# Patient Record
Sex: MALE | Race: WHITE | NOT HISPANIC OR LATINO | URBAN - METROPOLITAN AREA
[De-identification: names, ages, dates, MRNs, and addresses within clinical notes are randomized per-mention and may not be internally consistent; named-entity substitution may affect disease eponyms.]

---

## 2018-02-17 ENCOUNTER — EMERGENCY (EMERGENCY)
Facility: HOSPITAL | Age: 83
LOS: 1 days | Discharge: LEFT BEFORE DISCHARGE | End: 2018-02-17
Attending: EMERGENCY MEDICINE | Admitting: EMERGENCY MEDICINE
Payer: MEDICARE

## 2018-02-17 VITALS
RESPIRATION RATE: 16 BRPM | DIASTOLIC BLOOD PRESSURE: 81 MMHG | OXYGEN SATURATION: 97 % | SYSTOLIC BLOOD PRESSURE: 134 MMHG | HEART RATE: 81 BPM

## 2018-02-17 VITALS
HEIGHT: 66 IN | WEIGHT: 145.06 LBS | TEMPERATURE: 98 F | OXYGEN SATURATION: 96 % | SYSTOLIC BLOOD PRESSURE: 137 MMHG | RESPIRATION RATE: 22 BRPM | HEART RATE: 88 BPM | DIASTOLIC BLOOD PRESSURE: 85 MMHG

## 2018-02-17 LAB
ALBUMIN SERPL ELPH-MCNC: 3 G/DL — LOW (ref 3.3–5)
ALP SERPL-CCNC: 99 U/L — SIGNIFICANT CHANGE UP (ref 30–120)
ALT FLD-CCNC: 15 U/L DA — SIGNIFICANT CHANGE UP (ref 10–60)
ANION GAP SERPL CALC-SCNC: 9 MMOL/L — SIGNIFICANT CHANGE UP (ref 5–17)
AST SERPL-CCNC: 18 U/L — SIGNIFICANT CHANGE UP (ref 10–40)
BASOPHILS # BLD AUTO: 0 K/UL — SIGNIFICANT CHANGE UP (ref 0–0.2)
BASOPHILS NFR BLD AUTO: 0.5 % — SIGNIFICANT CHANGE UP (ref 0–2)
BILIRUB SERPL-MCNC: 1 MG/DL — SIGNIFICANT CHANGE UP (ref 0.2–1.2)
BUN SERPL-MCNC: 20 MG/DL — SIGNIFICANT CHANGE UP (ref 7–23)
CALCIUM SERPL-MCNC: 9 MG/DL — SIGNIFICANT CHANGE UP (ref 8.4–10.5)
CHLORIDE SERPL-SCNC: 103 MMOL/L — SIGNIFICANT CHANGE UP (ref 96–108)
CO2 SERPL-SCNC: 31 MMOL/L — SIGNIFICANT CHANGE UP (ref 22–31)
CREAT SERPL-MCNC: 0.98 MG/DL — SIGNIFICANT CHANGE UP (ref 0.5–1.3)
EOSINOPHIL # BLD AUTO: 0.1 K/UL — SIGNIFICANT CHANGE UP (ref 0–0.5)
EOSINOPHIL NFR BLD AUTO: 1 % — SIGNIFICANT CHANGE UP (ref 0–6)
GLUCOSE SERPL-MCNC: 116 MG/DL — HIGH (ref 70–99)
HCT VFR BLD CALC: 43.3 % — SIGNIFICANT CHANGE UP (ref 39–50)
HGB BLD-MCNC: 14.2 G/DL — SIGNIFICANT CHANGE UP (ref 13–17)
LYMPHOCYTES # BLD AUTO: 1 K/UL — SIGNIFICANT CHANGE UP (ref 1–3.3)
LYMPHOCYTES # BLD AUTO: 14.3 % — SIGNIFICANT CHANGE UP (ref 13–44)
MCHC RBC-ENTMCNC: 30.1 PG — SIGNIFICANT CHANGE UP (ref 27–34)
MCHC RBC-ENTMCNC: 32.7 GM/DL — SIGNIFICANT CHANGE UP (ref 32–36)
MCV RBC AUTO: 91.9 FL — SIGNIFICANT CHANGE UP (ref 80–100)
MONOCYTES # BLD AUTO: 0.4 K/UL — SIGNIFICANT CHANGE UP (ref 0–0.9)
MONOCYTES NFR BLD AUTO: 5.8 % — SIGNIFICANT CHANGE UP (ref 2–14)
NEUTROPHILS # BLD AUTO: 5.8 K/UL — SIGNIFICANT CHANGE UP (ref 1.8–7.4)
NEUTROPHILS NFR BLD AUTO: 78.4 % — HIGH (ref 43–77)
PLATELET # BLD AUTO: 198 K/UL — SIGNIFICANT CHANGE UP (ref 150–400)
POTASSIUM SERPL-MCNC: 3.5 MMOL/L — SIGNIFICANT CHANGE UP (ref 3.5–5.3)
POTASSIUM SERPL-SCNC: 3.5 MMOL/L — SIGNIFICANT CHANGE UP (ref 3.5–5.3)
PROT SERPL-MCNC: 7.9 G/DL — SIGNIFICANT CHANGE UP (ref 6–8.3)
RBC # BLD: 4.71 M/UL — SIGNIFICANT CHANGE UP (ref 4.2–5.8)
RBC # FLD: 11.7 % — SIGNIFICANT CHANGE UP (ref 10.3–14.5)
SODIUM SERPL-SCNC: 143 MMOL/L — SIGNIFICANT CHANGE UP (ref 135–145)
WBC # BLD: 7.3 K/UL — SIGNIFICANT CHANGE UP (ref 3.8–10.5)
WBC # FLD AUTO: 7.3 K/UL — SIGNIFICANT CHANGE UP (ref 3.8–10.5)

## 2018-02-17 PROCEDURE — 99284 EMERGENCY DEPT VISIT MOD MDM: CPT | Mod: 25

## 2018-02-17 PROCEDURE — 94640 AIRWAY INHALATION TREATMENT: CPT

## 2018-02-17 PROCEDURE — 71046 X-RAY EXAM CHEST 2 VIEWS: CPT | Mod: 26

## 2018-02-17 PROCEDURE — 71250 CT THORAX DX C-: CPT

## 2018-02-17 PROCEDURE — 94664 DEMO&/EVAL PT USE INHALER: CPT

## 2018-02-17 PROCEDURE — 93005 ELECTROCARDIOGRAM TRACING: CPT

## 2018-02-17 PROCEDURE — 96361 HYDRATE IV INFUSION ADD-ON: CPT

## 2018-02-17 PROCEDURE — 71046 X-RAY EXAM CHEST 2 VIEWS: CPT

## 2018-02-17 PROCEDURE — 93010 ELECTROCARDIOGRAM REPORT: CPT

## 2018-02-17 PROCEDURE — 85027 COMPLETE CBC AUTOMATED: CPT

## 2018-02-17 PROCEDURE — 96360 HYDRATION IV INFUSION INIT: CPT

## 2018-02-17 PROCEDURE — 71250 CT THORAX DX C-: CPT | Mod: 26

## 2018-02-17 PROCEDURE — 99285 EMERGENCY DEPT VISIT HI MDM: CPT

## 2018-02-17 PROCEDURE — 80053 COMPREHEN METABOLIC PANEL: CPT

## 2018-02-17 RX ORDER — SODIUM CHLORIDE 9 MG/ML
1000 INJECTION INTRAMUSCULAR; INTRAVENOUS; SUBCUTANEOUS ONCE
Qty: 0 | Refills: 0 | Status: COMPLETED | OUTPATIENT
Start: 2018-02-17 | End: 2018-02-17

## 2018-02-17 RX ORDER — SODIUM CHLORIDE 9 MG/ML
3 INJECTION INTRAMUSCULAR; INTRAVENOUS; SUBCUTANEOUS ONCE
Qty: 0 | Refills: 0 | Status: COMPLETED | OUTPATIENT
Start: 2018-02-17 | End: 2018-02-17

## 2018-02-17 RX ORDER — IPRATROPIUM/ALBUTEROL SULFATE 18-103MCG
3 AEROSOL WITH ADAPTER (GRAM) INHALATION ONCE
Qty: 0 | Refills: 0 | Status: COMPLETED | OUTPATIENT
Start: 2018-02-17 | End: 2018-02-17

## 2018-02-17 RX ORDER — ALBUTEROL 90 UG/1
2 AEROSOL, METERED ORAL
Qty: 1 | Refills: 0 | OUTPATIENT
Start: 2018-02-17 | End: 2018-03-18

## 2018-02-17 RX ORDER — AZITHROMYCIN 500 MG/1
500 TABLET, FILM COATED ORAL ONCE
Qty: 0 | Refills: 0 | Status: COMPLETED | OUTPATIENT
Start: 2018-02-17 | End: 2018-02-17

## 2018-02-17 RX ORDER — AZITHROMYCIN 500 MG/1
1 TABLET, FILM COATED ORAL
Qty: 4 | Refills: 0 | OUTPATIENT
Start: 2018-02-17 | End: 2018-02-20

## 2018-02-17 RX ADMIN — SODIUM CHLORIDE 3 MILLILITER(S): 9 INJECTION INTRAMUSCULAR; INTRAVENOUS; SUBCUTANEOUS at 14:36

## 2018-02-17 RX ADMIN — AZITHROMYCIN 500 MILLIGRAM(S): 500 TABLET, FILM COATED ORAL at 19:12

## 2018-02-17 RX ADMIN — SODIUM CHLORIDE 1000 MILLILITER(S): 9 INJECTION INTRAMUSCULAR; INTRAVENOUS; SUBCUTANEOUS at 17:08

## 2018-02-17 RX ADMIN — Medication 3 MILLILITER(S): at 18:51

## 2018-02-17 RX ADMIN — SODIUM CHLORIDE 1000 MILLILITER(S): 9 INJECTION INTRAMUSCULAR; INTRAVENOUS; SUBCUTANEOUS at 14:36

## 2018-02-17 NOTE — ED PROVIDER NOTE - PROGRESS NOTE DETAILS
labs and xray reviewed. will do ct of chest ct reviewed. results reviewed with pt including abnormal results. urine pending. waiting for urine. ct reviewed. results reviewed with pt including abnormal results. pt given a copy. urine pending. waiting for urine. will give azithro and albuterol for cough. pt advised we need urine. pt bladder scan shows >200cc urine. pt refused catheterization and wants to be d/c. pt advised he must sign out ama. I had a lengthy discussion with patient, and the patient wishes to leave at this time.The patient understands that he/she is leaving against medical advice despite the risk of missing a potential serious diagnosis which may lead to injury, disability and/or death. I discussed with the patient which tests would need to be performed and what type of monitoring would be necessary for the patient as well. I was unable to convince the patient to stay for further work-up.The patient is alert and oriented and demonstrates competence in making medical decisions. pt advised he must follow up with pmd or return to ed for worsening symptoms.

## 2018-02-17 NOTE — ED PROVIDER NOTE - ENMT, MLM
Airway patent, Nasal mucosa clear. Mouth with normal mucosa.dry MM Throat has no vesicles, no oropharyngeal exudates and uvula is midline.

## 2018-02-17 NOTE — ED ADULT NURSE NOTE - PAIN RATING/NUMBER SCALE (0-10): REST
Have Your Skin Lesions Been Treated?: not been treated Is This A New Presentation, Or A Follow-Up?: Skin Lesions How Severe Is Your Skin Lesion?: mild 6

## 2018-02-17 NOTE — ED ADULT NURSE NOTE - OBJECTIVE STATEMENT
Presents amb to ED from Urgent Care. Pt has Had moist cough & chest congestion. Pt also states diarrhea frequently. O?E color fair. Skin warm & dry to touch. Lungs- rhonchi with scattered wheezing. Abd soft nontender. Noble freely. Alert & oriented.

## 2018-02-17 NOTE — ED ADULT NURSE NOTE - PMH
"Chief Complaint   Patient presents with     Sleep Apnea     CPAP follow up, not getting good sleep with machine.       Initial /84  Pulse 82  Ht 1.651 m (5' 5\")  Wt 87.5 kg (193 lb)  SpO2 99%  BMI 32.12 kg/m2 Estimated body mass index is 32.12 kg/(m^2) as calculated from the following:    Height as of this encounter: 1.651 m (5' 5\").    Weight as of this encounter: 87.5 kg (193 lb).  Medication Reconciliation: complete    "
BPH (benign prostatic hyperplasia)

## 2018-02-17 NOTE — ED PROVIDER NOTE - OBJECTIVE STATEMENT
pt is a 89yo male with pmhx of BPH c/o cough x 2 weeks. pt endorses productive cough with green sputum. pt states he has associated diarrhea x 1 week, multiple BMS per day, increased after po intake. pt is a 89yo male with pmhx of BPH c/o cough x 2 weeks with associated fever and diarrhea. pt endorses productive cough with green sputum. pt states he has associated diarrhea x 1 week, multiple BMS per day, increased after po intake. pt [place] [facility] [place] to come to ed for evaluation. pt denies cp, palpitations, abd pain.

## 2018-02-17 NOTE — ED PROVIDER NOTE - ATTENDING CONTRIBUTION TO CARE
89 y/o M with cough and diarrhea. CT/XR, labs unremarkable.  Pt refused UA for urine legionella and signed out AMA.  antibiotics prescribed for duration of symptoms.  pt to f/u with PCP

## 2018-09-01 ENCOUNTER — EMERGENCY (EMERGENCY)
Facility: HOSPITAL | Age: 83
LOS: 1 days | Discharge: HOME | End: 2018-09-01
Attending: EMERGENCY MEDICINE
Payer: COMMERCIAL

## 2018-09-01 VITALS
TEMPERATURE: 98 F | DIASTOLIC BLOOD PRESSURE: 75 MMHG | RESPIRATION RATE: 17 BRPM | OXYGEN SATURATION: 99 % | HEART RATE: 85 BPM | SYSTOLIC BLOOD PRESSURE: 110 MMHG

## 2018-09-01 VITALS
DIASTOLIC BLOOD PRESSURE: 68 MMHG | SYSTOLIC BLOOD PRESSURE: 106 MMHG | WEIGHT: 139.99 LBS | OXYGEN SATURATION: 96 % | RESPIRATION RATE: 16 BRPM | HEIGHT: 60 IN | HEART RATE: 92 BPM | TEMPERATURE: 99 F

## 2018-09-01 LAB
APPEARANCE UR: ABNORMAL
BACTERIA # UR AUTO: ABNORMAL
BILIRUB UR-MCNC: NEGATIVE — SIGNIFICANT CHANGE UP
COLOR SPEC: YELLOW — SIGNIFICANT CHANGE UP
COMMENT - URINE: SIGNIFICANT CHANGE UP
DIFF PNL FLD: ABNORMAL
EPI CELLS # UR: ABNORMAL
GLUCOSE UR QL: NEGATIVE — SIGNIFICANT CHANGE UP
KETONES UR-MCNC: NEGATIVE — SIGNIFICANT CHANGE UP
LEUKOCYTE ESTERASE UR-ACNC: ABNORMAL
NITRITE UR-MCNC: POSITIVE
PH UR: 8 — SIGNIFICANT CHANGE UP (ref 5–8)
PROT UR-MCNC: 150 MG/DL
RBC CASTS # UR COMP ASSIST: ABNORMAL /HPF (ref 0–4)
SP GR SPEC: 1.02 — SIGNIFICANT CHANGE UP (ref 1.01–1.02)
UROBILINOGEN FLD QL: NEGATIVE — SIGNIFICANT CHANGE UP
WBC UR QL: >50

## 2018-09-01 PROCEDURE — 81001 URINALYSIS AUTO W/SCOPE: CPT

## 2018-09-01 PROCEDURE — 51702 INSERT TEMP BLADDER CATH: CPT

## 2018-09-01 PROCEDURE — 99283 EMERGENCY DEPT VISIT LOW MDM: CPT

## 2018-09-01 PROCEDURE — 99283 EMERGENCY DEPT VISIT LOW MDM: CPT | Mod: 25

## 2018-09-01 PROCEDURE — 87086 URINE CULTURE/COLONY COUNT: CPT

## 2018-09-01 NOTE — ED ADULT NURSE NOTE - OBJECTIVE STATEMENT
BIBEMS from home. Present to ER with c/o of Musa is not draining today. Pt has Musa for 3 weeks, but emptied  the urinary bag around 8am today. Denies any bladder discomfort.

## 2018-09-01 NOTE — ED PROVIDER NOTE - OBJECTIVE STATEMENT
Musa is not draining today, but has the Musa for 3 weeks, but emptied  the urinary bag around 8am today.  no pelvic or bladder discomfort.  bibems from home .  dc from [facility] this past Wednesday. Musa is not draining today, but has the Musa for 3 weeks, but emptied  the urinary bag around 8am today.  no pelvic or bladder discomfort.  bibems from home .  dc from [facility] this past Wednesday.  no other complaint.

## 2018-09-01 NOTE — ED ADULT NURSE NOTE - NSIMPLEMENTINTERV_GEN_ALL_ED
Implemented All Universal Safety Interventions:  [place] to call system. Call bell, personal items and telephone within reach. Instruct patient to call for assistance. Room bathroom lighting operational. Non-slip footwear when patient is off stretcher. Physically safe environment: no spills, clutter or unnecessary equipment. Stretcher in lowest position, wheels locked, appropriate side rails in place.

## 2018-09-02 LAB
CULTURE RESULTS: SIGNIFICANT CHANGE UP
SPECIMEN SOURCE: SIGNIFICANT CHANGE UP

## 2018-09-03 ENCOUNTER — EMERGENCY (EMERGENCY)
Facility: HOSPITAL | Age: 83
LOS: 1 days | Discharge: HOME | End: 2018-09-03
Attending: EMERGENCY MEDICINE
Payer: COMMERCIAL

## 2018-09-03 VITALS
WEIGHT: 145.06 LBS | HEART RATE: 91 BPM | OXYGEN SATURATION: 96 % | TEMPERATURE: 98 F | DIASTOLIC BLOOD PRESSURE: 64 MMHG | HEIGHT: 66 IN | RESPIRATION RATE: 16 BRPM | SYSTOLIC BLOOD PRESSURE: 98 MMHG

## 2018-09-03 VITALS
SYSTOLIC BLOOD PRESSURE: 122 MMHG | DIASTOLIC BLOOD PRESSURE: 62 MMHG | RESPIRATION RATE: 16 BRPM | OXYGEN SATURATION: 98 % | HEART RATE: 64 BPM | TEMPERATURE: 98 F

## 2018-09-03 DIAGNOSIS — Z90.49 ACQUIRED ABSENCE OF OTHER SPECIFIED PARTS OF DIGESTIVE TRACT: Chronic | ICD-10-CM

## 2018-09-03 LAB
ANION GAP SERPL CALC-SCNC: 9 MMOL/L — SIGNIFICANT CHANGE UP (ref 5–17)
APPEARANCE UR: ABNORMAL
BASOPHILS # BLD AUTO: 0.04 K/UL — SIGNIFICANT CHANGE UP (ref 0–0.2)
BASOPHILS NFR BLD AUTO: 1 % — SIGNIFICANT CHANGE UP (ref 0–2)
BILIRUB UR-MCNC: NEGATIVE — SIGNIFICANT CHANGE UP
BUN SERPL-MCNC: 14 MG/DL — SIGNIFICANT CHANGE UP (ref 7–23)
CALCIUM SERPL-MCNC: 8.8 MG/DL — SIGNIFICANT CHANGE UP (ref 8.5–10.1)
CHLORIDE SERPL-SCNC: 107 MMOL/L — SIGNIFICANT CHANGE UP (ref 96–108)
CO2 SERPL-SCNC: 25 MMOL/L — SIGNIFICANT CHANGE UP (ref 22–31)
COLOR SPEC: YELLOW — SIGNIFICANT CHANGE UP
CREAT SERPL-MCNC: 0.83 MG/DL — SIGNIFICANT CHANGE UP (ref 0.5–1.3)
DIFF PNL FLD: ABNORMAL
EOSINOPHIL # BLD AUTO: 0.41 K/UL — SIGNIFICANT CHANGE UP (ref 0–0.5)
EOSINOPHIL NFR BLD AUTO: 9.8 % — HIGH (ref 0–6)
GLUCOSE SERPL-MCNC: 93 MG/DL — SIGNIFICANT CHANGE UP (ref 70–99)
GLUCOSE UR QL: NEGATIVE — SIGNIFICANT CHANGE UP
HCT VFR BLD CALC: 35.3 % — LOW (ref 39–50)
HGB BLD-MCNC: 11.9 G/DL — LOW (ref 13–17)
IMM GRANULOCYTES NFR BLD AUTO: 0.2 % — SIGNIFICANT CHANGE UP (ref 0–1.5)
KETONES UR-MCNC: NEGATIVE — SIGNIFICANT CHANGE UP
LEUKOCYTE ESTERASE UR-ACNC: ABNORMAL
LYMPHOCYTES # BLD AUTO: 0.84 K/UL — LOW (ref 1–3.3)
LYMPHOCYTES # BLD AUTO: 20.1 % — SIGNIFICANT CHANGE UP (ref 13–44)
MCHC RBC-ENTMCNC: 30.4 PG — SIGNIFICANT CHANGE UP (ref 27–34)
MCHC RBC-ENTMCNC: 33.7 GM/DL — SIGNIFICANT CHANGE UP (ref 32–36)
MCV RBC AUTO: 90.3 FL — SIGNIFICANT CHANGE UP (ref 80–100)
MONOCYTES # BLD AUTO: 0.64 K/UL — SIGNIFICANT CHANGE UP (ref 0–0.9)
MONOCYTES NFR BLD AUTO: 15.3 % — HIGH (ref 2–14)
NEUTROPHILS # BLD AUTO: 2.24 K/UL — SIGNIFICANT CHANGE UP (ref 1.8–7.4)
NEUTROPHILS NFR BLD AUTO: 53.6 % — SIGNIFICANT CHANGE UP (ref 43–77)
NITRITE UR-MCNC: POSITIVE
PH UR: 9 — HIGH (ref 5–8)
PLATELET # BLD AUTO: 180 K/UL — SIGNIFICANT CHANGE UP (ref 150–400)
POTASSIUM SERPL-MCNC: 3.8 MMOL/L — SIGNIFICANT CHANGE UP (ref 3.5–5.3)
POTASSIUM SERPL-SCNC: 3.8 MMOL/L — SIGNIFICANT CHANGE UP (ref 3.5–5.3)
PROT UR-MCNC: 75 MG/DL
RBC # BLD: 3.91 M/UL — LOW (ref 4.2–5.8)
RBC # FLD: 13.4 % — SIGNIFICANT CHANGE UP (ref 10.3–14.5)
SODIUM SERPL-SCNC: 141 MMOL/L — SIGNIFICANT CHANGE UP (ref 135–145)
SP GR SPEC: 1.01 — SIGNIFICANT CHANGE UP (ref 1.01–1.02)
UROBILINOGEN FLD QL: NEGATIVE — SIGNIFICANT CHANGE UP
WBC # BLD: 4.18 K/UL — SIGNIFICANT CHANGE UP (ref 3.8–10.5)
WBC # FLD AUTO: 4.18 K/UL — SIGNIFICANT CHANGE UP (ref 3.8–10.5)

## 2018-09-03 PROCEDURE — 99284 EMERGENCY DEPT VISIT MOD MDM: CPT

## 2018-09-03 PROCEDURE — 87186 SC STD MICRODIL/AGAR DIL: CPT

## 2018-09-03 PROCEDURE — 87086 URINE CULTURE/COLONY COUNT: CPT

## 2018-09-03 PROCEDURE — 51702 INSERT TEMP BLADDER CATH: CPT

## 2018-09-03 PROCEDURE — 81001 URINALYSIS AUTO W/SCOPE: CPT

## 2018-09-03 PROCEDURE — 85027 COMPLETE CBC AUTOMATED: CPT

## 2018-09-03 PROCEDURE — 80048 BASIC METABOLIC PNL TOTAL CA: CPT

## 2018-09-03 PROCEDURE — 36415 COLL VENOUS BLD VENIPUNCTURE: CPT

## 2018-09-03 PROCEDURE — 99283 EMERGENCY DEPT VISIT LOW MDM: CPT | Mod: 25

## 2018-09-03 RX ORDER — CEPHALEXIN 500 MG
500 CAPSULE ORAL ONCE
Qty: 0 | Refills: 0 | Status: COMPLETED | OUTPATIENT
Start: 2018-09-03 | End: 2018-09-03

## 2018-09-03 RX ORDER — CEPHALEXIN 500 MG
1 CAPSULE ORAL
Qty: 20 | Refills: 0 | OUTPATIENT
Start: 2018-09-03 | End: 2018-09-12

## 2018-09-03 RX ORDER — CEPHALEXIN 500 MG
500 CAPSULE ORAL ONCE
Qty: 0 | Refills: 0 | Status: DISCONTINUED | OUTPATIENT
Start: 2018-09-03 | End: 2018-09-07

## 2018-09-03 RX ADMIN — Medication 500 MILLIGRAM(S): at 16:31

## 2018-09-03 NOTE — ED ADULT NURSE NOTE - OBJECTIVE STATEMENT
Pt received in bed alert and oriented and resting in bed with c/o being seen here 2 days ago and felipe replaced, but today pt reports discomfort and insertion site and dark urine yesterday, As per Md's orders IV ricardo placed blood specimen obtained and sent to the lab. Felipe cath change and urine specimen obtained and sent to the lab. Pt stable and nursing care ongoing and safety maintained.

## 2018-09-03 NOTE — ED ADULT NURSE NOTE - NSIMPLEMENTINTERV_GEN_ALL_ED
Implemented All Universal Safety Interventions:  [place] to call system. Call bell, personal items and telephone within reach. Instruct patient to call for assistance. Room bathroom lighting operational. Non-slip footwear when patient is off stretcher. Physically safe environment: no spills, clutter or unnecessary equipment. Stretcher in lowest position, wheels locked, appropriate side rails in place.

## 2018-09-03 NOTE — ED PROVIDER NOTE - PHYSICAL EXAMINATION
GEN: awake, alert, well appearing, NAD   HENT: atraumatic, normocephalic, AALIYAH, EOMI, no midline instability, oropharynx w/o erythema or exudates, no lymphadenopathy  CV: normal rate and rhythm, S1, S2, no MRG, equal pulses throughout, no JVD  RESP: no distress, no IWOB, no retraction, clear to auscultation bilaterally   ABD: soft, nontender, nondistended, no rebound, no guarding, normoactive bowel sounds, no organomegally  MUSCULOSKELETAL: strenght 5/5 x 4, full range of motion, CMS intact   SKIN: normal color, no turgor, no wounds or rash   NEURO: Awake alert oriented x 3, no facial asymmetry, no slurred speech, no pronator drift, moving all extremities  PSYCH: no suicial ideation, no homicidal ideation, no depression, no anxiety, no hallucination

## 2018-09-03 NOTE — ED PROVIDER NOTE - CARE PLAN
Principal Discharge DX:	Musa catheter problem, subsequent encounter  Secondary Diagnosis:	Urinary tract infection with hematuria, site unspecified

## 2018-09-03 NOTE — ED PROVIDER NOTE - PROGRESS NOTE DETAILS
felipe replaced, ua pos, prior cultures suspicious for contamination however given symptoms will treat w/ abx, pt to f/u w/ pcp/ for further eval and mgmt

## 2018-09-03 NOTE — ED PROVIDER NOTE - MEDICAL DECISION MAKING DETAILS
89yo M h/o BPH, seen 2 days prior for clogged felipe, s/p replacement, returns w/ leakage and pain around felipe insertion site. denies f/c/n/v/d.

## 2018-09-03 NOTE — ED ADULT TRIAGE NOTE - CHIEF COMPLAINT QUOTE
pt seen here 2 days ago and felipe replaced, has to follow up with Dr. Nichols but hasn't yet, today pt reports discomfort and insertion site and dark urine yesterday,

## 2018-09-05 LAB
-  AMIKACIN: SIGNIFICANT CHANGE UP
-  AMIKACIN: SIGNIFICANT CHANGE UP
-  AMOXICILLIN/CLAVULANIC ACID: SIGNIFICANT CHANGE UP
-  AMOXICILLIN/CLAVULANIC ACID: SIGNIFICANT CHANGE UP
-  AMPICILLIN/SULBACTAM: SIGNIFICANT CHANGE UP
-  AMPICILLIN/SULBACTAM: SIGNIFICANT CHANGE UP
-  AMPICILLIN: SIGNIFICANT CHANGE UP
-  AMPICILLIN: SIGNIFICANT CHANGE UP
-  AZTREONAM: SIGNIFICANT CHANGE UP
-  AZTREONAM: SIGNIFICANT CHANGE UP
-  CEFAZOLIN: SIGNIFICANT CHANGE UP
-  CEFAZOLIN: SIGNIFICANT CHANGE UP
-  CEFEPIME: SIGNIFICANT CHANGE UP
-  CEFEPIME: SIGNIFICANT CHANGE UP
-  CEFOXITIN: SIGNIFICANT CHANGE UP
-  CEFOXITIN: SIGNIFICANT CHANGE UP
-  CEFTRIAXONE: SIGNIFICANT CHANGE UP
-  CEFTRIAXONE: SIGNIFICANT CHANGE UP
-  CIPROFLOXACIN: SIGNIFICANT CHANGE UP
-  CIPROFLOXACIN: SIGNIFICANT CHANGE UP
-  ERTAPENEM: SIGNIFICANT CHANGE UP
-  ERTAPENEM: SIGNIFICANT CHANGE UP
-  GENTAMICIN: SIGNIFICANT CHANGE UP
-  GENTAMICIN: SIGNIFICANT CHANGE UP
-  IMIPENEM: SIGNIFICANT CHANGE UP
-  LEVOFLOXACIN: SIGNIFICANT CHANGE UP
-  LEVOFLOXACIN: SIGNIFICANT CHANGE UP
-  MEROPENEM: SIGNIFICANT CHANGE UP
-  MEROPENEM: SIGNIFICANT CHANGE UP
-  NITROFURANTOIN: SIGNIFICANT CHANGE UP
-  NITROFURANTOIN: SIGNIFICANT CHANGE UP
-  PIPERACILLIN/TAZOBACTAM: SIGNIFICANT CHANGE UP
-  PIPERACILLIN/TAZOBACTAM: SIGNIFICANT CHANGE UP
-  TIGECYCLINE: SIGNIFICANT CHANGE UP
-  TOBRAMYCIN: SIGNIFICANT CHANGE UP
-  TOBRAMYCIN: SIGNIFICANT CHANGE UP
-  TRIMETHOPRIM/SULFAMETHOXAZOLE: SIGNIFICANT CHANGE UP
-  TRIMETHOPRIM/SULFAMETHOXAZOLE: SIGNIFICANT CHANGE UP
CULTURE RESULTS: SIGNIFICANT CHANGE UP
METHOD TYPE: SIGNIFICANT CHANGE UP
METHOD TYPE: SIGNIFICANT CHANGE UP
ORGANISM # SPEC MICROSCOPIC CNT: SIGNIFICANT CHANGE UP
SPECIMEN SOURCE: SIGNIFICANT CHANGE UP

## 2018-09-06 PROBLEM — N40.0 BENIGN PROSTATIC HYPERPLASIA WITHOUT LOWER URINARY TRACT SYMPTOMS: Chronic | Status: ACTIVE | Noted: 2018-02-17

## 2018-09-19 ENCOUNTER — EMERGENCY (EMERGENCY)
Facility: HOSPITAL | Age: 83
LOS: 1 days | Discharge: HOME | End: 2018-09-19
Attending: STUDENT IN AN ORGANIZED HEALTH CARE EDUCATION/TRAINING PROGRAM
Payer: COMMERCIAL

## 2018-09-19 VITALS
SYSTOLIC BLOOD PRESSURE: 102 MMHG | OXYGEN SATURATION: 97 % | DIASTOLIC BLOOD PRESSURE: 67 MMHG | HEART RATE: 67 BPM | TEMPERATURE: 98 F | RESPIRATION RATE: 15 BRPM

## 2018-09-19 VITALS
OXYGEN SATURATION: 98 % | RESPIRATION RATE: 16 BRPM | TEMPERATURE: 98 F | SYSTOLIC BLOOD PRESSURE: 103 MMHG | HEIGHT: 66 IN | DIASTOLIC BLOOD PRESSURE: 62 MMHG | WEIGHT: 145.06 LBS | HEART RATE: 86 BPM

## 2018-09-19 DIAGNOSIS — Z90.49 ACQUIRED ABSENCE OF OTHER SPECIFIED PARTS OF DIGESTIVE TRACT: Chronic | ICD-10-CM

## 2018-09-19 PROBLEM — A41.9 SEPSIS, UNSPECIFIED ORGANISM: Chronic | Status: ACTIVE | Noted: 2018-09-03

## 2018-09-19 PROBLEM — N40.0 BENIGN PROSTATIC HYPERPLASIA WITHOUT LOWER URINARY TRACT SYMPTOMS: Chronic | Status: ACTIVE | Noted: 2018-09-03

## 2018-09-19 PROBLEM — Z92.89 PERSONAL HISTORY OF OTHER MEDICAL TREATMENT: Chronic | Status: ACTIVE | Noted: 2018-09-03

## 2018-09-19 PROBLEM — C18.9 MALIGNANT NEOPLASM OF COLON, UNSPECIFIED: Chronic | Status: ACTIVE | Noted: 2018-09-03

## 2018-09-19 LAB
APPEARANCE UR: ABNORMAL
BACTERIA # UR AUTO: ABNORMAL
BILIRUB UR-MCNC: NEGATIVE — SIGNIFICANT CHANGE UP
COLOR SPEC: YELLOW — SIGNIFICANT CHANGE UP
DIFF PNL FLD: ABNORMAL
EPI CELLS # UR: SIGNIFICANT CHANGE UP
GLUCOSE UR QL: NEGATIVE — SIGNIFICANT CHANGE UP
KETONES UR-MCNC: NEGATIVE — SIGNIFICANT CHANGE UP
LEUKOCYTE ESTERASE UR-ACNC: ABNORMAL
NITRITE UR-MCNC: POSITIVE
PH UR: 8 — SIGNIFICANT CHANGE UP (ref 5–8)
PROT UR-MCNC: 150 MG/DL
RBC CASTS # UR COMP ASSIST: ABNORMAL /HPF (ref 0–4)
SP GR SPEC: 1.01 — SIGNIFICANT CHANGE UP (ref 1.01–1.02)
UROBILINOGEN FLD QL: NEGATIVE — SIGNIFICANT CHANGE UP
WBC UR QL: >50

## 2018-09-19 PROCEDURE — 81001 URINALYSIS AUTO W/SCOPE: CPT

## 2018-09-19 PROCEDURE — 87086 URINE CULTURE/COLONY COUNT: CPT

## 2018-09-19 PROCEDURE — 87186 SC STD MICRODIL/AGAR DIL: CPT

## 2018-09-19 PROCEDURE — 51702 INSERT TEMP BLADDER CATH: CPT

## 2018-09-19 PROCEDURE — 99283 EMERGENCY DEPT VISIT LOW MDM: CPT | Mod: 25

## 2018-09-19 PROCEDURE — 99284 EMERGENCY DEPT VISIT MOD MDM: CPT

## 2018-09-19 RX ORDER — CEFUROXIME AXETIL 250 MG
1 TABLET ORAL
Qty: 14 | Refills: 0 | OUTPATIENT
Start: 2018-09-19 | End: 2018-09-25

## 2018-09-19 RX ORDER — CEFUROXIME AXETIL 250 MG
500 TABLET ORAL ONCE
Qty: 0 | Refills: 0 | Status: COMPLETED | OUTPATIENT
Start: 2018-09-19 | End: 2018-09-19

## 2018-09-19 RX ADMIN — Medication 500 MILLIGRAM(S): at 05:00

## 2018-09-19 NOTE — ED PROVIDER NOTE - GENITOURINARY, MLM
No discharge, lesions. Bladder mildly distended, non-tender to palpation.  Musa with no visible leaks, bag with scant cloudy urine

## 2018-09-19 NOTE — ED ADULT NURSE NOTE - OBJECTIVE STATEMENT
Patient came in to ED c/o clogged felipe cather. Patient states that he went to bed at 9 pm and since the urine output noted the same that opted him to come to ED.

## 2018-09-19 NOTE — ED PROVIDER NOTE - PROGRESS NOTE DETAILS
16Fr felipe placed, 200cc drained of cloudy yellow urine 16Fr felipe placed, 200cc drained of cloudy yellow urine. Leg bag placed

## 2018-09-19 NOTE — ED ADULT TRIAGE NOTE - CHIEF COMPLAINT QUOTE
Patient stated his felipe catheter might have been clogged as he slept at 9pm and his urine output is the same amount until this time; urine output appeared cloudy. Patient stated his catheter was changed September 3, 2018 and he is not leaking. He has an appointment on Friday with his urology he had a felipe catheter since April 2018.

## 2018-09-19 NOTE — ED ADULT NURSE REASSESSMENT NOTE - NS ED NURSE REASSESS COMMENT FT1
Musa catheter removed and changed with F16 indwelling catheter connected to urine bag. Patient tolerated procedure well.

## 2018-09-19 NOTE — ED PROVIDER NOTE - OBJECTIVE STATEMENT
89 year old male with a history of BPH, colon cancer presents with clogged felipe catheter.  Patient states he emptied his bag at 9pm.  He woke up at 1am and noted no urine output.  He had the urge to urinate but was unable to do so.  This is his 3rd visit this month for same.  He lives at home, [facility]  P[facility] with the [facility]

## 2018-09-19 NOTE — ED PROVIDER NOTE - PMH
BPH (benign prostatic hyperplasia)    BPH (benign prostatic hyperplasia)    Colon cancer    Musa catheter in place    Sepsis

## 2018-09-21 LAB
-  AMIKACIN: SIGNIFICANT CHANGE UP
-  AMOXICILLIN/CLAVULANIC ACID: SIGNIFICANT CHANGE UP
-  AMPICILLIN/SULBACTAM: SIGNIFICANT CHANGE UP
-  AMPICILLIN: SIGNIFICANT CHANGE UP
-  AZTREONAM: SIGNIFICANT CHANGE UP
-  CEFAZOLIN: SIGNIFICANT CHANGE UP
-  CEFEPIME: SIGNIFICANT CHANGE UP
-  CEFOXITIN: SIGNIFICANT CHANGE UP
-  CEFTRIAXONE: SIGNIFICANT CHANGE UP
-  CIPROFLOXACIN: SIGNIFICANT CHANGE UP
-  ERTAPENEM: SIGNIFICANT CHANGE UP
-  GENTAMICIN: SIGNIFICANT CHANGE UP
-  LEVOFLOXACIN: SIGNIFICANT CHANGE UP
-  MEROPENEM: SIGNIFICANT CHANGE UP
-  NITROFURANTOIN: SIGNIFICANT CHANGE UP
-  PIPERACILLIN/TAZOBACTAM: SIGNIFICANT CHANGE UP
-  TOBRAMYCIN: SIGNIFICANT CHANGE UP
-  TRIMETHOPRIM/SULFAMETHOXAZOLE: SIGNIFICANT CHANGE UP
CULTURE RESULTS: SIGNIFICANT CHANGE UP
METHOD TYPE: SIGNIFICANT CHANGE UP
ORGANISM # SPEC MICROSCOPIC CNT: SIGNIFICANT CHANGE UP
ORGANISM # SPEC MICROSCOPIC CNT: SIGNIFICANT CHANGE UP
SPECIMEN SOURCE: SIGNIFICANT CHANGE UP

## 2018-10-05 ENCOUNTER — EMERGENCY (EMERGENCY)
Facility: HOSPITAL | Age: 83
LOS: 1 days | Discharge: HOME | End: 2018-10-05
Attending: INTERNAL MEDICINE
Payer: COMMERCIAL

## 2018-10-05 VITALS
SYSTOLIC BLOOD PRESSURE: 123 MMHG | TEMPERATURE: 98 F | RESPIRATION RATE: 16 BRPM | HEART RATE: 63 BPM | OXYGEN SATURATION: 97 % | DIASTOLIC BLOOD PRESSURE: 71 MMHG

## 2018-10-05 VITALS
HEART RATE: 93 BPM | WEIGHT: 149.91 LBS | RESPIRATION RATE: 16 BRPM | DIASTOLIC BLOOD PRESSURE: 63 MMHG | SYSTOLIC BLOOD PRESSURE: 97 MMHG | TEMPERATURE: 98 F | OXYGEN SATURATION: 97 %

## 2018-10-05 DIAGNOSIS — Z90.49 ACQUIRED ABSENCE OF OTHER SPECIFIED PARTS OF DIGESTIVE TRACT: Chronic | ICD-10-CM

## 2018-10-05 LAB
APPEARANCE UR: ABNORMAL
BACTERIA # UR AUTO: ABNORMAL
BILIRUB UR-MCNC: NEGATIVE — SIGNIFICANT CHANGE UP
COLOR SPEC: YELLOW — SIGNIFICANT CHANGE UP
COMMENT - URINE: SIGNIFICANT CHANGE UP
DIFF PNL FLD: ABNORMAL
EPI CELLS # UR: NEGATIVE — SIGNIFICANT CHANGE UP
GLUCOSE UR QL: NEGATIVE — SIGNIFICANT CHANGE UP
KETONES UR-MCNC: NEGATIVE — SIGNIFICANT CHANGE UP
LEUKOCYTE ESTERASE UR-ACNC: ABNORMAL
NITRITE UR-MCNC: NEGATIVE — SIGNIFICANT CHANGE UP
PH UR: 6 — SIGNIFICANT CHANGE UP (ref 5–8)
PROT UR-MCNC: 75 MG/DL
RBC CASTS # UR COMP ASSIST: ABNORMAL /HPF (ref 0–4)
SP GR SPEC: 1.01 — SIGNIFICANT CHANGE UP (ref 1.01–1.02)
UROBILINOGEN FLD QL: NEGATIVE — SIGNIFICANT CHANGE UP
WBC UR QL: >50

## 2018-10-05 PROCEDURE — 99283 EMERGENCY DEPT VISIT LOW MDM: CPT

## 2018-10-05 PROCEDURE — 99283 EMERGENCY DEPT VISIT LOW MDM: CPT | Mod: 25

## 2018-10-05 PROCEDURE — 87086 URINE CULTURE/COLONY COUNT: CPT

## 2018-10-05 PROCEDURE — 51702 INSERT TEMP BLADDER CATH: CPT

## 2018-10-05 PROCEDURE — 81001 URINALYSIS AUTO W/SCOPE: CPT

## 2018-10-05 PROCEDURE — 87186 SC STD MICRODIL/AGAR DIL: CPT

## 2018-10-05 RX ORDER — CEFUROXIME AXETIL 250 MG
1 TABLET ORAL
Qty: 20 | Refills: 0 | OUTPATIENT
Start: 2018-10-05 | End: 2018-10-14

## 2018-10-05 RX ORDER — CEFUROXIME AXETIL 250 MG
250 TABLET ORAL ONCE
Qty: 0 | Refills: 0 | Status: COMPLETED | OUTPATIENT
Start: 2018-10-05 | End: 2018-10-05

## 2018-10-05 RX ADMIN — Medication 250 MILLIGRAM(S): at 06:23

## 2018-10-05 NOTE — ED PROVIDER NOTE - OBJECTIVE STATEMENT
89 y/o wm h/o BPH chronic Musa cathter . He has an occluded Musa and bladder discomfort. no fever, no chills, no N/V , no bleeding

## 2018-10-05 NOTE — ED ADULT NURSE REASSESSMENT NOTE - NS ED NURSE REASSESS COMMENT FT1
MD order for Musa insertion. Pt. noted with 18 English Musa in place. Bladder scan 501ml. Catheter replaced, pt. tolerated well, draining  yellow urine.

## 2018-10-05 NOTE — ED ADULT NURSE NOTE - OBJECTIVE STATEMENT
Pt. complaining of clogged catheter with bladder discomfort. Pt. stated Musa bag was emptied last night around 9pm, approx. 50 cc of urine noted in catheter at this time. Pt. stated Musa is chronic.

## 2018-11-11 ENCOUNTER — EMERGENCY (EMERGENCY)
Facility: HOSPITAL | Age: 83
LOS: 1 days | Discharge: HOME | End: 2018-11-11
Attending: EMERGENCY MEDICINE
Payer: COMMERCIAL

## 2018-11-11 VITALS
TEMPERATURE: 97 F | DIASTOLIC BLOOD PRESSURE: 49 MMHG | HEART RATE: 83 BPM | RESPIRATION RATE: 16 BRPM | OXYGEN SATURATION: 97 % | SYSTOLIC BLOOD PRESSURE: 109 MMHG

## 2018-11-11 VITALS
TEMPERATURE: 97 F | OXYGEN SATURATION: 97 % | HEART RATE: 80 BPM | SYSTOLIC BLOOD PRESSURE: 114 MMHG | RESPIRATION RATE: 16 BRPM | DIASTOLIC BLOOD PRESSURE: 60 MMHG

## 2018-11-11 DIAGNOSIS — Z90.49 ACQUIRED ABSENCE OF OTHER SPECIFIED PARTS OF DIGESTIVE TRACT: Chronic | ICD-10-CM

## 2018-11-11 LAB
APPEARANCE UR: ABNORMAL
BILIRUB UR-MCNC: NEGATIVE — SIGNIFICANT CHANGE UP
COLOR SPEC: YELLOW — SIGNIFICANT CHANGE UP
DIFF PNL FLD: ABNORMAL
GLUCOSE UR QL: NEGATIVE — SIGNIFICANT CHANGE UP
KETONES UR-MCNC: NEGATIVE — SIGNIFICANT CHANGE UP
LEUKOCYTE ESTERASE UR-ACNC: ABNORMAL
NITRITE UR-MCNC: NEGATIVE — SIGNIFICANT CHANGE UP
PH UR: 7 — SIGNIFICANT CHANGE UP (ref 5–8)
PROT UR-MCNC: 500 MG/DL
SP GR SPEC: 1.01 — SIGNIFICANT CHANGE UP (ref 1.01–1.02)
UROBILINOGEN FLD QL: NEGATIVE — SIGNIFICANT CHANGE UP

## 2018-11-11 PROCEDURE — 87186 SC STD MICRODIL/AGAR DIL: CPT

## 2018-11-11 PROCEDURE — 51702 INSERT TEMP BLADDER CATH: CPT

## 2018-11-11 PROCEDURE — 99283 EMERGENCY DEPT VISIT LOW MDM: CPT | Mod: 25

## 2018-11-11 PROCEDURE — 81001 URINALYSIS AUTO W/SCOPE: CPT

## 2018-11-11 PROCEDURE — 87086 URINE CULTURE/COLONY COUNT: CPT

## 2018-11-11 PROCEDURE — 99283 EMERGENCY DEPT VISIT LOW MDM: CPT

## 2018-11-11 RX ORDER — CEFUROXIME AXETIL 250 MG
1 TABLET ORAL
Qty: 14 | Refills: 0 | OUTPATIENT
Start: 2018-11-11 | End: 2018-11-17

## 2018-11-11 NOTE — ED PROVIDER NOTE - OBJECTIVE STATEMENT
presents with "clogged felipe" since this morning. noticed some leakage around felipe this morning and his pants were wet. slight abdominal pressure "but no pain". no fevers. states his urine does not appear cloudy. history of BPH. has had indwelling catheter since April 2018. has tried meds to shrink his prostate but not working. states his urologist has talked about performing laser surgery next. has 16 [place] in currently and states it "tends to get clogged up, especially if he drinks too much milk and not enough water". states 18 [place] seems to get clogged less but is more uncomfortable going in.   [facility] Coppola [facility]  urologist (at [facility], unable to remember name)

## 2018-11-11 NOTE — ED PROVIDER NOTE - ATTENDING CONTRIBUTION TO CARE
I have personally performed a face to face diagnostic evaluation on this patient.  I have reviewed the PA note and agree with the history, exam, and plan of care, except as noted.  History and Exam by me shows  clogged catheter today s any complaint.  abd- soft , nt.  hemal pacheco tomorrow.

## 2018-11-11 NOTE — ED PROVIDER NOTE - MEDICAL DECISION MAKING DETAILS
matteo changed, lab presents with clogged felipe. will replace felipe. Patient requesting 18 Nepali. will check UA

## 2018-11-11 NOTE — ED PROVIDER NOTE - NS ED MD DISPO DISCHARGE
Bariatric surgery status  gastric sleeve - 2014 at [facility]  History of tonsillectomy
Home

## 2018-11-11 NOTE — ED PROVIDER NOTE - GASTROINTESTINAL NEGATIVE STATEMENT, MLM
no abdominal pain but slight pressure, no bloating, no constipation, no diarrhea, no nausea and no vomiting.

## 2018-11-11 NOTE — ED PROVIDER NOTE - PROGRESS NOTE DETAILS
Reevaluated patient at bedside.  Patient feeling much improved. felipe replaced. urine appeared quite cloudy. will prescribe antibiotics. will have close follow up with urology.  An opportunity to ask questions was given.  Discussed the importance of prompt, close medical follow-up.  Patient will return with any changes, concerns or persistent / worsening symptoms.  Understanding of all instructions verbalized.

## 2018-11-11 NOTE — ED PROVIDER NOTE - CARE PLAN
Principal Discharge DX:	Musa catheter problem, initial encounter  Secondary Diagnosis:	Benign prostatic hyperplasia, unspecified whether lower urinary tract symptoms present

## 2018-11-11 NOTE — ED ADULT NURSE NOTE - NSIMPLEMENTINTERV_GEN_ALL_ED
Implemented All Fall Risk Interventions:  [place] to call system. Call bell, personal items and telephone within reach. Instruct patient to call for assistance. Room bathroom lighting operational. Non-slip footwear when patient is off stretcher. Physically safe environment: no spills, clutter or unnecessary equipment. Stretcher in lowest position, wheels locked, appropriate side rails in place. Provide visual cue, wrist band, yellow gown, etc. Monitor gait and stability. Monitor for mental status changes and reorient to person, place, and time. Review medications for side effects contributing to fall risk. Reinforce activity limits and safety measures with patient and family.

## 2018-12-01 ENCOUNTER — EMERGENCY (EMERGENCY)
Facility: HOSPITAL | Age: 83
LOS: 1 days | Discharge: HOME | End: 2018-12-01
Attending: EMERGENCY MEDICINE | Admitting: EMERGENCY MEDICINE
Payer: COMMERCIAL

## 2018-12-01 VITALS
WEIGHT: 149.91 LBS | TEMPERATURE: 97 F | DIASTOLIC BLOOD PRESSURE: 75 MMHG | RESPIRATION RATE: 14 BRPM | SYSTOLIC BLOOD PRESSURE: 117 MMHG | OXYGEN SATURATION: 98 % | HEIGHT: 66 IN | HEART RATE: 95 BPM

## 2018-12-01 VITALS
RESPIRATION RATE: 16 BRPM | TEMPERATURE: 98 F | HEART RATE: 90 BPM | OXYGEN SATURATION: 98 % | DIASTOLIC BLOOD PRESSURE: 72 MMHG | SYSTOLIC BLOOD PRESSURE: 118 MMHG

## 2018-12-01 DIAGNOSIS — Z90.49 ACQUIRED ABSENCE OF OTHER SPECIFIED PARTS OF DIGESTIVE TRACT: Chronic | ICD-10-CM

## 2018-12-01 PROCEDURE — 99283 EMERGENCY DEPT VISIT LOW MDM: CPT | Mod: 25

## 2018-12-01 PROCEDURE — 99283 EMERGENCY DEPT VISIT LOW MDM: CPT

## 2018-12-01 PROCEDURE — 51702 INSERT TEMP BLADDER CATH: CPT

## 2018-12-01 NOTE — ED PROVIDER NOTE - PROGRESS NOTE DETAILS
felipe catheter replaced, urine draining well in felipe bag, patient symptoms resolved, [facility]

## 2018-12-01 NOTE — ED PROVIDER NOTE - PHYSICAL EXAMINATION
Gen: Alert, NAD  Head/eyes: NC/AT, PERRL, EOMI, normal lids/conjunctiva, no scleral icterus  ENT: Bilateral TM WNL, normal hearing, patent oropharynx without erythema/exudate, uvula midline, no peritonsillar abscess, no tongue/uvula swelling  Neck: supple, no tenderness/meningismus/JVD, Trachea midline  Pulm: Bilateral clear BS, normal resp effort, no wheeze/stridor/retractions  CV: RRR, no M/R/G, +2 dist pulses (radial, pedal DP/PT, popliteal)  Abd: soft, NT/ND, +BS, no guarding/rebound tenderness, mild fullness noted suprapubic  Musculoskeletal: no edema/erythema/cyanosis, FROM in all extremities, no C/T/L spine ttp  Skin: no rash, no vesicles, no petechaie, no ecchymosis, no swelling  Neuro: AAOx3, CN 2-12 intact, normal sensation, 5/5 motor strength in all extremities, normal gait, no dysmetria

## 2018-12-01 NOTE — ED PROVIDER NOTE - OBJECTIVE STATEMENT
89 yo male hx of bph with chronic indwelling felipe c/o felipe catheter not draining since 9:30pm last night, here for evaluation.  States his bladder feels full.  No fever/chills.  No nausea/vomiting/diarrhea.  No other complaints.  States he gets his catheter changed every 3 weeks, and is due for a change.

## 2018-12-01 NOTE — ED ADULT NURSE NOTE - NSIMPLEMENTINTERV_GEN_ALL_ED
Implemented All Fall with Harm Risk Interventions:  [place] to call system. Call bell, personal items and telephone within reach. Instruct patient to call for assistance. Room bathroom lighting operational. Non-slip footwear when patient is off stretcher. Physically safe environment: no spills, clutter or unnecessary equipment. Stretcher in lowest position, wheels locked, appropriate side rails in place. Provide visual cue, wrist band, yellow gown, etc. Monitor gait and stability. Monitor for mental status changes and reorient to person, place, and time. Review medications for side effects contributing to fall risk. Reinforce activity limits and safety measures with patient and family. Provide visual clues: red socks.

## 2018-12-01 NOTE — ED PROVIDER NOTE - NS ED ROS FT

## 2018-12-07 ENCOUNTER — EMERGENCY (EMERGENCY)
Facility: HOSPITAL | Age: 83
LOS: 1 days | Discharge: HOME | End: 2018-12-07
Attending: EMERGENCY MEDICINE | Admitting: EMERGENCY MEDICINE
Payer: COMMERCIAL

## 2018-12-07 VITALS
TEMPERATURE: 98 F | DIASTOLIC BLOOD PRESSURE: 73 MMHG | HEART RATE: 64 BPM | SYSTOLIC BLOOD PRESSURE: 152 MMHG | OXYGEN SATURATION: 99 % | RESPIRATION RATE: 16 BRPM

## 2018-12-07 VITALS
DIASTOLIC BLOOD PRESSURE: 68 MMHG | TEMPERATURE: 99 F | OXYGEN SATURATION: 100 % | RESPIRATION RATE: 16 BRPM | HEART RATE: 66 BPM | SYSTOLIC BLOOD PRESSURE: 144 MMHG

## 2018-12-07 DIAGNOSIS — Z90.49 ACQUIRED ABSENCE OF OTHER SPECIFIED PARTS OF DIGESTIVE TRACT: Chronic | ICD-10-CM

## 2018-12-07 PROCEDURE — 51702 INSERT TEMP BLADDER CATH: CPT

## 2018-12-07 PROCEDURE — 99283 EMERGENCY DEPT VISIT LOW MDM: CPT

## 2018-12-07 PROCEDURE — 99283 EMERGENCY DEPT VISIT LOW MDM: CPT | Mod: 25

## 2018-12-07 NOTE — ED ADULT NURSE NOTE - NSIMPLEMENTINTERV_GEN_ALL_ED
Implemented All Fall with Harm Risk Interventions:  [place] to call system. Call bell, personal items and telephone within reach. Instruct patient to call for assistance. Room bathroom lighting operational. Non-slip footwear when patient is off stretcher. Physically safe environment: no spills, clutter or unnecessary equipment. Stretcher in lowest position, wheels locked, appropriate side rails in place. Provide visual cue, wrist band, yellow gown, etc. Monitor gait and stability. Monitor for mental status changes and reorient to person, place, and time. Review medications for side effects contributing to fall risk. Reinforce activity limits and safety measures with patient and family. Provide visual clues: red socks.

## 2018-12-07 NOTE — ED ADULT NURSE NOTE - OBJECTIVE STATEMENT
pt has bladder pain and distention with an indwelling urinary catheter in place.  pt has had one since April 2018 and this past one was placed 1 week ago but is not draining well.

## 2018-12-07 NOTE — ED ADULT NURSE REASSESSMENT NOTE - NS ED NURSE REASSESS COMMENT FT1
pt declined a leg bad and requested a regular drainage bag without the bulky measuring device.  one was found in the urinary cart and was changed before he left using sterile technique

## 2018-12-07 NOTE — ED PROVIDER NOTE - PROGRESS NOTE DETAILS
felipe cath changed to caude 18 franch, draining, feeling better, will f/[facility] with [facility]

## 2018-12-07 NOTE — ED PROVIDER NOTE - OBJECTIVE STATEMENT
89 male presents to ER c/o clogged felipe catheter, states it was changed a week ago but that he was suppose to get 18french and instead got 16 Armenian size tube, patient feels urinary distension and urine bag was drained this morning and very little urine output since.

## 2020-01-24 NOTE — ED ADULT TRIAGE NOTE - PAIN: PRESENCE, MLM
- Subjective


Subjective: 





Patient continues to improve. Dressing intact and there is some per went 

drainage. Patient will benefit from home with home healthcare with RN for wound 

care. Breathing well. Afebrile. Cultures with pan sensitive Staphylococcus, 

will de-escalate to oral antibiotics on discharge.





- Objective


Vital Signs & Weight: 


 Vital Signs (12 hours)











  Temp Pulse Resp BP Pulse Ox


 


 01/24/20 07:10  98.0 F  61  18  154/85 H  94 L








 Weight











Weight                         138 lb 4.8 oz














I&O: 


 











 01/23/20 01/24/20 01/25/20





 06:59 06:59 06:59


 


Intake Total 1970  


 


Balance 1970  











Result Diagrams: 


 01/22/20 05:47





 01/21/20 04:48





Hospitalist ROS





- Review of Systems


All other systems reviewed; all pertinent +/- noted in HPI/Subj





- Medication


Medications: 


Active Medications











Generic Name Dose Route Start Last Admin





  Trade Name Freq  PRN Reason Stop Dose Admin


 


Acetaminophen  650 mg  01/20/20 21:21  01/21/20 05:20





  Tylenol  W[place]   650 mg





  Q4H PRN   Administration





  Headache/Fever/Mild Pain (1-3)   





     





     





     


 


Acidophilus  1 gm  01/23/20 09:00  01/24/20 09:04





  Floranex  PO   1 gm





  DAILY MUSTAPHA   Administration





     





     





     





     


 


Famotidine  20 mg  01/21/20 09:00  01/24/20 09:05





  Pepcid  PO   20 mg





  BID MUSTAPHA   Administration





     





     





     





     


 


Clindamycin Phosphate/Dextrose  50 mls @ 100 mls/hr  01/21/20 06:00  01/24/20 12

:52





  900 mg/ Device  IVPB   50 mls





  Q8HR MUSTAPHA   Administration





     





     





     





     


 


Nebivolol  5 mg  01/24/20 09:00  01/24/20 09:04





  Bystolic  PO   5 mg





  DAILY MUSTAPHA   Administration





     





     





     





     


 


Sodium Chloride  10 ml  01/21/20 09:00  01/24/20 10:43





  Flush - Normal Saline  IVF   Not Given





  Q12HR MUSTAPHA   





     





     





     





     














- Exam


General Appearance: NAD, awake alert


Eye: PERRL, anicteric sclera


ENT: normocephalic atraumatic, moist mucosa


Neck: supple, symmetric, no lymphadenopathy


Heart: no murmur, no gallops, no rubs


Respiratory: CTAB, no wheezes, no rales, no ronchi, normal chest expansion


Gastrointestinal: soft, non-tender, no guarding, no rigidity


Extremities: no edema


Skin: no rashes


Skin - other findings: Right neck wound with scant blood and purulent discharge


Neurological: cranial nerve grossly intact, no focal deficits


Musculoskeletal: normal tone, normal strength


Psychiatric: normal affect, normal behavior, A&O x 3





Hosp A/P


(1) Neck abscess


Code(s): L02.11 - CUTANEOUS ABSCESS OF NECK   Status: Acute   





(2) Sepsis


Code(s): A41.9 - SEPSIS, UNSPECIFIED ORGANISM   Status: Acute   


Qualifiers: 


   Sepsis type: sepsis due to unspecified organism   Sepsis acute organ 

dysfunction status: without acute organ dysfunction   Qualified Code(s): A41.9 

- Sepsis, unspecified organism   





(3) Physical deconditioning


Code(s): R53.81 - OTHER MALAISE   Status: Acute   





(4) Hypertension


Code(s): I10 - ESSENTIAL (PRIMARY) HYPERTENSION   Status: Chronic   





(5) Menieres disease


Code(s): H81.09 - MENIERE'S DISEASE, UNSPECIFIED EAR   Status: Chronic   





- Plan





Plan:


medical/surgical unit


CM consult for D/c planning - will plan for home with home health in the AM, RN 

for home wound dressing changes


surgery consultation, recommendations appreciated


status post incision and drainage by Dr. Molina on 1/21/2020


postoperative care


IV antibiotics


intraoperative cultures with Staphylococcus, pan sensitivity - Will transition 

or oral meds on D/c


PT/OT eval and treat


blood pressure control 


blood sugar control


continual home medications as able


G.I. prophylaxis


DVT prophylaxis denies pain/discomfort

## 2020-12-21 NOTE — ED ADULT NURSE NOTE - TEMPLATE
LOV 6/24/2020    No future appt scheduled    Health Maintenance   Topic Date Due    Shingles Vaccine (3 of 3) 08/28/2018    Breast cancer screen  12/16/2020    Lipid screen  01/21/2021    Annual Wellness Visit (AWV)  06/25/2021    DTaP/Tdap/Td vaccine (3 - Td) 08/26/2021    Colon cancer screen colonoscopy  03/13/2024    DEXA (modify frequency per FRAX score)  Completed    Flu vaccine  Completed    Pneumococcal 65+ years Vaccine  Completed    Hepatitis C screen  Completed    Hepatitis A vaccine  Aged Out    Hepatitis B vaccine  Aged Out    Hib vaccine  Aged Out    Meningococcal (ACWY) vaccine  Aged Out             (applicable per patient's age: Cancer Screenings, Depression Screening, Fall Risk Screening, Immunizations)    LDL Calculated (mg/dL)   Date Value   01/21/2020 100     AST (U/L)   Date Value   06/19/2017 20     ALT (U/L)   Date Value   06/19/2017 20     BUN (mg/dL)   Date Value   06/19/2017 25      (goal A1C is < 7)   (goal LDL is <100) need 30-50% reduction from baseline     BP Readings from Last 3 Encounters:   06/24/20 120/72   01/16/20 126/67   12/11/19 132/76    (goal /80)      All Future Testing planned in [facility]:  Lab Frequency Next Occurrence   JADA DIGITAL SCREEN W CAD BILATERAL Once 02/10/2021   CBC Auto Differential Once 06/24/2021   Vitamin B12 & Folate Once 06/24/2021   Vitamin D 25 Hydroxy Once 06/24/2021   Hemoglobin A1C Once 06/24/2021   Comprehensive Metabolic Panel Once 51/35/2407   Lipid Panel Once 12/24/2020       Next Visit Date:  No future appointments.          Patient Active Problem List:     GERD (gastroesophageal reflux disease)     Hyperlipidemia     Cancer (HCC)     Estrogen deficiency     Osteopenia     Sinus disease     Lung nodules
 Symptoms

## 2020-12-29 NOTE — ED PROVIDER NOTE - CROS ED NEURO ALL NEG
negative... Benzoyl Peroxide Counseling: Patient counseled that medicine may cause skin irritation and bleach clothing.  In the event of skin irritation, the patient was advised to reduce the amount of the drug applied or use it less frequently.   The patient verbalized understanding of the proper use and possible adverse effects of benzoyl peroxide.  All of the patient's questions and concerns were addressed.

## 2021-05-03 NOTE — ED ADULT NURSE REASSESSMENT NOTE - NS ED NURSE REASSESS COMMENT FT1
ENTERED IN WRONG \"NOTE TYPE\"  Epic EMR TO PRIMITIVE TO ALLOW THIS TO BE CORRECTED    /////////////////////////////////////////////////////////////////////////////////////////////////////////////////////////////////////    Patient Information:  Patient: Bennett Blanco  MRN: [id number]    Acct: [de-identified]  YOB: 1977  Admit Date: 5/2/2021              Primary Care Physician: SAMARIA Melendez  Advance Directive: Full Code  Health Care Proxy: his wife, Mrs. Sandro Randle, [phone]           SUBJECTIVE:     EP Sign Out:  Chest Pain  Transfer In  Dr Shantel Paige to see in AM  Last cath on 29th April, done by same  Presented at [facility]/O [facility]     HPI:  Mr. Kleber Degroot is a pleasant 40year old gent known to me from prior admission. He has had chest pains for weeks, He had a cardiac cath with two stents placed [id number] and states that nothing changed. He states that his prior Mis felt like someone squeezing hi collar bone. The pain tonight started to reach to the collar bone at about 2:30pm to 3:00pm. He took NG that \"relxed it for a little while\". The character was \"like a digging\". He states that it hit again when he was on the elevator on the way up.      Review of Systems:   Review of Systems   Constitutional: Positive for fatigue. Negative for diaphoresis. HENT: Negative for sneezing. Respiratory: Positive for cough and shortness of breath. Cardiovascular: Positive for chest pain. HAS chest pressure   Gastrointestinal: Negative for abdominal pain and diarrhea. Genitourinary: Negative for dysuria. Musculoskeletal: Negative for arthralgias and myalgias. Neurological: Positive for light-headedness. Negative for syncope and weakness.         Denied changes to gustation, denied changes to olfaction   Psychiatric/Behavioral: Negative for confusion.      Past Medical History        Past Medical History:   Diagnosis Date    CAD (coronary artery disease)      CHF (congestive heart failure) (HCC)      GERD (gastroesophageal reflux disease)      Hand numbness      Hand tingling      Heart attack ([address]       x 3    Hiatal hernia      Hyperlipidemia      Impaired mobility       pt uses cane. IMPAIRED MOBILITY R/T NECK SURGERY    Morbid obesity due to excess calories ([address] (BMI>35 with comorbidities)      Movement disorder      Pyloric stenosis           Past Psychiatric History:  Denies any     Past Surgical History         Past Surgical History:   Procedure Laterality Date    CARDIAC CATHETERIZATION   10/25/15  [employer]     stent to RCA. Stent to LAD. angioplasaty to diagonal branch of LAD.     CORONARY ANGIOPLASTY WITH STENT PLACEMENT   2013    CORONARY ANGIOPLASTY WITH STENT PLACEMENT   04/29/2021     [id number] with 2 stents placed by Dr Anny Palmer ARTHROSCOPY Right      LAMINECTOMY   2019    PYLOROPLASTY   1977     \"as a baby, 1977\"    SPINAL FUSION   2018     C2-T1             Social History              Tobacco History              Smoking Status  Former Smoker Quit date  11/1/2019 Smoking Frequency  0.5 packs/day for 30 years (15 pk yrs) Smoking Tobacco Type  Cigarettes           Smokeless Tobacco Use  Never Used                   Alcohol History            Alcohol Use Status  Not Currently Comment  Socially, quit \"years\" ago                  Drug Use           Drug Use Status  No                   Sexual Activity            Sexually Active  Not Asked Comment  has a pair of sons and a daughter           CODE STATUS: Full Code  HEALTH CARE PROXY: his wife, Mrs. Sarita Hines, [phone]  AMBULATES: independently normally, sometimes a cane  DOMICILED: lives in a private home, has stairs inside, just him and his wife there, has a couple dogs in the house, small farm animal outside, has 2 steps in to the front door      Family History         Family History   Problem Relation Age of Onset    Cancer Mother           thyroid cancer & breast cancer, both as primaries    Other Mother           tobacco use    Cancer Father           kidney cancer on right    Other Father           tobacco use    Other Sister           Julio, Thyroid problems    No Known Problems Brother      Amblyopia Son      No Known Problems Son      Other Daughter           pyloric stenosis               Allergies   Allergen Reactions    Lisinopril Other (See Comments)       cough      Home Medications:  Home Medications           Prior to Admission medications    Medication Sig Start Date End Date Taking?  Authorizing Provider   nitroGLYCERIN (NITROSTAT) 0.4 MG SL tablet 1 TABLET UNDER THE TONGUE EVERY 5 MINUTES AS NEEDED FOR CHEST PAIN 4/26/21   Yes ENDER Cobb CNP   clopidogrel (PLAVIX) 75 MG tablet Take 1 tablet by mouth once daily 4/13/21   Yes ENDER Villegas   carvedilol (COREG) 12.5 MG tablet Take 1 tablet by mouth 2 times daily (with meals)  Patient taking differently: Take 12.5 mg by mouth See Admin Instructions 25 MG IN AM & 12.5 MG PM 3/1/21   Yes ENDER Villegas   isosorbide mononitrate (IMDUR) 60 MG extended release tablet Take 1 tablet by mouth once daily 3/1/21   Yes ENDER Villegas   losartan (COZAAR) 50 MG tablet Take 1 tablet by mouth daily 11/2/20   Yes ENDER Palacios   atorvastatin (LIPITOR) 80 MG tablet Take 0.5 tablets by mouth daily  Patient taking differently: Take 40 mg by mouth nightly  7/8/20   Yes ENDER Palacios   famotidine (PEPCID) 10 MG tablet Take 10 mg by mouth daily     Yes Historical Provider, MD   aspirin 81 MG chewable tablet Take 1 tablet by mouth daily 11/20/19   Yes Shravan Nicholas MD   CYCLOBENZAPRINE HCL PO Take 10 mg by mouth nightly     Yes Historical Provider, MD   furosemide (LASIX) 20 MG tablet TAKE 1 TABLET BY MOUTH ONCE DAILY AS NEEDED FOR WEIGHT GAIN 4/13/21     ENDER Villegas   Evolocumab (REPATHA SURECLICK) 631 MG/ML SOAJ Inject 1 mL into the skin every 14 days  Patient taking differently: Inject 1 mL into the skin every 14 days EVERY OTHER FRIDAY 7/9/20     ENDER Marquez               OBJECTIVE:         Vitals:     05/02/21 2003   BP:     Pulse: 82   Resp:     Temp:     SpO2:     breathing room air     BP (!) 174/112   Pulse 82   Temp 97.3 °F (36.3 °C) (Temporal)   Resp 20   Ht 5' 9\" (1.753 m)   Wt 248 lb (112.5 kg)   SpO2 96%   BMI 36.62 kg/m²      No intake or output data in the 24 hours ending 05/02/21 2015     Physical Exam  Vitals signs reviewed. Constitutional:       General: He is not in acute distress. Appearance: Normal appearance. He is obese. He is not ill-appearing or toxic-appearing. HENT:      Head: Normocephalic and atraumatic. Nose: No congestion or rhinorrhea. Eyes:      General:         Right eye: No discharge. Left eye: No discharge. Neck:      Musculoskeletal: Neck supple. Comments: Trachea appears midline  Cardiovascular:      Rate and Rhythm: Normal rate and regular rhythm. Heart sounds: No murmur. No friction rub. No gallop. Pulmonary:      Effort: Pulmonary effort is normal. No respiratory distress. Breath sounds: No stridor. No wheezing, rhonchi or rales. Chest:      Chest wall: No tenderness. Abdominal:      General: Bowel sounds are normal.      Tenderness: There is no abdominal tenderness. There is no guarding or rebound. Musculoskeletal:      Comments: No wasting of muscle stores, has increased fat stores   Skin:     General: Skin is warm. Comments: nondiaphoretic   Neurological:      Mental Status: He is alert. Cranial Nerves: No dysarthria. Motor: No tremor or seizure activity.    Psychiatric:         Mood and Affect: Mood normal.         Behavior: Behavior normal.            LABORATORY DATA:  Collected just now and pending results            ASESSMENTS & PLANS:     Chest Pain:  Hx known CAD, morbid obesity:  Tele  Admit to [facility] as OBSERVATION status patient  Cardio consult in AM  Trend TnI  Mag, Phos, TSH, Lipid Panel, HbA1c - will run as add ons to ED labs if able  CBC and BMP with Reflex for following AM  ASA as per protocol (324-325mg chewed STAT unless on 81ASA daily in which case 162mg chewed STAT if not yet given, THEN from following AM 81mg Daily. ) - ASA PO QDay at home  Statin as per protocol (High intensity Statin, if already on high intensity Stain of Simvasttain 80 continue it, if Lipitor 40+ then Lipitor 80 (if less than 40 just double so long as >=40), if Rosuvastatin 20mg+ then Rosuvastatin 40mg PO QHS (if less than 20 just double so long as >=20mg) - Lipitor 80mg PO QHS at home  NG SL PRN CP  TTE will be deferred to cardio  Continue Imdur 60mg PO QDay  contnue Plavix 75mg PO QDay  Continue Losartan 50mg PO QDay  Continue Carvedilol 12.5mg PO BID     Chronic Medical Problems:  Continue current Regimen as indicated  Continue flexeril 10mg PO QHS  Continue famotidine 10mg PO QDay     Supportive and Prophylactic Txx:  DVT PPx: Lovenox 40mg SQ BID (BID as Mass >100kg)  GI (PUD) PPx: Not indicated  PT: as per age  DIET CARDIAC; No Caffeine  Diet NPO, After Midnight Exceptions are: Sips of Water with Meds  PRN Medications   sodium chloride flush, sodium chloride, acetaminophen **OR** acetaminophen, ondansetron **OR** ondansetron, nitroGLYCERIN           Care time of >45 minutes  Pt seen/examined and admitted to OBServation status. Inpatient status is used for patients with an expected LOS extending past two midnights due to medical therapy and or critical care needs, otherwise patients are placed to OBServation status.     Signed:  Electronically signed by Jatin De La O MD on 5/2/21 at 20:29 CDT. 1120: felipe cath changed, #18 fr felipe inserted with proper technique. UA, urine  culture sent. Pt instructed on felipe care with a verbal understanding. 1120: felipe cath changed, #18 fr felipe inserted with proper technique. UA, urine  culture sent. Pt instructed on felipe care with a verbal understanding. Urinary output = 350cc

## 2021-06-07 NOTE — ED ADULT NURSE REASSESSMENT NOTE - TEMPLATE LIST FOR HEAD TO TOE ASSESSMENT
General Patient walked into clinic, c/o shortness of breath. Patient had difficulty walking d/t shortness of breath symptoms.  Upon entering clinic, was reported by  staff that patient came near to falling.    RN Supervisor assessed pt with assistance from writer, and fellow LPN.  PPE donned. VS are as follows:     Bp 126/91    Spo2 98%    P 88    Pt c/o intermittent chest pain, shortness of breath, labored breathing, abdominal pain d/t hernia, and difficulty ambulating.    Pt had difficulty with word finding, words slurred on occasion, difficulty maintaining eye contact, mild confusion     Denied alcohol use    1+ pitting edema to BLE, redness.    Lung sounds clear bilaterally per RN Supervisor's assessment    Pt c/o running out of medications, requesting establishing care and refills.  Pt unable to accurately confirm he took meds this morning.    911 called d/t shortness of breath, labored breathing and intermittent chest pain symptoms.    EMS arrived, assessed pt, and refused to take pt to ER d/t recent pandemic and pt's stable vitals.  EMS aware patient walked to clinic with symptoms and would have to walk home.  Pt had no one to call for assistance. Observed pt walking away from clinic into parking lot very slow and unsteady.    Pt left clinic on his own after EMS declined transport    Reviewed with Dr. Herzog.  Dr. Herzog requested virtual visit with pt to discuss symptoms.  Contacted pt-telephone visit scheduled for 11:55am today.

## 2022-03-16 NOTE — ED ADULT NURSE NOTE - MODE OF DISCHARGE
Pt was notified on his my kasia ----- Message from Flores Mercado MD sent at 3/16/2022 12:54 PM CDT -----      Please let patient know his coronary calcium score is 0 (normal).      
Ambulatory

## 2022-05-17 ENCOUNTER — NON-APPOINTMENT (OUTPATIENT)
Age: 87
End: 2022-05-17

## 2022-05-18 PROBLEM — Z00.00 ENCOUNTER FOR PREVENTIVE HEALTH EXAMINATION: Status: ACTIVE | Noted: 2022-05-18

## 2022-06-15 ENCOUNTER — APPOINTMENT (OUTPATIENT)
Dept: DERMATOLOGY | Facility: CLINIC | Age: 87
End: 2022-06-15
Payer: OTHER GOVERNMENT

## 2022-06-15 PROCEDURE — 99072 ADDL SUPL MATRL&STAF TM PHE: CPT

## 2022-06-15 PROCEDURE — 99204 OFFICE O/P NEW MOD 45 MIN: CPT

## 2022-07-04 NOTE — ED PROVIDER NOTE - CROS ED GI ALL NEG
V2.0  [facility] [place]      Name:  Joya Orellana /Age/Sex: 1989  (35 y.o. male)   MRN & CSN:  [id number] & [id number] Encounter Date/Time: 2022 1:41 PM EDT    Location:  -A PCP: Giana Amor Day: 28    Assessment and Plan:   Joya Orellana is a 35 y.o. male with pmh of diastolic heart failure, chronic anemia, ESRD who presents with Hypertensive urgency     VRE bacteremia  - Blood culture  positive for VRE. -CT A/P showed small bilateral effusions with dependent subsegmental consolidation and lymphadenopathy.    -Wound culture 2022 with Pseudomonas and Acinetobacter.    -Completed course of daptomycin, minocycline and Fetroja on     Status post right BKA 22  -Wound culture from  positive for Pseudomonas and Acinetobacter  -Antibiotics completed per ID recommendations    Hypertensive urgency,  -Patient is on Cardene drip on 22. Multiple attempts have been made to wean the pt off but no success. Attempting to wean off again today  -Continue oral hydralazine 100 mg 3 times daily  -Continue Coreg 25 mg  -Patient started on clonidine 3 times daily with holding parameters  -As needed labetalol added  -Catapress patch increased by nephrology and started on Amlodpine, added by nephrology. Hoping to wean off of Nicardipine drip in order to discahrge    Type 2 diabetes  - insulin-dependent with hypoglycemia 2022.    -SSI, blood glucose is improving  -Endocrinology on board appreciate recommendations    Hospital-acquired pneumonia  -Completed course of antibiotics and improved    History of left BKA  -S/P I&D     Acute diastolic heart failure  - Echo 2022 with EF of 50 to 55%. -Volume management/dialysis per nephrology. Chronic anemia  -Secondary to end-stage renal disease.    -Hemoglobin remaining stable around 7.    -Will monitor and transfuse as needed for hemoglobin less than 7.   Procrit per nephrolog     ESRD on HYDROmorphone  0.5 mg IntraVENous Once    cloNIDine  1 patch TransDERmal Weekly    amLODIPine  5 mg Oral Daily    cloNIDine  0.1 mg Oral TID    insulin lispro  0-12 Units SubCUTAneous TID     insulin lispro  0-6 Units SubCUTAneous 2 times per day    docusate sodium  100 mg Oral BID    polyethylene glycol  17 g Oral Daily    isosorbide mononitrate  60 mg Oral BID    pantoprazole  40 mg IntraVENous Daily    bisacodyl  5 mg Rectal Daily    heparin (porcine)  5,000 Units SubCUTAneous BID    sevelamer  800 mg Oral TID     carvedilol  25 mg Oral BID    atorvastatin  80 mg Oral Nightly    baclofen  10 mg Oral Daily    escitalopram  20 mg Oral Daily    melatonin  3 mg Oral Nightly    [Held by provider] pregabalin  75 mg Oral BID    hydrALAZINE  100 mg Oral 3 times per day      Infusions:    niCARdipine 2.5 mg/hr (07/03/22 0700)    sodium chloride      dextrose      dextrose       PRN Meds: HYDROcodone-acetaminophen, 1 tablet, Q4H PRN  labetalol, 10 mg, Q4H PRN  sodium chloride, , PRN  glucose, 4 tablet, PRN  dextrose bolus, 125 mL, PRN   Or  dextrose bolus, 250 mL, PRN  glucagon (rDNA), 1 mg, PRN  dextrose, 100 mL/hr, PRN  prochlorperazine, 10 mg, Q6H PRN  ondansetron, 4 mg, Q6H PRN  promethazine, 25 mg, Q6H PRN  sodium chloride flush, 10 mL, PRN  nicotine polacrilex, 2 mg, Q2H PRN  acetaminophen, 650 mg, Q6H PRN   Or  acetaminophen, 650 mg, Q6H PRN  acetaminophen, 650 mg, Q6H PRN  dextrose, 100 mL/hr, PRN        Labs      Recent Results (from the past 24 hour(s))   POCT Glucose    Collection Time: 07/03/22  6:16 PM   Result Value Ref Range    POC Glucose 214 (H) 70 - 99 MG/DL   POCT Glucose    Collection Time: 07/03/22  8:27 PM   Result Value Ref Range    POC Glucose 210 (H) 70 - 99 MG/DL   POCT Glucose    Collection Time: 07/04/22  2:41 AM   Result Value Ref Range    POC Glucose 167 (H) 70 - 99 MG/DL   POCT Glucose    Collection Time: 07/04/22  8:07 AM   Result Value Ref Range    POC Glucose 115 (H) 70 - 99 MG/DL        Imaging/Diagnostics Last 24 Hours   XR CHEST PORTABLE    Result Date: 6/13/2022  EXAMINATION: ONE XRAY VIEW OF THE CHEST 6/13/2022 3:16 pm COMPARISON: 06/07/2022 HISTORY: ORDERING SYSTEM PROVIDED HISTORY: post right neck/ij vascath insertion TECHNOLOGIST PROVIDED HISTORY: Reason for exam:->post right neck/ij vascath insertion Reason for Exam: post right neck/ij vascath insertion FINDINGS: A right internal jugular central venous catheter terminates near the cavoatrial junction. There is no pneumothorax identified. The mediastinal and cardiac contours are stable. There are bilateral perihilar opacities extending into the upper and lower lobes. There has been interval removal of a left internal jugular central venous catheter. Small bilateral pleural effusions persist.     1. Right internal jugular central venous catheter terminating near the cavoatrial junction. 2. No pneumothorax identified. 3. Persistent interstitial edema and small bilateral pleural effusions, similar to the previous exam.     IR NONTUNNELED VASCULAR CATHETER > 5 YEARS    Result Date: 6/13/2022  PROCEDURE: ULTRASOUND GUIDED VASCULAR ACCESS. PLACEMENT OF A NON-TUNNELED CATHETER. 6/13/2022. HISTORY: ORDERING SYSTEM PROVIDED HISTORY: removal of tunnelled HD cath andplacement of temp. pt with bacteremia this admission. Eliquis onn hold since thursday///thx TECHNOLOGIST PROVIDED HISTORY: removal of tunnelled HD cath andplacement of temp. pt with bacteremia this admission. Eliquis onn hold since thursday///thx See IP consult Reason for exam:->removal of tunnelled HD cath andplacement of temp. pt with bacteremia this admission. Eliquis onn hold since thursday///thx SEDATION: None TECHNIQUE: Maximum sterile barrier technique including hand hygiene, skin prep and sterile ultrasound technique utilized for procedure.  Sterile ultrasound technique also utilized for procedure Ultrasound guidance required for procedure to confirm target vessel patency, puncture site selection, real-time intra procedural guidance. Images made for patient's medical file. Informed consent was obtained after a detailed explanation of the procedure including risks, benefits, and alternatives. Universal protocol was observed. The right neck was prepped and draped in sterile fashion using maximum sterile barrier technique. Local anesthesia was achieved with lidocaine. A micropuncture needle was used to access the right internal jugular vein using ultrasound guidance. An ultrasound image demonstrating patency of the vein with needle tip located within it. An image was obtained and stored in PACs. A 0.035 guidewire was used to place a temporary hemodialysis access catheter after fascial tract dilation. The catheter flushed easily and there was a good blood return. The catheter was sutured to the skin. The catheter was locked with heparinized saline. The patient tolerated the procedure well and there were no immediate complications. Post procedure CXR pending. FINDINGS: Pre and intraprocedural images demonstrate access needle within patent right internal jugular vein. Postprocedure portable radiograph demonstrates temporary dialysis access catheter entering via right lower cervical/internal jugular venous approach with catheter tip at the superior cavoatrial junction. No complication suggested. Successful ultrasound guided non-tunneled temporary hemodialysis access catheter placement via right internal jugular venous approach. IR REMOVE TUNNELED CVAD WO SQ PORT/PUMP    Result Date: 6/13/2022  PROCEDURE: IR REMOVAL TUNNELED CVC WO PORT PUMP 6/13/2022 HISTORY: ORDERING SYSTEM PROVIDED HISTORY: removal of tunnelled HD cath andplacement of temp. pt with bacteremia this admission. Eliquis onn hold since thursday///thx TECHNOLOGIST PROVIDED HISTORY: removal of tunnelled HD cath andplacement of temp. pt with bacteremia this admission.  Eliquis onn hold since thursday///thx See IP consult Reason for exam:->removal of tunnelled HD cath andplacement of temp. pt with bacteremia this admission. Eliquis onn hold since thursday///thx TECHNIQUE: Following informed consent, pause a confirm/time-out skin and previously placed tunneled dialysis access catheter is well as catheter entry site were prepped and draped in sterile fashion. 10 mL 1% lidocaine without epinephrine for local anesthesia. Catheter was loosened within subcutaneous tunnel and removed. Pressure applied the puncture site until hemostasis achieved. Dressing applied. Patient tolerated procedure well. CONTRAST: None SEDATION: None FLUOROSCOPY DOSE AND TYPE OR TIME AND EXPOSURES: None Number of images: None DESCRIPTION OF PROCEDURE: Informed consent was obtained after a detailed explanation of the procedure including risks, benefits, and alternatives. Universal protocol was observed. Sterile gowns, masks, hats and gloves utilized for maximal sterile barrier. As above in technique section FINDINGS: No images made. Previously placed implanted dialysis access catheter removed intact and in total.  No complication suggested.      Removal of previously placed implanted/tunneled dialysis access catheter intact and in total.       Electronically signed by Bailey Penny MD on 7/4/2022 at 1:12 PM negative...

## 2022-08-08 ENCOUNTER — APPOINTMENT (OUTPATIENT)
Dept: DERMATOLOGY | Facility: CLINIC | Age: 87
End: 2022-08-08

## 2022-08-08 PROCEDURE — 99072 ADDL SUPL MATRL&STAF TM PHE: CPT

## 2022-08-08 PROCEDURE — 14061 TIS TRNFR E/N/E/L10.1-30SQCM: CPT

## 2022-08-08 PROCEDURE — 17311 MOHS 1 STAGE H/N/HF/G: CPT

## 2022-09-19 ENCOUNTER — APPOINTMENT (OUTPATIENT)
Dept: DERMATOLOGY | Facility: CLINIC | Age: 87
End: 2022-09-19

## 2022-09-19 DIAGNOSIS — C44.91 BASAL CELL CARCINOMA OF SKIN, UNSPECIFIED: ICD-10-CM

## 2022-09-19 PROCEDURE — 99024 POSTOP FOLLOW-UP VISIT: CPT

## 2022-11-17 NOTE — ED ADULT NURSE NOTE - CCCP TRG CHIEF CMPLNT
Received request via: Patient    Was the patient seen in the last year in this department? Yes    Does the patient have an active prescription (recently filled or refills available) for medication(s) requested? No    Does the patient have USP Plus and need 100 day supply (blood pressure, diabetes and cholesterol meds only)? Yes, quantity updated to 100 days    
urinary catheter complications

## 2022-11-22 ENCOUNTER — INPATIENT (INPATIENT)
Facility: HOSPITAL | Age: 87
LOS: 0 days | Discharge: NURSING, REHAB OR LONG-TERM CARE | DRG: 69 | End: 2022-11-23
Attending: HOSPITALIST | Admitting: INTERNAL MEDICINE
Payer: COMMERCIAL

## 2022-11-22 VITALS
WEIGHT: 145.06 LBS | HEIGHT: 68 IN | SYSTOLIC BLOOD PRESSURE: 106 MMHG | OXYGEN SATURATION: 95 % | DIASTOLIC BLOOD PRESSURE: 63 MMHG | RESPIRATION RATE: 18 BRPM | TEMPERATURE: 97 F | HEART RATE: 92 BPM

## 2022-11-22 DIAGNOSIS — R47.01 APHASIA: ICD-10-CM
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
## 2022-11-22 DIAGNOSIS — S72.002D FRACTURE OF UNSPECIFIED PART OF NECK OF LEFT FEMUR, SUBSEQUENT ENCOUNTER FOR CLOSED FRACTURE WITH ROUTINE HEALING: ICD-10-CM

## 2022-11-22 DIAGNOSIS — G81.94 HEMIPLEGIA, UNSPECIFIED AFFECTING LEFT NONDOMINANT SIDE: ICD-10-CM

## 2022-11-22 DIAGNOSIS — Z86.73 PERSONAL HISTORY OF TRANSIENT ISCHEMIC ATTACK (TIA), AND CEREBRAL INFARCTION WITHOUT RESIDUAL DEFICITS: ICD-10-CM

## 2022-11-22 DIAGNOSIS — I10 ESSENTIAL (PRIMARY) HYPERTENSION: ICD-10-CM

## 2022-11-22 DIAGNOSIS — R33.8 OTHER RETENTION OF URINE: ICD-10-CM

## 2022-11-22 DIAGNOSIS — E78.00 PURE HYPERCHOLESTEROLEMIA, UNSPECIFIED: ICD-10-CM

## 2022-11-22 DIAGNOSIS — Z90.49 ACQUIRED ABSENCE OF OTHER SPECIFIED PARTS OF DIGESTIVE TRACT: ICD-10-CM

## 2022-11-22 DIAGNOSIS — G45.9 TRANSIENT CEREBRAL ISCHEMIC ATTACK, UNSPECIFIED: ICD-10-CM

## 2022-11-22 DIAGNOSIS — N40.1 BENIGN PROSTATIC HYPERPLASIA WITH LOWER URINARY TRACT SYMPTOMS: ICD-10-CM

## 2022-11-22 DIAGNOSIS — Z90.49 ACQUIRED ABSENCE OF OTHER SPECIFIED PARTS OF DIGESTIVE TRACT: Chronic | ICD-10-CM

## 2022-11-22 DIAGNOSIS — Z96.642 PRESENCE OF LEFT ARTIFICIAL HIP JOINT: ICD-10-CM

## 2022-11-22 LAB
ALBUMIN SERPL ELPH-MCNC: 2.7 G/DL — LOW (ref 3.3–5)
ALP SERPL-CCNC: 118 U/L — SIGNIFICANT CHANGE UP (ref 40–120)
ALT FLD-CCNC: 17 U/L — SIGNIFICANT CHANGE UP (ref 10–45)
ANION GAP SERPL CALC-SCNC: 10 MMOL/L — SIGNIFICANT CHANGE UP (ref 5–17)
APTT BLD: 28.4 SEC — SIGNIFICANT CHANGE UP (ref 27.5–35.5)
AST SERPL-CCNC: 24 U/L — SIGNIFICANT CHANGE UP (ref 10–40)
BASOPHILS # BLD AUTO: 0.03 K/UL — SIGNIFICANT CHANGE UP (ref 0–0.2)
BASOPHILS NFR BLD AUTO: 0.3 % — SIGNIFICANT CHANGE UP (ref 0–2)
BILIRUB SERPL-MCNC: 1 MG/DL — SIGNIFICANT CHANGE UP (ref 0.2–1.2)
BUN SERPL-MCNC: 21 MG/DL — SIGNIFICANT CHANGE UP (ref 7–23)
CALCIUM SERPL-MCNC: 8.8 MG/DL — SIGNIFICANT CHANGE UP (ref 8.4–10.5)
CHLORIDE SERPL-SCNC: 104 MMOL/L — SIGNIFICANT CHANGE UP (ref 96–108)
CO2 SERPL-SCNC: 25 MMOL/L — SIGNIFICANT CHANGE UP (ref 22–31)
CREAT SERPL-MCNC: 0.83 MG/DL — SIGNIFICANT CHANGE UP (ref 0.5–1.3)
EGFR: 81 ML/MIN/1.73M2 — SIGNIFICANT CHANGE UP
EOSINOPHIL # BLD AUTO: 0.03 K/UL — SIGNIFICANT CHANGE UP (ref 0–0.5)
EOSINOPHIL NFR BLD AUTO: 0.3 % — SIGNIFICANT CHANGE UP (ref 0–6)
GLUCOSE BLDC GLUCOMTR-MCNC: 105 MG/DL — HIGH (ref 70–99)
GLUCOSE SERPL-MCNC: 108 MG/DL — HIGH (ref 70–99)
HCT VFR BLD CALC: 33.3 % — LOW (ref 39–50)
HGB BLD-MCNC: 10.9 G/DL — LOW (ref 13–17)
IMM GRANULOCYTES NFR BLD AUTO: 1.1 % — HIGH (ref 0–0.9)
INR BLD: 1.16 RATIO — SIGNIFICANT CHANGE UP (ref 0.88–1.16)
LYMPHOCYTES # BLD AUTO: 0.49 K/UL — LOW (ref 1–3.3)
LYMPHOCYTES # BLD AUTO: 4.7 % — LOW (ref 13–44)
MCHC RBC-ENTMCNC: 32.7 GM/DL — SIGNIFICANT CHANGE UP (ref 32–36)
MCHC RBC-ENTMCNC: 33.4 PG — SIGNIFICANT CHANGE UP (ref 27–34)
MCV RBC AUTO: 102.1 FL — HIGH (ref 80–100)
MONOCYTES # BLD AUTO: 0.8 K/UL — SIGNIFICANT CHANGE UP (ref 0–0.9)
MONOCYTES NFR BLD AUTO: 7.6 % — SIGNIFICANT CHANGE UP (ref 2–14)
NEUTROPHILS # BLD AUTO: 9.05 K/UL — HIGH (ref 1.8–7.4)
NEUTROPHILS NFR BLD AUTO: 86 % — HIGH (ref 43–77)
NRBC # BLD: 0 /100 WBCS — SIGNIFICANT CHANGE UP (ref 0–0)
PLATELET # BLD AUTO: 339 K/UL — SIGNIFICANT CHANGE UP (ref 150–400)
POTASSIUM SERPL-MCNC: 4.1 MMOL/L — SIGNIFICANT CHANGE UP (ref 3.5–5.3)
POTASSIUM SERPL-SCNC: 4.1 MMOL/L — SIGNIFICANT CHANGE UP (ref 3.5–5.3)
PROT SERPL-MCNC: 7 G/DL — SIGNIFICANT CHANGE UP (ref 6–8.3)
PROTHROM AB SERPL-ACNC: 13.5 SEC — HIGH (ref 10.5–13.4)
RBC # BLD: 3.26 M/UL — LOW (ref 4.2–5.8)
RBC # FLD: 14.2 % — SIGNIFICANT CHANGE UP (ref 10.3–14.5)
SARS-COV-2 RNA SPEC QL NAA+PROBE: SIGNIFICANT CHANGE UP
SODIUM SERPL-SCNC: 139 MMOL/L — SIGNIFICANT CHANGE UP (ref 135–145)
TROPONIN I, HIGH SENSITIVITY RESULT: 13.1 NG/L — SIGNIFICANT CHANGE UP
WBC # BLD: 10.52 K/UL — HIGH (ref 3.8–10.5)
WBC # FLD AUTO: 10.52 K/UL — HIGH (ref 3.8–10.5)

## 2022-11-22 PROCEDURE — 99285 EMERGENCY DEPT VISIT HI MDM: CPT

## 2022-11-22 PROCEDURE — 70496 CT ANGIOGRAPHY HEAD: CPT | Mod: 26,MA

## 2022-11-22 PROCEDURE — 70498 CT ANGIOGRAPHY NECK: CPT | Mod: 26,MA

## 2022-11-22 PROCEDURE — 0042T: CPT | Mod: MA

## 2022-11-22 PROCEDURE — 93010 ELECTROCARDIOGRAM REPORT: CPT

## 2022-11-22 RX ORDER — SODIUM CHLORIDE 9 MG/ML
1000 INJECTION INTRAMUSCULAR; INTRAVENOUS; SUBCUTANEOUS
Refills: 0 | Status: DISCONTINUED | OUTPATIENT
Start: 2022-11-22 | End: 2022-11-23

## 2022-11-22 RX ORDER — ATORVASTATIN CALCIUM 80 MG/1
40 TABLET, FILM COATED ORAL ONCE
Refills: 0 | Status: COMPLETED | OUTPATIENT
Start: 2022-11-22 | End: 2022-11-22

## 2022-11-22 RX ORDER — ASPIRIN/CALCIUM CARB/MAGNESIUM 324 MG
324 TABLET ORAL ONCE
Refills: 0 | Status: COMPLETED | OUTPATIENT
Start: 2022-11-22 | End: 2022-11-22

## 2022-11-22 RX ADMIN — SODIUM CHLORIDE 125 MILLILITER(S): 9 INJECTION INTRAMUSCULAR; INTRAVENOUS; SUBCUTANEOUS at 18:57

## 2022-11-22 RX ADMIN — ATORVASTATIN CALCIUM 40 MILLIGRAM(S): 80 TABLET, FILM COATED ORAL at 20:14

## 2022-11-22 RX ADMIN — Medication 324 MILLIGRAM(S): at 19:29

## 2022-11-22 NOTE — ED ADULT NURSE NOTE - CHIEF COMPLAINT QUOTE
Pt BIBA from [facility]. Pt had episode of slurred speech and left sided weakness at 3pm while doing PT that lasted 5 minutes as per EMS. Pt back to baseline in triage.  BS in field 127. Dr. Walsh evaluated pt. Code stroke not called as per MD. Pt BIBA from [facility]. Pt had episode of slurred speech and left sided weakness at 3pm while doing PT that lasted 5 minutes as per EMS. Pt back to baseline in triage.  BS in field 127. Dr. Walsh evaluated pt. Code stroke not called as per MD. Pt BIBA from [facility]. Pt had episode of slurred speech and left sided weakness at 3pm while doing PT that lasted 5 minutes as per EMS. Pt back to baseline in triage.  BS in field 127. Dr. Walsh evaluated pt. Code stroke not called as per MD.

## 2022-11-22 NOTE — ED ADULT NURSE NOTE - OBJECTIVE STATEMENT
Assumed pt care for a 89 yr old male complaining of a rehab facility via EMS. As per EMS pt had an episode earlier today of slurred speech. MD at facility evaluated patient and sent him to the ED for evaluation. MD evaluated pt. No stroke code called. PT taken to CT scan, IV established, Labs collected. NIH 0, GCS 15, ethel. Awaiting further disposition.

## 2022-11-22 NOTE — ED ADULT TRIAGE NOTE - NS ED NURSE AMBULANCES
[facility]

## 2022-11-22 NOTE — ED ADULT TRIAGE NOTE - CHIEF COMPLAINT QUOTE
Pt BIBA from [facility]. Pt had episode of slurred speech and left sided weakness at 3pm while doing PT that lasted 5 minutes as per EMS. Pt back to baseline in triage. Dr. Walsh evaluated pt. Code stroke not called. Pt BIBA from [facility]. Pt had episode of slurred speech and left sided weakness at 3pm while doing PT that lasted 5 minutes as per EMS. Pt back to baseline in triage. Dr. Walsh evaluated pt. Code stroke not called. Pt BIBA from [facility]. Pt had episode of slurred speech and left sided weakness at 3pm while doing PT that lasted 5 minutes as per EMS. Pt back to baseline in triage. Dr. Walsh evaluated pt. Code stroke not called. Pt BIBA from [facility]. Pt had episode of slurred speech and left sided weakness at 3pm while doing PT that lasted 5 minutes as per EMS. Pt back to baseline in triage.  BS in field 127. Dr. Walsh evaluated pt. Code stroke not called as per MD. Pt BIBA from [facility]. Pt had episode of slurred speech and left sided weakness at 3pm while doing PT that lasted 5 minutes as per EMS. Pt back to baseline in triage.  BS in field 127. Dr. Walsh evaluated pt. Code stroke not called as per MD. Pt BIBA from [facility]. Pt had episode of slurred speech and left sided weakness at 3pm while doing PT that lasted 5 minutes as per EMS. Pt back to baseline in triage.  BS in field 127. Dr. Walsh evaluated pt. Code stroke not called as per MD.

## 2022-11-22 NOTE — ED ADULT NURSE NOTE - NSIMPLEMENTINTERV_GEN_ALL_ED
Implemented All Universal Safety Interventions:  [place] to call system. Call bell, personal items and telephone within reach. Instruct patient to call for assistance. Room bathroom lighting operational. Non-slip footwear when patient is off stretcher. Physically safe environment: no spills, clutter or unnecessary equipment. Stretcher in lowest position, wheels locked, appropriate side rails in place. Implemented All Universal Safety Interventions:  [place] to call system. Call bell, personal items and telephone within reach. Instruct patient to call for assistance. Room bathroom lighting operational. Non-slip footwear when patient is off stretcher. Physically safe environment: no spills, clutter or unnecessary equipment. Stretcher in lowest position, wheels locked, appropriate side rails in place. Implemented All Universal Safety Interventions:  [place] to call system. Call bell, personal items and telephone within reach. Instruct patient to call for assistance. Room bathroom lighting operational. Non-slip footwear when patient is off stretcher. Physically safe environment: no spills, clutter or unnecessary equipment. Stretcher in lowest position, wheels locked, appropriate side rails in place.

## 2022-11-22 NOTE — ED PROVIDER NOTE - EYES, MLM
Group Therapy Note    Date: April 30    Group Start Time: 2000  Group End Time: 2030  Group Topic: Relaxation    [facility] 7SE ACUTE  Abdifaath Berrios, RN        Group Therapy Note    Attendees: 15/24 Clear bilaterally, pupils equal, round and reactive to light.

## 2022-11-22 NOTE — ED PROVIDER NOTE - CLINICAL SUMMARY MEDICAL DECISION MAKING FREE TEXT BOX
89 y m hx of stroke hypertensive heart dz bib ems weakness L side  aphasia resolved after 5 min  ct brain, cta head neck , perfusion study wnl   case d/w dr caballero and family will admit for observation and stroke work up

## 2022-11-23 ENCOUNTER — TRANSCRIPTION ENCOUNTER (OUTPATIENT)
Age: 87
End: 2022-11-23

## 2022-11-23 VITALS
DIASTOLIC BLOOD PRESSURE: 66 MMHG | HEART RATE: 74 BPM | SYSTOLIC BLOOD PRESSURE: 118 MMHG | RESPIRATION RATE: 16 BRPM | OXYGEN SATURATION: 95 % | TEMPERATURE: 99 F

## 2022-11-23 DIAGNOSIS — Z98.890 OTHER SPECIFIED POSTPROCEDURAL STATES: Chronic | ICD-10-CM

## 2022-11-23 LAB
A1C WITH ESTIMATED AVERAGE GLUCOSE RESULT: 4.8 % — SIGNIFICANT CHANGE UP (ref 4–5.6)
ANION GAP SERPL CALC-SCNC: 7 MMOL/L — SIGNIFICANT CHANGE UP (ref 5–17)
BASOPHILS # BLD AUTO: 0.05 K/UL — SIGNIFICANT CHANGE UP (ref 0–0.2)
BASOPHILS NFR BLD AUTO: 0.6 % — SIGNIFICANT CHANGE UP (ref 0–2)
BUN SERPL-MCNC: 18 MG/DL — SIGNIFICANT CHANGE UP (ref 7–23)
CALCIUM SERPL-MCNC: 8.7 MG/DL — SIGNIFICANT CHANGE UP (ref 8.4–10.5)
CHLORIDE SERPL-SCNC: 107 MMOL/L — SIGNIFICANT CHANGE UP (ref 96–108)
CHOLEST SERPL-MCNC: 160 MG/DL — SIGNIFICANT CHANGE UP
CO2 SERPL-SCNC: 26 MMOL/L — SIGNIFICANT CHANGE UP (ref 22–31)
CREAT SERPL-MCNC: 0.78 MG/DL — SIGNIFICANT CHANGE UP (ref 0.5–1.3)
EGFR: 82 ML/MIN/1.73M2 — SIGNIFICANT CHANGE UP
EOSINOPHIL # BLD AUTO: 0.21 K/UL — SIGNIFICANT CHANGE UP (ref 0–0.5)
EOSINOPHIL NFR BLD AUTO: 2.7 % — SIGNIFICANT CHANGE UP (ref 0–6)
ESTIMATED AVERAGE GLUCOSE: 91 MG/DL — SIGNIFICANT CHANGE UP (ref 68–114)
FOLATE SERPL-MCNC: 7.9 NG/ML — SIGNIFICANT CHANGE UP
GLUCOSE SERPL-MCNC: 95 MG/DL — SIGNIFICANT CHANGE UP (ref 70–99)
HCT VFR BLD CALC: 31.1 % — LOW (ref 39–50)
HDLC SERPL-MCNC: 44 MG/DL — SIGNIFICANT CHANGE UP
HGB BLD-MCNC: 9.6 G/DL — LOW (ref 13–17)
IMM GRANULOCYTES NFR BLD AUTO: 1.2 % — HIGH (ref 0–0.9)
LIPID PNL WITH DIRECT LDL SERPL: 97 MG/DL — SIGNIFICANT CHANGE UP
LYMPHOCYTES # BLD AUTO: 0.57 K/UL — LOW (ref 1–3.3)
LYMPHOCYTES # BLD AUTO: 7.4 % — LOW (ref 13–44)
MACROCYTES BLD QL: SLIGHT — SIGNIFICANT CHANGE UP
MCHC RBC-ENTMCNC: 30.9 GM/DL — LOW (ref 32–36)
MCHC RBC-ENTMCNC: 33.1 PG — SIGNIFICANT CHANGE UP (ref 27–34)
MCV RBC AUTO: 107.2 FL — HIGH (ref 80–100)
MONOCYTES # BLD AUTO: 0.72 K/UL — SIGNIFICANT CHANGE UP (ref 0–0.9)
MONOCYTES NFR BLD AUTO: 9.4 % — SIGNIFICANT CHANGE UP (ref 2–14)
NEUTROPHILS # BLD AUTO: 6.06 K/UL — SIGNIFICANT CHANGE UP (ref 1.8–7.4)
NEUTROPHILS NFR BLD AUTO: 78.7 % — HIGH (ref 43–77)
NON HDL CHOLESTEROL: 116 MG/DL — SIGNIFICANT CHANGE UP
NRBC # BLD: 0 /100 WBCS — SIGNIFICANT CHANGE UP (ref 0–0)
PLAT MORPH BLD: NORMAL — SIGNIFICANT CHANGE UP
PLATELET # BLD AUTO: 310 K/UL — SIGNIFICANT CHANGE UP (ref 150–400)
POTASSIUM SERPL-MCNC: 4.2 MMOL/L — SIGNIFICANT CHANGE UP (ref 3.5–5.3)
POTASSIUM SERPL-SCNC: 4.2 MMOL/L — SIGNIFICANT CHANGE UP (ref 3.5–5.3)
RBC # BLD: 2.9 M/UL — LOW (ref 4.2–5.8)
RBC # FLD: 14.4 % — SIGNIFICANT CHANGE UP (ref 10.3–14.5)
RBC BLD AUTO: ABNORMAL
SODIUM SERPL-SCNC: 140 MMOL/L — SIGNIFICANT CHANGE UP (ref 135–145)
TRIGL SERPL-MCNC: 95 MG/DL — SIGNIFICANT CHANGE UP
VIT B12 SERPL-MCNC: 1194 PG/ML — SIGNIFICANT CHANGE UP (ref 232–1245)
WBC # BLD: 7.7 K/UL — SIGNIFICANT CHANGE UP (ref 3.8–10.5)
WBC # FLD AUTO: 7.7 K/UL — SIGNIFICANT CHANGE UP (ref 3.8–10.5)

## 2022-11-23 PROCEDURE — 99285 EMERGENCY DEPT VISIT HI MDM: CPT

## 2022-11-23 PROCEDURE — 84484 ASSAY OF TROPONIN QUANT: CPT

## 2022-11-23 PROCEDURE — 93306 TTE W/DOPPLER COMPLETE: CPT | Mod: 26

## 2022-11-23 PROCEDURE — 87635 SARS-COV-2 COVID-19 AMP PRB: CPT

## 2022-11-23 PROCEDURE — 85610 PROTHROMBIN TIME: CPT

## 2022-11-23 PROCEDURE — 85730 THROMBOPLASTIN TIME PARTIAL: CPT

## 2022-11-23 PROCEDURE — 82962 GLUCOSE BLOOD TEST: CPT

## 2022-11-23 PROCEDURE — 80061 LIPID PANEL: CPT

## 2022-11-23 PROCEDURE — 80048 BASIC METABOLIC PNL TOTAL CA: CPT

## 2022-11-23 PROCEDURE — 93005 ELECTROCARDIOGRAM TRACING: CPT

## 2022-11-23 PROCEDURE — 97162 PT EVAL MOD COMPLEX 30 MIN: CPT

## 2022-11-23 PROCEDURE — 36415 COLL VENOUS BLD VENIPUNCTURE: CPT

## 2022-11-23 PROCEDURE — 85025 COMPLETE CBC W/AUTO DIFF WBC: CPT

## 2022-11-23 PROCEDURE — 0042T: CPT | Mod: MA

## 2022-11-23 PROCEDURE — 12345: CPT | Mod: NC

## 2022-11-23 PROCEDURE — 99223 1ST HOSP IP/OBS HIGH 75: CPT

## 2022-11-23 PROCEDURE — 97166 OT EVAL MOD COMPLEX 45 MIN: CPT

## 2022-11-23 PROCEDURE — 82607 VITAMIN B-12: CPT

## 2022-11-23 PROCEDURE — 70450 CT HEAD/BRAIN W/O DYE: CPT | Mod: MA

## 2022-11-23 PROCEDURE — 82746 ASSAY OF FOLIC ACID SERUM: CPT

## 2022-11-23 PROCEDURE — 70498 CT ANGIOGRAPHY NECK: CPT | Mod: MA

## 2022-11-23 PROCEDURE — 80053 COMPREHEN METABOLIC PANEL: CPT

## 2022-11-23 PROCEDURE — 83036 HEMOGLOBIN GLYCOSYLATED A1C: CPT

## 2022-11-23 PROCEDURE — 93306 TTE W/DOPPLER COMPLETE: CPT

## 2022-11-23 PROCEDURE — 70496 CT ANGIOGRAPHY HEAD: CPT | Mod: MA

## 2022-11-23 RX ORDER — ENOXAPARIN SODIUM 100 MG/ML
40 INJECTION SUBCUTANEOUS EVERY 24 HOURS
Refills: 0 | Status: DISCONTINUED | OUTPATIENT
Start: 2022-11-23 | End: 2022-11-23

## 2022-11-23 RX ORDER — INFLUENZA VIRUS VACCINE 15; 15; 15; 15 UG/.5ML; UG/.5ML; UG/.5ML; UG/.5ML
0.7 SUSPENSION INTRAMUSCULAR ONCE
Refills: 0 | Status: DISCONTINUED | OUTPATIENT
Start: 2022-11-23 | End: 2022-11-23

## 2022-11-23 RX ORDER — ACETAMINOPHEN 500 MG
650 TABLET ORAL EVERY 8 HOURS
Refills: 0 | Status: DISCONTINUED | OUTPATIENT
Start: 2022-11-23 | End: 2022-11-23

## 2022-11-23 RX ORDER — TAMSULOSIN HYDROCHLORIDE 0.4 MG/1
0.4 CAPSULE ORAL AT BEDTIME
Refills: 0 | Status: DISCONTINUED | OUTPATIENT
Start: 2022-11-23 | End: 2022-11-23

## 2022-11-23 RX ORDER — PANTOPRAZOLE SODIUM 20 MG/1
40 TABLET, DELAYED RELEASE ORAL
Refills: 0 | Status: DISCONTINUED | OUTPATIENT
Start: 2022-11-23 | End: 2022-11-23

## 2022-11-23 RX ORDER — ASPIRIN/CALCIUM CARB/MAGNESIUM 324 MG
81 TABLET ORAL DAILY
Refills: 0 | Status: DISCONTINUED | OUTPATIENT
Start: 2022-11-23 | End: 2022-11-23

## 2022-11-23 RX ORDER — FERROUS SULFATE 325(65) MG
325 TABLET ORAL
Refills: 0 | Status: DISCONTINUED | OUTPATIENT
Start: 2022-11-23 | End: 2022-11-23

## 2022-11-23 RX ORDER — ASPIRIN/CALCIUM CARB/MAGNESIUM 324 MG
1 TABLET ORAL
Qty: 0 | Refills: 0 | DISCHARGE
Start: 2022-11-23

## 2022-11-23 RX ORDER — OXYCODONE HYDROCHLORIDE 5 MG/1
5 TABLET ORAL EVERY 4 HOURS
Refills: 0 | Status: DISCONTINUED | OUTPATIENT
Start: 2022-11-23 | End: 2022-11-23

## 2022-11-23 RX ORDER — SENNA PLUS 8.6 MG/1
2 TABLET ORAL AT BEDTIME
Refills: 0 | Status: DISCONTINUED | OUTPATIENT
Start: 2022-11-23 | End: 2022-11-23

## 2022-11-23 RX ORDER — ATORVASTATIN CALCIUM 80 MG/1
1 TABLET, FILM COATED ORAL
Qty: 0 | Refills: 0 | DISCHARGE
Start: 2022-11-23

## 2022-11-23 RX ORDER — FINASTERIDE 5 MG/1
5 TABLET, FILM COATED ORAL DAILY
Refills: 0 | Status: DISCONTINUED | OUTPATIENT
Start: 2022-11-23 | End: 2022-11-23

## 2022-11-23 RX ORDER — ATORVASTATIN CALCIUM 80 MG/1
80 TABLET, FILM COATED ORAL AT BEDTIME
Refills: 0 | Status: DISCONTINUED | OUTPATIENT
Start: 2022-11-23 | End: 2022-11-23

## 2022-11-23 RX ORDER — SENNA PLUS 8.6 MG/1
2 TABLET ORAL
Qty: 0 | Refills: 0 | DISCHARGE
Start: 2022-11-23

## 2022-11-23 RX ADMIN — Medication 650 MILLIGRAM(S): at 14:47

## 2022-11-23 RX ADMIN — Medication 81 MILLIGRAM(S): at 12:09

## 2022-11-23 RX ADMIN — Medication 325 MILLIGRAM(S): at 06:38

## 2022-11-23 RX ADMIN — Medication 650 MILLIGRAM(S): at 06:38

## 2022-11-23 RX ADMIN — PANTOPRAZOLE SODIUM 40 MILLIGRAM(S): 20 TABLET, DELAYED RELEASE ORAL at 06:45

## 2022-11-23 RX ADMIN — ENOXAPARIN SODIUM 40 MILLIGRAM(S): 100 INJECTION SUBCUTANEOUS at 06:38

## 2022-11-23 RX ADMIN — FINASTERIDE 5 MILLIGRAM(S): 5 TABLET, FILM COATED ORAL at 12:09

## 2022-11-23 NOTE — OCCUPATIONAL THERAPY INITIAL EVALUATION ADULT - LEVEL OF INDEPENDENCE:TOILET, OT EVAL
on bed pan (requiring 2 people, assist for rolling and assist for hygiene/dependent (less than 25% patients effort)

## 2022-11-23 NOTE — OCCUPATIONAL THERAPY INITIAL EVALUATION ADULT - ADDITIONAL COMMENTS
Pt resides in private home alone. Pt has been at [facility] for rehab s/p L hip rx, requiring assist for ADL's/mobility. Prior to hip surgery, pt reports independence in ADL's/mobility, +RW, +shower chair at home. Pt resides in private home alone. Pt has been at [facility] for rehab s/p L hip rx, requiring assist for ADL's/mobility. Prior to hip surgery, pt reports independence in ADL's/mobility, +RW, +shower chair at home. Pt resides in private home alone. Pt has been at [facility] for rehab s/p L hip rx, requiring assist for ADL's/mobility. Prior to hip surgery, pt reports independence in ADL's/mobility, +RW, +shower chair at home.

## 2022-11-23 NOTE — OCCUPATIONAL THERAPY INITIAL EVALUATION ADULT - PERTINENT HX OF CURRENT PROBLEM, REHAB EVAL
89 M [facility] for [facility] s/p recent fall with L hip fx s/p L hip replacement about a week ago complicated with urinary retention now with Musa, hx BPH, hx of partial colectomy sec to colonic polyps pw TIA

## 2022-11-23 NOTE — CONSULT NOTE ADULT - ASSESSMENT
Impression:  89 M [facility] s/p recent fall with L hip fx s/p L hip replacement about a week ago complicated with urinary retention now with Musa, hx BPH, hx of partial colectomy sec to colonic polyps pw TIA. Reportedly sent in for witnessed episode of aphasia and L sided weakness for about 5 mins. Neuro exam is non focal and CT head and CT angio head neck and perfusion all unremarkable. No episodes over night on telemetry.    REC:  OK discharge to [facility] on ASA and statin medication.  Impression:  89 M [facility] s/p recent fall with L hip fx s/p L hip replacement about a week ago complicated with urinary retention now with Musa, hx BPH, hx of partial colectomy sec to colonic polyps pw TIA. Reportedly sent in for witnessed episode of aphasia and L sided weakness for about 5 mins. Neuro exam is non focal and CT head and CT angio head neck and perfusion all unremarkable. No episodes over night on telemetry.    REC:  OK discharge to [facility] on ASA and statin medication.  Impression:  89 M [facility] s/p recent fall with L hip fx s/p L hip replacement about a week ago complicated with urinary retention now with Musa, hx BPH, hx of partial colectomy sec to colonic polyps pw TIA. Reportedly sent in for witnessed episode of aphasia and L sided weakness for about 5 mins. Neuro exam is non focal and CT head and CT angio head neck and perfusion all unremarkable. No episodes over night on telemetry.    REC:  OK discharge to [facility] on ASA and statin medication.

## 2022-11-23 NOTE — PHYSICAL THERAPY INITIAL EVALUATION ADULT - ADDITIONAL COMMENTS
As per pt he has not been able to ambulate since hip replacement. Was at [facility]. Prior to fall pt was living independently in an apartment with 2 steps to enter. Pt used a can to ambulate. Had an aide come in for housekeeping. As per pt he has not been able to ambulate since hip replacement. Was at [facility]. Prior to fall pt was living independently in an apartment with 2 steps to enter. Pt used a can to ambulate. Had an aide come in for housekeeping. As per pt he has not been able to ambulate since hip replacement. Was at [facility]. Prior to fall pt was living independently in an apartment with 2 steps to enter. Pt used a can to ambulate. Had an aide come in for housekeeping.

## 2022-11-23 NOTE — PATIENT PROFILE ADULT - FALL HARM RISK - HARM RISK INTERVENTIONS
Assistance with ambulation/Assistance OOB with selected safe patient handling equipment/Communicate Risk of Fall with Harm to all staff/Discuss with provider need for PT consult/Monitor gait and stability/Provide patient with walking aids - walker, cane, crutches/Reinforce activity limits and safety measures with patient and family/Sit up slowly, dangle for a short time, stand at bedside before walking/Tailored Fall Risk Interventions/Use of alarms - bed, chair and/or voice tab/Visual Cue: Yellow wristband and red socks/Bed in lowest position, wheels locked, appropriate side rails in place/Call bell, personal items and telephone in reach/Instruct patient to call for assistance before getting out of bed or chair/Non-slip footwear when patient is out of bed/[place] to call system/Physically safe environment - no spills, clutter or unnecessary equipment/Purposeful Proactive Rounding/Room/bathroom lighting operational, light cord in reach Assistance with ambulation/Assistance OOB with selected safe patient handling equipment/Communicate Risk of Fall with Harm to all staff/Discuss with provider need for PT consult/Monitor gait and stability/Provide patient with walking aids - walker, cane, crutches/Reinforce activity limits and safety measures with patient and family/Sit up slowly, dangle for a short time, stand at bedside before walking/Tailored Fall Risk Interventions/Use of alarms - bed, chair and/or voice tab/Visual Cue: Yellow wristband and red socks/Bed in lowest position, wheels locked, appropriate side rails in place/Call bell, personal items and telephone in reach/Instruct patient to call for assistance before getting out of bed or chair/Non-slip footwear when patient is out of bed/[place] to call system/Physically safe environment - no spills, clutter or unnecessary equipment/Purposeful Proactive Rounding/Room/bathroom lighting operational, light cord in reach Assistance with ambulation/Assistance OOB with selected safe patient handling equipment/Communicate Risk of Fall with Harm to all staff/Discuss with provider need for PT consult/Monitor gait and stability/Provide patient with walking aids - walker, cane, crutches/Reinforce activity limits and safety measures with patient and family/Sit up slowly, dangle for a short time, stand at bedside before walking/Tailored Fall Risk Interventions/Use of alarms - bed, chair and/or voice tab/Visual Cue: Yellow wristband and red socks/Bed in lowest position, wheels locked, appropriate side rails in place/Call bell, personal items and telephone in reach/Instruct patient to call for assistance before getting out of bed or chair/Non-slip footwear when patient is out of bed/[place] to call system/Physically safe environment - no spills, clutter or unnecessary equipment/Purposeful Proactive Rounding/Room/bathroom lighting operational, light cord in reach

## 2022-11-23 NOTE — H&P ADULT - NSHPPHYSICALEXAM_GEN_ALL_CORE
Vital Signs Last 24 Hrs  T(C): 36.6 (22 Nov 2022 19:20), Max: 36.6 (22 Nov 2022 19:20)  T(F): 97.8 (22 Nov 2022 19:20), Max: 97.8 (22 Nov 2022 19:20)  HR: 90 (22 Nov 2022 19:23) (86 - 92)  BP: 126/66 (22 Nov 2022 19:23) (106/63 - 126/66)  BP(mean): --  RR: 18 (22 Nov 2022 19:23) (16 - 18)  SpO2: 98% (22 Nov 2022 19:23) (95% - 98%)    Parameters below as of 22 Nov 2022 19:23  Patient On (Oxygen Delivery Method): room air      Daily Height in cm: 172.72 (22 Nov 2022 17:44)    Daily   CAPILLARY BLOOD GLUCOSE      POCT Blood Glucose.: 105 mg/dL (22 Nov 2022 19:03)    I&O's Summary      GENERAL: NAD  HEAD:  Normocephalic  EYES: EOMI, PERRLA, conjunctiva and sclera clear  ENMT: No tonsillar erythema, exudates, or enlargement; Moist mucous membranes, No lesions  NECK: Supple, No JVD, no bruit, normal thyroid  NERVOUS SYSTEM:  Alert & Oriented X3, no facial droop, tongue midline. grossly 5/5 in upper and R LE. L leg unable to lift but good effort preexisting since surgery. ; DTRs 2+ intact and symmetric  CHEST/LUNG: Clear to auscultation bilaterally; No rales, rhonchi, wheezing, or rubs  HEART: Regular rate and rhythm; No murmurs, rubs, or gallops  ABDOMEN: Soft, Nontender, Nondistended; Bowel sounds present. L inguinal hernia  EXTREMITIES:  2+ Peripheral Pulses, No clubbing, cyanosis, or edema  LYMPH: No lymphadenopathy noted  SKIN: No rashes or lesions

## 2022-11-23 NOTE — PHYSICAL THERAPY INITIAL EVALUATION ADULT - RANGE OF MOTION EXAMINATION, REHAB EVAL
R NAJMA WFL, limited left hip flexion 90 deg due to pain, has hip precautions/deficits as listed below

## 2022-11-23 NOTE — DISCHARGE NOTE NURSING/CASE MANAGEMENT/SOCIAL WORK - NSDCPEFALRISK_GEN_ALL_CORE
For information on Fall & Injury Prevention, visit: https://www.[facility]/news/fall-prevention-protects-and-maintains-health-and-mobility OR  https://www.[facility]/news/fall-prevention-tips-to-avoid-injury OR  https://www.cdc.gov/steadi/patient.html For information on Fall & Injury Prevention, visit: https://www.[facility]/news/fall-prevention-protects-and-maintains-health-and-mobility OR  https://www.[facility]/news/fall-prevention-tips-to-avoid-injury OR  https://www.cdc.gov/steadi/patient.html For information on Fall & Injury Prevention, visit: https://www.[facility]/news/fall-prevention-protects-and-maintains-health-and-mobility OR  https://www.[facility]/news/fall-prevention-tips-to-avoid-injury OR  https://www.cdc.gov/steadi/patient.html

## 2022-11-23 NOTE — DISCHARGE NOTE PROVIDER - ATTENDING DISCHARGE PHYSICAL EXAMINATION:
See H&P from same date.  Transient aphasia, resolved, suspect TIA  d/c on ASA and high intensity statin  Stable for d/c back to SNF    *Dr. Jay to bill

## 2022-11-23 NOTE — H&P ADULT - ASSESSMENT
89 [facility] s/p recent fall with L hip fx s/p L hip replacement about a week ago complicated with urinary retention now with [place], hx BPH, hx of partial colectomy sec to colonic polyps pw TIA    # TIA  neuro checks q4  ECHO, lipid profile, HgA1c, neuro consult  cont asa and statin    # s/p L hip sx s/p fall with residual weakness  PT  cont lovenox    #Urinary retention  cont Reynaga  cont finasteride and flomax.     #GOC  As d/w daughter Shelli. Pt is DNR/DNI. MOLST filled out and DNR order placed.

## 2022-11-23 NOTE — PHYSICAL THERAPY INITIAL EVALUATION ADULT - PERTINENT HX OF CURRENT PROBLEM, REHAB EVAL
89 [facility] s/p recent fall with L hip fx s/p L hip replacement about a week ago complicated with urinary retention now with Musa, hx BPH, hx of partial colectomy sec to colonic polyps pw TIA. Reportedly sent in for witnessed episode of aphasia and L sided weakness for about 5 mins. Pt related he did not recall any such event. He stated he was upset at this physical therapist for pushing him too far in PT and because his L leg was just too weak for him to participate fully and he 'blew his cork'. He denied such event. Denied any HA, dizziness, focal numbness or paresthesias. Just weakness and still residual discomfort in L hip after surgery. Initial NIHSS 0. afebrile P: 92 BP: 106/63 sat 95-98% on RA. WBC10.5 86% N  COVID neg. CT head and CT angio negative.

## 2022-11-23 NOTE — DISCHARGE NOTE PROVIDER - NSDCCPCAREPLAN_GEN_ALL_CORE_FT
PRINCIPAL DISCHARGE DIAGNOSIS  Diagnosis: Brain TIA  Assessment and Plan of Treatment: Reportedly sent in for witnessed episode of aphasia and L sided weakness for about 5 mins. Neuro exam is non focal and CT head and CT angio head neck and perfusion all unremarkable. No episodes over night on telemetry. CLeared by neurology for discharge back to subacute rehab with aspirin and statin

## 2022-11-23 NOTE — PHYSICAL THERAPY INITIAL EVALUATION ADULT - LEVEL OF INDEPENDENCE: GAIT, REHAB EVAL
Patient is calling to discuss concerns about fever, fatigue black stools. Patient states he started experiencing symptoms a couple days ago. Patient was experiencing shortness of breath 2 days ago but symptom resolved. Patient is feeling better today. Patient would like to schedule phone visit with PCP. Please advise    Patient Name: Francisco Salmon  Caller Name: Patient  Callback Number: [phone]  Best Availability: n/a  Can A Detailed Message Be left? n/a    Thank you,  Estella Rivera    
Spoke with patient.   Continues to report jet-black stools.   Diarrhea for a week and a half, but states yesterday's BM was formed.  Reports subjective fever.  Was feeling fatigued but today feels better.   Is having chills on and off.  States he felt cold, clammy, and weak yesterday, but better today.   Denies rectal bleeding, vomiting, or feeling dizzy.   Patient advised to go to ER per protocol.  States his \"compadre\" can take him after 3 pm.   Patient advised to call 911 if he starts to having rectal bleeding, feels cold, clammy, dizzy or weak. Patient verbalized understand.  ME   Reason for Disposition  • Tarry or jet black-colored stool  • Tarry or jet black-colored stool (not dark green)    Protocols used: STOOLS - UNUSUAL COLOR-A-AH, RECTAL BLEEDING-A-AH    
unable to perform

## 2022-11-23 NOTE — H&P ADULT - NSHPLABSRESULTS_GEN_ALL_CORE
10.9   10.52 )-----------( 339      ( 22 Nov 2022 17:55 )             33.3       11-22    139  |  104  |  21  ----------------------------<  108<H>  4.1   |  25  |  0.83    Ca    8.8      22 Nov 2022 17:55    TPro  7.0  /  Alb  2.7<L>  /  TBili  1.0  /  DBili  x   /  AST  24  /  ALT  17  /  AlkPhos  118  11-22         LIVER FUNCTIONS - ( 22 Nov 2022 17:55 )  Alb: 2.7 g/dL / Pro: 7.0 g/dL / ALK PHOS: 118 U/L / ALT: 17 U/L / AST: 24 U/L / GGT: x               PT/INR - ( 22 Nov 2022 17:55 )   PT: 13.5 sec;   INR: 1.16 ratio         PTT - ( 22 Nov 2022 17:55 )  PTT:28.4 sec              CAPILLARY BLOOD GLUCOSE      POCT Blood Glucose.: 105 mg/dL (22 Nov 2022 19:03)        EKG: personally rev. NSR at 93bpm, no acute ST changes.       rad< from: CT Brain Stroke Protocol (11.22.22 @ 17:51) >      IMPRESSION:  HEAD CT: Mild volume loss, microvascular disease, no acute hemorrhage or   midline shift.    < end of copied text >    < from: CT Brain Perfusion Maps Stroke (11.22.22 @ 18:16) >      IMPRESSION:    CT PERFUSION demonstrated: No core infarct. No active ischemia.  If symptoms persist consider follow up head CT or MRI, MRA  if no   contraindication.    CTA COW:  Patent intracranial circulation without flow limiting stenosis.    CTA NECK: Patent, ECAs, ICAs, no  hemodynamically significant stenosis at    ICA origins by NASCET criteria.  Bilateral vertebral arteries are patent without flow limiting stenosis.      < end of copied text >

## 2022-11-23 NOTE — DISCHARGE NOTE NURSING/CASE MANAGEMENT/SOCIAL WORK - NSDCFUADDAPPT_GEN_ALL_CORE_FT
Dr Gonzales will continue to follow patient at [facility]  Dr Gonzales will continue to follow patient at [facility]  Dr Gonzales will continue to follow patient at [facility]

## 2022-11-23 NOTE — DISCHARGE NOTE PROVIDER - HOSPITAL COURSE
89 M [facility]hab s/p recent fall with L hip fx s/p L hip replacement about a week ago complicated with urinary retention now with Musa, hx BPH, hx of partial colectomy sec to colonic polyps pw TIA. Reportedly sent in for witnessed episode of aphasia and L sided weakness for about 5 mins. Pt related he did not recall any such event. He stated he was upset at this physical therapist for pushing him too far in PT and because his L leg was just too weak for him to participate fully and he 'blew his cork'. He denied such event. Denied any HA, dizziness, focal numbness or paresthesias. Just weakness and still residual discomfort in L hip after surgery. Initial NIHSS 0. afebrile P: 92 BP: 106/63 sat 95-98% on RA. WBC10.5 86% N  COVID neg. CT head and CT angio negative.    89 M [facility] s/p recent fall with L hip fx s/p L hip replacement about a week ago complicated with urinary retention now with Musa, hx BPH, hx of partial colectomy sec to colonic polyps pw TIA. Reportedly sent in for witnessed episode of aphasia and L sided weakness for about 5 mins. Pt related he did not recall any such event. He stated he was upset at this physical therapist for pushing him too far in PT and because his L leg was just too weak for him to participate fully and he 'blew his cork'. He denied such event. Denied any HA, dizziness, focal numbness or paresthesias. Just weakness and still residual discomfort in L hip after surgery. Initial NIHSS 0. afebrile P: 92 BP: 106/63 sat 95-98% on RA. WBC10.5 86% N  COVID neg. CT head and CT angio negative.   Admitted to hospitalist.  Neuro checks performed q 4 hrs with no events. Monitored on tele w no acute events.  TTE revealed ___________.  Lipid profile , total chol 160. LDL 97. PT/OT recommended DOMINICK. Patient cleared from neurology and stable for dc back to [facility]    He can obtain a holter monitor as outpatient for further monitoring    Discharging provider: Kristine NEVAREZ    Code Status: DNR     **RESULTS**  < from: CT Brain Perfusion Maps Stroke (11.22.22 @ 18:16) >    IMPRESSION:    CT PERFUSION demonstrated: No core infarct. No active ischemia.  If symptoms persist consider follow up head CT or MRI, MRA  if no   contraindication.    CTA COW:  Patent intracranial circulation without flow limiting stenosis.    CTA NECK: Patent, ECAs, ICAs, no  hemodynamically significant stenosis at    ICA origins by NASCET criteria.  Bilateral vertebral arteries are patent without flow limiting stenosis.     Discussed with GERI Hurst in the ED at 6:29 PM.    --- End of Report ---            GILBERTO POLLARD MD; Attending Radiologist    < end of copied text >       89 M fr[facility] s/p recent fall with L hip fx s/p L hip replacement about a week ago complicated with urinary retention now with Musa, hx BPH, hx of partial colectomy sec to colonic polyps pw TIA. Reportedly sent in for witnessed episode of aphasia and L sided weakness for about 5 mins. Pt related he did not recall any such event. He stated he was upset at this physical therapist for pushing him too far in PT and because his L leg was just too weak for him to participate fully and he 'blew his cork'. He denied such event. Denied any HA, dizziness, focal numbness or paresthesias. Just weakness and still residual discomfort in L hip after surgery. Initial NIHSS 0. afebrile P: 92 BP: 106/63 sat 95-98% on RA. WBC10.5 86% N  COVID neg. CT head and CT angio negative.   Admitted to hospitalist.  Neuro checks performed q 4 hrs with no events. Monitored on tele w no acute events.  TTE revealed ___________.  Lipid profile , total chol 160. LDL 97. PT/OT recommended DOMINICK. Patient cleared from neurology and stable for dc back to [facility]    He can obtain a holter monitor as outpatient for further monitoring    Discharging provider: Kristine NEVAREZ    Code Status: DNR     **RESULTS**  < from: CT Brain Perfusion Maps Stroke (11.22.22 @ 18:16) >    IMPRESSION:    CT PERFUSION demonstrated: No core infarct. No active ischemia.  If symptoms persist consider follow up head CT or MRI, MRA  if no   contraindication.    CTA COW:  Patent intracranial circulation without flow limiting stenosis.    CTA NECK: Patent, ECAs, ICAs, no  hemodynamically significant stenosis at    ICA origins by NASCET criteria.  Bilateral vertebral arteries are patent without flow limiting stenosis.     Discussed with GERI Hurst in the ED at 6:29 PM.    --- End of Report ---            GILBERTO POLLARD MD; Attending Radiologist    < end of copied text >       89 M fr[facility] s/p recent fall with L hip fx s/p L hip replacement about a week ago complicated with urinary retention now with Musa, hx BPH, hx of partial colectomy sec to colonic polyps pw TIA. Reportedly sent in for witnessed episode of aphasia and L sided weakness for about 5 mins. Pt related he did not recall any such event. He stated he was upset at this physical therapist for pushing him too far in PT and because his L leg was just too weak for him to participate fully and he 'blew his cork'. He denied such event. Denied any HA, dizziness, focal numbness or paresthesias. Just weakness and still residual discomfort in L hip after surgery. Initial NIHSS 0. afebrile P: 92 BP: 106/63 sat 95-98% on RA. WBC10.5 86% N  COVID neg. CT head and CT angio negative.   Admitted to hospitalist.  Neuro checks performed q 4 hrs with no events. Monitored on tele w no acute events.  TTE revealed ___________.  Lipid profile , total chol 160. LDL 97. PT/OT recommended DOMINICK. Patient cleared from neurology and stable for dc back to [facility]    He can obtain a holter monitor as outpatient for further monitoring    Discharging provider: Kristine NEVAREZ    Code Status: DNR     **RESULTS**  < from: CT Brain Perfusion Maps Stroke (11.22.22 @ 18:16) >    IMPRESSION:    CT PERFUSION demonstrated: No core infarct. No active ischemia.  If symptoms persist consider follow up head CT or MRI, MRA  if no   contraindication.    CTA COW:  Patent intracranial circulation without flow limiting stenosis.    CTA NECK: Patent, ECAs, ICAs, no  hemodynamically significant stenosis at    ICA origins by NASCET criteria.  Bilateral vertebral arteries are patent without flow limiting stenosis.     Discussed with GERI Hurst in the ED at 6:29 PM.    --- End of Report ---            GILBERTO POLLARD MD; Attending Radiologist    < end of copied text >       89 M fr[facility] s/p recent fall with L hip fx s/p L hip replacement about a week ago complicated with urinary retention now with Musa, hx BPH, hx of partial colectomy sec to colonic polyps pw TIA. Reportedly sent in for witnessed episode of aphasia and L sided weakness for about 5 mins. Pt related he did not recall any such event. He stated he was upset at this physical therapist for pushing him too far in PT and because his L leg was just too weak for him to participate fully and he 'blew his cork'. He denied such event. Denied any HA, dizziness, focal numbness or paresthesias. Just weakness and still residual discomfort in L hip after surgery. Initial NIHSS 0. afebrile P: 92 BP: 106/63 sat 95-98% on RA. WBC10.5 86% N  COVID neg. CT head and CT angio negative.   Admitted to hospitalist.  Neuro checks performed q 4 hrs with no events. Monitored on tele w no acute events.  Lipid profile , total chol 160. LDL 97. PT/OT recommended DOMINICK. Patient cleared from neurology and stable for dc back to [facility]    He can obtain a holter monitor as outpatient for further monitoring    Discharging provider: Kristine NEVAREZ    Code Status: DNR     **RESULTS**  < from: CT Brain Perfusion Maps Stroke (11.22.22 @ 18:16) >    IMPRESSION:    CT PERFUSION demonstrated: No core infarct. No active ischemia.  If symptoms persist consider follow up head CT or MRI, MRA  if no   contraindication.    CTA COW:  Patent intracranial circulation without flow limiting stenosis.    CTA NECK: Patent, ECAs, ICAs, no  hemodynamically significant stenosis at    ICA origins by NASCET criteria.  Bilateral vertebral arteries are patent without flow limiting stenosis.     Discussed with GERI Hurst in the ED at 6:29 PM.    --- End of Report ---            GILBERTO POLLARD MD; Attending Radiologist    < end of copied text >       89 M frpm [facility] s/p recent fall with L hip fx s/p L hip replacement about a week ago complicated with urinary retention now with Musa, hx BPH, hx of partial colectomy sec to colonic polyps pw TIA. Reportedly sent in for witnessed episode of aphasia and L sided weakness for about 5 mins. Pt related he did not recall any such event. He stated he was upset at this physical therapist for pushing him too far in PT and because his L leg was just too weak for him to participate fully and he 'blew his cork'. He denied such event. Denied any HA, dizziness, focal numbness or paresthesias. Just weakness and still residual discomfort in L hip after surgery. Initial NIHSS 0. afebrile P: 92 BP: 106/63 sat 95-98% on RA. WBC10.5 86% N  COVID neg. CT head and CT angio negative.   Admitted to hospitalist.  Neuro checks performed q 4 hrs with no events. Monitored on tele w no acute events.  Lipid profile , total chol 160. LDL 97. PT/OT recommended DOMINICK. Patient cleared from neurology and stable for dc back to [facility]    He can obtain a holter monitor as outpatient for further monitoring    Discharging provider: Kristine NEVAREZ    Code Status: DNR     **RESULTS**  < from: CT Brain Perfusion Maps Stroke (11.22.22 @ 18:16) >    IMPRESSION:    CT PERFUSION demonstrated: No core infarct. No active ischemia.  If symptoms persist consider follow up head CT or MRI, MRA  if no   contraindication.    CTA COW:  Patent intracranial circulation without flow limiting stenosis.    CTA NECK: Patent, ECAs, ICAs, no  hemodynamically significant stenosis at    ICA origins by NASCET criteria.  Bilateral vertebral arteries are patent without flow limiting stenosis.     Discussed with GERI Hurst in the ED at 6:29 PM.    --- End of Report ---            GILBERTO POLLARD MD; Attending Radiologist    < end of copied text >       89 M frpm [facility] s/p recent fall with L hip fx s/p L hip replacement about a week ago complicated with urinary retention now with Musa, hx BPH, hx of partial colectomy sec to colonic polyps pw TIA. Reportedly sent in for witnessed episode of aphasia and L sided weakness for about 5 mins. Pt related he did not recall any such event. He stated he was upset at this physical therapist for pushing him too far in PT and because his L leg was just too weak for him to participate fully and he 'blew his cork'. He denied such event. Denied any HA, dizziness, focal numbness or paresthesias. Just weakness and still residual discomfort in L hip after surgery. Initial NIHSS 0. afebrile P: 92 BP: 106/63 sat 95-98% on RA. WBC10.5 86% N  COVID neg. CT head and CT angio negative.   Admitted to hospitalist.  Neuro checks performed q 4 hrs with no events. Monitored on tele w no acute events.  Lipid profile , total chol 160. LDL 97. PT/OT recommended DOMINICK. Patient cleared from neurology and stable for dc back to [facility]    He can obtain a holter monitor as outpatient for further monitoring    Discharging provider: Kristine NEVAREZ    Code Status: DNR     **RESULTS**  < from: CT Brain Perfusion Maps Stroke (11.22.22 @ 18:16) >    IMPRESSION:    CT PERFUSION demonstrated: No core infarct. No active ischemia.  If symptoms persist consider follow up head CT or MRI, MRA  if no   contraindication.    CTA COW:  Patent intracranial circulation without flow limiting stenosis.    CTA NECK: Patent, ECAs, ICAs, no  hemodynamically significant stenosis at    ICA origins by NASCET criteria.  Bilateral vertebral arteries are patent without flow limiting stenosis.     Discussed with GERI Hurst in the ED at 6:29 PM.    --- End of Report ---            GILBERTO POLLARD MD; Attending Radiologist    < end of copied text >

## 2022-11-23 NOTE — PHYSICAL THERAPY INITIAL EVALUATION ADULT - GAIT TRAINING, PT EVAL
In 1-3 therapy sessions pt will be able to ambulate 10 ft with max A and with appropriate assistive device.
MEDICATIONS  (STANDING):  amLODIPine   Tablet 10 milliGRAM(s) Oral daily  cholestyramine Powder (Sugar-Free) 4 Gram(s) Oral two times a day  heparin   Injectable 5000 Unit(s) SubCutaneous every 12 hours    MEDICATIONS  (PRN):  acetaminophen     Tablet .. 650 milliGRAM(s) Oral every 6 hours PRN Temp greater or equal to 38C (100.4F), Mild Pain (1 - 3)  melatonin 3 milliGRAM(s) Oral at bedtime PRN Insomnia  ondansetron Injectable 4 milliGRAM(s) IV Push every 8 hours PRN Nausea and/or Vomiting

## 2022-11-23 NOTE — CONSULT NOTE ADULT - SUBJECTIVE AND OBJECTIVE BOX
Neurology consult    PRETTY DOLAN89yMale     Patient is a 89y old  Male who presents with a chief complaint of TIA (23 Nov 2022 11:45)      HPI:  89 M fr[facility] s/p recent fall with L hip fx s/p L hip replacement about a week ago complicated with urinary retention now with Musa, hx BPH, hx of partial colectomy sec to colonic polyps pw TIA. Reportedly sent in for witnessed episode of aphasia and L sided weakness for about 5 mins. Pt related he did not recall any such event. He stated he was upset at this physical therapist for pushing him too far in PT and because his L leg was just too weak for him to participate fully and he 'blew his cork'. He denied such event. Denied any HA, dizziness, focal numbness or paresthesias. Just weakness and still residual discomfort in L hip after surgery. Initial NIHSS 0. afebrile P: 92 BP: 106/63 sat 95-98% on RA. WBC10.5 86% N  COVID neg. CT head and CT angio negative.  (23 Nov 2022 01:00)      REVIEW OF SYSTEMS:    Constitutional: No fever, chills, fatigue, weakness  Eyes: no eye pain, visual disturbances, or discharge  ENT:  No difficulty hearing, tinnitus, vertigo; No sinus or throat pain  Neck: No pain or stiffness  Respiratory: No cough, dyspnea, wheezing   Cardiovascular: No chest pain, palpitations,   Gastrointestinal: No abdominal or epigastric pain. No nausea, vomiting  No diarrhea or constipation.   Genitourinary: No dysuria, frequency, hematuria or incontinence  Neurological: See abovr  Psychiatric: No depression, anxiety, mood swings or difficulty sleeping  Musculoskeletal: No joint pain or swelling; No muscle, back or extremity pain  Skin: No itching, burning, rashes or lesions   Lymph Nodes: No enlarged glands  Endocrine: No heat or cold intolerance; No hair loss, No h/o diabetes or thyroid dysfunction  Allergy and Immunologic: No hives or eczema    MEDICATIONS    acetaminophen     Tablet .. 650 milliGRAM(s) Oral every 8 hours  aspirin enteric coated 81 milliGRAM(s) Oral daily  atorvastatin 80 milliGRAM(s) Oral at bedtime  enoxaparin Injectable 40 milliGRAM(s) SubCutaneous every 24 hours  ferrous    sulfate 325 milliGRAM(s) Oral two times a day  finasteride 5 milliGRAM(s) Oral daily  influenza  Vaccine (HIGH DOSE) 0.7 milliLiter(s) IntraMuscular once  oxyCODONE    IR 5 milliGRAM(s) Oral every 4 hours PRN  pantoprazole    Tablet 40 milliGRAM(s) Oral before breakfast  senna 2 Tablet(s) Oral at bedtime  sodium chloride 0.9%. 1000 milliLiter(s) IV Continuous <Continuous>  tamsulosin 0.4 milliGRAM(s) Oral at bedtime      PMH: Hypertension    History of BPH         PSH: History of colon surgery        Family history:   FAMILY HISTORY:  FH: asthma (Mother)        SOCIAL HISTORY:  No history of tobacco or alcohol use     Allergies    No Known Allergies    Intolerances        Height (cm): 172.7 (11-22 @ 17:49)  Weight (kg): 65.8 (11-22 @ 17:49)  BMI (kg/m2): 22.1 (11-22 @ 17:49)    Vital Signs Last 24 Hrs  T(C): 36.6 (23 Nov 2022 12:18), Max: 36.9 (23 Nov 2022 03:26)  T(F): 97.8 (23 Nov 2022 12:18), Max: 98.4 (23 Nov 2022 03:26)  HR: 74 (23 Nov 2022 12:18) (74 - 92)  BP: 110/62 (23 Nov 2022 12:18) (106/63 - 135/72)  BP(mean): --  RR: 16 (23 Nov 2022 12:18) (15 - 18)  SpO2: 97% (23 Nov 2022 12:18) (94% - 98%)    Parameters below as of 23 Nov 2022 12:18  Patient On (Oxygen Delivery Method): room air          On Neurological Examination:    Head: normocephalic Neck: supple no carotid bruits    Mental Status - Patient is alert, speech intact oriented    Cranial Nerves - PERRL, EOMI, VFF, normal V through XII no nystagmus    Motor Exam :  No drift good power no dysmetria non focal    Sensory    Intact to light touch and pinprick bilaterally normal DSS to touch    Reflexes:  symmetric  plantars downgoing    Gait -  not tested                                                      LABS:  CBC Full  -  ( 23 Nov 2022 06:15 )  WBC Count : 7.70 K/uL  RBC Count : 2.90 M/uL  Hemoglobin : 9.6 g/dL  Hematocrit : 31.1 %  Platelet Count - Automated : 310 K/uL  Mean Cell Volume : 107.2 fl  Mean Cell Hemoglobin : 33.1 pg  Mean Cell Hemoglobin Concentration : 30.9 gm/dL  Auto Neutrophil # : 6.06 K/uL  Auto Lymphocyte # : 0.57 K/uL  Auto Monocyte # : 0.72 K/uL  Auto Eosinophil # : 0.21 K/uL  Auto Basophil # : 0.05 K/uL  Auto Neutrophil % : 78.7 %  Auto Lymphocyte % : 7.4 %  Auto Monocyte % : 9.4 %  Auto Eosinophil % : 2.7 %  Auto Basophil % : 0.6 %      11-23    140  |  107  |  18  ----------------------------<  95  4.2   |  26  |  0.78    Ca    8.7      23 Nov 2022 06:15    TPro  7.0  /  Alb  2.7<L>  /  TBili  1.0  /  DBili  x   /  AST  24  /  ALT  17  /  AlkPhos  118  11-22    LIVER FUNCTIONS - ( 22 Nov 2022 17:55 )  Alb: 2.7 g/dL / Pro: 7.0 g/dL / ALK PHOS: 118 U/L / ALT: 17 U/L / AST: 24 U/L / GGT: x           Hemoglobin A1C:   Lipid Panel 11-23 @ 06:15  Total Cholesterol, Serum 160  LDL --  Triglycerides 95      PT/INR - ( 22 Nov 2022 17:55 )   PT: 13.5 sec;   INR: 1.16 ratio         PTT - ( 22 Nov 2022 17:55 )  PTT:28.4 sec  Total [id number] mg/dL   HDL 44 mg/dL  LDL --          RADIOLOGY        < from: CT Brain Perfusion Maps Stroke (11.22.22 @ 18:16) >    ACC: [id number] EXAM:  CT ANGIO BRAIN STROKE PROT IC                        ACC: [id number] EXAM:  CT ANGIO NECK STROKE PROT IC                        ACC: [id number] EXAM:  CT BRAIN PERFUSION MAPS STROKE                          PROCEDURE DATE:  11/22/2022          INTERPRETATION:  CT PERFUSION WITH MAPS STROKE PROTOCOL, CT ANGIO NECK   STROKE PROTOCOL, CT ANGIO HEAD STROKE PROTOCOL  CT PERFUSION  CTA OF THE [place] OF GUTIERRES AND NECK:    INDICATIONS: Stroke Code. Delayed speech and left-sided weakness. Lasted   only 5 minutes and resolved completely. History of stroke.  GCT. No   additional clinical history was provided.    TECHNIQUE:  RAPID artificial intelligence was used for perfusion analysis and for   intracranial large vessel occlusion.    Contrast: 80 cc Omnipaque 350 administered. 10 cc discarded. 70 cc   Omnipaque 350 administered. 0 cc discarded.    CTA [place] OF GUTIERRES:  After the intravenous power injection of non-ionic contrast material,   serial thin sections were obtained through the intracranial circulation   on a multislice CT scanner.  Images were reformatted using a dedicated 3D   software package and viewed on a dedicated workstation in multiple planes.    CTA NECK:  After the intravenous power injection of non-ionic contrast material,   serial thin sections were obtained through the cervical circulation on a   multislice CT scanner.  Images were reformatted using a dedicated 3D   software package and viewed on a dedicated workstation in multiple planes.    COMPARISON EXAMINATION: None.    FINDINGS:    CT RAPID PERFUSION:  CBF<30% volume: 0 ml  Tmax>6.0 s volume: 0 ml  Mismatch volume: 0 ml  Mismatch ratio: none    CORE INFARCT: None.  TISSUE AT RISK: None.  MISMATCH RATIO: None.      CTA [place] OF GUTIERRES:  ANTERIOR CIRCULATION  ICA  CAVERNOUS, SUPRACLINOID, BIFURCATION SEGMENTS: Patent without flow   limiting stenosis.    ANTERIOR CEREBRAL ARTERIES: Bilateral A1, anterior communicating and A2   anterior cerebral arteries are unremarkable in course and caliber without   flow limiting stenosis.    MIDDLE CEREBRAL ARTERIES: Patent bilateral M1, M2, and distal MCA   branches without flow limiting stenosis.      POSTERIOR CIRCULATION:  VERTEBRAL ARTERIES: Patent without flow limiting stenosis. Left side  dominant.  BASILAR ARTERY: Patent no flow limiting stenosis.  POSTERIOR CEREBRAL ARTERIES: Patent without flow limiting stenosis.   Persistent fetal origin right PCA with hypoplastic or absent P1 segment.      CTA NECK:  GREAT VESSELS: Visualized segments are patent, no flow limiting stenosis.   Bovine arch.    COMMON CAROTID ARTERIES:  RIGHT CCA: Patent without flow limiting stenosis  LEFT CCA: Patent without flow limiting stenosis    CAROTID BULBS:  RIGHT CB: Patent without flow limiting stenosis  LEFT CB: Patent without flow limiting stenosis    INTERNAL CAROTID ARTERIES: Bilateral calcified plaque.  RIGHT ICA: Less than 50% stenosis at the ICA origin by NASCET criteria.  LEFT ICA: Patent no evidence for any hemodynamically significant stenosis   at the ICA origin by NASCET criteria.    VERTEBRAL ARTERIES:  RIGHT VA: Patent no evidence for any flow limiting stenosis. Threadlike.  LEFT VA: Patent no evidence for any flow limiting stenosis. Dominant.      SOFT TISSUES: Unremarkable  BONES: Unremarkable      IMPRESSION:    CT PERFUSION demonstrated: No core infarct. No active ischemia.  If symptoms persist consider follow up head CT or MRI, MRA  if no   contraindication.    CTA COW:  Patent intracranial circulation without flow limiting stenosis.    CTA NECK: Patent, ECAs, ICAs, no  hemodynamically significant stenosis at    ICA origins by NASCET criteria.  Bilateral vertebral arteries are patent without flow limiting stenosis.     Discussed with GERI Hurst in the ED at 6:29 PM.    --- End of Report ---            GILBERTO POLLARD MD; Attending Radiologist    < end of copied text >          < from: CT Brain Stroke Protocol (11.22.22 @ 17:51) >    ACC: [id number] EXAM:  CT BRAIN STROKE PROTOCOL                          PROCEDURE DATE:  11/22/2022          INTERPRETATION:  CT HEAD STROKE PROTOCOL  HEAD CT    INDICATIONS: Stroke Code. Delayed speech and left-sided weakness. Lasted   only 5 minutes and resolved completely. History of stroke. GCT  No additional history was provided.    TECHNIQUE:  HEAD CT:  Serial axial images were obtained from the skull base to the vertex   without the use of intravenous contrast. RAPID artificial intelligence   was used for preliminary evaluation of intracranial hemorrhage.    COMPARISON EXAMINATION: None.    FINDINGS:  HEAD CT:  VENTRICLES AND SULCI: Ventricles and sulci are unremarkable for patient   age.  INTRA-AXIAL: No intracranial mass, acute hemorrhage, or midline shift is   present.There is non-specific decreased attenuation in the white matter   likely microvascular disease.  EXTRA-AXIAL: No extra-axial fluid collection is present.  INTRACRANIAL HEMORRHAGE: None.    VISUALIZED SINUSES: No air-fluid levels are identified.  VISUALIZED MASTOIDS:  Clear.  CALVARIUM:  Intact.  MISCELLANEOUS:  Right-sided glaucoma orbital drainage implant. Bilateral   cataract surgery.    SOFT TISSUES: Unremarkable.  BONES: Unremarkable.      IMPRESSION:  HEAD CT: Mild volume loss, microvascular disease, no acute hemorrhage or   midline shift.    --- End of Report ---    < end of copied text >       Neurology consult    PRETTY DOLAN89yMale     Patient is a 89y old  Male who presents with a chief complaint of TIA (23 Nov 2022 11:45)      HPI:  89 M fr[facility] s/p recent fall with L hip fx s/p L hip replacement about a week ago complicated with urinary retention now with Musa, hx BPH, hx of partial colectomy sec to colonic polyps pw TIA. Reportedly sent in for witnessed episode of aphasia and L sided weakness for about 5 mins. Pt related he did not recall any such event. He stated he was upset at this physical therapist for pushing him too far in PT and because his L leg was just too weak for him to participate fully and he 'blew his cork'. He denied such event. Denied any HA, dizziness, focal numbness or paresthesias. Just weakness and still residual discomfort in L hip after surgery. Initial NIHSS 0. afebrile P: 92 BP: 106/63 sat 95-98% on RA. WBC10.5 86% N  COVID neg. CT head and CT angio negative.  (23 Nov 2022 01:00)      REVIEW OF SYSTEMS:    Constitutional: No fever, chills, fatigue, weakness  Eyes: no eye pain, visual disturbances, or discharge  ENT:  No difficulty hearing, tinnitus, vertigo; No sinus or throat pain  Neck: No pain or stiffness  Respiratory: No cough, dyspnea, wheezing   Cardiovascular: No chest pain, palpitations,   Gastrointestinal: No abdominal or epigastric pain. No nausea, vomiting  No diarrhea or constipation.   Genitourinary: No dysuria, frequency, hematuria or incontinence  Neurological: See abovr  Psychiatric: No depression, anxiety, mood swings or difficulty sleeping  Musculoskeletal: No joint pain or swelling; No muscle, back or extremity pain  Skin: No itching, burning, rashes or lesions   Lymph Nodes: No enlarged glands  Endocrine: No heat or cold intolerance; No hair loss, No h/o diabetes or thyroid dysfunction  Allergy and Immunologic: No hives or eczema    MEDICATIONS    acetaminophen     Tablet .. 650 milliGRAM(s) Oral every 8 hours  aspirin enteric coated 81 milliGRAM(s) Oral daily  atorvastatin 80 milliGRAM(s) Oral at bedtime  enoxaparin Injectable 40 milliGRAM(s) SubCutaneous every 24 hours  ferrous    sulfate 325 milliGRAM(s) Oral two times a day  finasteride 5 milliGRAM(s) Oral daily  influenza  Vaccine (HIGH DOSE) 0.7 milliLiter(s) IntraMuscular once  oxyCODONE    IR 5 milliGRAM(s) Oral every 4 hours PRN  pantoprazole    Tablet 40 milliGRAM(s) Oral before breakfast  senna 2 Tablet(s) Oral at bedtime  sodium chloride 0.9%. 1000 milliLiter(s) IV Continuous <Continuous>  tamsulosin 0.4 milliGRAM(s) Oral at bedtime      PMH: Hypertension    History of BPH         PSH: History of colon surgery        Family history:   FAMILY HISTORY:  FH: asthma (Mother)        SOCIAL HISTORY:  No history of tobacco or alcohol use     Allergies    No Known Allergies    Intolerances        Height (cm): 172.7 (11-22 @ 17:49)  Weight (kg): 65.8 (11-22 @ 17:49)  BMI (kg/m2): 22.1 (11-22 @ 17:49)    Vital Signs Last 24 Hrs  T(C): 36.6 (23 Nov 2022 12:18), Max: 36.9 (23 Nov 2022 03:26)  T(F): 97.8 (23 Nov 2022 12:18), Max: 98.4 (23 Nov 2022 03:26)  HR: 74 (23 Nov 2022 12:18) (74 - 92)  BP: 110/62 (23 Nov 2022 12:18) (106/63 - 135/72)  BP(mean): --  RR: 16 (23 Nov 2022 12:18) (15 - 18)  SpO2: 97% (23 Nov 2022 12:18) (94% - 98%)    Parameters below as of 23 Nov 2022 12:18  Patient On (Oxygen Delivery Method): room air          On Neurological Examination:    Head: normocephalic Neck: supple no carotid bruits    Mental Status - Patient is alert, speech intact oriented    Cranial Nerves - PERRL, EOMI, VFF, normal V through XII no nystagmus    Motor Exam :  No drift good power no dysmetria non focal    Sensory    Intact to light touch and pinprick bilaterally normal DSS to touch    Reflexes:  symmetric  plantars downgoing    Gait -  not tested                                                      LABS:  CBC Full  -  ( 23 Nov 2022 06:15 )  WBC Count : 7.70 K/uL  RBC Count : 2.90 M/uL  Hemoglobin : 9.6 g/dL  Hematocrit : 31.1 %  Platelet Count - Automated : 310 K/uL  Mean Cell Volume : 107.2 fl  Mean Cell Hemoglobin : 33.1 pg  Mean Cell Hemoglobin Concentration : 30.9 gm/dL  Auto Neutrophil # : 6.06 K/uL  Auto Lymphocyte # : 0.57 K/uL  Auto Monocyte # : 0.72 K/uL  Auto Eosinophil # : 0.21 K/uL  Auto Basophil # : 0.05 K/uL  Auto Neutrophil % : 78.7 %  Auto Lymphocyte % : 7.4 %  Auto Monocyte % : 9.4 %  Auto Eosinophil % : 2.7 %  Auto Basophil % : 0.6 %      11-23    140  |  107  |  18  ----------------------------<  95  4.2   |  26  |  0.78    Ca    8.7      23 Nov 2022 06:15    TPro  7.0  /  Alb  2.7<L>  /  TBili  1.0  /  DBili  x   /  AST  24  /  ALT  17  /  AlkPhos  118  11-22    LIVER FUNCTIONS - ( 22 Nov 2022 17:55 )  Alb: 2.7 g/dL / Pro: 7.0 g/dL / ALK PHOS: 118 U/L / ALT: 17 U/L / AST: 24 U/L / GGT: x           Hemoglobin A1C:   Lipid Panel 11-23 @ 06:15  Total Cholesterol, Serum 160  LDL --  Triglycerides 95      PT/INR - ( 22 Nov 2022 17:55 )   PT: 13.5 sec;   INR: 1.16 ratio         PTT - ( 22 Nov 2022 17:55 )  PTT:28.4 sec  Total [id number] mg/dL   HDL 44 mg/dL  LDL --          RADIOLOGY        < from: CT Brain Perfusion Maps Stroke (11.22.22 @ 18:16) >    ACC: [id number] EXAM:  CT ANGIO BRAIN STROKE PROT IC                        ACC: [id number] EXAM:  CT ANGIO NECK STROKE PROT IC                        ACC: [id number] EXAM:  CT BRAIN PERFUSION MAPS STROKE                          PROCEDURE DATE:  11/22/2022          INTERPRETATION:  CT PERFUSION WITH MAPS STROKE PROTOCOL, CT ANGIO NECK   STROKE PROTOCOL, CT ANGIO HEAD STROKE PROTOCOL  CT PERFUSION  CTA OF THE [place] OF GUTIERRES AND NECK:    INDICATIONS: Stroke Code. Delayed speech and left-sided weakness. Lasted   only 5 minutes and resolved completely. History of stroke.  GCT. No   additional clinical history was provided.    TECHNIQUE:  RAPID artificial intelligence was used for perfusion analysis and for   intracranial large vessel occlusion.    Contrast: 80 cc Omnipaque 350 administered. 10 cc discarded. 70 cc   Omnipaque 350 administered. 0 cc discarded.    CTA [place] OF GUTIERRES:  After the intravenous power injection of non-ionic contrast material,   serial thin sections were obtained through the intracranial circulation   on a multislice CT scanner.  Images were reformatted using a dedicated 3D   software package and viewed on a dedicated workstation in multiple planes.    CTA NECK:  After the intravenous power injection of non-ionic contrast material,   serial thin sections were obtained through the cervical circulation on a   multislice CT scanner.  Images were reformatted using a dedicated 3D   software package and viewed on a dedicated workstation in multiple planes.    COMPARISON EXAMINATION: None.    FINDINGS:    CT RAPID PERFUSION:  CBF<30% volume: 0 ml  Tmax>6.0 s volume: 0 ml  Mismatch volume: 0 ml  Mismatch ratio: none    CORE INFARCT: None.  TISSUE AT RISK: None.  MISMATCH RATIO: None.      CTA [place] OF GUTIERRES:  ANTERIOR CIRCULATION  ICA  CAVERNOUS, SUPRACLINOID, BIFURCATION SEGMENTS: Patent without flow   limiting stenosis.    ANTERIOR CEREBRAL ARTERIES: Bilateral A1, anterior communicating and A2   anterior cerebral arteries are unremarkable in course and caliber without   flow limiting stenosis.    MIDDLE CEREBRAL ARTERIES: Patent bilateral M1, M2, and distal MCA   branches without flow limiting stenosis.      POSTERIOR CIRCULATION:  VERTEBRAL ARTERIES: Patent without flow limiting stenosis. Left side  dominant.  BASILAR ARTERY: Patent no flow limiting stenosis.  POSTERIOR CEREBRAL ARTERIES: Patent without flow limiting stenosis.   Persistent fetal origin right PCA with hypoplastic or absent P1 segment.      CTA NECK:  GREAT VESSELS: Visualized segments are patent, no flow limiting stenosis.   Bovine arch.    COMMON CAROTID ARTERIES:  RIGHT CCA: Patent without flow limiting stenosis  LEFT CCA: Patent without flow limiting stenosis    CAROTID BULBS:  RIGHT CB: Patent without flow limiting stenosis  LEFT CB: Patent without flow limiting stenosis    INTERNAL CAROTID ARTERIES: Bilateral calcified plaque.  RIGHT ICA: Less than 50% stenosis at the ICA origin by NASCET criteria.  LEFT ICA: Patent no evidence for any hemodynamically significant stenosis   at the ICA origin by NASCET criteria.    VERTEBRAL ARTERIES:  RIGHT VA: Patent no evidence for any flow limiting stenosis. Threadlike.  LEFT VA: Patent no evidence for any flow limiting stenosis. Dominant.      SOFT TISSUES: Unremarkable  BONES: Unremarkable      IMPRESSION:    CT PERFUSION demonstrated: No core infarct. No active ischemia.  If symptoms persist consider follow up head CT or MRI, MRA  if no   contraindication.    CTA COW:  Patent intracranial circulation without flow limiting stenosis.    CTA NECK: Patent, ECAs, ICAs, no  hemodynamically significant stenosis at    ICA origins by NASCET criteria.  Bilateral vertebral arteries are patent without flow limiting stenosis.     Discussed with GERI Hurst in the ED at 6:29 PM.    --- End of Report ---            GILBERTO POLLARD MD; Attending Radiologist    < end of copied text >          < from: CT Brain Stroke Protocol (11.22.22 @ 17:51) >    ACC: [id number] EXAM:  CT BRAIN STROKE PROTOCOL                          PROCEDURE DATE:  11/22/2022          INTERPRETATION:  CT HEAD STROKE PROTOCOL  HEAD CT    INDICATIONS: Stroke Code. Delayed speech and left-sided weakness. Lasted   only 5 minutes and resolved completely. History of stroke. GCT  No additional history was provided.    TECHNIQUE:  HEAD CT:  Serial axial images were obtained from the skull base to the vertex   without the use of intravenous contrast. RAPID artificial intelligence   was used for preliminary evaluation of intracranial hemorrhage.    COMPARISON EXAMINATION: None.    FINDINGS:  HEAD CT:  VENTRICLES AND SULCI: Ventricles and sulci are unremarkable for patient   age.  INTRA-AXIAL: No intracranial mass, acute hemorrhage, or midline shift is   present.There is non-specific decreased attenuation in the white matter   likely microvascular disease.  EXTRA-AXIAL: No extra-axial fluid collection is present.  INTRACRANIAL HEMORRHAGE: None.    VISUALIZED SINUSES: No air-fluid levels are identified.  VISUALIZED MASTOIDS:  Clear.  CALVARIUM:  Intact.  MISCELLANEOUS:  Right-sided glaucoma orbital drainage implant. Bilateral   cataract surgery.    SOFT TISSUES: Unremarkable.  BONES: Unremarkable.      IMPRESSION:  HEAD CT: Mild volume loss, microvascular disease, no acute hemorrhage or   midline shift.    --- End of Report ---    < end of copied text >       Neurology consult    PRETTY DOLAN89yMale     Patient is a 89y old  Male who presents with a chief complaint of TIA (23 Nov 2022 11:45)      HPI:  89 M fr[facility] s/p recent fall with L hip fx s/p L hip replacement about a week ago complicated with urinary retention now with Musa, hx BPH, hx of partial colectomy sec to colonic polyps pw TIA. Reportedly sent in for witnessed episode of aphasia and L sided weakness for about 5 mins. Pt related he did not recall any such event. He stated he was upset at this physical therapist for pushing him too far in PT and because his L leg was just too weak for him to participate fully and he 'blew his cork'. He denied such event. Denied any HA, dizziness, focal numbness or paresthesias. Just weakness and still residual discomfort in L hip after surgery. Initial NIHSS 0. afebrile P: 92 BP: 106/63 sat 95-98% on RA. WBC10.5 86% N  COVID neg. CT head and CT angio negative.  (23 Nov 2022 01:00)      REVIEW OF SYSTEMS:    Constitutional: No fever, chills, fatigue, weakness  Eyes: no eye pain, visual disturbances, or discharge  ENT:  No difficulty hearing, tinnitus, vertigo; No sinus or throat pain  Neck: No pain or stiffness  Respiratory: No cough, dyspnea, wheezing   Cardiovascular: No chest pain, palpitations,   Gastrointestinal: No abdominal or epigastric pain. No nausea, vomiting  No diarrhea or constipation.   Genitourinary: No dysuria, frequency, hematuria or incontinence  Neurological: See abovr  Psychiatric: No depression, anxiety, mood swings or difficulty sleeping  Musculoskeletal: No joint pain or swelling; No muscle, back or extremity pain  Skin: No itching, burning, rashes or lesions   Lymph Nodes: No enlarged glands  Endocrine: No heat or cold intolerance; No hair loss, No h/o diabetes or thyroid dysfunction  Allergy and Immunologic: No hives or eczema    MEDICATIONS    acetaminophen     Tablet .. 650 milliGRAM(s) Oral every 8 hours  aspirin enteric coated 81 milliGRAM(s) Oral daily  atorvastatin 80 milliGRAM(s) Oral at bedtime  enoxaparin Injectable 40 milliGRAM(s) SubCutaneous every 24 hours  ferrous    sulfate 325 milliGRAM(s) Oral two times a day  finasteride 5 milliGRAM(s) Oral daily  influenza  Vaccine (HIGH DOSE) 0.7 milliLiter(s) IntraMuscular once  oxyCODONE    IR 5 milliGRAM(s) Oral every 4 hours PRN  pantoprazole    Tablet 40 milliGRAM(s) Oral before breakfast  senna 2 Tablet(s) Oral at bedtime  sodium chloride 0.9%. 1000 milliLiter(s) IV Continuous <Continuous>  tamsulosin 0.4 milliGRAM(s) Oral at bedtime      PMH: Hypertension    History of BPH         PSH: History of colon surgery        Family history:   FAMILY HISTORY:  FH: asthma (Mother)        SOCIAL HISTORY:  No history of tobacco or alcohol use     Allergies    No Known Allergies    Intolerances        Height (cm): 172.7 (11-22 @ 17:49)  Weight (kg): 65.8 (11-22 @ 17:49)  BMI (kg/m2): 22.1 (11-22 @ 17:49)    Vital Signs Last 24 Hrs  T(C): 36.6 (23 Nov 2022 12:18), Max: 36.9 (23 Nov 2022 03:26)  T(F): 97.8 (23 Nov 2022 12:18), Max: 98.4 (23 Nov 2022 03:26)  HR: 74 (23 Nov 2022 12:18) (74 - 92)  BP: 110/62 (23 Nov 2022 12:18) (106/63 - 135/72)  BP(mean): --  RR: 16 (23 Nov 2022 12:18) (15 - 18)  SpO2: 97% (23 Nov 2022 12:18) (94% - 98%)    Parameters below as of 23 Nov 2022 12:18  Patient On (Oxygen Delivery Method): room air          On Neurological Examination:    Head: normocephalic Neck: supple no carotid bruits    Mental Status - Patient is alert, speech intact oriented    Cranial Nerves - PERRL, EOMI, VFF, normal V through XII no nystagmus    Motor Exam :  No drift good power no dysmetria non focal    Sensory    Intact to light touch and pinprick bilaterally normal DSS to touch    Reflexes:  symmetric  plantars downgoing    Gait -  not tested                                                      LABS:  CBC Full  -  ( 23 Nov 2022 06:15 )  WBC Count : 7.70 K/uL  RBC Count : 2.90 M/uL  Hemoglobin : 9.6 g/dL  Hematocrit : 31.1 %  Platelet Count - Automated : 310 K/uL  Mean Cell Volume : 107.2 fl  Mean Cell Hemoglobin : 33.1 pg  Mean Cell Hemoglobin Concentration : 30.9 gm/dL  Auto Neutrophil # : 6.06 K/uL  Auto Lymphocyte # : 0.57 K/uL  Auto Monocyte # : 0.72 K/uL  Auto Eosinophil # : 0.21 K/uL  Auto Basophil # : 0.05 K/uL  Auto Neutrophil % : 78.7 %  Auto Lymphocyte % : 7.4 %  Auto Monocyte % : 9.4 %  Auto Eosinophil % : 2.7 %  Auto Basophil % : 0.6 %      11-23    140  |  107  |  18  ----------------------------<  95  4.2   |  26  |  0.78    Ca    8.7      23 Nov 2022 06:15    TPro  7.0  /  Alb  2.7<L>  /  TBili  1.0  /  DBili  x   /  AST  24  /  ALT  17  /  AlkPhos  118  11-22    LIVER FUNCTIONS - ( 22 Nov 2022 17:55 )  Alb: 2.7 g/dL / Pro: 7.0 g/dL / ALK PHOS: 118 U/L / ALT: 17 U/L / AST: 24 U/L / GGT: x           Hemoglobin A1C:   Lipid Panel 11-23 @ 06:15  Total Cholesterol, Serum 160  LDL --  Triglycerides 95      PT/INR - ( 22 Nov 2022 17:55 )   PT: 13.5 sec;   INR: 1.16 ratio         PTT - ( 22 Nov 2022 17:55 )  PTT:28.4 sec  Total [id number] mg/dL   HDL 44 mg/dL  LDL --          RADIOLOGY        < from: CT Brain Perfusion Maps Stroke (11.22.22 @ 18:16) >    ACC: [id number] EXAM:  CT ANGIO BRAIN STROKE PROT IC                        ACC: [id number] EXAM:  CT ANGIO NECK STROKE PROT IC                        ACC: [id number] EXAM:  CT BRAIN PERFUSION MAPS STROKE                          PROCEDURE DATE:  11/22/2022          INTERPRETATION:  CT PERFUSION WITH MAPS STROKE PROTOCOL, CT ANGIO NECK   STROKE PROTOCOL, CT ANGIO HEAD STROKE PROTOCOL  CT PERFUSION  CTA OF THE [place] OF GUTIERRES AND NECK:    INDICATIONS: Stroke Code. Delayed speech and left-sided weakness. Lasted   only 5 minutes and resolved completely. History of stroke.  GCT. No   additional clinical history was provided.    TECHNIQUE:  RAPID artificial intelligence was used for perfusion analysis and for   intracranial large vessel occlusion.    Contrast: 80 cc Omnipaque 350 administered. 10 cc discarded. 70 cc   Omnipaque 350 administered. 0 cc discarded.    CTA [place] OF GUTIERRES:  After the intravenous power injection of non-ionic contrast material,   serial thin sections were obtained through the intracranial circulation   on a multislice CT scanner.  Images were reformatted using a dedicated 3D   software package and viewed on a dedicated workstation in multiple planes.    CTA NECK:  After the intravenous power injection of non-ionic contrast material,   serial thin sections were obtained through the cervical circulation on a   multislice CT scanner.  Images were reformatted using a dedicated 3D   software package and viewed on a dedicated workstation in multiple planes.    COMPARISON EXAMINATION: None.    FINDINGS:    CT RAPID PERFUSION:  CBF<30% volume: 0 ml  Tmax>6.0 s volume: 0 ml  Mismatch volume: 0 ml  Mismatch ratio: none    CORE INFARCT: None.  TISSUE AT RISK: None.  MISMATCH RATIO: None.      CTA [place] OF GUTIERRES:  ANTERIOR CIRCULATION  ICA  CAVERNOUS, SUPRACLINOID, BIFURCATION SEGMENTS: Patent without flow   limiting stenosis.    ANTERIOR CEREBRAL ARTERIES: Bilateral A1, anterior communicating and A2   anterior cerebral arteries are unremarkable in course and caliber without   flow limiting stenosis.    MIDDLE CEREBRAL ARTERIES: Patent bilateral M1, M2, and distal MCA   branches without flow limiting stenosis.      POSTERIOR CIRCULATION:  VERTEBRAL ARTERIES: Patent without flow limiting stenosis. Left side  dominant.  BASILAR ARTERY: Patent no flow limiting stenosis.  POSTERIOR CEREBRAL ARTERIES: Patent without flow limiting stenosis.   Persistent fetal origin right PCA with hypoplastic or absent P1 segment.      CTA NECK:  GREAT VESSELS: Visualized segments are patent, no flow limiting stenosis.   Bovine arch.    COMMON CAROTID ARTERIES:  RIGHT CCA: Patent without flow limiting stenosis  LEFT CCA: Patent without flow limiting stenosis    CAROTID BULBS:  RIGHT CB: Patent without flow limiting stenosis  LEFT CB: Patent without flow limiting stenosis    INTERNAL CAROTID ARTERIES: Bilateral calcified plaque.  RIGHT ICA: Less than 50% stenosis at the ICA origin by NASCET criteria.  LEFT ICA: Patent no evidence for any hemodynamically significant stenosis   at the ICA origin by NASCET criteria.    VERTEBRAL ARTERIES:  RIGHT VA: Patent no evidence for any flow limiting stenosis. Threadlike.  LEFT VA: Patent no evidence for any flow limiting stenosis. Dominant.      SOFT TISSUES: Unremarkable  BONES: Unremarkable      IMPRESSION:    CT PERFUSION demonstrated: No core infarct. No active ischemia.  If symptoms persist consider follow up head CT or MRI, MRA  if no   contraindication.    CTA COW:  Patent intracranial circulation without flow limiting stenosis.    CTA NECK: Patent, ECAs, ICAs, no  hemodynamically significant stenosis at    ICA origins by NASCET criteria.  Bilateral vertebral arteries are patent without flow limiting stenosis.     Discussed with GERI Hurst in the ED at 6:29 PM.    --- End of Report ---            GILBERTO POLLARD MD; Attending Radiologist    < end of copied text >          < from: CT Brain Stroke Protocol (11.22.22 @ 17:51) >    ACC: [id number] EXAM:  CT BRAIN STROKE PROTOCOL                          PROCEDURE DATE:  11/22/2022          INTERPRETATION:  CT HEAD STROKE PROTOCOL  HEAD CT    INDICATIONS: Stroke Code. Delayed speech and left-sided weakness. Lasted   only 5 minutes and resolved completely. History of stroke. GCT  No additional history was provided.    TECHNIQUE:  HEAD CT:  Serial axial images were obtained from the skull base to the vertex   without the use of intravenous contrast. RAPID artificial intelligence   was used for preliminary evaluation of intracranial hemorrhage.    COMPARISON EXAMINATION: None.    FINDINGS:  HEAD CT:  VENTRICLES AND SULCI: Ventricles and sulci are unremarkable for patient   age.  INTRA-AXIAL: No intracranial mass, acute hemorrhage, or midline shift is   present.There is non-specific decreased attenuation in the white matter   likely microvascular disease.  EXTRA-AXIAL: No extra-axial fluid collection is present.  INTRACRANIAL HEMORRHAGE: None.    VISUALIZED SINUSES: No air-fluid levels are identified.  VISUALIZED MASTOIDS:  Clear.  CALVARIUM:  Intact.  MISCELLANEOUS:  Right-sided glaucoma orbital drainage implant. Bilateral   cataract surgery.    SOFT TISSUES: Unremarkable.  BONES: Unremarkable.      IMPRESSION:  HEAD CT: Mild volume loss, microvascular disease, no acute hemorrhage or   midline shift.    --- End of Report ---    < end of copied text >

## 2022-11-23 NOTE — PATIENT PROFILE ADULT - FUNCTIONAL ASSESSMENT - BASIC MOBILITY 6.
2-calculated by average/Not able to assess (calculate score using [facility] averaging method)  2-calculated by average/Not able to assess (calculate score using [facility] averaging method)  2-calculated by average/Not able to assess (calculate score using [facility] averaging method)

## 2022-11-23 NOTE — H&P ADULT - HISTORY OF PRESENT ILLNESS
89 M [facility] s/p recent fall with L hip fx s/p L hip replacement about a week ago complicated with urinary retention now with Musa, hx BPH, hx of partial colectomy sec to colonic polyps pw TIA. Reportedly sent in for witnessed episode of aphasia and L sided weakness for about 5 mins. Pt related he did not recall any such  89 M [facility] s/p recent fall with L hip fx s/p L hip replacement about a week ago complicated with urinary retention now with Musa, hx BPH, hx of partial colectomy sec to colonic polyps pw TIA. Reportedly sent in for witnessed episode of aphasia and L sided weakness for about 5 mins. Pt related he did not recall any such event. He stated he was upset at this physical therapist for pushing him too far in PT and because his L leg was just too weak for him to participate fully and he 'blew his cork'. He denied such event. Denied any HA, dizziness, focal numbness or paresthesias. Just weakness and still residual discomfort in L hip after surgery. Initial NIHSS 0. afebrile P: 92 BP: 106/63 sat 95-98% on RA. WBC10.5 86% N  COVID neg. CT head and CT angio negative.

## 2022-11-23 NOTE — DISCHARGE NOTE PROVIDER - NSDCMRMEDTOKEN_GEN_ALL_CORE_FT
Feosol 325 mg (65 mg elemental iron) oral tablet: 1 tab(s) orally 2 times a day  finasteride 5 mg oral tablet: 1 tab(s) orally once a day  Lovenox 40 mg/0.4 mL injectable solution: 40 unit(s) injectable once a day  Milk of Magnesia 8% oral suspension:   omeprazole 40 mg oral delayed release capsule: 1 cap(s) orally once a day  oxyCODONE 5 mg oral tablet: 1 tab(s) orally every 4 hours  tamsulosin 0.4 mg oral capsule: 1 cap(s) orally once a day  Tylenol 325 mg oral tablet: 3 tab(s) orally every 8 hours, As Needed   aspirin 81 mg oral delayed release tablet: 1 tab(s) orally once a day  atorvastatin 80 mg oral tablet: 1 tab(s) orally once a day (at bedtime)  Feosol 325 mg (65 mg elemental iron) oral tablet: 1 tab(s) orally 2 times a day  finasteride 5 mg oral tablet: 1 tab(s) orally once a day  Lovenox 40 mg/0.4 mL injectable solution: 40 unit(s) injectable once a day  Milk of Magnesia 8% oral suspension:   omeprazole 40 mg oral delayed release capsule: 1 cap(s) orally once a day  oxyCODONE 5 mg oral tablet: 1 tab(s) orally every 4 hours, As Needed  senna leaf extract oral tablet: 2 tab(s) orally once a day (at bedtime)  tamsulosin 0.4 mg oral capsule: 1 cap(s) orally once a day  Tylenol 325 mg oral tablet: 3 tab(s) orally every 8 hours, As Needed

## 2022-11-23 NOTE — CHART NOTE - NSCHARTNOTEFT_GEN_A_CORE
89 M [facility] s/p recent fall with L hip fx s/p L hip replacement about a week ago complicated with urinary retention now with Musa, hx BPH, hx of partial colectomy sec to colonic polyps pw TIA    # TIA:  Reported aphasia at [facility]   neuro checks q4: A+O x 4  Neuro- Dr Feliz-cleared pt for dc  TTE  Lipid profile reviewed: Total chol 160, LDL 97  Tele monitor: no acute events   cont asa and statin  PT/OT rec DOMINICK  Passed speech eval: regular diet    # s/p L hip sx s/p fall with residual weakness  PT  cont lovenox    #Urinary retention  cont Musa  cont finasteride and flomax.     #GOC  As d/w daughter Shelli. Pt is DNR/DNI. MOLST filled out and DNR order placed.       Dispo: suspect back to [facility] today 89 M fr[facility] s/p recent fall with L hip fx s/p L hip replacement about a week ago complicated with urinary retention now with Musa, hx BPH, hx of partial colectomy sec to colonic polyps pw TIA    # TIA:  Reported aphasia at [facility]   neuro checks q4: A+O x 4  Neuro- Dr Feliz-cleared pt for dc  TTE  Lipid profile reviewed: Total chol 160, LDL 97  Tele monitor: no acute events   cont asa and statin  PT/OT rec DOMINICK  Passed speech eval: regular diet    # s/p L hip sx s/p fall with residual weakness  PT  cont lovenox    #Urinary retention  cont Musa  cont finasteride and flomax.     #GOC  As d/w daughter Shelli. Pt is DNR/DNI. MOLST filled out and DNR order placed.       Dispo: suspect back to [facility] today 89 M [facility] s/p recent fall with L hip fx s/p L hip replacement about a week ago complicated with urinary retention now with Musa, hx BPH, hx of partial colectomy sec to colonic polyps pw TIA    # TIA:  Reported aphasia at [facility]   neuro checks q4: A+O x 4  Neuro- Dr Feliz-cleared pt for dc  TTE  Lipid profile reviewed: Total chol 160, LDL 97  Tele monitor: no acute events   cont asa and statin  PT/OT rec DOMINICK  Passed speech eval: regular diet    # s/p L hip sx s/p fall with residual weakness  PT  cont lovenox    #Urinary retention  cont Musa  cont finasteride and flomax.     #GOC  As d/w daughter Shelli. Pt is DNR/DNI. MOLST filled out and DNR order placed.       Dispo: suspect back to [facility] today 89 M frpm [facility] s/p recent fall with L hip fx s/p L hip replacement about a week ago complicated with urinary retention now with Musa, hx BPH, hx of partial colectomy sec to colonic polyps pw TIA    # TIA:  Reported aphasia at [facility]   neuro checks q4: A+O x 4  Neuro- Dr Feliz-cleared pt for dc  TTE  Lipid profile reviewed: Total chol 160, LDL 97  Tele monitor: no acute events   cont asa and statin  PT/OT rec DOMINICK  Passed speech eval: regular diet    # s/p L hip sx s/p fall with residual weakness  PT rec DOMINICK  cont lovenox    #Urinary retention  cont Musa  cont finasteride and flomax.     #GOC  As d/w daughter Shelli. Pt is DNR/DNI. MOLST filled out and DNR order placed.       Dispo: suspect back to [facility] today    11/23: Updated daughter Shelli at [phone] and she is in agreement w plan    *Case d/w Dr Feliz 89 M frpm [facility] s/p recent fall with L hip fx s/p L hip replacement about a week ago complicated with urinary retention now with Musa, hx BPH, hx of partial colectomy sec to colonic polyps pw TIA    # TIA:  Reported aphasia at [facility] [address] [facility]   neuro checks q4: A+O x 4  Neuro- Dr Feliz-cleared pt for dc  TTE  Lipid profile reviewed: Total chol 160, LDL 97  Tele monitor: no acute events   cont asa and statin  PT/OT rec DOMINICK  Passed speech eval: regular diet    # s/p L hip sx s/p fall with residual weakness  PT rec DOMINICK  cont lovenox    #Urinary retention  cont Musa  cont finasteride and flomax.     #GOC  As d/w daughter Shelli. Pt is DNR/DNI. MOLST filled out and DNR order placed.       Dispo: suspect back to [facility] today    11/23: Updated daughter Shelli at [phone] and she is in agreement w plan    *Case d/w Dr Feliz 89 M fr[facility] s/p recent fall with L hip fx s/p L hip replacement about a week ago complicated with urinary retention now with Musa, hx BPH, hx of partial colectomy sec to colonic polyps pw TIA    # TIA:  Reported aphasia at [facility]   neuro checks q4: A+O x 4  Neuro- Dr Feliz-cleared pt for dc  TTE  Lipid profile reviewed: Total chol 160, LDL 97  Tele monitor: no acute events   cont asa and statin  PT/OT rec DOMINICK  Passed speech eval: regular diet    # s/p L hip sx s/p fall with residual weakness  PT rec DOMINICK  cont lovenox    #Urinary retention  cont Musa  cont finasteride and flomax.     #GOC  As d/w daughter Shelli. Pt is DNR/DNI. MOLST filled out and DNR order placed.       Dispo: suspect back to [facility] today    11/23: Updated daughter Shelli at [phone] and she is in agreement w plan    *Case d/w Dr Feliz

## 2022-11-23 NOTE — DISCHARGE NOTE NURSING/CASE MANAGEMENT/SOCIAL WORK - PATIENT PORTAL LINK FT
You can access the FollowMyHealth Patient Portal offered by [facility] by registering at the following website: [email or web address]. By joining [employer]’s FollowMyHealth portal, you will also be able to view your health information using other applications (apps) compatible with our system. You can access the FollowMyHealth Patient Portal offered by [facility] by registering at the following website: [email or web address]. By joining [employer]’s FollowMyHealth portal, you will also be able to view your health information using other applications (apps) compatible with our system. You can access the FollowMyHealth Patient Portal offered by [facility] by registering at the following website: [email or web address]. By joining [employer]’s FollowMyHealth portal, you will also be able to view your health information using other applications (apps) compatible with our system.

## 2022-11-23 NOTE — DISCHARGE NOTE PROVIDER - NSDCFUADDAPPT_GEN_ALL_CORE_FT
Dr Gonzales will continue to follow patient at [facility]  Dr Gonzales will continue to follow patient at [facility]  Dr Gonzales will continue to follow patient at [facility]

## 2022-11-23 NOTE — H&P ADULT - NSHPREVIEWOFSYSTEMS_GEN_ALL_CORE
CONSTITUTIONAL: No fever, weight loss, or fatigue  EYES: No eye pain, visual disturbances, or discharge  ENMT:  No difficulty hearing, tinnitus, vertigo; No sinus or throat pain  NECK: No pain or stiffness  RESPIRATORY: No cough, wheezing, chills or hemoptysis; No shortness of breath  CARDIOVASCULAR: No chest pain, palpitations, dizziness, or leg swelling  GASTROINTESTINAL: No abdominal or epigastric pain. No nausea, vomiting, or hematemesis; No diarrhea or constipation. No melena or hematochezia.  GENITOURINARY: No dysuria, frequency, hematuria, or incontinence  NEUROLOGICAL: No headaches, memory loss, loss of strength, numbness, or tremors  SKIN: No itching, burning, rashes, or lesions   LYMPH NODES: No enlarged glands  ENDOCRINE: No heat or cold intolerance; No hair loss  MUSCULOSKELETAL: No joint pain or swelling; No muscle, back, or extremity pain  PSYCHIATRIC: No depression, anxiety, mood swings, or difficulty sleeping  HEME/LYMPH: No easy bruising, or bleeding gums  ALLERY AND IMMUNOLOGIC: No hives or eczema    IMPROVE VTE Individual Risk Assessment          RISK                                                          Points  [  ] Previous VTE                                                3  [  ] Thrombophilia                                             2  [2  ] Lower limb paralysis                                   2        (unable to hold up >15 seconds)    [  ] Current Cancer                                             2         (within 6 months)  [  ] Immobilization > 24 hrs                              1  [  ] ICU/CCU stay > 24 hours                             1  [1  ] Age > 60                                                         1    IMPROVE VTE Score:         [         ]    Total Risk Factor Score:    0 - 1:   Consider IPC  >2 - 3:  Thromboprophylaxis required (enoxaparin or SQ heparin)        >4:   High Risk: Thromboprophylaxis required (enoxaparin or SQ heparin), optional add IPC  **If CONTRAINDICATION to enoxaparin or SQ heparin, USE IPCs**

## 2022-12-15 NOTE — ED ADULT NURSE NOTE - MODE OF DISCHARGE
[FreeTextEntry1] : Here for follow up. Feels well. Exercising regularly.\par He wants to have labs incl PSA and HgbA1c despite normal levels in the last several months.\par BP has been normal at home.\par \par COVID vaccine 9/8/22\par Flu vaccine 10/4/22\par 
Ambulatory/with cane

## 2023-01-10 ENCOUNTER — APPOINTMENT (OUTPATIENT)
Age: 88
End: 2023-01-10

## 2023-01-29 ENCOUNTER — TRANSCRIPTION ENCOUNTER (OUTPATIENT)
Age: 88
End: 2023-01-29

## 2023-01-30 ENCOUNTER — INPATIENT (INPATIENT)
Facility: HOSPITAL | Age: 88
LOS: 9 days | Discharge: NURSING, REHAB OR LONG-TERM CARE | DRG: 417 | End: 2023-02-09
Attending: FAMILY MEDICINE | Admitting: STUDENT IN AN ORGANIZED HEALTH CARE EDUCATION/TRAINING PROGRAM
Payer: OTHER GOVERNMENT

## 2023-01-30 ENCOUNTER — TRANSCRIPTION ENCOUNTER (OUTPATIENT)
Age: 88
End: 2023-01-30

## 2023-01-30 ENCOUNTER — RESULT REVIEW (OUTPATIENT)
Age: 88
End: 2023-01-30

## 2023-01-30 VITALS
OXYGEN SATURATION: 95 % | HEIGHT: 69 IN | SYSTOLIC BLOOD PRESSURE: 108 MMHG | DIASTOLIC BLOOD PRESSURE: 68 MMHG | HEART RATE: 71 BPM | RESPIRATION RATE: 17 BRPM | WEIGHT: 119.93 LBS | TEMPERATURE: 97 F

## 2023-01-30 DIAGNOSIS — Z98.890 OTHER SPECIFIED POSTPROCEDURAL STATES: Chronic | ICD-10-CM

## 2023-01-30 DIAGNOSIS — K81.0 ACUTE CHOLECYSTITIS: ICD-10-CM

## 2023-01-30 LAB
ALBUMIN SERPL ELPH-MCNC: 2.7 G/DL — LOW (ref 3.3–5)
ALP SERPL-CCNC: 318 U/L — HIGH (ref 40–120)
ALT FLD-CCNC: 159 U/L — HIGH (ref 10–45)
ANION GAP SERPL CALC-SCNC: 11 MMOL/L — SIGNIFICANT CHANGE UP (ref 5–17)
APPEARANCE UR: ABNORMAL
APTT BLD: 23.8 SEC — LOW (ref 27.5–35.5)
AST SERPL-CCNC: 131 U/L — HIGH (ref 10–40)
BACTERIA # UR AUTO: ABNORMAL /HPF
BASOPHILS # BLD AUTO: 0.04 K/UL — SIGNIFICANT CHANGE UP (ref 0–0.2)
BASOPHILS NFR BLD AUTO: 0.3 % — SIGNIFICANT CHANGE UP (ref 0–2)
BILIRUB SERPL-MCNC: 1.4 MG/DL — HIGH (ref 0.2–1.2)
BILIRUB UR-MCNC: NEGATIVE — SIGNIFICANT CHANGE UP
BLD GP AB SCN SERPL QL: SIGNIFICANT CHANGE UP
BUN SERPL-MCNC: 42 MG/DL — HIGH (ref 7–23)
CALCIUM SERPL-MCNC: 10.2 MG/DL — SIGNIFICANT CHANGE UP (ref 8.4–10.5)
CHLORIDE SERPL-SCNC: 97 MMOL/L — SIGNIFICANT CHANGE UP (ref 96–108)
CO2 SERPL-SCNC: 31 MMOL/L — SIGNIFICANT CHANGE UP (ref 22–31)
COLOR SPEC: YELLOW — SIGNIFICANT CHANGE UP
COMMENT - URINE: SIGNIFICANT CHANGE UP
CREAT SERPL-MCNC: 1.24 MG/DL — SIGNIFICANT CHANGE UP (ref 0.5–1.3)
DIFF PNL FLD: ABNORMAL
EGFR: 54 ML/MIN/1.73M2 — LOW
EOSINOPHIL # BLD AUTO: 0.56 K/UL — HIGH (ref 0–0.5)
EOSINOPHIL NFR BLD AUTO: 3.9 % — SIGNIFICANT CHANGE UP (ref 0–6)
EPI CELLS # UR: SIGNIFICANT CHANGE UP
GLUCOSE SERPL-MCNC: 154 MG/DL — HIGH (ref 70–99)
GLUCOSE UR QL: NEGATIVE — SIGNIFICANT CHANGE UP
GRAN CASTS # UR COMP ASSIST: ABNORMAL
HCT VFR BLD CALC: 45.5 % — SIGNIFICANT CHANGE UP (ref 39–50)
HGB BLD-MCNC: 14.7 G/DL — SIGNIFICANT CHANGE UP (ref 13–17)
IMM GRANULOCYTES NFR BLD AUTO: 0.7 % — SIGNIFICANT CHANGE UP (ref 0–0.9)
INR BLD: 1.31 RATIO — HIGH (ref 0.88–1.16)
KETONES UR-MCNC: ABNORMAL
LACTATE SERPL-SCNC: 1 MMOL/L — SIGNIFICANT CHANGE UP (ref 0.7–2)
LACTATE SERPL-SCNC: 2.5 MMOL/L — HIGH (ref 0.7–2)
LEUKOCYTE ESTERASE UR-ACNC: ABNORMAL
LIDOCAIN IGE QN: 30 U/L — LOW (ref 73–393)
LYMPHOCYTES # BLD AUTO: 0.32 K/UL — LOW (ref 1–3.3)
LYMPHOCYTES # BLD AUTO: 2.2 % — LOW (ref 13–44)
MCHC RBC-ENTMCNC: 30.3 PG — SIGNIFICANT CHANGE UP (ref 27–34)
MCHC RBC-ENTMCNC: 32.3 GM/DL — SIGNIFICANT CHANGE UP (ref 32–36)
MCV RBC AUTO: 93.8 FL — SIGNIFICANT CHANGE UP (ref 80–100)
MONOCYTES # BLD AUTO: 1 K/UL — HIGH (ref 0–0.9)
MONOCYTES NFR BLD AUTO: 7 % — SIGNIFICANT CHANGE UP (ref 2–14)
NEUTROPHILS # BLD AUTO: 12.22 K/UL — HIGH (ref 1.8–7.4)
NEUTROPHILS NFR BLD AUTO: 85.9 % — HIGH (ref 43–77)
NITRITE UR-MCNC: NEGATIVE — SIGNIFICANT CHANGE UP
NRBC # BLD: 0 /100 WBCS — SIGNIFICANT CHANGE UP (ref 0–0)
PH UR: 5 — SIGNIFICANT CHANGE UP (ref 5–8)
PLATELET # BLD AUTO: 212 K/UL — SIGNIFICANT CHANGE UP (ref 150–400)
POTASSIUM SERPL-MCNC: 3.9 MMOL/L — SIGNIFICANT CHANGE UP (ref 3.5–5.3)
POTASSIUM SERPL-SCNC: 3.9 MMOL/L — SIGNIFICANT CHANGE UP (ref 3.5–5.3)
PROT SERPL-MCNC: 8.4 G/DL — HIGH (ref 6–8.3)
PROT UR-MCNC: 100
PROTHROM AB SERPL-ACNC: 15.2 SEC — HIGH (ref 10.5–13.4)
RBC # BLD: 4.85 M/UL — SIGNIFICANT CHANGE UP (ref 4.2–5.8)
RBC # FLD: 14.6 % — HIGH (ref 10.3–14.5)
RBC CASTS # UR COMP ASSIST: ABNORMAL /HPF (ref 0–4)
SARS-COV-2 RNA SPEC QL NAA+PROBE: SIGNIFICANT CHANGE UP
SODIUM SERPL-SCNC: 139 MMOL/L — SIGNIFICANT CHANGE UP (ref 135–145)
SP GR SPEC: 1.02 — SIGNIFICANT CHANGE UP (ref 1.01–1.02)
UROBILINOGEN FLD QL: 1
WBC # BLD: 14.24 K/UL — HIGH (ref 3.8–10.5)
WBC # FLD AUTO: 14.24 K/UL — HIGH (ref 3.8–10.5)
WBC UR QL: ABNORMAL /HPF (ref 0–5)

## 2023-01-30 PROCEDURE — 76705 ECHO EXAM OF ABDOMEN: CPT | Mod: 26

## 2023-01-30 PROCEDURE — 71045 X-RAY EXAM CHEST 1 VIEW: CPT | Mod: 26,77

## 2023-01-30 PROCEDURE — 99285 EMERGENCY DEPT VISIT HI MDM: CPT

## 2023-01-30 PROCEDURE — 47562 LAPAROSCOPIC CHOLECYSTECTOMY: CPT

## 2023-01-30 PROCEDURE — 99222 1ST HOSP IP/OBS MODERATE 55: CPT

## 2023-01-30 PROCEDURE — 99254 IP/OBS CNSLTJ NEW/EST MOD 60: CPT | Mod: 57

## 2023-01-30 PROCEDURE — 93010 ELECTROCARDIOGRAM REPORT: CPT

## 2023-01-30 PROCEDURE — 99497 ADVNCD CARE PLAN 30 MIN: CPT | Mod: 57

## 2023-01-30 PROCEDURE — 88304 TISSUE EXAM BY PATHOLOGIST: CPT | Mod: 26

## 2023-01-30 PROCEDURE — 71045 X-RAY EXAM CHEST 1 VIEW: CPT | Mod: 26

## 2023-01-30 PROCEDURE — 47562 LAPAROSCOPIC CHOLECYSTECTOMY: CPT | Mod: 80

## 2023-01-30 PROCEDURE — 99498 ADVNCD CARE PLAN ADDL 30 MIN: CPT | Mod: 57

## 2023-01-30 PROCEDURE — 74177 CT ABD & PELVIS W/CONTRAST: CPT | Mod: 26,MA

## 2023-01-30 PROCEDURE — 99222 1ST HOSP IP/OBS MODERATE 55: CPT | Mod: 25

## 2023-01-30 DEVICE — ARISTA 3GR: Type: IMPLANTABLE DEVICE | Status: FUNCTIONAL

## 2023-01-30 DEVICE — CLIP APPLIER COVIDIEN ENDOCLIP III 5MM: Type: IMPLANTABLE DEVICE | Status: FUNCTIONAL

## 2023-01-30 DEVICE — CLIP APPLIER ETHICON LIGAMAX 5MM: Type: IMPLANTABLE DEVICE | Status: FUNCTIONAL

## 2023-01-30 RX ORDER — METRONIDAZOLE 500 MG
500 TABLET ORAL ONCE
Refills: 0 | Status: COMPLETED | OUTPATIENT
Start: 2023-01-30 | End: 2023-01-30

## 2023-01-30 RX ORDER — HYDROMORPHONE HYDROCHLORIDE 2 MG/ML
0.5 INJECTION INTRAMUSCULAR; INTRAVENOUS; SUBCUTANEOUS
Refills: 0 | Status: DISCONTINUED | OUTPATIENT
Start: 2023-01-30 | End: 2023-02-04

## 2023-01-30 RX ORDER — PIPERACILLIN AND TAZOBACTAM 4; .5 G/20ML; G/20ML
3.38 INJECTION, POWDER, LYOPHILIZED, FOR SOLUTION INTRAVENOUS ONCE
Refills: 0 | Status: COMPLETED | OUTPATIENT
Start: 2023-01-30 | End: 2023-01-31

## 2023-01-30 RX ORDER — ONDANSETRON 8 MG/1
4 TABLET, FILM COATED ORAL ONCE
Refills: 0 | Status: COMPLETED | OUTPATIENT
Start: 2023-01-30 | End: 2023-01-30

## 2023-01-30 RX ORDER — ACETAMINOPHEN 500 MG
650 TABLET ORAL EVERY 6 HOURS
Refills: 0 | Status: DISCONTINUED | OUTPATIENT
Start: 2023-01-30 | End: 2023-01-30

## 2023-01-30 RX ORDER — ATORVASTATIN CALCIUM 80 MG/1
80 TABLET, FILM COATED ORAL AT BEDTIME
Refills: 0 | Status: DISCONTINUED | OUTPATIENT
Start: 2023-01-30 | End: 2023-01-30

## 2023-01-30 RX ORDER — ONDANSETRON 8 MG/1
4 TABLET, FILM COATED ORAL ONCE
Refills: 0 | Status: DISCONTINUED | OUTPATIENT
Start: 2023-01-30 | End: 2023-01-30

## 2023-01-30 RX ORDER — TAMSULOSIN HYDROCHLORIDE 0.4 MG/1
0.4 CAPSULE ORAL AT BEDTIME
Refills: 0 | Status: DISCONTINUED | OUTPATIENT
Start: 2023-01-30 | End: 2023-01-30

## 2023-01-30 RX ORDER — FINASTERIDE 5 MG/1
5 TABLET, FILM COATED ORAL DAILY
Refills: 0 | Status: DISCONTINUED | OUTPATIENT
Start: 2023-01-30 | End: 2023-01-30

## 2023-01-30 RX ORDER — CEFEPIME 1 G/1
1000 INJECTION, POWDER, FOR SOLUTION INTRAMUSCULAR; INTRAVENOUS ONCE
Refills: 0 | Status: COMPLETED | OUTPATIENT
Start: 2023-01-30 | End: 2023-01-30

## 2023-01-30 RX ORDER — PIPERACILLIN AND TAZOBACTAM 4; .5 G/20ML; G/20ML
3.38 INJECTION, POWDER, LYOPHILIZED, FOR SOLUTION INTRAVENOUS EVERY 8 HOURS
Refills: 0 | Status: CANCELLED | OUTPATIENT
Start: 2023-01-31 | End: 2023-01-30

## 2023-01-30 RX ORDER — PIPERACILLIN AND TAZOBACTAM 4; .5 G/20ML; G/20ML
3.38 INJECTION, POWDER, LYOPHILIZED, FOR SOLUTION INTRAVENOUS ONCE
Refills: 0 | Status: DISCONTINUED | OUTPATIENT
Start: 2023-01-30 | End: 2023-01-30

## 2023-01-30 RX ORDER — PIPERACILLIN AND TAZOBACTAM 4; .5 G/20ML; G/20ML
3.38 INJECTION, POWDER, LYOPHILIZED, FOR SOLUTION INTRAVENOUS ONCE
Refills: 0 | Status: CANCELLED | OUTPATIENT
Start: 2023-01-31 | End: 2023-01-30

## 2023-01-30 RX ORDER — ACETAMINOPHEN 500 MG
650 TABLET ORAL EVERY 6 HOURS
Refills: 0 | Status: DISCONTINUED | OUTPATIENT
Start: 2023-01-30 | End: 2023-02-01

## 2023-01-30 RX ORDER — ONDANSETRON 8 MG/1
4 TABLET, FILM COATED ORAL EVERY 8 HOURS
Refills: 0 | Status: DISCONTINUED | OUTPATIENT
Start: 2023-01-30 | End: 2023-02-09

## 2023-01-30 RX ORDER — OXYCODONE HYDROCHLORIDE 5 MG/1
5 TABLET ORAL
Refills: 0 | Status: DISCONTINUED | OUTPATIENT
Start: 2023-01-30 | End: 2023-01-31

## 2023-01-30 RX ORDER — SODIUM CHLORIDE 9 MG/ML
1000 INJECTION, SOLUTION INTRAVENOUS
Refills: 0 | Status: DISCONTINUED | OUTPATIENT
Start: 2023-01-30 | End: 2023-01-30

## 2023-01-30 RX ORDER — ATORVASTATIN CALCIUM 80 MG/1
80 TABLET, FILM COATED ORAL AT BEDTIME
Refills: 0 | Status: DISCONTINUED | OUTPATIENT
Start: 2023-01-30 | End: 2023-01-31

## 2023-01-30 RX ORDER — PIPERACILLIN AND TAZOBACTAM 4; .5 G/20ML; G/20ML
3.38 INJECTION, POWDER, LYOPHILIZED, FOR SOLUTION INTRAVENOUS ONCE
Refills: 0 | Status: COMPLETED | OUTPATIENT
Start: 2023-01-30 | End: 2023-01-30

## 2023-01-30 RX ORDER — ASPIRIN/CALCIUM CARB/MAGNESIUM 324 MG
81 TABLET ORAL DAILY
Refills: 0 | Status: DISCONTINUED | OUTPATIENT
Start: 2023-01-30 | End: 2023-01-30

## 2023-01-30 RX ORDER — FINASTERIDE 5 MG/1
5 TABLET, FILM COATED ORAL DAILY
Refills: 0 | Status: DISCONTINUED | OUTPATIENT
Start: 2023-01-30 | End: 2023-02-09

## 2023-01-30 RX ORDER — SODIUM CHLORIDE 9 MG/ML
1000 INJECTION INTRAMUSCULAR; INTRAVENOUS; SUBCUTANEOUS ONCE
Refills: 0 | Status: COMPLETED | OUTPATIENT
Start: 2023-01-30 | End: 2023-01-30

## 2023-01-30 RX ORDER — PIPERACILLIN AND TAZOBACTAM 4; .5 G/20ML; G/20ML
3.38 INJECTION, POWDER, LYOPHILIZED, FOR SOLUTION INTRAVENOUS EVERY 8 HOURS
Refills: 0 | Status: DISCONTINUED | OUTPATIENT
Start: 2023-01-30 | End: 2023-02-05

## 2023-01-30 RX ORDER — LANOLIN ALCOHOL/MO/W.PET/CERES
3 CREAM (GRAM) TOPICAL AT BEDTIME
Refills: 0 | Status: DISCONTINUED | OUTPATIENT
Start: 2023-01-30 | End: 2023-02-09

## 2023-01-30 RX ORDER — ONDANSETRON 8 MG/1
4 TABLET, FILM COATED ORAL EVERY 8 HOURS
Refills: 0 | Status: DISCONTINUED | OUTPATIENT
Start: 2023-01-30 | End: 2023-01-30

## 2023-01-30 RX ORDER — OXYCODONE HYDROCHLORIDE 5 MG/1
10 TABLET ORAL
Refills: 0 | Status: DISCONTINUED | OUTPATIENT
Start: 2023-01-30 | End: 2023-01-31

## 2023-01-30 RX ORDER — TAMSULOSIN HYDROCHLORIDE 0.4 MG/1
0.4 CAPSULE ORAL AT BEDTIME
Refills: 0 | Status: DISCONTINUED | OUTPATIENT
Start: 2023-01-30 | End: 2023-02-02

## 2023-01-30 RX ORDER — LANOLIN ALCOHOL/MO/W.PET/CERES
3 CREAM (GRAM) TOPICAL AT BEDTIME
Refills: 0 | Status: DISCONTINUED | OUTPATIENT
Start: 2023-01-30 | End: 2023-01-30

## 2023-01-30 RX ORDER — HYDROMORPHONE HYDROCHLORIDE 2 MG/ML
0.25 INJECTION INTRAMUSCULAR; INTRAVENOUS; SUBCUTANEOUS
Refills: 0 | Status: DISCONTINUED | OUTPATIENT
Start: 2023-01-30 | End: 2023-01-30

## 2023-01-30 RX ADMIN — CEFEPIME 1000 MILLIGRAM(S): 1 INJECTION, POWDER, FOR SOLUTION INTRAMUSCULAR; INTRAVENOUS at 13:58

## 2023-01-30 RX ADMIN — SODIUM CHLORIDE 1000 MILLILITER(S): 9 INJECTION INTRAMUSCULAR; INTRAVENOUS; SUBCUTANEOUS at 14:52

## 2023-01-30 RX ADMIN — PIPERACILLIN AND TAZOBACTAM 200 GRAM(S): 4; .5 INJECTION, POWDER, LYOPHILIZED, FOR SOLUTION INTRAVENOUS at 16:31

## 2023-01-30 RX ADMIN — ONDANSETRON 4 MILLIGRAM(S): 8 TABLET, FILM COATED ORAL at 14:52

## 2023-01-30 RX ADMIN — CEFEPIME 100 MILLIGRAM(S): 1 INJECTION, POWDER, FOR SOLUTION INTRAMUSCULAR; INTRAVENOUS at 13:17

## 2023-01-30 RX ADMIN — SODIUM CHLORIDE 1000 MILLILITER(S): 9 INJECTION INTRAMUSCULAR; INTRAVENOUS; SUBCUTANEOUS at 12:36

## 2023-01-30 RX ADMIN — SODIUM CHLORIDE 1000 MILLILITER(S): 9 INJECTION INTRAMUSCULAR; INTRAVENOUS; SUBCUTANEOUS at 14:00

## 2023-01-30 RX ADMIN — Medication 100 MILLIGRAM(S): at 14:52

## 2023-01-30 NOTE — BRIEF OPERATIVE NOTE - OPERATION/FINDINGS
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

## 2023-01-30 NOTE — CONSULT NOTE ADULT - ASSESSMENT
Assessment:  Everton Gaxiola is a 89 year old man with recent hospitalization here in November for TIA, now sent in from [facility] due to abdominal pain and abnormal labs, found to have pericholecystic fluid/gallstone ileus/possible cholecystitis, distal small bowel obstruction.    ECG with irregular rhythm.    Assessment:  Everton Gaxiola is a 89 year old man with recent hospitalization here in November for TIA, now sent in from [facility] due to abdominal pain and abnormal labs, found to have pericholecystic fluid/gallstone ileus/possible cholecystitis, distal small bowel obstruction.    ECG with irregular rhythm.    Assessment:  Everton Gaxiola is a 89 year old man with recent hospitalization here in November for TIA, now sent in from [facility] due to abdominal pain and abnormal labs, found to have pericholecystic fluid/gallstone ileus/possible cholecystitis, distal small bowel obstruction.    ECG with irregular rhythm.    Assessment:  Everton Gaxiola is a 89 year old man with recent hospitalization here in November for TIA, now sent in from [facility] due to abdominal pain, nausea, vomiting and abnormal labs, found to have pericholecystic fluid/gallstone ileus/possible cholecystitis, distal small bowel obstruction.    ECG is poor baseline, irregular rhythm, occasional sinus rhythm with PACs noted. Recent echo from 11/2022 with normal LV systolic function, no significant valvular disease. Exercise tolerance cannot be assessed due to patient recovering from recent hip fracture, however he denies chest pain, shortness of breath or palpitations. No signs of CHF on exam. No symptoms of an acute coronary syndrome at this time.     Recommendations:  [] Pre-operative cardiac risk stratification: The patient is at high cardiovascular risk prior to laparoscopic abdominal surgery, may proceed at this level of risk for this urgent, necessary surgery. Please place patient on telemetry to monitor for atrial arrhythmias. Repeat ECG. Will likely need outpatient extended cardiac event monitoring.    We will continue to follow along. Discussed with surgeon, Dr Thompson.     Jn Acuna MD  Cardiology      Assessment:  Everton Gaxiola is a 89 year old man with recent hospitalization here in November for TIA, now sent in from [facility] due to abdominal pain, nausea, vomiting and abnormal labs, found to have pericholecystic fluid/gallstone ileus/possible cholecystitis, distal small bowel obstruction.    ECG is poor baseline, irregular rhythm, occasional sinus rhythm with PACs noted. Recent echo from 11/2022 with normal LV systolic function, no significant valvular disease. Exercise tolerance cannot be assessed due to patient recovering from recent hip fracture, however he denies chest pain, shortness of breath or palpitations. No signs of CHF on exam. No symptoms of an acute coronary syndrome at this time.     Recommendations:  [] Pre-operative cardiac risk stratification: The patient is at high cardiovascular risk prior to laparoscopic abdominal surgery, may proceed at this level of risk for this urgent, necessary surgery. Please place patient on telemetry to monitor for atrial arrhythmias. Repeat ECG. Will likely need outpatient extended cardiac event monitoring.    We will continue to follow along. Discussed with surgeon, Dr Thompson.     Jn Acuna MD  Cardiology      Assessment:  Everton Gaxiola is a 89 year old man with recent hospitalization here in November for TIA, now sent in from [facility] due to abdominal pain, nausea, vomiting and abnormal labs, found to have pericholecystic fluid/gallstone ileus/possible cholecystitis, distal small bowel obstruction.    ECG is poor baseline, irregular rhythm, occasional sinus rhythm with PACs noted. Recent echo from 11/2022 with normal LV systolic function, no significant valvular disease. Exercise tolerance cannot be assessed due to patient recovering from recent hip fracture, however he denies chest pain, shortness of breath or palpitations. No signs of CHF on exam. No symptoms of an acute coronary syndrome at this time.     Recommendations:  [] Pre-operative cardiac risk stratification: The patient is at high cardiovascular risk prior to laparoscopic abdominal surgery, may proceed at this level of risk for this urgent, necessary surgery. Please place patient on telemetry to monitor for atrial arrhythmias. Repeat ECG. Will likely need outpatient extended cardiac event monitoring.    We will continue to follow along. Discussed with surgeon, Dr Thompson.     Jn Acuna MD  Cardiology

## 2023-01-30 NOTE — ED ADULT TRIAGE NOTE - NURSING HOMES
[facility]

## 2023-01-30 NOTE — BRIEF OPERATIVE NOTE - COMMENTS
AXR to confirm correct NG placement. NG to low intermittent suction. Hold asa / lovenox. Recheck CBC/ CMP in the AM. If Hb stable, can start chemical DVT ppx. Continue IV abx. Culture sent. Drain to bulb suction. Expect serosanguinous drainage due to copious irrigation. AXR to confirm correct NG placement. NG to low intermittent suction. Hold asa / lovenox. Recheck CBC/ CMP in the AM. If Hb stable, can start chemical DVT ppx. Continue IV abx. Culture sent. Drain to bulb suction. Expect serosanguinous drainage due to copious irrigation. Patient's daughter updated over the phone. Medical team night PA updated regarding operative findings and plan.

## 2023-01-30 NOTE — ED ADULT NURSE NOTE - OBJECTIVE STATEMENT
Pt. [facility].  Pt. sent in for elevated labs.  Pt. also complaining of lower back pain and intermittent abdominal pain but denies any at this time.  Pt. arrived with felipe catheter in place, changed on arrival. [facility]  Pt. sent in for elevated labs.  Pt. also complaining of lower back pain and intermittent abdominal pain but denies any at this time.  Pt. arrived with felipe catheter in place, changed on arrival. [facility]  Pt. sent in for elevated labs.  Pt. also complaining of lower back pain and intermittent abdominal pain but denies any at this time.  Pt. arrived with felipe catheter in place, changed on arrival.

## 2023-01-30 NOTE — CONSULT NOTE ADULT - TIME BILLING
Reviewing imaging with two radiologists. Consulting Dr. Moody and Dr. Morales. Discussing care with the hospitalist Dr. Balbuena as well as cardiology. Explaining risks and benefits to the patient and his daughter, and then again for the patient's son in law. Coordinating potential transfer to [facility] (subsequently the patient stayed here). Reviewing imaging with two radiologists. Consulting Dr. Moody and Dr. Morales. Discussing care with the hospitalist Dr. Balbuena as well as cardiology. Explaining risks and benefits to the patient and his daughter, and then again for the patient's son in law. Coordinating potential transfer to [facility] (subsequently the patient stayed here). Reviewing imaging with two radiologists. Consulting Dr. Moody and Dr. Morales. Discussing care with the hospitalist Dr. Balbuena as well as cardiology. Explaining risks and benefits to the patient and his daughter, and then again for the patient's son in law. Coordinating potential transfer to [facility] (subsequently the patient stayed here).

## 2023-01-30 NOTE — ED ADULT NURSE NOTE - NSIMPLEMENTINTERV_GEN_ALL_ED
Implemented All Fall with Harm Risk Interventions:  [place] to call system. Call bell, personal items and telephone within reach. Instruct patient to call for assistance. Room bathroom lighting operational. Non-slip footwear when patient is off stretcher. Physically safe environment: no spills, clutter or unnecessary equipment. Stretcher in lowest position, wheels locked, appropriate side rails in place. Provide visual cue, wrist band, yellow gown, etc. Monitor gait and stability. Monitor for mental status changes and reorient to person, place, and time. Review medications for side effects contributing to fall risk. Reinforce activity limits and safety measures with patient and family. Provide visual clues: red socks. Implemented All Fall with Harm Risk Interventions:  [place] to call system. Call bell, personal items and telephone within reach. Instruct patient to call for assistance. Room bathroom lighting operational. Non-slip footwear when patient is off stretcher. Physically safe environment: no spills, clutter or unnecessary equipment. Stretcher in lowest position, wheels locked, appropriate side rails in place. Provide visual cue, wrist band, yellow gown, etc. Monitor gait and stability. Monitor for mental status changes and reorient to person, place, and time. Review medications for side effects contributing to fall risk. Reinforce activity limits and safety measures with patient and family. Provide visual clues: red socks. Implemented All Fall with Harm Risk Interventions:  [place] to call system. Call bell, personal items and telephone within reach. Instruct patient to call for assistance. Room bathroom lighting operational. Non-slip footwear when patient is off stretcher. Physically safe environment: no spills, clutter or unnecessary equipment. Stretcher in lowest position, wheels locked, appropriate side rails in place. Provide visual cue, wrist band, yellow gown, etc. Monitor gait and stability. Monitor for mental status changes and reorient to person, place, and time. Review medications for side effects contributing to fall risk. Reinforce activity limits and safety measures with patient and family. Provide visual clues: red socks.

## 2023-01-30 NOTE — ED ADULT NURSE NOTE - CHIEF COMPLAINT QUOTE
BIB EMS from [facility] for abnormal labs and abdominal pain. per EMS pt with elevated liver enzymes. pt takes ASA daily. BIB EMS from [facility] for abnormal labs and abdominal pain. per EMS pt with elevated liver enzymes. pt takes ASA daily. BIB EMS from [facility] for abnormal labs and abdominal pain. per EMS pt with elevated liver enzymes. pt takes ASA daily.

## 2023-01-30 NOTE — H&P ADULT - HISTORY OF PRESENT ILLNESS
PRETTY DOLAN is a 89y year old Male with PMH of recent L hip fx (11/2022), TIA (11/2022) who presents to the ER from [facility] for abdominal pain, nausea for 4 days.     Patient states his symptoms have been worsening over the last 4 days; had bloodwork done at [facility] which showed elevated LFTs which prompted ER visit. patietn states he has had poor appetite for the same time period. Episode of vomiting in ER, nbnb.   Daughter at bedside who states patient has been participating with therapy however has been more fatigued over the last week.   Denies fever, chills, chest pain, shortness of breath.     On ER arrival, /68, HR 71, RR 17 T97.1, sat 95% on room air. Found to have acute cholecystitis on RUQ US. PRETTY DOLAN is a 89y year old Male with PMH of recent L hip fx (11/2022), TIA (11/2022) who presents to the ER from [facility] for abdominal pain, nausea for 4 days.     Patient states his symptoms have been worsening over the last 4 days; had bloodwork done at [facility] which showed elevated LFTs which prompted ER visit. patietn states he has had poor appetite for the same time period. Episode of vomiting in ER, nbnb.   Daughter at bedside who states patient has been participating with therapy however has been more fatigued over the last week.   Denies fever, chills, chest pain, shortness of breath.     On ER arrival, /68, HR 71, RR 17 T97.1, sat 95% on room air. Found to have acute cholecystitis on RUQ US. PRETTY DOLAN is a 89y year old Male with PMH of recent L hip fx (11/2022), TIA (11/2022) who presents to the ER from [facility] for abdominal pain, nausea for 4 days.     Patient states his symptoms have been worsening over the last 4 days; had bloodwork done at [facility] which showed elevated LFTs which prompted ER visit. patietn states he has had poor appetite for the same time period. Episode of vomiting in ER, nbnb.   Daughter at bedside who states patient has been participating with therapy however has been more fatigued over the last week.   Denies fever, chills, chest pain, shortness of breath.     On ER arrival, /68, HR 71, RR 17 T97.1, sat 95% on room air. Found to have acute cholecystitis on RUQ US.

## 2023-01-30 NOTE — H&P ADULT - ASSESSMENT
89y year old Male with PMH of recent L hip fx (11/2022), TIA (11/2022) who presents to the ER from [facility] for abdominal pain, nausea for 4 days. Admitted for acute cholecystitis    Acute cholecystitis   Transaminitis, Hyperbilirubinemia  Elevated lactate due to above  - Dr. Thompson had discussion with patient and daughter at bedside, they elect to hold off on intervention (surgery vs IR guided perc kellee tube). Awaiting CT imaging  - NPO  - IVF  - Zosyn  - Analgesics, anti-emetics prn  - monitor LFTs    New Atrial Fibrillation  - tele monitor  - will obtain limited TTE, patient had TTE 2 months ago  - Cardiology Dr. Acuna to eval    Acute Kidney Injury  - IVF  - Monitor BMP  - Avoid nephrotoxic medications     Urinary retention  - continue with chronic felipe    Hx of TIA  - c/w ASA, statin    DVT Prophylaxis:  - IPCDs for now     Updated daughter at bedside. Care coordinated with Surgeon, Dr. Thompson    IMPROVE VTE Individual Risk Assessment          RISK                                                          Points  [  ] Previous VTE                                                3  [  ] Thrombophilia                                             2  [  ] Lower limb paralysis                                   2        (unable to hold up >15 seconds)    [  ] Current Cancer                                             2         (within 6 months)  [  ] Immobilization > 24 hrs                              1  [  ] ICU/CCU stay > 24 hours                             1  [ X ] Age > 60                                                         1    IMPROVE VTE Score:         [    1     ] 89y year old Male with PMH of recent L hip fx (11/2022), TIA (11/2022) who presents to the ER from [facility] for abdominal pain, nausea for 4 days. Admitted for acute cholecystitis    Acute cholecystitis   Transaminitis, Hyperbilirubinemia  Elevated lactate due to above  - Dr. Thompson had discussion with patient and daughter at bedside, they elect to hold off on intervention (surgery vs IR guided perc kellee tube). Awaiting CT imaging  - NPO  - IVF  - Zosyn  - Analgesics, anti-emetics prn  - monitor LFTs    New Atrial Fibrillation  - tele monitor  - will obtain limited TTE, patient had TTE 2 months ago  - Cardiology Dr. Acnua to eval    Acute Kidney Injury  - IVF  - Monitor BMP  - Avoid nephrotoxic medications     Urinary retention  - continue with chronic felipe    Hx of TIA  - c/w ASA, statin    DVT Prophylaxis:  - IPCDs for now     Updated daughter at bedside. Care coordinated with Surgeon, Dr. Thompson    IMPROVE VTE Individual Risk Assessment          RISK                                                          Points  [  ] Previous VTE                                                3  [  ] Thrombophilia                                             2  [  ] Lower limb paralysis                                   2        (unable to hold up >15 seconds)    [  ] Current Cancer                                             2         (within 6 months)  [  ] Immobilization > 24 hrs                              1  [  ] ICU/CCU stay > 24 hours                             1  [ X ] Age > 60                                                         1    IMPROVE VTE Score:         [    1     ] 89y year old Male with PMH of recent L hip fx (11/2022), TIA (11/2022) who presents to the ER from [facility] for abdominal pain, nausea for 4 days. Admitted for acute cholecystitis    Acute cholecystitis   Transaminitis, Hyperbilirubinemia  Elevated lactate due to above  - Dr. Thompson had discussion with patient and daughter at bedside, they elect to hold off on intervention (surgery vs IR guided perc kellee tube). Awaiting CT imaging  - NPO  - IVF  - Zosyn  - Analgesics, anti-emetics prn  - monitor LFTs    New Atrial Fibrillation  - tele monitor  - will obtain limited TTE, patient had TTE 2 months ago  - Cardiology Dr. Acuna to eval    Acute Kidney Injury  - IVF  - Monitor BMP  - Avoid nephrotoxic medications     Urinary retention  - continue with chronic felipe    Hx of TIA  - c/w ASA, statin    DVT Prophylaxis:  - IPCDs for now     Updated daughter at bedside. Care coordinated with Surgeon, Dr. Thompson    IMPROVE VTE Individual Risk Assessment          RISK                                                          Points  [  ] Previous VTE                                                3  [  ] Thrombophilia                                             2  [  ] Lower limb paralysis                                   2        (unable to hold up >15 seconds)    [  ] Current Cancer                                             2         (within 6 months)  [  ] Immobilization > 24 hrs                              1  [  ] ICU/CCU stay > 24 hours                             1  [ X ] Age > 60                                                         1    IMPROVE VTE Score:         [    1     ]

## 2023-01-30 NOTE — CONSULT NOTE ADULT - SUBJECTIVE AND OBJECTIVE BOX
HPI:    This is a 89M who has a medical history significant for recent TIA (Nov, 2022, no residual deficits), recent fall c/b hip fracture in the past year, requiring hip replacement, who is still currently in rehab, who presented to the hospital with RUQ pain which radiates to the back. This has been going on for days with subjective fevers, chills, sweats, nausea, vomiting, and diarrhea. He notes having similar pains like this in the past.     I obtained history from both the patient and his daughter who states that while he used to live independently, he has been in rehab since the hip replacement and has not been very ambulatory since then. He is DNR at baseline, but has no diagnosed dementia or other condition to prevent him from making his own decisions. He had prior colon cancer with colonic resection a few years back which he tolerated well. No other abdominal surgeries or chronic abdominal issues. He used lovenox in the periop period for DVT ppx, but never was diagnosed with a DVT or PE. No history of MI or angina. Despite his TIA in November he never had any cardiac workup, of stroke work up such as a carotid ultrasound or evaluation for syncope / Afib.     Of note, his EKG here was concerning for Afib. Thus, cardiology was consulted for evaluation / preop optimization.     Vitals:  T 97.1 F HR 71 /68 RR 17 O2 sat 95%  General appearance:  HPI:    This is a 89M who has a medical history significant for recent TIA (Nov, 2022, no residual deficits), recent fall c/b hip fracture in the past year, requiring hip replacement, who is still currently in rehab, who presented to the hospital with RUQ pain which radiates to the back. This has been going on for days with subjective fevers, chills, sweats, nausea, vomiting, and diarrhea. He notes having similar pains like this in the past.     I obtained history from both the patient and his daughter who states that while he used to live independently, he has been in rehab since the hip replacement and has not been very ambulatory since then. He is DNR at baseline, but has no diagnosed dementia or other condition to prevent him from making his own decisions. He had prior colon cancer with colonic resection a few years back which he tolerated well. No other abdominal surgeries or chronic abdominal issues. He used lovenox in the periop period for DVT ppx, but never was diagnosed with a DVT or PE. No history of MI or angina. Despite his TIA in November he never had any cardiac workup, of stroke work up such as a carotid ultrasound or evaluation for syncope / Afib.     Of note, his EKG here was concerning for Afib. Thus, cardiology was consulted for evaluation / preop optimization.     PMH/PSH:   Colon cancer s/p colonic resection at [facility], TIA (November, 2022, no residual deficits), Hip fx s/p hip replacement  NKDA  Fam hx: father who had ETOHism  Social: hx of smoker, no ETOH or drug use    Home Medications:   * Patient Currently Takes Medications as of 23-Nov-2022 13:49 documented in Structured Notes  · 	senna leaf extract oral tablet: 2 tab(s) orally once a day (at bedtime)  · 	aspirin 81 mg oral delayed release tablet: 1 tab(s) orally once a day  · 	atorvastatin 80 mg oral tablet: 1 tab(s) orally once a day (at bedtime)  · 	Milk of Magnesia 8% oral suspension:   · 	finasteride 5 mg oral tablet: 1 tab(s) orally once a day  · 	omeprazole 40 mg oral delayed release capsule: 1 cap(s) orally once a day  · 	tamsulosin 0.4 mg oral capsule: 1 cap(s) orally once a day  · 	Feosol 325 mg (65 mg elemental iron) oral tablet: 1 tab(s) orally 2 times a day  · 	Tylenol 325 mg oral tablet: 3 tab(s) orally every 8 hours, As Needed  oxyCODONE 5 mg oral tablet: 1 tab(s) orally every 4 hours, As Needed    Vitals:  T 97.1 F HR 71 /68 RR 17 O2 sat 95%  General appearance: No acute distress, but had emesis over his gown.   HEENT: Normocephalic, atraumatic  Neck: Trachea midline  Respiratory: Non-labored breathing  Abdomen: soft, tender to palpation in the right hemiabdomen, + scott's sign, reducible left inguinal hernia, non-tender, no overlying skin erythema or skin changes  Neuro: No focal deficits appreciated. AxO x3  Psych: Calm affect, becomes mildly agitated at times, but consolable    Labs:  WBC: 14 Hb 14  INR 1.3 Lactic 2.5 to 1.0 BUN/Cr 42/1.24 Tbili  1.3 Alk Phos 318 ALT//131    COVID-19 neg    RUQ US:   Abnormal gallbladder appearance with echogenicity within the gallbladder   lumen most likely related to sludge and gallstones. Gallbladder wall   thickening noted measuring up to 9 mm. There is a pericholecystic fluid   collection identified measuring 5.1 x 4.3 x 1.3 cm. Correlate for   cholecystitis.    CT abdomen/pelvis: concern for gangrenous cholecystitis with perforation. Pericholecystic fluid. Distal small bowel obstruction. Possibly suggestive of gallstone ileus however no gallstone is visualized  with certainty. Consider gangrenous cholecystitis as well.    Musa balloon distended within the prostatic urethra. Recommend   repositioning.    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    Nonobstructed colon in the left inguinal hernia, does NOT appear to be   related to the bowel obstruction.    I reviewed the CT imaging with our in house radiologist, Dr. Ceja, as well as Dr. Lisa who both felt that at the very least there was likely gangrenous gall bladder with perforation. However, neither definitively could identify a gallstone in the small bowel.      HPI:    This is a 89M who has a medical history significant for recent TIA (Nov, 2022, no residual deficits), recent fall c/b hip fracture in the past year, requiring hip replacement, who is still currently in rehab, who presented to the hospital with RUQ pain which radiates to the back. This has been going on for days with subjective fevers, chills, sweats, nausea, vomiting, and diarrhea. He notes having similar pains like this in the past.     I obtained history from both the patient and his daughter who states that while he used to live independently, he has been in rehab since the hip replacement and has not been very ambulatory since then. He is DNR at baseline, but has no diagnosed dementia or other condition to prevent him from making his own decisions. He had prior colon cancer with colonic resection a few years back which he tolerated well. No other abdominal surgeries or chronic abdominal issues. He used lovenox in the periop period for DVT ppx, but never was diagnosed with a DVT or PE. No history of MI or angina. Despite his TIA in November he never had any cardiac workup, of stroke work up such as a carotid ultrasound or evaluation for syncope / Afib.     Of note, his EKG here was concerning for Afib. Thus, cardiology was consulted for evaluation / preop optimization.     PMH/PSH:   Colon cancer s/p colonic resection at [facility], TIA (November, 2022, no residual deficits), Hip fx s/p hip replacement  NKDA  Fam hx: father who had ETOHism  Social: hx of smoker, no ETOH or drug use    Home Medications:   * Patient Currently Takes Medications as of 23-Nov-2022 13:49 documented in Structured Notes  · 	senna leaf extract oral tablet: 2 tab(s) orally once a day (at bedtime)  · 	aspirin 81 mg oral delayed release tablet: 1 tab(s) orally once a day  · 	atorvastatin 80 mg oral tablet: 1 tab(s) orally once a day (at bedtime)  · 	Milk of Magnesia 8% oral suspension:   · 	finasteride 5 mg oral tablet: 1 tab(s) orally once a day  · 	omeprazole 40 mg oral delayed release capsule: 1 cap(s) orally once a day  · 	tamsulosin 0.4 mg oral capsule: 1 cap(s) orally once a day  · 	Feosol 325 mg (65 mg elemental iron) oral tablet: 1 tab(s) orally 2 times a day  · 	Tylenol 325 mg oral tablet: 3 tab(s) orally every 8 hours, As Needed  oxyCODONE 5 mg oral tablet: 1 tab(s) orally every 4 hours, As Needed    Vitals:  T 97.1 F HR 71 /68 RR 17 O2 sat 95%  General appearance: No acute distress, but had emesis over his gown.   HEENT: Normocephalic, atraumatic  Neck: Trachea midline  Respiratory: Non-labored breathing  Abdomen: soft, tender to palpation in the right hemiabdomen, + scott's sign, reducible left inguinal hernia, non-tender, no overlying skin erythema or skin changes  Neuro: No focal deficits appreciated. AxO x3  Psych: Calm affect, becomes mildly agitated at times, but consolable    Labs:  WBC: 14 Hb 14  INR 1.3 Lactic 2.5 to 1.0 BUN/Cr 42/1.24 Tbili  1.3 Alk Phos 318 ALT//131    COVID-19 neg    RUQ US:   Abnormal gallbladder appearance with echogenicity within the gallbladder   lumen most likely related to sludge and gallstones. Gallbladder wall   thickening noted measuring up to 9 mm. There is a pericholecystic fluid   collection identified measuring 5.1 x 4.3 x 1.3 cm. Correlate for   cholecystitis.    CT abdomen/pelvis: concern for gangrenous cholecystitis with perforation. Pericholecystic fluid. Distal small bowel obstruction. Possibly suggestive of gallstone ileus however no gallstone is visualized  with certainty. Consider gangrenous cholecystitis as well.    Musa balloon distended within the prostatic urethra. Recommend   repositioning.    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    Nonobstructed colon in the left inguinal hernia, does NOT appear to be   related to the bowel obstruction.    I reviewed the CT imaging with our in house radiologist, Dr. Ceja, as well as Dr. Lisa who both felt that at the very least there was likely gangrenous gall bladder with perforation. However, neither definitively could identify a gallstone in the small bowel.      HPI:    This is a 89M who has a medical history significant for recent TIA (Nov, 2022, no residual deficits), recent fall c/b hip fracture in the past year, requiring hip replacement, who is still currently in rehab, who presented to the hospital with RUQ pain which radiates to the back. This has been going on for days with subjective fevers, chills, sweats, nausea, vomiting, and diarrhea. He notes having similar pains like this in the past.     I obtained history from both the patient and his daughter who states that while he used to live independently, he has been in rehab since the hip replacement and has not been very ambulatory since then. He is DNR at baseline, but has no diagnosed dementia or other condition to prevent him from making his own decisions. He had prior colon cancer with colonic resection a few years back which he tolerated well. No other abdominal surgeries or chronic abdominal issues. He used lovenox in the periop period for DVT ppx, but never was diagnosed with a DVT or PE. No history of MI or angina. Despite his TIA in November he never had any cardiac workup, of stroke work up such as a carotid ultrasound or evaluation for syncope / Afib.     Of note, his EKG here was concerning for Afib. Thus, cardiology was consulted for evaluation / preop optimization.     PMH/PSH:   Colon cancer s/p colonic resection at [facility], TIA (November, 2022, no residual deficits), Hip fx s/p hip replacement  NKDA  Fam hx: father who had ETOHism  Social: hx of smoker, no ETOH or drug use    Home Medications:   * Patient Currently Takes Medications as of 23-Nov-2022 13:49 documented in Structured Notes  · 	senna leaf extract oral tablet: 2 tab(s) orally once a day (at bedtime)  · 	aspirin 81 mg oral delayed release tablet: 1 tab(s) orally once a day  · 	atorvastatin 80 mg oral tablet: 1 tab(s) orally once a day (at bedtime)  · 	Milk of Magnesia 8% oral suspension:   · 	finasteride 5 mg oral tablet: 1 tab(s) orally once a day  · 	omeprazole 40 mg oral delayed release capsule: 1 cap(s) orally once a day  · 	tamsulosin 0.4 mg oral capsule: 1 cap(s) orally once a day  · 	Feosol 325 mg (65 mg elemental iron) oral tablet: 1 tab(s) orally 2 times a day  · 	Tylenol 325 mg oral tablet: 3 tab(s) orally every 8 hours, As Needed  oxyCODONE 5 mg oral tablet: 1 tab(s) orally every 4 hours, As Needed    Vitals:  T 97.1 F HR 71 /68 RR 17 O2 sat 95%  General appearance: No acute distress, but had emesis over his gown.   HEENT: Normocephalic, atraumatic  Neck: Trachea midline  Respiratory: Non-labored breathing  Abdomen: soft, tender to palpation in the right hemiabdomen, + scott's sign, reducible left inguinal hernia, non-tender, no overlying skin erythema or skin changes  Neuro: No focal deficits appreciated. AxO x3  Psych: Calm affect, becomes mildly agitated at times, but consolable    Labs:  WBC: 14 Hb 14  INR 1.3 Lactic 2.5 to 1.0 BUN/Cr 42/1.24 Tbili  1.3 Alk Phos 318 ALT//131    COVID-19 neg    RUQ US:   Abnormal gallbladder appearance with echogenicity within the gallbladder   lumen most likely related to sludge and gallstones. Gallbladder wall   thickening noted measuring up to 9 mm. There is a pericholecystic fluid   collection identified measuring 5.1 x 4.3 x 1.3 cm. Correlate for   cholecystitis.    CT abdomen/pelvis: concern for gangrenous cholecystitis with perforation. Pericholecystic fluid. Distal small bowel obstruction. Possibly suggestive of gallstone ileus however no gallstone is visualized  with certainty. Consider gangrenous cholecystitis as well.    Musa balloon distended within the prostatic urethra. Recommend   repositioning.    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    Nonobstructed colon in the left inguinal hernia, does NOT appear to be   related to the bowel obstruction.    I reviewed the CT imaging with our in house radiologist, Dr. Ceja, as well as Dr. Lisa who both felt that at the very least there was likely gangrenous gall bladder with perforation. However, neither definitively could identify a gallstone in the small bowel.      HPI:    This is a 89M who has a medical history significant for recent TIA (Nov, 2022, no residual deficits), recent fall c/b hip fracture in the past year, requiring hip replacement, who is still currently in rehab, who presented to the hospital with RUQ pain which radiates to the back. This has been going on for days with subjective fevers, chills, sweats, nausea, vomiting, and diarrhea. He notes having similar pains like this in the past.     I obtained history from both the patient and his daughter who states that while he used to live independently, he has been in rehab since the hip replacement and has not been very ambulatory since then. He is DNR at baseline, but has no diagnosed dementia or other condition to prevent him from making his own decisions. He had prior colon cancer with colonic resection a few years back which he tolerated well. No other abdominal surgeries or chronic abdominal issues. He used lovenox in the periop period for DVT ppx, but never was diagnosed with a DVT or PE. No history of MI or angina. Despite his TIA in November he never had any cardiac workup, of stroke work up such as a carotid ultrasound or evaluation for syncope / Afib.     Of note, his EKG here was concerning for Afib. Thus, cardiology was consulted for evaluation / preop optimization.     PMH/PSH:   Colon cancer s/p colonic resection at [facility], TIA (November, 2022, no residual deficits), Hip fx s/p hip replacement  NKDA  Fam hx: father who had ETOHism  Social: hx of smoker, no ETOH or drug use    Home Medications:   * Patient Currently Takes Medications as of 23-Nov-2022 13:49 documented in Structured Notes  · 	senna leaf extract oral tablet: 2 tab(s) orally once a day (at bedtime)  · 	aspirin 81 mg oral delayed release tablet: 1 tab(s) orally once a day  · 	atorvastatin 80 mg oral tablet: 1 tab(s) orally once a day (at bedtime)  · 	Milk of Magnesia 8% oral suspension:   · 	finasteride 5 mg oral tablet: 1 tab(s) orally once a day  · 	omeprazole 40 mg oral delayed release capsule: 1 cap(s) orally once a day  · 	tamsulosin 0.4 mg oral capsule: 1 cap(s) orally once a day  · 	Feosol 325 mg (65 mg elemental iron) oral tablet: 1 tab(s) orally 2 times a day  · 	Tylenol 325 mg oral tablet: 3 tab(s) orally every 8 hours, As Needed  oxyCODONE 5 mg oral tablet: 1 tab(s) orally every 4 hours, As Needed    ROS: A 10 point ROS was performed for which the pertinent positives and negatives were noted in the HPI. All others were reviewed and were negative.    Vitals:  T 97.1 F HR 71 /68 RR 17 O2 sat 95%  General appearance: No acute distress, but had emesis over his gown.   HEENT: Normocephalic, atraumatic  Neck: Trachea midline  Respiratory: Non-labored breathing  Abdomen: soft, tender to palpation in the right hemiabdomen, + scott's sign, reducible left inguinal hernia, non-tender, no overlying skin erythema or skin changes  Neuro: No focal deficits appreciated. AxO x3  Psych: Calm affect, becomes mildly agitated at times, but consolable    Labs:  WBC: 14 Hb 14  INR 1.3 Lactic 2.5 to 1.0 BUN/Cr 42/1.24 Tbili  1.3 Alk Phos 318 ALT//131    COVID-19 neg    RUQ US:   Abnormal gallbladder appearance with echogenicity within the gallbladder   lumen most likely related to sludge and gallstones. Gallbladder wall   thickening noted measuring up to 9 mm. There is a pericholecystic fluid   collection identified measuring 5.1 x 4.3 x 1.3 cm. Correlate for   cholecystitis.    CT abdomen/pelvis: concern for gangrenous cholecystitis with perforation. Pericholecystic fluid. Distal small bowel obstruction. Possibly suggestive of gallstone ileus however no gallstone is visualized  with certainty. Consider gangrenous cholecystitis as well.    Musa balloon distended within the prostatic urethra. Recommend   repositioning.    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    Nonobstructed colon in the left inguinal hernia, does NOT appear to be   related to the bowel obstruction.    I reviewed the CT imaging with our in house radiologist, Dr. Ceja, as well as Dr. Lisa who both felt that at the very least there was likely gangrenous gall bladder with perforation. However, neither definitively could identify a gallstone in the small bowel.      HPI:    This is a 89M who has a medical history significant for recent TIA (Nov, 2022, no residual deficits), recent fall c/b hip fracture in the past year, requiring hip replacement, who is still currently in rehab, who presented to the hospital with RUQ pain which radiates to the back. This has been going on for days with subjective fevers, chills, sweats, nausea, vomiting, and diarrhea. He notes having similar pains like this in the past.     I obtained history from both the patient and his daughter who states that while he used to live independently, he has been in rehab since the hip replacement and has not been very ambulatory since then. He is DNR at baseline, but has no diagnosed dementia or other condition to prevent him from making his own decisions. He had prior colon cancer with colonic resection a few years back which he tolerated well. No other abdominal surgeries or chronic abdominal issues. He used lovenox in the periop period for DVT ppx, but never was diagnosed with a DVT or PE. No history of MI or angina. Despite his TIA in November he never had any cardiac workup, of stroke work up such as a carotid ultrasound or evaluation for syncope / Afib.     Of note, his EKG here was concerning for Afib. Thus, cardiology was consulted for evaluation / preop optimization.     PMH/PSH:   Colon cancer s/p colonic resection at [facility], TIA (November, 2022, no residual deficits), Hip fx s/p hip replacement  NKDA  Fam hx: father who had ETOHism  Social: hx of smoker, no ETOH or drug use    Home Medications:   * Patient Currently Takes Medications as of 23-Nov-2022 13:49 documented in Structured Notes  · 	senna leaf extract oral tablet: 2 tab(s) orally once a day (at bedtime)  · 	aspirin 81 mg oral delayed release tablet: 1 tab(s) orally once a day  · 	atorvastatin 80 mg oral tablet: 1 tab(s) orally once a day (at bedtime)  · 	Milk of Magnesia 8% oral suspension:   · 	finasteride 5 mg oral tablet: 1 tab(s) orally once a day  · 	omeprazole 40 mg oral delayed release capsule: 1 cap(s) orally once a day  · 	tamsulosin 0.4 mg oral capsule: 1 cap(s) orally once a day  · 	Feosol 325 mg (65 mg elemental iron) oral tablet: 1 tab(s) orally 2 times a day  · 	Tylenol 325 mg oral tablet: 3 tab(s) orally every 8 hours, As Needed  oxyCODONE 5 mg oral tablet: 1 tab(s) orally every 4 hours, As Needed    ROS: A 10 point ROS was performed for which the pertinent positives and negatives were noted in the HPI. All others were reviewed and were negative.    Vitals:  T 97.1 F HR 71 /68 RR 17 O2 sat 95%  General appearance: No acute distress, but had emesis over his gown.   HEENT: Normocephalic, atraumatic  Neck: Trachea midline  Respiratory: Non-labored breathing  Abdomen: soft, tender to palpation in the right hemiabdomen, + scott's sign, reducible left inguinal hernia, non-tender, no overlying skin erythema or skin changes  Neuro: No focal deficits appreciated. AxO x3  Psych: Calm affect, becomes mildly agitated at times, but consolable    Labs:  WBC: 14 Hb 14  INR 1.3 Lactic 2.5 to 1.0 BUN/Cr 42/1.24 Tbili  1.3 Alk Phos 318 ALT//131    COVID-19 neg    RUQ US:   Abnormal gallbladder appearance with echogenicity within the gallbladder   lumen most likely related to sludge and gallstones. Gallbladder wall   thickening noted measuring up to 9 mm. There is a pericholecystic fluid   collection identified measuring 5.1 x 4.3 x 1.3 cm. Correlate for   cholecystitis.    CT abdomen/pelvis: concern for gangrenous cholecystitis with perforation. Pericholecystic fluid. Distal small bowel obstruction. Possibly suggestive of gallstone ileus however no gallstone is visualized  with certainty. Consider gangrenous cholecystitis as well.    Musa balloon distended within the prostatic urethra. Recommend   repositioning.    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    Nonobstructed colon in the left inguinal hernia, does NOT appear to be   related to the bowel obstruction.    I reviewed the CT imaging with our in house radiologist, Dr. Ceja, as well as Dr. Lisa who both felt that at the very least there was likely gangrenous gall bladder with perforation. However, neither definitively could identify a gallstone in the small bowel.      HPI:    This is a 89M who has a medical history significant for recent TIA (Nov, 2022, no residual deficits), recent fall c/b hip fracture in the past year, requiring hip replacement, who is still currently in rehab, who presented to the hospital with RUQ pain which radiates to the back. This has been going on for days with subjective fevers, chills, sweats, nausea, vomiting, and diarrhea. He notes having similar pains like this in the past.     I obtained history from both the patient and his daughter who states that while he used to live independently, he has been in rehab since the hip replacement and has not been very ambulatory since then. He is DNR at baseline, but has no diagnosed dementia or other condition to prevent him from making his own decisions. He had prior colon cancer with colonic resection a few years back which he tolerated well. No other abdominal surgeries or chronic abdominal issues. He used lovenox in the periop period for DVT ppx, but never was diagnosed with a DVT or PE. No history of MI or angina. Despite his TIA in November he never had any cardiac workup, of stroke work up such as a carotid ultrasound or evaluation for syncope / Afib.     Of note, his EKG here was concerning for Afib. Thus, cardiology was consulted for evaluation / preop optimization.     PMH/PSH:   Colon cancer s/p colonic resection at [facility], TIA (November, 2022, no residual deficits), Hip fx s/p hip replacement  NKDA  Fam hx: father who had ETOHism  Social: hx of smoker, no ETOH or drug use    Home Medications:   * Patient Currently Takes Medications as of 23-Nov-2022 13:49 documented in Structured Notes  · 	senna leaf extract oral tablet: 2 tab(s) orally once a day (at bedtime)  · 	aspirin 81 mg oral delayed release tablet: 1 tab(s) orally once a day  · 	atorvastatin 80 mg oral tablet: 1 tab(s) orally once a day (at bedtime)  · 	Milk of Magnesia 8% oral suspension:   · 	finasteride 5 mg oral tablet: 1 tab(s) orally once a day  · 	omeprazole 40 mg oral delayed release capsule: 1 cap(s) orally once a day  · 	tamsulosin 0.4 mg oral capsule: 1 cap(s) orally once a day  · 	Feosol 325 mg (65 mg elemental iron) oral tablet: 1 tab(s) orally 2 times a day  · 	Tylenol 325 mg oral tablet: 3 tab(s) orally every 8 hours, As Needed  oxyCODONE 5 mg oral tablet: 1 tab(s) orally every 4 hours, As Needed    ROS: A 10 point ROS was performed for which the pertinent positives and negatives were noted in the HPI. All others were reviewed and were negative.    Vitals:  T 97.1 F HR 71 /68 RR 17 O2 sat 95%  General appearance: No acute distress, but had emesis over his gown.   HEENT: Normocephalic, atraumatic  Neck: Trachea midline  Respiratory: Non-labored breathing  Abdomen: soft, tender to palpation in the right hemiabdomen, + scott's sign, reducible left inguinal hernia, non-tender, no overlying skin erythema or skin changes  Neuro: No focal deficits appreciated. AxO x3  Psych: Calm affect, becomes mildly agitated at times, but consolable    Labs:  WBC: 14 Hb 14  INR 1.3 Lactic 2.5 to 1.0 BUN/Cr 42/1.24 Tbili  1.3 Alk Phos 318 ALT//131    COVID-19 neg    RUQ US:   Abnormal gallbladder appearance with echogenicity within the gallbladder   lumen most likely related to sludge and gallstones. Gallbladder wall   thickening noted measuring up to 9 mm. There is a pericholecystic fluid   collection identified measuring 5.1 x 4.3 x 1.3 cm. Correlate for   cholecystitis.    CT abdomen/pelvis: concern for gangrenous cholecystitis with perforation. Pericholecystic fluid. Distal small bowel obstruction. Possibly suggestive of gallstone ileus however no gallstone is visualized  with certainty. Consider gangrenous cholecystitis as well.    Musa balloon distended within the prostatic urethra. Recommend   repositioning.    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    Nonobstructed colon in the left inguinal hernia, does NOT appear to be   related to the bowel obstruction.    I reviewed the CT imaging with our in house radiologist, Dr. Ceja, as well as Dr. Lisa who both felt that at the very least there was likely gangrenous gall bladder with perforation. However, neither definitively could identify a gallstone in the small bowel.

## 2023-01-30 NOTE — ED ADULT TRIAGE NOTE - NS ED NURSE AMBULANCES
[facility]

## 2023-01-30 NOTE — ED PROVIDER NOTE - CLINICAL SUMMARY MEDICAL DECISION MAKING FREE TEXT BOX
pt with upper abd pain with labs c/w GB or liver as likely etiology of pain pt with upper abd pain with labs c/w GB or liver as likely etiology of pain, US shows acute kellee, case d/w surgery, admit.

## 2023-01-30 NOTE — BRIEF OPERATIVE NOTE - ASSISTANT(S)
[facility]

## 2023-01-30 NOTE — ED ADULT NURSE NOTE - NSICDXPASTMEDICALHX_GEN_ALL_CORE_FT
PAST MEDICAL HISTORY:  Femur neck fracture     GERD (gastroesophageal reflux disease)     History of BPH     History of glaucoma     Hyperlipidemia     Iron deficiency anemia     Malignant neoplasm of colon

## 2023-01-30 NOTE — PROGRESS NOTE ADULT - SUBJECTIVE AND OBJECTIVE BOX
F/U Note:    89y Male admitted POD #0 from [facility]        Vital Signs Last 24 Hrs  T(C): 36.3 (30 Jan 2023 23:35), Max: 36.7 (30 Jan 2023 18:52)  T(F): 97.3 (30 Jan 2023 23:35), Max: 98 (30 Jan 2023 18:52)  HR: 98 (30 Jan 2023 23:20) (68 - 110)  BP: 110/55 (30 Jan 2023 23:20) (91/73 - 122/69)  BP(mean): 77 (30 Jan 2023 23:20) (77 - 77)  RR: 24 (30 Jan 2023 23:20) (16 - 26)  SpO2: 94% (30 Jan 2023 23:20) (93% - 95%)    Parameters below as of 30 Jan 2023 22:45  Patient On (Oxygen Delivery Method): nasal cannula  O2 Flow (L/min): 3                              14.7   14.24 )-----------( 212      ( 30 Jan 2023 12:27 )             45.5         01-30    139  |  97  |  42<H>  ----------------------------<  154<H>  3.9   |  31  |  1.24    Ca    10.2      30 Jan 2023 12:27    TPro  8.4<H>  /  Alb  2.7<L>  /  TBili  1.4<H>  /  DBili  x   /  AST  131<H>  /  ALT  159<H>  /  AlkPhos  318<H>  01-30        NEURO: no headaches, blurry vision, tremors, depression, anxity  CV: no chest pain, palpitations, murmurs, orthopnea  Resp: no shortness of breath, cough, wheeze, sputum production  GI: no stomach pain,nausea, vomitting, flatulence, hematemesis, hematochezia  PV: no swelling of extremities, no hair loss, no coolness to extremities  ENDO: no polydypsia, polyphagia, polyuria, weight loss, night sweats          NEURO: awake and alert  CV: (+) S1/S2, rrr, no mrg  RESP: CTA b/l  GI: soft, non tender

## 2023-01-30 NOTE — ED PROVIDER NOTE - OBJECTIVE STATEMENT
Patient sent from [facility] for evaluation.  Patient reports upper abdominal pain and right flank pain for a few days.  Labs done at the facility show elevated liver enzymes as well as an elevated white count.  Patient also reports several episodes of vomiting but denies any fever.  Patient is relatively poor historian with poor memory, unreliable historian. Patient sent from [facility] for evaluation.  Patient reports upper abdominal pain and right flank pain for a few days.  Labs done at the facility show elevated liver enzymes as well as an elevated white count.  Patient also reports several episodes of vomiting but denies any fever.  Patient is relatively poor historian with poor memory, unreliable historian. Patient sent from [facility] for evaluation.  Patient reports upper abdominal pain and right flank pain for a few days.  Labs done at the facility show elevated liver enzymes as well as an elevated white count.  Patient also reports several episodes of vomiting but denies any fever.  Patient is relatively poor historian with poor memory, unreliable historian.

## 2023-01-30 NOTE — H&P ADULT - NSHPPHYSICALEXAM_GEN_ALL_CORE
Wt(kg): --Vital Signs Last 24 Hrs  T(C): 36.2 (30 Jan 2023 11:27), Max: 36.2 (30 Jan 2023 11:27)  T(F): 97.1 (30 Jan 2023 11:27), Max: 97.1 (30 Jan 2023 11:27)  HR: 68 (30 Jan 2023 14:34) (68 - 71)  BP: 114/71 (30 Jan 2023 14:34) (108/68 - 114/71)  BP(mean): --  RR: 16 (30 Jan 2023 14:34) (16 - 17)  SpO2: 95% (30 Jan 2023 14:34) (95% - 95%)    Parameters below as of 30 Jan 2023 14:34  Patient On (Oxygen Delivery Method): room air    PHYSICAL EXAM:  GENERAL: NAD, well-groomed, well-developed  NERVOUS SYSTEM:  Alert & Oriented X2, Good concentration; Motor Strength 5/5 B/L upper and lower extremities; DTRs 2+ intact and symmetric  HEAD:  Atraumatic, Normocephalic  EYES: EOMI, PERRLA, conjunctiva and sclera clear  ENMT: No tonsillar erythema, exudates, or enlargement; Moist mucous membranes, Good dentition, No lesions  NECK: Supple, No JVD, Normal thyroid  CHEST/LUNG: Clear to percussion bilaterally; No rales, rhonchi, wheezing, or rubs  HEART: IRR. normal rate  ABDOMEN: Soft, +ttp, Nondistended; Bowel sounds present  : +felipe with dark yellow urine  EXTREMITIES:  2+ Peripheral Pulses, No clubbing, cyanosis, or edema  LYMPH: No lymphadenopathy noted  SKIN: No rashes or lesions

## 2023-01-30 NOTE — CONSULT NOTE ADULT - ASSESSMENT
This is a 89M who has a medical history significant for recent TIA (Nov, 2022, no residual deficits), recent fall c/b hip fracture in the past year, requiring hip replacement, who is still currently in rehab who is here with likely gangrenous cholecystitis, with possible perforation. Radiology findings show possible gallstone ileus, but there is no definitive identification of a gall stone.     I discussed the case extensively with two attending radiologists (Dr. Ceja and Dr. Lisa), as well as Dr. Moody and Dr. Morales (Chair/Co-chair of general surgery).     I discussed extensively the risks and benefits of surgical management, despite Mr. Gaxiola's high medical risk, with Mr. Gaxiola, his daughter, and her . They understand that given the high likelihood for gangrenous cholecystitis, with perforation, surgical treatment would be the best option. Also, given the finding of possible gallstone ileus, I explained I would examine the bowel, and if there was any operative concern for gallstone ileus, he may require bowel resection as well. I also discussed the possibility of ICU admission, inability to come off the ventilator, risk of death, MI, stroke, DVT, PE, PNA,, future abscess formation requiring IR drainage, further operative intervention, hernia formation, possibility for partial gall bladder removal, bile leak and CBD injury, amongst other possibilities. Despite this, they were all in agreement to proceed with operative intervention.    He was made NPO, with IV abx, IVF, and has a type and screen collected in preparation for laparoscopic cholecystectomy,  possible open, possible bowel resection and all indicated procedures. I informed them that Dr. Morales would also be in the surgery to assist. I communicated this with the OR team, anesthesia team and the hospitalist on record, Dr. Balbuena.     Of note, the CT noted that the felipe needed to be re-positioned. ED RN reported repositioning per ED doc's orders.    Also of note, there was an EKG with potential afib noted. Thus, cardiology was consulted who recommended telemetry and EP consultation. They reviewed prior ECHO and said given the acute nature of the procedure, there was no acute cardiac issue to preclude emergent surgery.    Lastly, of note, there was a point in the discussion when the patient was concerned about having [facility] cover the expenses. He was initially adamant that [facility]. I was in the process of coordinating that care. However, Dr. Balbuena informed me that he discussed with the [facility], and given the complicated nature of this issue, they would be covering the entire cost of the stay. The patient and his family agreed to stay here for his operative management after all, given this information.

## 2023-01-30 NOTE — H&P ADULT - NSHPOUTPATIENTPROVIDERS_GEN_ALL_CORE
PCP at [facility] - Dr. Schultz PCP at [facility] - Dr. Schultz PCP at [facility] - Dr. Schultz

## 2023-01-30 NOTE — H&P ADULT - NSHPLABSRESULTS_GEN_ALL_CORE
LABS:                        14.7   14.24 )-----------( 212      ( 2023 12:27 )             45.5         139  |  97  |  42<H>  ----------------------------<  154<H>  3.9   |  31  |  1.24    Ca    10.2      2023 12:27    TPro  8.4<H>  /  Alb  2.7<L>  /  TBili  1.4<H>  /  DBili  x   /  AST  131<H>  /  ALT  159<H>  /  AlkPhos  318<H>      Urinalysis Basic - ( 2023 12:38 )    Color: Yellow / Appearance: Slightly Turbid / S.020 / pH: x  Gluc: x / Ketone: Trace  / Bili: Negative / Urobili: 1   Blood: x / Protein: 100 / Nitrite: Negative   Leuk Esterase: Moderate / RBC: 5-10 /HPF / WBC 11-25 /HPF   Sq Epi: x / Non Sq Epi: Neg.-Few / Bacteria: Moderate /HPF    RADIOLOGY & ADDITIONAL TESTS:    Consultant(s) Notes Reviewed:  [x ] YES  [ ] NO  Care Discussed with Consultants/Other Providers [ x] YES  [ ] NO d/w Dr. Antunez, Dr. Thompson   Imaging Personally Reviewed:  [X] YES  [ ] NO  EKG (personally reviewed by me): A-fib at 119bpm

## 2023-01-30 NOTE — ED PROVIDER NOTE - PHYSICAL EXAMINATION
Gen: alert, NAD  HEENT:  NC/AT, PERR, no exophthalmos  CV:  well perfused, rrr   Pulm:  normal RR, breathing comfortably, CTA b/l  Abd: epigastric and RUQ ttp  MSK: moving all extremities  Neuro:  non-focal  Skin:  visualized areas intact  Psych: AOx3

## 2023-01-30 NOTE — CONSULT NOTE ADULT - SUBJECTIVE AND OBJECTIVE BOX
PRETTY DOLAN  [id number]      HPI:        ALLERGIES:  No Known Allergies      PAST MEDICAL & SURGICAL HISTORY:  History of BPH      Malignant neoplasm of colon      Femur neck fracture      History of glaucoma      GERD (gastroesophageal reflux disease)      Iron deficiency anemia      Hyperlipidemia      History of colon surgery            CURRENT MEDICATIONS:  piperacillin/tazobactam IVPB. 3.375 Gram(s) IV Intermittent once  piperacillin/tazobactam IVPB.- 3.375 Gram(s) IV Intermittent once      SOCIAL HISTORY:      FAMILY HISTORY:  FH: asthma (Mother)        ROS:  All 10 systems reviewed and positives noted in HPI    OBJECTIVE:    VITAL SIGNS:  Vital Signs Last 24 Hrs  T(C): 36.2 (30 Jan 2023 11:27), Max: 36.2 (30 Jan 2023 11:27)  T(F): 97.1 (30 Jan 2023 11:27), Max: 97.1 (30 Jan 2023 11:27)  HR: 68 (30 Jan 2023 14:34) (68 - 71)  BP: 114/71 (30 Jan 2023 14:34) (108/68 - 114/71)  BP(mean): --  RR: 16 (30 Jan 2023 14:34) (16 - 17)  SpO2: 95% (30 Jan 2023 14:34) (95% - 95%)    Parameters below as of 30 Jan 2023 14:34  Patient On (Oxygen Delivery Method): room air        PHYSICAL EXAM:  General: well appearing, no distress  HEENT: sclera anicteric  Neck: supple, no carotid bruits b/l  CVS: JVP ~ 7 cm H20, RRR, s1, s2, no murmurs/rubs/gallops  Chest: unlabored respirations, clear to auscultation b/l  Abdomen: non-distended  Extremities: no lower extremity edema b/l  Neuro: awake, alert & oriented x 3  Psych: normal affect      LABS:                        14.7   14.24 )-----------( 212      ( 30 Jan 2023 12:27 )             45.5     01-30    139  |  97  |  42<H>  ----------------------------<  154<H>  3.9   |  31  |  1.24    Ca    10.2      30 Jan 2023 12:27    TPro  8.4<H>  /  Alb  2.7<L>  /  TBili  1.4<H>  /  DBili  x   /  AST  131<H>  /  ALT  159<H>  /  AlkPhos  318<H>  01-30              ECG:      TTE:     PRETTY DOLAN  [id number]      HPI:        ALLERGIES:  No Known Allergies      PAST MEDICAL & SURGICAL HISTORY:  History of BPH      Malignant neoplasm of colon      Femur neck fracture      History of glaucoma      GERD (gastroesophageal reflux disease)      Iron deficiency anemia      Hyperlipidemia      History of colon surgery            CURRENT MEDICATIONS:  piperacillin/tazobactam IVPB. 3.375 Gram(s) IV Intermittent once  piperacillin/tazobactam IVPB.- 3.375 Gram(s) IV Intermittent once      SOCIAL HISTORY:      FAMILY HISTORY:  FH: asthma (Mother)        ROS:  All 10 systems reviewed and positives noted in HPI    OBJECTIVE:    VITAL SIGNS:  Vital Signs Last 24 Hrs  T(C): 36.2 (30 Jan 2023 11:27), Max: 36.2 (30 Jan 2023 11:27)  T(F): 97.1 (30 Jan 2023 11:27), Max: 97.1 (30 Jan 2023 11:27)  HR: 68 (30 Jan 2023 14:34) (68 - 71)  BP: 114/71 (30 Jan 2023 14:34) (108/68 - 114/71)  BP(mean): --  RR: 16 (30 Jan 2023 14:34) (16 - 17)  SpO2: 95% (30 Jan 2023 14:34) (95% - 95%)    Parameters below as of 30 Jan 2023 14:34  Patient On (Oxygen Delivery Method): room air        PHYSICAL EXAM:  General: well appearing, no distress  HEENT: sclera anicteric  Neck: supple, no carotid bruits b/l  CVS: JVP ~ 7 cm H20, RRR, s1, s2, no murmurs/rubs/gallops  Chest: unlabored respirations, clear to auscultation b/l  Abdomen: non-distended  Extremities: no lower extremity edema b/l  Neuro: awake, alert & oriented x 3  Psych: normal affect      LABS:                        14.7   14.24 )-----------( 212      ( 30 Jan 2023 12:27 )             45.5     01-30    139  |  97  |  42<H>  ----------------------------<  154<H>  3.9   |  31  |  1.24    Ca    10.2      30 Jan 2023 12:27    TPro  8.4<H>  /  Alb  2.7<L>  /  TBili  1.4<H>  /  DBili  x   /  AST  131<H>  /  ALT  159<H>  /  AlkPhos  318<H>  01-30              ECG:      TTE:     PRETTY DOLAN  [id number]      HPI:        ALLERGIES:  No Known Allergies      PAST MEDICAL & SURGICAL HISTORY:  History of BPH      Malignant neoplasm of colon      Femur neck fracture      History of glaucoma      GERD (gastroesophageal reflux disease)      Iron deficiency anemia      Hyperlipidemia      History of colon surgery            CURRENT MEDICATIONS:  piperacillin/tazobactam IVPB. 3.375 Gram(s) IV Intermittent once  piperacillin/tazobactam IVPB.- 3.375 Gram(s) IV Intermittent once      SOCIAL HISTORY:      FAMILY HISTORY:  FH: asthma (Mother)        ROS:  All 10 systems reviewed and positives noted in HPI    OBJECTIVE:    VITAL SIGNS:  Vital Signs Last 24 Hrs  T(C): 36.2 (30 Jan 2023 11:27), Max: 36.2 (30 Jan 2023 11:27)  T(F): 97.1 (30 Jan 2023 11:27), Max: 97.1 (30 Jan 2023 11:27)  HR: 68 (30 Jan 2023 14:34) (68 - 71)  BP: 114/71 (30 Jan 2023 14:34) (108/68 - 114/71)  BP(mean): --  RR: 16 (30 Jan 2023 14:34) (16 - 17)  SpO2: 95% (30 Jan 2023 14:34) (95% - 95%)    Parameters below as of 30 Jan 2023 14:34  Patient On (Oxygen Delivery Method): room air        PHYSICAL EXAM:  General: well appearing, no distress  HEENT: sclera anicteric  Neck: supple, no carotid bruits b/l  CVS: JVP ~ 7 cm H20, RRR, s1, s2, no murmurs/rubs/gallops  Chest: unlabored respirations, clear to auscultation b/l  Abdomen: non-distended  Extremities: no lower extremity edema b/l  Neuro: awake, alert & oriented x 3  Psych: normal affect      LABS:                        14.7   14.24 )-----------( 212      ( 30 Jan 2023 12:27 )             45.5     01-30    139  |  97  |  42<H>  ----------------------------<  154<H>  3.9   |  31  |  1.24    Ca    10.2      30 Jan 2023 12:27    TPro  8.4<H>  /  Alb  2.7<L>  /  TBili  1.4<H>  /  DBili  x   /  AST  131<H>  /  ALT  159<H>  /  AlkPhos  318<H>  01-30              ECG:      TTE:     PRETTY DOLAN  [id number]      HPI:    Pretty Dolan is a 89 year old man with recent hospitalization here in November for TIA, now         ALLERGIES:  No Known Allergies      PAST MEDICAL & SURGICAL HISTORY:  History of BPH  Malignant neoplasm of colon  Femur neck fracture  History of glaucoma  GERD (gastroesophageal reflux disease)  Iron deficiency anemia  Hyperlipidemia  History of colon surgery  TIA      CURRENT MEDICATIONS:  piperacillin/tazobactam IVPB. 3.375 Gram(s) IV Intermittent once  piperacillin/tazobactam IVPB.- 3.375 Gram(s) IV Intermittent once      ROS:  All 10 systems reviewed and positives noted in HPI    OBJECTIVE:    VITAL SIGNS:  Vital Signs Last 24 Hrs  T(C): 36.2 (30 Jan 2023 11:27), Max: 36.2 (30 Jan 2023 11:27)  T(F): 97.1 (30 Jan 2023 11:27), Max: 97.1 (30 Jan 2023 11:27)  HR: 68 (30 Jan 2023 14:34) (68 - 71)  BP: 114/71 (30 Jan 2023 14:34) (108/68 - 114/71)  BP(mean): --  RR: 16 (30 Jan 2023 14:34) (16 - 17)  SpO2: 95% (30 Jan 2023 14:34) (95% - 95%)    Parameters below as of 30 Jan 2023 14:34  Patient On (Oxygen Delivery Method): room air        PHYSICAL EXAM:  General: well appearing, no distress  HEENT: sclera anicteric  Neck: supple, no carotid bruits b/l  CVS: JVP ~ 7 cm H20, RRR, s1, s2, no murmurs/rubs/gallops  Chest: unlabored respirations, clear to auscultation b/l  Abdomen: non-distended  Extremities: no lower extremity edema b/l  Neuro: awake, alert & oriented x 3  Psych: normal affect      LABS:                        14.7   14.24 )-----------( 212      ( 30 Jan 2023 12:27 )             45.5     01-30    139  |  97  |  42<H>  ----------------------------<  154<H>  3.9   |  31  |  1.24    Ca    10.2      30 Jan 2023 12:27    TPro  8.4<H>  /  Alb  2.7<L>  /  TBili  1.4<H>  /  DBili  x   /  AST  131<H>  /  ALT  159<H>  /  AlkPhos  318<H>  01-30        TTE (11/2022):   1. Technically difficult study with poor endocardial visualization.   2. The left ventricle endocardium is not well visualized, appears to   have generally normal systolic function. Abnormal septal motion which may be due to conduction delay. Evaluation of left ventricle regional wall motion is limited, consider use of IV echo contrast to better evaluate endocardium and left ventricle ejection fraction, if clinically indicated.   3. Normal left ventricular internal cavity size.   4. The right ventricle is not well visualized, appears to have normal   systolic function.   5. The left atrium appears generally normal in size.   6. Lipomatous interatrial septum.   7. Mild thickening and calcification of the anterior and posterior   mitral valve leaflets.   8. Mild tricuspid regurgitation.   9. Mild aortic valve leaflet calcification.  10. There is no evidence of pericardial effusion. PRETTY DOLAN  [id number]      HPI:    Pretty Dolan is a 89 year old man with recent hospitalization here in November for TIA, now         ALLERGIES:  No Known Allergies      PAST MEDICAL & SURGICAL HISTORY:  History of BPH  Malignant neoplasm of colon  Femur neck fracture  History of glaucoma  GERD (gastroesophageal reflux disease)  Iron deficiency anemia  Hyperlipidemia  History of colon surgery  TIA      CURRENT MEDICATIONS:  piperacillin/tazobactam IVPB. 3.375 Gram(s) IV Intermittent once  piperacillin/tazobactam IVPB.- 3.375 Gram(s) IV Intermittent once      ROS:  All 10 systems reviewed and positives noted in HPI    OBJECTIVE:    VITAL SIGNS:  Vital Signs Last 24 Hrs  T(C): 36.2 (30 Jan 2023 11:27), Max: 36.2 (30 Jan 2023 11:27)  T(F): 97.1 (30 Jan 2023 11:27), Max: 97.1 (30 Jan 2023 11:27)  HR: 68 (30 Jan 2023 14:34) (68 - 71)  BP: 114/71 (30 Jan 2023 14:34) (108/68 - 114/71)  BP(mean): --  RR: 16 (30 Jan 2023 14:34) (16 - 17)  SpO2: 95% (30 Jan 2023 14:34) (95% - 95%)    Parameters below as of 30 Jan 2023 14:34  Patient On (Oxygen Delivery Method): room air        PHYSICAL EXAM:  General: well appearing, no distress  HEENT: sclera anicteric  Neck: supple, no carotid bruits b/l  CVS: JVP ~ 7 cm H20, RRR, s1, s2, no murmurs/rubs/gallops  Chest: unlabored respirations, clear to auscultation b/l  Abdomen: non-distended  Extremities: no lower extremity edema b/l  Neuro: awake, alert & oriented x 3  Psych: normal affect      LABS:                        14.7   14.24 )-----------( 212      ( 30 Jan 2023 12:27 )             45.5     01-30    139  |  97  |  42<H>  ----------------------------<  154<H>  3.9   |  31  |  1.24    Ca    10.2      30 Jan 2023 12:27    TPro  8.4<H>  /  Alb  2.7<L>  /  TBili  1.4<H>  /  DBili  x   /  AST  131<H>  /  ALT  159<H>  /  AlkPhos  318<H>  01-30        TTE (11/2022):   1. Technically difficult study with poor endocardial visualization.   2. The left ventricle endocardium is not well visualized, appears to   have generally normal systolic function. Abnormal septal motion which may be due to conduction delay. Evaluation of left ventricle regional wall motion is limited, consider use of IV echo contrast to better evaluate endocardium and left ventricle ejection fraction, if clinically indicated.   3. Normal left ventricular internal cavity size.   4. The right ventricle is not well visualized, appears to have normal   systolic function.   5. The left atrium appears generally normal in size.   6. Lipomatous interatrial septum.   7. Mild thickening and calcification of the anterior and posterior   mitral valve leaflets.   8. Mild tricuspid regurgitation.   9. Mild aortic valve leaflet calcification.  10. There is no evidence of pericardial effusion. PRETTY DOLAN  [id number]      HPI:    Pretty Dolan is a 89 year old man with recent hospitalization here in November for TIA, now         ALLERGIES:  No Known Allergies      PAST MEDICAL & SURGICAL HISTORY:  History of BPH  Malignant neoplasm of colon  Femur neck fracture  History of glaucoma  GERD (gastroesophageal reflux disease)  Iron deficiency anemia  Hyperlipidemia  History of colon surgery  TIA      CURRENT MEDICATIONS:  piperacillin/tazobactam IVPB. 3.375 Gram(s) IV Intermittent once  piperacillin/tazobactam IVPB.- 3.375 Gram(s) IV Intermittent once      ROS:  All 10 systems reviewed and positives noted in HPI    OBJECTIVE:    VITAL SIGNS:  Vital Signs Last 24 Hrs  T(C): 36.2 (30 Jan 2023 11:27), Max: 36.2 (30 Jan 2023 11:27)  T(F): 97.1 (30 Jan 2023 11:27), Max: 97.1 (30 Jan 2023 11:27)  HR: 68 (30 Jan 2023 14:34) (68 - 71)  BP: 114/71 (30 Jan 2023 14:34) (108/68 - 114/71)  BP(mean): --  RR: 16 (30 Jan 2023 14:34) (16 - 17)  SpO2: 95% (30 Jan 2023 14:34) (95% - 95%)    Parameters below as of 30 Jan 2023 14:34  Patient On (Oxygen Delivery Method): room air        PHYSICAL EXAM:  General: well appearing, no distress  HEENT: sclera anicteric  Neck: supple, no carotid bruits b/l  CVS: JVP ~ 7 cm H20, RRR, s1, s2, no murmurs/rubs/gallops  Chest: unlabored respirations, clear to auscultation b/l  Abdomen: non-distended  Extremities: no lower extremity edema b/l  Neuro: awake, alert & oriented x 3  Psych: normal affect      LABS:                        14.7   14.24 )-----------( 212      ( 30 Jan 2023 12:27 )             45.5     01-30    139  |  97  |  42<H>  ----------------------------<  154<H>  3.9   |  31  |  1.24    Ca    10.2      30 Jan 2023 12:27    TPro  8.4<H>  /  Alb  2.7<L>  /  TBili  1.4<H>  /  DBili  x   /  AST  131<H>  /  ALT  159<H>  /  AlkPhos  318<H>  01-30        TTE (11/2022):   1. Technically difficult study with poor endocardial visualization.   2. The left ventricle endocardium is not well visualized, appears to   have generally normal systolic function. Abnormal septal motion which may be due to conduction delay. Evaluation of left ventricle regional wall motion is limited, consider use of IV echo contrast to better evaluate endocardium and left ventricle ejection fraction, if clinically indicated.   3. Normal left ventricular internal cavity size.   4. The right ventricle is not well visualized, appears to have normal   systolic function.   5. The left atrium appears generally normal in size.   6. Lipomatous interatrial septum.   7. Mild thickening and calcification of the anterior and posterior   mitral valve leaflets.   8. Mild tricuspid regurgitation.   9. Mild aortic valve leaflet calcification.  10. There is no evidence of pericardial effusion. PRETTY DOLAN  [id number]      HPI:    Pretty Dolan is a 89 year old man with recent hospitalization here in November for TIA, now sent in from [facility] due to abdominal pain, nausea, vomiting and abnormal labs.    Cardiology consulted due to concern for atrial arrhythmia. The patient was evaluated at bedside, daughter present. He denies any recent chest pain, shortness of breath or palpitations. He is currently at [facility] recovering from recent hip fracture from mechanical fall.     ALLERGIES:  No Known Allergies      PAST MEDICAL & SURGICAL HISTORY:  History of BPH  Malignant neoplasm of colon  Femur neck fracture  History of glaucoma  GERD (gastroesophageal reflux disease)  Iron deficiency anemia  Hyperlipidemia  History of colon surgery  TIA      CURRENT MEDICATIONS:  piperacillin/tazobactam IVPB. 3.375 Gram(s) IV Intermittent once  piperacillin/tazobactam IVPB.- 3.375 Gram(s) IV Intermittent once      ROS:  All 10 systems reviewed and positives noted in HPI    OBJECTIVE:    VITAL SIGNS:  Vital Signs Last 24 Hrs  T(C): 36.2 (30 Jan 2023 11:27), Max: 36.2 (30 Jan 2023 11:27)  T(F): 97.1 (30 Jan 2023 11:27), Max: 97.1 (30 Jan 2023 11:27)  HR: 68 (30 Jan 2023 14:34) (68 - 71)  BP: 114/71 (30 Jan 2023 14:34) (108/68 - 114/71)  BP(mean): --  RR: 16 (30 Jan 2023 14:34) (16 - 17)  SpO2: 95% (30 Jan 2023 14:34) (95% - 95%)    Parameters below as of 30 Jan 2023 14:34  Patient On (Oxygen Delivery Method): room air        PHYSICAL EXAM:  General: no distress  HEENT: sclera anicteric  Neck: supple  CVS: JVP ~ 7 cm H20, RRR, s1, s2  Chest: unlabored respirations, clear to auscultation b/l  Extremities: no lower extremity edema b/l  Neuro: awake, alert & oriented x 3  Psych: normal affect      LABS:                        14.7   14.24 )-----------( 212      ( 30 Jan 2023 12:27 )             45.5     01-30    139  |  97  |  42<H>  ----------------------------<  154<H>  3.9   |  31  |  1.24    Ca    10.2      30 Jan 2023 12:27    TPro  8.4<H>  /  Alb  2.7<L>  /  TBili  1.4<H>  /  DBili  x   /  AST  131<H>  /  ALT  159<H>  /  AlkPhos  318<H>  01-30        TTE (11/2022):   1. Technically difficult study with poor endocardial visualization.   2. The left ventricle endocardium is not well visualized, appears to   have generally normal systolic function. Abnormal septal motion which may be due to conduction delay. Evaluation of left ventricle regional wall motion is limited, consider use of IV echo contrast to better evaluate endocardium and left ventricle ejection fraction, if clinically indicated.   3. Normal left ventricular internal cavity size.   4. The right ventricle is not well visualized, appears to have normal   systolic function.   5. The left atrium appears generally normal in size.   6. Lipomatous interatrial septum.   7. Mild thickening and calcification of the anterior and posterior   mitral valve leaflets.   8. Mild tricuspid regurgitation.   9. Mild aortic valve leaflet calcification.  10. There is no evidence of pericardial effusion. PRETTY DOLAN  [id number]      HPI:    Pretty Dolan is a 89 year old man with recent hospitalization here in November for TIA, now sent in from [facility] due to abdominal pain, nausea, vomiting and abnormal labs.    Cardiology consulted due to concern for atrial arrhythmia. The patient was evaluated at bedside, daughter present. He denies any recent chest pain, shortness of breath or palpitations. He is currently at [facility] recovering from recent hip fracture from mechanical fall.     ALLERGIES:  No Known Allergies      PAST MEDICAL & SURGICAL HISTORY:  History of BPH  Malignant neoplasm of colon  Femur neck fracture  History of glaucoma  GERD (gastroesophageal reflux disease)  Iron deficiency anemia  Hyperlipidemia  History of colon surgery  TIA      CURRENT MEDICATIONS:  piperacillin/tazobactam IVPB. 3.375 Gram(s) IV Intermittent once  piperacillin/tazobactam IVPB.- 3.375 Gram(s) IV Intermittent once      ROS:  All 10 systems reviewed and positives noted in HPI    OBJECTIVE:    VITAL SIGNS:  Vital Signs Last 24 Hrs  T(C): 36.2 (30 Jan 2023 11:27), Max: 36.2 (30 Jan 2023 11:27)  T(F): 97.1 (30 Jan 2023 11:27), Max: 97.1 (30 Jan 2023 11:27)  HR: 68 (30 Jan 2023 14:34) (68 - 71)  BP: 114/71 (30 Jan 2023 14:34) (108/68 - 114/71)  BP(mean): --  RR: 16 (30 Jan 2023 14:34) (16 - 17)  SpO2: 95% (30 Jan 2023 14:34) (95% - 95%)    Parameters below as of 30 Jan 2023 14:34  Patient On (Oxygen Delivery Method): room air        PHYSICAL EXAM:  General: no distress  HEENT: sclera anicteric  Neck: supple  CVS: JVP ~ 7 cm H20, RRR, s1, s2  Chest: unlabored respirations, clear to auscultation b/l  Extremities: no lower extremity edema b/l  Neuro: awake, alert & oriented x 3  Psych: normal affect      LABS:                        14.7   14.24 )-----------( 212      ( 30 Jan 2023 12:27 )             45.5     01-30    139  |  97  |  42<H>  ----------------------------<  154<H>  3.9   |  31  |  1.24    Ca    10.2      30 Jan 2023 12:27    TPro  8.4<H>  /  Alb  2.7<L>  /  TBili  1.4<H>  /  DBili  x   /  AST  131<H>  /  ALT  159<H>  /  AlkPhos  318<H>  01-30        TTE (11/2022):   1. Technically difficult study with poor endocardial visualization.   2. The left ventricle endocardium is not well visualized, appears to   have generally normal systolic function. Abnormal septal motion which may be due to conduction delay. Evaluation of left ventricle regional wall motion is limited, consider use of IV echo contrast to better evaluate endocardium and left ventricle ejection fraction, if clinically indicated.   3. Normal left ventricular internal cavity size.   4. The right ventricle is not well visualized, appears to have normal   systolic function.   5. The left atrium appears generally normal in size.   6. Lipomatous interatrial septum.   7. Mild thickening and calcification of the anterior and posterior   mitral valve leaflets.   8. Mild tricuspid regurgitation.   9. Mild aortic valve leaflet calcification.  10. There is no evidence of pericardial effusion. PRETTY DOLAN  [id number]      HPI:    Pretty Dolan is a 89 year old man with recent hospitalization here in November for TIA, now sent in from [facility] due to abdominal pain, nausea, vomiting and abnormal labs.    Cardiology consulted due to concern for atrial arrhythmia. The patient was evaluated at bedside, daughter present. He denies any recent chest pain, shortness of breath or palpitations. He is currently at [facility] recovering from recent hip fracture from mechanical fall.     ALLERGIES:  No Known Allergies      PAST MEDICAL & SURGICAL HISTORY:  History of BPH  Malignant neoplasm of colon  Femur neck fracture  History of glaucoma  GERD (gastroesophageal reflux disease)  Iron deficiency anemia  Hyperlipidemia  History of colon surgery  TIA      CURRENT MEDICATIONS:  piperacillin/tazobactam IVPB. 3.375 Gram(s) IV Intermittent once  piperacillin/tazobactam IVPB.- 3.375 Gram(s) IV Intermittent once      ROS:  All 10 systems reviewed and positives noted in HPI    OBJECTIVE:    VITAL SIGNS:  Vital Signs Last 24 Hrs  T(C): 36.2 (30 Jan 2023 11:27), Max: 36.2 (30 Jan 2023 11:27)  T(F): 97.1 (30 Jan 2023 11:27), Max: 97.1 (30 Jan 2023 11:27)  HR: 68 (30 Jan 2023 14:34) (68 - 71)  BP: 114/71 (30 Jan 2023 14:34) (108/68 - 114/71)  BP(mean): --  RR: 16 (30 Jan 2023 14:34) (16 - 17)  SpO2: 95% (30 Jan 2023 14:34) (95% - 95%)    Parameters below as of 30 Jan 2023 14:34  Patient On (Oxygen Delivery Method): room air        PHYSICAL EXAM:  General: no distress  HEENT: sclera anicteric  Neck: supple  CVS: JVP ~ 7 cm H20, RRR, s1, s2  Chest: unlabored respirations, clear to auscultation b/l  Extremities: no lower extremity edema b/l  Neuro: awake, alert & oriented x 3  Psych: normal affect      LABS:                        14.7   14.24 )-----------( 212      ( 30 Jan 2023 12:27 )             45.5     01-30    139  |  97  |  42<H>  ----------------------------<  154<H>  3.9   |  31  |  1.24    Ca    10.2      30 Jan 2023 12:27    TPro  8.4<H>  /  Alb  2.7<L>  /  TBili  1.4<H>  /  DBili  x   /  AST  131<H>  /  ALT  159<H>  /  AlkPhos  318<H>  01-30        TTE (11/2022):   1. Technically difficult study with poor endocardial visualization.   2. The left ventricle endocardium is not well visualized, appears to   have generally normal systolic function. Abnormal septal motion which may be due to conduction delay. Evaluation of left ventricle regional wall motion is limited, consider use of IV echo contrast to better evaluate endocardium and left ventricle ejection fraction, if clinically indicated.   3. Normal left ventricular internal cavity size.   4. The right ventricle is not well visualized, appears to have normal   systolic function.   5. The left atrium appears generally normal in size.   6. Lipomatous interatrial septum.   7. Mild thickening and calcification of the anterior and posterior   mitral valve leaflets.   8. Mild tricuspid regurgitation.   9. Mild aortic valve leaflet calcification.  10. There is no evidence of pericardial effusion.

## 2023-01-31 LAB
-  BLOOD PCR PANEL: SIGNIFICANT CHANGE UP
ALBUMIN SERPL ELPH-MCNC: 1.8 G/DL — LOW (ref 3.3–5)
ALP SERPL-CCNC: 312 U/L — HIGH (ref 40–120)
ALT FLD-CCNC: 167 U/L — HIGH (ref 10–45)
ANION GAP SERPL CALC-SCNC: 10 MMOL/L — SIGNIFICANT CHANGE UP (ref 5–17)
AST SERPL-CCNC: 226 U/L — HIGH (ref 10–40)
BILIRUB DIRECT SERPL-MCNC: 1 MG/DL — HIGH (ref 0–0.3)
BILIRUB INDIRECT FLD-MCNC: 1.8 MG/DL — HIGH (ref 0.2–1)
BILIRUB SERPL-MCNC: 2.8 MG/DL — HIGH (ref 0.2–1.2)
BUN SERPL-MCNC: 34 MG/DL — HIGH (ref 7–23)
CALCIUM SERPL-MCNC: 8.5 MG/DL — SIGNIFICANT CHANGE UP (ref 8.4–10.5)
CHLORIDE SERPL-SCNC: 107 MMOL/L — SIGNIFICANT CHANGE UP (ref 96–108)
CO2 SERPL-SCNC: 24 MMOL/L — SIGNIFICANT CHANGE UP (ref 22–31)
CREAT SERPL-MCNC: 0.85 MG/DL — SIGNIFICANT CHANGE UP (ref 0.5–1.3)
EGFR: 80 ML/MIN/1.73M2 — SIGNIFICANT CHANGE UP
GLUCOSE SERPL-MCNC: 128 MG/DL — HIGH (ref 70–99)
GRAM STN FLD: SIGNIFICANT CHANGE UP
HCT VFR BLD CALC: 37.9 % — LOW (ref 39–50)
HGB BLD-MCNC: 12 G/DL — LOW (ref 13–17)
MCHC RBC-ENTMCNC: 30.2 PG — SIGNIFICANT CHANGE UP (ref 27–34)
MCHC RBC-ENTMCNC: 31.7 GM/DL — LOW (ref 32–36)
MCV RBC AUTO: 95.5 FL — SIGNIFICANT CHANGE UP (ref 80–100)
METHOD TYPE: SIGNIFICANT CHANGE UP
NRBC # BLD: 0 /100 WBCS — SIGNIFICANT CHANGE UP (ref 0–0)
PLATELET # BLD AUTO: 168 K/UL — SIGNIFICANT CHANGE UP (ref 150–400)
POTASSIUM SERPL-MCNC: 4.8 MMOL/L — SIGNIFICANT CHANGE UP (ref 3.5–5.3)
POTASSIUM SERPL-SCNC: 4.8 MMOL/L — SIGNIFICANT CHANGE UP (ref 3.5–5.3)
PROT SERPL-MCNC: 6.2 G/DL — SIGNIFICANT CHANGE UP (ref 6–8.3)
RBC # BLD: 3.97 M/UL — LOW (ref 4.2–5.8)
RBC # FLD: 14.9 % — HIGH (ref 10.3–14.5)
SODIUM SERPL-SCNC: 141 MMOL/L — SIGNIFICANT CHANGE UP (ref 135–145)
WBC # BLD: 6.32 K/UL — SIGNIFICANT CHANGE UP (ref 3.8–10.5)
WBC # FLD AUTO: 6.32 K/UL — SIGNIFICANT CHANGE UP (ref 3.8–10.5)

## 2023-01-31 PROCEDURE — 99233 SBSQ HOSP IP/OBS HIGH 50: CPT

## 2023-01-31 PROCEDURE — 99232 SBSQ HOSP IP/OBS MODERATE 35: CPT | Mod: 24

## 2023-01-31 PROCEDURE — 99232 SBSQ HOSP IP/OBS MODERATE 35: CPT

## 2023-01-31 RX ORDER — OXYCODONE HYDROCHLORIDE 5 MG/1
5 TABLET ORAL EVERY 6 HOURS
Refills: 0 | Status: DISCONTINUED | OUTPATIENT
Start: 2023-01-31 | End: 2023-01-31

## 2023-01-31 RX ORDER — SODIUM CHLORIDE 9 MG/ML
1000 INJECTION, SOLUTION INTRAVENOUS
Refills: 0 | Status: DISCONTINUED | OUTPATIENT
Start: 2023-01-31 | End: 2023-02-02

## 2023-01-31 RX ADMIN — SODIUM CHLORIDE 75 MILLILITER(S): 9 INJECTION, SOLUTION INTRAVENOUS at 21:46

## 2023-01-31 RX ADMIN — PIPERACILLIN AND TAZOBACTAM 25 GRAM(S): 4; .5 INJECTION, POWDER, LYOPHILIZED, FOR SOLUTION INTRAVENOUS at 00:24

## 2023-01-31 RX ADMIN — PIPERACILLIN AND TAZOBACTAM 25 GRAM(S): 4; .5 INJECTION, POWDER, LYOPHILIZED, FOR SOLUTION INTRAVENOUS at 21:46

## 2023-01-31 RX ADMIN — PIPERACILLIN AND TAZOBACTAM 25 GRAM(S): 4; .5 INJECTION, POWDER, LYOPHILIZED, FOR SOLUTION INTRAVENOUS at 17:44

## 2023-01-31 RX ADMIN — PIPERACILLIN AND TAZOBACTAM 25 GRAM(S): 4; .5 INJECTION, POWDER, LYOPHILIZED, FOR SOLUTION INTRAVENOUS at 09:09

## 2023-01-31 RX ADMIN — FINASTERIDE 5 MILLIGRAM(S): 5 TABLET, FILM COATED ORAL at 13:24

## 2023-01-31 RX ADMIN — TAMSULOSIN HYDROCHLORIDE 0.4 MILLIGRAM(S): 0.4 CAPSULE ORAL at 21:46

## 2023-01-31 NOTE — PROGRESS NOTE ADULT - ASSESSMENT
89y year old Male with PMH of recent L hip fx (11/2022), TIA (11/2022) who presents to the ER from [facility] for abdominal pain, nausea for 4 days. Admitted for acute cholecystitis    #Acute cholecystitis c/b perforated gallbladder  #Transaminitis, Hyperbilirubinemia  #Elevated lactate due to above  - s/p lap kellee with Dr Thompson on 1/30, POD 1  - NGT placed after surgery, patient removed it overnight, will f/u with surgery today   - Continue NPO for now  - Remove felipe??  - f/u blood cultures  - Continue zosyn  - DVT ppx if Hb stable  - Surgery following       New Atrial Fibrillation  - tele monitor  - will obtain limited TTE, patient had TTE 2 months ago  - Cardiology Dr. Acuna to eval    Acute Kidney Injury  - IVF  - Monitor BMP  - Avoid nephrotoxic medications     Urinary retention  - continue with chronic felipe    Hx of TIA  - c/w ASA, statin    DVT Prophylaxis:  - IPCDs for now     Updated daughter at bedside. Care coordinated with Surgeon, Dr. Thompson     89y year old Male with PMH of recent L hip fx (11/2022), TIA (11/2022) who presents to the ER from [facility] for abdominal pain, nausea for 4 days. Admitted for acute cholecystitis    #Acute cholecystitis c/b perforated gallbladder  #Transaminitis, Hyperbilirubinemia  #Elevated lactate due to above  - s/p lap kellee with Dr Thompson on 1/30, POD 1  - NGT placed after surgery, patient removed it overnight, will f/u with surgery today   - Continue NPO for now  - Remove felipe??  - f/u blood cultures  - Continue zosyn  - DVT ppx if Hb stable  - Surgery following       New Atrial Fibrillation  - tele monitor  - will obtain limited TTE, patient had TTE 2 months ago  - Cardiology Dr. Acuna to eval    Acute Kidney Injury  - IVF  - Monitor BMP  - Avoid nephrotoxic medications     Urinary retention  - continue with chronic felipe    Hx of TIA  - c/w ASA, statin    DVT Prophylaxis:  - IPCDs for now     Updated daughter at bedside. Care coordinated with Surgeon, Dr. Thompson     89y year old Male with PMH of recent L hip fx (11/2022), TIA (11/2022) [facility] for [facility] days. Admitted for acute cholecystitis    #Acute cholecystitis c/b perforated gallbladder  #Transaminitis, Hyperbilirubinemia  #Elevated lactate due to above  - s/p lap kellee with Dr Thompson on 1/30, POD 1  - NGT placed after surgery, patient removed it overnight, will f/u with surgery today   - Continue NPO for now  - Remove felipe??  - f/u blood cultures  - Continue zosyn  - DVT ppx if Hb stable  - Surgery following       New Atrial Fibrillation  - tele monitor  - will obtain limited TTE, patient had TTE 2 months ago  - Cardiology Dr. Acuna to eval    Acute Kidney Injury  - IVF  - Monitor BMP  - Avoid nephrotoxic medications     Urinary retention  - continue with chronic felipe    Hx of TIA  - c/w ASA, statin    DVT Prophylaxis:  - IPCDs for now     Updated daughter at bedside. Care coordinated with Surgeon, Dr. Thompson     89y year old Male with PMH of recent L hip fx (11/2022), TIA (11/2022) who presents to the ER from [facility] for abdominal pain, nausea for 4 days. Admitted for acute cholecystitis    #Acute cholecystitis c/b perforated gallbladder  #Transaminitis, Hyperbilirubinemia  #Elevated lactate due to above  - s/p lap kellee with Dr Thompson on 1/30, POD 1  - NGT placed after surgery, patient removed it overnight, will f/u with surgery today   - Continue NPO for now  - f/u blood cultures  - Continue zosyn  - DVT ppx if Hb stable  - Surgery following     New Atrial Fibrillation  -- ??? A FIb???   - tele monitor  - will obtain limited TTE, patient had TTE 2 months ago  - Cardiology Dr. Acuna to eval    Acute Kidney Injury  - IVF  - Monitor BMP  - Avoid nephrotoxic medications     Urinary retention  - continue with chronic felipe    #Hx of TIA  - c/w statin  - resume aspirin??    #DVT Prophylaxis  - IPCDs for now     Updated daughter at bedside. Care coordinated with Surgeon, Dr. Thompson     89y year old Male with PMH of recent L hip fx (11/2022), TIA (11/2022) who presents to the ER from [facility] for abdominal pain, nausea for 4 days. Admitted for acute cholecystitis    #Acute cholecystitis c/b perforated gallbladder  #Transaminitis, Hyperbilirubinemia  #Elevated lactate due to above  - s/p lap kellee with Dr Thompson on 1/30, POD 1  - NGT placed after surgery, patient removed it overnight, will f/u with surgery today   - Continue NPO for now  - f/u blood cultures  - Continue zosyn  - DVT ppx if Hb stable  - Surgery following     New Atrial Fibrillation  -- ??? A FIb???   - tele monitor  - will obtain limited TTE, patient had TTE 2 months ago  - Cardiology Dr. Acuna to eval    Acute Kidney Injury  - IVF  - Monitor BMP  - Avoid nephrotoxic medications     Urinary retention  - continue with chronic felipe    #Hx of TIA  - c/w statin  - resume aspirin??    #DVT Prophylaxis  - IPCDs for now     Updated daughter at bedside. Care coordinated with Surgeon, Dr. Thompson     89y year old Male with PMH of recent L hip fx (11/2022), TIA (11/2022) who presents to the ER from [facility] for abdominal pain, nausea for 4 days. Admitted for acute cholecystitis    #Acute cholecystitis c/b perforated gallbladder  #Transaminitis, Hyperbilirubinemia  #Elevated lactate due to above  - s/p lap kellee with Dr Thompson on 1/30, POD 1  - NGT placed after surgery, patient removed it overnight, will f/u with surgery today   - Continue NPO for now  - f/u blood cultures  - Continue zosyn  - DVT ppx if Hb stable  - Surgery following     New Atrial Fibrillation  -- ??? A FIb???   - tele monitor  - will obtain limited TTE, patient had TTE 2 months ago  - Cardiology Dr. Acuna to eval    Acute Kidney Injury  - IVF  - Monitor BMP  - Avoid nephrotoxic medications     Urinary retention  - continue with chronic felipe    #Hx of TIA  - c/w statin  - resume aspirin??    #DVT Prophylaxis  - IPCDs for now     Updated daughter at bedside. Care coordinated with Surgeon, Dr. Thompson     89y year old Male with PMH of recent L hip fx (11/2022), TIA (11/2022) who presents to the ER from [facility] for abdominal pain, nausea for 4 days. Admitted for acute cholecystitis    #Acute cholecystitis c/b perforated gallbladder  #Transaminitis, Hyperbilirubinemia  #Elevated lactate due to above  - s/p lap kellee with Dr Thompson on 1/30, POD 1  - NGT placed after surgery, patient removed it overnight, will f/u with surgery today   - Continue NPO for now  - f/u blood cultures  - Continue zosyn  - DVT ppx if Hb stable  - Surgery following     New Atrial Fibrillation  -- ??? A FIb???   - tele monitor  - will obtain limited TTE, patient had TTE 2 months ago  - Cardiology Dr. Acuna to eval    #Acute Kidney Injury  - IVF  - Monitor BMP  - Avoid nephrotoxic medications   - f/u AM labs    #Urinary retention  - Continue with chronic felipe    #Hx of TIA  - c/w statin  - resume aspirin??    #DVT Prophylaxis  - IPCDs for now     Shelli [phone]     89y year old Male with PMH of recent L hip fx (11/2022), TIA (11/2022) who presents to the ER from [facility] for abdominal pain, nausea for 4 days. Admitted for acute cholecystitis    #Acute cholecystitis c/b perforated gallbladder  #Transaminitis, Hyperbilirubinemia  #Elevated lactate due to above  - s/p lap kellee with Dr Thompson on 1/30, POD 1  - NGT placed after surgery, patient removed it overnight, will f/u with surgery today   - Continue NPO for now  - f/u blood cultures  - Continue zosyn  - DVT ppx if Hb stable  - Surgery following     New Atrial Fibrillation  -- ??? A FIb???   - tele monitor  - will obtain limited TTE, patient had TTE 2 months ago  - Cardiology Dr. Acuna to eval    #Acute Kidney Injury  - IVF  - Monitor BMP  - Avoid nephrotoxic medications   - f/u AM labs    #Urinary retention  - Continue with chronic felipe    #Hx of TIA  - c/w statin  - resume aspirin??    #DVT Prophylaxis  - IPCDs for now     Shelli [phone]     89y year old Male with PMH of recent L hip fx (11/2022), TIA (11/2022) who presents to the ER from [facility] for abdominal pain, nausea for 4 days. Admitted for acute cholecystitis    #Acute cholecystitis c/b perforated gallbladder  #Transaminitis, Hyperbilirubinemia  #Elevated lactate due to above  - s/p lap kellee with Dr Thompson on 1/30, POD 1  - NGT placed after surgery, patient removed it overnight, will f/u with surgery today   - Continue NPO for now  - f/u blood cultures  - Continue zosyn  - DVT ppx if Hb stable  - Surgery following     New Atrial Fibrillation  -- ??? A FIb???   - tele monitor  - will obtain limited TTE, patient had TTE 2 months ago  - Cardiology Dr. Acuan to eval    #Acute Kidney Injury  - IVF  - Monitor BMP  - Avoid nephrotoxic medications   - f/u AM labs    #Urinary retention  - Continue with chronic felipe    #Hx of TIA  - c/w statin  - resume aspirin??    #DVT Prophylaxis  - IPCDs for now     Shelli [phone]     89y year old Male with PMH of recent L hip fx (11/2022), TIA (11/2022) who presents to the ER from [facility] for abdominal pain, nausea for 4 days. Admitted for acute cholecystitis    #Acute cholecystitis c/b perforated gallbladder  #Transaminitis, Hyperbilirubinemia  #Elevated lactate due to above  - s/p lap kellee with Dr Thompson on 1/30, POD 1  - NGT placed after surgery, patient removed it overnight, d/w surgery, plan to keep NGT out, replace if patient develops n/v  - Continue NPO for now  - f/u blood cultures  - Continue zosyn  - DVT ppx if Hb stable  - Surgery following     New Atrial Fibrillation  -- ??? A FIb???   - tele monitor  - will obtain limited TTE, patient had TTE 2 months ago  - Cardiology Dr. Acuna to eval    #Acute Kidney Injury  - IVF  - Monitor BMP  - Avoid nephrotoxic medications   - f/u AM labs    #Urinary retention  - Continue with chronic felipe    #Hx of TIA  - c/w statin  - resume aspirin??    #DVT Prophylaxis  - IPCDs for now     Shelli [phone]     89y year old Male with PMH of recent L hip fx (11/2022), TIA (11/2022) who presents to the ER from [facility] for abdominal pain, nausea for 4 days. Admitted for acute cholecystitis    #Acute cholecystitis c/b perforated gallbladder  #Transaminitis, Hyperbilirubinemia  #Elevated lactate due to above  - s/p lap kellee with Dr Thompson on 1/30, POD 1  - NGT placed after surgery, patient removed it overnight, d/w surgery, plan to keep NGT out, replace if patient develops n/v  - Continue NPO for now  - f/u blood cultures  - Continue zosyn  - DVT ppx if Hb stable  - Surgery following     New Atrial Fibrillation  -- ??? A FIb???   - tele monitor  - will obtain limited TTE, patient had TTE 2 months ago  - Cardiology Dr. Acuna to eval    #Acute Kidney Injury  - IVF  - Monitor BMP  - Avoid nephrotoxic medications   - f/u AM labs    #Urinary retention  - Continue with chronic felipe    #Hx of TIA  - c/w statin  - resume aspirin??    #DVT Prophylaxis  - IPCDs for now     Shelli [phone]     89y year old Male with PMH of recent L hip fx (11/2022), TIA (11/2022) who presents to the ER from [facility] for abdominal pain, nausea for 4 days. Admitted for acute cholecystitis    #Acute cholecystitis c/b perforated gallbladder  #Transaminitis, Hyperbilirubinemia  #Elevated lactate due to above  - s/p lap kellee with Dr Thompson on 1/30, POD 1  - NGT placed after surgery, patient removed it overnight, d/w surgery, plan to keep NGT out, replace if patient develops n/v  - Continue NPO for now  - f/u blood cultures  - Continue zosyn  - DVT ppx if Hb stable  - Surgery following     New Atrial Fibrillation  -- ??? A FIb???   - tele monitor  - will obtain limited TTE, patient had TTE 2 months ago  - Cardiology Dr. Acuna to eval    #Acute Kidney Injury  - IVF  - Monitor BMP  - Avoid nephrotoxic medications   - f/u AM labs    #Urinary retention  - Continue with chronic felipe    #Hx of TIA  - c/w statin  - resume aspirin??    #DVT Prophylaxis  - IPCDs for now     Shelli [phone]     89y year old Male with PMH of recent L hip fx (11/2022), TIA (11/2022) who presents to the ER from [facility] for abdominal pain, nausea for 4 days. Admitted for acute cholecystitis    #Acute cholecystitis c/b perforated gallbladder  #Transaminitis, Hyperbilirubinemia  #Elevated lactate due to above  - s/p lap kellee with Dr Thompson on 1/30, POD 1  - NGT placed after surgery, patient removed it overnight, d/w surgery, plan to keep NGT out, replace if patient develops n/v  - Continue NPO for now  - IVF  - f/u blood cultures  - Continue zosyn  - Hold off on pharmacologic DVT ppx until cleared by surgery  - Trend LFT's if trending upward, consult GI tomorrow   - Surgery following     #Acute Kidney Injury - improved   - Continue   - Monitor BMP  - Avoid nephrotoxins    #Urinary retention  - Continue with chronic felipe    #Hx of TIA  - c/w statin  - Rsume aspirin??    #DVT Prophylaxis  - IPCDs for now     GOC: DNR/I, RHIANNON in chart    Dispo: Pending above     Shelli [phone]     89y year old Male with PMH of recent L hip fx (11/2022), TIA (11/2022) who presents to the ER from [facility] for abdominal pain, nausea for 4 days. Admitted for acute cholecystitis    #Acute cholecystitis c/b perforated gallbladder  #Transaminitis, Hyperbilirubinemia  #Elevated lactate due to above  - s/p lap kellee with Dr Thompson on 1/30, POD 1  - NGT placed after surgery, patient removed it overnight, d/w surgery, plan to keep NGT out, replace if patient develops n/v  - Continue NPO for now  - IVF  - f/u blood cultures  - Continue zosyn  - Hold off on pharmacologic DVT ppx until cleared by surgery  - Trend LFT's if trending upward, consult GI tomorrow   - Surgery following     #Acute Kidney Injury - improved   - Continue   - Monitor BMP  - Avoid nephrotoxins    #Urinary retention  - Continue with chronic felipe    #Hx of TIA  - c/w statin  - Rsume aspirin??    #DVT Prophylaxis  - IPCDs for now     GOC: DNR/I, RHIANNON in chart    Dispo: Pending above     Shelli [phone]     89y year old Male with PMH of recent L hip fx (11/2022), TIA (11/2022) who presents to the ER from [facility] for abdominal pain, nausea for 4 days. Admitted for acute cholecystitis    #Acute cholecystitis c/b perforated gallbladder  #Transaminitis, Hyperbilirubinemia  #Elevated lactate due to above  - s/p lap kellee with Dr Thompson on 1/30, POD 1  - NGT placed after surgery, patient removed it overnight, d/w surgery, plan to keep NGT out, replace if patient develops n/v  - Continue NPO for now  - IVF  - f/u blood cultures  - Continue zosyn  - Hold off on pharmacologic DVT ppx until cleared by surgery  - Trend LFT's if trending upward, consult GI tomorrow   - Surgery following     #Acute Kidney Injury - improved   - Continue   - Monitor BMP  - Avoid nephrotoxins    #Urinary retention  - Continue with chronic felipe    #Hx of TIA  - c/w statin  - Rsume aspirin??    #DVT Prophylaxis  - IPCDs for now     GOC: DNR/I, RHIANNON in chart    Dispo: Pending above     Shelli [phone]     89y year old Male with PMH of recent L hip fx (11/2022), TIA (11/2022) who presents to the ER from [facility] for abdominal pain, nausea for 4 days. Admitted for acute cholecystitis    #Acute cholecystitis c/b perforated gallbladder  #Transaminitis, Hyperbilirubinemia  #Elevated lactate due to above  - s/p lap kellee with Dr Thompson on 1/30, POD 1  - NGT placed after surgery, patient removed it overnight, d/w surgery, plan to keep NGT out, replace if patient develops n/v  - Continue NPO for now  - IVF  - f/u blood cultures  - Continue zosyn  - Hold off on pharmacologic DVT ppx until cleared by surgery  - Trend LFT's if trending upward, consult GI tomorrow   - Surgery following     #Acute Kidney Injury - improved   - Continue   - Monitor BMP  - Avoid nephrotoxins    #Urinary retention  - Continue with chronic felipe    #Hx of TIA  - c/w statin  - Rsume aspirin??    #DVT Prophylaxis  - IPCDs for now     GOC: DNR/I, RHIANNON in chart    Dispo: Pending above     1/31: Left voicemail with callback # for daughter Shelli at [phone]     89y year old Male with PMH of recent L hip fx (11/2022), TIA (11/2022) who presents to the ER from [facility] for abdominal pain, nausea for 4 days. Admitted for acute cholecystitis    #Acute cholecystitis c/b perforated gallbladder  #Transaminitis, Hyperbilirubinemia  #Elevated lactate due to above  - s/p lap kellee with Dr Thompson on 1/30, POD 1  - NGT placed after surgery, patient removed it overnight, d/w surgery, plan to keep NGT out, replace if patient develops n/v  - Continue NPO for now  - IVF  - f/u blood cultures  - Continue zosyn  - Hold off on pharmacologic DVT ppx until cleared by surgery  - Trend LFT's if trending upward, consult GI tomorrow   - Surgery following     #Acute Kidney Injury - improved   - Continue   - Monitor BMP  - Avoid nephrotoxins    #Urinary retention  - Continue with chronic felipe    #Hx of TIA  - c/w statin  - Rsume aspirin??    #DVT Prophylaxis  - IPCDs for now     GOC: DNR/I, RHIANNON in chart    Dispo: Pending above     1/31: Left voicemail with callback # for daughter Shelli at [phone]     89y year old Male with PMH of recent L hip fx (11/2022), TIA (11/2022) who presents to the ER from [facility] for abdominal pain, nausea for 4 days. Admitted for acute cholecystitis    #Acute cholecystitis c/b perforated gallbladder  #Transaminitis, Hyperbilirubinemia  #Elevated lactate due to above  - s/p lap kellee with Dr Thompson on 1/30, POD 1  - NGT placed after surgery, patient removed it overnight, d/w surgery, plan to keep NGT out, replace if patient develops n/v  - Continue NPO for now  - IVF  - f/u blood cultures  - Continue zosyn  - Hold off on pharmacologic DVT ppx until cleared by surgery  - Trend LFT's if trending upward, consult GI tomorrow   - Surgery following     #Acute Kidney Injury - improved   - Continue   - Monitor BMP  - Avoid nephrotoxins    #Urinary retention  - Continue with chronic felipe    #Hx of TIA  - c/w statin  - Rsume aspirin??    #DVT Prophylaxis  - IPCDs for now     GOC: DNR/I, RHIANNON in chart    Dispo: Pending above     1/31: Left voicemail with callback # for daughter Shelli at [phone]     89y year old Male with PMH of recent L hip fx (11/2022), TIA (11/2022) who presents to the ER from [facility] for abdominal pain, nausea for 4 days. Admitted for acute cholecystitis    #Acute cholecystitis c/b perforated gallbladder  #Transaminitis, Hyperbilirubinemia  #Elevated lactate due to above  - s/p lap kellee with Dr Thompson on 1/30, POD 1  - NGT placed after surgery, patient removed it overnight, d/w surgery, plan to keep NGT out, replace if patient develops n/v  - Continue NPO for now  - IVF  - f/u blood cultures  - Continue zosyn  - Hold off on pharmacologic DVT ppx until cleared by surgery  - Trend LFT's if trending upward, consult GI tomorrow   - Surgery following     #Acute Kidney Injury - improved   - Continue   - Monitor BMP  - Avoid nephrotoxins    #Urinary retention  - Continue with chronic felipe    #Hx of TIA  - c/w statin  - Resume aspirin once cleared by surgery     #DVT Prophylaxis  - IPCDs for now     GOC: DNR/I, RHIANNON in chart    Dispo: Pending above     1/31: Left voicemail with callback # for daughter Shelli at [phone]     89y year old Male with PMH of recent L hip fx (11/2022), TIA (11/2022) who presents to the ER from [facility] for abdominal pain, nausea for 4 days. Admitted for acute cholecystitis    #Acute cholecystitis c/b perforated gallbladder  #Transaminitis, Hyperbilirubinemia  #Elevated lactate due to above  - s/p lap kellee with Dr Thompson on 1/30, POD 1  - NGT placed after surgery, patient removed it overnight, d/w surgery, plan to keep NGT out, replace if patient develops n/v  - Continue NPO for now  - IVF  - f/u blood cultures  - Continue zosyn  - Hold off on pharmacologic DVT ppx until cleared by surgery  - Trend LFT's if trending upward, consult GI tomorrow   - Surgery following     #Acute Kidney Injury - improved   - Continue   - Monitor BMP  - Avoid nephrotoxins    #Urinary retention  - Continue with chronic felipe    #Hx of TIA  - c/w statin  - Resume aspirin once cleared by surgery     #DVT Prophylaxis  - IPCDs for now     GOC: DNR/I, RHIANNON in chart    Dispo: Pending above     1/31: Left voicemail with callback # for daughter Shelli at [phone]     89y year old Male with PMH of recent L hip fx (11/2022), TIA (11/2022) who presents to the ER from [facility] for abdominal pain, nausea for 4 days. Admitted for acute cholecystitis    #Acute cholecystitis c/b perforated gallbladder  #Transaminitis, Hyperbilirubinemia  #Elevated lactate due to above  - s/p lap kellee with Dr Thompson on 1/30, POD 1  - NGT placed after surgery, patient removed it overnight, d/w surgery, plan to keep NGT out, replace if patient develops n/v  - Continue NPO for now  - IVF  - f/u blood cultures  - Continue zosyn  - Hold off on pharmacologic DVT ppx until cleared by surgery  - Trend LFT's if trending upward, consult GI tomorrow   - Surgery following     #Acute Kidney Injury - improved   - Continue   - Monitor BMP  - Avoid nephrotoxins    #Urinary retention  - Continue with chronic felipe    #Hx of TIA  - c/w statin  - Resume aspirin once cleared by surgery     #DVT Prophylaxis  - IPCDs for now     GOC: DNR/I, RHIANNON in chart    Dispo: Pending above     1/31: Left voicemail with callback # for daughter Shelli at [phone]     89y year old Male with PMH of recent L hip fx (11/2022), TIA (11/2022) who presents to the ER from [facility] for abdominal pain, nausea for 4 days. Admitted for acute cholecystitis    #Acute cholecystitis c/b perforated gallbladder  #Transaminitis, Hyperbilirubinemia  #Elevated lactate due to above  - s/p lap kellee with Dr Thompson on 1/30, POD 1  - NGT placed after surgery, patient removed it overnight, d/w surgery, plan to keep NGT out, replace if patient develops n/v  - Continue NPO for now  - IVF  - f/u blood cultures  - Continue zosyn  - Hold off on pharmacologic DVT ppx until cleared by surgery  - Trend LFT's if trending upward, consult GI tomorrow   - Surgery following     #Acute Kidney Injury - improved   - Continue   - Monitor BMP  - Avoid nephrotoxins    #Urinary retention  - Patient with chronic felipe - was placed in November after fall/hip fx, no TOV has been done as per daughter. No hx of urinary retention prior to that event   - Continue felipe for now  - Consider TOV once patient becomes more mobile     #Hx of TIA  - c/w statin  - Resume aspirin once cleared by surgery     #DVT Prophylaxis  - IPCDs for now     GOC: DNR/I, MOLST in chart    Dispo: Pending above     1/31: Updated daughter Shelli at [phone]     89y year old Male with PMH of recent L hip fx (11/2022), TIA (11/2022) who presents to the ER from [facility] for abdominal pain, nausea for 4 days. Admitted for acute cholecystitis    #Acute cholecystitis c/b perforated gallbladder  #Transaminitis, Hyperbilirubinemia  #Elevated lactate due to above  - s/p lap kellee with Dr Thompson on 1/30, POD 1  - NGT placed after surgery, patient removed it overnight, d/w surgery, plan to keep NGT out, replace if patient develops n/v  - Continue NPO for now  - IVF  - f/u blood cultures  - Continue zosyn  - Hold off on pharmacologic DVT ppx until cleared by surgery  - Trend LFT's if trending upward, consult GI tomorrow   - Surgery following     #Acute Kidney Injury - improved   - Continue   - Monitor BMP  - Avoid nephrotoxins    #Urinary retention  - Patient with chronic felipe - was placed in November after fall/hip fx, no TOV has been done as per daughter. No hx of urinary retention prior to that event   - Continue felipe for now  - Consider TOV once patient becomes more mobile     #Hx of TIA  - c/w statin  - Resume aspirin once cleared by surgery     #DVT Prophylaxis  - IPCDs for now     GOC: DNR/I, MOLST in chart    Dispo: Pending above     1/31: Updated daughter Shelli at [phone]     89y year old Male with PMH of recent L hip fx (11/2022), TIA (11/2022) who presents to the ER from [facility] for abdominal pain, nausea for 4 days. Admitted for acute cholecystitis    #Acute cholecystitis c/b perforated gallbladder  #Transaminitis, Hyperbilirubinemia  #Elevated lactate due to above  - s/p lap kellee with Dr Thompson on 1/30, POD 1  - NGT placed after surgery, patient removed it overnight, d/w surgery, plan to keep NGT out, replace if patient develops n/v  - Continue NPO for now  - IVF  - f/u blood cultures  - Continue zosyn  - Hold off on pharmacologic DVT ppx until cleared by surgery  - Trend LFT's if trending upward, consult GI tomorrow   - Surgery following     #Acute Kidney Injury - improved   - Continue   - Monitor BMP  - Avoid nephrotoxins    #Urinary retention  - Patient with chronic felipe - was placed in November after fall/hip fx, no TOV has been done as per daughter. No hx of urinary retention prior to that event   - Continue felipe for now  - Consider TOV once patient becomes more mobile     #Hx of TIA  - c/w statin  - Resume aspirin once cleared by surgery     #DVT Prophylaxis  - IPCDs for now     GOC: DNR/I, MOLST in chart    Dispo: Pending above     1/31: Updated daughter Shelli at [phone]

## 2023-01-31 NOTE — GOALS OF CARE CONVERSATION - ADVANCED CARE PLANNING - CONVERSATION DETAILS
Pt. at [facility] from [facility] DOMINICK with acute choly. He was sent to [facility] from [facility] recently s/p repair of femur neck fx. Pt. has hx. glaucoma, colon ca., BPH. Pt. is A&Ox3. I reviewed MOLST DNR/I from previous admission with patient, he states this is still his GOC. He does not want attempted resuscitation or intubation in the event of cardiac arrest. Pt. at [facility] from [facility] DOMINICK with acute choly. He was sent to [facility] from [facility] recently s/p repair of femur neck fx. Pt. has hx. glaucoma, colon ca., BPH. Pt. is A&Ox3. I reviewed MOLST DNR/I from previous admission with patient, he states this is still his GOC. He does not want attempted resuscitation or intubation in the event of cardiac arrest. Pt. at [facility] from [facility] DOMINICK with acute choly. He was sent to [facility] from [facility] recently s/p repair of femur neck fx. Pt. has hx. glaucoma, colon ca., BPH. Pt. is A&Ox3. I reviewed MOLST DNR/I from previous admission with patient, he states this is still his GOC. He does not want attempted resuscitation or intubation in the event of cardiac arrest.

## 2023-01-31 NOTE — PROGRESS NOTE ADULT - ASSESSMENT
Assessment:  Everton Gaxiola is a 89 year old man with recent hospitalization here in November for TIA, now sent in from [facility] due to abdominal pain, nausea, vomiting and abnormal labs, found to have perforated gallbladder and small bowel obstruction.    ECG is poor baseline, irregular rhythm, occasional sinus rhythm with PACs noted. Recent echo from 11/2022 with normal LV systolic function, no significant valvular disease. Exercise tolerance cannot be assessed due to patient recovering from recent hip fracture, however he denies chest pain, shortness of breath or palpitations. No signs of CHF on exam. No symptoms of an acute coronary syndrome at this time.     Recommendations:  [] The patient is now s/p laparoscopic cholecystectomy, and tolerated the procedure well. No angina or dyspnea. Telemetry consistent with sinus rhythm with frequent PACs, continue to monitor. Repeat ECG today. Will likely need outpatient extended cardiac event monitoring.    We will continue to follow along.     Jn Acuna MD  Cardiology    Assessment:  Everton Gaxiola is a 89 year old man with recent hospitalization here in November for TIA, now sent in from [facility] due to abdominal pain, nausea, vomiting and abnormal labs, found to have perforated gallbladder and small bowel obstruction.    ECG is poor baseline, irregular rhythm, occasional sinus rhythm with PACs noted. Recent echo from 11/2022 with normal LV systolic function, no significant valvular disease. Exercise tolerance cannot be assessed due to patient recovering from recent hip fracture, however he denies chest pain, shortness of breath or palpitations. No signs of CHF on exam. No symptoms of an acute coronary syndrome at this time.     Recommendations:  [] The patient is now s/p laparoscopic cholecystectomy, and tolerated the procedure well. No angina or dyspnea. Telemetry consistent with sinus rhythm with frequent PACs, continue to monitor. Repeat ECG today. Will likely need outpatient extended cardiac event monitoring.    We will continue to follow along.     Jn Acuna MD  Cardiology    Assessment:  Everton Gaxiola is a 89 year old man with recent hospitalization here in November for TIA, now sent in from [facility] due to abdominal pain, nausea, vomiting and abnormal labs, found to have perforated gallbladder and small bowel obstruction.    ECG is poor baseline, irregular rhythm, occasional sinus rhythm with PACs noted. Recent echo from 11/2022 with normal LV systolic function, no significant valvular disease. Exercise tolerance cannot be assessed due to patient recovering from recent hip fracture, however he denies chest pain, shortness of breath or palpitations. No signs of CHF on exam. No symptoms of an acute coronary syndrome at this time.     Recommendations:  [] The patient is now s/p laparoscopic cholecystectomy, and tolerated the procedure well. No angina or dyspnea. Telemetry consistent with sinus rhythm with frequent PACs, continue to monitor. Repeat ECG today. Will likely need outpatient extended cardiac event monitoring.    We will continue to follow along.     Jn Acuna MD  Cardiology

## 2023-01-31 NOTE — PROGRESS NOTE ADULT - SUBJECTIVE AND OBJECTIVE BOX
Patient is a 89y old  Male who presents with a chief complaint of abdominal pain (2023 10:26)    Patient seen and examined at bedside. No overnight events reported. Patient c/o feeling thirsty.     ALLERGIES:  No Known Allergies    MEDICATIONS  (STANDING):  finasteride 5 milliGRAM(s) Oral daily  lactated ringers. 1000 milliLiter(s) (75 mL/Hr) IV Continuous <Continuous>  piperacillin/tazobactam IVPB.. 3.375 Gram(s) IV Intermittent every 8 hours  tamsulosin 0.4 milliGRAM(s) Oral at bedtime    MEDICATIONS  (PRN):  acetaminophen     Tablet .. 650 milliGRAM(s) Oral every 6 hours PRN Temp greater or equal to 38C (100.4F), Mild Pain (1 - 3)  aluminum hydroxide/magnesium hydroxide/simethicone Suspension 30 milliLiter(s) Oral every 4 hours PRN Dyspepsia  HYDROmorphone  Injectable 0.5 milliGRAM(s) IV Push every 3 hours PRN Severe Pain (7 - 10)  melatonin 3 milliGRAM(s) Oral at bedtime PRN Insomnia  ondansetron Injectable 4 milliGRAM(s) IV Push every 8 hours PRN Nausea and/or Vomiting  oxyCODONE    IR 10 milliGRAM(s) Oral every 3 hours PRN Moderate Pain (4 - 6)    Vital Signs Last 24 Hrs  T(F): 97.9 (2023 05:16), Max: 98 (2023 18:52)  HR: 110 (2023 05:16) (68 - 110)  BP: 128/61 (2023 05:16) (91/73 - 128/61)  RR: 16 (2023 05:16) (16 - 26)  SpO2: 98% (2023 05:16) (93% - 98%)    I&O's Summary  2023 07:01  -  2023 07:00  --------------------------------------------------------  IN: 75 mL / OUT: 78 mL / NET: -3 mL    PHYSICAL EXAM:  General: NAD, A/O x 3  ENT: No gross hearing impairment, dry mucous membranes, no thrush  Neck: Supple, No JVD  Lungs: Clear to auscultation bilaterally, good air entry, non-labored breathing  Cardio: tachycardic, S1/S2, No murmur  Abdomen: Soft, Nontender, Nondistended; RUQ drain in place, serosanguinous drainage, Bowel sounds present  Extremities: No calf tenderness, No cyanosis, No pitting edema  Psych: Appropriate mood and affect    LABS:                        12.0   6.32  )-----------( 168      ( 2023 08:15 )             37.9         141  |  107  |  34  ----------------------------<  128  4.8   |  24  |  0.85    Ca    8.5      2023 06:50    TPro  6.2  /  Alb  1.8  /  TBili  2.8  /  DBili  1.0  /  AST  226  /  ALT  167  /  AlkPhos  312        Lipase, Serum: 30 U/L (23 @ 12:27)      PT/INR - ( 2023 15:10 )   PT: 15.2 sec;   INR: 1.31 ratio         PTT - ( 2023 15:10 )  PTT:23.8 sec  Lactate, Blood: 1.0 mmol/L ( @ 17:21)  Lactate, Blood: 2.5 mmol/L ( @ 12:27)     Chol 160 mg/dL LDL -- HDL 44 mg/dL Trig 95 mg/dL      Urinalysis Basic - ( 2023 12:38 )    Color: Yellow / Appearance: Slightly Turbid / S.020 / pH: x  Gluc: x / Ketone: Trace  / Bili: Negative / Urobili: 1   Blood: x / Protein: 100 / Nitrite: Negative   Leuk Esterase: Moderate / RBC: 5-10 /HPF / WBC 11-25 /HPF   Sq Epi: x / Non Sq Epi: Neg.-Few / Bacteria: Moderate /HPF    COVID-19 PCR: NotDetec (23 @ 12:25)    RADIOLOGY & ADDITIONAL TESTS:    Care Discussed with Consultants/Other Providers: Surgical PA

## 2023-01-31 NOTE — PROGRESS NOTE ADULT - SUBJECTIVE AND OBJECTIVE BOX
PRETTY DOLAN  [id number]      Chief Complaint: Perforated gallbladder/History of TIA/SVT    Interval History: The patient reports feeling ok today, denies chest pain or shortness of breath.     Tele: sinus rhythm, frequent PACs      Current meds:   acetaminophen     Tablet .. 650 milliGRAM(s) Oral every 6 hours PRN  aluminum hydroxide/magnesium hydroxide/simethicone Suspension 30 milliLiter(s) Oral every 4 hours PRN  finasteride 5 milliGRAM(s) Oral daily  HYDROmorphone  Injectable 0.5 milliGRAM(s) IV Push every 3 hours PRN  lactated ringers. 1000 milliLiter(s) IV Continuous <Continuous>  melatonin 3 milliGRAM(s) Oral at bedtime PRN  ondansetron Injectable 4 milliGRAM(s) IV Push every 8 hours PRN  oxyCODONE    IR 10 milliGRAM(s) Oral every 3 hours PRN  piperacillin/tazobactam IVPB.. 3.375 Gram(s) IV Intermittent every 8 hours  tamsulosin 0.4 milliGRAM(s) Oral at bedtime      Objective:     Vital Signs:   T(C): 36.6 (01-31-23 @ 05:16), Max: 36.7 (01-30-23 @ 18:52)  HR: 110 (01-31-23 @ 05:16) (68 - 110)  BP: 128/61 (01-31-23 @ 05:16) (91/73 - 128/61)  RR: 16 (01-31-23 @ 05:16) (16 - 26)  SpO2: 98% (01-31-23 @ 05:16) (93% - 98%)  Wt(kg): --    PHYSICAL EXAM:  General: no distress  HEENT: sclera anicteric  Neck: supple  CVS: JVP ~ 7 cm H20, RRR, s1, s2  Chest: unlabored respirations, clear to auscultation b/l  Extremities: no lower extremity edema b/l  Neuro: awake, alert & oriented x 3  Psych: normal affect      Labs:   31 Jan 2023 06:50    141    |  107    |  34     ----------------------------<  128    4.8     |  24     |  0.85     Ca    8.5        31 Jan 2023 06:50    TPro  6.2    /  Alb  1.8    /  TBili  2.8    /  DBili  1.0    /  AST  226    /  ALT  167    /  AlkPhos  312    31 Jan 2023 06:50                          12.0   6.32  )-----------( 168      ( 31 Jan 2023 08:15 )             37.9     PT/INR - ( 30 Jan 2023 15:10 )   PT: 15.2 sec;   INR: 1.31 ratio         PTT - ( 30 Jan 2023 15:10 )  PTT:23.8 sec            TTE (11/2022):   1. Technically difficult study with poor endocardial visualization.   2. The left ventricle endocardium is not well visualized, appears to   have generally normal systolic function. Abnormal septal motion which may be due to conduction delay. Evaluation of left ventricle regional wall motion is limited, consider use of IV echo contrast to better evaluate endocardium and left ventricle ejection fraction, if clinically indicated.   3. Normal left ventricular internal cavity size.   4. The right ventricle is not well visualized, appears to have normal   systolic function.   5. The left atrium appears generally normal in size.   6. Lipomatous interatrial septum.   7. Mild thickening and calcification of the anterior and posterior   mitral valve leaflets.   8. Mild tricuspid regurgitation.   9. Mild aortic valve leaflet calcification.  10. There is no evidence of pericardial effusion.   PRETTY DOLAN  [id number]      Chief Complaint: Perforated gallbladder/History of TIA/SVT    Interval History: The patient reports feeling ok today, denies chest pain or shortness of breath.     Tele: sinus rhythm, frequent PACs      Current meds:   acetaminophen     Tablet .. 650 milliGRAM(s) Oral every 6 hours PRN  aluminum hydroxide/magnesium hydroxide/simethicone Suspension 30 milliLiter(s) Oral every 4 hours PRN  finasteride 5 milliGRAM(s) Oral daily  HYDROmorphone  Injectable 0.5 milliGRAM(s) IV Push every 3 hours PRN  lactated ringers. 1000 milliLiter(s) IV Continuous <Continuous>  melatonin 3 milliGRAM(s) Oral at bedtime PRN  ondansetron Injectable 4 milliGRAM(s) IV Push every 8 hours PRN  oxyCODONE    IR 10 milliGRAM(s) Oral every 3 hours PRN  piperacillin/tazobactam IVPB.. 3.375 Gram(s) IV Intermittent every 8 hours  tamsulosin 0.4 milliGRAM(s) Oral at bedtime      Objective:     Vital Signs:   T(C): 36.6 (01-31-23 @ 05:16), Max: 36.7 (01-30-23 @ 18:52)  HR: 110 (01-31-23 @ 05:16) (68 - 110)  BP: 128/61 (01-31-23 @ 05:16) (91/73 - 128/61)  RR: 16 (01-31-23 @ 05:16) (16 - 26)  SpO2: 98% (01-31-23 @ 05:16) (93% - 98%)  Wt(kg): --    PHYSICAL EXAM:  General: no distress  HEENT: sclera anicteric  Neck: supple  CVS: JVP ~ 7 cm H20, RRR, s1, s2  Chest: unlabored respirations, clear to auscultation b/l  Extremities: no lower extremity edema b/l  Neuro: awake, alert & oriented x 3  Psych: normal affect      Labs:   31 Jan 2023 06:50    141    |  107    |  34     ----------------------------<  128    4.8     |  24     |  0.85     Ca    8.5        31 Jan 2023 06:50    TPro  6.2    /  Alb  1.8    /  TBili  2.8    /  DBili  1.0    /  AST  226    /  ALT  167    /  AlkPhos  312    31 Jan 2023 06:50                          12.0   6.32  )-----------( 168      ( 31 Jan 2023 08:15 )             37.9     PT/INR - ( 30 Jan 2023 15:10 )   PT: 15.2 sec;   INR: 1.31 ratio         PTT - ( 30 Jan 2023 15:10 )  PTT:23.8 sec            TTE (11/2022):   1. Technically difficult study with poor endocardial visualization.   2. The left ventricle endocardium is not well visualized, appears to   have generally normal systolic function. Abnormal septal motion which may be due to conduction delay. Evaluation of left ventricle regional wall motion is limited, consider use of IV echo contrast to better evaluate endocardium and left ventricle ejection fraction, if clinically indicated.   3. Normal left ventricular internal cavity size.   4. The right ventricle is not well visualized, appears to have normal   systolic function.   5. The left atrium appears generally normal in size.   6. Lipomatous interatrial septum.   7. Mild thickening and calcification of the anterior and posterior   mitral valve leaflets.   8. Mild tricuspid regurgitation.   9. Mild aortic valve leaflet calcification.  10. There is no evidence of pericardial effusion.   PRETTY DOLAN  [id number]      Chief Complaint: Perforated gallbladder/History of TIA/SVT    Interval History: The patient reports feeling ok today, denies chest pain or shortness of breath.     Tele: sinus rhythm, frequent PACs      Current meds:   acetaminophen     Tablet .. 650 milliGRAM(s) Oral every 6 hours PRN  aluminum hydroxide/magnesium hydroxide/simethicone Suspension 30 milliLiter(s) Oral every 4 hours PRN  finasteride 5 milliGRAM(s) Oral daily  HYDROmorphone  Injectable 0.5 milliGRAM(s) IV Push every 3 hours PRN  lactated ringers. 1000 milliLiter(s) IV Continuous <Continuous>  melatonin 3 milliGRAM(s) Oral at bedtime PRN  ondansetron Injectable 4 milliGRAM(s) IV Push every 8 hours PRN  oxyCODONE    IR 10 milliGRAM(s) Oral every 3 hours PRN  piperacillin/tazobactam IVPB.. 3.375 Gram(s) IV Intermittent every 8 hours  tamsulosin 0.4 milliGRAM(s) Oral at bedtime      Objective:     Vital Signs:   T(C): 36.6 (01-31-23 @ 05:16), Max: 36.7 (01-30-23 @ 18:52)  HR: 110 (01-31-23 @ 05:16) (68 - 110)  BP: 128/61 (01-31-23 @ 05:16) (91/73 - 128/61)  RR: 16 (01-31-23 @ 05:16) (16 - 26)  SpO2: 98% (01-31-23 @ 05:16) (93% - 98%)  Wt(kg): --    PHYSICAL EXAM:  General: no distress  HEENT: sclera anicteric  Neck: supple  CVS: JVP ~ 7 cm H20, RRR, s1, s2  Chest: unlabored respirations, clear to auscultation b/l  Extremities: no lower extremity edema b/l  Neuro: awake, alert & oriented x 3  Psych: normal affect      Labs:   31 Jan 2023 06:50    141    |  107    |  34     ----------------------------<  128    4.8     |  24     |  0.85     Ca    8.5        31 Jan 2023 06:50    TPro  6.2    /  Alb  1.8    /  TBili  2.8    /  DBili  1.0    /  AST  226    /  ALT  167    /  AlkPhos  312    31 Jan 2023 06:50                          12.0   6.32  )-----------( 168      ( 31 Jan 2023 08:15 )             37.9     PT/INR - ( 30 Jan 2023 15:10 )   PT: 15.2 sec;   INR: 1.31 ratio         PTT - ( 30 Jan 2023 15:10 )  PTT:23.8 sec            TTE (11/2022):   1. Technically difficult study with poor endocardial visualization.   2. The left ventricle endocardium is not well visualized, appears to   have generally normal systolic function. Abnormal septal motion which may be due to conduction delay. Evaluation of left ventricle regional wall motion is limited, consider use of IV echo contrast to better evaluate endocardium and left ventricle ejection fraction, if clinically indicated.   3. Normal left ventricular internal cavity size.   4. The right ventricle is not well visualized, appears to have normal   systolic function.   5. The left atrium appears generally normal in size.   6. Lipomatous interatrial septum.   7. Mild thickening and calcification of the anterior and posterior   mitral valve leaflets.   8. Mild tricuspid regurgitation.   9. Mild aortic valve leaflet calcification.  10. There is no evidence of pericardial effusion.

## 2023-01-31 NOTE — PATIENT PROFILE ADULT - FALL HARM RISK - HARM RISK INTERVENTIONS
Assistance with ambulation/Assistance OOB with selected safe patient handling equipment/Communicate Risk of Fall with Harm to all staff/Reinforce activity limits and safety measures with patient and family/Tailored Fall Risk Interventions/Visual Cue: Yellow wristband and red socks/Bed in lowest position, wheels locked, appropriate side rails in place/Call bell, personal items and telephone in reach/Instruct patient to call for assistance before getting out of bed or chair/Non-slip footwear when patient is out of bed/[place] to call system/Physically safe environment - no spills, clutter or unnecessary equipment/Purposeful Proactive Rounding/Room/bathroom lighting operational, light cord in reach Assistance with ambulation/Assistance OOB with selected safe patient handling equipment/Communicate Risk of Fall with Harm to all staff/Reinforce activity limits and safety measures with patient and family/Tailored Fall Risk Interventions/Visual Cue: Yellow wristband and red socks/Bed in lowest position, wheels locked, appropriate side rails in place/Call bell, personal items and telephone in reach/Instruct patient to call for assistance before getting out of bed or chair/Non-slip footwear when patient is out of bed/[place] to call system/Physically safe environment - no spills, clutter or unnecessary equipment/Purposeful Proactive Rounding/Room/bathroom lighting operational, light cord in reach Assistance with ambulation/Assistance OOB with selected safe patient handling equipment/Communicate Risk of Fall with Harm to all staff/Reinforce activity limits and safety measures with patient and family/Tailored Fall Risk Interventions/Visual Cue: Yellow wristband and red socks/Bed in lowest position, wheels locked, appropriate side rails in place/Call bell, personal items and telephone in reach/Instruct patient to call for assistance before getting out of bed or chair/Non-slip footwear when patient is out of bed/[place] to call system/Physically safe environment - no spills, clutter or unnecessary equipment/Purposeful Proactive Rounding/Room/bathroom lighting operational, light cord in reach

## 2023-01-31 NOTE — PROGRESS NOTE ADULT - ASSESSMENT
A/P  89 y/o male POD #1 s/p laparoscopic cholecystectomy for gangrenous perforated bowel      Patient had pulled NGT overnight , should he start to vomit ; replace NGT  OOB to chair, /PT evaluation please   hold off on Anticoagulation for one more day   venodynes  trend LFT's if trending upward, would consult GI tomorrow   continue maintenance IV fluids   AM labs 2/1/23  continue care as per medicine  plan and recommendations as per Dr Thompson

## 2023-01-31 NOTE — CHART NOTE - NSCHARTNOTEFT_GEN_A_CORE
-patient noted to remove NGT, stating "This is not comfortable, I don't like it"    -given this, will not replace right now, however if patient has nausea or vomiting NGT will need to be replaced

## 2023-01-31 NOTE — PROGRESS NOTE ADULT - SUBJECTIVE AND OBJECTIVE BOX
Patient is a 89y old  Male who presents with a chief complaint of abdominal pain (2023 07:13)      Patient is found resting in bed and is alert  and states that he is very thirsty.  He states that he has some minor discomfort , but seems a little confused at times , but able to answer questions.  He states that he does not want the NGT to be placed back in as he pulled it out overnight.  I explained the need should he vomit again, but he was not convinced.  He states he is thirsty and denies nausea , severe abdominal pain , no fever no chills no sob no cp no calf tenderness bialterally.    ALLERGIES:  No Known Allergies    MEDICATIONS  (STANDING):  finasteride 5 milliGRAM(s) Oral daily  piperacillin/tazobactam IVPB.. 3.375 Gram(s) IV Intermittent every 8 hours  tamsulosin 0.4 milliGRAM(s) Oral at bedtime    MEDICATIONS  (PRN):  acetaminophen     Tablet .. 650 milliGRAM(s) Oral every 6 hours PRN Temp greater or equal to 38C (100.4F), Mild Pain (1 - 3)  aluminum hydroxide/magnesium hydroxide/simethicone Suspension 30 milliLiter(s) Oral every 4 hours PRN Dyspepsia  HYDROmorphone  Injectable 0.5 milliGRAM(s) IV Push every 3 hours PRN Severe Pain (7 - 10)  melatonin 3 milliGRAM(s) Oral at bedtime PRN Insomnia  ondansetron Injectable 4 milliGRAM(s) IV Push every 8 hours PRN Nausea and/or Vomiting  oxyCODONE    IR 10 milliGRAM(s) Oral every 3 hours PRN Moderate Pain (4 - 6)    Vital Signs Last 24 Hrs  T(F): 97.9 (2023 05:16), Max: 98 (2023 18:52)  HR: 110 (2023 05:16) (68 - 110)  BP: 128/61 (2023 05:16) (91/73 - 128/61)  RR: 16 (2023 05:16) (16 - 26)  SpO2: 98% (2023 05:16) (93% - 98%)  I&O's Summary    2023 07:01  -  2023 07:00  --------------------------------------------------------  IN: 75 mL / OUT: 78 mL / NET: -3 mL      PHYSICAL EXAM:  General: NAD, A/O x 2 able to answer questions   ENT: MMM  Neck: Supple, No JVD  Lungs: Clear to auscultation bilaterally  Cardio: tachycardic , S1/S2, No murmurs  Abdomen: Soft, dermabond over three port sights , C/D/I, sophia drain in place to bulb suction with 75 cc of serosanguinous drainage , no leakage ,soft, non distended , mild diffuse tenderness to palpation of abdomen, + hyperactive bowel sounds throughout   Extremities: No calf tenderness, No pitting edema bilaterally     LABS:                        12.0   6.32  )-----------( 168      ( 2023 08:15 )             37.9         141  |  107  |  34  ----------------------------<  128  4.8   |  24  |  0.85    Ca    8.5      2023 06:50    TPro  6.2  /  Alb  1.8  /  TBili  2.8  /  DBili  1.0  /  AST  226  /  ALT  167  /  AlkPhos  312        PT/INR - ( 2023 15:10 )   PT: 15.2 sec;   INR: 1.31 ratio         PTT - ( 2023 15:10 )  PTT:23.8 sec  Lactate, Blood: 1.0 mmol/L ( @ 17:21)  Lactate, Blood: 2.5 mmol/L ( @ 12:27)        11-23 Chol 160 mg/dL LDL -- HDL 44 mg/dL Trig 95 mg/dL                  Urinalysis Basic - ( 2023 12:38 )    Color: Yellow / Appearance: Slightly Turbid / S.020 / pH: x  Gluc: x / Ketone: Trace  / Bili: Negative / Urobili: 1   Blood: x / Protein: 100 / Nitrite: Negative   Leuk Esterase: Moderate / RBC: 5-10 /HPF / WBC 11-25 /HPF   Sq Epi: x / Non Sq Epi: Neg.-Few / Bacteria: Moderate /HPF        COVID-19 PCR: NotDetec (23 @ 12:25)      RADIOLOGY & ADDITIONAL TESTS:    Care Discussed with Consultants/Other Providers:

## 2023-02-01 LAB
-  AMIKACIN: SIGNIFICANT CHANGE UP
-  AMOXICILLIN/CLAVULANIC ACID: SIGNIFICANT CHANGE UP
-  AMPICILLIN/SULBACTAM: SIGNIFICANT CHANGE UP
-  AMPICILLIN: SIGNIFICANT CHANGE UP
-  AZTREONAM: SIGNIFICANT CHANGE UP
-  CEFAZOLIN: SIGNIFICANT CHANGE UP
-  CEFEPIME: SIGNIFICANT CHANGE UP
-  CEFOXITIN: SIGNIFICANT CHANGE UP
-  CEFTRIAXONE: SIGNIFICANT CHANGE UP
-  CEFUROXIME: SIGNIFICANT CHANGE UP
-  CIPROFLOXACIN: SIGNIFICANT CHANGE UP
-  ERTAPENEM: SIGNIFICANT CHANGE UP
-  GENTAMICIN: SIGNIFICANT CHANGE UP
-  IMIPENEM: SIGNIFICANT CHANGE UP
-  LEVOFLOXACIN: SIGNIFICANT CHANGE UP
-  MEROPENEM: SIGNIFICANT CHANGE UP
-  NITROFURANTOIN: SIGNIFICANT CHANGE UP
-  PIPERACILLIN/TAZOBACTAM: SIGNIFICANT CHANGE UP
-  TOBRAMYCIN: SIGNIFICANT CHANGE UP
-  TRIMETHOPRIM/SULFAMETHOXAZOLE: SIGNIFICANT CHANGE UP
ALBUMIN SERPL ELPH-MCNC: 1.9 G/DL — LOW (ref 3.3–5)
ALP SERPL-CCNC: 224 U/L — HIGH (ref 40–120)
ALT FLD-CCNC: 92 U/L — HIGH (ref 10–45)
ANION GAP SERPL CALC-SCNC: 8 MMOL/L — SIGNIFICANT CHANGE UP (ref 5–17)
AST SERPL-CCNC: 83 U/L — HIGH (ref 10–40)
BASOPHILS # BLD AUTO: 0.03 K/UL — SIGNIFICANT CHANGE UP (ref 0–0.2)
BASOPHILS NFR BLD AUTO: 0.4 % — SIGNIFICANT CHANGE UP (ref 0–2)
BILIRUB DIRECT SERPL-MCNC: 0.4 MG/DL — HIGH (ref 0–0.3)
BILIRUB SERPL-MCNC: 1 MG/DL — SIGNIFICANT CHANGE UP (ref 0.2–1.2)
BUN SERPL-MCNC: 24 MG/DL — HIGH (ref 7–23)
CALCIUM SERPL-MCNC: 8.5 MG/DL — SIGNIFICANT CHANGE UP (ref 8.4–10.5)
CHLORIDE SERPL-SCNC: 107 MMOL/L — SIGNIFICANT CHANGE UP (ref 96–108)
CO2 SERPL-SCNC: 29 MMOL/L — SIGNIFICANT CHANGE UP (ref 22–31)
CREAT SERPL-MCNC: 0.85 MG/DL — SIGNIFICANT CHANGE UP (ref 0.5–1.3)
CULTURE RESULTS: SIGNIFICANT CHANGE UP
EGFR: 80 ML/MIN/1.73M2 — SIGNIFICANT CHANGE UP
EOSINOPHIL # BLD AUTO: 0.07 K/UL — SIGNIFICANT CHANGE UP (ref 0–0.5)
EOSINOPHIL NFR BLD AUTO: 0.9 % — SIGNIFICANT CHANGE UP (ref 0–6)
GLUCOSE SERPL-MCNC: 101 MG/DL — HIGH (ref 70–99)
HCT VFR BLD CALC: 36 % — LOW (ref 39–50)
HGB BLD-MCNC: 11.3 G/DL — LOW (ref 13–17)
IMM GRANULOCYTES NFR BLD AUTO: 0.7 % — SIGNIFICANT CHANGE UP (ref 0–0.9)
LYMPHOCYTES # BLD AUTO: 0.42 K/UL — LOW (ref 1–3.3)
LYMPHOCYTES # BLD AUTO: 5.5 % — LOW (ref 13–44)
MCHC RBC-ENTMCNC: 29.7 PG — SIGNIFICANT CHANGE UP (ref 27–34)
MCHC RBC-ENTMCNC: 31.4 GM/DL — LOW (ref 32–36)
MCV RBC AUTO: 94.7 FL — SIGNIFICANT CHANGE UP (ref 80–100)
METHOD TYPE: SIGNIFICANT CHANGE UP
MONOCYTES # BLD AUTO: 0.44 K/UL — SIGNIFICANT CHANGE UP (ref 0–0.9)
MONOCYTES NFR BLD AUTO: 5.8 % — SIGNIFICANT CHANGE UP (ref 2–14)
NEUTROPHILS # BLD AUTO: 6.61 K/UL — SIGNIFICANT CHANGE UP (ref 1.8–7.4)
NEUTROPHILS NFR BLD AUTO: 86.7 % — HIGH (ref 43–77)
NRBC # BLD: 0 /100 WBCS — SIGNIFICANT CHANGE UP (ref 0–0)
ORGANISM # SPEC MICROSCOPIC CNT: SIGNIFICANT CHANGE UP
PLATELET # BLD AUTO: 165 K/UL — SIGNIFICANT CHANGE UP (ref 150–400)
POTASSIUM SERPL-MCNC: 3.5 MMOL/L — SIGNIFICANT CHANGE UP (ref 3.5–5.3)
POTASSIUM SERPL-SCNC: 3.5 MMOL/L — SIGNIFICANT CHANGE UP (ref 3.5–5.3)
PROT SERPL-MCNC: 6.2 G/DL — SIGNIFICANT CHANGE UP (ref 6–8.3)
RBC # BLD: 3.8 M/UL — LOW (ref 4.2–5.8)
RBC # FLD: 14.9 % — HIGH (ref 10.3–14.5)
SODIUM SERPL-SCNC: 144 MMOL/L — SIGNIFICANT CHANGE UP (ref 135–145)
SPECIMEN SOURCE: SIGNIFICANT CHANGE UP
WBC # BLD: 7.62 K/UL — SIGNIFICANT CHANGE UP (ref 3.8–10.5)
WBC # FLD AUTO: 7.62 K/UL — SIGNIFICANT CHANGE UP (ref 3.8–10.5)

## 2023-02-01 PROCEDURE — 99233 SBSQ HOSP IP/OBS HIGH 50: CPT

## 2023-02-01 PROCEDURE — 99232 SBSQ HOSP IP/OBS MODERATE 35: CPT

## 2023-02-01 PROCEDURE — 99232 SBSQ HOSP IP/OBS MODERATE 35: CPT | Mod: 24

## 2023-02-01 RX ORDER — ACETAMINOPHEN 500 MG
650 TABLET ORAL EVERY 6 HOURS
Refills: 0 | Status: DISCONTINUED | OUTPATIENT
Start: 2023-02-01 | End: 2023-02-09

## 2023-02-01 RX ORDER — POTASSIUM CHLORIDE 20 MEQ
40 PACKET (EA) ORAL ONCE
Refills: 0 | Status: COMPLETED | OUTPATIENT
Start: 2023-02-01 | End: 2023-02-01

## 2023-02-01 RX ORDER — ENOXAPARIN SODIUM 100 MG/ML
40 INJECTION SUBCUTANEOUS EVERY 24 HOURS
Refills: 0 | Status: DISCONTINUED | OUTPATIENT
Start: 2023-02-01 | End: 2023-02-02

## 2023-02-01 RX ADMIN — PIPERACILLIN AND TAZOBACTAM 25 GRAM(S): 4; .5 INJECTION, POWDER, LYOPHILIZED, FOR SOLUTION INTRAVENOUS at 14:52

## 2023-02-01 RX ADMIN — ENOXAPARIN SODIUM 40 MILLIGRAM(S): 100 INJECTION SUBCUTANEOUS at 14:52

## 2023-02-01 RX ADMIN — Medication 40 MILLIEQUIVALENT(S): at 12:52

## 2023-02-01 RX ADMIN — Medication 650 MILLIGRAM(S): at 10:21

## 2023-02-01 RX ADMIN — FINASTERIDE 5 MILLIGRAM(S): 5 TABLET, FILM COATED ORAL at 12:54

## 2023-02-01 RX ADMIN — PIPERACILLIN AND TAZOBACTAM 25 GRAM(S): 4; .5 INJECTION, POWDER, LYOPHILIZED, FOR SOLUTION INTRAVENOUS at 05:20

## 2023-02-01 RX ADMIN — PIPERACILLIN AND TAZOBACTAM 25 GRAM(S): 4; .5 INJECTION, POWDER, LYOPHILIZED, FOR SOLUTION INTRAVENOUS at 21:39

## 2023-02-01 RX ADMIN — Medication 650 MILLIGRAM(S): at 12:57

## 2023-02-01 RX ADMIN — TAMSULOSIN HYDROCHLORIDE 0.4 MILLIGRAM(S): 0.4 CAPSULE ORAL at 21:39

## 2023-02-01 NOTE — PROGRESS NOTE ADULT - NS ATTEND AMEND GEN_ALL_CORE FT
This is a pleasant 89M who presented with acute gangrenous cholecystitis with perforation who is POD#2 s/p laparoscopic cholecystectomy. Intraop there was perforation into the liver bed, with empyema. He tolerated the procedure well. He was extubated and continues to be afebrile and hemodynamically stable. No evidence of gallstone ileus intraop. Patient noted to have severe ileus. Thus, NG initially placed intra-op to decreased risk of aspiration PNA, but patient removed POD 0.     He tolerated sips of clears yesterday. No N/V or subjective bloating. No fevers, chills or sweats. Abdominal pain is tolerable. He is avoiding narcotics at this time. No flatus yet. OOB to a chair on my  evaluation.     Abdomen benign, appropriate incisional tenderness, incision c/d/i, drain with serosang output. reducible left inguinal hernia. Slightly more distended/tympanitic than yesterday    - Keep NPO with low threshold to replace NG if he has emesis. Minimal amount of ice chips OK for comfort.   - OOB/IS, PT. Perhaps attempting short periods of standing tomorrow.   - Recommend d/c FC and void trial tomorrrow.  - Continue IV abx until POD 4 given severity of the initial infection / empyema. If ileus resolves by then and tolerating a diet, anticipate possible d/c Friday, prior to weekend back to facility.   - LFTS down-trending as expected.  - Continue mIVF and trend electrolytes to minimize risk factors for ileus. Please add on Mg /Phos to am. labs. 40 meQ of K ordered for today with a goal K of 4.0.  - Continue drain to bulb suction for now. Will likely remove prior to discharge.  - Trend CBC. OK for lovenox for DVT ppx today. If Hb stable in am, can start asa 81 mg for cardio/stroke protection.  - Appreciate cardiology recommendations given possible afib on admission EKG. Continues to be hemodynamically stable. Tolerated OR well.  - Non operative management of incidental left inguinal hernia at this time. Easily reducible. Will discuss further options as outpatient.    Patient's daughter was at bedside. She is happy with progression and had all questions answered. Primary team updated with recommendations.

## 2023-02-01 NOTE — PROGRESS NOTE ADULT - ASSESSMENT
89 yo male POD # 2 s/p lap kellee for gangrenous gall bladder    pt doing well, no n/v, however no flatus or bm    +bcx, final sensitivities pending    plan  - may start Lovenox today, start ASA 2/2/23  - advance to clear diet today  - as per Dr. Thompson, keep potassium level around 4, 40 meq x 1 dose ordered  - f/u final bcx, ID f/u  - last day of surgical abx anticipated for Friday 2/3/23, given adequate source control  - anticipate pt going back to [facility] 2/3/23 from surgical standpoint      case d/w Dr. Thompson    89 yo male POD # 2 s/p lap kellee for gangrenous gall bladder    pt doing well, no n/v, however no flatus or bm    +bcx, final sensitivities pending    plan  - may start Lovenox today, start ASA 2/2/23  - advance to clear diet today  - as per Dr. Thompson, keep potassium level around 4, 40 meq x 1 dose ordered  - f/u final bcx, ID f/u  - last day of surgical abx anticipated for Friday 2/3/23, given adequate source control  - anticipate pt going back to [facility] 2/3/23 from surgical standpoint      case d/w Dr. Thompson    89 yo male POD # 2 s/p lap kellee for gangrenous gall bladder    pt doing well, no n/v, however no flatus or bm    +bcx, final sensitivities pending    plan  - may start Lovenox today, start ASA 2/2/23  - advance to clear diet today  - as per Dr. Thompson, keep potassium level around 4, 40 meq x 1 dose ordered  - f/u final bcx, ID f/u  - last day of surgical abx anticipated for Friday 2/3/23, given adequate source control  - anticipate pt going back to [facility] 2/3/23 from surgical standpoint      case d/w Dr. Thompson    89 yo male POD # 2 s/p lap kellee for gangrenous gall bladder    pt doing well, no n/v, however no flatus or bm    +bcx, final sensitivities pending    plan  - may start Lovenox today, start ASA 2/2/23 if hb stable.  - Continue NPO, with sips/chips for comfort, given persistent ileus and abdominal distension  - as per Dr. Thompson, keep potassium level around 4, 40 meq x 1 dose ordered  - f/u final bcx, ID f/u  - last day of surgical abx anticipated for Friday 2/3/23, given adequate source control  - anticipate pt going back to [facility] 2/3/23 from surgical standpoint if ileus resolves and tolerating PO. Will likely d/c drain prior to d/c      case d/w Dr. Thompson    89 yo male POD # 2 s/p lap kellee for gangrenous gall bladder    pt doing well, no n/v, however no flatus or bm    +bcx, final sensitivities pending    plan  - may start Lovenox today, start ASA 2/2/23 if hb stable.  - Continue NPO, with sips/chips for comfort, given persistent ileus and abdominal distension  - as per Dr. Thompson, keep potassium level around 4, 40 meq x 1 dose ordered  - f/u final bcx, ID f/u  - last day of surgical abx anticipated for Friday 2/3/23, given adequate source control  - anticipate pt going back to [facility] 2/3/23 from surgical standpoint if ileus resolves and tolerating PO. Will likely d/c drain prior to d/c      case d/w Dr. Thompson    89 yo male POD # 2 s/p lap kellee for gangrenous gall bladder    pt doing well, no n/v, however no flatus or bm    +bcx, final sensitivities pending    plan  - may start Lovenox today, start ASA 2/2/23 if hb stable.  - Continue NPO, with sips/chips for comfort, given persistent ileus and abdominal distension  - as per Dr. Thompson, keep potassium level around 4, 40 meq x 1 dose ordered  - f/u final bcx, ID f/u  - last day of surgical abx anticipated for Friday 2/3/23, given adequate source control  - anticipate pt going back to [facility] 2/3/23 from surgical standpoint if ileus resolves and tolerating PO. Will likely d/c drain prior to d/c      case d/w Dr. Thompson

## 2023-02-01 NOTE — PROGRESS NOTE ADULT - ASSESSMENT
Assessment:  Everton Gaxiola is a 89 year old man with recent hospitalization here in November for TIA, now sent in from [facility] due to abdominal pain, nausea, vomiting and abnormal labs, found to have perforated gallbladder and small bowel obstruction.    ECG is poor baseline, irregular rhythm, occasional sinus rhythm with PACs noted. Recent echo from 11/2022 with normal LV systolic function, no significant valvular disease. Exercise tolerance cannot be assessed due to patient recovering from recent hip fracture, however he denies chest pain, shortness of breath or palpitations. No signs of CHF on exam. No symptoms of an acute coronary syndrome at this time.     Recommendations:  [] The patient is now s/p laparoscopic cholecystectomy, and tolerated the procedure well, follow up Surgery and ID regarding antibiotics. No angina or dyspnea. Telemetry consistent with sinus rhythm with frequent PACs, continue to monitor. Repeat ECG today. Will likely need outpatient extended cardiac event monitoring.    Will sign out to cardiologist to follow along tomorrow.     Jn Acuna MD  Cardiology      Assessment:  Everton Gaxiola is a 89 year old man with recent hospitalization here in November for TIA, now sent in from [facility] due to abdominal pain, nausea, vomiting and abnormal labs, found to have perforated gallbladder and small bowel obstruction.    ECG is poor baseline, irregular rhythm, occasional sinus rhythm with PACs noted. Recent echo from 11/2022 with normal LV systolic function, no significant valvular disease. Exercise tolerance cannot be assessed due to patient recovering from recent hip fracture, however he denies chest pain, shortness of breath or palpitations. No signs of CHF on exam. No symptoms of an acute coronary syndrome at this time.     Recommendations:  [] The patient is now s/p laparoscopic cholecystectomy, and tolerated the procedure well, follow up Surgery and ID regarding antibiotics. No angina or dyspnea. Telemetry consistent with sinus rhythm with frequent PACs, continue to monitor. Repeat ECG today. Will likely need outpatient extended cardiac event monitoring.    Will sign out to cardiologist to follow along tomorrow.     Jn Acuna MD  Cardiology      Assessment:  Everton Gaxiola is a 89 year old man with recent hospitalization here in November for TIA, now sent in from [facility] due to abdominal pain, nausea, vomiting and abnormal labs, found to have perforated gallbladder and small bowel obstruction.    ECG is poor baseline, irregular rhythm, occasional sinus rhythm with PACs noted. Recent echo from 11/2022 with normal LV systolic function, no significant valvular disease. Exercise tolerance cannot be assessed due to patient recovering from recent hip fracture, however he denies chest pain, shortness of breath or palpitations. No signs of CHF on exam. No symptoms of an acute coronary syndrome at this time.     Recommendations:  [] The patient is now s/p laparoscopic cholecystectomy, and tolerated the procedure well, follow up Surgery and ID regarding antibiotics. No angina or dyspnea. Telemetry consistent with sinus rhythm with frequent PACs, continue to monitor. Repeat ECG today. Will likely need outpatient extended cardiac event monitoring.    Will sign out to cardiologist to follow along tomorrow.     Jn Acuna MD  Cardiology

## 2023-02-01 NOTE — PHYSICAL THERAPY INITIAL EVALUATION ADULT - PERTINENT HX OF CURRENT PROBLEM, REHAB EVAL
PRETTY DOLAN is a 89y year old Male with PMH of recent L hip fx (11/2022), TIA (11/2022) who presents to the ER from [facility] for abdominal pain, nausea for 4 days.     Patient states his symptoms have been worsening over the last 4 days; had bloodwork done at [facility] which showed elevated LFTs which prompted ER visit. patietn states he has had poor appetite for the same time period. Episode of vomiting in ER, nbnb.   Daughter at bedside who states patient has been participating with therapy however has been more fatigued over the last week.   Denies fever, chills, chest pain, shortness of breath. PRETTY DOLAN is a 89y year old Male with PMH of recent L hip fx (11/2022), TIA (11/2022) who presents to the ER from [facility] for abdominal pain, nausea for 4 days.     Patient states his symptoms have been worsening over the last 4 days; had bloodwork done at [facility] which showed elevated LFTs which prompted ER visit. patietn states he has had poor appetite for the same time period. Episode of vomiting in ER, nbnb.   Daughter at bedside who states patient has been participating with therapy however has been more fatigued over the last week.   Denies fever, chills, chest pain, shortness of breath. PRETTY DOLAN is a 89y year old Male with PMH of recent L hip fx (11/2022), TIA (11/2022) who presents to the ER from [facility] for abdominal pain, nausea for 4 days.     Patient states his symptoms have been worsening over the last 4 days; had bloodwork done at [facility] which showed elevated LFTs which prompted ER visit. patietn states he has had poor appetite for the same time period. Episode of vomiting in ER, nbnb.   Daughter at bedside who states patient has been participating with therapy however has been more fatigued over the last week.   Denies fever, chills, chest pain, shortness of breath.

## 2023-02-01 NOTE — PHYSICAL THERAPY INITIAL EVALUATION ADULT - ADDITIONAL COMMENTS
pt from [facility] for [place] 2/2 left hip fracture/[place], was ambulating w/rw and assist, required assist w/most ADL's pt from [facility] for [place] 2/2 left hip fracture/[place], was ambulating w/rw and assist, required assist w/most ADL's pt from [facility] for [place] 2/2 left hip fracture/[place], was ambulating w/rw and assist, required assist w/most ADL's

## 2023-02-01 NOTE — PROGRESS NOTE ADULT - SUBJECTIVE AND OBJECTIVE BOX
Patient is a 89y old  Male who presents with a chief complaint of abdominal pain (2023 10:26)    Patient seen and examined at bedside. No overnight events reported. Patient states that he feels lousy. He c/o right sided abdominal pain. He states pain is tolerable and initially refused offer for pain medication but now is agreeable to tylenol     ALLERGIES:  No Known Allergies    MEDICATIONS  (STANDING):  acetaminophen     Tablet .. 650 milliGRAM(s) Oral every 6 hours  finasteride 5 milliGRAM(s) Oral daily  lactated ringers. 1000 milliLiter(s) (75 mL/Hr) IV Continuous <Continuous>  piperacillin/tazobactam IVPB.. 3.375 Gram(s) IV Intermittent every 8 hours  tamsulosin 0.4 milliGRAM(s) Oral at bedtime    MEDICATIONS  (PRN):  aluminum hydroxide/magnesium hydroxide/simethicone Suspension 30 milliLiter(s) Oral every 4 hours PRN Dyspepsia  HYDROmorphone  Injectable 0.5 milliGRAM(s) IV Push every 3 hours PRN Severe Pain (7 - 10)  melatonin 3 milliGRAM(s) Oral at bedtime PRN Insomnia  ondansetron Injectable 4 milliGRAM(s) IV Push every 8 hours PRN Nausea and/or Vomiting  oxyCODONE    IR 5 milliGRAM(s) Oral every 6 hours PRN Moderate Pain (4 - 6)    Vital Signs Last 24 Hrs  T(F): 98.2 (2023 05:07), Max: 98.2 (2023 05:07)  HR: 80 (2023 05:07) (80 - 105)  BP: 130/72 (2023 05:07) (110/58 - 145/74)  RR: 19 (2023 05:07) (19 - 30)  SpO2: 98% (2023 05:07) (96% - 98%)    I&O's Summary  2023 07:01  -  2023 07:00  --------------------------------------------------------  IN: 130 mL / OUT: 650 mL / NET: -520 mL    PHYSICAL EXAM:  General: NAD, A/O x 2  ENT: No gross hearing impairment, moist mucous membranes, no thrush  Neck: Supple, No JVD  Lungs: Poor inspiratory effort, anterior lung sounds clear, non-labored breathing. Slight weak cough  Cardio: RRR, S1/S2, No murmur  Abdomen: Soft, Nontender, Nondistended; Bowel sounds present  Extremities: No calf tenderness, No cyanosis, No pitting edema  Psych: Appropriate mood and affect    LABS:                        11.3   7.62  )-----------( 165      ( 2023 07:05 )             36.0         144  |  107  |  24  ----------------------------<  101  3.5   |  29  |  0.85    Ca    8.5      2023 07:05    TPro  6.2  /  Alb  1.9  /  TBili  1.0  /  DBili  0.4  /  AST  83  /  ALT  92  /  AlkPhos  224      Lipase, Serum: 30 U/L (23 @ 12:27)  PT/INR - ( 2023 15:10 )   PT: 15.2 sec;   INR: 1.31 ratio      PTT - ( 2023 15:10 )  PTT:23.8 sec  Lactate, Blood: 1.0 mmol/L ( @ 17:21)  Lactate, Blood: 2.5 mmol/L ( @ 12:27)     Chol 160 mg/dL LDL -- HDL 44 mg/dL Trig 95 mg/dL  Urinalysis Basic - ( 2023 12:38 )    Color: Yellow / Appearance: Slightly Turbid / S.020 / pH: x  Gluc: x / Ketone: Trace  / Bili: Negative / Urobili: 1   Blood: x / Protein: 100 / Nitrite: Negative   Leuk Esterase: Moderate / RBC: 5-10 /HPF / WBC 11-25 /HPF   Sq Epi: x / Non Sq Epi: Neg.-Few / Bacteria: Moderate /HPF    Culture - Urine (collected 2023 12:38)  Source: Catheterized Catheterized  Preliminary Report (2023 23:25):    >100,000 CFU/ml Escherichia coli    >100,000 CFU/ml Aerococcus species    "Aerococcus spp. are predictably susceptible to penicillin,    ampicillin, tetracycline, and vancomycin.  All isolates are    resistant to sulfonamides"    Culture - Blood (collected 2023 12:27)  Source: .Blood Blood-Peripheral  Preliminary Report (2023 15:01):    No growth to date.    Culture - Blood (collected 2023 12:10)  Source: .Blood Blood-Peripheral  Gram Stain (2023 15:34):    Growth in anaerobic bottle: Gram positive cocci in pairs  Preliminary Report (2023 15:35):    Growth in anaerobic bottle: Gram positive cocci in pairs    ***Blood Panel PCR results on this specimen are available    approximately 3 hours after the Gram stain result.***    Gram stain, PCR, and/or culture results may not always    correspond due to difference in methodologies.    ************************************************************    This PCR assay was performed by multiplex PCR. This    Assay tests for 66 bacterial and resistance gene targets.    Please refer to the [facility] test directory    at [email or web address] for details.  Organism: Blood Culture PCR (2023 20:03)  Organism: Blood Culture PCR (2023 20:03)      -  Blood PCR Panel: NEG      Method Type: PCR    COVID-19 PCR: NotDetec (23 @ 12:25)    RADIOLOGY & ADDITIONAL TESTS:    Care Discussed with Consultants/Other Providers:    Patient is a 89y old  Male who presents with a chief complaint of abdominal pain (2023 10:26)    Patient seen and examined at bedside. No overnight events reported. Patient states that he feels lousy. He c/o right sided abdominal pain. He states pain is tolerable and initially refused offer for pain medication but now is agreeable to tylenol     ALLERGIES:  No Known Allergies    MEDICATIONS  (STANDING):  acetaminophen     Tablet .. 650 milliGRAM(s) Oral every 6 hours  finasteride 5 milliGRAM(s) Oral daily  lactated ringers. 1000 milliLiter(s) (75 mL/Hr) IV Continuous <Continuous>  piperacillin/tazobactam IVPB.. 3.375 Gram(s) IV Intermittent every 8 hours  tamsulosin 0.4 milliGRAM(s) Oral at bedtime    MEDICATIONS  (PRN):  aluminum hydroxide/magnesium hydroxide/simethicone Suspension 30 milliLiter(s) Oral every 4 hours PRN Dyspepsia  HYDROmorphone  Injectable 0.5 milliGRAM(s) IV Push every 3 hours PRN Severe Pain (7 - 10)  melatonin 3 milliGRAM(s) Oral at bedtime PRN Insomnia  ondansetron Injectable 4 milliGRAM(s) IV Push every 8 hours PRN Nausea and/or Vomiting  oxyCODONE    IR 5 milliGRAM(s) Oral every 6 hours PRN Moderate Pain (4 - 6)    Vital Signs Last 24 Hrs  T(F): 98.2 (2023 05:07), Max: 98.2 (2023 05:07)  HR: 80 (2023 05:07) (80 - 105)  BP: 130/72 (2023 05:07) (110/58 - 145/74)  RR: 19 (2023 05:07) (19 - 30)  SpO2: 98% (2023 05:07) (96% - 98%)    I&O's Summary  2023 07:01  -  2023 07:00  --------------------------------------------------------  IN: 130 mL / OUT: 650 mL / NET: -520 mL    PHYSICAL EXAM:  General: NAD, A/O x 2  ENT: No gross hearing impairment, moist mucous membranes, no thrush  Neck: Supple, No JVD  Lungs: Poor inspiratory effort, anterior lung sounds clear, non-labored breathing. Slight weak cough  Cardio: RRR, S1/S2, No murmur  Abdomen: Soft, Nontender, Nondistended; Bowel sounds present  Extremities: No calf tenderness, No cyanosis, No pitting edema  Psych: Appropriate mood and affect    LABS:                        11.3   7.62  )-----------( 165      ( 2023 07:05 )             36.0         144  |  107  |  24  ----------------------------<  101  3.5   |  29  |  0.85    Ca    8.5      2023 07:05    TPro  6.2  /  Alb  1.9  /  TBili  1.0  /  DBili  0.4  /  AST  83  /  ALT  92  /  AlkPhos  224      Lipase, Serum: 30 U/L (23 @ 12:27)  PT/INR - ( 2023 15:10 )   PT: 15.2 sec;   INR: 1.31 ratio      PTT - ( 2023 15:10 )  PTT:23.8 sec  Lactate, Blood: 1.0 mmol/L ( @ 17:21)  Lactate, Blood: 2.5 mmol/L ( @ 12:27)     Chol 160 mg/dL LDL -- HDL 44 mg/dL Trig 95 mg/dL  Urinalysis Basic - ( 2023 12:38 )    Color: Yellow / Appearance: Slightly Turbid / S.020 / pH: x  Gluc: x / Ketone: Trace  / Bili: Negative / Urobili: 1   Blood: x / Protein: 100 / Nitrite: Negative   Leuk Esterase: Moderate / RBC: 5-10 /HPF / WBC 11-25 /HPF   Sq Epi: x / Non Sq Epi: Neg.-Few / Bacteria: Moderate /HPF    Culture - Urine (collected 2023 12:38)  Source: Catheterized Catheterized  Preliminary Report (2023 23:25):    >100,000 CFU/ml Escherichia coli    >100,000 CFU/ml Aerococcus species    "Aerococcus spp. are predictably susceptible to penicillin,    ampicillin, tetracycline, and vancomycin.  All isolates are    resistant to sulfonamides"    Culture - Blood (collected 2023 12:27)  Source: .Blood Blood-Peripheral  Preliminary Report (2023 15:01):    No growth to date.    Culture - Blood (collected 2023 12:10)  Source: .Blood Blood-Peripheral  Gram Stain (2023 15:34):    Growth in anaerobic bottle: Gram positive cocci in pairs  Preliminary Report (2023 15:35):    Growth in anaerobic bottle: Gram positive cocci in pairs    ***Blood Panel PCR results on this specimen are available    approximately 3 hours after the Gram stain result.***    Gram stain, PCR, and/or culture results may not always    correspond due to difference in methodologies.    ************************************************************    This PCR assay was performed by multiplex PCR. This    Assay tests for 66 bacterial and resistance gene targets.    Please refer to the [facility] test directory    at [email or web address] for details.  Organism: Blood Culture PCR (2023 20:03)  Organism: Blood Culture PCR (2023 20:03)      -  Blood PCR Panel: NEG      Method Type: PCR    COVID-19 PCR: NotDetec (23 @ 12:25)    RADIOLOGY & ADDITIONAL TESTS:    Care Discussed with Consultants/Other Providers:    Patient is a 89y old  Male who presents with a chief complaint of abdominal pain (2023 10:26)    Patient seen and examined at bedside. No overnight events reported. Patient states that he feels lousy. He c/o right sided abdominal pain. He states pain is tolerable and initially refused offer for pain medication but now is agreeable to tylenol     ALLERGIES:  No Known Allergies    MEDICATIONS  (STANDING):  acetaminophen     Tablet .. 650 milliGRAM(s) Oral every 6 hours  finasteride 5 milliGRAM(s) Oral daily  lactated ringers. 1000 milliLiter(s) (75 mL/Hr) IV Continuous <Continuous>  piperacillin/tazobactam IVPB.. 3.375 Gram(s) IV Intermittent every 8 hours  tamsulosin 0.4 milliGRAM(s) Oral at bedtime    MEDICATIONS  (PRN):  aluminum hydroxide/magnesium hydroxide/simethicone Suspension 30 milliLiter(s) Oral every 4 hours PRN Dyspepsia  HYDROmorphone  Injectable 0.5 milliGRAM(s) IV Push every 3 hours PRN Severe Pain (7 - 10)  melatonin 3 milliGRAM(s) Oral at bedtime PRN Insomnia  ondansetron Injectable 4 milliGRAM(s) IV Push every 8 hours PRN Nausea and/or Vomiting  oxyCODONE    IR 5 milliGRAM(s) Oral every 6 hours PRN Moderate Pain (4 - 6)    Vital Signs Last 24 Hrs  T(F): 98.2 (2023 05:07), Max: 98.2 (2023 05:07)  HR: 80 (2023 05:07) (80 - 105)  BP: 130/72 (2023 05:07) (110/58 - 145/74)  RR: 19 (2023 05:07) (19 - 30)  SpO2: 98% (2023 05:07) (96% - 98%)    I&O's Summary  2023 07:01  -  2023 07:00  --------------------------------------------------------  IN: 130 mL / OUT: 650 mL / NET: -520 mL    PHYSICAL EXAM:  General: NAD, A/O x 2  ENT: No gross hearing impairment, moist mucous membranes, no thrush  Neck: Supple, No JVD  Lungs: Poor inspiratory effort, anterior lung sounds clear, non-labored breathing. Slight weak cough  Cardio: RRR, S1/S2, No murmur  Abdomen: Soft, Nontender, Nondistended; Bowel sounds present  Extremities: No calf tenderness, No cyanosis, No pitting edema  Psych: Appropriate mood and affect    LABS:                        11.3   7.62  )-----------( 165      ( 2023 07:05 )             36.0         144  |  107  |  24  ----------------------------<  101  3.5   |  29  |  0.85    Ca    8.5      2023 07:05    TPro  6.2  /  Alb  1.9  /  TBili  1.0  /  DBili  0.4  /  AST  83  /  ALT  92  /  AlkPhos  224      Lipase, Serum: 30 U/L (23 @ 12:27)  PT/INR - ( 2023 15:10 )   PT: 15.2 sec;   INR: 1.31 ratio      PTT - ( 2023 15:10 )  PTT:23.8 sec  Lactate, Blood: 1.0 mmol/L ( @ 17:21)  Lactate, Blood: 2.5 mmol/L ( @ 12:27)     Chol 160 mg/dL LDL -- HDL 44 mg/dL Trig 95 mg/dL  Urinalysis Basic - ( 2023 12:38 )    Color: Yellow / Appearance: Slightly Turbid / S.020 / pH: x  Gluc: x / Ketone: Trace  / Bili: Negative / Urobili: 1   Blood: x / Protein: 100 / Nitrite: Negative   Leuk Esterase: Moderate / RBC: 5-10 /HPF / WBC 11-25 /HPF   Sq Epi: x / Non Sq Epi: Neg.-Few / Bacteria: Moderate /HPF    Culture - Urine (collected 2023 12:38)  Source: Catheterized Catheterized  Preliminary Report (2023 23:25):    >100,000 CFU/ml Escherichia coli    >100,000 CFU/ml Aerococcus species    "Aerococcus spp. are predictably susceptible to penicillin,    ampicillin, tetracycline, and vancomycin.  All isolates are    resistant to sulfonamides"    Culture - Blood (collected 2023 12:27)  Source: .Blood Blood-Peripheral  Preliminary Report (2023 15:01):    No growth to date.    Culture - Blood (collected 2023 12:10)  Source: .Blood Blood-Peripheral  Gram Stain (2023 15:34):    Growth in anaerobic bottle: Gram positive cocci in pairs  Preliminary Report (2023 15:35):    Growth in anaerobic bottle: Gram positive cocci in pairs    ***Blood Panel PCR results on this specimen are available    approximately 3 hours after the Gram stain result.***    Gram stain, PCR, and/or culture results may not always    correspond due to difference in methodologies.    ************************************************************    This PCR assay was performed by multiplex PCR. This    Assay tests for 66 bacterial and resistance gene targets.    Please refer to the [facility] test directory    at [email or web address] for details.  Organism: Blood Culture PCR (2023 20:03)  Organism: Blood Culture PCR (2023 20:03)      -  Blood PCR Panel: NEG      Method Type: PCR    COVID-19 PCR: NotDetec (23 @ 12:25)    RADIOLOGY & ADDITIONAL TESTS:    Care Discussed with Consultants/Other Providers:

## 2023-02-01 NOTE — PHYSICAL THERAPY INITIAL EVALUATION ADULT - GENERAL OBSERVATIONS, REHAB EVAL
pt in bed, sleeping, easily arousable, nad, vss per monitor, drain in place, +condom cath, some word finding diff.

## 2023-02-01 NOTE — PROGRESS NOTE ADULT - SUBJECTIVE AND OBJECTIVE BOX
PRETTY DOLAN  [id number]      Chief Complaint: Perforated gallbladder/History of TIA/PACs    Interval History: The patient reports having abdominal pain, but overall doing well. Denies palpitations, chest pain or shortness of breath.    Tele: sinus rhythm, frequent PACs 80s BPM    Current meds:   acetaminophen     Tablet .. 650 milliGRAM(s) Oral every 6 hours  aluminum hydroxide/magnesium hydroxide/simethicone Suspension 30 milliLiter(s) Oral every 4 hours PRN  finasteride 5 milliGRAM(s) Oral daily  HYDROmorphone  Injectable 0.5 milliGRAM(s) IV Push every 3 hours PRN  lactated ringers. 1000 milliLiter(s) IV Continuous <Continuous>  melatonin 3 milliGRAM(s) Oral at bedtime PRN  ondansetron Injectable 4 milliGRAM(s) IV Push every 8 hours PRN  oxyCODONE    IR 5 milliGRAM(s) Oral every 6 hours PRN  piperacillin/tazobactam IVPB.. 3.375 Gram(s) IV Intermittent every 8 hours  tamsulosin 0.4 milliGRAM(s) Oral at bedtime      Objective:     Vital Signs:   T(C): 36.8 (02-01-23 @ 05:07), Max: 36.8 (02-01-23 @ 05:07)  HR: 80 (02-01-23 @ 05:07) (80 - 105)  BP: 130/72 (02-01-23 @ 05:07) (110/58 - 145/74)  RR: 19 (02-01-23 @ 05:07) (19 - 30)  SpO2: 98% (02-01-23 @ 05:07) (96% - 98%)  Wt(kg): --    PHYSICAL EXAM:  General: no distress  HEENT: sclera anicteric  Neck: supple  CVS: JVP ~ 7 cm H20, RRR, s1, s2  Chest: unlabored respirations, clear to auscultation b/l  Extremities: no lower extremity edema b/l  Neuro: awake, alert & oriented x 3  Psych: normal affect        Labs:   01 Feb 2023 07:05    144    |  107    |  24     ----------------------------<  101    3.5     |  29     |  0.85     Ca    8.5        01 Feb 2023 07:05    TPro  6.2    /  Alb  1.9    /  TBili  1.0    /  DBili  0.4    /  AST  83     /  ALT  92     /  AlkPhos  224    01 Feb 2023 07:05                          11.3   7.62  )-----------( 165      ( 01 Feb 2023 07:05 )             36.0     PT/INR - ( 30 Jan 2023 15:10 )   PT: 15.2 sec;   INR: 1.31 ratio         PTT - ( 30 Jan 2023 15:10 )  PTT:23.8 sec          TTE (11/2022):   1. Technically difficult study with poor endocardial visualization.   2. The left ventricle endocardium is not well visualized, appears to   have generally normal systolic function. Abnormal septal motion which may be due to conduction delay. Evaluation of left ventricle regional wall motion is limited, consider use of IV echo contrast to better evaluate endocardium and left ventricle ejection fraction, if clinically indicated.   3. Normal left ventricular internal cavity size.   4. The right ventricle is not well visualized, appears to have normal   systolic function.   5. The left atrium appears generally normal in size.   6. Lipomatous interatrial septum.   7. Mild thickening and calcification of the anterior and posterior   mitral valve leaflets.   8. Mild tricuspid regurgitation.   9. Mild aortic valve leaflet calcification.  10. There is no evidence of pericardial effusion.   PRETTY DOLAN  [id number]      Chief Complaint: Perforated gallbladder/History of TIA/PACs    Interval History: The patient reports having abdominal pain, but overall doing well. Denies palpitations, chest pain or shortness of breath.    Tele: sinus rhythm, frequent PACs 80s BPM    Current meds:   acetaminophen     Tablet .. 650 milliGRAM(s) Oral every 6 hours  aluminum hydroxide/magnesium hydroxide/simethicone Suspension 30 milliLiter(s) Oral every 4 hours PRN  finasteride 5 milliGRAM(s) Oral daily  HYDROmorphone  Injectable 0.5 milliGRAM(s) IV Push every 3 hours PRN  lactated ringers. 1000 milliLiter(s) IV Continuous <Continuous>  melatonin 3 milliGRAM(s) Oral at bedtime PRN  ondansetron Injectable 4 milliGRAM(s) IV Push every 8 hours PRN  oxyCODONE    IR 5 milliGRAM(s) Oral every 6 hours PRN  piperacillin/tazobactam IVPB.. 3.375 Gram(s) IV Intermittent every 8 hours  tamsulosin 0.4 milliGRAM(s) Oral at bedtime      Objective:     Vital Signs:   T(C): 36.8 (02-01-23 @ 05:07), Max: 36.8 (02-01-23 @ 05:07)  HR: 80 (02-01-23 @ 05:07) (80 - 105)  BP: 130/72 (02-01-23 @ 05:07) (110/58 - 145/74)  RR: 19 (02-01-23 @ 05:07) (19 - 30)  SpO2: 98% (02-01-23 @ 05:07) (96% - 98%)  Wt(kg): --    PHYSICAL EXAM:  General: no distress  HEENT: sclera anicteric  Neck: supple  CVS: JVP ~ 7 cm H20, RRR, s1, s2  Chest: unlabored respirations, clear to auscultation b/l  Extremities: no lower extremity edema b/l  Neuro: awake, alert & oriented x 3  Psych: normal affect        Labs:   01 Feb 2023 07:05    144    |  107    |  24     ----------------------------<  101    3.5     |  29     |  0.85     Ca    8.5        01 Feb 2023 07:05    TPro  6.2    /  Alb  1.9    /  TBili  1.0    /  DBili  0.4    /  AST  83     /  ALT  92     /  AlkPhos  224    01 Feb 2023 07:05                          11.3   7.62  )-----------( 165      ( 01 Feb 2023 07:05 )             36.0     PT/INR - ( 30 Jan 2023 15:10 )   PT: 15.2 sec;   INR: 1.31 ratio         PTT - ( 30 Jan 2023 15:10 )  PTT:23.8 sec          TTE (11/2022):   1. Technically difficult study with poor endocardial visualization.   2. The left ventricle endocardium is not well visualized, appears to   have generally normal systolic function. Abnormal septal motion which may be due to conduction delay. Evaluation of left ventricle regional wall motion is limited, consider use of IV echo contrast to better evaluate endocardium and left ventricle ejection fraction, if clinically indicated.   3. Normal left ventricular internal cavity size.   4. The right ventricle is not well visualized, appears to have normal   systolic function.   5. The left atrium appears generally normal in size.   6. Lipomatous interatrial septum.   7. Mild thickening and calcification of the anterior and posterior   mitral valve leaflets.   8. Mild tricuspid regurgitation.   9. Mild aortic valve leaflet calcification.  10. There is no evidence of pericardial effusion.   PRETTY DOLAN  [id number]      Chief Complaint: Perforated gallbladder/History of TIA/PACs    Interval History: The patient reports having abdominal pain, but overall doing well. Denies palpitations, chest pain or shortness of breath.    Tele: sinus rhythm, frequent PACs 80s BPM    Current meds:   acetaminophen     Tablet .. 650 milliGRAM(s) Oral every 6 hours  aluminum hydroxide/magnesium hydroxide/simethicone Suspension 30 milliLiter(s) Oral every 4 hours PRN  finasteride 5 milliGRAM(s) Oral daily  HYDROmorphone  Injectable 0.5 milliGRAM(s) IV Push every 3 hours PRN  lactated ringers. 1000 milliLiter(s) IV Continuous <Continuous>  melatonin 3 milliGRAM(s) Oral at bedtime PRN  ondansetron Injectable 4 milliGRAM(s) IV Push every 8 hours PRN  oxyCODONE    IR 5 milliGRAM(s) Oral every 6 hours PRN  piperacillin/tazobactam IVPB.. 3.375 Gram(s) IV Intermittent every 8 hours  tamsulosin 0.4 milliGRAM(s) Oral at bedtime      Objective:     Vital Signs:   T(C): 36.8 (02-01-23 @ 05:07), Max: 36.8 (02-01-23 @ 05:07)  HR: 80 (02-01-23 @ 05:07) (80 - 105)  BP: 130/72 (02-01-23 @ 05:07) (110/58 - 145/74)  RR: 19 (02-01-23 @ 05:07) (19 - 30)  SpO2: 98% (02-01-23 @ 05:07) (96% - 98%)  Wt(kg): --    PHYSICAL EXAM:  General: no distress  HEENT: sclera anicteric  Neck: supple  CVS: JVP ~ 7 cm H20, RRR, s1, s2  Chest: unlabored respirations, clear to auscultation b/l  Extremities: no lower extremity edema b/l  Neuro: awake, alert & oriented x 3  Psych: normal affect        Labs:   01 Feb 2023 07:05    144    |  107    |  24     ----------------------------<  101    3.5     |  29     |  0.85     Ca    8.5        01 Feb 2023 07:05    TPro  6.2    /  Alb  1.9    /  TBili  1.0    /  DBili  0.4    /  AST  83     /  ALT  92     /  AlkPhos  224    01 Feb 2023 07:05                          11.3   7.62  )-----------( 165      ( 01 Feb 2023 07:05 )             36.0     PT/INR - ( 30 Jan 2023 15:10 )   PT: 15.2 sec;   INR: 1.31 ratio         PTT - ( 30 Jan 2023 15:10 )  PTT:23.8 sec          TTE (11/2022):   1. Technically difficult study with poor endocardial visualization.   2. The left ventricle endocardium is not well visualized, appears to   have generally normal systolic function. Abnormal septal motion which may be due to conduction delay. Evaluation of left ventricle regional wall motion is limited, consider use of IV echo contrast to better evaluate endocardium and left ventricle ejection fraction, if clinically indicated.   3. Normal left ventricular internal cavity size.   4. The right ventricle is not well visualized, appears to have normal   systolic function.   5. The left atrium appears generally normal in size.   6. Lipomatous interatrial septum.   7. Mild thickening and calcification of the anterior and posterior   mitral valve leaflets.   8. Mild tricuspid regurgitation.   9. Mild aortic valve leaflet calcification.  10. There is no evidence of pericardial effusion.

## 2023-02-01 NOTE — CONSULT NOTE ADULT - SUBJECTIVE AND OBJECTIVE BOX
HPI:   Patient is a 89y male with a past history of a TIA,BPH,GERD, Fe def anemia, remote colon cancer s/p surgical resection, recent fall with left hip fracture, s/p left hip replacement in 2022, who was at a rehab facility when he developed abdominal pain, n/v, elevated LFT's and elevated wbc and was sent to ER on .His imaging revealed a perforated GB and he was taken to OR. He had laparoscopic E Lap with removal of gangrenous, perforated gallbladder with adhesions to small bowel.He was placed on zosyn in ER and he is growing a GPC in anaerobic bottle of admission blood culture.  He had an irregular rhythm on admission, ? A fib, is now in NSR.He was extubated in RR, is alert, has a felipe and is requesting water.    REVIEW OF SYSTEMS:  All other review of systems negative (Comprehensive ROS)    PAST MEDICAL & SURGICAL HISTORY:  History of BPH      Malignant neoplasm of colon      Femur neck fracture      History of glaucoma      GERD (gastroesophageal reflux disease)      Iron deficiency anemia      Hyperlipidemia      History of colon surgery          Allergies    No Known Allergies    Intolerances        Antimicrobials Day #  :POD 2  piperacillin/tazobactam IVPB.. 3.375 Gram(s) IV Intermittent every 8 hours    Other Medications:  acetaminophen     Tablet .. 650 milliGRAM(s) Oral every 6 hours PRN  aluminum hydroxide/magnesium hydroxide/simethicone Suspension 30 milliLiter(s) Oral every 4 hours PRN  finasteride 5 milliGRAM(s) Oral daily  HYDROmorphone  Injectable 0.5 milliGRAM(s) IV Push every 3 hours PRN  lactated ringers. 1000 milliLiter(s) IV Continuous <Continuous>  melatonin 3 milliGRAM(s) Oral at bedtime PRN  ondansetron Injectable 4 milliGRAM(s) IV Push every 8 hours PRN  oxyCODONE    IR 5 milliGRAM(s) Oral every 6 hours PRN  tamsulosin 0.4 milliGRAM(s) Oral at bedtime      FAMILY HISTORY:  FH: asthma (Mother)        SOCIAL HISTORY:  Smoking: x    ETOH: x    Drug Use: x    Single     T(F): 98.2 (23 @ 05:07), Max: 98.2 (23 @ 05:07)  HR: 80 (23 @ 05:07)  BP: 130/72 (23 @ 05:07)  RR: 19 (23 @ 05:07)  SpO2: 98% (23 @ 05:07)  Wt(kg): --    PHYSICAL EXAM:  General: alert, no acute distress  Eyes:  anicteric, no conjunctival injection, no discharge  Oropharynx: no lesions or injection 	  Neck: supple, without adenopathy  Lungs: clear to auscultation  Heart: regular rate and rhythm; no murmur,   Abdomen: soft,  mild distension,, post op tenderness, RUPINDER with serosanguinous drainage  Skin: no lesions  Extremities: no clubbing, cyanosis, or edema  Neurologic: alert, oriented, moves all extremities  : felipe  Left hip incision well healed  LAB RESULTS:                        11.3   7.62  )-----------( 165      ( 2023 07:05 )             36.0         141  |  107  |  34<H>  ----------------------------<  128<H>  4.8   |  24  |  0.85    Ca    8.5      2023 06:50    TPro  x   /  Alb  x   /  TBili  x   /  DBili  0.4<H>  /  AST  x   /  ALT  x   /  AlkPhos  x       LIVER FUNCTIONS - ( 2023 06:50 )  Alb: 1.8 g/dL / Pro: 6.2 g/dL / ALK PHOS: 312 U/L / ALT: 167 U/L / AST: 226 U/L / GGT: x           Urinalysis Basic - ( 2023 12:38 )    Color: Yellow / Appearance: Slightly Turbid / S.020 / pH: x  Gluc: x / Ketone: Trace  / Bili: Negative / Urobili: 1   Blood: x / Protein: 100 / Nitrite: Negative   Leuk Esterase: Moderate / RBC: 5-10 /HPF / WBC 11-25 /HPF   Sq Epi: x / Non Sq Epi: Neg.-Few / Bacteria: Moderate /HPF        MICROBIOLOGY:  RECENT CULTURES:   @ 12:38 Catheterized Catheterized     >100,000 CFU/ml Escherichia coli  >100,000 CFU/ml Aerococcus species  "Aerococcus spp. are predictably susceptible to penicillin,  ampicillin, tetracycline, and vancomycin.  All isolates are  resistant to sulfonamides"       @ 12:27 .Blood Blood-Peripheral     No growth to date.       @ 12:10 .Blood Blood-Peripheral Blood Culture PCR    Growth in anaerobic bottle: Gram positive cocci in pairs    Growth in anaerobic bottle: Gram positive cocci in pairs          RADIOLOGY REVIEWED:  < from: Xray Chest 1 View- PORTABLE-Urgent (Xray Chest 1 View- PORTABLE-Urgent .) (23 @ 23:08) >  Impression:    No acute pulmonary disease.    Tip of NG tube at GE junction and side-port in distal esophagus. This   needs to be advanced.      Findings discussed with Dr. Toribio  when the film was submitted for my   interpretation.    --- End of Report --    < end of copied text >  < from: CT Abdomen and Pelvis w/ IV Cont (23 @ 13:47) >  IMPRESSION:  Markedly irregular gallbladder wall with areas of discontinuity. Air in   the gallbladder. Pericholecystic fluid. Distal small bowel obstruction.   Findings suggestive of gallstone ileus however no gallstone is visualized   with certainty. Consider gangrenous cholecystitis as well.    Felipe balloon distended within the prostatic urethra.Recommend   repositioning.    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    Nonobstructed colon in the left inguinal hernia, does NOT appear to be   related to the bowel obstruction.    < end of copied text >

## 2023-02-01 NOTE — PROGRESS NOTE ADULT - SUBJECTIVE AND OBJECTIVE BOX
Patient is a 89y old  Male who presents with a chief complaint of abdominal pain (2023 09:34)  pt POD # 2 s/p lap kellee  no events noted overnight   pt currently NPO with sips of water  no n/v, pt denies flatus or bm    Patient seen and examined at bedside    ALLERGIES:  No Known Allergies    MEDICATIONS  (STANDING):  acetaminophen     Tablet .. 650 milliGRAM(s) Oral every 6 hours  finasteride 5 milliGRAM(s) Oral daily  lactated ringers. 1000 milliLiter(s) (75 mL/Hr) IV Continuous <Continuous>  piperacillin/tazobactam IVPB.. 3.375 Gram(s) IV Intermittent every 8 hours  potassium chloride    Tablet ER 40 milliEquivalent(s) Oral once  tamsulosin 0.4 milliGRAM(s) Oral at bedtime    MEDICATIONS  (PRN):  aluminum hydroxide/magnesium hydroxide/simethicone Suspension 30 milliLiter(s) Oral every 4 hours PRN Dyspepsia  HYDROmorphone  Injectable 0.5 milliGRAM(s) IV Push every 3 hours PRN Severe Pain (7 - 10)  melatonin 3 milliGRAM(s) Oral at bedtime PRN Insomnia  ondansetron Injectable 4 milliGRAM(s) IV Push every 8 hours PRN Nausea and/or Vomiting  oxyCODONE    IR 5 milliGRAM(s) Oral every 6 hours PRN Moderate Pain (4 - 6)    Vital Signs Last 24 Hrs  T(F): 98.2 (2023 05:07), Max: 98.2 (2023 05:07)  HR: 80 (2023 05:07) (80 - 105)  BP: 130/72 (2023 05:07) (110/58 - 145/74)  RR: 19 (2023 05:07) (19 - 30)  SpO2: 98% (2023 05:07) (96% - 98%)    I&O's Summary    2023 07:01  -  2023 07:00  --------------------------------------------------------  IN: 130 mL / OUT: 650 mL / NET: -520 mL    PHYSICAL EXAM:  General: NAD, A/O x 3  ENT: MMM  Neck: Supple, No JVD  Abdomen: slightly distended, but soft, slightly ttp on /ruqrlq, +oswaldo with scant serosanguinous fluid  Extremities: No calf tenderness, No pitting edema    LABS:                        11.3   7.62  )-----------( 165      ( 2023 07:05 )             36.0         144  |  107  |  24  ----------------------------<  101  3.5   |  29  |  0.85    Ca    8.5      2023 07:05    TPro  6.2  /  Alb  1.9  /  TBili  1.0  /  DBili  0.4  /  AST  83  /  ALT  92  /  AlkPhos  224  02-      PT/INR - ( 2023 15:10 )   PT: 15.2 sec;   INR: 1.31 ratio         PTT - ( 2023 15:10 )  PTT:23.8 sec  Lactate, Blood: 1.0 mmol/L ( @ 17:21)  Lactate, Blood: 2.5 mmol/L ( @ 12:27)    11-23 Chol 160 mg/dL LDL -- HDL 44 mg/dL Trig 95 mg/dL    Urinalysis Basic - ( 2023 12:38 )    Color: Yellow / Appearance: Slightly Turbid / S.020 / pH: x  Gluc: x / Ketone: Trace  / Bili: Negative / Urobili: 1   Blood: x / Protein: 100 / Nitrite: Negative   Leuk Esterase: Moderate / RBC: 5-10 /HPF / WBC 11-25 /HPF   Sq Epi: x / Non Sq Epi: Neg.-Few / Bacteria: Moderate /HPF    Culture - Urine (collected 2023 12:38)  Source: Catheterized Catheterized  Preliminary Report (2023 23:25):    >100,000 CFU/ml Escherichia coli    >100,000 CFU/ml Aerococcus species    "Aerococcus spp. are predictably susceptible to penicillin,    ampicillin, tetracycline, and vancomycin.  All isolates are    resistant to sulfonamides"    Culture - Blood (collected 2023 12:27)  Source: .Blood Blood-Peripheral  Preliminary Report (2023 15:01):    No growth to date.    Culture - Blood (collected 2023 12:10)  Source: .Blood Blood-Peripheral  Gram Stain (2023 15:34):    Growth in anaerobic bottle: Gram positive cocci in pairs  Preliminary Report (2023 15:35):    Growth in anaerobic bottle: Gram positive cocci in pairs    ***Blood Panel PCR results on this specimen are available    approximately 3 hours after the Gram stain result.***    Gram stain, PCR, and/or culture results may not always    correspond due to difference in methodologies.    ************************************************************    This PCR assay was performed by multiplex PCR. This    Assay tests for 66 bacterial and resistance gene targets.    Please refer to the [facility] test directory    at [email or web address] for details.  Organism: Blood Culture PCR (2023 20:03)  Organism: Blood Culture PCR (2023 20:03)      -  Blood PCR Panel: NEG      Method Type: PCR      COVID-19 PCR: NotDetec (23 @ 12:25)       Patient is a 89y old  Male who presents with a chief complaint of abdominal pain (2023 09:34)  pt POD # 2 s/p lap kellee  no events noted overnight   pt currently NPO with sips of water  no n/v, pt denies flatus or bm    Patient seen and examined at bedside    ALLERGIES:  No Known Allergies    MEDICATIONS  (STANDING):  acetaminophen     Tablet .. 650 milliGRAM(s) Oral every 6 hours  finasteride 5 milliGRAM(s) Oral daily  lactated ringers. 1000 milliLiter(s) (75 mL/Hr) IV Continuous <Continuous>  piperacillin/tazobactam IVPB.. 3.375 Gram(s) IV Intermittent every 8 hours  potassium chloride    Tablet ER 40 milliEquivalent(s) Oral once  tamsulosin 0.4 milliGRAM(s) Oral at bedtime    MEDICATIONS  (PRN):  aluminum hydroxide/magnesium hydroxide/simethicone Suspension 30 milliLiter(s) Oral every 4 hours PRN Dyspepsia  HYDROmorphone  Injectable 0.5 milliGRAM(s) IV Push every 3 hours PRN Severe Pain (7 - 10)  melatonin 3 milliGRAM(s) Oral at bedtime PRN Insomnia  ondansetron Injectable 4 milliGRAM(s) IV Push every 8 hours PRN Nausea and/or Vomiting  oxyCODONE    IR 5 milliGRAM(s) Oral every 6 hours PRN Moderate Pain (4 - 6)    Vital Signs Last 24 Hrs  T(F): 98.2 (2023 05:07), Max: 98.2 (2023 05:07)  HR: 80 (2023 05:07) (80 - 105)  BP: 130/72 (2023 05:07) (110/58 - 145/74)  RR: 19 (2023 05:07) (19 - 30)  SpO2: 98% (2023 05:07) (96% - 98%)    I&O's Summary    2023 07:01  -  2023 07:00  --------------------------------------------------------  IN: 130 mL / OUT: 650 mL / NET: -520 mL    PHYSICAL EXAM:  General: NAD, A/O x 3  ENT: MMM  Neck: Supple, No JVD  Abdomen: slightly distended, but soft, slightly ttp on /ruqrlq, +oswaldo with scant serosanguinous fluid  Extremities: No calf tenderness, No pitting edema    LABS:                        11.3   7.62  )-----------( 165      ( 2023 07:05 )             36.0         144  |  107  |  24  ----------------------------<  101  3.5   |  29  |  0.85    Ca    8.5      2023 07:05    TPro  6.2  /  Alb  1.9  /  TBili  1.0  /  DBili  0.4  /  AST  83  /  ALT  92  /  AlkPhos  224  02-      PT/INR - ( 2023 15:10 )   PT: 15.2 sec;   INR: 1.31 ratio         PTT - ( 2023 15:10 )  PTT:23.8 sec  Lactate, Blood: 1.0 mmol/L ( @ 17:21)  Lactate, Blood: 2.5 mmol/L ( @ 12:27)    11-23 Chol 160 mg/dL LDL -- HDL 44 mg/dL Trig 95 mg/dL    Urinalysis Basic - ( 2023 12:38 )    Color: Yellow / Appearance: Slightly Turbid / S.020 / pH: x  Gluc: x / Ketone: Trace  / Bili: Negative / Urobili: 1   Blood: x / Protein: 100 / Nitrite: Negative   Leuk Esterase: Moderate / RBC: 5-10 /HPF / WBC 11-25 /HPF   Sq Epi: x / Non Sq Epi: Neg.-Few / Bacteria: Moderate /HPF    Culture - Urine (collected 2023 12:38)  Source: Catheterized Catheterized  Preliminary Report (2023 23:25):    >100,000 CFU/ml Escherichia coli    >100,000 CFU/ml Aerococcus species    "Aerococcus spp. are predictably susceptible to penicillin,    ampicillin, tetracycline, and vancomycin.  All isolates are    resistant to sulfonamides"    Culture - Blood (collected 2023 12:27)  Source: .Blood Blood-Peripheral  Preliminary Report (2023 15:01):    No growth to date.    Culture - Blood (collected 2023 12:10)  Source: .Blood Blood-Peripheral  Gram Stain (2023 15:34):    Growth in anaerobic bottle: Gram positive cocci in pairs  Preliminary Report (2023 15:35):    Growth in anaerobic bottle: Gram positive cocci in pairs    ***Blood Panel PCR results on this specimen are available    approximately 3 hours after the Gram stain result.***    Gram stain, PCR, and/or culture results may not always    correspond due to difference in methodologies.    ************************************************************    This PCR assay was performed by multiplex PCR. This    Assay tests for 66 bacterial and resistance gene targets.    Please refer to the [facility] test directory    at [email or web address] for details.  Organism: Blood Culture PCR (2023 20:03)  Organism: Blood Culture PCR (2023 20:03)      -  Blood PCR Panel: NEG      Method Type: PCR      COVID-19 PCR: NotDetec (23 @ 12:25)       Patient is a 89y old  Male who presents with a chief complaint of abdominal pain (2023 09:34)  pt POD # 2 s/p lap kellee  no events noted overnight   pt currently NPO with sips of water  no n/v, pt denies flatus or bm    Patient seen and examined at bedside    ALLERGIES:  No Known Allergies    MEDICATIONS  (STANDING):  acetaminophen     Tablet .. 650 milliGRAM(s) Oral every 6 hours  finasteride 5 milliGRAM(s) Oral daily  lactated ringers. 1000 milliLiter(s) (75 mL/Hr) IV Continuous <Continuous>  piperacillin/tazobactam IVPB.. 3.375 Gram(s) IV Intermittent every 8 hours  potassium chloride    Tablet ER 40 milliEquivalent(s) Oral once  tamsulosin 0.4 milliGRAM(s) Oral at bedtime    MEDICATIONS  (PRN):  aluminum hydroxide/magnesium hydroxide/simethicone Suspension 30 milliLiter(s) Oral every 4 hours PRN Dyspepsia  HYDROmorphone  Injectable 0.5 milliGRAM(s) IV Push every 3 hours PRN Severe Pain (7 - 10)  melatonin 3 milliGRAM(s) Oral at bedtime PRN Insomnia  ondansetron Injectable 4 milliGRAM(s) IV Push every 8 hours PRN Nausea and/or Vomiting  oxyCODONE    IR 5 milliGRAM(s) Oral every 6 hours PRN Moderate Pain (4 - 6)    Vital Signs Last 24 Hrs  T(F): 98.2 (2023 05:07), Max: 98.2 (2023 05:07)  HR: 80 (2023 05:07) (80 - 105)  BP: 130/72 (2023 05:07) (110/58 - 145/74)  RR: 19 (2023 05:07) (19 - 30)  SpO2: 98% (2023 05:07) (96% - 98%)    I&O's Summary    2023 07:01  -  2023 07:00  --------------------------------------------------------  IN: 130 mL / OUT: 650 mL / NET: -520 mL    PHYSICAL EXAM:  General: NAD, A/O x 3  ENT: MMM  Neck: Supple, No JVD  Abdomen: slightly distended, but soft, slightly ttp on /ruqrlq, +oswaldo with scant serosanguinous fluid  Extremities: No calf tenderness, No pitting edema    LABS:                        11.3   7.62  )-----------( 165      ( 2023 07:05 )             36.0         144  |  107  |  24  ----------------------------<  101  3.5   |  29  |  0.85    Ca    8.5      2023 07:05    TPro  6.2  /  Alb  1.9  /  TBili  1.0  /  DBili  0.4  /  AST  83  /  ALT  92  /  AlkPhos  224  02-      PT/INR - ( 2023 15:10 )   PT: 15.2 sec;   INR: 1.31 ratio         PTT - ( 2023 15:10 )  PTT:23.8 sec  Lactate, Blood: 1.0 mmol/L ( @ 17:21)  Lactate, Blood: 2.5 mmol/L ( @ 12:27)    11-23 Chol 160 mg/dL LDL -- HDL 44 mg/dL Trig 95 mg/dL    Urinalysis Basic - ( 2023 12:38 )    Color: Yellow / Appearance: Slightly Turbid / S.020 / pH: x  Gluc: x / Ketone: Trace  / Bili: Negative / Urobili: 1   Blood: x / Protein: 100 / Nitrite: Negative   Leuk Esterase: Moderate / RBC: 5-10 /HPF / WBC 11-25 /HPF   Sq Epi: x / Non Sq Epi: Neg.-Few / Bacteria: Moderate /HPF    Culture - Urine (collected 2023 12:38)  Source: Catheterized Catheterized  Preliminary Report (2023 23:25):    >100,000 CFU/ml Escherichia coli    >100,000 CFU/ml Aerococcus species    "Aerococcus spp. are predictably susceptible to penicillin,    ampicillin, tetracycline, and vancomycin.  All isolates are    resistant to sulfonamides"    Culture - Blood (collected 2023 12:27)  Source: .Blood Blood-Peripheral  Preliminary Report (2023 15:01):    No growth to date.    Culture - Blood (collected 2023 12:10)  Source: .Blood Blood-Peripheral  Gram Stain (2023 15:34):    Growth in anaerobic bottle: Gram positive cocci in pairs  Preliminary Report (2023 15:35):    Growth in anaerobic bottle: Gram positive cocci in pairs    ***Blood Panel PCR results on this specimen are available    approximately 3 hours after the Gram stain result.***    Gram stain, PCR, and/or culture results may not always    correspond due to difference in methodologies.    ************************************************************    This PCR assay was performed by multiplex PCR. This    Assay tests for 66 bacterial and resistance gene targets.    Please refer to the [facility] test directory    at [email or web address] for details.  Organism: Blood Culture PCR (2023 20:03)  Organism: Blood Culture PCR (2023 20:03)      -  Blood PCR Panel: NEG      Method Type: PCR      COVID-19 PCR: NotDetec (23 @ 12:25)

## 2023-02-01 NOTE — CONSULT NOTE ADULT - ASSESSMENT
Patient is a 89y male with a past history of a TIA,BPH,GERD, Fe def anemia, remote colon cancer s/p surgical resection, recent fall with left hip fracture, s/p left hip replacement in November of 2022, who was at a rehab facility when he developed abdominal pain, n/v, elevated LFT's and elevated wbc and was sent to ER on 1/30.His imaging revealed a perforated GB and he was taken to OR. He had laparoscopic E Lap with removal of gangrenous, perforated gallbladder with adhesions to small bowel.He was placed on zosyn in ER and he is growing a GPC in anaerobic bottle of admission blood culture.  He had an irregular rhythm on admission, ? A fib, is now in NSR.He was extubated in RR, is alert, has a felipe and is requesting water.  Zosyn should be adequate coverage for gangrenous cholecystitis.His blood isolate is likely an anaerobic organism which should be covered by zosyn. his repeat blood cultures are negative so far.Zosyn should provide adequate  coverage of E Coli and aerococcus although they may both be colonizers.  Suggest:  1.Continue zosyn, POD 2  2.Await gallbladder culture and further ID of blood isolate  3.Supportive care per surgery  4.Anticoagulation per cardiology  5.Satisfactory post op course so far from ID viewpoint.

## 2023-02-01 NOTE — PROGRESS NOTE ADULT - ASSESSMENT
89y year old Male with PMH of recent L hip fx (11/2022), TIA (11/2022) who presents to the ER from [facility] for abdominal pain, nausea for 4 days. Admitted for acute cholecystitis    #Acute cholecystitis c/b perforated gallbladder  #Transaminitis, Hyperbilirubinemia  - s/p lap kellee with Dr Thompson on 1/30, POD 2  - Continue NPO with ice chips for now, advance diet as per surgery  - IVF while NPO  - Blood cultures growing gram + cocci, ID consult pending  - Continue zosyn for now   - Hold off on pharmacologic DVT ppx until cleared by surgery  - LFT's now downtrending   - Pain control, will order standing tylenol dose with oxy/dilaudid PRN  - Surgery following     #Acute Kidney Injury - improved   - Monitor BMP  - Avoid nephrotoxins    #Urinary retention  - Patient with chronic felipe - was placed in November after fall/hip fx, no TOV has been done as per daughter. No hx of urinary retention prior to that event   - Continue felipe for now  - Consider TOV once patient becomes more mobile     #Hx of TIA  - c/w statin  - Resume aspirin once cleared by surgery     #DVT Prophylaxis  - IPCDs for now     GOC: DNR/I, MOLST in chart    Dispo: Pending above     1/31: Updated daughter Shelli at [phone]     89y year old Male with PMH of recent L hip fx (11/2022), TIA (11/2022) who presents to the ER from [facility] for abdominal pain, nausea for 4 days. Admitted for acute cholecystitis    #Acute cholecystitis c/b perforated gallbladder  #Transaminitis, Hyperbilirubinemia  - s/p lap kellee with Dr Thompson on 1/30, POD 2  - Continue NPO with ice chips for now, advance diet as per surgery  - IVF while NPO  - Blood cultures growing gram + cocci, ID consult pending  - Continue zosyn for now   - Hold off on pharmacologic DVT ppx until cleared by surgery  - LFT's now downtrending   - Pain control, will order standing tylenol dose with oxy/dilaudid PRN  - Surgery following     #Acute Kidney Injury - improved   - Monitor BMP  - Avoid nephrotoxins    #Urinary retention  - Patient with chronic felipe - was placed in November after fall/hip fx, no TOV has been done as per daughter. No hx of urinary retention prior to that event   - Continue felipe for now  - Consider TOV once patient becomes more mobile     #Hx of TIA  - c/w statin  - Resume aspirin once cleared by surgery     #DVT Prophylaxis  - IPCDs for now     GOC: DNR/I, MOLST in chart    Dispo: Pending above     1/31: Updated daughter Shelli at [phone]     89y year old Male with PMH of recent L hip fx (11/2022), TIA (11/2022) who presents to the ER from [facility] for abdominal pain, nausea for 4 days. Admitted for acute cholecystitis    #Acute cholecystitis c/b perforated gallbladder  #Transaminitis, Hyperbilirubinemia  - s/p lap kellee with Dr Thompson on 1/30, POD 2  - Continue NPO with ice chips for now, advance diet as per surgery  - IVF while NPO  - Blood cultures growing gram + cocci, ID consult pending  - Continue zosyn for now   - Hold off on pharmacologic DVT ppx until cleared by surgery  - LFT's now downtrending   - Pain control, will order standing tylenol dose with oxy/dilaudid PRN  - Surgery following     #Acute Kidney Injury - improved   - Monitor BMP  - Avoid nephrotoxins    #Urinary retention  - Patient with chronic felipe - was placed in November after fall/hip fx, no TOV has been done as per daughter. No hx of urinary retention prior to that event   - Continue felipe for now  - Consider TOV once patient becomes more mobile     #Hx of TIA  - c/w statin  - Resume aspirin once cleared by surgery     #DVT Prophylaxis  - IPCDs for now     GOC: DNR/I, MOLST in chart    Dispo: Pending above     1/31: Updated daughter Shelli at [phone]     89y year old Male with PMH of recent L hip fx (11/2022), TIA (11/2022) who presents to the ER from [facility] for abdominal pain, nausea for 4 days. Admitted for acute cholecystitis    #Acute cholecystitis c/b perforated gallbladder  #Transaminitis, Hyperbilirubinemia  - s/p lap kellee with Dr Thompson on 1/30, POD 2  - Continue NPO with ice chips for now, advance diet as per surgery  - IVF while NPO  - Blood cultures growing gram + cocci, ID consulted  - Continue zosyn for now   - Hold off on pharmacologic DVT ppx until cleared by surgery  - LFT's now downtrending   - Pain control, will order standing tylenol dose with oxy/dilaudid PRN  - ID and Surgery following     #Acute Kidney Injury - improved   - Monitor BMP  - Avoid nephrotoxins    #Urinary retention  - Patient with chronic felipe - was placed in November after fall/hip fx, no TOV has been done as per daughter. No hx of urinary retention prior to that event   - Continue felipe for now  - Consider TOV once patient becomes more mobile     #Hx of TIA  - c/w statin  - Resume aspirin once cleared by surgery     #DVT Prophylaxis  - IPCDs for now     GOC: DNR/I, MOLST in chart    Dispo: Pending above     2/1: Updated daughter Shelli at [phone]     89y year old Male with PMH of recent L hip fx (11/2022), TIA (11/2022) who presents to the ER from [facility] for abdominal pain, nausea for 4 days. Admitted for acute cholecystitis    #Acute cholecystitis c/b perforated gallbladder  #Transaminitis, Hyperbilirubinemia  - s/p lap kellee with Dr Thompson on 1/30, POD 2  - Continue NPO with ice chips for now, advance diet as per surgery  - IVF while NPO  - Blood cultures growing gram + cocci, ID consulted  - Continue zosyn for now   - Hold off on pharmacologic DVT ppx until cleared by surgery  - LFT's now downtrending   - Pain control, will order standing tylenol dose with oxy/dilaudid PRN  - ID and Surgery following     #Acute Kidney Injury - improved   - Monitor BMP  - Avoid nephrotoxins    #Urinary retention  - Patient with chronic felipe - was placed in November after fall/hip fx, no TOV has been done as per daughter. No hx of urinary retention prior to that event   - Continue felipe for now  - Consider TOV once patient becomes more mobile     #Hx of TIA  - c/w statin  - Resume aspirin once cleared by surgery     #DVT Prophylaxis  - IPCDs for now     GOC: DNR/I, MOLST in chart    Dispo: Pending above     2/1: Updated daughter Shelli at [phone]     89y year old Male with PMH of recent L hip fx (11/2022), TIA (11/2022) who presents to the ER from [facility] for abdominal pain, nausea for 4 days. Admitted for acute cholecystitis    #Acute cholecystitis c/b perforated gallbladder  #Transaminitis, Hyperbilirubinemia  - s/p lap kellee with Dr Thompson on 1/30, POD 2  - Continue NPO with ice chips for now, advance diet as per surgery  - IVF while NPO  - Blood cultures growing gram + cocci, ID consulted  - Continue zosyn for now   - Hold off on pharmacologic DVT ppx until cleared by surgery  - LFT's now downtrending   - Pain control, will order standing tylenol dose with oxy/dilaudid PRN  - ID and Surgery following     #Acute Kidney Injury - improved   - Monitor BMP  - Avoid nephrotoxins    #Urinary retention  - Patient with chronic felipe - was placed in November after fall/hip fx, no TOV has been done as per daughter. No hx of urinary retention prior to that event   - Continue felipe for now  - Consider TOV once patient becomes more mobile     #Hx of TIA  - c/w statin  - Resume aspirin once cleared by surgery     #DVT Prophylaxis  - IPCDs for now     GOC: DNR/I, MOLST in chart    Dispo: Pending above     2/1: Updated daughter Shelli at [phone]

## 2023-02-02 LAB
ALBUMIN SERPL ELPH-MCNC: 1.8 G/DL — LOW (ref 3.3–5)
ALP SERPL-CCNC: 200 U/L — HIGH (ref 40–120)
ALT FLD-CCNC: 72 U/L — HIGH (ref 10–45)
ANION GAP SERPL CALC-SCNC: 6 MMOL/L — SIGNIFICANT CHANGE UP (ref 5–17)
ANION GAP SERPL CALC-SCNC: 9 MMOL/L — SIGNIFICANT CHANGE UP (ref 5–17)
AST SERPL-CCNC: 42 U/L — HIGH (ref 10–40)
BILIRUB SERPL-MCNC: 0.6 MG/DL — SIGNIFICANT CHANGE UP (ref 0.2–1.2)
BUN SERPL-MCNC: 20 MG/DL — SIGNIFICANT CHANGE UP (ref 7–23)
CALCIUM SERPL-MCNC: 8.5 MG/DL — SIGNIFICANT CHANGE UP (ref 8.4–10.5)
CALCIUM SERPL-MCNC: 8.8 MG/DL — SIGNIFICANT CHANGE UP (ref 8.4–10.5)
CHLORIDE SERPL-SCNC: 107 MMOL/L — SIGNIFICANT CHANGE UP (ref 96–108)
CHLORIDE SERPL-SCNC: 112 MMOL/L — HIGH (ref 96–108)
CO2 SERPL-SCNC: 29 MMOL/L — SIGNIFICANT CHANGE UP (ref 22–31)
CREAT SERPL-MCNC: 0.73 MG/DL — SIGNIFICANT CHANGE UP (ref 0.5–1.3)
CREAT SERPL-MCNC: 0.81 MG/DL — SIGNIFICANT CHANGE UP (ref 0.5–1.3)
EGFR: 81 ML/MIN/1.73M2 — SIGNIFICANT CHANGE UP
EGFR: 84 ML/MIN/1.73M2 — SIGNIFICANT CHANGE UP
GLUCOSE SERPL-MCNC: 106 MG/DL — HIGH (ref 70–99)
GLUCOSE SERPL-MCNC: 116 MG/DL — HIGH (ref 70–99)
HCT VFR BLD CALC: 37.1 % — LOW (ref 39–50)
HGB BLD-MCNC: 11.4 G/DL — LOW (ref 13–17)
MAGNESIUM SERPL-MCNC: 2 MG/DL — SIGNIFICANT CHANGE UP (ref 1.6–2.6)
MCHC RBC-ENTMCNC: 29.5 PG — SIGNIFICANT CHANGE UP (ref 27–34)
MCHC RBC-ENTMCNC: 30.7 GM/DL — LOW (ref 32–36)
MCV RBC AUTO: 96.1 FL — SIGNIFICANT CHANGE UP (ref 80–100)
NRBC # BLD: 0 /100 WBCS — SIGNIFICANT CHANGE UP (ref 0–0)
PHOSPHATE SERPL-MCNC: 2.5 MG/DL — SIGNIFICANT CHANGE UP (ref 2.5–4.5)
PLATELET # BLD AUTO: 196 K/UL — SIGNIFICANT CHANGE UP (ref 150–400)
POTASSIUM SERPL-MCNC: 3.9 MMOL/L — SIGNIFICANT CHANGE UP (ref 3.5–5.3)
POTASSIUM SERPL-MCNC: 4 MMOL/L — SIGNIFICANT CHANGE UP (ref 3.5–5.3)
POTASSIUM SERPL-SCNC: 3.9 MMOL/L — SIGNIFICANT CHANGE UP (ref 3.5–5.3)
POTASSIUM SERPL-SCNC: 4 MMOL/L — SIGNIFICANT CHANGE UP (ref 3.5–5.3)
PROT SERPL-MCNC: 6 G/DL — SIGNIFICANT CHANGE UP (ref 6–8.3)
RBC # BLD: 3.86 M/UL — LOW (ref 4.2–5.8)
RBC # FLD: 15 % — HIGH (ref 10.3–14.5)
SODIUM SERPL-SCNC: 142 MMOL/L — SIGNIFICANT CHANGE UP (ref 135–145)
SODIUM SERPL-SCNC: 150 MMOL/L — HIGH (ref 135–145)
WBC # BLD: 9.29 K/UL — SIGNIFICANT CHANGE UP (ref 3.8–10.5)
WBC # FLD AUTO: 9.29 K/UL — SIGNIFICANT CHANGE UP (ref 3.8–10.5)

## 2023-02-02 PROCEDURE — 99233 SBSQ HOSP IP/OBS HIGH 50: CPT

## 2023-02-02 PROCEDURE — 99232 SBSQ HOSP IP/OBS MODERATE 35: CPT

## 2023-02-02 RX ORDER — ASPIRIN/CALCIUM CARB/MAGNESIUM 324 MG
81 TABLET ORAL DAILY
Refills: 0 | Status: DISCONTINUED | OUTPATIENT
Start: 2023-02-02 | End: 2023-02-09

## 2023-02-02 RX ORDER — DEXTROSE MONOHYDRATE, SODIUM CHLORIDE, AND POTASSIUM CHLORIDE 50; .745; 4.5 G/1000ML; G/1000ML; G/1000ML
1000 INJECTION, SOLUTION INTRAVENOUS
Refills: 0 | Status: DISCONTINUED | OUTPATIENT
Start: 2023-02-02 | End: 2023-02-03

## 2023-02-02 RX ORDER — SODIUM CHLORIDE 9 MG/ML
1000 INJECTION, SOLUTION INTRAVENOUS
Refills: 0 | Status: DISCONTINUED | OUTPATIENT
Start: 2023-02-02 | End: 2023-02-02

## 2023-02-02 RX ORDER — POLYETHYLENE GLYCOL 3350 17 G/17G
17 POWDER, FOR SOLUTION ORAL DAILY
Refills: 0 | Status: DISCONTINUED | OUTPATIENT
Start: 2023-02-02 | End: 2023-02-03

## 2023-02-02 RX ORDER — TAMSULOSIN HYDROCHLORIDE 0.4 MG/1
0.8 CAPSULE ORAL AT BEDTIME
Refills: 0 | Status: DISCONTINUED | OUTPATIENT
Start: 2023-02-02 | End: 2023-02-09

## 2023-02-02 RX ORDER — POTASSIUM CHLORIDE 20 MEQ
40 PACKET (EA) ORAL ONCE
Refills: 0 | Status: COMPLETED | OUTPATIENT
Start: 2023-02-02 | End: 2023-02-02

## 2023-02-02 RX ORDER — ENOXAPARIN SODIUM 100 MG/ML
30 INJECTION SUBCUTANEOUS EVERY 24 HOURS
Refills: 0 | Status: DISCONTINUED | OUTPATIENT
Start: 2023-02-02 | End: 2023-02-09

## 2023-02-02 RX ADMIN — FINASTERIDE 5 MILLIGRAM(S): 5 TABLET, FILM COATED ORAL at 11:36

## 2023-02-02 RX ADMIN — SODIUM CHLORIDE 75 MILLILITER(S): 9 INJECTION, SOLUTION INTRAVENOUS at 16:55

## 2023-02-02 RX ADMIN — PIPERACILLIN AND TAZOBACTAM 25 GRAM(S): 4; .5 INJECTION, POWDER, LYOPHILIZED, FOR SOLUTION INTRAVENOUS at 15:41

## 2023-02-02 RX ADMIN — TAMSULOSIN HYDROCHLORIDE 0.8 MILLIGRAM(S): 0.4 CAPSULE ORAL at 22:14

## 2023-02-02 RX ADMIN — Medication 40 MILLIEQUIVALENT(S): at 11:36

## 2023-02-02 RX ADMIN — Medication 650 MILLIGRAM(S): at 11:36

## 2023-02-02 RX ADMIN — Medication 650 MILLIGRAM(S): at 22:14

## 2023-02-02 RX ADMIN — Medication 650 MILLIGRAM(S): at 05:11

## 2023-02-02 RX ADMIN — ENOXAPARIN SODIUM 30 MILLIGRAM(S): 100 INJECTION SUBCUTANEOUS at 11:35

## 2023-02-02 RX ADMIN — SODIUM CHLORIDE 75 MILLILITER(S): 9 INJECTION, SOLUTION INTRAVENOUS at 11:37

## 2023-02-02 RX ADMIN — Medication 650 MILLIGRAM(S): at 23:00

## 2023-02-02 RX ADMIN — SODIUM CHLORIDE 75 MILLILITER(S): 9 INJECTION, SOLUTION INTRAVENOUS at 05:12

## 2023-02-02 RX ADMIN — PIPERACILLIN AND TAZOBACTAM 25 GRAM(S): 4; .5 INJECTION, POWDER, LYOPHILIZED, FOR SOLUTION INTRAVENOUS at 22:13

## 2023-02-02 RX ADMIN — DEXTROSE MONOHYDRATE, SODIUM CHLORIDE, AND POTASSIUM CHLORIDE 75 MILLILITER(S): 50; .745; 4.5 INJECTION, SOLUTION INTRAVENOUS at 22:13

## 2023-02-02 RX ADMIN — Medication 650 MILLIGRAM(S): at 06:15

## 2023-02-02 RX ADMIN — PIPERACILLIN AND TAZOBACTAM 25 GRAM(S): 4; .5 INJECTION, POWDER, LYOPHILIZED, FOR SOLUTION INTRAVENOUS at 06:41

## 2023-02-02 NOTE — PROGRESS NOTE ADULT - SUBJECTIVE AND OBJECTIVE BOX
89 yo male POD #3 s/p lap kellee for perforated, gangrenous gall bladder  no events noted overnight  pt tolerating sips/chips, denies n/v  + flatus this morning  pt c/o mild incisional pain     Patient seen and examined at bedside    ALLERGIES:  No Known Allergies    MEDICATIONS  (STANDING):  acetaminophen     Tablet .. 650 milliGRAM(s) Oral every 6 hours  dextrose 5%. 1000 milliLiter(s) (75 mL/Hr) IV Continuous <Continuous>  enoxaparin Injectable 40 milliGRAM(s) SubCutaneous every 24 hours  finasteride 5 milliGRAM(s) Oral daily  piperacillin/tazobactam IVPB.. 3.375 Gram(s) IV Intermittent every 8 hours  potassium chloride   Powder 40 milliEquivalent(s) Oral once  tamsulosin 0.8 milliGRAM(s) Oral at bedtime    MEDICATIONS  (PRN):  aluminum hydroxide/magnesium hydroxide/simethicone Suspension 30 milliLiter(s) Oral every 4 hours PRN Dyspepsia  HYDROmorphone  Injectable 0.5 milliGRAM(s) IV Push every 3 hours PRN Severe Pain (7 - 10)  melatonin 3 milliGRAM(s) Oral at bedtime PRN Insomnia  ondansetron Injectable 4 milliGRAM(s) IV Push every 8 hours PRN Nausea and/or Vomiting  oxyCODONE    IR 5 milliGRAM(s) Oral every 6 hours PRN Moderate Pain (4 - 6)    Vital Signs Last 24 Hrs  T(F): 97.9 (2023 05:13), Max: 97.9 (2023 05:13)  HR: 84 (2023 05:13) (82 - 88)  BP: 140/64 (2023 05:13) (113/84 - 140/64)  RR: 18 (2023 05:13) (18 - 21)  SpO2: 97% (2023 05:13) (96% - 99%)    I&O's Summary    2023 07:01  -  2023 07:00  --------------------------------------------------------  IN: 850 mL / OUT: 535 mL / NET: 315 mL    PHYSICAL EXAM:  General: NAD, A/O x 3  ENT: MMM  Neck: Supple, No JVD  Abdomen: mildly distended but soft, mildly ttp on rlq, no guarding, no rebound tenderness, incisions c/d/i, oswaldo drain with serosanguinous fluid, + left reducible inguina hernia  Extremities: No calf tenderness, No pitting edema    LABS:                        11.4   9.29  )-----------( 196      ( 2023 07:15 )             37.1     02-02    150  |  112  |  20  ----------------------------<  106  3.9   |  29  |  0.73    Ca    8.8      2023 07:15  Phos  2.5     02-  Mg     2.0     02-02    TPro  6.0  /  Alb  1.8  /  TBili  0.6  /  DBili  x   /  AST  42  /  ALT  72  /  AlkPhos  200  02-02      PT/INR - ( 2023 15:10 )   PT: 15.2 sec;   INR: 1.31 ratio         PTT - ( 2023 15:10 )  PTT:23.8 sec  Lactate, Blood: 1.0 mmol/L ( @ 17:21)  Lactate, Blood: 2.5 mmol/L ( @ 12:27)    11-23 Chol 160 mg/dL LDL -- HDL 44 mg/dL Trig 95 mg/dL    Urinalysis Basic - ( 2023 12:38 )    Color: Yellow / Appearance: Slightly Turbid / S.020 / pH: x  Gluc: x / Ketone: Trace  / Bili: Negative / Urobili: 1   Blood: x / Protein: 100 / Nitrite: Negative   Leuk Esterase: Moderate / RBC: 5-10 /HPF / WBC 11-25 /HPF   Sq Epi: x / Non Sq Epi: Neg.-Few / Bacteria: Moderate /HPF    Culture - Urine (collected 2023 12:38)  Source: Catheterized Catheterized  Final Report (2023 19:29):    >100,000 CFU/ml Escherichia coli    >100,000 CFU/ml Aerococcus species    "Aerococcus spp. are predictably susceptible to penicillin,    ampicillin, tetracycline, and vancomycin.  All isolates are    resistant to sulfonamides"  Organism: Escherichia coli (2023 19:29)  Organism: Escherichia coli (2023 19:29)      -  Amikacin: S <=16      -  Amoxicillin/Clavulanic Acid: S <=8/4      -  Ampicillin: S <=8 These ampicillin results predict results for amoxicillin      -  Ampicillin/Sulbactam: S <=4/2 Enterobacter, Klebsiella aerogenes, Citrobacter, and Serratia may develop resistance during prolonged therapy (3-4 days)      -  Aztreonam: S <=4      -  Cefazolin: S <=2 For uncomplicated UTI with K. pneumoniae, E. coli, or P. mirablis: PHOEBE <=16 is sensitive and PHOEBE >=32 is resistant. This also predicts results for oral agents cefaclor, cefdinir, cefpodoxime, cefprozil, cefuroxime axetil, cephalexin and locarbef for uncomplicated UTI. Note that some isolates may be susceptible to these agents while testing resistant to cefazolin.      -  Cefepime: S 8      -  Cefoxitin: S <=8      -  Ceftriaxone: S <=1 Enterobacter, Klebsiella aerogenes, Citrobacter, and Serratia may develop resistance during prolonged therapy      -  Cefuroxime: S <=4      -  Ciprofloxacin: S <=0.25      -  Ertapenem: S <=0.5      -  Gentamicin: S <=2      -  Imipenem: S <=1      -  Levofloxacin: S <=0.5      -  Meropenem: S <=1      -  Nitrofurantoin: S <=32 Should not be used to treat pyelonephritis      -  Piperacillin/Tazobactam: S <=8      -  Tobramycin: S <=2      -  Trimethoprim/Sulfamethoxazole: S <=0.5/9.5      Method Type: PHOEBE    Culture - Blood (collected 2023 12:27)  Source: .Blood Blood-Peripheral  Preliminary Report (2023 15:01):    No growth to date.    Culture - Blood (collected 2023 12:10)  Source: .Blood Blood-Peripheral  Gram Stain (2023 15:34):    Growth in anaerobic bottle: Gram positive cocci in pairs  Final Report (2023 13:38):    Growth in anaerobic bottle: Aerococcus urinae    "Susceptibilities not performed"    ***Blood Panel PCR results on this specimen are available    approximately 3 hours after the Gram stain result.***    Gram stain, PCR, and/or culture results may not always    correspond due to difference in methodologies.    ************************************************************    This PCR assay was performed by multiplex PCR. This    Assay tests for 66 bacterial and resistance gene targets.    Please refer to the [facility] Labs test directory    at [email or web address] for details.  Organism: Blood Culture PCR (2023 13:38)  Organism: Blood Culture PCR (2023 13:38)      -  Blood PCR Panel: NEG      Method Type: PCR    COVID-19 PCR: NotDetec (23 @ 12:25)         89 yo male POD #3 s/p lap kellee for perforated, gangrenous gall bladder  no events noted overnight  pt tolerating sips/chips, denies n/v  + flatus this morning  pt c/o mild incisional pain     Patient seen and examined at bedside    ALLERGIES:  No Known Allergies    MEDICATIONS  (STANDING):  acetaminophen     Tablet .. 650 milliGRAM(s) Oral every 6 hours  dextrose 5%. 1000 milliLiter(s) (75 mL/Hr) IV Continuous <Continuous>  enoxaparin Injectable 40 milliGRAM(s) SubCutaneous every 24 hours  finasteride 5 milliGRAM(s) Oral daily  piperacillin/tazobactam IVPB.. 3.375 Gram(s) IV Intermittent every 8 hours  potassium chloride   Powder 40 milliEquivalent(s) Oral once  tamsulosin 0.8 milliGRAM(s) Oral at bedtime    MEDICATIONS  (PRN):  aluminum hydroxide/magnesium hydroxide/simethicone Suspension 30 milliLiter(s) Oral every 4 hours PRN Dyspepsia  HYDROmorphone  Injectable 0.5 milliGRAM(s) IV Push every 3 hours PRN Severe Pain (7 - 10)  melatonin 3 milliGRAM(s) Oral at bedtime PRN Insomnia  ondansetron Injectable 4 milliGRAM(s) IV Push every 8 hours PRN Nausea and/or Vomiting  oxyCODONE    IR 5 milliGRAM(s) Oral every 6 hours PRN Moderate Pain (4 - 6)    Vital Signs Last 24 Hrs  T(F): 97.9 (2023 05:13), Max: 97.9 (2023 05:13)  HR: 84 (2023 05:13) (82 - 88)  BP: 140/64 (2023 05:13) (113/84 - 140/64)  RR: 18 (2023 05:13) (18 - 21)  SpO2: 97% (2023 05:13) (96% - 99%)    I&O's Summary    2023 07:01  -  2023 07:00  --------------------------------------------------------  IN: 850 mL / OUT: 535 mL / NET: 315 mL    PHYSICAL EXAM:  General: NAD, A/O x 3  ENT: MMM  Neck: Supple, No JVD  Abdomen: mildly distended but soft, mildly ttp on rlq, no guarding, no rebound tenderness, incisions c/d/i, oswaldo drain with serosanguinous fluid, + left reducible inguina hernia  Extremities: No calf tenderness, No pitting edema    LABS:                        11.4   9.29  )-----------( 196      ( 2023 07:15 )             37.1     02-02    150  |  112  |  20  ----------------------------<  106  3.9   |  29  |  0.73    Ca    8.8      2023 07:15  Phos  2.5     02-  Mg     2.0     02-02    TPro  6.0  /  Alb  1.8  /  TBili  0.6  /  DBili  x   /  AST  42  /  ALT  72  /  AlkPhos  200  02-02      PT/INR - ( 2023 15:10 )   PT: 15.2 sec;   INR: 1.31 ratio         PTT - ( 2023 15:10 )  PTT:23.8 sec  Lactate, Blood: 1.0 mmol/L ( @ 17:21)  Lactate, Blood: 2.5 mmol/L ( @ 12:27)    11-23 Chol 160 mg/dL LDL -- HDL 44 mg/dL Trig 95 mg/dL    Urinalysis Basic - ( 2023 12:38 )    Color: Yellow / Appearance: Slightly Turbid / S.020 / pH: x  Gluc: x / Ketone: Trace  / Bili: Negative / Urobili: 1   Blood: x / Protein: 100 / Nitrite: Negative   Leuk Esterase: Moderate / RBC: 5-10 /HPF / WBC 11-25 /HPF   Sq Epi: x / Non Sq Epi: Neg.-Few / Bacteria: Moderate /HPF    Culture - Urine (collected 2023 12:38)  Source: Catheterized Catheterized  Final Report (2023 19:29):    >100,000 CFU/ml Escherichia coli    >100,000 CFU/ml Aerococcus species    "Aerococcus spp. are predictably susceptible to penicillin,    ampicillin, tetracycline, and vancomycin.  All isolates are    resistant to sulfonamides"  Organism: Escherichia coli (2023 19:29)  Organism: Escherichia coli (2023 19:29)      -  Amikacin: S <=16      -  Amoxicillin/Clavulanic Acid: S <=8/4      -  Ampicillin: S <=8 These ampicillin results predict results for amoxicillin      -  Ampicillin/Sulbactam: S <=4/2 Enterobacter, Klebsiella aerogenes, Citrobacter, and Serratia may develop resistance during prolonged therapy (3-4 days)      -  Aztreonam: S <=4      -  Cefazolin: S <=2 For uncomplicated UTI with K. pneumoniae, E. coli, or P. mirablis: PHOEBE <=16 is sensitive and PHOEBE >=32 is resistant. This also predicts results for oral agents cefaclor, cefdinir, cefpodoxime, cefprozil, cefuroxime axetil, cephalexin and locarbef for uncomplicated UTI. Note that some isolates may be susceptible to these agents while testing resistant to cefazolin.      -  Cefepime: S 8      -  Cefoxitin: S <=8      -  Ceftriaxone: S <=1 Enterobacter, Klebsiella aerogenes, Citrobacter, and Serratia may develop resistance during prolonged therapy      -  Cefuroxime: S <=4      -  Ciprofloxacin: S <=0.25      -  Ertapenem: S <=0.5      -  Gentamicin: S <=2      -  Imipenem: S <=1      -  Levofloxacin: S <=0.5      -  Meropenem: S <=1      -  Nitrofurantoin: S <=32 Should not be used to treat pyelonephritis      -  Piperacillin/Tazobactam: S <=8      -  Tobramycin: S <=2      -  Trimethoprim/Sulfamethoxazole: S <=0.5/9.5      Method Type: PHOEBE    Culture - Blood (collected 2023 12:27)  Source: .Blood Blood-Peripheral  Preliminary Report (2023 15:01):    No growth to date.    Culture - Blood (collected 2023 12:10)  Source: .Blood Blood-Peripheral  Gram Stain (2023 15:34):    Growth in anaerobic bottle: Gram positive cocci in pairs  Final Report (2023 13:38):    Growth in anaerobic bottle: Aerococcus urinae    "Susceptibilities not performed"    ***Blood Panel PCR results on this specimen are available    approximately 3 hours after the Gram stain result.***    Gram stain, PCR, and/or culture results may not always    correspond due to difference in methodologies.    ************************************************************    This PCR assay was performed by multiplex PCR. This    Assay tests for 66 bacterial and resistance gene targets.    Please refer to the [facility] Labs test directory    at [email or web address] for details.  Organism: Blood Culture PCR (2023 13:38)  Organism: Blood Culture PCR (2023 13:38)      -  Blood PCR Panel: NEG      Method Type: PCR    COVID-19 PCR: NotDetec (23 @ 12:25)         89 yo male POD #3 s/p lap kellee for perforated, gangrenous gall bladder  no events noted overnight  pt tolerating sips/chips, denies n/v  + flatus this morning  pt c/o mild incisional pain     Patient seen and examined at bedside    ALLERGIES:  No Known Allergies    MEDICATIONS  (STANDING):  acetaminophen     Tablet .. 650 milliGRAM(s) Oral every 6 hours  dextrose 5%. 1000 milliLiter(s) (75 mL/Hr) IV Continuous <Continuous>  enoxaparin Injectable 40 milliGRAM(s) SubCutaneous every 24 hours  finasteride 5 milliGRAM(s) Oral daily  piperacillin/tazobactam IVPB.. 3.375 Gram(s) IV Intermittent every 8 hours  potassium chloride   Powder 40 milliEquivalent(s) Oral once  tamsulosin 0.8 milliGRAM(s) Oral at bedtime    MEDICATIONS  (PRN):  aluminum hydroxide/magnesium hydroxide/simethicone Suspension 30 milliLiter(s) Oral every 4 hours PRN Dyspepsia  HYDROmorphone  Injectable 0.5 milliGRAM(s) IV Push every 3 hours PRN Severe Pain (7 - 10)  melatonin 3 milliGRAM(s) Oral at bedtime PRN Insomnia  ondansetron Injectable 4 milliGRAM(s) IV Push every 8 hours PRN Nausea and/or Vomiting  oxyCODONE    IR 5 milliGRAM(s) Oral every 6 hours PRN Moderate Pain (4 - 6)    Vital Signs Last 24 Hrs  T(F): 97.9 (2023 05:13), Max: 97.9 (2023 05:13)  HR: 84 (2023 05:13) (82 - 88)  BP: 140/64 (2023 05:13) (113/84 - 140/64)  RR: 18 (2023 05:13) (18 - 21)  SpO2: 97% (2023 05:13) (96% - 99%)    I&O's Summary    2023 07:01  -  2023 07:00  --------------------------------------------------------  IN: 850 mL / OUT: 535 mL / NET: 315 mL    PHYSICAL EXAM:  General: NAD, A/O x 3  ENT: MMM  Neck: Supple, No JVD  Abdomen: mildly distended but soft, mildly ttp on rlq, no guarding, no rebound tenderness, incisions c/d/i, oswaldo drain with serosanguinous fluid, + left reducible inguina hernia  Extremities: No calf tenderness, No pitting edema    LABS:                        11.4   9.29  )-----------( 196      ( 2023 07:15 )             37.1     02-02    150  |  112  |  20  ----------------------------<  106  3.9   |  29  |  0.73    Ca    8.8      2023 07:15  Phos  2.5     02-  Mg     2.0     02-02    TPro  6.0  /  Alb  1.8  /  TBili  0.6  /  DBili  x   /  AST  42  /  ALT  72  /  AlkPhos  200  02-02      PT/INR - ( 2023 15:10 )   PT: 15.2 sec;   INR: 1.31 ratio         PTT - ( 2023 15:10 )  PTT:23.8 sec  Lactate, Blood: 1.0 mmol/L ( @ 17:21)  Lactate, Blood: 2.5 mmol/L ( @ 12:27)    11-23 Chol 160 mg/dL LDL -- HDL 44 mg/dL Trig 95 mg/dL    Urinalysis Basic - ( 2023 12:38 )    Color: Yellow / Appearance: Slightly Turbid / S.020 / pH: x  Gluc: x / Ketone: Trace  / Bili: Negative / Urobili: 1   Blood: x / Protein: 100 / Nitrite: Negative   Leuk Esterase: Moderate / RBC: 5-10 /HPF / WBC 11-25 /HPF   Sq Epi: x / Non Sq Epi: Neg.-Few / Bacteria: Moderate /HPF    Culture - Urine (collected 2023 12:38)  Source: Catheterized Catheterized  Final Report (2023 19:29):    >100,000 CFU/ml Escherichia coli    >100,000 CFU/ml Aerococcus species    "Aerococcus spp. are predictably susceptible to penicillin,    ampicillin, tetracycline, and vancomycin.  All isolates are    resistant to sulfonamides"  Organism: Escherichia coli (2023 19:29)  Organism: Escherichia coli (2023 19:29)      -  Amikacin: S <=16      -  Amoxicillin/Clavulanic Acid: S <=8/4      -  Ampicillin: S <=8 These ampicillin results predict results for amoxicillin      -  Ampicillin/Sulbactam: S <=4/2 Enterobacter, Klebsiella aerogenes, Citrobacter, and Serratia may develop resistance during prolonged therapy (3-4 days)      -  Aztreonam: S <=4      -  Cefazolin: S <=2 For uncomplicated UTI with K. pneumoniae, E. coli, or P. mirablis: PHOEBE <=16 is sensitive and PHOEBE >=32 is resistant. This also predicts results for oral agents cefaclor, cefdinir, cefpodoxime, cefprozil, cefuroxime axetil, cephalexin and locarbef for uncomplicated UTI. Note that some isolates may be susceptible to these agents while testing resistant to cefazolin.      -  Cefepime: S 8      -  Cefoxitin: S <=8      -  Ceftriaxone: S <=1 Enterobacter, Klebsiella aerogenes, Citrobacter, and Serratia may develop resistance during prolonged therapy      -  Cefuroxime: S <=4      -  Ciprofloxacin: S <=0.25      -  Ertapenem: S <=0.5      -  Gentamicin: S <=2      -  Imipenem: S <=1      -  Levofloxacin: S <=0.5      -  Meropenem: S <=1      -  Nitrofurantoin: S <=32 Should not be used to treat pyelonephritis      -  Piperacillin/Tazobactam: S <=8      -  Tobramycin: S <=2      -  Trimethoprim/Sulfamethoxazole: S <=0.5/9.5      Method Type: PHOEBE    Culture - Blood (collected 2023 12:27)  Source: .Blood Blood-Peripheral  Preliminary Report (2023 15:01):    No growth to date.    Culture - Blood (collected 2023 12:10)  Source: .Blood Blood-Peripheral  Gram Stain (2023 15:34):    Growth in anaerobic bottle: Gram positive cocci in pairs  Final Report (2023 13:38):    Growth in anaerobic bottle: Aerococcus urinae    "Susceptibilities not performed"    ***Blood Panel PCR results on this specimen are available    approximately 3 hours after the Gram stain result.***    Gram stain, PCR, and/or culture results may not always    correspond due to difference in methodologies.    ************************************************************    This PCR assay was performed by multiplex PCR. This    Assay tests for 66 bacterial and resistance gene targets.    Please refer to the [facility] Labs test directory    at [email or web address] for details.  Organism: Blood Culture PCR (2023 13:38)  Organism: Blood Culture PCR (2023 13:38)      -  Blood PCR Panel: NEG      Method Type: PCR    COVID-19 PCR: NotDetec (23 @ 12:25)         89 yo male POD #3 s/p lap kellee for perforated, gangrenous gall bladder  no events noted overnight  pt tolerating sips/chips, denies n/v  pt c/o mild incisional pain     Patient seen and examined at bedside    ALLERGIES:  No Known Allergies    MEDICATIONS  (STANDING):  acetaminophen     Tablet .. 650 milliGRAM(s) Oral every 6 hours  dextrose 5%. 1000 milliLiter(s) (75 mL/Hr) IV Continuous <Continuous>  enoxaparin Injectable 40 milliGRAM(s) SubCutaneous every 24 hours  finasteride 5 milliGRAM(s) Oral daily  piperacillin/tazobactam IVPB.. 3.375 Gram(s) IV Intermittent every 8 hours  potassium chloride   Powder 40 milliEquivalent(s) Oral once  tamsulosin 0.8 milliGRAM(s) Oral at bedtime    MEDICATIONS  (PRN):  aluminum hydroxide/magnesium hydroxide/simethicone Suspension 30 milliLiter(s) Oral every 4 hours PRN Dyspepsia  HYDROmorphone  Injectable 0.5 milliGRAM(s) IV Push every 3 hours PRN Severe Pain (7 - 10)  melatonin 3 milliGRAM(s) Oral at bedtime PRN Insomnia  ondansetron Injectable 4 milliGRAM(s) IV Push every 8 hours PRN Nausea and/or Vomiting  oxyCODONE    IR 5 milliGRAM(s) Oral every 6 hours PRN Moderate Pain (4 - 6)    Vital Signs Last 24 Hrs  T(F): 97.9 (2023 05:13), Max: 97.9 (2023 05:13)  HR: 84 (2023 05:13) (82 - 88)  BP: 140/64 (2023 05:13) (113/84 - 140/64)  RR: 18 (2023 05:13) (18 - 21)  SpO2: 97% (2023 05:13) (96% - 99%)    I&O's Summary    2023 07:01  -  2023 07:00  --------------------------------------------------------  IN: 850 mL / OUT: 535 mL / NET: 315 mL    PHYSICAL EXAM:  General: NAD, A/O x 3  ENT: MMM  Neck: Supple, No JVD  Abdomen: mildly distended but soft, mildly ttp on rlq, no guarding, no rebound tenderness, incisions c/d/i, oswaldo drain with serosanguinous fluid, + left reducible inguina hernia  Extremities: No calf tenderness, No pitting edema    LABS:                        11.4   9.29  )-----------( 196      ( 2023 07:15 )             37.1     02-02    150  |  112  |  20  ----------------------------<  106  3.9   |  29  |  0.73    Ca    8.8      2023 07:15  Phos  2.5     02-  Mg     2.0     02-    TPro  6.0  /  Alb  1.8  /  TBili  0.6  /  DBili  x   /  AST  42  /  ALT  72  /  AlkPhos  200  02-02      PT/INR - ( 2023 15:10 )   PT: 15.2 sec;   INR: 1.31 ratio         PTT - ( 2023 15:10 )  PTT:23.8 sec  Lactate, Blood: 1.0 mmol/L ( @ 17:21)  Lactate, Blood: 2.5 mmol/L ( @ 12:27)    11-23 Chol 160 mg/dL LDL -- HDL 44 mg/dL Trig 95 mg/dL    Urinalysis Basic - ( 2023 12:38 )    Color: Yellow / Appearance: Slightly Turbid / S.020 / pH: x  Gluc: x / Ketone: Trace  / Bili: Negative / Urobili: 1   Blood: x / Protein: 100 / Nitrite: Negative   Leuk Esterase: Moderate / RBC: 5-10 /HPF / WBC 11-25 /HPF   Sq Epi: x / Non Sq Epi: Neg.-Few / Bacteria: Moderate /HPF    Culture - Urine (collected 2023 12:38)  Source: Catheterized Catheterized  Final Report (2023 19:29):    >100,000 CFU/ml Escherichia coli    >100,000 CFU/ml Aerococcus species    "Aerococcus spp. are predictably susceptible to penicillin,    ampicillin, tetracycline, and vancomycin.  All isolates are    resistant to sulfonamides"  Organism: Escherichia coli (2023 19:29)  Organism: Escherichia coli (2023 19:29)      -  Amikacin: S <=16      -  Amoxicillin/Clavulanic Acid: S <=8/4      -  Ampicillin: S <=8 These ampicillin results predict results for amoxicillin      -  Ampicillin/Sulbactam: S <=4/2 Enterobacter, Klebsiella aerogenes, Citrobacter, and Serratia may develop resistance during prolonged therapy (3-4 days)      -  Aztreonam: S <=4      -  Cefazolin: S <=2 For uncomplicated UTI with K. pneumoniae, E. coli, or P. mirablis: PHOEBE <=16 is sensitive and PHOEBE >=32 is resistant. This also predicts results for oral agents cefaclor, cefdinir, cefpodoxime, cefprozil, cefuroxime axetil, cephalexin and locarbef for uncomplicated UTI. Note that some isolates may be susceptible to these agents while testing resistant to cefazolin.      -  Cefepime: S 8      -  Cefoxitin: S <=8      -  Ceftriaxone: S <=1 Enterobacter, Klebsiella aerogenes, Citrobacter, and Serratia may develop resistance during prolonged therapy      -  Cefuroxime: S <=4      -  Ciprofloxacin: S <=0.25      -  Ertapenem: S <=0.5      -  Gentamicin: S <=2      -  Imipenem: S <=1      -  Levofloxacin: S <=0.5      -  Meropenem: S <=1      -  Nitrofurantoin: S <=32 Should not be used to treat pyelonephritis      -  Piperacillin/Tazobactam: S <=8      -  Tobramycin: S <=2      -  Trimethoprim/Sulfamethoxazole: S <=0.5/9.5      Method Type: PHOEBE    Culture - Blood (collected 2023 12:27)  Source: .Blood Blood-Peripheral  Preliminary Report (2023 15:01):    No growth to date.    Culture - Blood (collected 2023 12:10)  Source: .Blood Blood-Peripheral  Gram Stain (2023 15:34):    Growth in anaerobic bottle: Gram positive cocci in pairs  Final Report (2023 13:38):    Growth in anaerobic bottle: Aerococcus urinae    "Susceptibilities not performed"    ***Blood Panel PCR results on this specimen are available    approximately 3 hours after the Gram stain result.***    Gram stain, PCR, and/or culture results may not always    correspond due to difference in methodologies.    ************************************************************    This PCR assay was performed by multiplex PCR. This    Assay tests for 66 bacterial and resistance gene targets.    Please refer to the [facility] Labs test directory    at [email or web address] for details.  Organism: Blood Culture PCR (2023 13:38)  Organism: Blood Culture PCR (2023 13:38)      -  Blood PCR Panel: NEG      Method Type: PCR    COVID-19 PCR: NotDetec (23 @ 12:25)         89 yo male POD #3 s/p lap kellee for perforated, gangrenous gall bladder  no events noted overnight  pt tolerating sips/chips, denies n/v  pt c/o mild incisional pain     Patient seen and examined at bedside    ALLERGIES:  No Known Allergies    MEDICATIONS  (STANDING):  acetaminophen     Tablet .. 650 milliGRAM(s) Oral every 6 hours  dextrose 5%. 1000 milliLiter(s) (75 mL/Hr) IV Continuous <Continuous>  enoxaparin Injectable 40 milliGRAM(s) SubCutaneous every 24 hours  finasteride 5 milliGRAM(s) Oral daily  piperacillin/tazobactam IVPB.. 3.375 Gram(s) IV Intermittent every 8 hours  potassium chloride   Powder 40 milliEquivalent(s) Oral once  tamsulosin 0.8 milliGRAM(s) Oral at bedtime    MEDICATIONS  (PRN):  aluminum hydroxide/magnesium hydroxide/simethicone Suspension 30 milliLiter(s) Oral every 4 hours PRN Dyspepsia  HYDROmorphone  Injectable 0.5 milliGRAM(s) IV Push every 3 hours PRN Severe Pain (7 - 10)  melatonin 3 milliGRAM(s) Oral at bedtime PRN Insomnia  ondansetron Injectable 4 milliGRAM(s) IV Push every 8 hours PRN Nausea and/or Vomiting  oxyCODONE    IR 5 milliGRAM(s) Oral every 6 hours PRN Moderate Pain (4 - 6)    Vital Signs Last 24 Hrs  T(F): 97.9 (2023 05:13), Max: 97.9 (2023 05:13)  HR: 84 (2023 05:13) (82 - 88)  BP: 140/64 (2023 05:13) (113/84 - 140/64)  RR: 18 (2023 05:13) (18 - 21)  SpO2: 97% (2023 05:13) (96% - 99%)    I&O's Summary    2023 07:01  -  2023 07:00  --------------------------------------------------------  IN: 850 mL / OUT: 535 mL / NET: 315 mL    PHYSICAL EXAM:  General: NAD, A/O x 3  ENT: MMM  Neck: Supple, No JVD  Abdomen: mildly distended but soft, mildly ttp on rlq, no guarding, no rebound tenderness, incisions c/d/i, oswaldo drain with serosanguinous fluid, + left reducible inguina hernia  Extremities: No calf tenderness, No pitting edema    LABS:                        11.4   9.29  )-----------( 196      ( 2023 07:15 )             37.1     02-02    150  |  112  |  20  ----------------------------<  106  3.9   |  29  |  0.73    Ca    8.8      2023 07:15  Phos  2.5     02-  Mg     2.0     02-    TPro  6.0  /  Alb  1.8  /  TBili  0.6  /  DBili  x   /  AST  42  /  ALT  72  /  AlkPhos  200  02-02      PT/INR - ( 2023 15:10 )   PT: 15.2 sec;   INR: 1.31 ratio         PTT - ( 2023 15:10 )  PTT:23.8 sec  Lactate, Blood: 1.0 mmol/L ( @ 17:21)  Lactate, Blood: 2.5 mmol/L ( @ 12:27)    11-23 Chol 160 mg/dL LDL -- HDL 44 mg/dL Trig 95 mg/dL    Urinalysis Basic - ( 2023 12:38 )    Color: Yellow / Appearance: Slightly Turbid / S.020 / pH: x  Gluc: x / Ketone: Trace  / Bili: Negative / Urobili: 1   Blood: x / Protein: 100 / Nitrite: Negative   Leuk Esterase: Moderate / RBC: 5-10 /HPF / WBC 11-25 /HPF   Sq Epi: x / Non Sq Epi: Neg.-Few / Bacteria: Moderate /HPF    Culture - Urine (collected 2023 12:38)  Source: Catheterized Catheterized  Final Report (2023 19:29):    >100,000 CFU/ml Escherichia coli    >100,000 CFU/ml Aerococcus species    "Aerococcus spp. are predictably susceptible to penicillin,    ampicillin, tetracycline, and vancomycin.  All isolates are    resistant to sulfonamides"  Organism: Escherichia coli (2023 19:29)  Organism: Escherichia coli (2023 19:29)      -  Amikacin: S <=16      -  Amoxicillin/Clavulanic Acid: S <=8/4      -  Ampicillin: S <=8 These ampicillin results predict results for amoxicillin      -  Ampicillin/Sulbactam: S <=4/2 Enterobacter, Klebsiella aerogenes, Citrobacter, and Serratia may develop resistance during prolonged therapy (3-4 days)      -  Aztreonam: S <=4      -  Cefazolin: S <=2 For uncomplicated UTI with K. pneumoniae, E. coli, or P. mirablis: PHOEBE <=16 is sensitive and PHOEBE >=32 is resistant. This also predicts results for oral agents cefaclor, cefdinir, cefpodoxime, cefprozil, cefuroxime axetil, cephalexin and locarbef for uncomplicated UTI. Note that some isolates may be susceptible to these agents while testing resistant to cefazolin.      -  Cefepime: S 8      -  Cefoxitin: S <=8      -  Ceftriaxone: S <=1 Enterobacter, Klebsiella aerogenes, Citrobacter, and Serratia may develop resistance during prolonged therapy      -  Cefuroxime: S <=4      -  Ciprofloxacin: S <=0.25      -  Ertapenem: S <=0.5      -  Gentamicin: S <=2      -  Imipenem: S <=1      -  Levofloxacin: S <=0.5      -  Meropenem: S <=1      -  Nitrofurantoin: S <=32 Should not be used to treat pyelonephritis      -  Piperacillin/Tazobactam: S <=8      -  Tobramycin: S <=2      -  Trimethoprim/Sulfamethoxazole: S <=0.5/9.5      Method Type: PHOEBE    Culture - Blood (collected 2023 12:27)  Source: .Blood Blood-Peripheral  Preliminary Report (2023 15:01):    No growth to date.    Culture - Blood (collected 2023 12:10)  Source: .Blood Blood-Peripheral  Gram Stain (2023 15:34):    Growth in anaerobic bottle: Gram positive cocci in pairs  Final Report (2023 13:38):    Growth in anaerobic bottle: Aerococcus urinae    "Susceptibilities not performed"    ***Blood Panel PCR results on this specimen are available    approximately 3 hours after the Gram stain result.***    Gram stain, PCR, and/or culture results may not always    correspond due to difference in methodologies.    ************************************************************    This PCR assay was performed by multiplex PCR. This    Assay tests for 66 bacterial and resistance gene targets.    Please refer to the [facility] test directory    at [email or web address] for details.  Organism: Blood Culture PCR (2023 13:38)  Organism: Blood Culture PCR (2023 13:38)      -  Blood PCR Panel: NEG      Method Type: PCR    COVID-19 PCR: NotDetec (23 @ 12:25)         89 yo male POD #3 s/p lap kellee for perforated, gangrenous gall bladder  no events noted overnight  pt tolerating sips/chips, denies n/v  pt c/o mild incisional pain     Patient seen and examined at bedside    ALLERGIES:  No Known Allergies    MEDICATIONS  (STANDING):  acetaminophen     Tablet .. 650 milliGRAM(s) Oral every 6 hours  dextrose 5%. 1000 milliLiter(s) (75 mL/Hr) IV Continuous <Continuous>  enoxaparin Injectable 40 milliGRAM(s) SubCutaneous every 24 hours  finasteride 5 milliGRAM(s) Oral daily  piperacillin/tazobactam IVPB.. 3.375 Gram(s) IV Intermittent every 8 hours  potassium chloride   Powder 40 milliEquivalent(s) Oral once  tamsulosin 0.8 milliGRAM(s) Oral at bedtime    MEDICATIONS  (PRN):  aluminum hydroxide/magnesium hydroxide/simethicone Suspension 30 milliLiter(s) Oral every 4 hours PRN Dyspepsia  HYDROmorphone  Injectable 0.5 milliGRAM(s) IV Push every 3 hours PRN Severe Pain (7 - 10)  melatonin 3 milliGRAM(s) Oral at bedtime PRN Insomnia  ondansetron Injectable 4 milliGRAM(s) IV Push every 8 hours PRN Nausea and/or Vomiting  oxyCODONE    IR 5 milliGRAM(s) Oral every 6 hours PRN Moderate Pain (4 - 6)    Vital Signs Last 24 Hrs  T(F): 97.9 (2023 05:13), Max: 97.9 (2023 05:13)  HR: 84 (2023 05:13) (82 - 88)  BP: 140/64 (2023 05:13) (113/84 - 140/64)  RR: 18 (2023 05:13) (18 - 21)  SpO2: 97% (2023 05:13) (96% - 99%)    I&O's Summary    2023 07:01  -  2023 07:00  --------------------------------------------------------  IN: 850 mL / OUT: 535 mL / NET: 315 mL    PHYSICAL EXAM:  General: NAD, A/O x 3  ENT: MMM  Neck: Supple, No JVD  Abdomen: mildly distended but soft, mildly ttp on rlq, no guarding, no rebound tenderness, incisions c/d/i, oswaldo drain with serosanguinous fluid, + left reducible inguina hernia  Extremities: No calf tenderness, No pitting edema    LABS:                        11.4   9.29  )-----------( 196      ( 2023 07:15 )             37.1     02-02    150  |  112  |  20  ----------------------------<  106  3.9   |  29  |  0.73    Ca    8.8      2023 07:15  Phos  2.5     02-  Mg     2.0     02-    TPro  6.0  /  Alb  1.8  /  TBili  0.6  /  DBili  x   /  AST  42  /  ALT  72  /  AlkPhos  200  02-02      PT/INR - ( 2023 15:10 )   PT: 15.2 sec;   INR: 1.31 ratio         PTT - ( 2023 15:10 )  PTT:23.8 sec  Lactate, Blood: 1.0 mmol/L ( @ 17:21)  Lactate, Blood: 2.5 mmol/L ( @ 12:27)    11-23 Chol 160 mg/dL LDL -- HDL 44 mg/dL Trig 95 mg/dL    Urinalysis Basic - ( 2023 12:38 )    Color: Yellow / Appearance: Slightly Turbid / S.020 / pH: x  Gluc: x / Ketone: Trace  / Bili: Negative / Urobili: 1   Blood: x / Protein: 100 / Nitrite: Negative   Leuk Esterase: Moderate / RBC: 5-10 /HPF / WBC 11-25 /HPF   Sq Epi: x / Non Sq Epi: Neg.-Few / Bacteria: Moderate /HPF    Culture - Urine (collected 2023 12:38)  Source: Catheterized Catheterized  Final Report (2023 19:29):    >100,000 CFU/ml Escherichia coli    >100,000 CFU/ml Aerococcus species    "Aerococcus spp. are predictably susceptible to penicillin,    ampicillin, tetracycline, and vancomycin.  All isolates are    resistant to sulfonamides"  Organism: Escherichia coli (2023 19:29)  Organism: Escherichia coli (2023 19:29)      -  Amikacin: S <=16      -  Amoxicillin/Clavulanic Acid: S <=8/4      -  Ampicillin: S <=8 These ampicillin results predict results for amoxicillin      -  Ampicillin/Sulbactam: S <=4/2 Enterobacter, Klebsiella aerogenes, Citrobacter, and Serratia may develop resistance during prolonged therapy (3-4 days)      -  Aztreonam: S <=4      -  Cefazolin: S <=2 For uncomplicated UTI with K. pneumoniae, E. coli, or P. mirablis: PHOEBE <=16 is sensitive and PHOEBE >=32 is resistant. This also predicts results for oral agents cefaclor, cefdinir, cefpodoxime, cefprozil, cefuroxime axetil, cephalexin and locarbef for uncomplicated UTI. Note that some isolates may be susceptible to these agents while testing resistant to cefazolin.      -  Cefepime: S 8      -  Cefoxitin: S <=8      -  Ceftriaxone: S <=1 Enterobacter, Klebsiella aerogenes, Citrobacter, and Serratia may develop resistance during prolonged therapy      -  Cefuroxime: S <=4      -  Ciprofloxacin: S <=0.25      -  Ertapenem: S <=0.5      -  Gentamicin: S <=2      -  Imipenem: S <=1      -  Levofloxacin: S <=0.5      -  Meropenem: S <=1      -  Nitrofurantoin: S <=32 Should not be used to treat pyelonephritis      -  Piperacillin/Tazobactam: S <=8      -  Tobramycin: S <=2      -  Trimethoprim/Sulfamethoxazole: S <=0.5/9.5      Method Type: PHOEBE    Culture - Blood (collected 2023 12:27)  Source: .Blood Blood-Peripheral  Preliminary Report (2023 15:01):    No growth to date.    Culture - Blood (collected 2023 12:10)  Source: .Blood Blood-Peripheral  Gram Stain (2023 15:34):    Growth in anaerobic bottle: Gram positive cocci in pairs  Final Report (2023 13:38):    Growth in anaerobic bottle: Aerococcus urinae    "Susceptibilities not performed"    ***Blood Panel PCR results on this specimen are available    approximately 3 hours after the Gram stain result.***    Gram stain, PCR, and/or culture results may not always    correspond due to difference in methodologies.    ************************************************************    This PCR assay was performed by multiplex PCR. This    Assay tests for 66 bacterial and resistance gene targets.    Please refer to the [facility] Labs test directory    at [email or web address] for details.  Organism: Blood Culture PCR (2023 13:38)  Organism: Blood Culture PCR (2023 13:38)      -  Blood PCR Panel: NEG      Method Type: PCR    COVID-19 PCR: NotDetec (23 @ 12:25)

## 2023-02-02 NOTE — PROGRESS NOTE ADULT - SUBJECTIVE AND OBJECTIVE BOX
Follow up for atrial arrythmia  SUBJ: Patient doing well from a cardiac perspective.  PMH  Hypertension    History of BPH    Malignant neoplasm of colon    Femur neck fracture    History of glaucoma    GERD (gastroesophageal reflux disease)    Iron deficiency anemia    Hyperlipidemia        MEDICATIONS  (STANDING):  acetaminophen     Tablet .. 650 milliGRAM(s) Oral every 6 hours  aspirin enteric coated 81 milliGRAM(s) Oral daily  dextrose 5%. 1000 milliLiter(s) (75 mL/Hr) IV Continuous <Continuous>  enoxaparin Injectable 30 milliGRAM(s) SubCutaneous every 24 hours  finasteride 5 milliGRAM(s) Oral daily  piperacillin/tazobactam IVPB.. 3.375 Gram(s) IV Intermittent every 8 hours  tamsulosin 0.8 milliGRAM(s) Oral at bedtime    MEDICATIONS  (PRN):  aluminum hydroxide/magnesium hydroxide/simethicone Suspension 30 milliLiter(s) Oral every 4 hours PRN Dyspepsia  HYDROmorphone  Injectable 0.5 milliGRAM(s) IV Push every 3 hours PRN Severe Pain (7 - 10)  melatonin 3 milliGRAM(s) Oral at bedtime PRN Insomnia  ondansetron Injectable 4 milliGRAM(s) IV Push every 8 hours PRN Nausea and/or Vomiting  oxyCODONE    IR 5 milliGRAM(s) Oral every 6 hours PRN Moderate Pain (4 - 6)        PHYSICAL EXAM:  Vital Signs Last 24 Hrs  T(C): 36.2 (02 Feb 2023 12:44), Max: 36.6 (01 Feb 2023 20:12)  T(F): 97.1 (02 Feb 2023 12:44), Max: 97.9 (02 Feb 2023 05:13)  HR: 81 (02 Feb 2023 12:44) (81 - 84)  BP: 125/66 (02 Feb 2023 12:44) (125/66 - 140/64)  BP(mean): --  RR: 18 (02 Feb 2023 12:44) (18 - 18)  SpO2: 98% (02 Feb 2023 12:44) (96% - 98%)    Parameters below as of 02 Feb 2023 12:44  Patient On (Oxygen Delivery Method): nasal cannula  O2 Flow (L/min): 2      GENERAL: NAD, well-groomed, well-developed  HEAD:  Atraumatic, Normocephalic  EYES: EOMI, PERRLA, conjunctiva and sclera clear  ENT: Moist mucous membranes,  NECK: Supple, No JVD, no bruits  CHEST/LUNG: Clear to percussion bilaterally; No rales, rhonchi, wheezing, or rubs  HEART: Regular rate and rhythm; No murmurs, rubs, or gallops PMI non displaced.  ABDOMEN: Soft, Nontender, Nondistended; Bowel sounds present  EXTREMITIES:  2+ Peripheral Pulses, No clubbing, cyanosis, or edema  SKIN: No rashes or lesions  NERVOUS SYSTEM:  Alert & Oriented X3, Good concentration; Motor Strength 5/5 B/L upper and lower extremities; DTRs 2+ intact and symmetric      TELEMETRY:rsr        LABS:                        11.4   9.29  )-----------( 196      ( 02 Feb 2023 07:15 )             37.1     02-02    142  |  107  |  20  ----------------------------<  116<H>  4.0   |  29  |  0.81    Ca    8.5      02 Feb 2023 14:21  Phos  2.5     02-02  Mg     2.0     02-02    TPro  6.0  /  Alb  1.8<L>  /  TBili  0.6  /  DBili  x   /  AST  42<H>  /  ALT  72<H>  /  AlkPhos  200<H>  02-02            I&O's Summary    01 Feb 2023 07:01  -  02 Feb 2023 07:00  --------------------------------------------------------  IN: 850 mL / OUT: 535 mL / NET: 315 mL      BNP    RADIOLOGY & ADDITIONAL STUDIES:    ECHO:

## 2023-02-02 NOTE — PROGRESS NOTE ADULT - ASSESSMENT
imp    patient now s/p surgery    no acute cardiac issues    will sign off now    please notify us if further cardiac issues arise

## 2023-02-02 NOTE — PROGRESS NOTE ADULT - NS ATTEND AMEND GEN_ALL_CORE FT
89y year old Male with PMH of recent L hip fx (11/2022), TIA (11/2022) who presents to the ER from [facility] for abdominal pain, nausea for 4 days. Admitted for acute gangrenous cholecystitis, c/b perforated gallbladder, s/p lap kellee on 1/30, with empyema found. Pt is on zosyn, infectious disease consulting, sensitivities now available.  will start ASA today - h/h stable.   still NPO per Surgery - low threshold to replace NGT if pt has emesis. Ice chips ok for comfort  cont to trend LFTs.   Pt had questionable a fib at admission - telemetry reviewed and NSR, freq PACs/PVCs. Per Cardio, will need OP extended cardiac event monitoring. 89y year old Male with PMH of recent L hip fx (11/2022), TIA (11/2022) who presents to the ER from [facility] for abdominal pain, nausea for 4 days. Admitted for acute gangrenous cholecystitis, c/b perforated gallbladder, s/p lap kellee on 1/30, with empyema found. Pt is on zosyn, infectious disease consulting, sensitivities now available.  will start ASA today - h/h stable.   still NPO per Surgery - low threshold to replace NGT if pt has emesis. Ice chips ok for comfort  cont to trend LFTs.   Pt had questionable a fib at admission - telemetry reviewed and NSR, freq PACs/PVCs. Per Cardio, will need OP extended cardiac event monitoring. 89y year old Male with PMH of recent L hip fx (11/2022), TIA (11/2022) who presents to the ER from [facility] for abdominal pain, nausea for 4 days. Admitted for acute gangrenous cholecystitis, c/b perforated gallbladder, s/p lap kellee on 1/30, with empyema found. Pt is on zosyn, infectious disease consulting, sensitivities now available.  will start ASA today - h/h stable.   still NPO per Surgery - low threshold to replace NGT if pt has emesis. Ice chips ok for comfort  cont to trend LFTs.   Pt had questionable a fib at admission - telemetry reviewed and NSR, freq PACs/PVCs. Per Cardio, will need OP extended cardiac event monitoring.

## 2023-02-02 NOTE — PROGRESS NOTE ADULT - NS ATTEND AMEND GEN_ALL_CORE FT
This is a pleasant 89M who presented with acute gangrenous cholecystitis with perforation who is POD#3 s/p laparoscopic cholecystectomy. Intraop there was perforation into the liver bed, with empyema. He tolerated the procedure well. He was extubated and continues to be afebrile and hemodynamically stable. No evidence of gallstone ileus intraop. Patient noted to have severe ileus. Thus, NG initially placed intra-op to decreased risk of aspiration PNA, but patient removed POD 0.     He tolerated sips of clears yesterday. No N/V or subjective bloating. No fevers, chills or sweats. Abdominal pain is controlled with tylenol. Still no flatus yet. OOB to a chair on my  evaluation.     Abdomen benign, appropriate incisional tenderness, incision c/d/i, drain with serosang output. reducible left inguinal hernia. Still distended/tympanitic     - Keep NPO with low threshold to replace NG if he has emesis. Minimal amount of ice chips OK for comfort.   - OOB/IS, PT. Perhaps attempting short periods of standing daily.  - D/c FC, void trial.  - Continue IV abx until tomorrow (2/3) given severity of the initial infection / empyema.   - LFTS down-trending as expected.  - Continue mIVF and trend electrolytes to minimize risk factors for ileus. Continue to check daily Mg /Phos to am. labs. Goal K of 4.0.  - Continue drain to bulb suction for now. Will likely remove prior to discharge.  - OK for lovenox for DVT and asa at this point for cardio/stroke protection. Hb stable.  - Outpatient cardiology eval. No issues this admission from a hemodynamic standpoint. Cardiology signed off today.  - Non operative management of incidental left inguinal hernia at this time. Easily reducible. Will discuss further options as outpatient.

## 2023-02-02 NOTE — PROGRESS NOTE ADULT - ASSESSMENT
Patient is a 89y male with a past history of a TIA,BPH,GERD, Fe def anemia, remote colon cancer s/p surgical resection, recent fall with left hip fracture, s/p left hip replacement in November of 2022, who was at a rehab facility when he developed abdominal pain, n/v, elevated LFT's and elevated wbc and was sent to ER on 1/30.His imaging revealed a perforated GB and he was taken to OR. He had laparoscopic E Lap with removal of gangrenous, perforated gallbladder with adhesions to small bowel.He was placed on zosyn in ER and he is growing a GPC in anaerobic bottle of admission blood culture.  He had an irregular rhythm on admission, ? A fib, is now in NSR.He was extubated in RR, is alert, has a felipe and is requesting water.  Zosyn should be adequate coverage for gangrenous cholecystitis.His blood isolate has now been identified as an aerococcus urinae, this is a typical  organism which should be covered by zosyn. his repeat blood cultures are negative so far.Zosyn should provide adequate  coverage of E Coli and aerococcus . The admission CT showed the felipe in the prostatic urethra, ? if this was prior to felipe catheter change in ER.  Suggest:  1.Continue zosyn, POD 3, will cover both urinary isolates and biliary mukesh. ? If felipe is in correct position.  2.Await gallbladder culture   3.Supportive care per surgery  4.Anticoagulation per cardiology  5.Satisfactory post op course so far from ID viewpoint.ID issues reviewed with daughter at bedside Patient is a 89y male with a past history of a TIA,BPH,GERD, Fe def anemia, remote colon cancer s/p surgical resection, recent fall with left hip fracture, s/p left hip replacement in November of 2022, who was at a rehab facility when he developed abdominal pain, n/v, elevated LFT's and elevated wbc and was sent to ER on 1/30.His imaging revealed a perforated GB and he was taken to OR. He had laparoscopic E Lap with removal of gangrenous, perforated gallbladder with adhesions to small bowel.He was placed on zosyn in ER and he is growing a GPC in anaerobic bottle of admission blood culture.  He had an irregular rhythm on admission, ? A fib, is now in NSR.He was extubated in RR, is alert, has a felipe and is requesting water.  Zosyn should be adequate coverage for gangrenous cholecystitis.His blood isolate has now been identified as an aerococcus urinae, this is a typical  organism which should be covered by zosyn. his repeat blood cultures are negative so far.Zosyn should provide adequate  coverage of E Coli and aerococcus . The admission CT showed the felipe in the prostatic urethra, ? if this was prior to felipe catheter change in ER.  Suggest:  1.Continue zosyn, POD 3, will cover both urinary isolates and biliary mukesh. ? If felipe is in correct position.  2.Supportive care per surgery  3.Anticoagulation per cardiology  4.Satisfactory post op course so far from ID viewpoint.ID issues reviewed with daughter at bedside

## 2023-02-02 NOTE — PROGRESS NOTE ADULT - ASSESSMENT
89y year old Male with PMH of recent L hip fx (11/2022), TIA (11/2022) who presents to the ER from [facility] for abdominal pain, nausea for 4 days. Admitted for acute cholecystitis    #Acute cholecystitis c/b perforated gallbladder  #Transaminitis, Hyperbilirubinemia  - s/p lap kellee with Dr Thompson on 1/30, POD 3  - Continue NPO with ice chips for now, advance diet as per surgery  - IVF while NPO  - Blood cultures growing gram + cocci, urine culture with E Coli and aerococcus  - Continue zosyn for now   - Started lovenox for DVT ppx. Resume aspirin once cleared by surgery   - Keep K > 4   - LFT's now downtrending   - Pain control, will order standing tylenol dose with oxy/dilaudid PRN  - ID and Surgery following     #Acute Kidney Injury - improved   - Monitor BMP  - Avoid nephrotoxins    #Urinary retention  - Patient with chronic felipe - was placed in November after fall/hip fx, no TOV has been done as per daughter. No hx of urinary retention prior to that event   - Continue felipe for now  - Consider TOV once patient becomes more mobile     #Hx of TIA  - c/w statin  - Resume aspirin once cleared by surgery     #DVT Prophylaxis  - IPCDs for now     GOC: DNR/I, RHIANNON in chart    Dispo: Pending above     2/1: Updated daughter Shelli at [phone]     89y year old Male with PMH of recent L hip fx (11/2022), TIA (11/2022) who presents to the ER from [facility] for abdominal pain, nausea for 4 days. Admitted for acute cholecystitis    #Acute cholecystitis c/b perforated gallbladder  #Transaminitis, Hyperbilirubinemia  - s/p lap kellee with Dr Thompson on 1/30, POD 3  - Continue NPO with ice chips for now, advance diet as per surgery  - IVF while NPO  - Blood cultures growing gram + cocci, urine culture with E Coli and aerococcus  - Continue zosyn for now   - Started lovenox for DVT ppx. Resume aspirin once cleared by surgery   - Keep K > 4   - LFT's now downtrending   - Pain control, will order standing tylenol dose with oxy/dilaudid PRN  - ID and Surgery following     #Acute Kidney Injury - improved   - Monitor BMP  - Avoid nephrotoxins    #Urinary retention  - Patient with chronic felipe - was placed in November after fall/hip fx, no TOV has been done as per daughter. No hx of urinary retention prior to that event   - Continue felipe for now  - Consider TOV once patient becomes more mobile     #Hx of TIA  - c/w statin  - Resume aspirin once cleared by surgery     #DVT Prophylaxis  - IPCDs for now     GOC: DNR/I, RHIANNON in chart    Dispo: Pending above     2/1: Updated daughter Shelli at [phone]     89y year old Male with PMH of recent L hip fx (11/2022), TIA (11/2022) who presents to the ER from [facility] for abdominal pain, nausea for 4 days. Admitted for acute cholecystitis    #Acute cholecystitis c/b perforated gallbladder  #Transaminitis, Hyperbilirubinemia  - s/p lap kellee with Dr Thompson on 1/30, POD 3  - Continue NPO with ice chips for now, advance diet as per surgery  - IVF while NPO  - Blood cultures growing gram + cocci, urine culture with E Coli and aerococcus  - Continue zosyn for now   - Started lovenox for DVT ppx. Resume aspirin once cleared by surgery   - Keep K > 4   - LFT's now downtrending   - Pain control, will order standing tylenol dose with oxy/dilaudid PRN  - ID and Surgery following     #Acute Kidney Injury - improved   - Monitor BMP  - Avoid nephrotoxins    #Urinary retention  - Patient with chronic felipe - was placed in November after fall/hip fx, no TOV has been done as per daughter. No hx of urinary retention prior to that event   - Continue felipe for now  - Consider TOV once patient becomes more mobile     #Hx of TIA  - c/w statin  - Resume aspirin once cleared by surgery     #DVT Prophylaxis  - IPCDs for now     GOC: DNR/I, RHIANNON in chart    Dispo: Pending above     2/1: Updated daughter Shelli at [phone]     89y year old Male with PMH of recent L hip fx (11/2022), TIA (11/2022) who presents to the ER from [facility] for abdominal pain, nausea for 4 days. Admitted for acute cholecystitis    #Acute cholecystitis c/b perforated gallbladder  #Transaminitis, Hyperbilirubinemia  - s/p lap kellee with Dr Thompson on 1/30, POD 3  - Continue NPO with ice chips for now, advance diet as per surgery  - IVF while NPO  - Blood cultures growing gram + cocci, urine culture with E Coli and aerococcus  - Continue zosyn for now   - Started lovenox for DVT ppx. Resume aspirin once cleared by surgery   - Keep K > 4   - LFT's now downtrending   - Pain control, will order standing tylenol dose with oxy/dilaudid PRN  - ID and Surgery following     #Acute Kidney Injury - improved   - Monitor BMP  - Avoid nephrotoxins    #Urinary retention  - Patient with chronic felipe - was placed in November after fall/hip fx, no TOV has been done as per daughter. No hx of urinary retention prior to that event   - Continue felipe for now  - Consider TOV once patient becomes more mobile     #Hx of TIA  - c/w statin  - Resume aspirin once cleared by surgery     #DVT Prophylaxis  - IPCDs for now     GOC: DNR/I, RHIANNON in chart    Dispo: Pending above     2/2: Updated daughter Shelli at [phone]     89y year old Male with PMH of recent L hip fx (11/2022), TIA (11/2022) who presents to the ER from [facility] for abdominal pain, nausea for 4 days. Admitted for acute cholecystitis    #Acute cholecystitis c/b perforated gallbladder  #Transaminitis, Hyperbilirubinemia  - s/p lap kellee with Dr Thompson on 1/30, POD 3  - Continue NPO with ice chips for now, advance diet as per surgery  - IVF while NPO  - Blood cultures growing gram + cocci, urine culture with E Coli and aerococcus  - Continue zosyn for now   - Started lovenox for DVT ppx. Resume aspirin once cleared by surgery   - Keep K > 4   - LFT's now downtrending   - Pain control, will order standing tylenol dose with oxy/dilaudid PRN  - ID and Surgery following     #Acute Kidney Injury - improved   - Monitor BMP  - Avoid nephrotoxins    #Urinary retention  - Patient with chronic felipe - was placed in November after fall/hip fx, no TOV has been done as per daughter. No hx of urinary retention prior to that event   - Continue felipe for now  - Consider TOV once patient becomes more mobile     #Hx of TIA  - c/w statin  - Resume aspirin once cleared by surgery     #DVT Prophylaxis  - IPCDs for now     GOC: DNR/I, RHIANNON in chart    Dispo: Pending above     2/2: Updated daughter Shelli at [phone]     89y year old Male with PMH of recent L hip fx (11/2022), TIA (11/2022) who presents to the ER from [facility] for abdominal pain, nausea for 4 days. Admitted for acute cholecystitis    #Acute cholecystitis c/b perforated gallbladder  #Transaminitis, Hyperbilirubinemia  - s/p lap kellee with Dr Thompson on 1/30, POD 3  - Continue NPO with ice chips for now, advance diet as per surgery  - IVF while NPO  - Blood cultures growing gram + cocci, urine culture with E Coli and aerococcus  - Continue zosyn for now   - Started lovenox for DVT ppx. Resume aspirin once cleared by surgery   - Keep K > 4   - LFT's now downtrending   - Pain control, will order standing tylenol dose with oxy/dilaudid PRN  - ID and Surgery following     #Acute Kidney Injury - improved   - Monitor BMP  - Avoid nephrotoxins    #Urinary retention  - Patient with chronic felipe - was placed in November after fall/hip fx, no TOV has been done as per daughter. No hx of urinary retention prior to that event   - Continue feilpe for now  - Consider TOV once patient becomes more mobile     #Hx of TIA  - c/w statin  - Resume aspirin once cleared by surgery     #DVT Prophylaxis  - IPCDs for now     GOC: DNR/I, RHIANNON in chart    Dispo: Pending above     2/2: Updated daughter Shelli at [phone]     89y year old Male with PMH of recent L hip fx (11/2022), TIA (11/2022) who presents to the ER from [facility] for abdominal pain, nausea for 4 days. Admitted for acute cholecystitis.    #Acute cholecystitis c/b perforated gallbladder  #Transaminitis, Hyperbilirubinemia  - s/p lap kellee with Dr Thompson on 1/30, POD 3  - Continue NPO with ice chips for now, advance diet as per surgery  - IVF while NPO  - Blood cultures growing gram + cocci, urine culture with E Coli and aerococcus  - Continue zosyn for now   - Started lovenox for DVT ppx. Resume aspirin since h/h stable.  - Keep K > 4   - LFT's now downtrending   - Pain control, will order standing tylenol dose with oxy/dilaudid PRN  - ID and Surgery following   - wean off of O2, incentive spirometry.    # Hypernatremia   - IVF w/ D5W, 75cc/hr x5 hours, f/u repeat BMP this afternoon.    #Acute Kidney Injury - improved   - Monitor BMP  - Avoid nephrotoxins    #Urinary retention  - Patient with chronic felipe - was placed in November after fall/hip fx, no TOV has been done as per daughter. No hx of urinary retention prior to that event   - Continue felipe for now  - Consider TOV once patient becomes more mobile - Surgery has already ordered. Inc tamsulosin to 0.8 mg qhs    # ? paroxysmal atrial fibrillation  - Will likely need outpatient extended cardiac event monitoring.  - will [place] back to Cardio when closer to discharge.    #Hx of TIA  - c/w statin  - Resume aspirin     #DVT Prophylaxis  - IPCDs for now     GOC: DNR/I, MOLST in chart    Dispo: Pending above     2/2: Updated daughter Shelli at [phone]     89y year old Male with PMH of recent L hip fx (11/2022), TIA (11/2022) who presents to the ER from [facility] for abdominal pain, nausea for 4 days. Admitted for acute cholecystitis.    #Acute cholecystitis c/b perforated gallbladder  #Transaminitis, Hyperbilirubinemia  - s/p lap kellee with Dr Thompson on 1/30, POD 3  - Continue NPO with ice chips for now, advance diet as per surgery  - IVF while NPO  - Blood cultures growing gram + cocci, urine culture with E Coli and aerococcus  - Continue zosyn for now   - Started lovenox for DVT ppx. Resume aspirin since h/h stable.  - Keep K > 4   - LFT's now downtrending   - Pain control, will order standing tylenol dose with oxy/dilaudid PRN  - ID and Surgery following   - wean off of O2, incentive spirometry.    # Hypernatremia   - IVF w/ D5W, 75cc/hr x5 hours, f/u repeat BMP this afternoon.    #Acute Kidney Injury - improved   - Monitor BMP  - Avoid nephrotoxins    #Urinary retention  - Patient with chronic felipe - was placed in November after fall/hip fx, no TOV has been done as per daughter. No hx of urinary retention prior to that event   - Continue felipe for now  - Consider TOV once patient becomes more mobile - Surgery has already ordered. Inc tamsulosin to 0.8 mg qhs    # ? paroxysmal atrial fibrillation  - Will likely need outpatient extended cardiac event monitoring.  - will [place] back to Cardio when closer to discharge.    #Hx of TIA  - c/w statin  - Resume aspirin     #DVT Prophylaxis  - IPCDs for now     GOC: DNR/I, MOLST in chart    Dispo: Pending above     2/2: Updated daughter Shelli at [phone]     89y year old Male with PMH of recent L hip fx (11/2022), TIA (11/2022) who presents to the ER from [facility] for abdominal pain, nausea for 4 days. Admitted for acute cholecystitis.    #Acute cholecystitis c/b perforated gallbladder  #Transaminitis, Hyperbilirubinemia  - s/p lap kellee with Dr Thompson on 1/30, POD 3  - Continue NPO with ice chips for now, advance diet as per surgery  - IVF while NPO  - Blood cultures growing gram + cocci, urine culture with E Coli and aerococcus  - Continue zosyn for now   - Started lovenox for DVT ppx. Resume aspirin since h/h stable.  - Keep K > 4   - LFT's now downtrending   - Pain control, will order standing tylenol dose with oxy/dilaudid PRN  - ID and Surgery following   - wean off of O2, incentive spirometry.    # Hypernatremia   - IVF w/ D5W, 75cc/hr x5 hours, f/u repeat BMP this afternoon.    #Acute Kidney Injury - improved   - Monitor BMP  - Avoid nephrotoxins    #Urinary retention  - Patient with chronic felipe - was placed in November after fall/hip fx, no TOV has been done as per daughter. No hx of urinary retention prior to that event   - Continue felipe for now  - Consider TOV once patient becomes more mobile - Surgery has already ordered. Inc tamsulosin to 0.8 mg qhs    # ? paroxysmal atrial fibrillation  - Will likely need outpatient extended cardiac event monitoring.  - will [place] back to Cardio when closer to discharge.    #Hx of TIA  - c/w statin  - Resume aspirin     #DVT Prophylaxis  - IPCDs for now     GOC: DNR/I, MOLST in chart    Dispo: Pending above     2/2: Updated daughter Shelli at [phone]

## 2023-02-02 NOTE — PROGRESS NOTE ADULT - SUBJECTIVE AND OBJECTIVE BOX
Patient is a 89y old  Male who presents with a chief complaint of abdominal pain (2023 07:04)    Patient seen and examined at bedside. No overnight events reported.     ALLERGIES:  No Known Allergies    MEDICATIONS  (STANDING):  acetaminophen     Tablet .. 650 milliGRAM(s) Oral every 6 hours  enoxaparin Injectable 40 milliGRAM(s) SubCutaneous every 24 hours  finasteride 5 milliGRAM(s) Oral daily  lactated ringers. 1000 milliLiter(s) (75 mL/Hr) IV Continuous <Continuous>  piperacillin/tazobactam IVPB.. 3.375 Gram(s) IV Intermittent every 8 hours  tamsulosin 0.8 milliGRAM(s) Oral at bedtime    MEDICATIONS  (PRN):  aluminum hydroxide/magnesium hydroxide/simethicone Suspension 30 milliLiter(s) Oral every 4 hours PRN Dyspepsia  HYDROmorphone  Injectable 0.5 milliGRAM(s) IV Push every 3 hours PRN Severe Pain (7 - 10)  melatonin 3 milliGRAM(s) Oral at bedtime PRN Insomnia  ondansetron Injectable 4 milliGRAM(s) IV Push every 8 hours PRN Nausea and/or Vomiting  oxyCODONE    IR 5 milliGRAM(s) Oral every 6 hours PRN Moderate Pain (4 - 6)    Vital Signs Last 24 Hrs  T(F): 97.9 (2023 05:13), Max: 97.9 (2023 05:13)  HR: 84 (2023 05:13) (82 - 88)  BP: 140/64 (2023 05:13) (113/84 - 140/64)  RR: 18 (2023 05:13) (18 - 21)  SpO2: 97% (2023 05:13) (96% - 99%)    I&O's Summary  2023 07:01  -  2023 07:00  --------------------------------------------------------  IN: 850 mL / OUT: 535 mL / NET: 315 mL    PHYSICAL EXAM:  General: NAD, A/O x 2  ENT: No gross hearing impairment, moist mucous membranes, no thrush  Neck: Supple, No JVD  Lungs: Poor inspiratory effort, anterior lung sounds clear, non-labored breathing. Slight weak cough  Cardio: RRR, S1/S2, No murmur  Abdomen: Soft, tender, mildly distended; Bowel sounds present  Extremities: No calf tenderness, No cyanosis, No pitting edema  Psych: Appropriate mood and affect    LABS:                        11.4   9.29  )-----------( 196      ( 2023 07:15 )             37.1         144  |  107  |  24  ----------------------------<  101  3.5   |  29  |  0.85    Ca    8.5      2023 07:05    TPro  6.2  /  Alb  1.9  /  TBili  1.0  /  DBili  0.4  /  AST  83  /  ALT  92  /  AlkPhos  224        Lipase, Serum: 30 U/L (23 @ 12:27)      PT/INR - ( 2023 15:10 )   PT: 15.2 sec;   INR: 1.31 ratio         PTT - ( 2023 15:10 )  PTT:23.8 sec  Lactate, Blood: 1.0 mmol/L ( @ 17:21)  Lactate, Blood: 2.5 mmol/L ( @ 12:27)     Chol 160 mg/dL LDL -- HDL 44 mg/dL Trig 95 mg/dL    Urinalysis Basic - ( 2023 12:38 )    Color: Yellow / Appearance: Slightly Turbid / S.020 / pH: x  Gluc: x / Ketone: Trace  / Bili: Negative / Urobili: 1   Blood: x / Protein: 100 / Nitrite: Negative   Leuk Esterase: Moderate / RBC: 5-10 /HPF / WBC 11-25 /HPF   Sq Epi: x / Non Sq Epi: Neg.-Few / Bacteria: Moderate /HPF    Culture - Urine (collected 2023 12:38)  Source: Catheterized Catheterized  Final Report (2023 19:29):    >100,000 CFU/ml Escherichia coli    >100,000 CFU/ml Aerococcus species    "Aerococcus spp. are predictably susceptible to penicillin,    ampicillin, tetracycline, and vancomycin.  All isolates are    resistant to sulfonamides"  Organism: Escherichia coli (2023 19:29)  Organism: Escherichia coli (2023 19:29)      -  Amikacin: S <=16      -  Amoxicillin/Clavulanic Acid: S <=8/4      -  Ampicillin: S <=8 These ampicillin results predict results for amoxicillin      -  Ampicillin/Sulbactam: S <=4/2 Enterobacter, Klebsiella aerogenes, Citrobacter, and Serratia may develop resistance during prolonged therapy (3-4 days)      -  Aztreonam: S <=4      -  Cefazolin: S <=2 For uncomplicated UTI with K. pneumoniae, E. coli, or P. mirablis: PHOEBE <=16 is sensitive and PHOEBE >=32 is resistant. This also predicts results for oral agents cefaclor, cefdinir, cefpodoxime, cefprozil, cefuroxime axetil, cephalexin and locarbef for uncomplicated UTI. Note that some isolates may be susceptible to these agents while testing resistant to cefazolin.      -  Cefepime: S 8      -  Cefoxitin: S <=8      -  Ceftriaxone: S <=1 Enterobacter, Klebsiella aerogenes, Citrobacter, and Serratia may develop resistance during prolonged therapy      -  Cefuroxime: S <=4      -  Ciprofloxacin: S <=0.25      -  Ertapenem: S <=0.5      -  Gentamicin: S <=2      -  Imipenem: S <=1      -  Levofloxacin: S <=0.5      -  Meropenem: S <=1      -  Nitrofurantoin: S <=32 Should not be used to treat pyelonephritis      -  Piperacillin/Tazobactam: S <=8      -  Tobramycin: S <=2      -  Trimethoprim/Sulfamethoxazole: S <=0.5/9.5      Method Type: PHOEBE    Culture - Blood (collected 2023 12:27)  Source: .Blood Blood-Peripheral  Preliminary Report (2023 15:01):    No growth to date.    Culture - Blood (collected 2023 12:10)  Source: .Blood Blood-Peripheral  Gram Stain (2023 15:34):    Growth in anaerobic bottle: Gram positive cocci in pairs  Final Report (2023 13:38):    Growth in anaerobic bottle: Aerococcus urinae    "Susceptibilities not performed"    ***Blood Panel PCR results on this specimen are available    approximately 3 hours after the Gram stain result.***    Gram stain, PCR, and/or culture results may not always    correspond due to difference in methodologies.    ************************************************************    This PCR assay was performed by multiplex PCR. This    Assay tests for 66 bacterial and resistance gene targets.    Please refer to the [facility] Labs test directory    at [email or web address] for details.  Organism: Blood Culture PCR (2023 13:38)  Organism: Blood Culture PCR (2023 13:38)      -  Blood PCR Panel: NEG      Method Type: PCR    COVID-19 PCR: NotDetec (23 @ 12:25)    RADIOLOGY & ADDITIONAL TESTS:    Care Discussed with Consultants/Other Providers:    Patient is a 89y old  Male who presents with a chief complaint of abdominal pain (2023 07:04)    Patient seen and examined at bedside. No overnight events reported.     ALLERGIES:  No Known Allergies    MEDICATIONS  (STANDING):  acetaminophen     Tablet .. 650 milliGRAM(s) Oral every 6 hours  enoxaparin Injectable 40 milliGRAM(s) SubCutaneous every 24 hours  finasteride 5 milliGRAM(s) Oral daily  lactated ringers. 1000 milliLiter(s) (75 mL/Hr) IV Continuous <Continuous>  piperacillin/tazobactam IVPB.. 3.375 Gram(s) IV Intermittent every 8 hours  tamsulosin 0.8 milliGRAM(s) Oral at bedtime    MEDICATIONS  (PRN):  aluminum hydroxide/magnesium hydroxide/simethicone Suspension 30 milliLiter(s) Oral every 4 hours PRN Dyspepsia  HYDROmorphone  Injectable 0.5 milliGRAM(s) IV Push every 3 hours PRN Severe Pain (7 - 10)  melatonin 3 milliGRAM(s) Oral at bedtime PRN Insomnia  ondansetron Injectable 4 milliGRAM(s) IV Push every 8 hours PRN Nausea and/or Vomiting  oxyCODONE    IR 5 milliGRAM(s) Oral every 6 hours PRN Moderate Pain (4 - 6)    Vital Signs Last 24 Hrs  T(F): 97.9 (2023 05:13), Max: 97.9 (2023 05:13)  HR: 84 (2023 05:13) (82 - 88)  BP: 140/64 (2023 05:13) (113/84 - 140/64)  RR: 18 (2023 05:13) (18 - 21)  SpO2: 97% (2023 05:13) (96% - 99%)    I&O's Summary  2023 07:01  -  2023 07:00  --------------------------------------------------------  IN: 850 mL / OUT: 535 mL / NET: 315 mL    PHYSICAL EXAM:  General: NAD, A/O x 2  ENT: No gross hearing impairment, moist mucous membranes, no thrush  Neck: Supple, No JVD  Lungs: Poor inspiratory effort, anterior lung sounds clear, non-labored breathing. Slight weak cough  Cardio: RRR, S1/S2, No murmur  Abdomen: Soft, tender, mildly distended; Bowel sounds present  Extremities: No calf tenderness, No cyanosis, No pitting edema  Psych: Appropriate mood and affect    LABS:                        11.4   9.29  )-----------( 196      ( 2023 07:15 )             37.1         144  |  107  |  24  ----------------------------<  101  3.5   |  29  |  0.85    Ca    8.5      2023 07:05    TPro  6.2  /  Alb  1.9  /  TBili  1.0  /  DBili  0.4  /  AST  83  /  ALT  92  /  AlkPhos  224        Lipase, Serum: 30 U/L (23 @ 12:27)      PT/INR - ( 2023 15:10 )   PT: 15.2 sec;   INR: 1.31 ratio         PTT - ( 2023 15:10 )  PTT:23.8 sec  Lactate, Blood: 1.0 mmol/L ( @ 17:21)  Lactate, Blood: 2.5 mmol/L ( @ 12:27)     Chol 160 mg/dL LDL -- HDL 44 mg/dL Trig 95 mg/dL    Urinalysis Basic - ( 2023 12:38 )    Color: Yellow / Appearance: Slightly Turbid / S.020 / pH: x  Gluc: x / Ketone: Trace  / Bili: Negative / Urobili: 1   Blood: x / Protein: 100 / Nitrite: Negative   Leuk Esterase: Moderate / RBC: 5-10 /HPF / WBC 11-25 /HPF   Sq Epi: x / Non Sq Epi: Neg.-Few / Bacteria: Moderate /HPF    Culture - Urine (collected 2023 12:38)  Source: Catheterized Catheterized  Final Report (2023 19:29):    >100,000 CFU/ml Escherichia coli    >100,000 CFU/ml Aerococcus species    "Aerococcus spp. are predictably susceptible to penicillin,    ampicillin, tetracycline, and vancomycin.  All isolates are    resistant to sulfonamides"  Organism: Escherichia coli (2023 19:29)  Organism: Escherichia coli (2023 19:29)      -  Amikacin: S <=16      -  Amoxicillin/Clavulanic Acid: S <=8/4      -  Ampicillin: S <=8 These ampicillin results predict results for amoxicillin      -  Ampicillin/Sulbactam: S <=4/2 Enterobacter, Klebsiella aerogenes, Citrobacter, and Serratia may develop resistance during prolonged therapy (3-4 days)      -  Aztreonam: S <=4      -  Cefazolin: S <=2 For uncomplicated UTI with K. pneumoniae, E. coli, or P. mirablis: PHOEBE <=16 is sensitive and PHOEBE >=32 is resistant. This also predicts results for oral agents cefaclor, cefdinir, cefpodoxime, cefprozil, cefuroxime axetil, cephalexin and locarbef for uncomplicated UTI. Note that some isolates may be susceptible to these agents while testing resistant to cefazolin.      -  Cefepime: S 8      -  Cefoxitin: S <=8      -  Ceftriaxone: S <=1 Enterobacter, Klebsiella aerogenes, Citrobacter, and Serratia may develop resistance during prolonged therapy      -  Cefuroxime: S <=4      -  Ciprofloxacin: S <=0.25      -  Ertapenem: S <=0.5      -  Gentamicin: S <=2      -  Imipenem: S <=1      -  Levofloxacin: S <=0.5      -  Meropenem: S <=1      -  Nitrofurantoin: S <=32 Should not be used to treat pyelonephritis      -  Piperacillin/Tazobactam: S <=8      -  Tobramycin: S <=2      -  Trimethoprim/Sulfamethoxazole: S <=0.5/9.5      Method Type: PHOEBE    Culture - Blood (collected 2023 12:27)  Source: .Blood Blood-Peripheral  Preliminary Report (2023 15:01):    No growth to date.    Culture - Blood (collected 2023 12:10)  Source: .Blood Blood-Peripheral  Gram Stain (2023 15:34):    Growth in anaerobic bottle: Gram positive cocci in pairs  Final Report (2023 13:38):    Growth in anaerobic bottle: Aerococcus urinae    "Susceptibilities not performed"    ***Blood Panel PCR results on this specimen are available    approximately 3 hours after the Gram stain result.***    Gram stain, PCR, and/or culture results may not always    correspond due to difference in methodologies.    ************************************************************    This PCR assay was performed by multiplex PCR. This    Assay tests for 66 bacterial and resistance gene targets.    Please refer to the [facility] Labs test directory    at [email or web address] for details.  Organism: Blood Culture PCR (2023 13:38)  Organism: Blood Culture PCR (2023 13:38)      -  Blood PCR Panel: NEG      Method Type: PCR    COVID-19 PCR: NotDetec (23 @ 12:25)    RADIOLOGY & ADDITIONAL TESTS:    Care Discussed with Consultants/Other Providers:    Patient is a 89y old  Male who presents with a chief complaint of abdominal pain (2023 07:04)    Patient seen and examined at bedside. No overnight events reported.     ALLERGIES:  No Known Allergies    MEDICATIONS  (STANDING):  acetaminophen     Tablet .. 650 milliGRAM(s) Oral every 6 hours  enoxaparin Injectable 40 milliGRAM(s) SubCutaneous every 24 hours  finasteride 5 milliGRAM(s) Oral daily  lactated ringers. 1000 milliLiter(s) (75 mL/Hr) IV Continuous <Continuous>  piperacillin/tazobactam IVPB.. 3.375 Gram(s) IV Intermittent every 8 hours  tamsulosin 0.8 milliGRAM(s) Oral at bedtime    MEDICATIONS  (PRN):  aluminum hydroxide/magnesium hydroxide/simethicone Suspension 30 milliLiter(s) Oral every 4 hours PRN Dyspepsia  HYDROmorphone  Injectable 0.5 milliGRAM(s) IV Push every 3 hours PRN Severe Pain (7 - 10)  melatonin 3 milliGRAM(s) Oral at bedtime PRN Insomnia  ondansetron Injectable 4 milliGRAM(s) IV Push every 8 hours PRN Nausea and/or Vomiting  oxyCODONE    IR 5 milliGRAM(s) Oral every 6 hours PRN Moderate Pain (4 - 6)    Vital Signs Last 24 Hrs  T(F): 97.9 (2023 05:13), Max: 97.9 (2023 05:13)  HR: 84 (2023 05:13) (82 - 88)  BP: 140/64 (2023 05:13) (113/84 - 140/64)  RR: 18 (2023 05:13) (18 - 21)  SpO2: 97% (2023 05:13) (96% - 99%)    I&O's Summary  2023 07:01  -  2023 07:00  --------------------------------------------------------  IN: 850 mL / OUT: 535 mL / NET: 315 mL    PHYSICAL EXAM:  General: NAD, A/O x 2  ENT: No gross hearing impairment, moist mucous membranes, no thrush  Neck: Supple, No JVD  Lungs: Poor inspiratory effort, anterior lung sounds clear, non-labored breathing. Slight weak cough  Cardio: RRR, S1/S2, No murmur  Abdomen: Soft, tender, mildly distended; Bowel sounds present  Extremities: No calf tenderness, No cyanosis, No pitting edema  Psych: Appropriate mood and affect    LABS:                        11.4   9.29  )-----------( 196      ( 2023 07:15 )             37.1         144  |  107  |  24  ----------------------------<  101  3.5   |  29  |  0.85    Ca    8.5      2023 07:05    TPro  6.2  /  Alb  1.9  /  TBili  1.0  /  DBili  0.4  /  AST  83  /  ALT  92  /  AlkPhos  224        Lipase, Serum: 30 U/L (23 @ 12:27)      PT/INR - ( 2023 15:10 )   PT: 15.2 sec;   INR: 1.31 ratio         PTT - ( 2023 15:10 )  PTT:23.8 sec  Lactate, Blood: 1.0 mmol/L ( @ 17:21)  Lactate, Blood: 2.5 mmol/L ( @ 12:27)     Chol 160 mg/dL LDL -- HDL 44 mg/dL Trig 95 mg/dL    Urinalysis Basic - ( 2023 12:38 )    Color: Yellow / Appearance: Slightly Turbid / S.020 / pH: x  Gluc: x / Ketone: Trace  / Bili: Negative / Urobili: 1   Blood: x / Protein: 100 / Nitrite: Negative   Leuk Esterase: Moderate / RBC: 5-10 /HPF / WBC 11-25 /HPF   Sq Epi: x / Non Sq Epi: Neg.-Few / Bacteria: Moderate /HPF    Culture - Urine (collected 2023 12:38)  Source: Catheterized Catheterized  Final Report (2023 19:29):    >100,000 CFU/ml Escherichia coli    >100,000 CFU/ml Aerococcus species    "Aerococcus spp. are predictably susceptible to penicillin,    ampicillin, tetracycline, and vancomycin.  All isolates are    resistant to sulfonamides"  Organism: Escherichia coli (2023 19:29)  Organism: Escherichia coli (2023 19:29)      -  Amikacin: S <=16      -  Amoxicillin/Clavulanic Acid: S <=8/4      -  Ampicillin: S <=8 These ampicillin results predict results for amoxicillin      -  Ampicillin/Sulbactam: S <=4/2 Enterobacter, Klebsiella aerogenes, Citrobacter, and Serratia may develop resistance during prolonged therapy (3-4 days)      -  Aztreonam: S <=4      -  Cefazolin: S <=2 For uncomplicated UTI with K. pneumoniae, E. coli, or P. mirablis: PHOEBE <=16 is sensitive and PHOEBE >=32 is resistant. This also predicts results for oral agents cefaclor, cefdinir, cefpodoxime, cefprozil, cefuroxime axetil, cephalexin and locarbef for uncomplicated UTI. Note that some isolates may be susceptible to these agents while testing resistant to cefazolin.      -  Cefepime: S 8      -  Cefoxitin: S <=8      -  Ceftriaxone: S <=1 Enterobacter, Klebsiella aerogenes, Citrobacter, and Serratia may develop resistance during prolonged therapy      -  Cefuroxime: S <=4      -  Ciprofloxacin: S <=0.25      -  Ertapenem: S <=0.5      -  Gentamicin: S <=2      -  Imipenem: S <=1      -  Levofloxacin: S <=0.5      -  Meropenem: S <=1      -  Nitrofurantoin: S <=32 Should not be used to treat pyelonephritis      -  Piperacillin/Tazobactam: S <=8      -  Tobramycin: S <=2      -  Trimethoprim/Sulfamethoxazole: S <=0.5/9.5      Method Type: PHOEBE    Culture - Blood (collected 2023 12:27)  Source: .Blood Blood-Peripheral  Preliminary Report (2023 15:01):    No growth to date.    Culture - Blood (collected 2023 12:10)  Source: .Blood Blood-Peripheral  Gram Stain (2023 15:34):    Growth in anaerobic bottle: Gram positive cocci in pairs  Final Report (2023 13:38):    Growth in anaerobic bottle: Aerococcus urinae    "Susceptibilities not performed"    ***Blood Panel PCR results on this specimen are available    approximately 3 hours after the Gram stain result.***    Gram stain, PCR, and/or culture results may not always    correspond due to difference in methodologies.    ************************************************************    This PCR assay was performed by multiplex PCR. This    Assay tests for 66 bacterial and resistance gene targets.    Please refer to the [facility] Labs test directory    at [email or web address] for details.  Organism: Blood Culture PCR (2023 13:38)  Organism: Blood Culture PCR (2023 13:38)      -  Blood PCR Panel: NEG      Method Type: PCR    COVID-19 PCR: NotDetec (23 @ 12:25)    RADIOLOGY & ADDITIONAL TESTS:    Care Discussed with Consultants/Other Providers:    Patient is a 89y old  Male who presents with a chief complaint of abdominal pain (2023 07:04)    Patient seen and examined at bedside. No overnight events reported. Yesterday diet switched back to NPO with ice chips. This morning patient c/o 9/10 pain in abdomen     ALLERGIES:  No Known Allergies    MEDICATIONS  (STANDING):  acetaminophen     Tablet .. 650 milliGRAM(s) Oral every 6 hours  enoxaparin Injectable 40 milliGRAM(s) SubCutaneous every 24 hours  finasteride 5 milliGRAM(s) Oral daily  lactated ringers. 1000 milliLiter(s) (75 mL/Hr) IV Continuous <Continuous>  piperacillin/tazobactam IVPB.. 3.375 Gram(s) IV Intermittent every 8 hours  tamsulosin 0.8 milliGRAM(s) Oral at bedtime    MEDICATIONS  (PRN):  aluminum hydroxide/magnesium hydroxide/simethicone Suspension 30 milliLiter(s) Oral every 4 hours PRN Dyspepsia  HYDROmorphone  Injectable 0.5 milliGRAM(s) IV Push every 3 hours PRN Severe Pain (7 - 10)  melatonin 3 milliGRAM(s) Oral at bedtime PRN Insomnia  ondansetron Injectable 4 milliGRAM(s) IV Push every 8 hours PRN Nausea and/or Vomiting  oxyCODONE    IR 5 milliGRAM(s) Oral every 6 hours PRN Moderate Pain (4 - 6)    Vital Signs Last 24 Hrs  T(F): 97.9 (2023 05:13), Max: 97.9 (2023 05:13)  HR: 84 (2023 05:13) (82 - 88)  BP: 140/64 (2023 05:13) (113/84 - 140/64)  RR: 18 (2023 05:13) (18 - 21)  SpO2: 97% (2023 05:13) (96% - 99%)    I&O's Summary  2023 07:01  -  2023 07:00  --------------------------------------------------------  IN: 850 mL / OUT: 535 mL / NET: 315 mL    PHYSICAL EXAM:  General: NAD, A/O x 2  ENT: No gross hearing impairment, moist mucous membranes, no thrush  Neck: Supple, No JVD  Lungs: Poor inspiratory effort, anterior lung sounds clear, non-labored breathing. Slight weak cough  Cardio: RRR, S1/S2, No murmur  Abdomen: Soft, tender, mildly distended; Bowel sounds present  Extremities: No calf tenderness, No cyanosis, No pitting edema  Psych: Appropriate mood and affect    LABS:                        11.4   9.29  )-----------( 196      ( 2023 07:15 )             37.1         144  |  107  |  24  ----------------------------<  101  3.5   |  29  |  0.85    Ca    8.5      2023 07:05    TPro  6.2  /  Alb  1.9  /  TBili  1.0  /  DBili  0.4  /  AST  83  /  ALT  92  /  AlkPhos  224        Lipase, Serum: 30 U/L (23 @ 12:27)      PT/INR - ( 2023 15:10 )   PT: 15.2 sec;   INR: 1.31 ratio         PTT - ( 2023 15:10 )  PTT:23.8 sec  Lactate, Blood: 1.0 mmol/L ( @ 17:21)  Lactate, Blood: 2.5 mmol/L ( @ 12:27)     Chol 160 mg/dL LDL -- HDL 44 mg/dL Trig 95 mg/dL    Urinalysis Basic - ( 2023 12:38 )    Color: Yellow / Appearance: Slightly Turbid / S.020 / pH: x  Gluc: x / Ketone: Trace  / Bili: Negative / Urobili: 1   Blood: x / Protein: 100 / Nitrite: Negative   Leuk Esterase: Moderate / RBC: 5-10 /HPF / WBC 11-25 /HPF   Sq Epi: x / Non Sq Epi: Neg.-Few / Bacteria: Moderate /HPF    Culture - Urine (collected 2023 12:38)  Source: Catheterized Catheterized  Final Report (2023 19:29):    >100,000 CFU/ml Escherichia coli    >100,000 CFU/ml Aerococcus species    "Aerococcus spp. are predictably susceptible to penicillin,    ampicillin, tetracycline, and vancomycin.  All isolates are    resistant to sulfonamides"  Organism: Escherichia coli (2023 19:29)  Organism: Escherichia coli (2023 19:29)      -  Amikacin: S <=16      -  Amoxicillin/Clavulanic Acid: S <=8/4      -  Ampicillin: S <=8 These ampicillin results predict results for amoxicillin      -  Ampicillin/Sulbactam: S <=4/2 Enterobacter, Klebsiella aerogenes, Citrobacter, and Serratia may develop resistance during prolonged therapy (3-4 days)      -  Aztreonam: S <=4      -  Cefazolin: S <=2 For uncomplicated UTI with K. pneumoniae, E. coli, or P. mirablis: PHOEBE <=16 is sensitive and PHOEBE >=32 is resistant. This also predicts results for oral agents cefaclor, cefdinir, cefpodoxime, cefprozil, cefuroxime axetil, cephalexin and locarbef for uncomplicated UTI. Note that some isolates may be susceptible to these agents while testing resistant to cefazolin.      -  Cefepime: S 8      -  Cefoxitin: S <=8      -  Ceftriaxone: S <=1 Enterobacter, Klebsiella aerogenes, Citrobacter, and Serratia may develop resistance during prolonged therapy      -  Cefuroxime: S <=4      -  Ciprofloxacin: S <=0.25      -  Ertapenem: S <=0.5      -  Gentamicin: S <=2      -  Imipenem: S <=1      -  Levofloxacin: S <=0.5      -  Meropenem: S <=1      -  Nitrofurantoin: S <=32 Should not be used to treat pyelonephritis      -  Piperacillin/Tazobactam: S <=8      -  Tobramycin: S <=2      -  Trimethoprim/Sulfamethoxazole: S <=0.5/9.5      Method Type: PHOEBE    Culture - Blood (collected 2023 12:27)  Source: .Blood Blood-Peripheral  Preliminary Report (2023 15:01):    No growth to date.    Culture - Blood (collected 2023 12:10)  Source: .Blood Blood-Peripheral  Gram Stain (2023 15:34):    Growth in anaerobic bottle: Gram positive cocci in pairs  Final Report (2023 13:38):    Growth in anaerobic bottle: Aerococcus urinae    "Susceptibilities not performed"    ***Blood Panel PCR results on this specimen are available    approximately 3 hours after the Gram stain result.***    Gram stain, PCR, and/or culture results may not always    correspond due to difference in methodologies.    ************************************************************    This PCR assay was performed by multiplex PCR. This    Assay tests for 66 bacterial and resistance gene targets.    Please refer to the [facility] Labs test directory    at [email or web address] for details.  Organism: Blood Culture PCR (2023 13:38)  Organism: Blood Culture PCR (2023 13:38)      -  Blood PCR Panel: NEG      Method Type: PCR    COVID-19 PCR: NotDetec (23 @ 12:25)    RADIOLOGY & ADDITIONAL TESTS:    Care Discussed with Consultants/Other Providers:    Patient is a 89y old  Male who presents with a chief complaint of abdominal pain (2023 07:04)    Patient seen and examined at bedside. No overnight events reported. Yesterday diet switched back to NPO with ice chips. This morning patient c/o 9/10 pain in abdomen     ALLERGIES:  No Known Allergies    MEDICATIONS  (STANDING):  acetaminophen     Tablet .. 650 milliGRAM(s) Oral every 6 hours  enoxaparin Injectable 40 milliGRAM(s) SubCutaneous every 24 hours  finasteride 5 milliGRAM(s) Oral daily  lactated ringers. 1000 milliLiter(s) (75 mL/Hr) IV Continuous <Continuous>  piperacillin/tazobactam IVPB.. 3.375 Gram(s) IV Intermittent every 8 hours  tamsulosin 0.8 milliGRAM(s) Oral at bedtime    MEDICATIONS  (PRN):  aluminum hydroxide/magnesium hydroxide/simethicone Suspension 30 milliLiter(s) Oral every 4 hours PRN Dyspepsia  HYDROmorphone  Injectable 0.5 milliGRAM(s) IV Push every 3 hours PRN Severe Pain (7 - 10)  melatonin 3 milliGRAM(s) Oral at bedtime PRN Insomnia  ondansetron Injectable 4 milliGRAM(s) IV Push every 8 hours PRN Nausea and/or Vomiting  oxyCODONE    IR 5 milliGRAM(s) Oral every 6 hours PRN Moderate Pain (4 - 6)    Vital Signs Last 24 Hrs  T(F): 97.9 (2023 05:13), Max: 97.9 (2023 05:13)  HR: 84 (2023 05:13) (82 - 88)  BP: 140/64 (2023 05:13) (113/84 - 140/64)  RR: 18 (2023 05:13) (18 - 21)  SpO2: 97% (2023 05:13) (96% - 99%)    I&O's Summary  2023 07:01  -  2023 07:00  --------------------------------------------------------  IN: 850 mL / OUT: 535 mL / NET: 315 mL    PHYSICAL EXAM:  General: NAD, A/O x 2  ENT: No gross hearing impairment, moist mucous membranes, no thrush  Neck: Supple, No JVD  Lungs: Poor inspiratory effort, anterior lung sounds clear, non-labored breathing. Slight weak cough  Cardio: RRR, S1/S2, No murmur  Abdomen: Soft, tender, mildly distended; Bowel sounds present  Extremities: No calf tenderness, No cyanosis, No pitting edema  Psych: Appropriate mood and affect    LABS:                        11.4   9.29  )-----------( 196      ( 2023 07:15 )             37.1         144  |  107  |  24  ----------------------------<  101  3.5   |  29  |  0.85    Ca    8.5      2023 07:05    TPro  6.2  /  Alb  1.9  /  TBili  1.0  /  DBili  0.4  /  AST  83  /  ALT  92  /  AlkPhos  224        Lipase, Serum: 30 U/L (23 @ 12:27)      PT/INR - ( 2023 15:10 )   PT: 15.2 sec;   INR: 1.31 ratio         PTT - ( 2023 15:10 )  PTT:23.8 sec  Lactate, Blood: 1.0 mmol/L ( @ 17:21)  Lactate, Blood: 2.5 mmol/L ( @ 12:27)     Chol 160 mg/dL LDL -- HDL 44 mg/dL Trig 95 mg/dL    Urinalysis Basic - ( 2023 12:38 )    Color: Yellow / Appearance: Slightly Turbid / S.020 / pH: x  Gluc: x / Ketone: Trace  / Bili: Negative / Urobili: 1   Blood: x / Protein: 100 / Nitrite: Negative   Leuk Esterase: Moderate / RBC: 5-10 /HPF / WBC 11-25 /HPF   Sq Epi: x / Non Sq Epi: Neg.-Few / Bacteria: Moderate /HPF    Culture - Urine (collected 2023 12:38)  Source: Catheterized Catheterized  Final Report (2023 19:29):    >100,000 CFU/ml Escherichia coli    >100,000 CFU/ml Aerococcus species    "Aerococcus spp. are predictably susceptible to penicillin,    ampicillin, tetracycline, and vancomycin.  All isolates are    resistant to sulfonamides"  Organism: Escherichia coli (2023 19:29)  Organism: Escherichia coli (2023 19:29)      -  Amikacin: S <=16      -  Amoxicillin/Clavulanic Acid: S <=8/4      -  Ampicillin: S <=8 These ampicillin results predict results for amoxicillin      -  Ampicillin/Sulbactam: S <=4/2 Enterobacter, Klebsiella aerogenes, Citrobacter, and Serratia may develop resistance during prolonged therapy (3-4 days)      -  Aztreonam: S <=4      -  Cefazolin: S <=2 For uncomplicated UTI with K. pneumoniae, E. coli, or P. mirablis: PHOEBE <=16 is sensitive and PHOEBE >=32 is resistant. This also predicts results for oral agents cefaclor, cefdinir, cefpodoxime, cefprozil, cefuroxime axetil, cephalexin and locarbef for uncomplicated UTI. Note that some isolates may be susceptible to these agents while testing resistant to cefazolin.      -  Cefepime: S 8      -  Cefoxitin: S <=8      -  Ceftriaxone: S <=1 Enterobacter, Klebsiella aerogenes, Citrobacter, and Serratia may develop resistance during prolonged therapy      -  Cefuroxime: S <=4      -  Ciprofloxacin: S <=0.25      -  Ertapenem: S <=0.5      -  Gentamicin: S <=2      -  Imipenem: S <=1      -  Levofloxacin: S <=0.5      -  Meropenem: S <=1      -  Nitrofurantoin: S <=32 Should not be used to treat pyelonephritis      -  Piperacillin/Tazobactam: S <=8      -  Tobramycin: S <=2      -  Trimethoprim/Sulfamethoxazole: S <=0.5/9.5      Method Type: PHOEBE    Culture - Blood (collected 2023 12:27)  Source: .Blood Blood-Peripheral  Preliminary Report (2023 15:01):    No growth to date.    Culture - Blood (collected 2023 12:10)  Source: .Blood Blood-Peripheral  Gram Stain (2023 15:34):    Growth in anaerobic bottle: Gram positive cocci in pairs  Final Report (2023 13:38):    Growth in anaerobic bottle: Aerococcus urinae    "Susceptibilities not performed"    ***Blood Panel PCR results on this specimen are available    approximately 3 hours after the Gram stain result.***    Gram stain, PCR, and/or culture results may not always    correspond due to difference in methodologies.    ************************************************************    This PCR assay was performed by multiplex PCR. This    Assay tests for 66 bacterial and resistance gene targets.    Please refer to the [facility] Labs test directory    at [email or web address] for details.  Organism: Blood Culture PCR (2023 13:38)  Organism: Blood Culture PCR (2023 13:38)      -  Blood PCR Panel: NEG      Method Type: PCR    COVID-19 PCR: NotDetec (23 @ 12:25)    RADIOLOGY & ADDITIONAL TESTS:    Care Discussed with Consultants/Other Providers:    Patient is a 89y old  Male who presents with a chief complaint of abdominal pain (2023 07:04)    Patient seen and examined at bedside. No overnight events reported. Yesterday diet switched back to NPO with ice chips. This morning patient c/o 9/10 pain in abdomen     ALLERGIES:  No Known Allergies    MEDICATIONS  (STANDING):  acetaminophen     Tablet .. 650 milliGRAM(s) Oral every 6 hours  enoxaparin Injectable 40 milliGRAM(s) SubCutaneous every 24 hours  finasteride 5 milliGRAM(s) Oral daily  lactated ringers. 1000 milliLiter(s) (75 mL/Hr) IV Continuous <Continuous>  piperacillin/tazobactam IVPB.. 3.375 Gram(s) IV Intermittent every 8 hours  tamsulosin 0.8 milliGRAM(s) Oral at bedtime    MEDICATIONS  (PRN):  aluminum hydroxide/magnesium hydroxide/simethicone Suspension 30 milliLiter(s) Oral every 4 hours PRN Dyspepsia  HYDROmorphone  Injectable 0.5 milliGRAM(s) IV Push every 3 hours PRN Severe Pain (7 - 10)  melatonin 3 milliGRAM(s) Oral at bedtime PRN Insomnia  ondansetron Injectable 4 milliGRAM(s) IV Push every 8 hours PRN Nausea and/or Vomiting  oxyCODONE    IR 5 milliGRAM(s) Oral every 6 hours PRN Moderate Pain (4 - 6)    Vital Signs Last 24 Hrs  T(F): 97.9 (2023 05:13), Max: 97.9 (2023 05:13)  HR: 84 (2023 05:13) (82 - 88)  BP: 140/64 (2023 05:13) (113/84 - 140/64)  RR: 18 (2023 05:13) (18 - 21)  SpO2: 97% (2023 05:13) (96% - 99%)    I&O's Summary  2023 07:01  -  2023 07:00  --------------------------------------------------------  IN: 850 mL / OUT: 535 mL / NET: 315 mL    PHYSICAL EXAM:  General: NAD, A/O x 2  ENT: No gross hearing impairment, moist mucous membranes, no thrush  Neck: Supple, No JVD  Lungs: Poor inspiratory effort, anterior lung sounds clear, non-labored breathing. Slight weak cough  Cardio: RRR, S1/S2, No murmur  Abdomen: Soft, tender, mildly distended; Bowel sounds present  Extremities: No calf tenderness, No cyanosis, No pitting edema  Psych: Appropriate mood and affect    LABS:                        11.4   9.29  )-----------( 196      ( 2023 07:15 )             37.1         144  |  107  |  24  ----------------------------<  101  3.5   |  29  |  0.85    Ca    8.5      2023 07:05    TPro  6.2  /  Alb  1.9  /  TBili  1.0  /  DBili  0.4  /  AST  83  /  ALT  92  /  AlkPhos  224        Lipase, Serum: 30 U/L (23 @ 12:27)      PT/INR - ( 2023 15:10 )   PT: 15.2 sec;   INR: 1.31 ratio         PTT - ( 2023 15:10 )  PTT:23.8 sec  Lactate, Blood: 1.0 mmol/L ( @ 17:21)  Lactate, Blood: 2.5 mmol/L ( @ 12:27)     Chol 160 mg/dL LDL -- HDL 44 mg/dL Trig 95 mg/dL    Urinalysis Basic - ( 2023 12:38 )    Color: Yellow / Appearance: Slightly Turbid / S.020 / pH: x  Gluc: x / Ketone: Trace  / Bili: Negative / Urobili: 1   Blood: x / Protein: 100 / Nitrite: Negative   Leuk Esterase: Moderate / RBC: 5-10 /HPF / WBC 11-25 /HPF   Sq Epi: x / Non Sq Epi: Neg.-Few / Bacteria: Moderate /HPF    Culture - Urine (collected 2023 12:38)  Source: Catheterized Catheterized  Final Report (2023 19:29):    >100,000 CFU/ml Escherichia coli    >100,000 CFU/ml Aerococcus species    "Aerococcus spp. are predictably susceptible to penicillin,    ampicillin, tetracycline, and vancomycin.  All isolates are    resistant to sulfonamides"  Organism: Escherichia coli (2023 19:29)  Organism: Escherichia coli (2023 19:29)      -  Amikacin: S <=16      -  Amoxicillin/Clavulanic Acid: S <=8/4      -  Ampicillin: S <=8 These ampicillin results predict results for amoxicillin      -  Ampicillin/Sulbactam: S <=4/2 Enterobacter, Klebsiella aerogenes, Citrobacter, and Serratia may develop resistance during prolonged therapy (3-4 days)      -  Aztreonam: S <=4      -  Cefazolin: S <=2 For uncomplicated UTI with K. pneumoniae, E. coli, or P. mirablis: PHOEBE <=16 is sensitive and PHOEBE >=32 is resistant. This also predicts results for oral agents cefaclor, cefdinir, cefpodoxime, cefprozil, cefuroxime axetil, cephalexin and locarbef for uncomplicated UTI. Note that some isolates may be susceptible to these agents while testing resistant to cefazolin.      -  Cefepime: S 8      -  Cefoxitin: S <=8      -  Ceftriaxone: S <=1 Enterobacter, Klebsiella aerogenes, Citrobacter, and Serratia may develop resistance during prolonged therapy      -  Cefuroxime: S <=4      -  Ciprofloxacin: S <=0.25      -  Ertapenem: S <=0.5      -  Gentamicin: S <=2      -  Imipenem: S <=1      -  Levofloxacin: S <=0.5      -  Meropenem: S <=1      -  Nitrofurantoin: S <=32 Should not be used to treat pyelonephritis      -  Piperacillin/Tazobactam: S <=8      -  Tobramycin: S <=2      -  Trimethoprim/Sulfamethoxazole: S <=0.5/9.5      Method Type: PHOEBE    Culture - Blood (collected 2023 12:27)  Source: .Blood Blood-Peripheral  Preliminary Report (2023 15:01):    No growth to date.    Culture - Blood (collected 2023 12:10)  Source: .Blood Blood-Peripheral  Gram Stain (2023 15:34):    Growth in anaerobic bottle: Gram positive cocci in pairs  Final Report (2023 13:38):    Growth in anaerobic bottle: Aerococcus urinae    "Susceptibilities not performed"    ***Blood Panel PCR results on this specimen are available    approximately 3 hours after the Gram stain result.***    Gram stain, PCR, and/or culture results may not always    correspond due to difference in methodologies.    ************************************************************    This PCR assay was performed by multiplex PCR. This    Assay tests for 66 bacterial and resistance gene targets.    Please refer to the [facility] Labs test directory    at [email or web address] for details.  Organism: Blood Culture PCR (2023 13:38)  Organism: Blood Culture PCR (2023 13:38)      -  Blood PCR Panel: NEG      Method Type: PCR    COVID-19 PCR: NotDetec (23 @ 12:25)    RADIOLOGY & ADDITIONAL TESTS:    Care Discussed with Consultants/Other Providers:

## 2023-02-02 NOTE — PROGRESS NOTE ADULT - SUBJECTIVE AND OBJECTIVE BOX
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 3.375 Gram(s) IV Intermittent every 8 hours    Other Medications Reviewed  MEDICATIONS  (STANDING):  acetaminophen     Tablet .. 650 milliGRAM(s) Oral every 6 hours  aspirin enteric coated 81 milliGRAM(s) Oral daily  enoxaparin Injectable 30 milliGRAM(s) SubCutaneous every 24 hours  finasteride 5 milliGRAM(s) Oral daily  lactated ringers. 1000 milliLiter(s) (75 mL/Hr) IV Continuous <Continuous>  piperacillin/tazobactam IVPB.. 3.375 Gram(s) IV Intermittent every 8 hours  tamsulosin 0.8 milliGRAM(s) Oral at bedtime    T(F): 97.1 (02-02-23 @ 12:44), Max: 97.9 (02-02-23 @ 05:13)  HR: 81 (02-02-23 @ 12:44)  BP: 125/66 (02-02-23 @ 12:44)  RR: 18 (02-02-23 @ 12:44)  SpO2: 98% (02-02-23 @ 12:44)  Wt(kg): --    PHYSICAL EXAM:  General: alert, no acute distress  Eyes:  anicteric, no conjunctival injection, no discharge  Oropharynx: no lesions or injection 	  Neck: supple, without adenopathy  Lungs: clear to auscultation  Heart: regular rate and rhythm; no murmur, rubs or gallops  Abdomen: soft,distended, mild tenderness, RUPINDER with serous drainage  Skin: no lesions  Extremities: no clubbing, cyanosis, or edema  Neurologic: alert, oriented, moves all extremities  : felipe  LAB RESULTS:                        11.4   9.29  )-----------( 196      ( 02 Feb 2023 07:15 )             37.1     02-02    142  |  107  |  20  ----------------------------<  116<H>  4.0   |  29  |  0.81    Ca    8.5      02 Feb 2023 14:21  Phos  2.5     02-02  Mg     2.0     02-02    TPro  6.0  /  Alb  1.8<L>  /  TBili  0.6  /  DBili  x   /  AST  42<H>  /  ALT  72<H>  /  AlkPhos  200<H>  02-02    LIVER FUNCTIONS - ( 02 Feb 2023 07:15 )  Alb: 1.8 g/dL / Pro: 6.0 g/dL / ALK PHOS: 200 U/L / ALT: 72 U/L / AST: 42 U/L / GGT: x             MICROBIOLOGY:  RECENT CULTURES:  01-30 @ 12:38 Catheterized Catheterized Escherichia coli    >100,000 CFU/ml Escherichia coli  >100,000 CFU/ml Aerococcus species  "Aerococcus spp. are predictably susceptible to penicillin,  ampicillin, tetracycline, and vancomycin.  All isolates are  resistant to sulfonamides"      01-30 @ 12:27 .Blood Blood-Peripheral     No growth to date.      01-30 @ 12:10 .Blood Blood-Peripheral Blood Culture PCR    Growth in anaerobic bottle: Aerococcus urinae  "Susceptibilities not performed"  ***Blood Panel PCR results on this specimen are available  approximately 3 hours after the Gram stain result.***  Gram stain, PCR, and/or culture results may not always  correspond due to difference in methodologies.  ************************************************************  This PCR assay was performed by multiplex PCR. This  Assay tests for 66 bacterial and resistance gene targets.  Please refer to the [facility] test directory  at [email or web address] for details.    Growth in anaerobic bottle: Gram positive cocci in pairs        RADIOLOGY REVIEWED:  < from: CT Abdomen and Pelvis w/ IV Cont (01.30.23 @ 13:47) >  IMPRESSION:  Markedly irregular gallbladder wall with areas of discontinuity. Air in   the gallbladder. Pericholecystic fluid. Distal small bowel obstruction.   Findings suggestive of gallstone ileus however no gallstone is visualized   with certainty. Consider gangrenous cholecystitis as well.    Felipe balloon distended within the prostatic urethra.Recommend   repositioning.    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    Nonobstructed colon in the left inguinal hernia, does NOT appear to be   related to the bowel obstruction.    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 3.375 Gram(s) IV Intermittent every 8 hours    Other Medications Reviewed  MEDICATIONS  (STANDING):  acetaminophen     Tablet .. 650 milliGRAM(s) Oral every 6 hours  aspirin enteric coated 81 milliGRAM(s) Oral daily  enoxaparin Injectable 30 milliGRAM(s) SubCutaneous every 24 hours  finasteride 5 milliGRAM(s) Oral daily  lactated ringers. 1000 milliLiter(s) (75 mL/Hr) IV Continuous <Continuous>  piperacillin/tazobactam IVPB.. 3.375 Gram(s) IV Intermittent every 8 hours  tamsulosin 0.8 milliGRAM(s) Oral at bedtime    T(F): 97.1 (02-02-23 @ 12:44), Max: 97.9 (02-02-23 @ 05:13)  HR: 81 (02-02-23 @ 12:44)  BP: 125/66 (02-02-23 @ 12:44)  RR: 18 (02-02-23 @ 12:44)  SpO2: 98% (02-02-23 @ 12:44)  Wt(kg): --    PHYSICAL EXAM:  General: alert, no acute distress  Eyes:  anicteric, no conjunctival injection, no discharge  Oropharynx: no lesions or injection 	  Neck: supple, without adenopathy  Lungs: clear to auscultation  Heart: regular rate and rhythm; no murmur, rubs or gallops  Abdomen: soft,distended, mild tenderness, RUPINDER with serous drainage  Skin: no lesions  Extremities: no clubbing, cyanosis, or edema  Neurologic: alert, oriented, moves all extremities  : felipe  LAB RESULTS:                        11.4   9.29  )-----------( 196      ( 02 Feb 2023 07:15 )             37.1     02-02    142  |  107  |  20  ----------------------------<  116<H>  4.0   |  29  |  0.81    Ca    8.5      02 Feb 2023 14:21  Phos  2.5     02-02  Mg     2.0     02-02    TPro  6.0  /  Alb  1.8<L>  /  TBili  0.6  /  DBili  x   /  AST  42<H>  /  ALT  72<H>  /  AlkPhos  200<H>  02-02    LIVER FUNCTIONS - ( 02 Feb 2023 07:15 )  Alb: 1.8 g/dL / Pro: 6.0 g/dL / ALK PHOS: 200 U/L / ALT: 72 U/L / AST: 42 U/L / GGT: x             MICROBIOLOGY:  RECENT CULTURES:  01-30 @ 12:38 Catheterized Catheterized Escherichia coli    >100,000 CFU/ml Escherichia coli  >100,000 CFU/ml Aerococcus species  "Aerococcus spp. are predictably susceptible to penicillin,  ampicillin, tetracycline, and vancomycin.  All isolates are  resistant to sulfonamides"      01-30 @ 12:27 .Blood Blood-Peripheral     No growth to date.      01-30 @ 12:10 .Blood Blood-Peripheral Blood Culture PCR    Growth in anaerobic bottle: Aerococcus urinae  "Susceptibilities not performed"  ***Blood Panel PCR results on this specimen are available  approximately 3 hours after the Gram stain result.***  Gram stain, PCR, and/or culture results may not always  correspond due to difference in methodologies.  ************************************************************  This PCR assay was performed by multiplex PCR. This  Assay tests for 66 bacterial and resistance gene targets.  Please refer to the [facility] Labs test directory  at [email or web address] for details.    Growth in anaerobic bottle: Gram positive cocci in pairs        RADIOLOGY REVIEWED:  < from: CT Abdomen and Pelvis w/ IV Cont (01.30.23 @ 13:47) >  IMPRESSION:  Markedly irregular gallbladder wall with areas of discontinuity. Air in   the gallbladder. Pericholecystic fluid. Distal small bowel obstruction.   Findings suggestive of gallstone ileus however no gallstone is visualized   with certainty. Consider gangrenous cholecystitis as well.    Felipe balloon distended within the prostatic urethra.Recommend   repositioning.    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    Nonobstructed colon in the left inguinal hernia, does NOT appear to be   related to the bowel obstruction.    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 3.375 Gram(s) IV Intermittent every 8 hours    Other Medications Reviewed  MEDICATIONS  (STANDING):  acetaminophen     Tablet .. 650 milliGRAM(s) Oral every 6 hours  aspirin enteric coated 81 milliGRAM(s) Oral daily  enoxaparin Injectable 30 milliGRAM(s) SubCutaneous every 24 hours  finasteride 5 milliGRAM(s) Oral daily  lactated ringers. 1000 milliLiter(s) (75 mL/Hr) IV Continuous <Continuous>  piperacillin/tazobactam IVPB.. 3.375 Gram(s) IV Intermittent every 8 hours  tamsulosin 0.8 milliGRAM(s) Oral at bedtime    T(F): 97.1 (02-02-23 @ 12:44), Max: 97.9 (02-02-23 @ 05:13)  HR: 81 (02-02-23 @ 12:44)  BP: 125/66 (02-02-23 @ 12:44)  RR: 18 (02-02-23 @ 12:44)  SpO2: 98% (02-02-23 @ 12:44)  Wt(kg): --    PHYSICAL EXAM:  General: alert, no acute distress  Eyes:  anicteric, no conjunctival injection, no discharge  Oropharynx: no lesions or injection 	  Neck: supple, without adenopathy  Lungs: clear to auscultation  Heart: regular rate and rhythm; no murmur, rubs or gallops  Abdomen: soft,distended, mild tenderness, RUPINDER with serous drainage  Skin: no lesions  Extremities: no clubbing, cyanosis, or edema  Neurologic: alert, oriented, moves all extremities  : felipe  LAB RESULTS:                        11.4   9.29  )-----------( 196      ( 02 Feb 2023 07:15 )             37.1     02-02    142  |  107  |  20  ----------------------------<  116<H>  4.0   |  29  |  0.81    Ca    8.5      02 Feb 2023 14:21  Phos  2.5     02-02  Mg     2.0     02-02    TPro  6.0  /  Alb  1.8<L>  /  TBili  0.6  /  DBili  x   /  AST  42<H>  /  ALT  72<H>  /  AlkPhos  200<H>  02-02    LIVER FUNCTIONS - ( 02 Feb 2023 07:15 )  Alb: 1.8 g/dL / Pro: 6.0 g/dL / ALK PHOS: 200 U/L / ALT: 72 U/L / AST: 42 U/L / GGT: x             MICROBIOLOGY:  RECENT CULTURES:  01-30 @ 12:38 Catheterized Catheterized Escherichia coli    >100,000 CFU/ml Escherichia coli  >100,000 CFU/ml Aerococcus species  "Aerococcus spp. are predictably susceptible to penicillin,  ampicillin, tetracycline, and vancomycin.  All isolates are  resistant to sulfonamides"      01-30 @ 12:27 .Blood Blood-Peripheral     No growth to date.      01-30 @ 12:10 .Blood Blood-Peripheral Blood Culture PCR    Growth in anaerobic bottle: Aerococcus urinae  "Susceptibilities not performed"  ***Blood Panel PCR results on this specimen are available  approximately 3 hours after the Gram stain result.***  Gram stain, PCR, and/or culture results may not always  correspond due to difference in methodologies.  ************************************************************  This PCR assay was performed by multiplex PCR. This  Assay tests for 66 bacterial and resistance gene targets.  Please refer to the [facility] Labs test directory  at [email or web address] for details.    Growth in anaerobic bottle: Gram positive cocci in pairs        RADIOLOGY REVIEWED:  < from: CT Abdomen and Pelvis w/ IV Cont (01.30.23 @ 13:47) >  IMPRESSION:  Markedly irregular gallbladder wall with areas of discontinuity. Air in   the gallbladder. Pericholecystic fluid. Distal small bowel obstruction.   Findings suggestive of gallstone ileus however no gallstone is visualized   with certainty. Consider gangrenous cholecystitis as well.    Felipe balloon distended within the prostatic urethra.Recommend   repositioning.    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    Nonobstructed colon in the left inguinal hernia, does NOT appear to be   related to the bowel obstruction.    < end of copied text >

## 2023-02-02 NOTE — PROGRESS NOTE ADULT - ASSESSMENT
89 yo male POD #3 s/p lap kellee for perforated, gangrenous gall bladder  no events noted overnight  pt tolerating sips/chips, denies n/v  + flatus this morning  pt c/o mild incisional pain     pt doing well from surgical standpoint    Hg stable, wbc wnl, LFTs trending down as expected    hypernatremia/hyperchloremia, medicine following    K+ 3.9    plan  - continue ice chips for now   - IV abx  - Is and Os   - dvt ppx  - if hg stable, may start ASA  - analgesia prn  - incentive spirometry  - ambulate    will d/w Dr. Thompson     89 yo male POD #3 s/p lap kellee for perforated, gangrenous gall bladder  no events noted overnight  pt tolerating sips/chips, denies n/v  + flatus this morning  pt c/o mild incisional pain     pt doing well from surgical standpoint    Hg stable, wbc wnl, LFTs trending down as expected    hypernatremia/hyperchloremia, medicine following    K+ 3.9    plan  - continue ice chips for now   - IV abx  - Is and Os   - dvt ppx  - if hg stable, may start ASA  - analgesia prn  - incentive spirometry  - ambulate    will d/w Dr. Thompson     89 yo male POD #3 s/p lap kellee for perforated, gangrenous gall bladder  no events noted overnight  pt tolerating sips/chips, denies n/v  + flatus this morning  pt c/o mild incisional pain     pt doing well from surgical standpoint    Hg stable, wbc wnl, LFTs trending down as expected    hypernatremia/hyperchloremia, medicine following    K+ 3.9    plan  - continue ice chips for now   - d/c [facility] for TOV  - IV abx  - Is and Os   - dvt ppx  - if hg stable, may start ASA today  - analgesia prn  - incentive spirometry  - ambulate with PT      d/w Dr. Thompson     89 yo male POD #3 s/p lap kellee for perforated, gangrenous gall bladder  no events noted overnight  pt tolerating sips/chips, denies n/v  + flatus this morning  pt c/o mild incisional pain     pt doing well from surgical standpoint    Hg stable, wbc wnl, LFTs trending down as expected    hypernatremia/hyperchloremia, medicine following    K+ 3.9    plan  - continue ice chips for now   - d/c [facility] for TOV  - IV abx  - Is and Os   - dvt ppx  - if hg stable, may start ASA today  - analgesia prn  - incentive spirometry  - ambulate with PT      d/w Dr. Thompson     89 yo male POD #3 s/p lap kellee for perforated, gangrenous gall bladder  no events noted overnight  pt tolerating sips/chips, denies n/v  pt c/o mild incisional pain     pt doing well from surgical standpoint    Hg stable, wbc wnl, LFTs trending down as expected    hypernatremia/hyperchloremia, medicine following    K+ 3.9    plan  - continue ice chips for now   - d/c [facility] for TOV  - IV abx  - Is and Os   - dvt ppx  - if hg stable, may start ASA today  - analgesia prn  - incentive spirometry  - ambulate with PT      d/w Dr. Thompson     89 yo male POD #3 s/p lap kellee for perforated, gangrenous gall bladder  no events noted overnight  pt tolerating sips/chips, denies n/v  pt c/o mild incisional pain     pt doing well from surgical standpoint    Hg stable, wbc wnl, LFTs trending down as expected    hypernatremia/hyperchloremia, medicine following    K+ 3.9    plan  - continue ice chips for now   - d/c [facility] for TOV  - IV abx  - Is and Os   - dvt ppx  - if hg stable, may start ASA today  - analgesia prn  - incentive spirometry  - ambulate with PT      d/w Dr. Thompson

## 2023-02-03 DIAGNOSIS — K81.0 ACUTE CHOLECYSTITIS: ICD-10-CM

## 2023-02-03 LAB
ALBUMIN SERPL ELPH-MCNC: 2 G/DL — LOW (ref 3.3–5)
ALP SERPL-CCNC: 204 U/L — HIGH (ref 40–120)
ALT FLD-CCNC: 53 U/L — HIGH (ref 10–45)
ANION GAP SERPL CALC-SCNC: 8 MMOL/L — SIGNIFICANT CHANGE UP (ref 5–17)
AST SERPL-CCNC: 48 U/L — HIGH (ref 10–40)
BILIRUB DIRECT SERPL-MCNC: 0.2 MG/DL — SIGNIFICANT CHANGE UP (ref 0–0.3)
BILIRUB INDIRECT FLD-MCNC: 0.4 MG/DL — SIGNIFICANT CHANGE UP (ref 0.2–1)
BILIRUB SERPL-MCNC: 0.6 MG/DL — SIGNIFICANT CHANGE UP (ref 0.2–1.2)
BUN SERPL-MCNC: 17 MG/DL — SIGNIFICANT CHANGE UP (ref 7–23)
CALCIUM SERPL-MCNC: 8.3 MG/DL — LOW (ref 8.4–10.5)
CHLORIDE SERPL-SCNC: 107 MMOL/L — SIGNIFICANT CHANGE UP (ref 96–108)
CO2 SERPL-SCNC: 28 MMOL/L — SIGNIFICANT CHANGE UP (ref 22–31)
CREAT SERPL-MCNC: 0.72 MG/DL — SIGNIFICANT CHANGE UP (ref 0.5–1.3)
EGFR: 84 ML/MIN/1.73M2 — SIGNIFICANT CHANGE UP
GLUCOSE SERPL-MCNC: 103 MG/DL — HIGH (ref 70–99)
HCT VFR BLD CALC: 38.6 % — LOW (ref 39–50)
HGB BLD-MCNC: 12.4 G/DL — LOW (ref 13–17)
MAGNESIUM SERPL-MCNC: 1.9 MG/DL — SIGNIFICANT CHANGE UP (ref 1.6–2.6)
MCHC RBC-ENTMCNC: 30.8 PG — SIGNIFICANT CHANGE UP (ref 27–34)
MCHC RBC-ENTMCNC: 32.1 GM/DL — SIGNIFICANT CHANGE UP (ref 32–36)
MCV RBC AUTO: 95.8 FL — SIGNIFICANT CHANGE UP (ref 80–100)
NRBC # BLD: 0 /100 WBCS — SIGNIFICANT CHANGE UP (ref 0–0)
PLATELET # BLD AUTO: 193 K/UL — SIGNIFICANT CHANGE UP (ref 150–400)
POTASSIUM SERPL-MCNC: 3.8 MMOL/L — SIGNIFICANT CHANGE UP (ref 3.5–5.3)
POTASSIUM SERPL-SCNC: 3.8 MMOL/L — SIGNIFICANT CHANGE UP (ref 3.5–5.3)
PROT SERPL-MCNC: 6.1 G/DL — SIGNIFICANT CHANGE UP (ref 6–8.3)
RBC # BLD: 4.03 M/UL — LOW (ref 4.2–5.8)
RBC # FLD: 14.8 % — HIGH (ref 10.3–14.5)
SODIUM SERPL-SCNC: 143 MMOL/L — SIGNIFICANT CHANGE UP (ref 135–145)
SURGICAL PATHOLOGY STUDY: SIGNIFICANT CHANGE UP
WBC # BLD: 9.85 K/UL — SIGNIFICANT CHANGE UP (ref 3.8–10.5)
WBC # FLD AUTO: 9.85 K/UL — SIGNIFICANT CHANGE UP (ref 3.8–10.5)

## 2023-02-03 PROCEDURE — 99232 SBSQ HOSP IP/OBS MODERATE 35: CPT

## 2023-02-03 RX ORDER — ZINC OXIDE 200 MG/G
1 OINTMENT TOPICAL THREE TIMES A DAY
Refills: 0 | Status: DISCONTINUED | OUTPATIENT
Start: 2023-02-03 | End: 2023-02-09

## 2023-02-03 RX ADMIN — Medication 650 MILLIGRAM(S): at 05:56

## 2023-02-03 RX ADMIN — ENOXAPARIN SODIUM 30 MILLIGRAM(S): 100 INJECTION SUBCUTANEOUS at 11:17

## 2023-02-03 RX ADMIN — Medication 650 MILLIGRAM(S): at 18:04

## 2023-02-03 RX ADMIN — ZINC OXIDE 1 APPLICATION(S): 200 OINTMENT TOPICAL at 21:55

## 2023-02-03 RX ADMIN — Medication 650 MILLIGRAM(S): at 11:18

## 2023-02-03 RX ADMIN — PIPERACILLIN AND TAZOBACTAM 25 GRAM(S): 4; .5 INJECTION, POWDER, LYOPHILIZED, FOR SOLUTION INTRAVENOUS at 22:14

## 2023-02-03 RX ADMIN — PIPERACILLIN AND TAZOBACTAM 25 GRAM(S): 4; .5 INJECTION, POWDER, LYOPHILIZED, FOR SOLUTION INTRAVENOUS at 05:56

## 2023-02-03 RX ADMIN — Medication 650 MILLIGRAM(S): at 18:34

## 2023-02-03 RX ADMIN — Medication 650 MILLIGRAM(S): at 06:30

## 2023-02-03 RX ADMIN — TAMSULOSIN HYDROCHLORIDE 0.8 MILLIGRAM(S): 0.4 CAPSULE ORAL at 21:39

## 2023-02-03 RX ADMIN — Medication 650 MILLIGRAM(S): at 12:15

## 2023-02-03 RX ADMIN — Medication 81 MILLIGRAM(S): at 11:18

## 2023-02-03 RX ADMIN — FINASTERIDE 5 MILLIGRAM(S): 5 TABLET, FILM COATED ORAL at 11:18

## 2023-02-03 RX ADMIN — PIPERACILLIN AND TAZOBACTAM 25 GRAM(S): 4; .5 INJECTION, POWDER, LYOPHILIZED, FOR SOLUTION INTRAVENOUS at 14:35

## 2023-02-03 NOTE — PROGRESS NOTE ADULT - SUBJECTIVE AND OBJECTIVE BOX
89 yo male POD #4 s/p lap kellee for perforated, gangrenous gall bladder. Pt in bed admits to abdominal pain. No nausea, no vomiting. Bowel movement overnight. denies chest pain, sob,n,v      PAST MEDICAL & SURGICAL HISTORY:  History of BPH      Malignant neoplasm of colon      Femur neck fracture      History of glaucoma      GERD (gastroesophageal reflux disease)      Iron deficiency anemia      Hyperlipidemia      History of colon surgery      MEDICATIONS  (STANDING):  acetaminophen     Tablet .. 650 milliGRAM(s) Oral every 6 hours  aspirin enteric coated 81 milliGRAM(s) Oral daily  dextrose 5% + sodium chloride 0.45% with potassium chloride 10 mEq/L 1000 milliLiter(s) (75 mL/Hr) IV Continuous <Continuous>  enoxaparin Injectable 30 milliGRAM(s) SubCutaneous every 24 hours  finasteride 5 milliGRAM(s) Oral daily  piperacillin/tazobactam IVPB.. 3.375 Gram(s) IV Intermittent every 8 hours  polyethylene glycol 3350 17 Gram(s) Oral daily  tamsulosin 0.8 milliGRAM(s) Oral at bedtime    MEDICATIONS  (PRN):  aluminum hydroxide/magnesium hydroxide/simethicone Suspension 30 milliLiter(s) Oral every 4 hours PRN Dyspepsia  HYDROmorphone  Injectable 0.5 milliGRAM(s) IV Push every 3 hours PRN Severe Pain (7 - 10)  melatonin 3 milliGRAM(s) Oral at bedtime PRN Insomnia  ondansetron Injectable 4 milliGRAM(s) IV Push every 8 hours PRN Nausea and/or Vomiting  oxyCODONE    IR 5 milliGRAM(s) Oral every 6 hours PRN Moderate Pain (4 - 6)    PE: Vital Signs Last 24 Hrs  T(C): 36.7 (03 Feb 2023 05:40), Max: 36.7 (03 Feb 2023 05:40)  T(F): 98 (03 Feb 2023 05:40), Max: 98 (03 Feb 2023 05:40)  HR: 84 (03 Feb 2023 05:40) (81 - 84)  BP: 147/76 (03 Feb 2023 05:40) (125/66 - 147/76)  BP(mean): --  RR: 16 (03 Feb 2023 05:40) (16 - 18)  SpO2: 95% (03 Feb 2023 05:40) (95% - 98%)    Parameters below as of 03 Feb 2023 05:40  Patient On (Oxygen Delivery Method): nasal cannula  O2 Flow (L/min): 2    Gen: Awake in NAD  abd: softly distended, tender on drain site and RUQ, no guarding, incisions c/d/i, Drain ss output  : bladder scan pending   LE: Calfs soft, no swelling non tenderness.                           12.4   9.85  )-----------( 193      ( 03 Feb 2023 06:00 )             38.6   02-03    143  |  107  |  17  ----------------------------<  103<H>  3.8   |  28  |  0.72    Ca    8.3<L>      03 Feb 2023 06:00  Phos  2.5     02-02  Mg     1.9     02-03    TPro  6.0  /  Alb  1.8<L>  /  TBili  0.6  /  DBili  x   /  AST  42<H>  /  ALT  72<H>  /  AlkPhos  200<H>  02-02                         89 yo male POD #4 s/p lap kellee for perforated, gangrenous gall bladder. Pt in bed admits to abdominal pain, flatus and Bowel movement overnight. Denies chest pain, sob,n,v      PAST MEDICAL & SURGICAL HISTORY:  History of BPH      Malignant neoplasm of colon      Femur neck fracture      History of glaucoma      GERD (gastroesophageal reflux disease)      Iron deficiency anemia      Hyperlipidemia      History of colon surgery      MEDICATIONS  (STANDING):  acetaminophen     Tablet .. 650 milliGRAM(s) Oral every 6 hours  aspirin enteric coated 81 milliGRAM(s) Oral daily  dextrose 5% + sodium chloride 0.45% with potassium chloride 10 mEq/L 1000 milliLiter(s) (75 mL/Hr) IV Continuous <Continuous>  enoxaparin Injectable 30 milliGRAM(s) SubCutaneous every 24 hours  finasteride 5 milliGRAM(s) Oral daily  piperacillin/tazobactam IVPB.. 3.375 Gram(s) IV Intermittent every 8 hours  polyethylene glycol 3350 17 Gram(s) Oral daily  tamsulosin 0.8 milliGRAM(s) Oral at bedtime    MEDICATIONS  (PRN):  aluminum hydroxide/magnesium hydroxide/simethicone Suspension 30 milliLiter(s) Oral every 4 hours PRN Dyspepsia  HYDROmorphone  Injectable 0.5 milliGRAM(s) IV Push every 3 hours PRN Severe Pain (7 - 10)  melatonin 3 milliGRAM(s) Oral at bedtime PRN Insomnia  ondansetron Injectable 4 milliGRAM(s) IV Push every 8 hours PRN Nausea and/or Vomiting  oxyCODONE    IR 5 milliGRAM(s) Oral every 6 hours PRN Moderate Pain (4 - 6)    PE: Vital Signs Last 24 Hrs  T(C): 36.7 (03 Feb 2023 05:40), Max: 36.7 (03 Feb 2023 05:40)  T(F): 98 (03 Feb 2023 05:40), Max: 98 (03 Feb 2023 05:40)  HR: 84 (03 Feb 2023 05:40) (81 - 84)  BP: 147/76 (03 Feb 2023 05:40) (125/66 - 147/76)  BP(mean): --  RR: 16 (03 Feb 2023 05:40) (16 - 18)  SpO2: 95% (03 Feb 2023 05:40) (95% - 98%)    Parameters below as of 03 Feb 2023 05:40  Patient On (Oxygen Delivery Method): nasal cannula  O2 Flow (L/min): 2    Gen: Awake in NAD  abd: softly distended, tender on drain site and RUQ, no guarding, incisions c/d/i, Drain ss output  : bladder scan pending   LE: Calfs soft, no swelling non tenderness.                           12.4   9.85  )-----------( 193      ( 03 Feb 2023 06:00 )             38.6   02-03    143  |  107  |  17  ----------------------------<  103<H>  3.8   |  28  |  0.72    Ca    8.3<L>      03 Feb 2023 06:00  Phos  2.5     02-02  Mg     1.9     02-03    TPro  6.0  /  Alb  1.8<L>  /  TBili  0.6  /  DBili  x   /  AST  42<H>  /  ALT  72<H>  /  AlkPhos  200<H>  02-02                         89 yo male POD #4 s/p lap kellee for perforated, gangrenous gall bladder. Pt in bed admits to abdominal pain, flatus and Bowel movement overnight. Denies chest pain, sob,n,v      PAST MEDICAL & SURGICAL HISTORY:  History of BPH      Malignant neoplasm of colon      Femur neck fracture      History of glaucoma      GERD (gastroesophageal reflux disease)      Iron deficiency anemia      Hyperlipidemia      History of colon surgery      MEDICATIONS  (STANDING):  acetaminophen     Tablet .. 650 milliGRAM(s) Oral every 6 hours  aspirin enteric coated 81 milliGRAM(s) Oral daily  dextrose 5% + sodium chloride 0.45% with potassium chloride 10 mEq/L 1000 milliLiter(s) (75 mL/Hr) IV Continuous <Continuous>  enoxaparin Injectable 30 milliGRAM(s) SubCutaneous every 24 hours  finasteride 5 milliGRAM(s) Oral daily  piperacillin/tazobactam IVPB.. 3.375 Gram(s) IV Intermittent every 8 hours  polyethylene glycol 3350 17 Gram(s) Oral daily  tamsulosin 0.8 milliGRAM(s) Oral at bedtime    MEDICATIONS  (PRN):  aluminum hydroxide/magnesium hydroxide/simethicone Suspension 30 milliLiter(s) Oral every 4 hours PRN Dyspepsia  HYDROmorphone  Injectable 0.5 milliGRAM(s) IV Push every 3 hours PRN Severe Pain (7 - 10)  melatonin 3 milliGRAM(s) Oral at bedtime PRN Insomnia  ondansetron Injectable 4 milliGRAM(s) IV Push every 8 hours PRN Nausea and/or Vomiting  oxyCODONE    IR 5 milliGRAM(s) Oral every 6 hours PRN Moderate Pain (4 - 6)    PE: Vital Signs Last 24 Hrs  T(C): 36.7 (03 Feb 2023 05:40), Max: 36.7 (03 Feb 2023 05:40)  T(F): 98 (03 Feb 2023 05:40), Max: 98 (03 Feb 2023 05:40)  HR: 84 (03 Feb 2023 05:40) (81 - 84)  BP: 147/76 (03 Feb 2023 05:40) (125/66 - 147/76)  BP(mean): --  RR: 16 (03 Feb 2023 05:40) (16 - 18)  SpO2: 95% (03 Feb 2023 05:40) (95% - 98%)    Parameters below as of 03 Feb 2023 05:40  Patient On (Oxygen Delivery Method): nasal cannula  O2 Flow (L/min): 2    Gen: Awake in NAD  abd: softly distended, tender on drain site and RUQ, no guarding, incisions c/d/i, Drain ss output  : bladder scan pending   LE: Calfs soft, no swelling non tenderness.                           12.4   9.85  )-----------( 193      ( 03 Feb 2023 06:00 )             38.6   02-03    143  |  107  |  17  ----------------------------<  103<H>  3.8   |  28  |  0.72    Ca    8.3<L>      03 Feb 2023 06:00  Phos  2.5     02-02  Mg     1.9     02-03    TPro  6.0  /  Alb  1.8<L>  /  TBili  0.6  /  DBili  x   /  AST  42<H>  /  ALT  72<H>  /  AlkPhos  200<H>  02-02

## 2023-02-03 NOTE — PROGRESS NOTE ADULT - ASSESSMENT
89 yo male POD #4 s/p lap kellee for perforated, gangrenous gall bladder. 89 yo male POD #4 s/p lap kellee for perforated, gangrenous gall bladder.

## 2023-02-03 NOTE — PHARMACOTHERAPY INTERVENTION NOTE - COMMENTS
patients BMI <18.5 recommended ot decrease Lovenox to 30 mg daily for DVT prophylaxis 
discussed current medications with patient   patient expressed understanding  all questions answered

## 2023-02-03 NOTE — PROGRESS NOTE ADULT - SUBJECTIVE AND OBJECTIVE BOX
Patient is a 89y old  Male who presents with a chief complaint of abdominal pain (02 Feb 2023 16:45)    Patient seen and examined at bedside.  S: Endorses some abd discomfort. Mild nausea, no vomiting. Denies sob/cp, f/c. Reports flatus & BM this morning.     ALLERGIES:  No Known Allergies    MEDICATIONS:  acetaminophen     Tablet .. 650 milliGRAM(s) Oral every 6 hours  aspirin enteric coated 81 milliGRAM(s) Oral daily  dextrose 5% + sodium chloride 0.45% with potassium chloride 10 mEq/L 1000 milliLiter(s) IV Continuous <Continuous>  enoxaparin Injectable 30 milliGRAM(s) SubCutaneous every 24 hours  oxyCODONE    IR 5 milliGRAM(s) Oral every 6 hours PRN  polyethylene glycol 3350 17 Gram(s) Oral daily  tamsulosin 0.8 milliGRAM(s) Oral at bedtime    Vital Signs Last 24 Hrs  T(F): 98 (03 Feb 2023 05:40), Max: 98 (03 Feb 2023 05:40)  HR: 84 (03 Feb 2023 05:40) (81 - 84)  BP: 147/76 (03 Feb 2023 05:40) (125/66 - 147/76)  RR: 16 (03 Feb 2023 05:40) (16 - 18)  SpO2: 95% (03 Feb 2023 05:40) (95% - 98%)  I&O's Summary    02 Feb 2023 07:01  -  03 Feb 2023 07:00  --------------------------------------------------------  IN: 0 mL / OUT: 331 mL / NET: -331 mL        PHYSICAL EXAM:  General: NAD, Alert  Lungs: Clear to auscultation bilaterally (Anteriorly), Resp. slight tachypnea   Cardio: RR, S1/S2 w. PVCs  Abdomen: Soft, tender to palpation, hypoactive Bowel Sounds, incision/lap site w. glue c/d/i, R-sided RUPINDER w. serosang output. L inguinal hernia.   Extremities: No cyanosis, No edema    LABS:                        12.4   9.85  )-----------( 193      ( 03 Feb 2023 06:00 )             38.6     02-03    143  |  107  |  17  ----------------------------<  103  3.8   |  28  |  0.72    Ca    8.3      03 Feb 2023 06:00  Phos  2.5     02-02  Mg     1.9     02-03    TPro  6.0  /  Alb  1.8  /  TBili  0.6  /  DBili  x   /  AST  42  /  ALT  72  /  AlkPhos  200  02-02      11-23 Chol 160 mg/dL LDL -- HDL 44 mg/dL Trig 95 mg/dL        Culture - Urine (collected 30 Jan 2023 12:38)  Source: Catheterized Catheterized  Final Report (01 Feb 2023 19:29):    >100,000 CFU/ml Escherichia coli    >100,000 CFU/ml Aerococcus species    "Aerococcus spp. are predictably susceptible to penicillin,    ampicillin, tetracycline, and vancomycin.  All isolates are    resistant to sulfonamides"  Organism: Escherichia coli (01 Feb 2023 19:29)  Organism: Escherichia coli (01 Feb 2023 19:29)      -  Amikacin: S <=16      -  Amoxicillin/Clavulanic Acid: S <=8/4      -  Ampicillin: S <=8 These ampicillin results predict results for amoxicillin      -  Ampicillin/Sulbactam: S <=4/2 Enterobacter, Klebsiella aerogenes, Citrobacter, and Serratia may develop resistance during prolonged therapy (3-4 days)      -  Aztreonam: S <=4      -  Cefazolin: S <=2 For uncomplicated UTI with K. pneumoniae, E. coli, or P. mirablis: PHOEBE <=16 is sensitive and PHOEBE >=32 is resistant. This also predicts results for oral agents cefaclor, cefdinir, cefpodoxime, cefprozil, cefuroxime axetil, cephalexin and locarbef for uncomplicated UTI. Note that some isolates may be susceptible to these agents while testing resistant to cefazolin.      -  Cefepime: S 8      -  Cefoxitin: S <=8      -  Ceftriaxone: S <=1 Enterobacter, Klebsiella aerogenes, Citrobacter, and Serratia may develop resistance during prolonged therapy      -  Cefuroxime: S <=4      -  Ciprofloxacin: S <=0.25      -  Ertapenem: S <=0.5      -  Gentamicin: S <=2      -  Imipenem: S <=1      -  Levofloxacin: S <=0.5      -  Meropenem: S <=1      -  Nitrofurantoin: S <=32 Should not be used to treat pyelonephritis      -  Piperacillin/Tazobactam: S <=8      -  Tobramycin: S <=2      -  Trimethoprim/Sulfamethoxazole: S <=0.5/9.5      Method Type: PHOEBE    Culture - Blood (collected 30 Jan 2023 12:27)  Source: .Blood Blood-Peripheral  Preliminary Report (31 Jan 2023 15:01):    No growth to date.    Culture - Blood (collected 30 Jan 2023 12:10)  Source: .Blood Blood-Peripheral  Gram Stain (31 Jan 2023 15:34):    Growth in anaerobic bottle: Gram positive cocci in pairs  Final Report (01 Feb 2023 13:38):    Growth in anaerobic bottle: Aerococcus urinae    "Susceptibilities not performed"    ***Blood Panel PCR results on this specimen are available    approximately 3 hours after the Gram stain result.***    Gram stain, PCR, and/or culture results may not always    correspond due to difference in methodologies.    ************************************************************    This PCR assay was performed by multiplex PCR. This    Assay tests for 66 bacterial and resistance gene targets.    Please refer to the [facility] test directory    at [email or web address] for details.  Organism: Blood Culture PCR (01 Feb 2023 13:38)  Organism: Blood Culture PCR (01 Feb 2023 13:38)      -  Blood PCR Panel: NEG      Method Type: PCR      COVID-19 PCR: NotDetec (01-30-23 @ 12:25)      RADIOLOGY & ADDITIONAL TESTS:  < from: Xray Chest 1 View- PORTABLE-Urgent (Xray Chest 1 View- PORTABLE-Urgent .) (01.30.23 @ 23:08) >  No acute pulmonary disease.  Tip of NG tube at GE junction and side-port in distal esophagus. This   needs to be advanced.  Findings discussed with Dr. Toribio  when the film was submitted for my   interpretation.    < from: CT Abdomen and Pelvis w/ IV Cont (01.30.23 @ 13:47) >  Markedly irregular gallbladder wall with areas of discontinuity. Air in   the gallbladder. Pericholecystic fluid. Distal small bowel obstruction.   Findings suggestive of gallstone ileus however no gallstone is visualized   with certainty. Consider gangrenous cholecystitis as well.  Musa balloon distended within the prostatic urethra.Recommend   repositioning.  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  Nonobstructed colon in the left inguinal hernia, does NOT appear to be   related to the bowel obstruction.    < from: US Abdomen Upper Quadrant Right (01.30.23 @ 13:34) >  Abnormal gallbladder appearance with echogenicity within the gallbladder   lumen most likely related to sludge and gallstones. Gallbladder wall   thickening noted measuring up to 9 mm. There is a pericholecystic fluid   collection identified measuring 5.1 x 4.3 x 1.3 cm. Correlate for   cholecystitis.            Care Discussed with Consultants/Other Providers:   Case and plan d/w Dr. Bailey Patient is a 89y old  Male who presents with a chief complaint of abdominal pain (02 Feb 2023 16:45)    Patient seen and examined at bedside.  S: Endorses some abd discomfort. Mild nausea, no vomiting. Denies sob/cp, f/c. Reports flatus & BM this morning.     ALLERGIES:  No Known Allergies    MEDICATIONS:  acetaminophen     Tablet .. 650 milliGRAM(s) Oral every 6 hours  aspirin enteric coated 81 milliGRAM(s) Oral daily  dextrose 5% + sodium chloride 0.45% with potassium chloride 10 mEq/L 1000 milliLiter(s) IV Continuous <Continuous>  enoxaparin Injectable 30 milliGRAM(s) SubCutaneous every 24 hours  oxyCODONE    IR 5 milliGRAM(s) Oral every 6 hours PRN  polyethylene glycol 3350 17 Gram(s) Oral daily  tamsulosin 0.8 milliGRAM(s) Oral at bedtime    Vital Signs Last 24 Hrs  T(F): 98 (03 Feb 2023 05:40), Max: 98 (03 Feb 2023 05:40)  HR: 84 (03 Feb 2023 05:40) (81 - 84)  BP: 147/76 (03 Feb 2023 05:40) (125/66 - 147/76)  RR: 16 (03 Feb 2023 05:40) (16 - 18)  SpO2: 95% (03 Feb 2023 05:40) (95% - 98%)  I&O's Summary    02 Feb 2023 07:01  -  03 Feb 2023 07:00  --------------------------------------------------------  IN: 0 mL / OUT: 331 mL / NET: -331 mL        PHYSICAL EXAM:  General: NAD, Alert  Lungs: Clear to auscultation bilaterally (Anteriorly), Resp. slight tachypnea   Cardio: RR, S1/S2 w. PVCs  Abdomen: Soft, tender to palpation, hypoactive Bowel Sounds, incision/lap site w. glue c/d/i, R-sided RUPINDER w. serosang output. L inguinal hernia.   Extremities: No cyanosis, No edema    LABS:                        12.4   9.85  )-----------( 193      ( 03 Feb 2023 06:00 )             38.6     02-03    143  |  107  |  17  ----------------------------<  103  3.8   |  28  |  0.72    Ca    8.3      03 Feb 2023 06:00  Phos  2.5     02-02  Mg     1.9     02-03    TPro  6.0  /  Alb  1.8  /  TBili  0.6  /  DBili  x   /  AST  42  /  ALT  72  /  AlkPhos  200  02-02      11-23 Chol 160 mg/dL LDL -- HDL 44 mg/dL Trig 95 mg/dL        Culture - Urine (collected 30 Jan 2023 12:38)  Source: Catheterized Catheterized  Final Report (01 Feb 2023 19:29):    >100,000 CFU/ml Escherichia coli    >100,000 CFU/ml Aerococcus species    "Aerococcus spp. are predictably susceptible to penicillin,    ampicillin, tetracycline, and vancomycin.  All isolates are    resistant to sulfonamides"  Organism: Escherichia coli (01 Feb 2023 19:29)  Organism: Escherichia coli (01 Feb 2023 19:29)      -  Amikacin: S <=16      -  Amoxicillin/Clavulanic Acid: S <=8/4      -  Ampicillin: S <=8 These ampicillin results predict results for amoxicillin      -  Ampicillin/Sulbactam: S <=4/2 Enterobacter, Klebsiella aerogenes, Citrobacter, and Serratia may develop resistance during prolonged therapy (3-4 days)      -  Aztreonam: S <=4      -  Cefazolin: S <=2 For uncomplicated UTI with K. pneumoniae, E. coli, or P. mirablis: PHOEBE <=16 is sensitive and PHOEBE >=32 is resistant. This also predicts results for oral agents cefaclor, cefdinir, cefpodoxime, cefprozil, cefuroxime axetil, cephalexin and locarbef for uncomplicated UTI. Note that some isolates may be susceptible to these agents while testing resistant to cefazolin.      -  Cefepime: S 8      -  Cefoxitin: S <=8      -  Ceftriaxone: S <=1 Enterobacter, Klebsiella aerogenes, Citrobacter, and Serratia may develop resistance during prolonged therapy      -  Cefuroxime: S <=4      -  Ciprofloxacin: S <=0.25      -  Ertapenem: S <=0.5      -  Gentamicin: S <=2      -  Imipenem: S <=1      -  Levofloxacin: S <=0.5      -  Meropenem: S <=1      -  Nitrofurantoin: S <=32 Should not be used to treat pyelonephritis      -  Piperacillin/Tazobactam: S <=8      -  Tobramycin: S <=2      -  Trimethoprim/Sulfamethoxazole: S <=0.5/9.5      Method Type: PHOEBE    Culture - Blood (collected 30 Jan 2023 12:27)  Source: .Blood Blood-Peripheral  Preliminary Report (31 Jan 2023 15:01):    No growth to date.    Culture - Blood (collected 30 Jan 2023 12:10)  Source: .Blood Blood-Peripheral  Gram Stain (31 Jan 2023 15:34):    Growth in anaerobic bottle: Gram positive cocci in pairs  Final Report (01 Feb 2023 13:38):    Growth in anaerobic bottle: Aerococcus urinae    "Susceptibilities not performed"    ***Blood Panel PCR results on this specimen are available    approximately 3 hours after the Gram stain result.***    Gram stain, PCR, and/or culture results may not always    correspond due to difference in methodologies.    ************************************************************    This PCR assay was performed by multiplex PCR. This    Assay tests for 66 bacterial and resistance gene targets.    Please refer to the [facility] test directory    at [email or web address] for details.  Organism: Blood Culture PCR (01 Feb 2023 13:38)  Organism: Blood Culture PCR (01 Feb 2023 13:38)      -  Blood PCR Panel: NEG      Method Type: PCR      COVID-19 PCR: NotDetec (01-30-23 @ 12:25)      RADIOLOGY & ADDITIONAL TESTS:  < from: Xray Chest 1 View- PORTABLE-Urgent (Xray Chest 1 View- PORTABLE-Urgent .) (01.30.23 @ 23:08) >  No acute pulmonary disease.  Tip of NG tube at GE junction and side-port in distal esophagus. This   needs to be advanced.  Findings discussed with Dr. Toribio  when the film was submitted for my   interpretation.    < from: CT Abdomen and Pelvis w/ IV Cont (01.30.23 @ 13:47) >  Markedly irregular gallbladder wall with areas of discontinuity. Air in   the gallbladder. Pericholecystic fluid. Distal small bowel obstruction.   Findings suggestive of gallstone ileus however no gallstone is visualized   with certainty. Consider gangrenous cholecystitis as well.  Musa balloon distended within the prostatic urethra.Recommend   repositioning.  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  Nonobstructed colon in the left inguinal hernia, does NOT appear to be   related to the bowel obstruction.    < from: US Abdomen Upper Quadrant Right (01.30.23 @ 13:34) >  Abnormal gallbladder appearance with echogenicity within the gallbladder   lumen most likely related to sludge and gallstones. Gallbladder wall   thickening noted measuring up to 9 mm. There is a pericholecystic fluid   collection identified measuring 5.1 x 4.3 x 1.3 cm. Correlate for   cholecystitis.            Care Discussed with Consultants/Other Providers:   Case and plan d/w Dr. Bailey Patient is a 89y old  Male who presents with a chief complaint of abdominal pain (02 Feb 2023 16:45)    Patient seen and examined at bedside.  S: Endorses some abd discomfort. Mild nausea, no vomiting. Denies sob/cp, f/c. Reports flatus & BM this morning.     ALLERGIES:  No Known Allergies    MEDICATIONS:  acetaminophen     Tablet .. 650 milliGRAM(s) Oral every 6 hours  aspirin enteric coated 81 milliGRAM(s) Oral daily  dextrose 5% + sodium chloride 0.45% with potassium chloride 10 mEq/L 1000 milliLiter(s) IV Continuous <Continuous>  enoxaparin Injectable 30 milliGRAM(s) SubCutaneous every 24 hours  oxyCODONE    IR 5 milliGRAM(s) Oral every 6 hours PRN  polyethylene glycol 3350 17 Gram(s) Oral daily  tamsulosin 0.8 milliGRAM(s) Oral at bedtime    Vital Signs Last 24 Hrs  T(F): 98 (03 Feb 2023 05:40), Max: 98 (03 Feb 2023 05:40)  HR: 84 (03 Feb 2023 05:40) (81 - 84)  BP: 147/76 (03 Feb 2023 05:40) (125/66 - 147/76)  RR: 16 (03 Feb 2023 05:40) (16 - 18)  SpO2: 95% (03 Feb 2023 05:40) (95% - 98%)  I&O's Summary    02 Feb 2023 07:01  -  03 Feb 2023 07:00  --------------------------------------------------------  IN: 0 mL / OUT: 331 mL / NET: -331 mL        PHYSICAL EXAM:  General: NAD, Alert  Lungs: Clear to auscultation bilaterally (Anteriorly), Resp. slight tachypnea   Cardio: RR, S1/S2 w. PVCs  Abdomen: Soft, tender to palpation, hypoactive Bowel Sounds, incision/lap site w. glue c/d/i, R-sided RUPINDER w. serosang output. L inguinal hernia.   Extremities: No cyanosis, No edema    LABS:                        12.4   9.85  )-----------( 193      ( 03 Feb 2023 06:00 )             38.6     02-03    143  |  107  |  17  ----------------------------<  103  3.8   |  28  |  0.72    Ca    8.3      03 Feb 2023 06:00  Phos  2.5     02-02  Mg     1.9     02-03    TPro  6.0  /  Alb  1.8  /  TBili  0.6  /  DBili  x   /  AST  42  /  ALT  72  /  AlkPhos  200  02-02      11-23 Chol 160 mg/dL LDL -- HDL 44 mg/dL Trig 95 mg/dL        Culture - Urine (collected 30 Jan 2023 12:38)  Source: Catheterized Catheterized  Final Report (01 Feb 2023 19:29):    >100,000 CFU/ml Escherichia coli    >100,000 CFU/ml Aerococcus species    "Aerococcus spp. are predictably susceptible to penicillin,    ampicillin, tetracycline, and vancomycin.  All isolates are    resistant to sulfonamides"  Organism: Escherichia coli (01 Feb 2023 19:29)  Organism: Escherichia coli (01 Feb 2023 19:29)      -  Amikacin: S <=16      -  Amoxicillin/Clavulanic Acid: S <=8/4      -  Ampicillin: S <=8 These ampicillin results predict results for amoxicillin      -  Ampicillin/Sulbactam: S <=4/2 Enterobacter, Klebsiella aerogenes, Citrobacter, and Serratia may develop resistance during prolonged therapy (3-4 days)      -  Aztreonam: S <=4      -  Cefazolin: S <=2 For uncomplicated UTI with K. pneumoniae, E. coli, or P. mirablis: PHOEBE <=16 is sensitive and PHOBEE >=32 is resistant. This also predicts results for oral agents cefaclor, cefdinir, cefpodoxime, cefprozil, cefuroxime axetil, cephalexin and locarbef for uncomplicated UTI. Note that some isolates may be susceptible to these agents while testing resistant to cefazolin.      -  Cefepime: S 8      -  Cefoxitin: S <=8      -  Ceftriaxone: S <=1 Enterobacter, Klebsiella aerogenes, Citrobacter, and Serratia may develop resistance during prolonged therapy      -  Cefuroxime: S <=4      -  Ciprofloxacin: S <=0.25      -  Ertapenem: S <=0.5      -  Gentamicin: S <=2      -  Imipenem: S <=1      -  Levofloxacin: S <=0.5      -  Meropenem: S <=1      -  Nitrofurantoin: S <=32 Should not be used to treat pyelonephritis      -  Piperacillin/Tazobactam: S <=8      -  Tobramycin: S <=2      -  Trimethoprim/Sulfamethoxazole: S <=0.5/9.5      Method Type: PHOEBE    Culture - Blood (collected 30 Jan 2023 12:27)  Source: .Blood Blood-Peripheral  Preliminary Report (31 Jan 2023 15:01):    No growth to date.    Culture - Blood (collected 30 Jan 2023 12:10)  Source: .Blood Blood-Peripheral  Gram Stain (31 Jan 2023 15:34):    Growth in anaerobic bottle: Gram positive cocci in pairs  Final Report (01 Feb 2023 13:38):    Growth in anaerobic bottle: Aerococcus urinae    "Susceptibilities not performed"    ***Blood Panel PCR results on this specimen are available    approximately 3 hours after the Gram stain result.***    Gram stain, PCR, and/or culture results may not always    correspond due to difference in methodologies.    ************************************************************    This PCR assay was performed by multiplex PCR. This    Assay tests for 66 bacterial and resistance gene targets.    Please refer to the [facility] test directory    at [email or web address] for details.  Organism: Blood Culture PCR (01 Feb 2023 13:38)  Organism: Blood Culture PCR (01 Feb 2023 13:38)      -  Blood PCR Panel: NEG      Method Type: PCR      COVID-19 PCR: NotDetec (01-30-23 @ 12:25)      RADIOLOGY & ADDITIONAL TESTS:  < from: Xray Chest 1 View- PORTABLE-Urgent (Xray Chest 1 View- PORTABLE-Urgent .) (01.30.23 @ 23:08) >  No acute pulmonary disease.  Tip of NG tube at GE junction and side-port in distal esophagus. This   needs to be advanced.  Findings discussed with Dr. Toribio  when the film was submitted for my   interpretation.    < from: CT Abdomen and Pelvis w/ IV Cont (01.30.23 @ 13:47) >  Markedly irregular gallbladder wall with areas of discontinuity. Air in   the gallbladder. Pericholecystic fluid. Distal small bowel obstruction.   Findings suggestive of gallstone ileus however no gallstone is visualized   with certainty. Consider gangrenous cholecystitis as well.  Musa balloon distended within the prostatic urethra.Recommend   repositioning.  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  Nonobstructed colon in the left inguinal hernia, does NOT appear to be   related to the bowel obstruction.    < from: US Abdomen Upper Quadrant Right (01.30.23 @ 13:34) >  Abnormal gallbladder appearance with echogenicity within the gallbladder   lumen most likely related to sludge and gallstones. Gallbladder wall   thickening noted measuring up to 9 mm. There is a pericholecystic fluid   collection identified measuring 5.1 x 4.3 x 1.3 cm. Correlate for   cholecystitis.            Care Discussed with Consultants/Other Providers:   Case and plan d/w Dr. Bailey Patient is a 89y old  Male who presents with a chief complaint of abdominal pain (02 Feb 2023 16:45)    Patient seen and examined at bedside.  S: Endorses some abd discomfort. Mild nausea, no vomiting. Denies sob/cp, f/c. Reports flatus & BM this morning.     ALLERGIES:  No Known Allergies    MEDICATIONS:  acetaminophen     Tablet .. 650 milliGRAM(s) Oral every 6 hours  aspirin enteric coated 81 milliGRAM(s) Oral daily  dextrose 5% + sodium chloride 0.45% with potassium chloride 10 mEq/L 1000 milliLiter(s) IV Continuous <Continuous>  enoxaparin Injectable 30 milliGRAM(s) SubCutaneous every 24 hours  oxyCODONE    IR 5 milliGRAM(s) Oral every 6 hours PRN  polyethylene glycol 3350 17 Gram(s) Oral daily  tamsulosin 0.8 milliGRAM(s) Oral at bedtime    Vital Signs Last 24 Hrs  T(F): 98 (03 Feb 2023 05:40), Max: 98 (03 Feb 2023 05:40)  HR: 84 (03 Feb 2023 05:40) (81 - 84)  BP: 147/76 (03 Feb 2023 05:40) (125/66 - 147/76)  RR: 16 (03 Feb 2023 05:40) (16 - 18)  SpO2: 95% (03 Feb 2023 05:40) (95% - 98%)  I&O's Summary    02 Feb 2023 07:01  -  03 Feb 2023 07:00  --------------------------------------------------------  IN: 0 mL / OUT: 331 mL / NET: -331 mL        PHYSICAL EXAM:  General: NAD, Alert, orientation x1 to self  Lungs: Clear to auscultation bilaterally (Anteriorly), Resp. slight tachypnea   Cardio: RR, S1/S2 w. PVCs  Abdomen: Soft, tender to palpation, hypoactive Bowel Sounds, incision/lap site w. glue c/d/i, R-sided RUPINDER w. serosang output. L inguinal hernia.   Extremities: No cyanosis, No edema    LABS:                        12.4   9.85  )-----------( 193      ( 03 Feb 2023 06:00 )             38.6     02-03    143  |  107  |  17  ----------------------------<  103  3.8   |  28  |  0.72    Ca    8.3      03 Feb 2023 06:00  Phos  2.5     02-02  Mg     1.9     02-03    TPro  6.0  /  Alb  1.8  /  TBili  0.6  /  DBili  x   /  AST  42  /  ALT  72  /  AlkPhos  200  02-02      11-23 Chol 160 mg/dL LDL -- HDL 44 mg/dL Trig 95 mg/dL        Culture - Urine (collected 30 Jan 2023 12:38)  Source: Catheterized Catheterized  Final Report (01 Feb 2023 19:29):    >100,000 CFU/ml Escherichia coli    >100,000 CFU/ml Aerococcus species    "Aerococcus spp. are predictably susceptible to penicillin,    ampicillin, tetracycline, and vancomycin.  All isolates are    resistant to sulfonamides"  Organism: Escherichia coli (01 Feb 2023 19:29)  Organism: Escherichia coli (01 Feb 2023 19:29)      -  Amikacin: S <=16      -  Amoxicillin/Clavulanic Acid: S <=8/4      -  Ampicillin: S <=8 These ampicillin results predict results for amoxicillin      -  Ampicillin/Sulbactam: S <=4/2 Enterobacter, Klebsiella aerogenes, Citrobacter, and Serratia may develop resistance during prolonged therapy (3-4 days)      -  Aztreonam: S <=4      -  Cefazolin: S <=2 For uncomplicated UTI with K. pneumoniae, E. coli, or P. mirablis: PHOEBE <=16 is sensitive and PHOEBE >=32 is resistant. This also predicts results for oral agents cefaclor, cefdinir, cefpodoxime, cefprozil, cefuroxime axetil, cephalexin and locarbef for uncomplicated UTI. Note that some isolates may be susceptible to these agents while testing resistant to cefazolin.      -  Cefepime: S 8      -  Cefoxitin: S <=8      -  Ceftriaxone: S <=1 Enterobacter, Klebsiella aerogenes, Citrobacter, and Serratia may develop resistance during prolonged therapy      -  Cefuroxime: S <=4      -  Ciprofloxacin: S <=0.25      -  Ertapenem: S <=0.5      -  Gentamicin: S <=2      -  Imipenem: S <=1      -  Levofloxacin: S <=0.5      -  Meropenem: S <=1      -  Nitrofurantoin: S <=32 Should not be used to treat pyelonephritis      -  Piperacillin/Tazobactam: S <=8      -  Tobramycin: S <=2      -  Trimethoprim/Sulfamethoxazole: S <=0.5/9.5      Method Type: PHOEBE    Culture - Blood (collected 30 Jan 2023 12:27)  Source: .Blood Blood-Peripheral  Preliminary Report (31 Jan 2023 15:01):    No growth to date.    Culture - Blood (collected 30 Jan 2023 12:10)  Source: .Blood Blood-Peripheral  Gram Stain (31 Jan 2023 15:34):    Growth in anaerobic bottle: Gram positive cocci in pairs  Final Report (01 Feb 2023 13:38):    Growth in anaerobic bottle: Aerococcus urinae    "Susceptibilities not performed"    ***Blood Panel PCR results on this specimen are available    approximately 3 hours after the Gram stain result.***    Gram stain, PCR, and/or culture results may not always    correspond due to difference in methodologies.    ************************************************************    This PCR assay was performed by multiplex PCR. This    Assay tests for 66 bacterial and resistance gene targets.    Please refer to the [facility] Labs test directory    at [email or web address] for details.  Organism: Blood Culture PCR (01 Feb 2023 13:38)  Organism: Blood Culture PCR (01 Feb 2023 13:38)      -  Blood PCR Panel: NEG      Method Type: PCR      COVID-19 PCR: NotDetec (01-30-23 @ 12:25)      RADIOLOGY & ADDITIONAL TESTS:  < from: Xray Chest 1 View- PORTABLE-Urgent (Xray Chest 1 View- PORTABLE-Urgent .) (01.30.23 @ 23:08) >  No acute pulmonary disease.  Tip of NG tube at GE junction and side-port in distal esophagus. This   needs to be advanced.  Findings discussed with Dr. Toribio  when the film was submitted for my   interpretation.    < from: CT Abdomen and Pelvis w/ IV Cont (01.30.23 @ 13:47) >  Markedly irregular gallbladder wall with areas of discontinuity. Air in   the gallbladder. Pericholecystic fluid. Distal small bowel obstruction.   Findings suggestive of gallstone ileus however no gallstone is visualized   with certainty. Consider gangrenous cholecystitis as well.  Musa balloon distended within the prostatic urethra.Recommend   repositioning.  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  Nonobstructed colon in the left inguinal hernia, does NOT appear to be   related to the bowel obstruction.    < from: US Abdomen Upper Quadrant Right (01.30.23 @ 13:34) >  Abnormal gallbladder appearance with echogenicity within the gallbladder   lumen most likely related to sludge and gallstones. Gallbladder wall   thickening noted measuring up to 9 mm. There is a pericholecystic fluid   collection identified measuring 5.1 x 4.3 x 1.3 cm. Correlate for   cholecystitis.          Care Discussed with Consultants/Other Providers:   Case and plan d/w Dr. Bailey Patient is a 89y old  Male who presents with a chief complaint of abdominal pain (02 Feb 2023 16:45)    Patient seen and examined at bedside.  S: Endorses some abd discomfort. Mild nausea, no vomiting. Denies sob/cp, f/c. Reports flatus & BM this morning.     ALLERGIES:  No Known Allergies    MEDICATIONS:  acetaminophen     Tablet .. 650 milliGRAM(s) Oral every 6 hours  aspirin enteric coated 81 milliGRAM(s) Oral daily  dextrose 5% + sodium chloride 0.45% with potassium chloride 10 mEq/L 1000 milliLiter(s) IV Continuous <Continuous>  enoxaparin Injectable 30 milliGRAM(s) SubCutaneous every 24 hours  oxyCODONE    IR 5 milliGRAM(s) Oral every 6 hours PRN  polyethylene glycol 3350 17 Gram(s) Oral daily  tamsulosin 0.8 milliGRAM(s) Oral at bedtime    Vital Signs Last 24 Hrs  T(F): 98 (03 Feb 2023 05:40), Max: 98 (03 Feb 2023 05:40)  HR: 84 (03 Feb 2023 05:40) (81 - 84)  BP: 147/76 (03 Feb 2023 05:40) (125/66 - 147/76)  RR: 16 (03 Feb 2023 05:40) (16 - 18)  SpO2: 95% (03 Feb 2023 05:40) (95% - 98%)  I&O's Summary    02 Feb 2023 07:01  -  03 Feb 2023 07:00  --------------------------------------------------------  IN: 0 mL / OUT: 331 mL / NET: -331 mL        PHYSICAL EXAM:  General: NAD, Alert, orientation x1 to self  Lungs: Clear to auscultation bilaterally (Anteriorly), Resp. slight tachypnea   Cardio: RR, S1/S2 w. PVCs  Abdomen: Soft, tender to palpation, hypoactive Bowel Sounds, incision/lap site w. glue c/d/i, R-sided RUPINDER w. serosang output. L inguinal hernia.   Extremities: No cyanosis, No edema    LABS:                        12.4   9.85  )-----------( 193      ( 03 Feb 2023 06:00 )             38.6     02-03    143  |  107  |  17  ----------------------------<  103  3.8   |  28  |  0.72    Ca    8.3      03 Feb 2023 06:00  Phos  2.5     02-02  Mg     1.9     02-03    TPro  6.0  /  Alb  1.8  /  TBili  0.6  /  DBili  x   /  AST  42  /  ALT  72  /  AlkPhos  200  02-02      11-23 Chol 160 mg/dL LDL -- HDL 44 mg/dL Trig 95 mg/dL        Culture - Urine (collected 30 Jan 2023 12:38)  Source: Catheterized Catheterized  Final Report (01 Feb 2023 19:29):    >100,000 CFU/ml Escherichia coli    >100,000 CFU/ml Aerococcus species    "Aerococcus spp. are predictably susceptible to penicillin,    ampicillin, tetracycline, and vancomycin.  All isolates are    resistant to sulfonamides"  Organism: Escherichia coli (01 Feb 2023 19:29)  Organism: Escherichia coli (01 Feb 2023 19:29)      -  Amikacin: S <=16      -  Amoxicillin/Clavulanic Acid: S <=8/4      -  Ampicillin: S <=8 These ampicillin results predict results for amoxicillin      -  Ampicillin/Sulbactam: S <=4/2 Enterobacter, Klebsiella aerogenes, Citrobacter, and Serratia may develop resistance during prolonged therapy (3-4 days)      -  Aztreonam: S <=4      -  Cefazolin: S <=2 For uncomplicated UTI with K. pneumoniae, E. coli, or P. mirablis: PHOEBE <=16 is sensitive and PHOEBE >=32 is resistant. This also predicts results for oral agents cefaclor, cefdinir, cefpodoxime, cefprozil, cefuroxime axetil, cephalexin and locarbef for uncomplicated UTI. Note that some isolates may be susceptible to these agents while testing resistant to cefazolin.      -  Cefepime: S 8      -  Cefoxitin: S <=8      -  Ceftriaxone: S <=1 Enterobacter, Klebsiella aerogenes, Citrobacter, and Serratia may develop resistance during prolonged therapy      -  Cefuroxime: S <=4      -  Ciprofloxacin: S <=0.25      -  Ertapenem: S <=0.5      -  Gentamicin: S <=2      -  Imipenem: S <=1      -  Levofloxacin: S <=0.5      -  Meropenem: S <=1      -  Nitrofurantoin: S <=32 Should not be used to treat pyelonephritis      -  Piperacillin/Tazobactam: S <=8      -  Tobramycin: S <=2      -  Trimethoprim/Sulfamethoxazole: S <=0.5/9.5      Method Type: PHOEBE    Culture - Blood (collected 30 Jan 2023 12:27)  Source: .Blood Blood-Peripheral  Preliminary Report (31 Jan 2023 15:01):    No growth to date.    Culture - Blood (collected 30 Jan 2023 12:10)  Source: .Blood Blood-Peripheral  Gram Stain (31 Jan 2023 15:34):    Growth in anaerobic bottle: Gram positive cocci in pairs  Final Report (01 Feb 2023 13:38):    Growth in anaerobic bottle: Aerococcus urinae    "Susceptibilities not performed"    ***Blood Panel PCR results on this specimen are available    approximately 3 hours after the Gram stain result.***    Gram stain, PCR, and/or culture results may not always    correspond due to difference in methodologies.    ************************************************************    This PCR assay was performed by multiplex PCR. This    Assay tests for 66 bacterial and resistance gene targets.    Please refer to the [facility] test directory    at [email or web address] for details.  Organism: Blood Culture PCR (01 Feb 2023 13:38)  Organism: Blood Culture PCR (01 Feb 2023 13:38)      -  Blood PCR Panel: NEG      Method Type: PCR      COVID-19 PCR: NotDetec (01-30-23 @ 12:25)      RADIOLOGY & ADDITIONAL TESTS:  < from: Xray Chest 1 View- PORTABLE-Urgent (Xray Chest 1 View- PORTABLE-Urgent .) (01.30.23 @ 23:08) >  No acute pulmonary disease.  Tip of NG tube at GE junction and side-port in distal esophagus. This   needs to be advanced.  Findings discussed with Dr. Toribio  when the film was submitted for my   interpretation.    < from: CT Abdomen and Pelvis w/ IV Cont (01.30.23 @ 13:47) >  Markedly irregular gallbladder wall with areas of discontinuity. Air in   the gallbladder. Pericholecystic fluid. Distal small bowel obstruction.   Findings suggestive of gallstone ileus however no gallstone is visualized   with certainty. Consider gangrenous cholecystitis as well.  Musa balloon distended within the prostatic urethra.Recommend   repositioning.  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  Nonobstructed colon in the left inguinal hernia, does NOT appear to be   related to the bowel obstruction.    < from: US Abdomen Upper Quadrant Right (01.30.23 @ 13:34) >  Abnormal gallbladder appearance with echogenicity within the gallbladder   lumen most likely related to sludge and gallstones. Gallbladder wall   thickening noted measuring up to 9 mm. There is a pericholecystic fluid   collection identified measuring 5.1 x 4.3 x 1.3 cm. Correlate for   cholecystitis.          Care Discussed with Consultants/Other Providers:   Case and plan d/w Dr. Bailey Patient is a 89y old  Male who presents with a chief complaint of abdominal pain (02 Feb 2023 16:45)    Patient seen and examined at bedside.  S: Endorses some abd discomfort. Mild nausea, no vomiting. Denies sob/cp, f/c. Reports flatus & BM this morning.     ALLERGIES:  No Known Allergies    MEDICATIONS:  acetaminophen     Tablet .. 650 milliGRAM(s) Oral every 6 hours  aspirin enteric coated 81 milliGRAM(s) Oral daily  dextrose 5% + sodium chloride 0.45% with potassium chloride 10 mEq/L 1000 milliLiter(s) IV Continuous <Continuous>  enoxaparin Injectable 30 milliGRAM(s) SubCutaneous every 24 hours  oxyCODONE    IR 5 milliGRAM(s) Oral every 6 hours PRN  polyethylene glycol 3350 17 Gram(s) Oral daily  tamsulosin 0.8 milliGRAM(s) Oral at bedtime    Vital Signs Last 24 Hrs  T(F): 98 (03 Feb 2023 05:40), Max: 98 (03 Feb 2023 05:40)  HR: 84 (03 Feb 2023 05:40) (81 - 84)  BP: 147/76 (03 Feb 2023 05:40) (125/66 - 147/76)  RR: 16 (03 Feb 2023 05:40) (16 - 18)  SpO2: 95% (03 Feb 2023 05:40) (95% - 98%)  I&O's Summary    02 Feb 2023 07:01  -  03 Feb 2023 07:00  --------------------------------------------------------  IN: 0 mL / OUT: 331 mL / NET: -331 mL        PHYSICAL EXAM:  General: NAD, Alert, orientation x1 to self  Lungs: Clear to auscultation bilaterally (Anteriorly), Resp. slight tachypnea   Cardio: RR, S1/S2 w. PVCs  Abdomen: Soft, tender to palpation, hypoactive Bowel Sounds, incision/lap site w. glue c/d/i, R-sided RUPINDER w. serosang output. L inguinal hernia.   Extremities: No cyanosis, No edema    LABS:                        12.4   9.85  )-----------( 193      ( 03 Feb 2023 06:00 )             38.6     02-03    143  |  107  |  17  ----------------------------<  103  3.8   |  28  |  0.72    Ca    8.3      03 Feb 2023 06:00  Phos  2.5     02-02  Mg     1.9     02-03    TPro  6.0  /  Alb  1.8  /  TBili  0.6  /  DBili  x   /  AST  42  /  ALT  72  /  AlkPhos  200  02-02      11-23 Chol 160 mg/dL LDL -- HDL 44 mg/dL Trig 95 mg/dL        Culture - Urine (collected 30 Jan 2023 12:38)  Source: Catheterized Catheterized  Final Report (01 Feb 2023 19:29):    >100,000 CFU/ml Escherichia coli    >100,000 CFU/ml Aerococcus species    "Aerococcus spp. are predictably susceptible to penicillin,    ampicillin, tetracycline, and vancomycin.  All isolates are    resistant to sulfonamides"  Organism: Escherichia coli (01 Feb 2023 19:29)  Organism: Escherichia coli (01 Feb 2023 19:29)      -  Amikacin: S <=16      -  Amoxicillin/Clavulanic Acid: S <=8/4      -  Ampicillin: S <=8 These ampicillin results predict results for amoxicillin      -  Ampicillin/Sulbactam: S <=4/2 Enterobacter, Klebsiella aerogenes, Citrobacter, and Serratia may develop resistance during prolonged therapy (3-4 days)      -  Aztreonam: S <=4      -  Cefazolin: S <=2 For uncomplicated UTI with K. pneumoniae, E. coli, or P. mirablis: PHOEBE <=16 is sensitive and PHOEBE >=32 is resistant. This also predicts results for oral agents cefaclor, cefdinir, cefpodoxime, cefprozil, cefuroxime axetil, cephalexin and locarbef for uncomplicated UTI. Note that some isolates may be susceptible to these agents while testing resistant to cefazolin.      -  Cefepime: S 8      -  Cefoxitin: S <=8      -  Ceftriaxone: S <=1 Enterobacter, Klebsiella aerogenes, Citrobacter, and Serratia may develop resistance during prolonged therapy      -  Cefuroxime: S <=4      -  Ciprofloxacin: S <=0.25      -  Ertapenem: S <=0.5      -  Gentamicin: S <=2      -  Imipenem: S <=1      -  Levofloxacin: S <=0.5      -  Meropenem: S <=1      -  Nitrofurantoin: S <=32 Should not be used to treat pyelonephritis      -  Piperacillin/Tazobactam: S <=8      -  Tobramycin: S <=2      -  Trimethoprim/Sulfamethoxazole: S <=0.5/9.5      Method Type: PHOEBE    Culture - Blood (collected 30 Jan 2023 12:27)  Source: .Blood Blood-Peripheral  Preliminary Report (31 Jan 2023 15:01):    No growth to date.    Culture - Blood (collected 30 Jan 2023 12:10)  Source: .Blood Blood-Peripheral  Gram Stain (31 Jan 2023 15:34):    Growth in anaerobic bottle: Gram positive cocci in pairs  Final Report (01 Feb 2023 13:38):    Growth in anaerobic bottle: Aerococcus urinae    "Susceptibilities not performed"    ***Blood Panel PCR results on this specimen are available    approximately 3 hours after the Gram stain result.***    Gram stain, PCR, and/or culture results may not always    correspond due to difference in methodologies.    ************************************************************    This PCR assay was performed by multiplex PCR. This    Assay tests for 66 bacterial and resistance gene targets.    Please refer to the [facility] test directory    at [email or web address] for details.  Organism: Blood Culture PCR (01 Feb 2023 13:38)  Organism: Blood Culture PCR (01 Feb 2023 13:38)      -  Blood PCR Panel: NEG      Method Type: PCR      COVID-19 PCR: NotDetec (01-30-23 @ 12:25)      RADIOLOGY & ADDITIONAL TESTS:  < from: Xray Chest 1 View- PORTABLE-Urgent (Xray Chest 1 View- PORTABLE-Urgent .) (01.30.23 @ 23:08) >  No acute pulmonary disease.  Tip of NG tube at GE junction and side-port in distal esophagus. This   needs to be advanced.  Findings discussed with Dr. Toribio  when the film was submitted for my   interpretation.    < from: CT Abdomen and Pelvis w/ IV Cont (01.30.23 @ 13:47) >  Markedly irregular gallbladder wall with areas of discontinuity. Air in   the gallbladder. Pericholecystic fluid. Distal small bowel obstruction.   Findings suggestive of gallstone ileus however no gallstone is visualized   with certainty. Consider gangrenous cholecystitis as well.  Musa balloon distended within the prostatic urethra.Recommend   repositioning.  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  Nonobstructed colon in the left inguinal hernia, does NOT appear to be   related to the bowel obstruction.    < from: US Abdomen Upper Quadrant Right (01.30.23 @ 13:34) >  Abnormal gallbladder appearance with echogenicity within the gallbladder   lumen most likely related to sludge and gallstones. Gallbladder wall   thickening noted measuring up to 9 mm. There is a pericholecystic fluid   collection identified measuring 5.1 x 4.3 x 1.3 cm. Correlate for   cholecystitis.          Care Discussed with Consultants/Other Providers:   Case and plan d/w Dr. Bailey

## 2023-02-03 NOTE — DIETITIAN INITIAL EVALUATION ADULT - PERTINENT LABORATORY DATA
02-03    143  |  107  |  17  ----------------------------<  103<H>  3.8   |  28  |  0.72    Ca    8.3<L>      03 Feb 2023 06:00  Phos  2.5     02-02  Mg     1.9     02-03    TPro  6.0  /  Alb  1.8<L>  /  TBili  0.6  /  DBili  x   /  AST  42<H>  /  ALT  72<H>  /  AlkPhos  200<H>  02-02  A1C with Estimated Average Glucose Result: 4.8 % (11-23-22 @ 06:15)

## 2023-02-03 NOTE — PROGRESS NOTE ADULT - PROBLEM SELECTOR PLAN 1
POD 4 Lap kellee   Likely ileus/continue NPO/Aspiration precaution  OOB/IVF  Abx as per ID   will discuss with surgeon. POD 4 Lap kellee   Likely resolving ileus/Aspiration precaution  OOB/IVF  Abx as per ID   Full liquid diet with ensures  discussed with surgeon. POD 4 Lap kellee   Likely resolving ileus/Aspiration precaution  OOB/IVF  Abx as per ID  Full liquid diet with ensures  discussed with surgeon.

## 2023-02-03 NOTE — DIETITIAN INITIAL EVALUATION ADULT - PERTINENT MEDS FT
MEDICATIONS  (STANDING):  acetaminophen     Tablet .. 650 milliGRAM(s) Oral every 6 hours  aspirin enteric coated 81 milliGRAM(s) Oral daily  dextrose 5% + sodium chloride 0.45% with potassium chloride 10 mEq/L 1000 milliLiter(s) (75 mL/Hr) IV Continuous <Continuous>  enoxaparin Injectable 30 milliGRAM(s) SubCutaneous every 24 hours  finasteride 5 milliGRAM(s) Oral daily  piperacillin/tazobactam IVPB.. 3.375 Gram(s) IV Intermittent every 8 hours  polyethylene glycol 3350 17 Gram(s) Oral daily  tamsulosin 0.8 milliGRAM(s) Oral at bedtime    MEDICATIONS  (PRN):  aluminum hydroxide/magnesium hydroxide/simethicone Suspension 30 milliLiter(s) Oral every 4 hours PRN Dyspepsia  HYDROmorphone  Injectable 0.5 milliGRAM(s) IV Push every 3 hours PRN Severe Pain (7 - 10)  melatonin 3 milliGRAM(s) Oral at bedtime PRN Insomnia  ondansetron Injectable 4 milliGRAM(s) IV Push every 8 hours PRN Nausea and/or Vomiting  oxyCODONE    IR 5 milliGRAM(s) Oral every 6 hours PRN Moderate Pain (4 - 6)

## 2023-02-03 NOTE — PROGRESS NOTE ADULT - NS ATTEND AMEND GEN_ALL_CORE FT
89y year old Male with PMH of recent L hip fx (11/2022), TIA (11/2022) who presents to the ER from [facility] for abdominal pain, nausea for 4 days. Admitted for acute gangrenous cholecystitis, c/b perforated gallbladder, s/p lap kellee on 1/30, with empyema found. Pt is on zosyn, infectious disease consulting, sensitivities now available.  still NPO per Surgery - low threshold to replace NGT if pt has emesis. Ice chips ok for comfort. May be able to start CLD this afternoon.  cont to trend LFTs.   Pt had questionable a fib at admission - telemetry reviewed and NSR, freq PACs/PVCs. Per Cardio, will need OP extended cardiac event monitoring. 89y year old Male with PMH of recent L hip fx (11/2022), TIA (11/2022) who presents to the ER from [facility] for abdominal pain, nausea for 4 days. Admitted for acute gangrenous cholecystitis, c/b perforated gallbladder, s/p lap kellee on 1/30, with empyema found. Pt is on zosyn, infectious disease consulting, sensitivities now available.  still NPO per Surgery - low threshold to replace NGT if pt has emesis. Ice chips ok for comfort. May be able to start CLD this afternoon.  cont to trend LFTs.   Pt had questionable a fib at admission - telemetry reviewed and NSR, freq PACs/PVCs. Per Cardio, will need OP extended cardiac event monitoring. 89y year old Male with PMH of recent L hip fx (11/2022), TIA (11/2022) who presents to the ER from [facility] for abdominal pain, nausea for 4 days. Admitted for acute gangrenous cholecystitis, c/b perforated gallbladder, s/p lap kellee on 1/30, with empyema found. Pt is on zosyn, infectious disease consulting, sensitivities now available. Augmentin at time of d/c to complete a ten day course post operatively.  discussed with Dr. Thompson this afternoon, pt's surgeon - Transition to full liquid diet with ensures. regular diet in AM if pt tolerates it well. Discontinue drain prior to discharge.   Pt had questionable a fib at admission - telemetry reviewed and NSR, freq PACs/PVCs. Per Cardio, will need OP extended cardiac event monitoring.  Pt to discharge to [facility] likely tomorrow. 89y year old Male with PMH of recent L hip fx (11/2022), TIA (11/2022) who presents to the ER from [facility] for abdominal pain, nausea for 4 days. Admitted for acute gangrenous cholecystitis, c/b perforated gallbladder, s/p lap kellee on 1/30, with empyema found. Pt is on zosyn, infectious disease consulting, sensitivities now available. Augmentin at time of d/c to complete a ten day course post operatively.  discussed with Dr. Thompson this afternoon, pt's surgeon - Transition to full liquid diet with ensures. regular diet in AM if pt tolerates it well. Discontinue drain prior to discharge.   Pt had questionable a fib at admission - telemetry reviewed and NSR, freq PACs/PVCs. Per Cardio, will need OP extended cardiac event monitoring.  Pt to discharge to [facility] likely tomorrow. 89y year old Male with PMH of recent L hip fx (11/2022), TIA (11/2022) who presents to the ER from [facility] for abdominal pain, nausea for 4 days. Admitted for acute gangrenous cholecystitis, c/b perforated gallbladder, s/p lap kellee on 1/30, with empyema found. Pt is on zosyn, infectious disease consulting, sensitivities now available. Augmentin at time of d/c to complete a ten day course post operatively.  discussed with Dr. Thompson this afternoon, pt's surgeon - Transition to full liquid diet with ensures. regular diet in AM if pt tolerates it well. Discontinue drain prior to discharge.   Pt had questionable a fib at admission - telemetry reviewed and NSR, freq PACs/PVCs. Per Cardio, will need OP extended cardiac event monitoring.  Pt to discharge to [facility] likely tomorrow.

## 2023-02-03 NOTE — PROGRESS NOTE ADULT - SUBJECTIVE AND OBJECTIVE BOX
CC: f/u for  aerococcus bacteremia, acute kellee  Patient reports  abdomen is a little sore  REVIEW OF SYSTEMS:  All other review of systems negative (Comprehensive ROS)    Antimicrobials Day #  :pod 4/10  piperacillin/tazobactam IVPB.. 3.375 Gram(s) IV Intermittent every 8 hours    Other Medications Reviewed    T(F): 98 (02-03-23 @ 05:40), Max: 98 (02-03-23 @ 05:40)  HR: 84 (02-03-23 @ 05:40)  BP: 147/76 (02-03-23 @ 05:40)  RR: 16 (02-03-23 @ 05:40)  SpO2: 95% (02-03-23 @ 05:40)  Wt(kg): --    PHYSICAL EXAM:  General: alert, no acute distress  Eyes:  anicteric, no conjunctival injection, no discharge  Oropharynx: no lesions or injection 	  Neck: supple, without adenopathy  Lungs: clear to auscultation  Heart: regular rate and rhythm; no murmur, rubs or gallops  Abdomen: soft, nondistended, nontender, without mass or organomegaly. wound clean,   Skin: no lesions  Extremities: no clubbing, cyanosis, or edema  Neurologic: alert, oriented, moves all extremities  felipe out  LAB RESULTS:                        12.4   9.85  )-----------( 193      ( 03 Feb 2023 06:00 )             38.6     02-03    143  |  107  |  17  ----------------------------<  103<H>  3.8   |  28  |  0.72    Ca    8.3<L>      03 Feb 2023 06:00  Phos  2.5     02-02  Mg     1.9     02-03    TPro  6.0  /  Alb  1.8<L>  /  TBili  0.6  /  DBili  x   /  AST  42<H>  /  ALT  72<H>  /  AlkPhos  200<H>  02-02    LIVER FUNCTIONS - ( 02 Feb 2023 07:15 )  Alb: 1.8 g/dL / Pro: 6.0 g/dL / ALK PHOS: 200 U/L / ALT: 72 U/L / AST: 42 U/L / GGT: x             MICROBIOLOGY:  RECENT CULTURES:  01-30 @ 12:38 Catheterized Catheterized Escherichia coli    >100,000 CFU/ml Escherichia coli  >100,000 CFU/ml Aerococcus species  "Aerococcus spp. are predictably susceptible to penicillin,  ampicillin, tetracycline, and vancomycin.  All isolates are  resistant to sulfonamides"      01-30 @ 12:27 .Blood Blood-Peripheral     No growth to date.      01-30 @ 12:10 .Blood Blood-Peripheral Blood Culture PCR    Growth in anaerobic bottle: Aerococcus urinae  "Susceptibilities not performed"  ***Blood Panel PCR results on this specimen are available  approximately 3 hours after the Gram stain result.***  Gram stain, PCR, and/or culture results may not always  correspond due to difference in methodologies.  ************************************************************  This PCR assay was performed by multiplex PCR. This  Assay tests for 66 bacterial and resistance gene targets.  Please refer to the [facility] test directory  at [email or web address] for details.    Growth in anaerobic bottle: Gram positive cocci in pairs        RADIOLOGY REVIEWED:    < from: CT Abdomen and Pelvis w/ IV Cont (01.30.23 @ 13:47) >    ACC: [id number] EXAM:  CT ABDOMEN AND PELVIS IC   ORDERED BY: DYLAN WILLIS     *** ADDENDUM # 1 ***    Findings discussed with  Dr. Alicia Rawls at 1/30/2023 3:34 PM with   readback.    --- End of Report ---    *** END OF ADDENDUM # 1 ***      PROCEDURE DATE:  01/30/2023          INTERPRETATION:  CLINICAL INFORMATION: 89 years  Male with epigastric and   RUQ pain.    COMPARISON: None.    CONTRAST/COMPLICATIONS:  IV Contrast: Omnipaque 350  90 cc administered   10 cc discarded  Oral Contrast: NONE  Complications: None reported at time of study completion    PROCEDURE:  CT of the Abdomen and Pelvis was performed.  Sagittal and coronal reformats were performed.    FINDINGS:  LOWER CHEST: Within normal limits.    LIVER: 1.5 cm hepatic dome cyst.  BILE DUCTS: Normal caliber.  GALLBLADDER: Markedly irregular gallbladder wall with areas of   discontinuity. Pericholecystic fluid. Air-fluid level in the gallbladder.   Consistent with either gallstone ileus or gangrenous cholecystitis.  SPLEEN: Within normal limits.  PANCREAS: Within normal limits.  ADRENALS: Within normal limits.  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 1.4 cm right renal   cyst. No hydroureteronephrosis.    BLADDER: Completely decompressed. Felipe balloon is distended in the   prostatic urethra.  REPRODUCTIVE ORGANS: Enlarged prostate 5.7 x 3.9 cm. Felipe balloon   distended in the prostatic urethra. Prostate partially obscured by streak   artifact.    BOWEL: Distended fluid-filled small bowel loops measuring upto 4.0 cm to   the level of the ileocolic anastomosis. Unobstructed descending and   sigmoid colon extends into a left inguinal hernia. Constipated rectum.  PERITONEUM: No ascites.  VESSELS: Atherosclerotic changes. 1.7 cm right common iliac artery with   ulcerating plaque (2:79).  RETROPERITONEUM/LYMPH NODES: No lymphadenopathy.  ABDOMINAL WALL: Tip arthroplasty.  BONES: Degenerative changes.    IMPRESSION:  Markedly irregular gallbladder wall with areas of discontinuity. Air in   the gallbladder. Pericholecystic fluid. Distal small bowel obstruction.   Findings suggestive of gallstone ileus however no gallstone is visualized   with certainty. Consider gangrenous cholecystitis as well.    Felipe balloon distended within the prostatic urethra.Recommend   repositioning.    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    Nonobstructed colon in the left inguinal hernia, does NOT appear to be   related to the bowel obstruction.        --- End of Report ---    ***Please see the addendum at the top of this report. It may contain   additional important information or changes.****        DEB CHARLTON MD; Attending Radiologist  This document has been electronically signed. Jan 30 2023  2:50PM  1st Addendum: DEB CHARLTON MD; Attending Radiologist  The first addendum was electronically signed on: Jan 30 2023  3:34PM.    < end of copied text >            Assessment:  Patient with afib, had chronic felipe since hip surgery in november, came in with abdomen pain. He was found to have aerococcus in the urine and blood and perforated gangrenous gall bladder. He had lap choley and is now s/p removal of felipe for tov  Plan:  continue zosyn day 4/10 post op abx  po augmentin when ready to leave  monitor urine output s/p tov CC: f/u for  aerococcus bacteremia, acute kellee  Patient reports  abdomen is a little sore  REVIEW OF SYSTEMS:  All other review of systems negative (Comprehensive ROS)    Antimicrobials Day #  :pod 4/10  piperacillin/tazobactam IVPB.. 3.375 Gram(s) IV Intermittent every 8 hours    Other Medications Reviewed    T(F): 98 (02-03-23 @ 05:40), Max: 98 (02-03-23 @ 05:40)  HR: 84 (02-03-23 @ 05:40)  BP: 147/76 (02-03-23 @ 05:40)  RR: 16 (02-03-23 @ 05:40)  SpO2: 95% (02-03-23 @ 05:40)  Wt(kg): --    PHYSICAL EXAM:  General: alert, no acute distress  Eyes:  anicteric, no conjunctival injection, no discharge  Oropharynx: no lesions or injection 	  Neck: supple, without adenopathy  Lungs: clear to auscultation  Heart: regular rate and rhythm; no murmur, rubs or gallops  Abdomen: soft, nondistended, nontender, without mass or organomegaly. wound clean,   Skin: no lesions  Extremities: no clubbing, cyanosis, or edema  Neurologic: alert, oriented, moves all extremities  felipe out  LAB RESULTS:                        12.4   9.85  )-----------( 193      ( 03 Feb 2023 06:00 )             38.6     02-03    143  |  107  |  17  ----------------------------<  103<H>  3.8   |  28  |  0.72    Ca    8.3<L>      03 Feb 2023 06:00  Phos  2.5     02-02  Mg     1.9     02-03    TPro  6.0  /  Alb  1.8<L>  /  TBili  0.6  /  DBili  x   /  AST  42<H>  /  ALT  72<H>  /  AlkPhos  200<H>  02-02    LIVER FUNCTIONS - ( 02 Feb 2023 07:15 )  Alb: 1.8 g/dL / Pro: 6.0 g/dL / ALK PHOS: 200 U/L / ALT: 72 U/L / AST: 42 U/L / GGT: x             MICROBIOLOGY:  RECENT CULTURES:  01-30 @ 12:38 Catheterized Catheterized Escherichia coli    >100,000 CFU/ml Escherichia coli  >100,000 CFU/ml Aerococcus species  "Aerococcus spp. are predictably susceptible to penicillin,  ampicillin, tetracycline, and vancomycin.  All isolates are  resistant to sulfonamides"      01-30 @ 12:27 .Blood Blood-Peripheral     No growth to date.      01-30 @ 12:10 .Blood Blood-Peripheral Blood Culture PCR    Growth in anaerobic bottle: Aerococcus urinae  "Susceptibilities not performed"  ***Blood Panel PCR results on this specimen are available  approximately 3 hours after the Gram stain result.***  Gram stain, PCR, and/or culture results may not always  correspond due to difference in methodologies.  ************************************************************  This PCR assay was performed by multiplex PCR. This  Assay tests for 66 bacterial and resistance gene targets.  Please refer to the [facility] test directory  at [email or web address] for details.    Growth in anaerobic bottle: Gram positive cocci in pairs        RADIOLOGY REVIEWED:    < from: CT Abdomen and Pelvis w/ IV Cont (01.30.23 @ 13:47) >    ACC: [id number] EXAM:  CT ABDOMEN AND PELVIS IC   ORDERED BY: DYLAN WILLIS     *** ADDENDUM # 1 ***    Findings discussed with  Dr. Alicia Rawls at 1/30/2023 3:34 PM with   readback.    --- End of Report ---    *** END OF ADDENDUM # 1 ***      PROCEDURE DATE:  01/30/2023          INTERPRETATION:  CLINICAL INFORMATION: 89 years  Male with epigastric and   RUQ pain.    COMPARISON: None.    CONTRAST/COMPLICATIONS:  IV Contrast: Omnipaque 350  90 cc administered   10 cc discarded  Oral Contrast: NONE  Complications: None reported at time of study completion    PROCEDURE:  CT of the Abdomen and Pelvis was performed.  Sagittal and coronal reformats were performed.    FINDINGS:  LOWER CHEST: Within normal limits.    LIVER: 1.5 cm hepatic dome cyst.  BILE DUCTS: Normal caliber.  GALLBLADDER: Markedly irregular gallbladder wall with areas of   discontinuity. Pericholecystic fluid. Air-fluid level in the gallbladder.   Consistent with either gallstone ileus or gangrenous cholecystitis.  SPLEEN: Within normal limits.  PANCREAS: Within normal limits.  ADRENALS: Within normal limits.  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 1.4 cm right renal   cyst. No hydroureteronephrosis.    BLADDER: Completely decompressed. Felipe balloon is distended in the   prostatic urethra.  REPRODUCTIVE ORGANS: Enlarged prostate 5.7 x 3.9 cm. Felipe balloon   distended in the prostatic urethra. Prostate partially obscured by streak   artifact.    BOWEL: Distended fluid-filled small bowel loops measuring upto 4.0 cm to   the level of the ileocolic anastomosis. Unobstructed descending and   sigmoid colon extends into a left inguinal hernia. Constipated rectum.  PERITONEUM: No ascites.  VESSELS: Atherosclerotic changes. 1.7 cm right common iliac artery with   ulcerating plaque (2:79).  RETROPERITONEUM/LYMPH NODES: No lymphadenopathy.  ABDOMINAL WALL: Tip arthroplasty.  BONES: Degenerative changes.    IMPRESSION:  Markedly irregular gallbladder wall with areas of discontinuity. Air in   the gallbladder. Pericholecystic fluid. Distal small bowel obstruction.   Findings suggestive of gallstone ileus however no gallstone is visualized   with certainty. Consider gangrenous cholecystitis as well.    Felipe balloon distended within the prostatic urethra.Recommend   repositioning.    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    Nonobstructed colon in the left inguinal hernia, does NOT appear to be   related to the bowel obstruction.        --- End of Report ---    ***Please see the addendum at the top of this report. It may contain   additional important information or changes.****        DEB CHARLTON MD; Attending Radiologist  This document has been electronically signed. Jan 30 2023  2:50PM  1st Addendum: DEB CHARLTON MD; Attending Radiologist  The first addendum was electronically signed on: Jan 30 2023  3:34PM.    < end of copied text >            Assessment:  Patient with afib, had chronic felipe since hip surgery in november, came in with abdomen pain. He was found to have aerococcus in the urine and blood and perforated gangrenous gall bladder. He had lap choley and is now s/p removal of felipe for tov  Plan:  continue zosyn day 4/10 post op abx  po augmentin when ready to leave  monitor urine output s/p tov CC: f/u for  aerococcus bacteremia, acute kellee  Patient reports  abdomen is a little sore  REVIEW OF SYSTEMS:  All other review of systems negative (Comprehensive ROS)    Antimicrobials Day #  :pod 4/10  piperacillin/tazobactam IVPB.. 3.375 Gram(s) IV Intermittent every 8 hours    Other Medications Reviewed    T(F): 98 (02-03-23 @ 05:40), Max: 98 (02-03-23 @ 05:40)  HR: 84 (02-03-23 @ 05:40)  BP: 147/76 (02-03-23 @ 05:40)  RR: 16 (02-03-23 @ 05:40)  SpO2: 95% (02-03-23 @ 05:40)  Wt(kg): --    PHYSICAL EXAM:  General: alert, no acute distress  Eyes:  anicteric, no conjunctival injection, no discharge  Oropharynx: no lesions or injection 	  Neck: supple, without adenopathy  Lungs: clear to auscultation  Heart: regular rate and rhythm; no murmur, rubs or gallops  Abdomen: soft, nondistended, nontender, without mass or organomegaly. wound clean,   Skin: no lesions  Extremities: no clubbing, cyanosis, or edema  Neurologic: alert, oriented, moves all extremities  felipe out  LAB RESULTS:                        12.4   9.85  )-----------( 193      ( 03 Feb 2023 06:00 )             38.6     02-03    143  |  107  |  17  ----------------------------<  103<H>  3.8   |  28  |  0.72    Ca    8.3<L>      03 Feb 2023 06:00  Phos  2.5     02-02  Mg     1.9     02-03    TPro  6.0  /  Alb  1.8<L>  /  TBili  0.6  /  DBili  x   /  AST  42<H>  /  ALT  72<H>  /  AlkPhos  200<H>  02-02    LIVER FUNCTIONS - ( 02 Feb 2023 07:15 )  Alb: 1.8 g/dL / Pro: 6.0 g/dL / ALK PHOS: 200 U/L / ALT: 72 U/L / AST: 42 U/L / GGT: x             MICROBIOLOGY:  RECENT CULTURES:  01-30 @ 12:38 Catheterized Catheterized Escherichia coli    >100,000 CFU/ml Escherichia coli  >100,000 CFU/ml Aerococcus species  "Aerococcus spp. are predictably susceptible to penicillin,  ampicillin, tetracycline, and vancomycin.  All isolates are  resistant to sulfonamides"      01-30 @ 12:27 .Blood Blood-Peripheral     No growth to date.      01-30 @ 12:10 .Blood Blood-Peripheral Blood Culture PCR    Growth in anaerobic bottle: Aerococcus urinae  "Susceptibilities not performed"  ***Blood Panel PCR results on this specimen are available  approximately 3 hours after the Gram stain result.***  Gram stain, PCR, and/or culture results may not always  correspond due to difference in methodologies.  ************************************************************  This PCR assay was performed by multiplex PCR. This  Assay tests for 66 bacterial and resistance gene targets.  Please refer to the [facility] test directory  at [email or web address] for details.    Growth in anaerobic bottle: Gram positive cocci in pairs        RADIOLOGY REVIEWED:    < from: CT Abdomen and Pelvis w/ IV Cont (01.30.23 @ 13:47) >    ACC: [id number] EXAM:  CT ABDOMEN AND PELVIS IC   ORDERED BY: DYLAN WILLIS     *** ADDENDUM # 1 ***    Findings discussed with  Dr. Alicia Rawls at 1/30/2023 3:34 PM with   readback.    --- End of Report ---    *** END OF ADDENDUM # 1 ***      PROCEDURE DATE:  01/30/2023          INTERPRETATION:  CLINICAL INFORMATION: 89 years  Male with epigastric and   RUQ pain.    COMPARISON: None.    CONTRAST/COMPLICATIONS:  IV Contrast: Omnipaque 350  90 cc administered   10 cc discarded  Oral Contrast: NONE  Complications: None reported at time of study completion    PROCEDURE:  CT of the Abdomen and Pelvis was performed.  Sagittal and coronal reformats were performed.    FINDINGS:  LOWER CHEST: Within normal limits.    LIVER: 1.5 cm hepatic dome cyst.  BILE DUCTS: Normal caliber.  GALLBLADDER: Markedly irregular gallbladder wall with areas of   discontinuity. Pericholecystic fluid. Air-fluid level in the gallbladder.   Consistent with either gallstone ileus or gangrenous cholecystitis.  SPLEEN: Within normal limits.  PANCREAS: Within normal limits.  ADRENALS: Within normal limits.  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 1.4 cm right renal   cyst. No hydroureteronephrosis.    BLADDER: Completely decompressed. Felipe balloon is distended in the   prostatic urethra.  REPRODUCTIVE ORGANS: Enlarged prostate 5.7 x 3.9 cm. Felipe balloon   distended in the prostatic urethra. Prostate partially obscured by streak   artifact.    BOWEL: Distended fluid-filled small bowel loops measuring upto 4.0 cm to   the level of the ileocolic anastomosis. Unobstructed descending and   sigmoid colon extends into a left inguinal hernia. Constipated rectum.  PERITONEUM: No ascites.  VESSELS: Atherosclerotic changes. 1.7 cm right common iliac artery with   ulcerating plaque (2:79).  RETROPERITONEUM/LYMPH NODES: No lymphadenopathy.  ABDOMINAL WALL: Tip arthroplasty.  BONES: Degenerative changes.    IMPRESSION:  Markedly irregular gallbladder wall with areas of discontinuity. Air in   the gallbladder. Pericholecystic fluid. Distal small bowel obstruction.   Findings suggestive of gallstone ileus however no gallstone is visualized   with certainty. Consider gangrenous cholecystitis as well.    Felipe balloon distended within the prostatic urethra.Recommend   repositioning.    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    Nonobstructed colon in the left inguinal hernia, does NOT appear to be   related to the bowel obstruction.        --- End of Report ---    ***Please see the addendum at the top of this report. It may contain   additional important information or changes.****        DEB CHARLTON MD; Attending Radiologist  This document has been electronically signed. Jan 30 2023  2:50PM  1st Addendum: DEB CHARLTON MD; Attending Radiologist  The first addendum was electronically signed on: Jan 30 2023  3:34PM.    < end of copied text >            Assessment:  Patient with afib, had chronic felipe since hip surgery in november, came in with abdomen pain. He was found to have aerococcus in the urine and blood and perforated gangrenous gall bladder. He had lap choley and is now s/p removal of felipe for tov  Plan:  continue zosyn day 4/10 post op abx  po augmentin when ready to leave  monitor urine output s/p tov

## 2023-02-03 NOTE — PROGRESS NOTE ADULT - NS ATTEND AMEND GEN_ALL_CORE FT
This is a pleasant 89M who presented with acute gangrenous cholecystitis with perforation who is POD#3 s/p laparoscopic cholecystectomy. Intraop there was perforation into the liver bed, with empyema. He tolerated the procedure well. He was extubated and continues to be afebrile and hemodynamically stable. No evidence of gallstone ileus intraop. Patient noted to have severe ileus. Thus, NG initially placed intra-op to decreased risk of aspiration PNA, but patient removed POD 0.     Continues to tolerate sips of clears. No N/V or subjective bloating. No fevers, chills or sweats. Abdominal pain is controlled. Had flatus and multiple BMs. +void after FC discontinued.    Abdomen benign, appropriate incisional tenderness, incision c/d/i, drain with serous output. reducible left inguinal hernia. slightly less tympanic.    - Advance to full liquid diet with ensures TID.  - OOB/IS, PT.   - Would be ok with last day of abx today, but will defer to ID who recommends a total of 10 days for abx.  - LFTS down-trended as expected.  - Wean mIVF as he continues to tolerated PO. Trend electrolytes to minimize risk factors for ileus. Continue to check daily Mg /Phos to am. labs. Goal K of 4.0.  - Continue drain to bulb suction for now. Anticipate continued improvement, with advancement to regular diet if he tolerates full liquids today. Will remove drain tomorrow prior to discharge. I he tolerates regular diet tomorrow 2/4, anticipate discharge back to rehab.  - Continue lovenox for DVT and asa for cardio/stroke protection. Hb stable.  - Outpatient cardiology eval. No issues this admission from a hemodynamic standpoint. Cardiology signed off.  - Non operative management of incidental left inguinal hernia at this time. Easily reducible. Will discuss further options as outpatient.    Attempted to reach daughter over the phone, as she was not bedside, but no answer. This is a pleasant 89M who presented with acute gangrenous cholecystitis with perforation who is POD#3 s/p laparoscopic cholecystectomy. Intraop there was perforation into the liver bed, with empyema. He tolerated the procedure well. He was extubated and continues to be afebrile and hemodynamically stable. No evidence of gallstone ileus intraop. Patient noted to have severe ileus. Thus, NG initially placed intra-op to decreased risk of aspiration PNA, but patient removed POD 0.     Continues to tolerate sips of clears. No N/V or subjective bloating. No fevers, chills or sweats. Abdominal pain is controlled. Had flatus and multiple BMs. +void after FC discontinued.    Abdomen benign, appropriate incisional tenderness, incision c/d/i, drain with serous output. reducible left inguinal hernia. slightly less tympanic.    - Advance to full liquid diet with ensures TID.  - OOB/IS, PT.   - Would be ok with last day of abx today, but will defer to ID who recommends a total of 10 days for abx.  - LFTS down-trended as expected.  - Wean mIVF as he continues to tolerated PO. Trend electrolytes to minimize risk factors for ileus. Continue to check daily Mg /Phos to am. labs. Goal K of 4.0.  - Continue drain to bulb suction for now. Anticipate continued improvement, with advancement to regular diet if he tolerates full liquids today. Will remove drain tomorrow prior to discharge. I he tolerates regular diet tomorrow 2/4, anticipate discharge back to rehab.  - Continue lovenox for DVT and asa for cardio/stroke protection. Hb stable.  - Outpatient cardiology eval. No issues this admission from a hemodynamic standpoint. Cardiology signed off.  - Non operative management of incidental left inguinal hernia at this time. Easily reducible. Will discuss further options as outpatient.    Attempted to reach daughter over the phone, as she was not bedside, but no answer.    Discussed plan with hospitalist.

## 2023-02-03 NOTE — DIETITIAN INITIAL EVALUATION ADULT - ORAL INTAKE PTA/DIET HISTORY
Patient reports consuming a regular diet PTA , avoids tough, hard foods due to poor dentition , consumes po nutrition supplements , patient is receptive to minced moist consistency as diet is advanced

## 2023-02-03 NOTE — PROGRESS NOTE ADULT - ASSESSMENT
89year old Male with PMH of recent L hip fx (11/2022), TIA (11/2022) who presents to the ER from [facility] for abdominal pain, nausea for 4 days. Admitted for acute cholecystitis.    #Acute cholecystitis c/b perforated gallbladder  #Transaminitis, Hyperbilirubinemia  - s/p lap kellee with Dr Thompson on 1/30, POD 4  - Maintain RUPINDER-SS  - Continue NPO with ice chips for now, advance diet as per surgery  - IVF while NPO  - Blood cultures growing gram + cocci, urine culture with E Coli and aerococcus  - Continue Zosyn for now   - Started Lovenox for DVT ppx. Aspirin resumed since h/h stable  - Maintain K > 4   - LFT's now downtrending   - Pain control, will order standing tylenol dose with oxy/dilaudid PRN  - ID and Surgery following   - Wean off of O2, incentive spirometry    # Hypernatremia - Improved  - IVF w/ D5W1/2NS    #Acute Kidney Injury - Improved   - Monitor BMP  - Avoid nephrotoxins    #Urinary retention  - Patient with chronic felipe - was placed in November after fall/hip fx, no TOV has been done as per daughter. No hx of urinary retention prior to that event   - Felipe d/c'd last evening, follow up on voiding   - Cont increased Flomax dosing at 0.8mg qHS    # Paroxysmal atrial fibrillation  - Will likely need outpatient extended cardiac event monitoring  - will touch base with Cards when closer to discharge    #Hx of TIA  - c/w Statin,ASA    #DVT Prophylaxis  - IPCDs for now     GOC: DNR/I, MOLST in chart    Dispo: Pending clinical course as above.     2/3: Will Updated daughter Shelli [phone] regarding pt's current status and plan of care.      89year old Male with PMH of recent L hip fx (11/2022), TIA (11/2022) who presents to the ER from [facility] for abdominal pain, nausea for 4 days. Admitted for acute cholecystitis.    #Acute cholecystitis c/b perforated gallbladder  #Transaminitis, Hyperbilirubinemia  - s/p lap kellee with Dr Thompson on 1/30, POD 4  - Maintain RUPINDER-SS  - Continue NPO with ice chips for now, advance diet as per surgery  - IVF while NPO  - Blood cultures growing gram + cocci, urine culture with E Coli and aerococcus  - Continue Zosyn for now   - Started Lovenox for DVT ppx. Aspirin resumed since h/h stable  - Maintain K > 4   - LFT's now downtrending   - Pain control, will order standing tylenol dose with oxy/dilaudid PRN  - ID and Surgery following   - Wean off of O2, incentive spirometry    # Hypernatremia - Improved  - IVF w/ D5W1/2NS    #Acute Kidney Injury - Improved   - Monitor BMP  - Avoid nephrotoxins    #Urinary retention  - Patient with chronic felipe - was placed in November after fall/hip fx, no TOV has been done as per daughter. No hx of urinary retention prior to that event   - Felipe d/c'd last evening, follow up on voiding   - Cont increased Flomax dosing at 0.8mg qHS    # Paroxysmal atrial fibrillation  - Will likely need outpatient extended cardiac event monitoring  - will touch base with Cards when closer to discharge    #Hx of TIA  - c/w Statin,ASA    #DVT Prophylaxis  - IPCDs for now     GOC: DNR/I, MOLST in chart    Dispo: Pending clinical course as above.     2/3: Will Updated daughter Shelli [phone] regarding pt's current status and plan of care.      89year old Male with PMH of recent L hip fx (11/2022), TIA (11/2022) who presents to the ER from [facility] for abdominal pain, nausea for 4 days. Admitted for acute cholecystitis.    #Acute cholecystitis c/b perforated gallbladder  #Transaminitis, Hyperbilirubinemia  - s/p lap kellee with Dr Thompson on 1/30, POD 4  - Maintain RUPINDER-SS  - Continue NPO with ice chips for now, advance diet as per surgery  - IVF while NPO  - Blood cultures growing gram + cocci, urine culture with E Coli and aerococcus  - Continue Zosyn for now   - Started Lovenox for DVT ppx. Aspirin resumed since h/h stable  - Maintain K > 4   - LFT's now downtrending   - Pain control, will order standing tylenol dose with oxy/dilaudid PRN  - ID and Surgery following   - Wean off of O2, incentive spirometry    # Hypernatremia - Improved  - IVF w/ D5W1/2NS    #Acute Kidney Injury - Improved   - Monitor BMP  - Avoid nephrotoxins    #Urinary retention  - Patient with chronic felipe - was placed in November after fall/hip fx, no TOV has been done as per daughter. No hx of urinary retention prior to that event   - Felipe d/c'd last evening, follow up on voiding   - Cont increased Flomax dosing at 0.8mg qHS    # Paroxysmal atrial fibrillation  - Will likely need outpatient extended cardiac event monitoring  - will touch base with Cards when closer to discharge    #Hx of TIA  - c/w Statin,ASA    #DVT Prophylaxis  - IPCDs for now     GOC: DNR/I, MOLST in chart    Dispo: Pending clinical course as above.     2/3: Will Updated daughter Shelli [phone] regarding pt's current status and plan of care.      89year old Male with PMH of recent L hip fx (11/2022), TIA (11/2022) who presents to the ER from [facility] for abdominal pain, nausea for 4 days. Admitted for acute cholecystitis.    #Acute cholecystitis c/b perforated gallbladder  #Transaminitis, Hyperbilirubinemia  - s/p lap kellee with Dr Thompson on 1/30, POD 4  - Maintain RUPINDER-SS  - Continue NPO with ice chips for now, advance diet as per surgery  - IVF while NPO  - Blood cultures growing gram + cocci, urine culture with E Coli and Aerococcus  - Continue Zosyn for now   - Started Lovenox for DVT ppx. Aspirin resumed since h/h stable  - Maintain K > 4   - LFT's now downtrending   - Pain control, will order standing tylenol dose with oxy/dilaudid PRN  - ID and Surgery following   - Wean off of O2, incentive spirometry    # Hypernatremia - Improved  - IVF w/ D5W1/2NS    #Acute Kidney Injury - Improved   - Monitor BMP  - Avoid nephrotoxins    #Urinary retention  - Patient with chronic felipe - was placed in November after fall/hip fx, no TOV has been done as per daughter. No hx of urinary retention prior to that event   - Felipe d/c'd last evening, follow up on voiding   - Cont increased Flomax dosing at 0.8mg qHS    # Paroxysmal atrial fibrillation  - Will likely need outpatient extended cardiac event monitoring  - will touch base with Cards when closer to discharge    #Hx of TIA  - c/w Statin,ASA    #DVT Prophylaxis  - IPCDs for now     GOC: DNR/I, MOLST in chart    Dispo: Pending clinical course as above.     2/3: Will Updated daughter Shelli [phone] regarding pt's current status and plan of care.      89year old Male with PMH of recent L hip fx (11/2022), TIA (11/2022) who presents to the ER from [facility] for abdominal pain, nausea for 4 days. Admitted for acute cholecystitis.    #Acute cholecystitis c/b perforated gallbladder  #Transaminitis, Hyperbilirubinemia  - s/p lap kellee with Dr Thompson on 1/30, POD 4  - Maintain RUPINDER-SS  - Continue NPO with ice chips for now, advance diet as per surgery  - IVF while NPO  - Blood cultures growing gram + cocci, urine culture with E Coli and Aerococcus  - Continue Zosyn for now   - Started Lovenox for DVT ppx. Aspirin resumed since h/h stable  - Maintain K > 4   - LFT's now downtrending   - Pain control, will order standing tylenol dose with oxy/dilaudid PRN  - ID and Surgery following   - Wean off of O2, incentive spirometry    # Hypernatremia - Improved  - IVF w/ D5W1/2NS    #Acute Kidney Injury - Improved   - Monitor BMP  - Avoid nephrotoxins    #Urinary retention  - Patient with chronic felipe - was placed in November after fall/hip fx, no TOV has been done as per daughter. No hx of urinary retention prior to that event   - Felipe d/c'd last evening, follow up on voiding   - Cont increased Flomax dosing at 0.8mg qHS    # Paroxysmal atrial fibrillation  - Will likely need outpatient extended cardiac event monitoring  - will touch base with Cards when closer to discharge    #Hx of TIA  - c/w Statin,ASA    #DVT Prophylaxis  - IPCDs for now     GOC: DNR/I, MOLST in chart    Dispo: Pending clinical course as above.     2/3: Will Updated daughter Shelli [phone] regarding pt's current status and plan of care.      89year old Male with PMH of recent L hip fx (11/2022), TIA (11/2022) who presents to the ER from [facility] for abdominal pain, nausea for 4 days. Admitted for acute cholecystitis.    #Acute cholecystitis c/b perforated gallbladder  #Transaminitis, Hyperbilirubinemia  - s/p lap kellee with Dr Thompson on 1/30, POD 4  - Maintain RUPINDER-SS  - Continue NPO with ice chips for now, advance diet as per surgery  - IVF while NPO  - Blood cultures growing gram + cocci, urine culture with E Coli and Aerococcus  - Continue Zosyn for now   - Started Lovenox for DVT ppx. Aspirin resumed since h/h stable  - Maintain K > 4   - LFT's now downtrending   - Pain control, will order standing tylenol dose with oxy/dilaudid PRN  - ID and Surgery following   - Wean off of O2, incentive spirometry    # Hypernatremia - Improved  - IVF w/ D5W1/2NS    #Acute Kidney Injury - Improved   - Monitor BMP  - Avoid nephrotoxins    #Urinary retention  - Patient with chronic felipe - was placed in November after fall/hip fx, no TOV has been done as per daughter. No hx of urinary retention prior to that event   - Felipe d/c'd last evening, follow up on voiding   - Cont increased Flomax dosing at 0.8mg qHS    # Paroxysmal atrial fibrillation  - Will likely need outpatient extended cardiac event monitoring  - will touch base with Cards when closer to discharge    #Hx of TIA  - c/w Statin,ASA    #DVT Prophylaxis  - IPCDs for now     GOC: DNR/I, MOLST in chart    Dispo: Pending clinical course as above.     2/3: Will Updated daughter Shelli [phone] regarding pt's current status and plan of care.      89year old Male with PMH of recent L hip fx (11/2022), TIA (11/2022) who presents to the ER from [facility] for abdominal pain, nausea for 4 days. Admitted for acute cholecystitis.    #Acute cholecystitis c/b perforated gallbladder  #Transaminitis, Hyperbilirubinemia  - s/p lap kellee with Dr Thompson on 1/30, POD 4  - Maintain RUPINDER-SS  - Continue NPO with ice chips for now, advance diet as per surgery  - IVF while NPO  - Blood cultures growing gram + cocci, urine culture with E Coli and Aerococcus  - Continue Zosyn for now   - Started Lovenox for DVT ppx. Aspirin resumed since h/h stable  - Maintain K > 4   - LFT's now downtrending   - Pain control, will order standing tylenol dose with oxy/dilaudid PRN  - ID and Surgery following   - Wean off of O2, incentive spirometry    # Hypernatremia - resolved  - IVF w/ D5W1/2NS    #Acute Kidney Injury - Improved   - Monitor BMP  - Avoid nephrotoxins    #Urinary retention  - Patient with chronic feliep - was placed in November after fall/hip fx, no TOV has been done as per daughter. No hx of urinary retention prior to that event   - Felipe d/c'd last evening, follow up on voiding   - PVR q6h  - Cont increased Flomax dosing at 0.8mg qHS    # Paroxysmal atrial fibrillation  - Will likely need outpatient extended cardiac event monitoring  - will touch base with Cards when closer to discharge    #Hx of TIA  - c/w Statin,ASA    #DVT Prophylaxis  - IPCDs for now     GOC: DNR/I, MOLST in chart    Dispo: Pending clinical course as above.     2/3: Will Updated daughter Shelli [phone] regarding pt's current status and plan of care.      89year old Male with PMH of recent L hip fx (11/2022), TIA (11/2022) who presents to the ER from [facility] for abdominal pain, nausea for 4 days. Admitted for acute cholecystitis.    #Acute cholecystitis c/b perforated gallbladder  #Transaminitis, Hyperbilirubinemia  - s/p lap kellee with Dr Thompson on 1/30, POD 4  - Maintain RUPINDER-SS  - Continue NPO with ice chips for now, advance diet as per surgery  - IVF while NPO  - Blood cultures growing gram + cocci, urine culture with E Coli and Aerococcus  - Continue Zosyn for now   - Started Lovenox for DVT ppx. Aspirin resumed since h/h stable  - Maintain K > 4   - LFT's now downtrending   - Pain control, will order standing tylenol dose with oxy/dilaudid PRN  - ID and Surgery following   - Wean off of O2, incentive spirometry    # Hypernatremia - resolved  - IVF w/ D5W1/2NS    #Acute Kidney Injury - Improved   - Monitor BMP  - Avoid nephrotoxins    #Urinary retention  - Patient with chronic felipe - was placed in November after fall/hip fx, no TOV has been done as per daughter. No hx of urinary retention prior to that event   - Felipe d/c'd last evening, follow up on voiding   - PVR q6h  - Cont increased Flomax dosing at 0.8mg qHS    # Paroxysmal atrial fibrillation  - Will likely need outpatient extended cardiac event monitoring  - will touch base with Cards when closer to discharge    #Hx of TIA  - c/w Statin,ASA    #DVT Prophylaxis  - IPCDs for now     GOC: DNR/I, MOLST in chart    Dispo: Pending clinical course as above.     2/3: Will Updated daughter Shelli [phone] regarding pt's current status and plan of care.      89year old Male with PMH of recent L hip fx (11/2022), TIA (11/2022) who presents to the ER from [facility] for abdominal pain, nausea for 4 days. Admitted for acute cholecystitis.    #Acute cholecystitis c/b perforated gallbladder  #Transaminitis, Hyperbilirubinemia  - s/p lap kellee with Dr Thompson on 1/30, POD 4  - Maintain RUPINDER-SS  - Continue NPO with ice chips for now, advance diet as per surgery  - IVF while NPO  - Blood cultures growing gram + cocci, urine culture with E Coli and Aerococcus  - Continue Zosyn for now   - Started Lovenox for DVT ppx. Aspirin resumed since h/h stable  - Maintain K > 4   - LFT's now downtrending   - Pain control, will order standing tylenol dose with oxy/dilaudid PRN  - ID and Surgery following   - Wean off of O2, incentive spirometry    # Hypernatremia - resolved  - IVF w/ D5W1/2NS    #Acute Kidney Injury - Improved   - Monitor BMP  - Avoid nephrotoxins    #Urinary retention  - Patient with chronic felipe - was placed in November after fall/hip fx, no TOV has been done as per daughter. No hx of urinary retention prior to that event   - Felipe d/c'd last evening, follow up on voiding   - PVR q6h  - Cont increased Flomax dosing at 0.8mg qHS    # Paroxysmal atrial fibrillation  - Will likely need outpatient extended cardiac event monitoring  - will touch base with Cards when closer to discharge    #Hx of TIA  - c/w Statin,ASA    #DVT Prophylaxis  - IPCDs for now     GOC: DNR/I, MOLST in chart    Dispo: Pending clinical course as above.     2/3: Will Updated daughter Shelli [phone] regarding pt's current status and plan of care.      89year old Male with PMH of recent L hip fx (11/2022), TIA (11/2022) who presents to the ER from [facility] for abdominal pain, nausea for 4 days. Admitted for acute cholecystitis.    #Acute cholecystitis c/b perforated gallbladder  #Transaminitis, Hyperbilirubinemia  - s/p lap kellee with Dr Thompson on 1/30, POD 4  - Maintain RUPINDER-SS  - Continue NPO with ice chips for now, advance diet as per surgery  - IVF while NPO  - Blood cultures growing gram + cocci, urine culture with E Coli and Aerococcus  - Continue Zosyn for now   - Started Lovenox for DVT ppx. Aspirin resumed since h/h stable  - Maintain K > 4   - LFT's now downtrending   - Pain control, will order standing tylenol dose with oxy/dilaudid PRN  - ID and Surgery following   - Wean off of O2, incentive spirometry    # Hypernatremia - resolved  - IVF w/ D5W1/2NS    #Acute Kidney Injury - Improved   - Monitor BMP  - Avoid nephrotoxins    #Urinary retention  - Patient with chronic felipe - was placed in November after fall/hip fx, no TOV has been done as per daughter. No hx of urinary retention prior to that event   - Felipe d/c'd last evening, follow up on voiding   - PVR q6h  - Cont increased Flomax dosing at 0.8mg qHS    # Paroxysmal atrial fibrillation  - Will likely need outpatient extended cardiac event monitoring  - will touch base with Cards when closer to discharge    #Hx of TIA  - c/w Statin,ASA    #DVT Prophylaxis  - IPCDs for now     GOC: DNR/I, MOLST in chart    Dispo: Pending clinical course as above.     2/3: Attempted to Updated terrie Marques Cell [phone] regarding pt's current status and plan of care- No answer, voicemail left.        89year old Male with PMH of recent L hip fx (11/2022), TIA (11/2022) who presents to the ER from [facility] for abdominal pain, nausea for 4 days. Admitted for acute cholecystitis.    #Acute cholecystitis c/b perforated gallbladder  #Transaminitis, Hyperbilirubinemia  - s/p lap kellee with Dr Thompson on 1/30, POD 4  - Maintain RUPINDER-SS  - Continue NPO with ice chips for now, advance diet as per surgery  - IVF while NPO  - Blood cultures growing gram + cocci, urine culture with E Coli and Aerococcus  - Continue Zosyn for now   - Started Lovenox for DVT ppx. Aspirin resumed since h/h stable  - Maintain K > 4   - LFT's now downtrending   - Pain control, will order standing tylenol dose with oxy/dilaudid PRN  - ID and Surgery following   - Wean off of O2, incentive spirometry    # Hypernatremia - resolved  - IVF w/ D5W1/2NS    #Acute Kidney Injury - Improved   - Monitor BMP  - Avoid nephrotoxins    #Urinary retention  - Patient with chronic felipe - was placed in November after fall/hip fx, no TOV has been done as per daughter. No hx of urinary retention prior to that event   - Felipe d/c'd last evening, follow up on voiding   - PVR q6h  - Cont increased Flomax dosing at 0.8mg qHS    # Paroxysmal atrial fibrillation  - Will likely need outpatient extended cardiac event monitoring  - will touch base with Cards when closer to discharge    #Hx of TIA  - c/w Statin,ASA    #DVT Prophylaxis  - IPCDs for now     GOC: DNR/I, MOLST in chart    Dispo: Pending clinical course as above.     2/3: Attempted to Updated terrie Marques Cell [phone] regarding pt's current status and plan of care- No answer, voicemail left.        89year old Male with PMH of recent L hip fx (11/2022), TIA (11/2022) who presents to the ER from [facility] for abdominal pain, nausea for 4 days. Admitted for acute cholecystitis.    #Acute cholecystitis c/b perforated gallbladder  #Transaminitis, Hyperbilirubinemia  - s/p lap kellee with Dr Thompson on 1/30, POD 4  - Maintain RUPINDER-SS  - Continue NPO with ice chips for now, advance diet as per surgery  - IVF while NPO  - Blood cultures growing gram + cocci, urine culture with E Coli and Aerococcus  - Continue Zosyn for now   - Started Lovenox for DVT ppx. Aspirin resumed since h/h stable  - Maintain K > 4   - LFT's now downtrending   - Pain control, will order standing tylenol dose with oxy/dilaudid PRN  - ID and Surgery following   - Wean off of O2, incentive spirometry    # Hypernatremia - resolved  - IVF w/ D5W1/2NS    #Acute Kidney Injury - Improved   - Monitor BMP  - Avoid nephrotoxins    #Urinary retention  - Patient with chronic felipe - was placed in November after fall/hip fx, no TOV has been done as per daughter. No hx of urinary retention prior to that event   - Felipe d/c'd last evening, follow up on voiding   - PVR q6h  - Cont increased Flomax dosing at 0.8mg qHS    # Paroxysmal atrial fibrillation  - Will likely need outpatient extended cardiac event monitoring  - will touch base with Cards when closer to discharge    #Hx of TIA  - c/w Statin,ASA    #DVT Prophylaxis  - IPCDs for now     GOC: DNR/I, MOLST in chart    Dispo: Pending clinical course as above.     2/3: Attempted to Updated terrie Marques Cell [phone] regarding pt's current status and plan of care- No answer, voicemail left.

## 2023-02-03 NOTE — DIETITIAN INITIAL EVALUATION ADULT - OTHER INFO
89y year old Male with PMH of recent L hip fx (11/2022), TIA (11/2022) who presents to the ER from [facility] for abdominal pain, nausea for 4 days PTA . Patient POD #4  , s/p Laparoscopic cholecystectomy, Diet advanced to clear fluids tolerating well , LFT's noted trending down , No edema , No BM noted since PTA NFPE conducted , patient with evidence of severe protein calorie malnutrition , (+) muscle  wasting & loss of body fat noted , patient reports consuming 2 meals a day . 89y year old Male with PMH of recent L hip fx (11/2022), TIA (11/2022) who presents to the ER from [facility] for abdominal pain, nausea for 4 days PTA . Patient POD #4  , s/p Laparoscopic cholecystectomy, Diet advanced to clear fluids tolerating well , LFT's noted trending down , No edema , No BM noted since PTA NFPE conducted , patient with evidence of severe protein calorie malnutrition , (+) muscle  wasting & loss of body fat noted , patient reports consuming 2 meals a day . 89y year old Male with PMH of recent L hip fx (11/2022), TIA (11/2022) who presents to the ER from [facility] for abdominal pain, nausea for 4 days PTA . Patient POD #4  , s/p Laparoscopic cholecystectomy, Diet advanced to clear fluids tolerating well , LFT's noted trending down , No edema , No BM noted since PTA NFPE conducted , patient with evidence of severe protein calorie malnutrition , (+) muscle  wasting & loss of body fat noted , patient reports consuming 2 meals a day .

## 2023-02-04 LAB
ALBUMIN SERPL ELPH-MCNC: 1.9 G/DL — LOW (ref 3.3–5)
ALBUMIN SERPL ELPH-MCNC: 2 G/DL — LOW (ref 3.3–5)
ALP SERPL-CCNC: 168 U/L — HIGH (ref 40–120)
ALP SERPL-CCNC: 174 U/L — HIGH (ref 40–120)
ALT FLD-CCNC: 35 U/L — SIGNIFICANT CHANGE UP (ref 10–45)
ALT FLD-CCNC: 39 U/L — SIGNIFICANT CHANGE UP (ref 10–45)
ANION GAP SERPL CALC-SCNC: 10 MMOL/L — SIGNIFICANT CHANGE UP (ref 5–17)
ANION GAP SERPL CALC-SCNC: 11 MMOL/L — SIGNIFICANT CHANGE UP (ref 5–17)
ANION GAP SERPL CALC-SCNC: 6 MMOL/L — SIGNIFICANT CHANGE UP (ref 5–17)
AST SERPL-CCNC: 28 U/L — SIGNIFICANT CHANGE UP (ref 10–40)
AST SERPL-CCNC: 31 U/L — SIGNIFICANT CHANGE UP (ref 10–40)
BILIRUB DIRECT SERPL-MCNC: 0.1 MG/DL — SIGNIFICANT CHANGE UP (ref 0–0.3)
BILIRUB DIRECT SERPL-MCNC: 0.2 MG/DL — SIGNIFICANT CHANGE UP (ref 0–0.3)
BILIRUB INDIRECT FLD-MCNC: 0.4 MG/DL — SIGNIFICANT CHANGE UP (ref 0.2–1)
BILIRUB INDIRECT FLD-MCNC: 0.5 MG/DL — SIGNIFICANT CHANGE UP (ref 0.2–1)
BILIRUB SERPL-MCNC: 0.6 MG/DL — SIGNIFICANT CHANGE UP (ref 0.2–1.2)
BUN SERPL-MCNC: 14 MG/DL — SIGNIFICANT CHANGE UP (ref 7–23)
BUN SERPL-MCNC: 16 MG/DL — SIGNIFICANT CHANGE UP (ref 7–23)
BUN SERPL-MCNC: 17 MG/DL — SIGNIFICANT CHANGE UP (ref 7–23)
CALCIUM SERPL-MCNC: 7.8 MG/DL — LOW (ref 8.4–10.5)
CALCIUM SERPL-MCNC: 7.9 MG/DL — LOW (ref 8.4–10.5)
CALCIUM SERPL-MCNC: 8 MG/DL — LOW (ref 8.4–10.5)
CHLORIDE SERPL-SCNC: 106 MMOL/L — SIGNIFICANT CHANGE UP (ref 96–108)
CHLORIDE SERPL-SCNC: 107 MMOL/L — SIGNIFICANT CHANGE UP (ref 96–108)
CHLORIDE SERPL-SCNC: 109 MMOL/L — HIGH (ref 96–108)
CO2 SERPL-SCNC: 25 MMOL/L — SIGNIFICANT CHANGE UP (ref 22–31)
CO2 SERPL-SCNC: 26 MMOL/L — SIGNIFICANT CHANGE UP (ref 22–31)
CO2 SERPL-SCNC: 29 MMOL/L — SIGNIFICANT CHANGE UP (ref 22–31)
CREAT SERPL-MCNC: 0.73 MG/DL — SIGNIFICANT CHANGE UP (ref 0.5–1.3)
CREAT SERPL-MCNC: 0.78 MG/DL — SIGNIFICANT CHANGE UP (ref 0.5–1.3)
CREAT SERPL-MCNC: 0.81 MG/DL — SIGNIFICANT CHANGE UP (ref 0.5–1.3)
CULTURE RESULTS: SIGNIFICANT CHANGE UP
D DIMER BLD IA.RAPID-MCNC: 1872 NG/ML DDU — HIGH
EGFR: 81 ML/MIN/1.73M2 — SIGNIFICANT CHANGE UP
EGFR: 82 ML/MIN/1.73M2 — SIGNIFICANT CHANGE UP
EGFR: 84 ML/MIN/1.73M2 — SIGNIFICANT CHANGE UP
GLUCOSE BLDC GLUCOMTR-MCNC: 113 MG/DL — HIGH (ref 70–99)
GLUCOSE SERPL-MCNC: 105 MG/DL — HIGH (ref 70–99)
GLUCOSE SERPL-MCNC: 127 MG/DL — HIGH (ref 70–99)
GLUCOSE SERPL-MCNC: 97 MG/DL — SIGNIFICANT CHANGE UP (ref 70–99)
HCT VFR BLD CALC: 36.7 % — LOW (ref 39–50)
HGB BLD-MCNC: 11.8 G/DL — LOW (ref 13–17)
LACTATE SERPL-SCNC: 1.5 MMOL/L — SIGNIFICANT CHANGE UP (ref 0.7–2)
MAGNESIUM SERPL-MCNC: 1.6 MG/DL — SIGNIFICANT CHANGE UP (ref 1.6–2.6)
MAGNESIUM SERPL-MCNC: 1.7 MG/DL — SIGNIFICANT CHANGE UP (ref 1.6–2.6)
MCHC RBC-ENTMCNC: 30 PG — SIGNIFICANT CHANGE UP (ref 27–34)
MCHC RBC-ENTMCNC: 32.2 GM/DL — SIGNIFICANT CHANGE UP (ref 32–36)
MCV RBC AUTO: 93.4 FL — SIGNIFICANT CHANGE UP (ref 80–100)
NRBC # BLD: 0 /100 WBCS — SIGNIFICANT CHANGE UP (ref 0–0)
PHOSPHATE SERPL-MCNC: 2.6 MG/DL — SIGNIFICANT CHANGE UP (ref 2.5–4.5)
PHOSPHATE SERPL-MCNC: 2.7 MG/DL — SIGNIFICANT CHANGE UP (ref 2.5–4.5)
PLATELET # BLD AUTO: 210 K/UL — SIGNIFICANT CHANGE UP (ref 150–400)
POTASSIUM SERPL-MCNC: 2.8 MMOL/L — CRITICAL LOW (ref 3.5–5.3)
POTASSIUM SERPL-MCNC: 3.4 MMOL/L — LOW (ref 3.5–5.3)
POTASSIUM SERPL-MCNC: 3.7 MMOL/L — SIGNIFICANT CHANGE UP (ref 3.5–5.3)
POTASSIUM SERPL-SCNC: 2.8 MMOL/L — CRITICAL LOW (ref 3.5–5.3)
POTASSIUM SERPL-SCNC: 3.4 MMOL/L — LOW (ref 3.5–5.3)
POTASSIUM SERPL-SCNC: 3.7 MMOL/L — SIGNIFICANT CHANGE UP (ref 3.5–5.3)
PROT SERPL-MCNC: 5.8 G/DL — LOW (ref 6–8.3)
RBC # BLD: 3.93 M/UL — LOW (ref 4.2–5.8)
RBC # FLD: 14.8 % — HIGH (ref 10.3–14.5)
SODIUM SERPL-SCNC: 141 MMOL/L — SIGNIFICANT CHANGE UP (ref 135–145)
SODIUM SERPL-SCNC: 144 MMOL/L — SIGNIFICANT CHANGE UP (ref 135–145)
SPECIMEN SOURCE: SIGNIFICANT CHANGE UP
WBC # BLD: 14.05 K/UL — HIGH (ref 3.8–10.5)
WBC # FLD AUTO: 14.05 K/UL — HIGH (ref 3.8–10.5)

## 2023-02-04 PROCEDURE — 71275 CT ANGIOGRAPHY CHEST: CPT | Mod: 26

## 2023-02-04 PROCEDURE — 99291 CRITICAL CARE FIRST HOUR: CPT

## 2023-02-04 PROCEDURE — 71045 X-RAY EXAM CHEST 1 VIEW: CPT | Mod: 26

## 2023-02-04 PROCEDURE — 74177 CT ABD & PELVIS W/CONTRAST: CPT | Mod: 26

## 2023-02-04 PROCEDURE — 93306 TTE W/DOPPLER COMPLETE: CPT | Mod: 26

## 2023-02-04 PROCEDURE — 99232 SBSQ HOSP IP/OBS MODERATE 35: CPT

## 2023-02-04 PROCEDURE — 93010 ELECTROCARDIOGRAM REPORT: CPT | Mod: 76

## 2023-02-04 RX ORDER — METOPROLOL TARTRATE 50 MG
2.5 TABLET ORAL ONCE
Refills: 0 | Status: DISCONTINUED | OUTPATIENT
Start: 2023-02-04 | End: 2023-02-04

## 2023-02-04 RX ORDER — DEXTROSE MONOHYDRATE, SODIUM CHLORIDE, AND POTASSIUM CHLORIDE 50; .745; 4.5 G/1000ML; G/1000ML; G/1000ML
1000 INJECTION, SOLUTION INTRAVENOUS
Refills: 0 | Status: DISCONTINUED | OUTPATIENT
Start: 2023-02-04 | End: 2023-02-05

## 2023-02-04 RX ORDER — MAGNESIUM SULFATE 500 MG/ML
2 VIAL (ML) INJECTION ONCE
Refills: 0 | Status: COMPLETED | OUTPATIENT
Start: 2023-02-04 | End: 2023-02-04

## 2023-02-04 RX ORDER — POTASSIUM CHLORIDE 20 MEQ
40 PACKET (EA) ORAL ONCE
Refills: 0 | Status: DISCONTINUED | OUTPATIENT
Start: 2023-02-04 | End: 2023-02-04

## 2023-02-04 RX ORDER — POTASSIUM CHLORIDE 20 MEQ
10 PACKET (EA) ORAL
Refills: 0 | Status: COMPLETED | OUTPATIENT
Start: 2023-02-04 | End: 2023-02-04

## 2023-02-04 RX ORDER — METOPROLOL TARTRATE 50 MG
12.5 TABLET ORAL EVERY 6 HOURS
Refills: 0 | Status: DISCONTINUED | OUTPATIENT
Start: 2023-02-04 | End: 2023-02-04

## 2023-02-04 RX ORDER — SODIUM CHLORIDE 9 MG/ML
1000 INJECTION INTRAMUSCULAR; INTRAVENOUS; SUBCUTANEOUS ONCE
Refills: 0 | Status: COMPLETED | OUTPATIENT
Start: 2023-02-04 | End: 2023-02-04

## 2023-02-04 RX ORDER — POTASSIUM CHLORIDE 20 MEQ
10 PACKET (EA) ORAL
Refills: 0 | Status: DISCONTINUED | OUTPATIENT
Start: 2023-02-04 | End: 2023-02-06

## 2023-02-04 RX ADMIN — Medication 25 GRAM(S): at 22:46

## 2023-02-04 RX ADMIN — SODIUM CHLORIDE 1000 MILLILITER(S): 9 INJECTION INTRAMUSCULAR; INTRAVENOUS; SUBCUTANEOUS at 11:35

## 2023-02-04 RX ADMIN — PIPERACILLIN AND TAZOBACTAM 25 GRAM(S): 4; .5 INJECTION, POWDER, LYOPHILIZED, FOR SOLUTION INTRAVENOUS at 23:46

## 2023-02-04 RX ADMIN — Medication 100 MILLIEQUIVALENT(S): at 22:45

## 2023-02-04 RX ADMIN — PIPERACILLIN AND TAZOBACTAM 25 GRAM(S): 4; .5 INJECTION, POWDER, LYOPHILIZED, FOR SOLUTION INTRAVENOUS at 14:44

## 2023-02-04 RX ADMIN — Medication 650 MILLIGRAM(S): at 13:07

## 2023-02-04 RX ADMIN — DEXTROSE MONOHYDRATE, SODIUM CHLORIDE, AND POTASSIUM CHLORIDE 75 MILLILITER(S): 50; .745; 4.5 INJECTION, SOLUTION INTRAVENOUS at 14:42

## 2023-02-04 RX ADMIN — ONDANSETRON 4 MILLIGRAM(S): 8 TABLET, FILM COATED ORAL at 11:11

## 2023-02-04 RX ADMIN — Medication 81 MILLIGRAM(S): at 14:43

## 2023-02-04 RX ADMIN — Medication 100 MILLIEQUIVALENT(S): at 12:05

## 2023-02-04 RX ADMIN — Medication 650 MILLIGRAM(S): at 13:40

## 2023-02-04 RX ADMIN — Medication 100 MILLIEQUIVALENT(S): at 14:41

## 2023-02-04 RX ADMIN — Medication 100 MILLIEQUIVALENT(S): at 16:34

## 2023-02-04 RX ADMIN — PIPERACILLIN AND TAZOBACTAM 25 GRAM(S): 4; .5 INJECTION, POWDER, LYOPHILIZED, FOR SOLUTION INTRAVENOUS at 06:00

## 2023-02-04 RX ADMIN — ENOXAPARIN SODIUM 30 MILLIGRAM(S): 100 INJECTION SUBCUTANEOUS at 13:07

## 2023-02-04 RX ADMIN — TAMSULOSIN HYDROCHLORIDE 0.8 MILLIGRAM(S): 0.4 CAPSULE ORAL at 23:46

## 2023-02-04 RX ADMIN — FINASTERIDE 5 MILLIGRAM(S): 5 TABLET, FILM COATED ORAL at 13:08

## 2023-02-04 NOTE — PROGRESS NOTE ADULT - SUBJECTIVE AND OBJECTIVE BOX
89 year old male admitted from [facility] with 4 days of abdominal pain /nausea/vomiting on Jan 30 s/p Laparoscopic cholecystectomy for perforated gangrenous gallbladder on Monday Jan30 . Today is POD #5    Patient was seen and examined by me early this am.   Patient was sitting up in bed c/o nausea and vomited X 1,  He also had two episodes of explosive diarrhea.   Prior to these episodes, he was tolerating a full liquid diet with  ensure .  Soon after, patient was gotten OOB.   He then dropped his BP and became tachycardic.  RR was called.   He was given a fluid bolus, K replacement , and again made NPO .  He is scheduled to have a CT abdomen and pelvis .    CXR done.       Vital Signs Last 24 Hrs  T(C): 36.3 (04 Feb 2023 11:22), Max: 36.4 (03 Feb 2023 20:34)  T(F): 97.4 (04 Feb 2023 11:22), Max: 97.5 (03 Feb 2023 20:34)  HR: 128 (04 Feb 2023 11:22) (83 - 128)  BP: 123/50 (04 Feb 2023 05:55) (117/67 - 123/50)  RR: 18 (04 Feb 2023 11:22) (17 - 18)  SpO2: 96% (04 Feb 2023 11:22) (95% - 96%)    Parameters below as of 04 Feb 2023 11:22  Patient On (Oxygen Delivery Method): nasal cannula  O2 Flow (L/min): 2    PAST MEDICAL & SURGICAL HISTORY:  History of BPH  Malignant neoplasm of colon  Femur neck fracture  History of glaucoma  GERD (gastroesophageal reflux disease)  Iron deficiency anemia  Hyperlipidemia  History of colon surgery    MEDICATIONS  (STANDING):  acetaminophen     Tablet .. 650 milliGRAM(s) Oral every 6 hours  aspirin enteric coated 81 milliGRAM(s) Oral daily  enoxaparin Injectable 30 milliGRAM(s) SubCutaneous every 24 hours  finasteride 5 milliGRAM(s) Oral daily  piperacillin/tazobactam IVPB.. 3.375 Gram(s) IV Intermittent every 8 hours  potassium chloride  10 mEq/100 mL IVPB 10 milliEquivalent(s) IV Intermittent every 1 hour  potassium chloride  10 mEq/100 mL IVPB 10 milliEquivalent(s) IV Intermittent every 1 hour  sodium chloride 0.9% with potassium chloride 20 mEq/L 1000 milliLiter(s) (75 mL/Hr) IV Continuous <Continuous>  tamsulosin 0.8 milliGRAM(s) Oral at bedtime    MEDICATIONS  (PRN):  aluminum hydroxide/magnesium hydroxide/simethicone Suspension 30 milliLiter(s) Oral every 4 hours PRN Dyspepsia  melatonin 3 milliGRAM(s) Oral at bedtime PRN Insomnia  ondansetron Injectable 4 milliGRAM(s) IV Push every 8 hours PRN Nausea and/or Vomiting  oxyCODONE    IR 5 milliGRAM(s) Oral every 6 hours PRN Moderate Pain (4 - 6)  zinc oxide 40% Paste 1 Application(s) Topical three times a day PRN rash    PE:  Alert and oriented X 3  Lungs:  decreased at right bases , no rales, wheezes, or rhonchi  Cor:  distant S1S2  Abd:  softly distended +BS, +BM watery diarrhea X 2,  bladder scan > 300 needed straight cath X 2 now with regular felipe catheter , Left incarcerated inguinal hernia easily reducable .   surgical port sites clean, dry and intact .  RUPINDER drain still draining large amounts of serous drainage                         11.8   14.05 )-----------( 210      ( 04 Feb 2023 08:00 )             36.7   02-04    144  |  107  |  16  ----------------------------<  127<H>  2.8<LL>   |  26  |  0.78    Ca    7.9<L>      04 Feb 2023 12:00  Phos  2.6     02-04  Mg     1.6     02-04    TPro  5.8<L>  /  Alb  2.0<L>  /  TBili  0.6  /  DBili  0.2  /  AST  28  /  ALT  39  /  AlkPhos  168<H>  02-04       89 year old male admitted from [facility] with 4 days of abdominal pain /nausea/vomiting on Jan 30 s/p Laparoscopic cholecystectomy for perforated gangrenous gallbladder on Monday Jan30 . Today is POD #5    Patient was seen and examined by me early this am.   Patient was sitting up in bed c/o nausea and vomited X 1,  He also had two episodes of explosive diarrhea.   Prior to these episodes, he was tolerating a full liquid diet with  ensure .  Soon after, patient was gotten OOB.   He then dropped his BP and became tachycardic.  RR was called.   He was given a fluid bolus, K replacement , and again made NPO .  He is scheduled to have a CT abdomen and pelvis .    CXR done.       Vital Signs Last 24 Hrs  T(C): 36.3 (04 Feb 2023 11:22), Max: 36.4 (03 Feb 2023 20:34)  T(F): 97.4 (04 Feb 2023 11:22), Max: 97.5 (03 Feb 2023 20:34)  HR: 128 (04 Feb 2023 11:22) (83 - 128)  BP: 123/50 (04 Feb 2023 05:55) (117/67 - 123/50)  RR: 18 (04 Feb 2023 11:22) (17 - 18)  SpO2: 96% (04 Feb 2023 11:22) (95% - 96%)    Parameters below as of 04 Feb 2023 11:22  Patient On (Oxygen Delivery Method): nasal cannula  O2 Flow (L/min): 2    PAST MEDICAL & SURGICAL HISTORY:  History of BPH  Malignant neoplasm of colon  Femur neck fracture  History of glaucoma  GERD (gastroesophageal reflux disease)  Iron deficiency anemia  Hyperlipidemia  History of colon surgery    MEDICATIONS  (STANDING):  acetaminophen     Tablet .. 650 milliGRAM(s) Oral every 6 hours  aspirin enteric coated 81 milliGRAM(s) Oral daily  enoxaparin Injectable 30 milliGRAM(s) SubCutaneous every 24 hours  finasteride 5 milliGRAM(s) Oral daily  piperacillin/tazobactam IVPB.. 3.375 Gram(s) IV Intermittent every 8 hours  potassium chloride  10 mEq/100 mL IVPB 10 milliEquivalent(s) IV Intermittent every 1 hour  potassium chloride  10 mEq/100 mL IVPB 10 milliEquivalent(s) IV Intermittent every 1 hour  sodium chloride 0.9% with potassium chloride 20 mEq/L 1000 milliLiter(s) (75 mL/Hr) IV Continuous <Continuous>  tamsulosin 0.8 milliGRAM(s) Oral at bedtime    MEDICATIONS  (PRN):  aluminum hydroxide/magnesium hydroxide/simethicone Suspension 30 milliLiter(s) Oral every 4 hours PRN Dyspepsia  melatonin 3 milliGRAM(s) Oral at bedtime PRN Insomnia  ondansetron Injectable 4 milliGRAM(s) IV Push every 8 hours PRN Nausea and/or Vomiting  oxyCODONE    IR 5 milliGRAM(s) Oral every 6 hours PRN Moderate Pain (4 - 6)  zinc oxide 40% Paste 1 Application(s) Topical three times a day PRN rash    PE:  Alert and oriented X 3  Lungs:  decreased at right bases , no rales, wheezes, or rhonchi  Cor:  distant S1S2  Abd:  softly distended +BS, +BM watery diarrhea X 2,  bladder scan > 300 needed straight cath X 2 now with regular felipe catheter , Left incarcerated inguinal hernia easily reducable .   surgical port sites clean, dry and intact .  RUPINDER drain still draining large amounts of serous drainage                         11.8   14.05 )-----------( 210      ( 04 Feb 2023 08:00 )             36.7   02-04    144  |  107  |  16  ----------------------------<  127<H>  2.8<LL>   |  26  |  0.78    Ca    7.9<L>      04 Feb 2023 12:00  Phos  2.6     02-04  Mg     1.6     02-04    TPro  5.8<L>  /  Alb  2.0<L>  /  TBili  0.6  /  DBili  0.2  /  AST  28  /  ALT  39  /  AlkPhos  168<H>  02-04       89 year old male admitted from [facility] with 4 days of abdominal pain /nausea/vomiting on Jan 30 s/p Laparoscopic cholecystectomy for perforated gangrenous gallbladder on Monday Jan30 . Today is POD #5    Patient was seen and examined by me early this am.   Patient was sitting up in bed c/o nausea and vomited X 1,  He also had two episodes of explosive diarrhea.   Prior to these episodes, he was tolerating a full liquid diet with  ensure .  Soon after, patient was gotten OOB.   He then dropped his BP and became tachycardic.  RR was called.   He was given a fluid bolus, K replacement , and again made NPO .  He is scheduled to have a CT abdomen and pelvis .    CXR done.       Vital Signs Last 24 Hrs  T(C): 36.3 (04 Feb 2023 11:22), Max: 36.4 (03 Feb 2023 20:34)  T(F): 97.4 (04 Feb 2023 11:22), Max: 97.5 (03 Feb 2023 20:34)  HR: 128 (04 Feb 2023 11:22) (83 - 128)  BP: 123/50 (04 Feb 2023 05:55) (117/67 - 123/50)  RR: 18 (04 Feb 2023 11:22) (17 - 18)  SpO2: 96% (04 Feb 2023 11:22) (95% - 96%)    Parameters below as of 04 Feb 2023 11:22  Patient On (Oxygen Delivery Method): nasal cannula  O2 Flow (L/min): 2    PAST MEDICAL & SURGICAL HISTORY:  History of BPH  Malignant neoplasm of colon  Femur neck fracture  History of glaucoma  GERD (gastroesophageal reflux disease)  Iron deficiency anemia  Hyperlipidemia  History of colon surgery    MEDICATIONS  (STANDING):  acetaminophen     Tablet .. 650 milliGRAM(s) Oral every 6 hours  aspirin enteric coated 81 milliGRAM(s) Oral daily  enoxaparin Injectable 30 milliGRAM(s) SubCutaneous every 24 hours  finasteride 5 milliGRAM(s) Oral daily  piperacillin/tazobactam IVPB.. 3.375 Gram(s) IV Intermittent every 8 hours  potassium chloride  10 mEq/100 mL IVPB 10 milliEquivalent(s) IV Intermittent every 1 hour  potassium chloride  10 mEq/100 mL IVPB 10 milliEquivalent(s) IV Intermittent every 1 hour  sodium chloride 0.9% with potassium chloride 20 mEq/L 1000 milliLiter(s) (75 mL/Hr) IV Continuous <Continuous>  tamsulosin 0.8 milliGRAM(s) Oral at bedtime    MEDICATIONS  (PRN):  aluminum hydroxide/magnesium hydroxide/simethicone Suspension 30 milliLiter(s) Oral every 4 hours PRN Dyspepsia  melatonin 3 milliGRAM(s) Oral at bedtime PRN Insomnia  ondansetron Injectable 4 milliGRAM(s) IV Push every 8 hours PRN Nausea and/or Vomiting  oxyCODONE    IR 5 milliGRAM(s) Oral every 6 hours PRN Moderate Pain (4 - 6)  zinc oxide 40% Paste 1 Application(s) Topical three times a day PRN rash    PE:  Alert and oriented X 3  Lungs:  decreased at right bases , no rales, wheezes, or rhonchi  Cor:  distant S1S2  Abd:  softly distended +BS, +BM watery diarrhea X 2,  bladder scan > 300 needed straight cath X 2 now with regular felipe catheter , Left incarcerated inguinal hernia easily reducable .   surgical port sites clean, dry and intact .  RUPINDER drain still draining large amounts of serous drainage                         11.8   14.05 )-----------( 210      ( 04 Feb 2023 08:00 )             36.7   02-04    144  |  107  |  16  ----------------------------<  127<H>  2.8<LL>   |  26  |  0.78    Ca    7.9<L>      04 Feb 2023 12:00  Phos  2.6     02-04  Mg     1.6     02-04    TPro  5.8<L>  /  Alb  2.0<L>  /  TBili  0.6  /  DBili  0.2  /  AST  28  /  ALT  39  /  AlkPhos  168<H>  02-04

## 2023-02-04 NOTE — PROGRESS NOTE ADULT - ASSESSMENT
89year old Male with PMH of recent L hip fx (11/2022), TIA (11/2022) who presents to the ER from [facility] for abdominal pain, nausea for 4 days. Admitted for acute cholecystitis.    #Acute cholecystitis c/b perforated gallbladder  #Transaminitis, Hyperbilirubinemia  - s/p lap kellee with Dr Thompson on 1/30, POD #5, drain with high output, surgery not clearing for discharge yet.    - Maintain RUPINDER-SS  - Advance diet to regular  - Blood cultures growing gram + cocci, urine culture with E Coli and Aerococcus  - Continue Zosyn for now, day 5/10  - Started Lovenox for DVT ppx. Aspirin resumed since h/h stable  - Maintain K > 4, supplemented today   - LFT's now downtrending   - Pain control, will order standing tylenol dose with oxy/dilaudid PRN  - ID and Surgery following   - Wean off of O2, incentive spirometry    # Hypernatremia - resolved  - IVF w/ D5W1/2NS  -diet advanced to regular, d/c IVF when tolerating diet    #Acute Kidney Injury - Improved   - Monitor BMP  - Avoid nephrotoxins    #Urinary retention  - Patient with chronic felipe - was placed in November after fall/hip fx, no TOV has been done as per daughter. No hx of urinary retention prior to that event   - Felipe d/c'd last evening, failed trial void last night, straight cathed last night, again no void this am, felipe replaced.  - Cont increased Flomax dosing at 0.8mg qHS    # Paroxysmal atrial fibrillation  - rapid afib this morning, b/p remained stable, EKG and cardio eval ordered, metoprolol given, continue monitoring    #Hx of TIA  - c/w Statin,ASA    #DVT Prophylaxis  - IPCDs for now     GOC: DNR/I, MOLST in chart    Dispo: D/C to [facility] when cleared by surgery and cardio    2/3: Attempted to Updated daughter Shelli Cell [phone] regarding pt's current status and plan of care- No answer, voicemail left.          89year old Male with PMH of recent L hip fx (11/2022), TIA (11/2022) who presents to the ER from [facility] for abdominal pain, nausea for 4 days. Admitted for acute cholecystitis.    #Acute cholecystitis c/b perforated gallbladder  #Transaminitis, Hyperbilirubinemia  - s/p lap kellee with Dr Thompson on 1/30, POD #5, drain with high output, surgery not clearing for discharge yet.    - Maintain RUPINDER-SS  - Advance diet to regular  - Blood cultures growing gram + cocci, urine culture with E Coli and Aerococcus  - Continue Zosyn for now, day 5/10  - Started Lovenox for DVT ppx. Aspirin resumed since h/h stable  - Maintain K > 4, supplemented today   - LFT's now downtrending   - Pain control, will order standing tylenol dose with oxy/dilaudid PRN  - ID and Surgery following   - Wean off of O2, incentive spirometry    # Hypernatremia - resolved  - IVF w/ D5W1/2NS  -diet advanced to regular, d/c IVF when tolerating diet    #Acute Kidney Injury - Improved   - Monitor BMP  - Avoid nephrotoxins    #Urinary retention  - Patient with chronic felipe - was placed in November after fall/hip fx, no TOV has been done as per daughter. No hx of urinary retention prior to that event   - Felipe d/c'd last evening, failed trial void last night, straight cathed last night, again no void this am, felipe replaced.  - Cont increased Flomax dosing at 0.8mg qHS    # Paroxysmal atrial fibrillation  - rapid afib this morning, b/p remained stable, EKG and cardio eval ordered, metoprolol given, continue monitoring    #Hx of TIA  - c/w Statin,ASA    #DVT Prophylaxis  - IPCDs for now     GOC: DNR/I, MOLST in chart    Dispo: D/C to [facility] when cleared by surgery and cardio    2/3: Attempted to Updated daughter Shelli Cell [phone] regarding pt's current status and plan of care- No answer, voicemail left.          89year old Male with PMH of recent L hip fx (11/2022), TIA (11/2022) who presents to the ER from [facility] for abdominal pain, nausea for 4 days. Admitted for acute cholecystitis.    #Acute cholecystitis c/b perforated gallbladder  #Transaminitis, Hyperbilirubinemia  - s/p lap kellee with Dr Thompson on 1/30, POD #5, drain with high output, surgery not clearing for discharge yet.    - Maintain RUPINDER-SS  - Advance diet to regular  - Blood cultures growing gram + cocci, urine culture with E Coli and Aerococcus  - Continue Zosyn for now, day 5/10  - Started Lovenox for DVT ppx. Aspirin resumed since h/h stable  - Maintain K > 4, supplemented today   - LFT's now downtrending   - Pain control, will order standing tylenol dose with oxy/dilaudid PRN  - ID and Surgery following   - Wean off of O2, incentive spirometry    # Hypernatremia - resolved  - IVF w/ D5W1/2NS  -diet advanced to regular, d/c IVF when tolerating diet    #Acute Kidney Injury - Improved   - Monitor BMP  - Avoid nephrotoxins    #Urinary retention  - Patient with chronic felipe - was placed in November after fall/hip fx, no TOV has been done as per daughter. No hx of urinary retention prior to that event   - Felipe d/c'd last evening, failed trial void last night, straight cathed last night, again no void this am, felipe replaced.  - Cont increased Flomax dosing at 0.8mg qHS    # Paroxysmal atrial fibrillation  - rapid afib this morning, b/p remained stable, EKG and cardio eval ordered, metoprolol given, continue monitoring    #Hx of TIA  - c/w Statin,ASA    #DVT Prophylaxis  - IPCDs for now     GOC: DNR/I, MOLST in chart    Dispo: D/C to [facility] when cleared by surgery and cardio    2/3: Attempted to Updated daughter Shelli Cell [phone] regarding pt's current status and plan of care- No answer, voicemail left.          89year old Male with PMH of recent L hip fx (11/2022), TIA (11/2022) who presents to the ER from [facility] for abdominal pain, nausea for 4 days. Admitted for acute cholecystitis.    #Acute cholecystitis c/b perforated gallbladder  #Transaminitis, Hyperbilirubinemia  - s/p lap kellee with Dr Thompson on 1/30, POD #5, drain with high output, surgery not clearing for discharge yet.    - Maintain RUPINDER-SS  - Diet was advanced this am, but now again NPO due to episode of vomiting.  Daughter says also having trouble swallowing.  Will get speech and swallow when diet resumes.  - Blood cultures growing gram + cocci, urine culture with E Coli and Aerococcus  - Continue Zosyn for now, day 5/10  - Started Lovenox for DVT ppx. Aspirin resumed since h/h stable  - Maintain K > 4, supplemented today   - LFT's now downtrending   - Pain control, will order standing tylenol dose with oxy/dilaudid PRN  - ID and Surgery following, drain with high output, , had episode of vomiting, will get repeat CT of abdomen, surgery aware not clearing for discharge.   - Wean off of O2, incentive spirometry    # Hypernatremia   - resolved      #Acute Kidney Injury - Improved   - Monitor BMP  - Avoid nephrotoxins    #Urinary retention  - Patient with chronic felipe - was placed in November after fall/hip fx, no TOV has been done as per daughter. No hx of urinary retention prior to that event   - Felipe d/c'd last evening, failed trial void last night, straight cathed last night, again no void this am, felipe replaced.  - Cont increased Flomax dosing at 0.8mg qHS  -urology consult (Dr. Pastrana notified)    # Paroxysmal atrial fibrillation  - rapid afib this morning, b/p low, EKG and cardio eval ordered, continue monitoring  -fluid bolus (NS)  -ddimer ordered, if elevated will order CTA  -Cardiology consult (Dr. Ceja)    #Hx of TIA  - c/w Statin,ASA    #DVT Prophylaxis  - Lovenox   -SCD     GOC: DNR/I, MOLST in chart    Dispo: D/C to [facility] when cleared by surgery and cardio    2/4 Updated daughter Shelli Cell [phone] regarding pt's current status and plan of care         89year old Male with PMH of recent L hip fx (11/2022), TIA (11/2022) who presents to the ER from [facility] for abdominal pain, nausea for 4 days. Admitted for acute cholecystitis.    #Acute cholecystitis c/b perforated gallbladder  #Transaminitis, Hyperbilirubinemia  - s/p lap kellee with Dr Thompson on 1/30, POD #5, drain with high output, surgery not clearing for discharge yet.    - Maintain RUPINDER-SS  - Diet was advanced this am, but now again NPO due to episode of vomiting.  Daughter says also having trouble swallowing.  Will get speech and swallow when diet resumes.  - Blood cultures growing gram + cocci, urine culture with E Coli and Aerococcus  - Continue Zosyn for now, day 5/10  - Started Lovenox for DVT ppx. Aspirin resumed since h/h stable  - Maintain K > 4, supplemented today   - LFT's now downtrending   - Pain control, will order standing tylenol dose with oxy/dilaudid PRN  - ID and Surgery following, drain with high output, , had episode of vomiting, will get repeat CT of abdomen, surgery aware not clearing for discharge.   - Wean off of O2, incentive spirometry    # Hypernatremia   - resolved      #Acute Kidney Injury - Improved   - Monitor BMP  - Avoid nephrotoxins    #Urinary retention  - Patient with chronic felipe - was placed in November after fall/hip fx, no TOV has been done as per daughter. No hx of urinary retention prior to that event   - Felipe d/c'd last evening, failed trial void last night, straight cathed last night, again no void this am, felipe replaced.  - Cont increased Flomax dosing at 0.8mg qHS  -urology consult (Dr. Pastrana notified)    # Paroxysmal atrial fibrillation  - rapid afib this morning, b/p low, EKG and cardio eval ordered, continue monitoring  -fluid bolus (NS)  -ddimer ordered, if elevated will order CTA  -Cardiology consult (Dr. Ceja)    #Hx of TIA  - c/w Statin,ASA    #DVT Prophylaxis  - Lovenox   -SCD     GOC: DNR/I, MOLST in chart    Dispo: D/C to [facility] when cleared by surgery and cardio    2/4 Updated daughter Shelli Cell [phone] regarding pt's current status and plan of care         89year old Male with PMH of recent L hip fx (11/2022), TIA (11/2022) who presents to the ER from [facility] for abdominal pain, nausea for 4 days. Admitted for acute cholecystitis.    #Acute cholecystitis c/b perforated gallbladder  #Transaminitis, Hyperbilirubinemia  - s/p lap kellee with Dr Thompson on 1/30, POD #5, drain with high output, surgery not clearing for discharge yet.    - Maintain RUPINDER-SS  - Diet was advanced this am, but now again NPO due to episode of vomiting.  Daughter says also having trouble swallowing.  Will get speech and swallow when diet resumes.  - Blood cultures growing gram + cocci, urine culture with E Coli and Aerococcus  - Continue Zosyn for now, day 5/10  - Started Lovenox for DVT ppx. Aspirin resumed since h/h stable  - Maintain K > 4, supplemented today   - LFT's now downtrending   - Pain control, will order standing tylenol dose with oxy/dilaudid PRN  - ID and Surgery following, drain with high output, , had episode of vomiting, will get repeat CT of abdomen, surgery aware not clearing for discharge.   - Wean off of O2, incentive spirometry    # Hypernatremia   - resolved      #Acute Kidney Injury - Improved   - Monitor BMP  - Avoid nephrotoxins    #Urinary retention  - Patient with chronic felipe - was placed in November after fall/hip fx, no TOV has been done as per daughter. No hx of urinary retention prior to that event   - Felipe d/c'd last evening, failed trial void last night, straight cathed last night, again no void this am, felipe replaced.  - Cont increased Flomax dosing at 0.8mg qHS  -urology consult (Dr. Pastrana notified)    # Paroxysmal atrial fibrillation  - rapid afib this morning, b/p low, EKG and cardio eval ordered, continue monitoring  -fluid bolus (NS)  -ddimer ordered, if elevated will order CTA  -Cardiology consult (Dr. Ceja)    #Hx of TIA  - c/w Statin,ASA    #DVT Prophylaxis  - Lovenox   -SCD     GOC: DNR/I, MOLST in chart    Dispo: D/C to [facility] when cleared by surgery and cardio    2/4 Updated daughter Shelli Cell [phone] regarding pt's current status and plan of care         89year old Male with PMH of recent L hip fx (11/2022), TIA (11/2022) who presents to the ER from [facility] for abdominal pain, nausea for 4 days. Admitted for acute cholecystitis.    #Acute cholecystitis c/b perforated gallbladder  #Transaminitis, Hyperbilirubinemia  #Leukocytosis - worsening  - s/p lap kellee with Dr Thompson on 1/30, POD #5, drain with high output, surgery not clearing for discharge yet.    - Maintain RUPINDER-SS  - Diet was advanced this am, but now again NPO due to episode of vomiting.  Daughter says also having trouble swallowing.  Will get speech and swallow when diet resumes.  - Blood cultures growing gram + cocci, urine culture with E Coli and Aerococcus  - Continue Zosyn for now, day 5/10  - Started Lovenox for DVT ppx. Aspirin resumed since h/h stable  - Maintain K > 4, supplemented today   - LFT's now downtrending   - Pain control, will order standing tylenol dose with oxy/dilaudid PRN  - ID and Surgery following, drain with high output, , had episode of vomiting, will get repeat CT of abdomen, CT A/P,  surgery aware  - Wean off of O2, incentive spirometry    # Hypernatremia   - resolved      #Acute Kidney Injury - Improved   - Monitor BMP  - Avoid nephrotoxins    #Urinary retention  - Patient with chronic felipe - was placed in November after fall/hip fx, no TOV has been done as per daughter. No hx of urinary retention prior to that event   - Felipe d/c'd last evening, failed trial void last night, straight cathed last night, again no void this am, felipe replaced.  - Cont increased Flomax dosing at 0.8mg qHS  -urology consult (Dr. Pastrana notified)    # Paroxysmal atrial fibrillation  - rapid afib this morning, b/p low, EKG and cardio eval ordered, continue monitoring  -fluid bolus (NS)  -ddimer ordered, if elevated will order CTA  -Cardiology consult (Dr. Ceja)    #Hx of TIA  - c/w Statin,ASA    #DVT Prophylaxis  - Lovenox   -SCD     GOC: DNR/I, MOLST in chart    Dispo: D/C to [facility] when cleared by surgery and cardio    2/4 Updated daughter Shelli Cell [phone] regarding pt's current status and plan of care         89year old Male with PMH of recent L hip fx (11/2022), TIA (11/2022) who presents to the ER from [facility] for abdominal pain, nausea for 4 days. Admitted for acute cholecystitis.    #Acute cholecystitis c/b perforated gallbladder  #Transaminitis, Hyperbilirubinemia  #Leukocytosis - worsening  - s/p lap kellee with Dr Thompson on 1/30, POD #5, drain with high output, surgery not clearing for discharge yet.    - Maintain RUPINDER-SS  - Diet was advanced this am, but now again NPO due to episode of vomiting.  Daughter says also having trouble swallowing.  Will get speech and swallow when diet resumes.  - Blood cultures growing gram + cocci, urine culture with E Coli and Aerococcus  - Continue Zosyn for now, day 5/10  - Started Lovenox for DVT ppx. Aspirin resumed since h/h stable  - Maintain K > 4, supplemented today   - LFT's now downtrending   - Pain control, will order standing tylenol dose with oxy/dilaudid PRN  - ID and Surgery following, drain with high output, , had episode of vomiting, will get repeat CT of abdomen, CT A/P,  surgery aware  - Wean off of O2, incentive spirometry    # Hypernatremia   - resolved      #Acute Kidney Injury - Improved   - Monitor BMP  - Avoid nephrotoxins    #Urinary retention  - Patient with chronic felipe - was placed in November after fall/hip fx, no TOV has been done as per daughter. No hx of urinary retention prior to that event   - Felipe d/c'd last evening, failed trial void last night, straight cathed last night, again no void this am, felipe replaced.  - Cont increased Flomax dosing at 0.8mg qHS  -urology consult (Dr. Pastrana notified)    # Paroxysmal atrial fibrillation  - rapid afib this morning, b/p low, EKG and cardio eval ordered, continue monitoring  -fluid bolus (NS)  -ddimer ordered, if elevated will order CTA  -Cardiology consult (Dr. Ceja)    #Hx of TIA  - c/w Statin,ASA    #DVT Prophylaxis  - Lovenox   -SCD     GOC: DNR/I, MOLST in chart    Dispo: D/C to [facility] when cleared by surgery and cardio    2/4 Updated daughter Shelli Cell [phone] regarding pt's current status and plan of care         89year old Male with PMH of recent L hip fx (11/2022), TIA (11/2022) who presents to the ER from [facility] for abdominal pain, nausea for 4 days. Admitted for acute cholecystitis.    #Acute cholecystitis c/b perforated gallbladder  #Transaminitis, Hyperbilirubinemia  #Leukocytosis - worsening  - s/p lap kellee with Dr Thompson on 1/30, POD #5, drain with high output, surgery not clearing for discharge yet.    - Maintain RUPINDER-SS  - Diet was advanced this am, but now again NPO due to episode of vomiting.  Daughter says also having trouble swallowing.  Will get speech and swallow when diet resumes.  - Blood cultures growing gram + cocci, urine culture with E Coli and Aerococcus  - Continue Zosyn for now, day 5/10  - Started Lovenox for DVT ppx. Aspirin resumed since h/h stable  - Maintain K > 4, supplemented today   - LFT's now downtrending   - Pain control, will order standing tylenol dose with oxy/dilaudid PRN  - ID and Surgery following, drain with high output, , had episode of vomiting, will get repeat CT of abdomen, CT A/P,  surgery aware  - Wean off of O2, incentive spirometry    # Hypernatremia   - resolved      #Acute Kidney Injury - Improved   - Monitor BMP  - Avoid nephrotoxins    #Urinary retention  - Patient with chronic felipe - was placed in November after fall/hip fx, no TOV has been done as per daughter. No hx of urinary retention prior to that event   - Felipe d/c'd last evening, failed trial void last night, straight cathed last night, again no void this am, felipe replaced.  - Cont increased Flomax dosing at 0.8mg qHS  -urology consult (Dr. Pastrana notified)    # Paroxysmal atrial fibrillation  - rapid afib this morning, b/p low, EKG and cardio eval ordered, continue monitoring  -fluid bolus (NS)  -ddimer ordered, if elevated will order CTA  -Cardiology consult (Dr. Ceja)    #Hx of TIA  - c/w Statin,ASA    #DVT Prophylaxis  - Lovenox   -SCD     GOC: DNR/I, MOLST in chart    Dispo: D/C to [facility] when cleared by surgery and cardio    2/4 Updated daughter Shelli Cell [phone] regarding pt's current status and plan of care

## 2023-02-04 NOTE — PROGRESS NOTE ADULT - NS ATTEND AMEND GEN_ALL_CORE FT
89y year old Male with PMH of recent L hip fx (11/2022), TIA (11/2022) who presents to the ER from [facility] for abdominal pain, nausea for 4 days. Admitted for acute gangrenous cholecystitis, c/b perforated gallbladder, s/p lap kellee on 1/30, with empyema found. Pt is on zosyn, infectious disease consulting, sensitivities now available. Augmentin at time of d/c to complete a ten day course post operatively.  Pt Needed to be straight cathed overnight for urinary retention. This AM pt had episode of vomiting.  In addition, continues to have diarrhea.  This afternoon. Rapid Response called when pt went into rapid a fib w/ RVR with hypotension, responsive to 1 L NS bolus. Noted to have hypokalemia and acute urinary retention. KCl supplemented and felipe catheter placed. D-dimer elevated, CTA ordered. Cardiology consulted, d/w Dr. Ceja. CT abdomen/pelvis performed as gallbladder drain has had inc output and new leukocytosis.   discussed with Dr. Thompson, pt's surgeon, above plan of care. Will make NPO. IVF while NPO. Follow up above studies. 89y year old Male with PMH of recent L hip fx (11/2022), TIA (11/2022) who presents to the ER from [facility] for abdominal pain, nausea for 4 days. Admitted for acute gangrenous cholecystitis, c/b perforated gallbladder, s/p lap kellee on 1/30, with empyema found. Pt is on zosyn, infectious disease consulting, sensitivities now available. Augmentin at time of d/c to complete a ten day course post operatively.  Pt Needed to be straight cathed overnight for urinary retention. This AM pt had episode of vomiting.  In addition, continues to have diarrhea.  This afternoon. Rapid Response called when pt went into rapid a fib w/ RVR with hypotension, responsive to 1 L NS bolus. Noted to have hypokalemia and acute urinary retention. KCl supplemented and felipe catheter placed. D-dimer elevated, CTA ordered. Cardiology consulted, d/w Dr. Ceja. CT abdomen/pelvis performed as gallbladder drain has had inc output and new leukocytosis.   discussed with Dr. Thompson, pt's surgeon, above plan of care. Will make NPO. IVF while NPO. Follow up above studies. 89y year old Male with PMH of recent L hip fx (11/2022), TIA (11/2022) who presents to the ER from [facility] for abdominal pain, nausea for 4 days. Admitted for acute gangrenous cholecystitis, c/b perforated gallbladder, s/p lap kellee on 1/30, with empyema found. Pt is on zosyn, infectious disease consulting, sensitivities now available. Augmentin at time of d/c to complete a ten day course post operatively.  Pt Needed to be straight cathed overnight for urinary retention. This AM pt had episode of vomiting.  In addition, continues to have diarrhea.  This afternoon. Rapid Response called when pt went into rapid a fib w/ RVR with hypotension, responsive to 1 L NS bolus. Noted to have hypokalemia and acute urinary retention. KCl supplemented and felipe catheter placed. D-dimer elevated, CTA ordered. Cardiology consulted, d/w Dr. Ceja. CT abdomen/pelvis performed as gallbladder drain has had inc output and new leukocytosis.   discussed with Dr. Thompson, pt's surgeon, above plan of care. Will make NPO. IVF while NPO. Follow up above studies.

## 2023-02-04 NOTE — PROGRESS NOTE ADULT - SUBJECTIVE AND OBJECTIVE BOX
Patient is a 89y old  Male who presents with a chief complaint of abdominal pain (03 Feb 2023 11:30)      Patient seen and examined at bedside. No new complaints.  Appears comfortable.  Had diarrhea yestersay, bowel regimen d/c'd, diarrhea resolved.    ALLERGIES:  No Known Allergies    MEDICATIONS  (STANDING):  acetaminophen     Tablet .. 650 milliGRAM(s) Oral every 6 hours  aspirin enteric coated 81 milliGRAM(s) Oral daily  enoxaparin Injectable 30 milliGRAM(s) SubCutaneous every 24 hours  finasteride 5 milliGRAM(s) Oral daily  piperacillin/tazobactam IVPB.. 3.375 Gram(s) IV Intermittent every 8 hours  potassium chloride   Powder 40 milliEquivalent(s) Oral once  tamsulosin 0.8 milliGRAM(s) Oral at bedtime    MEDICATIONS  (PRN):  aluminum hydroxide/magnesium hydroxide/simethicone Suspension 30 milliLiter(s) Oral every 4 hours PRN Dyspepsia  HYDROmorphone  Injectable 0.5 milliGRAM(s) IV Push every 3 hours PRN Severe Pain (7 - 10)  melatonin 3 milliGRAM(s) Oral at bedtime PRN Insomnia  ondansetron Injectable 4 milliGRAM(s) IV Push every 8 hours PRN Nausea and/or Vomiting  oxyCODONE    IR 5 milliGRAM(s) Oral every 6 hours PRN Moderate Pain (4 - 6)  zinc oxide 40% Paste 1 Application(s) Topical three times a day PRN rash    Vital Signs Last 24 Hrs  T(F): 97.3 (04 Feb 2023 05:55), Max: 98.9 (03 Feb 2023 11:54)  HR: 89 (04 Feb 2023 05:55) (83 - 89)  BP: 123/50 (04 Feb 2023 05:55) (117/67 - 123/50)  RR: 17 (04 Feb 2023 05:55) (17 - 18)  SpO2: 95% (04 Feb 2023 05:55) (95% - 96%)  I&O's Summary    03 Feb 2023 07:01  -  04 Feb 2023 07:00  --------------------------------------------------------  IN: 0 mL / OUT: 935 mL / NET: -935 mL    04 Feb 2023 07:01  -  04 Feb 2023 10:48  --------------------------------------------------------  IN: 120 mL / OUT: 0 mL / NET: 120 mL      General: NAD, Alert, orientation x1 to self  Lungs: Clear to auscultation bilaterally   Cardio: S1/S2, RRR  Abdomen: Soft, tender to palpation, hypoactive Bowel Sounds, incision/lap site w. dermabond c/d/i, R-sided RUPINDER w. serosang output.  Extremities: No cyanosis, No edema    LABS:                        11.8   14.05 )-----------( 210      ( 04 Feb 2023 08:00 )             36.7     02-04    141  |  106  |  14  ----------------------------<  97  3.4   |  25  |  0.73    Ca    8.0      04 Feb 2023 07:40  Phos  2.5     02-02  Mg     1.9     02-03    TPro  5.8  /  Alb  1.9  /  TBili  0.6  /  DBili  0.1  /  AST  31  /  ALT  35  /  AlkPhos  174  02-04        11-23 Chol 160 mg/dL LDL -- HDL 44 mg/dL Trig 95 mg/dL        Culture - Urine (collected 30 Jan 2023 12:38)  Source: Catheterized Catheterized  Final Report (01 Feb 2023 19:29):    >100,000 CFU/ml Escherichia coli    >100,000 CFU/ml Aerococcus species    "Aerococcus spp. are predictably susceptible to penicillin,    ampicillin, tetracycline, and vancomycin.  All isolates are    resistant to sulfonamides"  Organism: Escherichia coli (01 Feb 2023 19:29)  Organism: Escherichia coli (01 Feb 2023 19:29)      -  Amikacin: S <=16      -  Amoxicillin/Clavulanic Acid: S <=8/4      -  Ampicillin: S <=8 These ampicillin results predict results for amoxicillin      -  Ampicillin/Sulbactam: S <=4/2 Enterobacter, Klebsiella aerogenes, Citrobacter, and Serratia may develop resistance during prolonged therapy (3-4 days)      -  Aztreonam: S <=4      -  Cefazolin: S <=2 For uncomplicated UTI with K. pneumoniae, E. coli, or P. mirablis: PHOEBE <=16 is sensitive and PHOEBE >=32 is resistant. This also predicts results for oral agents cefaclor, cefdinir, cefpodoxime, cefprozil, cefuroxime axetil, cephalexin and locarbef for uncomplicated UTI. Note that some isolates may be susceptible to these agents while testing resistant to cefazolin.      -  Cefepime: S 8      -  Cefoxitin: S <=8      -  Ceftriaxone: S <=1 Enterobacter, Klebsiella aerogenes, Citrobacter, and Serratia may develop resistance during prolonged therapy      -  Cefuroxime: S <=4      -  Ciprofloxacin: S <=0.25      -  Ertapenem: S <=0.5      -  Gentamicin: S <=2      -  Imipenem: S <=1      -  Levofloxacin: S <=0.5      -  Meropenem: S <=1      -  Nitrofurantoin: S <=32 Should not be used to treat pyelonephritis      -  Piperacillin/Tazobactam: S <=8      -  Tobramycin: S <=2      -  Trimethoprim/Sulfamethoxazole: S <=0.5/9.5      Method Type: PHOEBE    Culture - Blood (collected 30 Jan 2023 12:27)  Source: .Blood Blood-Peripheral  Preliminary Report (31 Jan 2023 15:01):    No growth to date.    Culture - Blood (collected 30 Jan 2023 12:10)  Source: .Blood Blood-Peripheral  Gram Stain (31 Jan 2023 15:34):    Growth in anaerobic bottle: Gram positive cocci in pairs  Final Report (01 Feb 2023 13:38):    Growth in anaerobic bottle: Aerococcus urinae    "Susceptibilities not performed"    ***Blood Panel PCR results on this specimen are available    approximately 3 hours after the Gram stain result.***    Gram stain, PCR, and/or culture results may not always    correspond due to difference in methodologies.    ************************************************************    This PCR assay was performed by multiplex PCR. This    Assay tests for 66 bacterial and resistance gene targets.    Please refer to the [facility] Labs test directory    at [email or web address] for details.  Organism: Blood Culture PCR (01 Feb 2023 13:38)  Organism: Blood Culture PCR (01 Feb 2023 13:38)      -  Blood PCR Panel: NEG      Method Type: PCR      COVID-19 PCR: NotDetec (01-30-23 @ 12:25)      RADIOLOGY & ADDITIONAL TESTS:    Care Discussed with Consultants/Other Providers:    Patient is a 89y old  Male who presents with a chief complaint of abdominal pain (03 Feb 2023 11:30)      Patient seen and examined at bedside. No new complaints.  Appears comfortable.  Had diarrhea yestersay, bowel regimen d/c'd, diarrhea resolved.    ALLERGIES:  No Known Allergies    MEDICATIONS  (STANDING):  acetaminophen     Tablet .. 650 milliGRAM(s) Oral every 6 hours  aspirin enteric coated 81 milliGRAM(s) Oral daily  enoxaparin Injectable 30 milliGRAM(s) SubCutaneous every 24 hours  finasteride 5 milliGRAM(s) Oral daily  piperacillin/tazobactam IVPB.. 3.375 Gram(s) IV Intermittent every 8 hours  potassium chloride   Powder 40 milliEquivalent(s) Oral once  tamsulosin 0.8 milliGRAM(s) Oral at bedtime    MEDICATIONS  (PRN):  aluminum hydroxide/magnesium hydroxide/simethicone Suspension 30 milliLiter(s) Oral every 4 hours PRN Dyspepsia  HYDROmorphone  Injectable 0.5 milliGRAM(s) IV Push every 3 hours PRN Severe Pain (7 - 10)  melatonin 3 milliGRAM(s) Oral at bedtime PRN Insomnia  ondansetron Injectable 4 milliGRAM(s) IV Push every 8 hours PRN Nausea and/or Vomiting  oxyCODONE    IR 5 milliGRAM(s) Oral every 6 hours PRN Moderate Pain (4 - 6)  zinc oxide 40% Paste 1 Application(s) Topical three times a day PRN rash    Vital Signs Last 24 Hrs  T(F): 97.3 (04 Feb 2023 05:55), Max: 98.9 (03 Feb 2023 11:54)  HR: 89 (04 Feb 2023 05:55) (83 - 89)  BP: 123/50 (04 Feb 2023 05:55) (117/67 - 123/50)  RR: 17 (04 Feb 2023 05:55) (17 - 18)  SpO2: 95% (04 Feb 2023 05:55) (95% - 96%)  I&O's Summary    03 Feb 2023 07:01  -  04 Feb 2023 07:00  --------------------------------------------------------  IN: 0 mL / OUT: 935 mL / NET: -935 mL    04 Feb 2023 07:01  -  04 Feb 2023 10:48  --------------------------------------------------------  IN: 120 mL / OUT: 0 mL / NET: 120 mL      General: NAD, Alert, orientation x1 to self  Lungs: Clear to auscultation bilaterally   Cardio: S1/S2, RRR  Abdomen: Soft, tender to palpation, hypoactive Bowel Sounds, incision/lap site w. dermabond c/d/i, R-sided RUPINDER w. serosang output.  Extremities: No cyanosis, No edema    LABS:                        11.8   14.05 )-----------( 210      ( 04 Feb 2023 08:00 )             36.7     02-04    141  |  106  |  14  ----------------------------<  97  3.4   |  25  |  0.73    Ca    8.0      04 Feb 2023 07:40  Phos  2.5     02-02  Mg     1.9     02-03    TPro  5.8  /  Alb  1.9  /  TBili  0.6  /  DBili  0.1  /  AST  31  /  ALT  35  /  AlkPhos  174  02-04        11-23 Chol 160 mg/dL LDL -- HDL 44 mg/dL Trig 95 mg/dL        Culture - Urine (collected 30 Jan 2023 12:38)  Source: Catheterized Catheterized  Final Report (01 Feb 2023 19:29):    >100,000 CFU/ml Escherichia coli    >100,000 CFU/ml Aerococcus species    "Aerococcus spp. are predictably susceptible to penicillin,    ampicillin, tetracycline, and vancomycin.  All isolates are    resistant to sulfonamides"  Organism: Escherichia coli (01 Feb 2023 19:29)  Organism: Escherichia coli (01 Feb 2023 19:29)      -  Amikacin: S <=16      -  Amoxicillin/Clavulanic Acid: S <=8/4      -  Ampicillin: S <=8 These ampicillin results predict results for amoxicillin      -  Ampicillin/Sulbactam: S <=4/2 Enterobacter, Klebsiella aerogenes, Citrobacter, and Serratia may develop resistance during prolonged therapy (3-4 days)      -  Aztreonam: S <=4      -  Cefazolin: S <=2 For uncomplicated UTI with K. pneumoniae, E. coli, or P. mirablis: PHOEBE <=16 is sensitive and PHOEBE >=32 is resistant. This also predicts results for oral agents cefaclor, cefdinir, cefpodoxime, cefprozil, cefuroxime axetil, cephalexin and locarbef for uncomplicated UTI. Note that some isolates may be susceptible to these agents while testing resistant to cefazolin.      -  Cefepime: S 8      -  Cefoxitin: S <=8      -  Ceftriaxone: S <=1 Enterobacter, Klebsiella aerogenes, Citrobacter, and Serratia may develop resistance during prolonged therapy      -  Cefuroxime: S <=4      -  Ciprofloxacin: S <=0.25      -  Ertapenem: S <=0.5      -  Gentamicin: S <=2      -  Imipenem: S <=1      -  Levofloxacin: S <=0.5      -  Meropenem: S <=1      -  Nitrofurantoin: S <=32 Should not be used to treat pyelonephritis      -  Piperacillin/Tazobactam: S <=8      -  Tobramycin: S <=2      -  Trimethoprim/Sulfamethoxazole: S <=0.5/9.5      Method Type: PHOEBE    Culture - Blood (collected 30 Jan 2023 12:27)  Source: .Blood Blood-Peripheral  Preliminary Report (31 Jan 2023 15:01):    No growth to date.    Culture - Blood (collected 30 Jan 2023 12:10)  Source: .Blood Blood-Peripheral  Gram Stain (31 Jan 2023 15:34):    Growth in anaerobic bottle: Gram positive cocci in pairs  Final Report (01 Feb 2023 13:38):    Growth in anaerobic bottle: Aerococcus urinae    "Susceptibilities not performed"    ***Blood Panel PCR results on this specimen are available    approximately 3 hours after the Gram stain result.***    Gram stain, PCR, and/or culture results may not always    correspond due to difference in methodologies.    ************************************************************    This PCR assay was performed by multiplex PCR. This    Assay tests for 66 bacterial and resistance gene targets.    Please refer to the [facility] test directory    at [email or web address] for details.  Organism: Blood Culture PCR (01 Feb 2023 13:38)  Organism: Blood Culture PCR (01 Feb 2023 13:38)      -  Blood PCR Panel: NEG      Method Type: PCR      COVID-19 PCR: NotDetec (01-30-23 @ 12:25)      RADIOLOGY & ADDITIONAL TESTS:    Care Discussed with Consultants/Other Providers:    Patient is a 89y old  Male who presents with a chief complaint of abdominal pain (03 Feb 2023 11:30)      Patient seen and examined at bedside. No new complaints.  Appears comfortable.  Had diarrhea yestersay, bowel regimen d/c'd, diarrhea resolved.    ALLERGIES:  No Known Allergies    MEDICATIONS  (STANDING):  acetaminophen     Tablet .. 650 milliGRAM(s) Oral every 6 hours  aspirin enteric coated 81 milliGRAM(s) Oral daily  enoxaparin Injectable 30 milliGRAM(s) SubCutaneous every 24 hours  finasteride 5 milliGRAM(s) Oral daily  piperacillin/tazobactam IVPB.. 3.375 Gram(s) IV Intermittent every 8 hours  potassium chloride   Powder 40 milliEquivalent(s) Oral once  tamsulosin 0.8 milliGRAM(s) Oral at bedtime    MEDICATIONS  (PRN):  aluminum hydroxide/magnesium hydroxide/simethicone Suspension 30 milliLiter(s) Oral every 4 hours PRN Dyspepsia  HYDROmorphone  Injectable 0.5 milliGRAM(s) IV Push every 3 hours PRN Severe Pain (7 - 10)  melatonin 3 milliGRAM(s) Oral at bedtime PRN Insomnia  ondansetron Injectable 4 milliGRAM(s) IV Push every 8 hours PRN Nausea and/or Vomiting  oxyCODONE    IR 5 milliGRAM(s) Oral every 6 hours PRN Moderate Pain (4 - 6)  zinc oxide 40% Paste 1 Application(s) Topical three times a day PRN rash    Vital Signs Last 24 Hrs  T(F): 97.3 (04 Feb 2023 05:55), Max: 98.9 (03 Feb 2023 11:54)  HR: 89 (04 Feb 2023 05:55) (83 - 89)  BP: 123/50 (04 Feb 2023 05:55) (117/67 - 123/50)  RR: 17 (04 Feb 2023 05:55) (17 - 18)  SpO2: 95% (04 Feb 2023 05:55) (95% - 96%)  I&O's Summary    03 Feb 2023 07:01  -  04 Feb 2023 07:00  --------------------------------------------------------  IN: 0 mL / OUT: 935 mL / NET: -935 mL    04 Feb 2023 07:01  -  04 Feb 2023 10:48  --------------------------------------------------------  IN: 120 mL / OUT: 0 mL / NET: 120 mL      General: NAD, Alert, orientation x1 to self  Lungs: Clear to auscultation bilaterally   Cardio: S1/S2, RRR  Abdomen: Soft, tender to palpation, hypoactive Bowel Sounds, incision/lap site w. dermabond c/d/i, R-sided RUPINDER w. serosang output.  Extremities: No cyanosis, No edema    LABS:                        11.8   14.05 )-----------( 210      ( 04 Feb 2023 08:00 )             36.7     02-04    141  |  106  |  14  ----------------------------<  97  3.4   |  25  |  0.73    Ca    8.0      04 Feb 2023 07:40  Phos  2.5     02-02  Mg     1.9     02-03    TPro  5.8  /  Alb  1.9  /  TBili  0.6  /  DBili  0.1  /  AST  31  /  ALT  35  /  AlkPhos  174  02-04        11-23 Chol 160 mg/dL LDL -- HDL 44 mg/dL Trig 95 mg/dL        Culture - Urine (collected 30 Jan 2023 12:38)  Source: Catheterized Catheterized  Final Report (01 Feb 2023 19:29):    >100,000 CFU/ml Escherichia coli    >100,000 CFU/ml Aerococcus species    "Aerococcus spp. are predictably susceptible to penicillin,    ampicillin, tetracycline, and vancomycin.  All isolates are    resistant to sulfonamides"  Organism: Escherichia coli (01 Feb 2023 19:29)  Organism: Escherichia coli (01 Feb 2023 19:29)      -  Amikacin: S <=16      -  Amoxicillin/Clavulanic Acid: S <=8/4      -  Ampicillin: S <=8 These ampicillin results predict results for amoxicillin      -  Ampicillin/Sulbactam: S <=4/2 Enterobacter, Klebsiella aerogenes, Citrobacter, and Serratia may develop resistance during prolonged therapy (3-4 days)      -  Aztreonam: S <=4      -  Cefazolin: S <=2 For uncomplicated UTI with K. pneumoniae, E. coli, or P. mirablis: PHOEBE <=16 is sensitive and PHOEBE >=32 is resistant. This also predicts results for oral agents cefaclor, cefdinir, cefpodoxime, cefprozil, cefuroxime axetil, cephalexin and locarbef for uncomplicated UTI. Note that some isolates may be susceptible to these agents while testing resistant to cefazolin.      -  Cefepime: S 8      -  Cefoxitin: S <=8      -  Ceftriaxone: S <=1 Enterobacter, Klebsiella aerogenes, Citrobacter, and Serratia may develop resistance during prolonged therapy      -  Cefuroxime: S <=4      -  Ciprofloxacin: S <=0.25      -  Ertapenem: S <=0.5      -  Gentamicin: S <=2      -  Imipenem: S <=1      -  Levofloxacin: S <=0.5      -  Meropenem: S <=1      -  Nitrofurantoin: S <=32 Should not be used to treat pyelonephritis      -  Piperacillin/Tazobactam: S <=8      -  Tobramycin: S <=2      -  Trimethoprim/Sulfamethoxazole: S <=0.5/9.5      Method Type: PHOEBE    Culture - Blood (collected 30 Jan 2023 12:27)  Source: .Blood Blood-Peripheral  Preliminary Report (31 Jan 2023 15:01):    No growth to date.    Culture - Blood (collected 30 Jan 2023 12:10)  Source: .Blood Blood-Peripheral  Gram Stain (31 Jan 2023 15:34):    Growth in anaerobic bottle: Gram positive cocci in pairs  Final Report (01 Feb 2023 13:38):    Growth in anaerobic bottle: Aerococcus urinae    "Susceptibilities not performed"    ***Blood Panel PCR results on this specimen are available    approximately 3 hours after the Gram stain result.***    Gram stain, PCR, and/or culture results may not always    correspond due to difference in methodologies.    ************************************************************    This PCR assay was performed by multiplex PCR. This    Assay tests for 66 bacterial and resistance gene targets.    Please refer to the [facility] Labs test directory    at [email or web address] for details.  Organism: Blood Culture PCR (01 Feb 2023 13:38)  Organism: Blood Culture PCR (01 Feb 2023 13:38)      -  Blood PCR Panel: NEG      Method Type: PCR      COVID-19 PCR: NotDetec (01-30-23 @ 12:25)      RADIOLOGY & ADDITIONAL TESTS:    Care Discussed with Consultants/Other Providers:    Patient is a 89y old  Male who presents with a chief complaint of abdominal pain (03 Feb 2023 11:30)    Patient seen and examined at bedside this am. No new complaints.  Appeared comfortable and was in NAD. Had diarrhea yesterday bowel regimen d/c'd, diarrhea resolved.  Called by nurse later in morning, patient in rapid afib (140) with an episode of vomiting.  Also, didn't     ALLERGIES:  No Known Allergies    MEDICATIONS  (STANDING):  acetaminophen     Tablet .. 650 milliGRAM(s) Oral every 6 hours  aspirin enteric coated 81 milliGRAM(s) Oral daily  enoxaparin Injectable 30 milliGRAM(s) SubCutaneous every 24 hours  finasteride 5 milliGRAM(s) Oral daily  piperacillin/tazobactam IVPB.. 3.375 Gram(s) IV Intermittent every 8 hours  potassium chloride   Powder 40 milliEquivalent(s) Oral once  tamsulosin 0.8 milliGRAM(s) Oral at bedtime    MEDICATIONS  (PRN):  aluminum hydroxide/magnesium hydroxide/simethicone Suspension 30 milliLiter(s) Oral every 4 hours PRN Dyspepsia  HYDROmorphone  Injectable 0.5 milliGRAM(s) IV Push every 3 hours PRN Severe Pain (7 - 10)  melatonin 3 milliGRAM(s) Oral at bedtime PRN Insomnia  ondansetron Injectable 4 milliGRAM(s) IV Push every 8 hours PRN Nausea and/or Vomiting  oxyCODONE    IR 5 milliGRAM(s) Oral every 6 hours PRN Moderate Pain (4 - 6)  zinc oxide 40% Paste 1 Application(s) Topical three times a day PRN rash    Vital Signs Last 24 Hrs  T(F): 97.3 (04 Feb 2023 05:55), Max: 98.9 (03 Feb 2023 11:54)  HR: 89 (04 Feb 2023 05:55) (83 - 89)  BP: 123/50 (04 Feb 2023 05:55) (117/67 - 123/50)  RR: 17 (04 Feb 2023 05:55) (17 - 18)  SpO2: 95% (04 Feb 2023 05:55) (95% - 96%)  I&O's Summary    03 Feb 2023 07:01  -  04 Feb 2023 07:00  --------------------------------------------------------  IN: 0 mL / OUT: 935 mL / NET: -935 mL    04 Feb 2023 07:01  -  04 Feb 2023 10:48  --------------------------------------------------------  IN: 120 mL / OUT: 0 mL / NET: 120 mL      General: NAD, Alert, orientation x1 to self  Lungs: Clear to auscultation bilaterally   Cardio: S1/S2, RRR  Abdomen: Soft, tender to palpation, hypoactive Bowel Sounds, incision/lap site w. dermabond c/d/i, R-sided RUPINDER w. serosang output.  Extremities: No cyanosis, No edema    LABS:                        11.8   14.05 )-----------( 210      ( 04 Feb 2023 08:00 )             36.7     02-04    141  |  106  |  14  ----------------------------<  97  3.4   |  25  |  0.73    Ca    8.0      04 Feb 2023 07:40  Phos  2.5     02-02  Mg     1.9     02-03    TPro  5.8  /  Alb  1.9  /  TBili  0.6  /  DBili  0.1  /  AST  31  /  ALT  35  /  AlkPhos  174  02-04        11-23 Chol 160 mg/dL LDL -- HDL 44 mg/dL Trig 95 mg/dL        Culture - Urine (collected 30 Jan 2023 12:38)  Source: Catheterized Catheterized  Final Report (01 Feb 2023 19:29):    >100,000 CFU/ml Escherichia coli    >100,000 CFU/ml Aerococcus species    "Aerococcus spp. are predictably susceptible to penicillin,    ampicillin, tetracycline, and vancomycin.  All isolates are    resistant to sulfonamides"  Organism: Escherichia coli (01 Feb 2023 19:29)  Organism: Escherichia coli (01 Feb 2023 19:29)      -  Amikacin: S <=16      -  Amoxicillin/Clavulanic Acid: S <=8/4      -  Ampicillin: S <=8 These ampicillin results predict results for amoxicillin      -  Ampicillin/Sulbactam: S <=4/2 Enterobacter, Klebsiella aerogenes, Citrobacter, and Serratia may develop resistance during prolonged therapy (3-4 days)      -  Aztreonam: S <=4      -  Cefazolin: S <=2 For uncomplicated UTI with K. pneumoniae, E. coli, or P. mirablis: PHOEBE <=16 is sensitive and PHOEBE >=32 is resistant. This also predicts results for oral agents cefaclor, cefdinir, cefpodoxime, cefprozil, cefuroxime axetil, cephalexin and locarbef for uncomplicated UTI. Note that some isolates may be susceptible to these agents while testing resistant to cefazolin.      -  Cefepime: S 8      -  Cefoxitin: S <=8      -  Ceftriaxone: S <=1 Enterobacter, Klebsiella aerogenes, Citrobacter, and Serratia may develop resistance during prolonged therapy      -  Cefuroxime: S <=4      -  Ciprofloxacin: S <=0.25      -  Ertapenem: S <=0.5      -  Gentamicin: S <=2      -  Imipenem: S <=1      -  Levofloxacin: S <=0.5      -  Meropenem: S <=1      -  Nitrofurantoin: S <=32 Should not be used to treat pyelonephritis      -  Piperacillin/Tazobactam: S <=8      -  Tobramycin: S <=2      -  Trimethoprim/Sulfamethoxazole: S <=0.5/9.5      Method Type: PHOEBE    Culture - Blood (collected 30 Jan 2023 12:27)  Source: .Blood Blood-Peripheral  Preliminary Report (31 Jan 2023 15:01):    No growth to date.    Culture - Blood (collected 30 Jan 2023 12:10)  Source: .Blood Blood-Peripheral  Gram Stain (31 Jan 2023 15:34):    Growth in anaerobic bottle: Gram positive cocci in pairs  Final Report (01 Feb 2023 13:38):    Growth in anaerobic bottle: Aerococcus urinae    "Susceptibilities not performed"    ***Blood Panel PCR results on this specimen are available    approximately 3 hours after the Gram stain result.***    Gram stain, PCR, and/or culture results may not always    correspond due to difference in methodologies.    ************************************************************    This PCR assay was performed by multiplex PCR. This    Assay tests for 66 bacterial and resistance gene targets.    Please refer to the [facility] Labs test directory    at [email or web address] for details.  Organism: Blood Culture PCR (01 Feb 2023 13:38)  Organism: Blood Culture PCR (01 Feb 2023 13:38)      -  Blood PCR Panel: NEG      Method Type: PCR      COVID-19 PCR: NotDetec (01-30-23 @ 12:25)      RADIOLOGY & ADDITIONAL TESTS:    Care Discussed with Consultants/Other Providers:    Patient is a 89y old  Male who presents with a chief complaint of abdominal pain (03 Feb 2023 11:30)    Patient seen and examined at bedside this am. No new complaints.  Appeared comfortable and was in NAD. Had diarrhea yesterday bowel regimen d/c'd, diarrhea resolved.  Called by nurse later in morning, patient in rapid afib (140) with an episode of vomiting.  Also, didn't     ALLERGIES:  No Known Allergies    MEDICATIONS  (STANDING):  acetaminophen     Tablet .. 650 milliGRAM(s) Oral every 6 hours  aspirin enteric coated 81 milliGRAM(s) Oral daily  enoxaparin Injectable 30 milliGRAM(s) SubCutaneous every 24 hours  finasteride 5 milliGRAM(s) Oral daily  piperacillin/tazobactam IVPB.. 3.375 Gram(s) IV Intermittent every 8 hours  potassium chloride   Powder 40 milliEquivalent(s) Oral once  tamsulosin 0.8 milliGRAM(s) Oral at bedtime    MEDICATIONS  (PRN):  aluminum hydroxide/magnesium hydroxide/simethicone Suspension 30 milliLiter(s) Oral every 4 hours PRN Dyspepsia  HYDROmorphone  Injectable 0.5 milliGRAM(s) IV Push every 3 hours PRN Severe Pain (7 - 10)  melatonin 3 milliGRAM(s) Oral at bedtime PRN Insomnia  ondansetron Injectable 4 milliGRAM(s) IV Push every 8 hours PRN Nausea and/or Vomiting  oxyCODONE    IR 5 milliGRAM(s) Oral every 6 hours PRN Moderate Pain (4 - 6)  zinc oxide 40% Paste 1 Application(s) Topical three times a day PRN rash    Vital Signs Last 24 Hrs  T(F): 97.3 (04 Feb 2023 05:55), Max: 98.9 (03 Feb 2023 11:54)  HR: 89 (04 Feb 2023 05:55) (83 - 89)  BP: 123/50 (04 Feb 2023 05:55) (117/67 - 123/50)  RR: 17 (04 Feb 2023 05:55) (17 - 18)  SpO2: 95% (04 Feb 2023 05:55) (95% - 96%)  I&O's Summary    03 Feb 2023 07:01  -  04 Feb 2023 07:00  --------------------------------------------------------  IN: 0 mL / OUT: 935 mL / NET: -935 mL    04 Feb 2023 07:01  -  04 Feb 2023 10:48  --------------------------------------------------------  IN: 120 mL / OUT: 0 mL / NET: 120 mL      General: NAD, Alert, orientation x1 to self  Lungs: Clear to auscultation bilaterally   Cardio: S1/S2, RRR  Abdomen: Soft, tender to palpation, hypoactive Bowel Sounds, incision/lap site w. dermabond c/d/i, R-sided RUPINDER w. serosang output.  Extremities: No cyanosis, No edema    LABS:                        11.8   14.05 )-----------( 210      ( 04 Feb 2023 08:00 )             36.7     02-04    141  |  106  |  14  ----------------------------<  97  3.4   |  25  |  0.73    Ca    8.0      04 Feb 2023 07:40  Phos  2.5     02-02  Mg     1.9     02-03    TPro  5.8  /  Alb  1.9  /  TBili  0.6  /  DBili  0.1  /  AST  31  /  ALT  35  /  AlkPhos  174  02-04        11-23 Chol 160 mg/dL LDL -- HDL 44 mg/dL Trig 95 mg/dL        Culture - Urine (collected 30 Jan 2023 12:38)  Source: Catheterized Catheterized  Final Report (01 Feb 2023 19:29):    >100,000 CFU/ml Escherichia coli    >100,000 CFU/ml Aerococcus species    "Aerococcus spp. are predictably susceptible to penicillin,    ampicillin, tetracycline, and vancomycin.  All isolates are    resistant to sulfonamides"  Organism: Escherichia coli (01 Feb 2023 19:29)  Organism: Escherichia coli (01 Feb 2023 19:29)      -  Amikacin: S <=16      -  Amoxicillin/Clavulanic Acid: S <=8/4      -  Ampicillin: S <=8 These ampicillin results predict results for amoxicillin      -  Ampicillin/Sulbactam: S <=4/2 Enterobacter, Klebsiella aerogenes, Citrobacter, and Serratia may develop resistance during prolonged therapy (3-4 days)      -  Aztreonam: S <=4      -  Cefazolin: S <=2 For uncomplicated UTI with K. pneumoniae, E. coli, or P. mirablis: PHOEBE <=16 is sensitive and PHOEBE >=32 is resistant. This also predicts results for oral agents cefaclor, cefdinir, cefpodoxime, cefprozil, cefuroxime axetil, cephalexin and locarbef for uncomplicated UTI. Note that some isolates may be susceptible to these agents while testing resistant to cefazolin.      -  Cefepime: S 8      -  Cefoxitin: S <=8      -  Ceftriaxone: S <=1 Enterobacter, Klebsiella aerogenes, Citrobacter, and Serratia may develop resistance during prolonged therapy      -  Cefuroxime: S <=4      -  Ciprofloxacin: S <=0.25      -  Ertapenem: S <=0.5      -  Gentamicin: S <=2      -  Imipenem: S <=1      -  Levofloxacin: S <=0.5      -  Meropenem: S <=1      -  Nitrofurantoin: S <=32 Should not be used to treat pyelonephritis      -  Piperacillin/Tazobactam: S <=8      -  Tobramycin: S <=2      -  Trimethoprim/Sulfamethoxazole: S <=0.5/9.5      Method Type: PHOEBE    Culture - Blood (collected 30 Jan 2023 12:27)  Source: .Blood Blood-Peripheral  Preliminary Report (31 Jan 2023 15:01):    No growth to date.    Culture - Blood (collected 30 Jan 2023 12:10)  Source: .Blood Blood-Peripheral  Gram Stain (31 Jan 2023 15:34):    Growth in anaerobic bottle: Gram positive cocci in pairs  Final Report (01 Feb 2023 13:38):    Growth in anaerobic bottle: Aerococcus urinae    "Susceptibilities not performed"    ***Blood Panel PCR results on this specimen are available    approximately 3 hours after the Gram stain result.***    Gram stain, PCR, and/or culture results may not always    correspond due to difference in methodologies.    ************************************************************    This PCR assay was performed by multiplex PCR. This    Assay tests for 66 bacterial and resistance gene targets.    Please refer to the [facility] Labs test directory    at [email or web address] for details.  Organism: Blood Culture PCR (01 Feb 2023 13:38)  Organism: Blood Culture PCR (01 Feb 2023 13:38)      -  Blood PCR Panel: NEG      Method Type: PCR      COVID-19 PCR: NotDetec (01-30-23 @ 12:25)      RADIOLOGY & ADDITIONAL TESTS:    Care Discussed with Consultants/Other Providers:    Patient is a 89y old  Male who presents with a chief complaint of abdominal pain (03 Feb 2023 11:30)    Patient seen and examined at bedside this am. No new complaints.  Appeared comfortable and was in NAD. Had diarrhea yesterday bowel regimen d/c'd, diarrhea resolved.  Called by nurse later in morning, patient in rapid afib (140) with an episode of vomiting.  Also, didn't     ALLERGIES:  No Known Allergies    MEDICATIONS  (STANDING):  acetaminophen     Tablet .. 650 milliGRAM(s) Oral every 6 hours  aspirin enteric coated 81 milliGRAM(s) Oral daily  enoxaparin Injectable 30 milliGRAM(s) SubCutaneous every 24 hours  finasteride 5 milliGRAM(s) Oral daily  piperacillin/tazobactam IVPB.. 3.375 Gram(s) IV Intermittent every 8 hours  potassium chloride   Powder 40 milliEquivalent(s) Oral once  tamsulosin 0.8 milliGRAM(s) Oral at bedtime    MEDICATIONS  (PRN):  aluminum hydroxide/magnesium hydroxide/simethicone Suspension 30 milliLiter(s) Oral every 4 hours PRN Dyspepsia  HYDROmorphone  Injectable 0.5 milliGRAM(s) IV Push every 3 hours PRN Severe Pain (7 - 10)  melatonin 3 milliGRAM(s) Oral at bedtime PRN Insomnia  ondansetron Injectable 4 milliGRAM(s) IV Push every 8 hours PRN Nausea and/or Vomiting  oxyCODONE    IR 5 milliGRAM(s) Oral every 6 hours PRN Moderate Pain (4 - 6)  zinc oxide 40% Paste 1 Application(s) Topical three times a day PRN rash    Vital Signs Last 24 Hrs  T(F): 97.3 (04 Feb 2023 05:55), Max: 98.9 (03 Feb 2023 11:54)  HR: 89 (04 Feb 2023 05:55) (83 - 89)  BP: 123/50 (04 Feb 2023 05:55) (117/67 - 123/50)  RR: 17 (04 Feb 2023 05:55) (17 - 18)  SpO2: 95% (04 Feb 2023 05:55) (95% - 96%)  I&O's Summary    03 Feb 2023 07:01  -  04 Feb 2023 07:00  --------------------------------------------------------  IN: 0 mL / OUT: 935 mL / NET: -935 mL    04 Feb 2023 07:01  -  04 Feb 2023 10:48  --------------------------------------------------------  IN: 120 mL / OUT: 0 mL / NET: 120 mL      General: NAD, Alert, orientation x1 to self  Lungs: Clear to auscultation bilaterally   Cardio: S1/S2, RRR  Abdomen: Soft, tender to palpation, hypoactive Bowel Sounds, incision/lap site w. dermabond c/d/i, R-sided RUPINDER w. serosang output.  Extremities: No cyanosis, No edema    LABS:                        11.8   14.05 )-----------( 210      ( 04 Feb 2023 08:00 )             36.7     02-04    141  |  106  |  14  ----------------------------<  97  3.4   |  25  |  0.73    Ca    8.0      04 Feb 2023 07:40  Phos  2.5     02-02  Mg     1.9     02-03    TPro  5.8  /  Alb  1.9  /  TBili  0.6  /  DBili  0.1  /  AST  31  /  ALT  35  /  AlkPhos  174  02-04        11-23 Chol 160 mg/dL LDL -- HDL 44 mg/dL Trig 95 mg/dL        Culture - Urine (collected 30 Jan 2023 12:38)  Source: Catheterized Catheterized  Final Report (01 Feb 2023 19:29):    >100,000 CFU/ml Escherichia coli    >100,000 CFU/ml Aerococcus species    "Aerococcus spp. are predictably susceptible to penicillin,    ampicillin, tetracycline, and vancomycin.  All isolates are    resistant to sulfonamides"  Organism: Escherichia coli (01 Feb 2023 19:29)  Organism: Escherichia coli (01 Feb 2023 19:29)      -  Amikacin: S <=16      -  Amoxicillin/Clavulanic Acid: S <=8/4      -  Ampicillin: S <=8 These ampicillin results predict results for amoxicillin      -  Ampicillin/Sulbactam: S <=4/2 Enterobacter, Klebsiella aerogenes, Citrobacter, and Serratia may develop resistance during prolonged therapy (3-4 days)      -  Aztreonam: S <=4      -  Cefazolin: S <=2 For uncomplicated UTI with K. pneumoniae, E. coli, or P. mirablis: PHOEBE <=16 is sensitive and PHOEBE >=32 is resistant. This also predicts results for oral agents cefaclor, cefdinir, cefpodoxime, cefprozil, cefuroxime axetil, cephalexin and locarbef for uncomplicated UTI. Note that some isolates may be susceptible to these agents while testing resistant to cefazolin.      -  Cefepime: S 8      -  Cefoxitin: S <=8      -  Ceftriaxone: S <=1 Enterobacter, Klebsiella aerogenes, Citrobacter, and Serratia may develop resistance during prolonged therapy      -  Cefuroxime: S <=4      -  Ciprofloxacin: S <=0.25      -  Ertapenem: S <=0.5      -  Gentamicin: S <=2      -  Imipenem: S <=1      -  Levofloxacin: S <=0.5      -  Meropenem: S <=1      -  Nitrofurantoin: S <=32 Should not be used to treat pyelonephritis      -  Piperacillin/Tazobactam: S <=8      -  Tobramycin: S <=2      -  Trimethoprim/Sulfamethoxazole: S <=0.5/9.5      Method Type: PHOEBE    Culture - Blood (collected 30 Jan 2023 12:27)  Source: .Blood Blood-Peripheral  Preliminary Report (31 Jan 2023 15:01):    No growth to date.    Culture - Blood (collected 30 Jan 2023 12:10)  Source: .Blood Blood-Peripheral  Gram Stain (31 Jan 2023 15:34):    Growth in anaerobic bottle: Gram positive cocci in pairs  Final Report (01 Feb 2023 13:38):    Growth in anaerobic bottle: Aerococcus urinae    "Susceptibilities not performed"    ***Blood Panel PCR results on this specimen are available    approximately 3 hours after the Gram stain result.***    Gram stain, PCR, and/or culture results may not always    correspond due to difference in methodologies.    ************************************************************    This PCR assay was performed by multiplex PCR. This    Assay tests for 66 bacterial and resistance gene targets.    Please refer to the [facility] Labs test directory    at [email or web address] for details.  Organism: Blood Culture PCR (01 Feb 2023 13:38)  Organism: Blood Culture PCR (01 Feb 2023 13:38)      -  Blood PCR Panel: NEG      Method Type: PCR      COVID-19 PCR: NotDetec (01-30-23 @ 12:25)      RADIOLOGY & ADDITIONAL TESTS:    Care Discussed with Consultants/Other Providers:    Patient is a 89y old  Male who presents with a chief complaint of abdominal pain (03 Feb 2023 11:30)    Patient seen and examined at bedside this am. No new complaints.  Appeared comfortable and was in NAD. Had diarrhea yesterday bowel regimen d/c'd, diarrhea resolved.  Called by nurse later in morning, patient in rapid afib (140) with an episode of vomiting.  Also, failed trial void.  B/P 70/50, rapid response called.   Musa placed, EKG, ddimer, electrolytes ordered.   Patient alert, and communicating as usual    ALLERGIES:  No Known Allergies    MEDICATIONS  (STANDING):  acetaminophen     Tablet .. 650 milliGRAM(s) Oral every 6 hours  aspirin enteric coated 81 milliGRAM(s) Oral daily  enoxaparin Injectable 30 milliGRAM(s) SubCutaneous every 24 hours  finasteride 5 milliGRAM(s) Oral daily  piperacillin/tazobactam IVPB.. 3.375 Gram(s) IV Intermittent every 8 hours  potassium chloride   Powder 40 milliEquivalent(s) Oral once  tamsulosin 0.8 milliGRAM(s) Oral at bedtime    MEDICATIONS  (PRN):  aluminum hydroxide/magnesium hydroxide/simethicone Suspension 30 milliLiter(s) Oral every 4 hours PRN Dyspepsia  HYDROmorphone  Injectable 0.5 milliGRAM(s) IV Push every 3 hours PRN Severe Pain (7 - 10)  melatonin 3 milliGRAM(s) Oral at bedtime PRN Insomnia  ondansetron Injectable 4 milliGRAM(s) IV Push every 8 hours PRN Nausea and/or Vomiting  oxyCODONE    IR 5 milliGRAM(s) Oral every 6 hours PRN Moderate Pain (4 - 6)  zinc oxide 40% Paste 1 Application(s) Topical three times a day PRN rash    Vital Signs Last 24 Hrs  T(F): 97.3 (04 Feb 2023 05:55), Max: 98.9 (03 Feb 2023 11:54)  HR: 89 (04 Feb 2023 05:55) (83 - 89)  BP: 123/50 (04 Feb 2023 05:55) (117/67 - 123/50)  RR: 17 (04 Feb 2023 05:55) (17 - 18)  SpO2: 95% (04 Feb 2023 05:55) (95% - 96%)  I&O's Summary    03 Feb 2023 07:01  -  04 Feb 2023 07:00  --------------------------------------------------------  IN: 0 mL / OUT: 935 mL / NET: -935 mL    04 Feb 2023 07:01  -  04 Feb 2023 10:48  --------------------------------------------------------  IN: 120 mL / OUT: 0 mL / NET: 120 mL      General: NAD, Alert, orientation x1 to self  Lungs: Clear to auscultation bilaterally   Cardio: S1/S2, RRR  Abdomen: Soft, tender to palpation, hypoactive Bowel Sounds, incision/lap site w. dermabond c/d/i, R-sided RUPINDRE w. serosang output.  Extremities: No cyanosis, No edema    LABS:                        11.8   14.05 )-----------( 210      ( 04 Feb 2023 08:00 )             36.7     02-04    141  |  106  |  14  ----------------------------<  97  3.4   |  25  |  0.73    Ca    8.0      04 Feb 2023 07:40  Phos  2.5     02-02  Mg     1.9     02-03    TPro  5.8  /  Alb  1.9  /  TBili  0.6  /  DBili  0.1  /  AST  31  /  ALT  35  /  AlkPhos  174  02-04        11-23 Chol 160 mg/dL LDL -- HDL 44 mg/dL Trig 95 mg/dL        Culture - Urine (collected 30 Jan 2023 12:38)  Source: Catheterized Catheterized  Final Report (01 Feb 2023 19:29):    >100,000 CFU/ml Escherichia coli    >100,000 CFU/ml Aerococcus species    "Aerococcus spp. are predictably susceptible to penicillin,    ampicillin, tetracycline, and vancomycin.  All isolates are    resistant to sulfonamides"  Organism: Escherichia coli (01 Feb 2023 19:29)  Organism: Escherichia coli (01 Feb 2023 19:29)      -  Amikacin: S <=16      -  Amoxicillin/Clavulanic Acid: S <=8/4      -  Ampicillin: S <=8 These ampicillin results predict results for amoxicillin      -  Ampicillin/Sulbactam: S <=4/2 Enterobacter, Klebsiella aerogenes, Citrobacter, and Serratia may develop resistance during prolonged therapy (3-4 days)      -  Aztreonam: S <=4      -  Cefazolin: S <=2 For uncomplicated UTI with K. pneumoniae, E. coli, or P. mirablis: PHOEBE <=16 is sensitive and PHOEBE >=32 is resistant. This also predicts results for oral agents cefaclor, cefdinir, cefpodoxime, cefprozil, cefuroxime axetil, cephalexin and locarbef for uncomplicated UTI. Note that some isolates may be susceptible to these agents while testing resistant to cefazolin.      -  Cefepime: S 8      -  Cefoxitin: S <=8      -  Ceftriaxone: S <=1 Enterobacter, Klebsiella aerogenes, Citrobacter, and Serratia may develop resistance during prolonged therapy      -  Cefuroxime: S <=4      -  Ciprofloxacin: S <=0.25      -  Ertapenem: S <=0.5      -  Gentamicin: S <=2      -  Imipenem: S <=1      -  Levofloxacin: S <=0.5      -  Meropenem: S <=1      -  Nitrofurantoin: S <=32 Should not be used to treat pyelonephritis      -  Piperacillin/Tazobactam: S <=8      -  Tobramycin: S <=2      -  Trimethoprim/Sulfamethoxazole: S <=0.5/9.5      Method Type: PHOEBE    Culture - Blood (collected 30 Jan 2023 12:27)  Source: .Blood Blood-Peripheral  Preliminary Report (31 Jan 2023 15:01):    No growth to date.    Culture - Blood (collected 30 Jan 2023 12:10)  Source: .Blood Blood-Peripheral  Gram Stain (31 Jan 2023 15:34):    Growth in anaerobic bottle: Gram positive cocci in pairs  Final Report (01 Feb 2023 13:38):    Growth in anaerobic bottle: Aerococcus urinae    "Susceptibilities not performed"    ***Blood Panel PCR results on this specimen are available    approximately 3 hours after the Gram stain result.***    Gram stain, PCR, and/or culture results may not always    correspond due to difference in methodologies.    ************************************************************    This PCR assay was performed by multiplex PCR. This    Assay tests for 66 bacterial and resistance gene targets.    Please refer to the  Labs test directory    at [email or web address] for details.  Organism: Blood Culture PCR (01 Feb 2023 13:38)  Organism: Blood Culture PCR (01 Feb 2023 13:38)      -  Blood PCR Panel: NEG      Method Type: PCR      COVID-19 PCR: NotDetec (01-30-23 @ 12:25)      RADIOLOGY & ADDITIONAL TESTS:    Care Discussed with Consultants/Other Providers:    Patient is a 89y old  Male who presents with a chief complaint of abdominal pain (03 Feb 2023 11:30)    Patient seen and examined at bedside this am. No new complaints.  Appeared comfortable and was in NAD. Had diarrhea yesterday bowel regimen d/c'd, diarrhea resolved.  Called by nurse later in morning, patient in rapid afib (140) with an episode of vomiting.  Also, failed trial void.  B/P 70/50, rapid response called.   Musa placed, EKG, ddimer, electrolytes ordered.   Patient alert, and communicating as usual    ALLERGIES:  No Known Allergies    MEDICATIONS  (STANDING):  acetaminophen     Tablet .. 650 milliGRAM(s) Oral every 6 hours  aspirin enteric coated 81 milliGRAM(s) Oral daily  enoxaparin Injectable 30 milliGRAM(s) SubCutaneous every 24 hours  finasteride 5 milliGRAM(s) Oral daily  piperacillin/tazobactam IVPB.. 3.375 Gram(s) IV Intermittent every 8 hours  potassium chloride   Powder 40 milliEquivalent(s) Oral once  tamsulosin 0.8 milliGRAM(s) Oral at bedtime    MEDICATIONS  (PRN):  aluminum hydroxide/magnesium hydroxide/simethicone Suspension 30 milliLiter(s) Oral every 4 hours PRN Dyspepsia  HYDROmorphone  Injectable 0.5 milliGRAM(s) IV Push every 3 hours PRN Severe Pain (7 - 10)  melatonin 3 milliGRAM(s) Oral at bedtime PRN Insomnia  ondansetron Injectable 4 milliGRAM(s) IV Push every 8 hours PRN Nausea and/or Vomiting  oxyCODONE    IR 5 milliGRAM(s) Oral every 6 hours PRN Moderate Pain (4 - 6)  zinc oxide 40% Paste 1 Application(s) Topical three times a day PRN rash    Vital Signs Last 24 Hrs  T(F): 97.3 (04 Feb 2023 05:55), Max: 98.9 (03 Feb 2023 11:54)  HR: 89 (04 Feb 2023 05:55) (83 - 89)  BP: 123/50 (04 Feb 2023 05:55) (117/67 - 123/50)  RR: 17 (04 Feb 2023 05:55) (17 - 18)  SpO2: 95% (04 Feb 2023 05:55) (95% - 96%)  I&O's Summary    03 Feb 2023 07:01  -  04 Feb 2023 07:00  --------------------------------------------------------  IN: 0 mL / OUT: 935 mL / NET: -935 mL    04 Feb 2023 07:01  -  04 Feb 2023 10:48  --------------------------------------------------------  IN: 120 mL / OUT: 0 mL / NET: 120 mL      General: NAD, Alert, orientation x1 to self  Lungs: Clear to auscultation bilaterally   Cardio: S1/S2, RRR  Abdomen: Soft, tender to palpation, hypoactive Bowel Sounds, incision/lap site w. dermabond c/d/i, R-sided RUPINDER w. serosang output.  Extremities: No cyanosis, No edema    LABS:                        11.8   14.05 )-----------( 210      ( 04 Feb 2023 08:00 )             36.7     02-04    141  |  106  |  14  ----------------------------<  97  3.4   |  25  |  0.73    Ca    8.0      04 Feb 2023 07:40  Phos  2.5     02-02  Mg     1.9     02-03    TPro  5.8  /  Alb  1.9  /  TBili  0.6  /  DBili  0.1  /  AST  31  /  ALT  35  /  AlkPhos  174  02-04        11-23 Chol 160 mg/dL LDL -- HDL 44 mg/dL Trig 95 mg/dL        Culture - Urine (collected 30 Jan 2023 12:38)  Source: Catheterized Catheterized  Final Report (01 Feb 2023 19:29):    >100,000 CFU/ml Escherichia coli    >100,000 CFU/ml Aerococcus species    "Aerococcus spp. are predictably susceptible to penicillin,    ampicillin, tetracycline, and vancomycin.  All isolates are    resistant to sulfonamides"  Organism: Escherichia coli (01 Feb 2023 19:29)  Organism: Escherichia coli (01 Feb 2023 19:29)      -  Amikacin: S <=16      -  Amoxicillin/Clavulanic Acid: S <=8/4      -  Ampicillin: S <=8 These ampicillin results predict results for amoxicillin      -  Ampicillin/Sulbactam: S <=4/2 Enterobacter, Klebsiella aerogenes, Citrobacter, and Serratia may develop resistance during prolonged therapy (3-4 days)      -  Aztreonam: S <=4      -  Cefazolin: S <=2 For uncomplicated UTI with K. pneumoniae, E. coli, or P. mirablis: PHOEBE <=16 is sensitive and PHOEBE >=32 is resistant. This also predicts results for oral agents cefaclor, cefdinir, cefpodoxime, cefprozil, cefuroxime axetil, cephalexin and locarbef for uncomplicated UTI. Note that some isolates may be susceptible to these agents while testing resistant to cefazolin.      -  Cefepime: S 8      -  Cefoxitin: S <=8      -  Ceftriaxone: S <=1 Enterobacter, Klebsiella aerogenes, Citrobacter, and Serratia may develop resistance during prolonged therapy      -  Cefuroxime: S <=4      -  Ciprofloxacin: S <=0.25      -  Ertapenem: S <=0.5      -  Gentamicin: S <=2      -  Imipenem: S <=1      -  Levofloxacin: S <=0.5      -  Meropenem: S <=1      -  Nitrofurantoin: S <=32 Should not be used to treat pyelonephritis      -  Piperacillin/Tazobactam: S <=8      -  Tobramycin: S <=2      -  Trimethoprim/Sulfamethoxazole: S <=0.5/9.5      Method Type: PHOEBE    Culture - Blood (collected 30 Jan 2023 12:27)  Source: .Blood Blood-Peripheral  Preliminary Report (31 Jan 2023 15:01):    No growth to date.    Culture - Blood (collected 30 Jan 2023 12:10)  Source: .Blood Blood-Peripheral  Gram Stain (31 Jan 2023 15:34):    Growth in anaerobic bottle: Gram positive cocci in pairs  Final Report (01 Feb 2023 13:38):    Growth in anaerobic bottle: Aerococcus urinae    "Susceptibilities not performed"    ***Blood Panel PCR results on this specimen are available    approximately 3 hours after the Gram stain result.***    Gram stain, PCR, and/or culture results may not always    correspond due to difference in methodologies.    ************************************************************    This PCR assay was performed by multiplex PCR. This    Assay tests for 66 bacterial and resistance gene targets.    Please refer to the [facility] Labs test directory    at [email or web address] for details.  Organism: Blood Culture PCR (01 Feb 2023 13:38)  Organism: Blood Culture PCR (01 Feb 2023 13:38)      -  Blood PCR Panel: NEG      Method Type: PCR      COVID-19 PCR: NotDetec (01-30-23 @ 12:25)      RADIOLOGY & ADDITIONAL TESTS:    Care Discussed with Consultants/Other Providers:    Patient is a 89y old  Male who presents with a chief complaint of abdominal pain (03 Feb 2023 11:30)    Patient seen and examined at bedside this am. No new complaints.  Appeared comfortable and was in NAD. Had diarrhea yesterday bowel regimen d/c'd, diarrhea resolved.  Called by nurse later in morning, patient in rapid afib (140) with an episode of vomiting.  Also, failed trial void.  B/P 70/50, rapid response called.   Musa placed, EKG, ddimer, electrolytes ordered.   Patient alert, and communicating as usual    ALLERGIES:  No Known Allergies    MEDICATIONS  (STANDING):  acetaminophen     Tablet .. 650 milliGRAM(s) Oral every 6 hours  aspirin enteric coated 81 milliGRAM(s) Oral daily  enoxaparin Injectable 30 milliGRAM(s) SubCutaneous every 24 hours  finasteride 5 milliGRAM(s) Oral daily  piperacillin/tazobactam IVPB.. 3.375 Gram(s) IV Intermittent every 8 hours  potassium chloride   Powder 40 milliEquivalent(s) Oral once  tamsulosin 0.8 milliGRAM(s) Oral at bedtime    MEDICATIONS  (PRN):  aluminum hydroxide/magnesium hydroxide/simethicone Suspension 30 milliLiter(s) Oral every 4 hours PRN Dyspepsia  HYDROmorphone  Injectable 0.5 milliGRAM(s) IV Push every 3 hours PRN Severe Pain (7 - 10)  melatonin 3 milliGRAM(s) Oral at bedtime PRN Insomnia  ondansetron Injectable 4 milliGRAM(s) IV Push every 8 hours PRN Nausea and/or Vomiting  oxyCODONE    IR 5 milliGRAM(s) Oral every 6 hours PRN Moderate Pain (4 - 6)  zinc oxide 40% Paste 1 Application(s) Topical three times a day PRN rash    Vital Signs Last 24 Hrs  T(F): 97.3 (04 Feb 2023 05:55), Max: 98.9 (03 Feb 2023 11:54)  HR: 89 (04 Feb 2023 05:55) (83 - 89)  BP: 123/50 (04 Feb 2023 05:55) (117/67 - 123/50)  RR: 17 (04 Feb 2023 05:55) (17 - 18)  SpO2: 95% (04 Feb 2023 05:55) (95% - 96%)  I&O's Summary    03 Feb 2023 07:01  -  04 Feb 2023 07:00  --------------------------------------------------------  IN: 0 mL / OUT: 935 mL / NET: -935 mL    04 Feb 2023 07:01  -  04 Feb 2023 10:48  --------------------------------------------------------  IN: 120 mL / OUT: 0 mL / NET: 120 mL      General: NAD, Alert, orientation x1 to self  Lungs: Clear to auscultation bilaterally   Cardio: S1/S2, RRR  Abdomen: Soft, tender to palpation, hypoactive Bowel Sounds, incision/lap site w. dermabond c/d/i, R-sided RUPINDER w. serosang output.  Extremities: No cyanosis, No edema    LABS:                        11.8   14.05 )-----------( 210      ( 04 Feb 2023 08:00 )             36.7     02-04    141  |  106  |  14  ----------------------------<  97  3.4   |  25  |  0.73    Ca    8.0      04 Feb 2023 07:40  Phos  2.5     02-02  Mg     1.9     02-03    TPro  5.8  /  Alb  1.9  /  TBili  0.6  /  DBili  0.1  /  AST  31  /  ALT  35  /  AlkPhos  174  02-04        11-23 Chol 160 mg/dL LDL -- HDL 44 mg/dL Trig 95 mg/dL        Culture - Urine (collected 30 Jan 2023 12:38)  Source: Catheterized Catheterized  Final Report (01 Feb 2023 19:29):    >100,000 CFU/ml Escherichia coli    >100,000 CFU/ml Aerococcus species    "Aerococcus spp. are predictably susceptible to penicillin,    ampicillin, tetracycline, and vancomycin.  All isolates are    resistant to sulfonamides"  Organism: Escherichia coli (01 Feb 2023 19:29)  Organism: Escherichia coli (01 Feb 2023 19:29)      -  Amikacin: S <=16      -  Amoxicillin/Clavulanic Acid: S <=8/4      -  Ampicillin: S <=8 These ampicillin results predict results for amoxicillin      -  Ampicillin/Sulbactam: S <=4/2 Enterobacter, Klebsiella aerogenes, Citrobacter, and Serratia may develop resistance during prolonged therapy (3-4 days)      -  Aztreonam: S <=4      -  Cefazolin: S <=2 For uncomplicated UTI with K. pneumoniae, E. coli, or P. mirablis: PHOEBE <=16 is sensitive and PHOEBE >=32 is resistant. This also predicts results for oral agents cefaclor, cefdinir, cefpodoxime, cefprozil, cefuroxime axetil, cephalexin and locarbef for uncomplicated UTI. Note that some isolates may be susceptible to these agents while testing resistant to cefazolin.      -  Cefepime: S 8      -  Cefoxitin: S <=8      -  Ceftriaxone: S <=1 Enterobacter, Klebsiella aerogenes, Citrobacter, and Serratia may develop resistance during prolonged therapy      -  Cefuroxime: S <=4      -  Ciprofloxacin: S <=0.25      -  Ertapenem: S <=0.5      -  Gentamicin: S <=2      -  Imipenem: S <=1      -  Levofloxacin: S <=0.5      -  Meropenem: S <=1      -  Nitrofurantoin: S <=32 Should not be used to treat pyelonephritis      -  Piperacillin/Tazobactam: S <=8      -  Tobramycin: S <=2      -  Trimethoprim/Sulfamethoxazole: S <=0.5/9.5      Method Type: PHOEBE    Culture - Blood (collected 30 Jan 2023 12:27)  Source: .Blood Blood-Peripheral  Preliminary Report (31 Jan 2023 15:01):    No growth to date.    Culture - Blood (collected 30 Jan 2023 12:10)  Source: .Blood Blood-Peripheral  Gram Stain (31 Jan 2023 15:34):    Growth in anaerobic bottle: Gram positive cocci in pairs  Final Report (01 Feb 2023 13:38):    Growth in anaerobic bottle: Aerococcus urinae    "Susceptibilities not performed"    ***Blood Panel PCR results on this specimen are available    approximately 3 hours after the Gram stain result.***    Gram stain, PCR, and/or culture results may not always    correspond due to difference in methodologies.    ************************************************************    This PCR assay was performed by multiplex PCR. This    Assay tests for 66 bacterial and resistance gene targets.    Please refer to the [facility] test directory    at [email or web address] for details.  Organism: Blood Culture PCR (01 Feb 2023 13:38)  Organism: Blood Culture PCR (01 Feb 2023 13:38)      -  Blood PCR Panel: NEG      Method Type: PCR      COVID-19 PCR: NotDetec (01-30-23 @ 12:25)      RADIOLOGY & ADDITIONAL TESTS:    Care Discussed with Consultants/Other Providers:    Patient is a 89y old  Male who presents with a chief complaint of abdominal pain (03 Feb 2023 11:30)    Patient seen and examined at bedside this am. No new complaints.  Appeared comfortable and was in NAD. Had diarrhea yesterday bowel regimen d/c'd, diarrhea resolved.  Called by nurse later in morning, patient in rapid afib (140) with an episode of vomiting.  Also, failed trial void.  B/P 70/50, rapid response called.   Musa placed for acute urinary retention, EKG, ddimer, electrolytes ordered.   Patient alert, and communicating as usual    ALLERGIES:  No Known Allergies    MEDICATIONS  (STANDING):  acetaminophen     Tablet .. 650 milliGRAM(s) Oral every 6 hours  aspirin enteric coated 81 milliGRAM(s) Oral daily  enoxaparin Injectable 30 milliGRAM(s) SubCutaneous every 24 hours  finasteride 5 milliGRAM(s) Oral daily  piperacillin/tazobactam IVPB.. 3.375 Gram(s) IV Intermittent every 8 hours  potassium chloride   Powder 40 milliEquivalent(s) Oral once  tamsulosin 0.8 milliGRAM(s) Oral at bedtime    MEDICATIONS  (PRN):  aluminum hydroxide/magnesium hydroxide/simethicone Suspension 30 milliLiter(s) Oral every 4 hours PRN Dyspepsia  HYDROmorphone  Injectable 0.5 milliGRAM(s) IV Push every 3 hours PRN Severe Pain (7 - 10)  melatonin 3 milliGRAM(s) Oral at bedtime PRN Insomnia  ondansetron Injectable 4 milliGRAM(s) IV Push every 8 hours PRN Nausea and/or Vomiting  oxyCODONE    IR 5 milliGRAM(s) Oral every 6 hours PRN Moderate Pain (4 - 6)  zinc oxide 40% Paste 1 Application(s) Topical three times a day PRN rash    Vital Signs Last 24 Hrs  T(F): 97.3 (04 Feb 2023 05:55), Max: 98.9 (03 Feb 2023 11:54)  HR: 89 (04 Feb 2023 05:55) (83 - 89)  BP: 123/50 (04 Feb 2023 05:55) (117/67 - 123/50)  RR: 17 (04 Feb 2023 05:55) (17 - 18)  SpO2: 95% (04 Feb 2023 05:55) (95% - 96%)  I&O's Summary    03 Feb 2023 07:01  -  04 Feb 2023 07:00  --------------------------------------------------------  IN: 0 mL / OUT: 935 mL / NET: -935 mL    04 Feb 2023 07:01  -  04 Feb 2023 10:48  --------------------------------------------------------  IN: 120 mL / OUT: 0 mL / NET: 120 mL      General: NAD, Alert, orientation x1 to self  Lungs: Clear to auscultation bilaterally   Cardio: S1/S2, RRR  Abdomen: Soft, tender to palpation, hypoactive Bowel Sounds, incision/lap site w. dermabond c/d/i, R-sided RUPINDER w. serosang output.  Extremities: No cyanosis, No edema    LABS:                        11.8   14.05 )-----------( 210      ( 04 Feb 2023 08:00 )             36.7     02-04    141  |  106  |  14  ----------------------------<  97  3.4   |  25  |  0.73    Ca    8.0      04 Feb 2023 07:40  Phos  2.5     02-02  Mg     1.9     02-03    TPro  5.8  /  Alb  1.9  /  TBili  0.6  /  DBili  0.1  /  AST  31  /  ALT  35  /  AlkPhos  174  02-04        11-23 Chol 160 mg/dL LDL -- HDL 44 mg/dL Trig 95 mg/dL        Culture - Urine (collected 30 Jan 2023 12:38)  Source: Catheterized Catheterized  Final Report (01 Feb 2023 19:29):    >100,000 CFU/ml Escherichia coli    >100,000 CFU/ml Aerococcus species    "Aerococcus spp. are predictably susceptible to penicillin,    ampicillin, tetracycline, and vancomycin.  All isolates are    resistant to sulfonamides"  Organism: Escherichia coli (01 Feb 2023 19:29)  Organism: Escherichia coli (01 Feb 2023 19:29)      -  Amikacin: S <=16      -  Amoxicillin/Clavulanic Acid: S <=8/4      -  Ampicillin: S <=8 These ampicillin results predict results for amoxicillin      -  Ampicillin/Sulbactam: S <=4/2 Enterobacter, Klebsiella aerogenes, Citrobacter, and Serratia may develop resistance during prolonged therapy (3-4 days)      -  Aztreonam: S <=4      -  Cefazolin: S <=2 For uncomplicated UTI with K. pneumoniae, E. coli, or P. mirablis: PHOEBE <=16 is sensitive and PHOEBE >=32 is resistant. This also predicts results for oral agents cefaclor, cefdinir, cefpodoxime, cefprozil, cefuroxime axetil, cephalexin and locarbef for uncomplicated UTI. Note that some isolates may be susceptible to these agents while testing resistant to cefazolin.      -  Cefepime: S 8      -  Cefoxitin: S <=8      -  Ceftriaxone: S <=1 Enterobacter, Klebsiella aerogenes, Citrobacter, and Serratia may develop resistance during prolonged therapy      -  Cefuroxime: S <=4      -  Ciprofloxacin: S <=0.25      -  Ertapenem: S <=0.5      -  Gentamicin: S <=2      -  Imipenem: S <=1      -  Levofloxacin: S <=0.5      -  Meropenem: S <=1      -  Nitrofurantoin: S <=32 Should not be used to treat pyelonephritis      -  Piperacillin/Tazobactam: S <=8      -  Tobramycin: S <=2      -  Trimethoprim/Sulfamethoxazole: S <=0.5/9.5      Method Type: PHOEBE    Culture - Blood (collected 30 Jan 2023 12:27)  Source: .Blood Blood-Peripheral  Preliminary Report (31 Jan 2023 15:01):    No growth to date.    Culture - Blood (collected 30 Jan 2023 12:10)  Source: .Blood Blood-Peripheral  Gram Stain (31 Jan 2023 15:34):    Growth in anaerobic bottle: Gram positive cocci in pairs  Final Report (01 Feb 2023 13:38):    Growth in anaerobic bottle: Aerococcus urinae    "Susceptibilities not performed"    ***Blood Panel PCR results on this specimen are available    approximately 3 hours after the Gram stain result.***    Gram stain, PCR, and/or culture results may not always    correspond due to difference in methodologies.    ************************************************************    This PCR assay was performed by multiplex PCR. This    Assay tests for 66 bacterial and resistance gene targets.    Please refer to the [facility] test directory    at [email or web address] for details.  Organism: Blood Culture PCR (01 Feb 2023 13:38)  Organism: Blood Culture PCR (01 Feb 2023 13:38)      -  Blood PCR Panel: NEG      Method Type: PCR      COVID-19 PCR: NotDetec (01-30-23 @ 12:25)      RADIOLOGY & ADDITIONAL TESTS:    Care Discussed with Consultants/Other Providers:    Patient is a 89y old  Male who presents with a chief complaint of abdominal pain (03 Feb 2023 11:30)    Patient seen and examined at bedside this am. No new complaints.  Appeared comfortable and was in NAD. Had diarrhea yesterday bowel regimen d/c'd, diarrhea resolved.  Called by nurse later in morning, patient in rapid afib (140) with an episode of vomiting.  Also, failed trial void.  B/P 70/50, rapid response called.   Musa placed for acute urinary retention, EKG, ddimer, electrolytes ordered.   Patient alert, and communicating as usual    ALLERGIES:  No Known Allergies    MEDICATIONS  (STANDING):  acetaminophen     Tablet .. 650 milliGRAM(s) Oral every 6 hours  aspirin enteric coated 81 milliGRAM(s) Oral daily  enoxaparin Injectable 30 milliGRAM(s) SubCutaneous every 24 hours  finasteride 5 milliGRAM(s) Oral daily  piperacillin/tazobactam IVPB.. 3.375 Gram(s) IV Intermittent every 8 hours  potassium chloride   Powder 40 milliEquivalent(s) Oral once  tamsulosin 0.8 milliGRAM(s) Oral at bedtime    MEDICATIONS  (PRN):  aluminum hydroxide/magnesium hydroxide/simethicone Suspension 30 milliLiter(s) Oral every 4 hours PRN Dyspepsia  HYDROmorphone  Injectable 0.5 milliGRAM(s) IV Push every 3 hours PRN Severe Pain (7 - 10)  melatonin 3 milliGRAM(s) Oral at bedtime PRN Insomnia  ondansetron Injectable 4 milliGRAM(s) IV Push every 8 hours PRN Nausea and/or Vomiting  oxyCODONE    IR 5 milliGRAM(s) Oral every 6 hours PRN Moderate Pain (4 - 6)  zinc oxide 40% Paste 1 Application(s) Topical three times a day PRN rash    Vital Signs Last 24 Hrs  T(F): 97.3 (04 Feb 2023 05:55), Max: 98.9 (03 Feb 2023 11:54)  HR: 89 (04 Feb 2023 05:55) (83 - 89)  BP: 123/50 (04 Feb 2023 05:55) (117/67 - 123/50)  RR: 17 (04 Feb 2023 05:55) (17 - 18)  SpO2: 95% (04 Feb 2023 05:55) (95% - 96%)  I&O's Summary    03 Feb 2023 07:01  -  04 Feb 2023 07:00  --------------------------------------------------------  IN: 0 mL / OUT: 935 mL / NET: -935 mL    04 Feb 2023 07:01  -  04 Feb 2023 10:48  --------------------------------------------------------  IN: 120 mL / OUT: 0 mL / NET: 120 mL      General: NAD, Alert, orientation x1 to self  Lungs: Clear to auscultation bilaterally   Cardio: S1/S2, RRR  Abdomen: Soft, tender to palpation, hypoactive Bowel Sounds, incision/lap site w. dermabond c/d/i, R-sided RUPINDER w. serosang output.  Extremities: No cyanosis, No edema    LABS:                        11.8   14.05 )-----------( 210      ( 04 Feb 2023 08:00 )             36.7     02-04    141  |  106  |  14  ----------------------------<  97  3.4   |  25  |  0.73    Ca    8.0      04 Feb 2023 07:40  Phos  2.5     02-02  Mg     1.9     02-03    TPro  5.8  /  Alb  1.9  /  TBili  0.6  /  DBili  0.1  /  AST  31  /  ALT  35  /  AlkPhos  174  02-04        11-23 Chol 160 mg/dL LDL -- HDL 44 mg/dL Trig 95 mg/dL        Culture - Urine (collected 30 Jan 2023 12:38)  Source: Catheterized Catheterized  Final Report (01 Feb 2023 19:29):    >100,000 CFU/ml Escherichia coli    >100,000 CFU/ml Aerococcus species    "Aerococcus spp. are predictably susceptible to penicillin,    ampicillin, tetracycline, and vancomycin.  All isolates are    resistant to sulfonamides"  Organism: Escherichia coli (01 Feb 2023 19:29)  Organism: Escherichia coli (01 Feb 2023 19:29)      -  Amikacin: S <=16      -  Amoxicillin/Clavulanic Acid: S <=8/4      -  Ampicillin: S <=8 These ampicillin results predict results for amoxicillin      -  Ampicillin/Sulbactam: S <=4/2 Enterobacter, Klebsiella aerogenes, Citrobacter, and Serratia may develop resistance during prolonged therapy (3-4 days)      -  Aztreonam: S <=4      -  Cefazolin: S <=2 For uncomplicated UTI with K. pneumoniae, E. coli, or P. mirablis: PHOEBE <=16 is sensitive and PHOEBE >=32 is resistant. This also predicts results for oral agents cefaclor, cefdinir, cefpodoxime, cefprozil, cefuroxime axetil, cephalexin and locarbef for uncomplicated UTI. Note that some isolates may be susceptible to these agents while testing resistant to cefazolin.      -  Cefepime: S 8      -  Cefoxitin: S <=8      -  Ceftriaxone: S <=1 Enterobacter, Klebsiella aerogenes, Citrobacter, and Serratia may develop resistance during prolonged therapy      -  Cefuroxime: S <=4      -  Ciprofloxacin: S <=0.25      -  Ertapenem: S <=0.5      -  Gentamicin: S <=2      -  Imipenem: S <=1      -  Levofloxacin: S <=0.5      -  Meropenem: S <=1      -  Nitrofurantoin: S <=32 Should not be used to treat pyelonephritis      -  Piperacillin/Tazobactam: S <=8      -  Tobramycin: S <=2      -  Trimethoprim/Sulfamethoxazole: S <=0.5/9.5      Method Type: PHOEBE    Culture - Blood (collected 30 Jan 2023 12:27)  Source: .Blood Blood-Peripheral  Preliminary Report (31 Jan 2023 15:01):    No growth to date.    Culture - Blood (collected 30 Jan 2023 12:10)  Source: .Blood Blood-Peripheral  Gram Stain (31 Jan 2023 15:34):    Growth in anaerobic bottle: Gram positive cocci in pairs  Final Report (01 Feb 2023 13:38):    Growth in anaerobic bottle: Aerococcus urinae    "Susceptibilities not performed"    ***Blood Panel PCR results on this specimen are available    approximately 3 hours after the Gram stain result.***    Gram stain, PCR, and/or culture results may not always    correspond due to difference in methodologies.    ************************************************************    This PCR assay was performed by multiplex PCR. This    Assay tests for 66 bacterial and resistance gene targets.    Please refer to the [facility] test directory    at [email or web address] for details.  Organism: Blood Culture PCR (01 Feb 2023 13:38)  Organism: Blood Culture PCR (01 Feb 2023 13:38)      -  Blood PCR Panel: NEG      Method Type: PCR      COVID-19 PCR: NotDetec (01-30-23 @ 12:25)      RADIOLOGY & ADDITIONAL TESTS:    Care Discussed with Consultants/Other Providers:    Patient is a 89y old  Male who presents with a chief complaint of abdominal pain (03 Feb 2023 11:30)    Patient seen and examined at bedside this am. No new complaints.  Appeared comfortable and was in NAD. Had diarrhea yesterday bowel regimen d/c'd, diarrhea resolved.  Called by nurse later in morning, patient in rapid afib (140) with an episode of vomiting.  Also, failed trial void.  B/P 70/50, rapid response called.   Musa placed for acute urinary retention, EKG, ddimer, electrolytes ordered.   Patient alert, and communicating as usual    ALLERGIES:  No Known Allergies    MEDICATIONS  (STANDING):  acetaminophen     Tablet .. 650 milliGRAM(s) Oral every 6 hours  aspirin enteric coated 81 milliGRAM(s) Oral daily  enoxaparin Injectable 30 milliGRAM(s) SubCutaneous every 24 hours  finasteride 5 milliGRAM(s) Oral daily  piperacillin/tazobactam IVPB.. 3.375 Gram(s) IV Intermittent every 8 hours  potassium chloride   Powder 40 milliEquivalent(s) Oral once  tamsulosin 0.8 milliGRAM(s) Oral at bedtime    MEDICATIONS  (PRN):  aluminum hydroxide/magnesium hydroxide/simethicone Suspension 30 milliLiter(s) Oral every 4 hours PRN Dyspepsia  HYDROmorphone  Injectable 0.5 milliGRAM(s) IV Push every 3 hours PRN Severe Pain (7 - 10)  melatonin 3 milliGRAM(s) Oral at bedtime PRN Insomnia  ondansetron Injectable 4 milliGRAM(s) IV Push every 8 hours PRN Nausea and/or Vomiting  oxyCODONE    IR 5 milliGRAM(s) Oral every 6 hours PRN Moderate Pain (4 - 6)  zinc oxide 40% Paste 1 Application(s) Topical three times a day PRN rash    Vital Signs Last 24 Hrs  T(F): 97.3 (04 Feb 2023 05:55), Max: 98.9 (03 Feb 2023 11:54)  HR: 89 (04 Feb 2023 05:55) (83 - 89)  BP: 123/50 (04 Feb 2023 05:55) (117/67 - 123/50)  RR: 17 (04 Feb 2023 05:55) (17 - 18)  SpO2: 95% (04 Feb 2023 05:55) (95% - 96%)  I&O's Summary    03 Feb 2023 07:01  -  04 Feb 2023 07:00  --------------------------------------------------------  IN: 0 mL / OUT: 935 mL / NET: -935 mL    04 Feb 2023 07:01  -  04 Feb 2023 10:48  --------------------------------------------------------  IN: 120 mL / OUT: 0 mL / NET: 120 mL      General: NAD, Alert, orientation x1 to self  Lungs: Clear to auscultation bilaterally   Cardio: S1/S2, RRR  Abdomen: Soft, tender to palpation, hypoactive Bowel Sounds, incision/lap site w. dermabond c/d/i, R-sided RUPINDER w. serosang output.  Extremities: No cyanosis, No edema    LABS:                        11.8   14.05 )-----------( 210      ( 04 Feb 2023 08:00 )             36.7     02-04    141  |  106  |  14  ----------------------------<  97  3.4   |  25  |  0.73    Ca    8.0      04 Feb 2023 07:40  Phos  2.5     02-02  Mg     1.9     02-03    TPro  5.8  /  Alb  1.9  /  TBili  0.6  /  DBili  0.1  /  AST  31  /  ALT  35  /  AlkPhos  174  02-04        11-23 Chol 160 mg/dL LDL -- HDL 44 mg/dL Trig 95 mg/dL        Culture - Urine (collected 30 Jan 2023 12:38)  Source: Catheterized Catheterized  Final Report (01 Feb 2023 19:29):    >100,000 CFU/ml Escherichia coli    >100,000 CFU/ml Aerococcus species    "Aerococcus spp. are predictably susceptible to penicillin,    ampicillin, tetracycline, and vancomycin.  All isolates are    resistant to sulfonamides"  Organism: Escherichia coli (01 Feb 2023 19:29)  Organism: Escherichia coli (01 Feb 2023 19:29)      -  Amikacin: S <=16      -  Amoxicillin/Clavulanic Acid: S <=8/4      -  Ampicillin: S <=8 These ampicillin results predict results for amoxicillin      -  Ampicillin/Sulbactam: S <=4/2 Enterobacter, Klebsiella aerogenes, Citrobacter, and Serratia may develop resistance during prolonged therapy (3-4 days)      -  Aztreonam: S <=4      -  Cefazolin: S <=2 For uncomplicated UTI with K. pneumoniae, E. coli, or P. mirablis: PHOEBE <=16 is sensitive and PHOEBE >=32 is resistant. This also predicts results for oral agents cefaclor, cefdinir, cefpodoxime, cefprozil, cefuroxime axetil, cephalexin and locarbef for uncomplicated UTI. Note that some isolates may be susceptible to these agents while testing resistant to cefazolin.      -  Cefepime: S 8      -  Cefoxitin: S <=8      -  Ceftriaxone: S <=1 Enterobacter, Klebsiella aerogenes, Citrobacter, and Serratia may develop resistance during prolonged therapy      -  Cefuroxime: S <=4      -  Ciprofloxacin: S <=0.25      -  Ertapenem: S <=0.5      -  Gentamicin: S <=2      -  Imipenem: S <=1      -  Levofloxacin: S <=0.5      -  Meropenem: S <=1      -  Nitrofurantoin: S <=32 Should not be used to treat pyelonephritis      -  Piperacillin/Tazobactam: S <=8      -  Tobramycin: S <=2      -  Trimethoprim/Sulfamethoxazole: S <=0.5/9.5      Method Type: PHOEBE    Culture - Blood (collected 30 Jan 2023 12:27)  Source: .Blood Blood-Peripheral  Preliminary Report (31 Jan 2023 15:01):    No growth to date.    Culture - Blood (collected 30 Jan 2023 12:10)  Source: .Blood Blood-Peripheral  Gram Stain (31 Jan 2023 15:34):    Growth in anaerobic bottle: Gram positive cocci in pairs  Final Report (01 Feb 2023 13:38):    Growth in anaerobic bottle: Aerococcus urinae    "Susceptibilities not performed"    ***Blood Panel PCR results on this specimen are available    approximately 3 hours after the Gram stain result.***    Gram stain, PCR, and/or culture results may not always    correspond due to difference in methodologies.    ************************************************************    This PCR assay was performed by multiplex PCR. This    Assay tests for 66 bacterial and resistance gene targets.    Please refer to the [facility] Labs test directory    at [email or web address] for details.  Organism: Blood Culture PCR (01 Feb 2023 13:38)  Organism: Blood Culture PCR (01 Feb 2023 13:38)      -  Blood PCR Panel: NEG      Method Type: PCR      COVID-19 PCR: NotDetec (01-30-23 @ 12:25)      RADIOLOGY & ADDITIONAL TESTS:    Care Discussed with Consultants/Other Providers:

## 2023-02-04 NOTE — PROGRESS NOTE ADULT - ASSESSMENT
PRETTY DOLAN is a 89y year old Male with PMH of recent L hip fx (11/2022), TIA (11/2022) who presented to the ER from [facility] for abdominal pain, nausea for 4 days.   Patient states his symptoms have been worsening over the last 4 days; had bloodwork done at [facility] which showed elevated LFTs which prompted ER visit. patient  states he has had poor appetite for the same time period. Episode of vomiting in ER, nbnb.   Daughter at bedside who states patient has been participating with therapy however has been more fatigued over the last week.   Denies fever, chills, chest pain, shortness of breath.   On ER arrival, /68, HR 71, RR 17 T97.1, sat 95% on room air. Found to have acute cholecystitis on RUQ US.    S/P Laparoscopic cholecstectomy for gangreous gallbladder Celestino 30   -- developed nausea/vomiting today   -- diarrhea   -- Elevated D dimer   -- hypokalemia ;  -- urinary retention     PLan:  Discussed with Dr Thompson and medicine                IV fluids                NPO xcept meds and ice chips               CT abdomen                CTA chest                felipe catheter placed        PRETTY DOLAN is a 89y year old Male with PMH of recent L hip fx (11/2022), TIA (11/2022) who presented to the ER from [facility] for abdominal pain, nausea for 4 days.   Patient states his symptoms have been worsening over the last 4 days; had bloodwork done at [facility] which showed elevated LFTs which prompted ER visit. patient  states he has had poor appetite for the same time period. Episode of vomiting in ER, nbnb.   Daughter at bedside who states patient has been participating with therapy however has been more fatigued over the last week.   Denies fever, chills, chest pain, shortness of breath.   On ER arrival, /68, HR 71, RR 17 T97.1, sat 95% on room air. Found to have acute cholecystitis on RUQ US.    S/P Laparoscopic cholecstectomy for gangreous gallbladder Celestino 30   -- developed nausea/vomiting today   -- diarrhea   -- Elevated D dimer   -- hypokalemia ;  -- urinary retention     PLan:  Discussed with Dr Thompson and medicine                IV fluids                NPO xcept meds and ice chips               CT abdomen                CTA chest                felipe catheter placed        PRETTY DOLAN is a 89y year old Male with PMH of recent L hip fx (11/2022), TIA (11/2022) who presented to the ER from [facility] for abdominal pain, nausea for 4 days.   Patient states his symptoms have been worsening over the last 4 days; had bloodwork done at [facility] which showed elevated LFTs which prompted ER visit. patient  states he has had poor appetite for the same time period. Episode of vomiting in ER, nbnb.   Daughter at bedside who states patient has been participating with therapy however has been more fatigued over the last week.   Denies fever, chills, chest pain, shortness of breath.   On ER arrival, /68, HR 71, RR 17 T97.1, sat 95% on room air. Found to have acute cholecystitis on RUQ US.    S/P Laparoscopic cholecstectomy for gangreous gallbladder Celestino 30   -- developed nausea/vomiting today   -- diarrhea   -- Elevated D dimer   -- hypokalemia ;  -- urinary retention     PLan:  Discussed with Dr Thompson and medicine                IV fluids                NPO xcept meds and ice chips               CT abdomen                CTA chest                felipe catheter placed

## 2023-02-04 NOTE — CHART NOTE - NSCHARTNOTEFT_GEN_A_CORE
-of note (currently, as of 2114 on 2/4/2023) the lab results noted for February 4th at 0820 are ACTUALLY results for labs that were sent on February 4th at 8PM    -I have called lab to try to have them change time to correct time however I was told "there is nothing I can do, you have to call the help desk, and that they "will leave a note for lab supervisor" who is not in until monday"    -this is not an issue that help desk will be able to fix    -if necessary will have to send another set of labs to obtain correct date and time     the results are as follows:    Basic Metabolic Panel - STAT (02.04.23 @ 08:20) THIS SHOULD READ  2.04.23 @ 2020    Sodium, Serum: 144 mmol/L    Potassium, Serum: 3.7 mmol/L    Chloride, Serum: 109 mmol/L    Carbon Dioxide, Serum: 29 mmol/L    Anion Gap, Serum: 6 mmol/L    Blood Urea Nitrogen, Serum: 17 mg/dL    Creatinine, Serum: 0.81 mg/dL    Glucose, Serum: 105 mg/dL    Calcium, Total Serum: 7.8 mg/dL    eGFR: 81: The estimated glomerular filtration rate (eGFR) is calculated using the  2021 CKD-EPI creatinine equation, which does not have a coefficient for  race and is validated in individuals 18 years of age and older (N Engl J  Med 2021; 385:4032-0585). Creatinine-based eGFR may be inaccurate in  various situations including but not limited to extremes of muscle mass,  altered dietary protein intake, or medications that affect renal tubular  creatinine secretion. mL/min/1.73m2        this BMP showes improvement of K+ from 2.8 --> 3.7    -has 1 more KCL rider to be given ( 10 meqs) will give now    -IV access has been obtained and patient will go down FOR CTA chest/abd now, ordered post RRT this AM for hypotension and AMS    -patient did receive IVFs, overall status has improved    -labs improved    -fill have team follow up CTA results -of note (currently, as of 2114 on 2/4/2023) the lab results noted for February 4th at 0820 are ACTUALLY results for labs that were sent on February 4th at 8PM    -I have called lab to try to have them change time to correct time however I was told "there is nothing I can do, you have to call the help desk, and that they "will leave a note for lab supervisor" who is not in until monday"    -this is not an issue that help desk will be able to fix    -if necessary will have to send another set of labs to obtain correct date and time     the results are as follows:    Basic Metabolic Panel - STAT (02.04.23 @ 08:20) THIS SHOULD READ  2.04.23 @ 2020    Sodium, Serum: 144 mmol/L    Potassium, Serum: 3.7 mmol/L    Chloride, Serum: 109 mmol/L    Carbon Dioxide, Serum: 29 mmol/L    Anion Gap, Serum: 6 mmol/L    Blood Urea Nitrogen, Serum: 17 mg/dL    Creatinine, Serum: 0.81 mg/dL    Glucose, Serum: 105 mg/dL    Calcium, Total Serum: 7.8 mg/dL    eGFR: 81: The estimated glomerular filtration rate (eGFR) is calculated using the  2021 CKD-EPI creatinine equation, which does not have a coefficient for  race and is validated in individuals 18 years of age and older (N Engl J  Med 2021; 385:3507-0031). Creatinine-based eGFR may be inaccurate in  various situations including but not limited to extremes of muscle mass,  altered dietary protein intake, or medications that affect renal tubular  creatinine secretion. mL/min/1.73m2        this BMP showes improvement of K+ from 2.8 --> 3.7    -has 1 more KCL rider to be given ( 10 meqs) will give now    -IV access has been obtained and patient will go down FOR CTA chest/abd now, ordered post RRT this AM for hypotension and AMS    -patient did receive IVFs, overall status has improved    -labs improved    -fill have team follow up CTA results -of note (currently, as of 2114 on 2/4/2023) the lab results noted for February 4th at 0820 are ACTUALLY results for labs that were sent on February 4th at 8PM    -I have called lab to try to have them change time to correct time however I was told "there is nothing I can do, you have to call the help desk, and that they "will leave a note for lab supervisor" who is not in until monday"    -this is not an issue that help desk will be able to fix    -if necessary will have to send another set of labs to obtain correct date and time     the results are as follows:    Basic Metabolic Panel - STAT (02.04.23 @ 08:20) THIS SHOULD READ  2.04.23 @ 2020    Sodium, Serum: 144 mmol/L    Potassium, Serum: 3.7 mmol/L    Chloride, Serum: 109 mmol/L    Carbon Dioxide, Serum: 29 mmol/L    Anion Gap, Serum: 6 mmol/L    Blood Urea Nitrogen, Serum: 17 mg/dL    Creatinine, Serum: 0.81 mg/dL    Glucose, Serum: 105 mg/dL    Calcium, Total Serum: 7.8 mg/dL    eGFR: 81: The estimated glomerular filtration rate (eGFR) is calculated using the  2021 CKD-EPI creatinine equation, which does not have a coefficient for  race and is validated in individuals 18 years of age and older (N Engl J  Med 2021; 385:1050-9327). Creatinine-based eGFR may be inaccurate in  various situations including but not limited to extremes of muscle mass,  altered dietary protein intake, or medications that affect renal tubular  creatinine secretion. mL/min/1.73m2        this BMP showes improvement of K+ from 2.8 --> 3.7    -has 1 more KCL rider to be given ( 10 meqs) will give now    -IV access has been obtained and patient will go down FOR CTA chest/abd now, ordered post RRT this AM for hypotension and AMS    -patient did receive IVFs, overall status has improved    -labs improved    -fill have team follow up CTA results

## 2023-02-04 NOTE — PROGRESS NOTE ADULT - SUBJECTIVE AND OBJECTIVE BOX
Follow up for  SUBJ:  asked to respond urgently to a code. when patient was seen he was improved with iv fluid resuscitation    PMH  Hypertension    History of BPH    Malignant neoplasm of colon    Femur neck fracture    History of glaucoma    GERD (gastroesophageal reflux disease)    Iron deficiency anemia    Hyperlipidemia        MEDICATIONS  (STANDING):  acetaminophen     Tablet .. 650 milliGRAM(s) Oral every 6 hours  aspirin enteric coated 81 milliGRAM(s) Oral daily  enoxaparin Injectable 30 milliGRAM(s) SubCutaneous every 24 hours  finasteride 5 milliGRAM(s) Oral daily  piperacillin/tazobactam IVPB.. 3.375 Gram(s) IV Intermittent every 8 hours  potassium chloride  10 mEq/100 mL IVPB 10 milliEquivalent(s) IV Intermittent every 1 hour  potassium chloride  10 mEq/100 mL IVPB 10 milliEquivalent(s) IV Intermittent every 1 hour  sodium chloride 0.9% with potassium chloride 20 mEq/L 1000 milliLiter(s) (75 mL/Hr) IV Continuous <Continuous>  tamsulosin 0.8 milliGRAM(s) Oral at bedtime    MEDICATIONS  (PRN):  aluminum hydroxide/magnesium hydroxide/simethicone Suspension 30 milliLiter(s) Oral every 4 hours PRN Dyspepsia  melatonin 3 milliGRAM(s) Oral at bedtime PRN Insomnia  ondansetron Injectable 4 milliGRAM(s) IV Push every 8 hours PRN Nausea and/or Vomiting  oxyCODONE    IR 5 milliGRAM(s) Oral every 6 hours PRN Moderate Pain (4 - 6)  zinc oxide 40% Paste 1 Application(s) Topical three times a day PRN rash        PHYSICAL EXAM:  Vital Signs Last 24 Hrs  T(C): 36.3 (04 Feb 2023 11:22), Max: 36.4 (03 Feb 2023 20:34)  T(F): 97.4 (04 Feb 2023 11:22), Max: 97.5 (03 Feb 2023 20:34)  HR: 128 (04 Feb 2023 11:22) (83 - 128)  BP: 123/50 (04 Feb 2023 05:55) (117/67 - 123/50)  BP(mean): --  RR: 18 (04 Feb 2023 11:22) (17 - 18)  SpO2: 96% (04 Feb 2023 11:22) (95% - 96%)    Parameters below as of 04 Feb 2023 11:22  Patient On (Oxygen Delivery Method): nasal cannula  O2 Flow (L/min): 2      GENERAL: NAD, elderly frail gentleman with a drain  HEAD:  Atraumatic, Normocephalic  EYES: EOMI, PERRLA, conjunctiva and sclera clear  ENT: Moist mucous membranes,  NECK: Supple, No JVD, no bruits  CHEST/LUNG: Clear to percussion bilaterally; No rales, rhonchi, wheezing, or rubs  HEART: Regular rate and rhythm; No murmurs, rubs, or gallops PMI non displaced.  ABDOMEN: Soft, Nontender, Nondistended; Bowel sounds present  EXTREMITIES:  2+ Peripheral Pulses, No clubbing, cyanosis, or edema  SKIN: No rashes or lesions  NERVOUS SYSTEM:  Cranial Nerves II-XII intact      TELEMETRY:    afib    ECG:    < from: 12 Lead ECG (01.30.23 @ 11:46) >    Diagnosis Line SVT, repeat ECG  Nonspecific ST abnormality  Abnormal ECG  When compared with ECG of 22-NOV-2022 18:39,  Supraventricular tachycardia has replaced Sinus rhythm  Confirmed by Ba Acuna ([id number]) on 1/31/2023 7:31:11 AM    < end of copied text >    ECHO:    < from: TTE Echo Complete w/o Contrast w/ Doppler (02.04.23 @ 13:58) >    Summary:   1. Left ventricular ejection fraction, by visual estimation, is 50 to   55%.   2. Normal global left ventricular systolic function.   3. Atypical septal motion suspect bundle branch block.   4. Spectral Doppler shows impaired relaxation pattern of left   ventricular myocardial filling (Grade I diastolic dysfunction).   5. Trace mitral valve regurgitation.   6.Mild tricuspid regurgitation.   7. Sclerotic aortic valve with normal opening.   8. Estimated pulmonary artery systolic pressure is 37.6 mmHg assuming a   right atrial pressure of 10 mmHg, which is consistent with borderline   pulmonary hypertension.    [id number] Vineet Ceja , Electronically signed on 2/4/2023 at   5:03:35 PM            *** Final ***    < end of copied text >      LABS:                        11.8   14.05 )-----------( 210      ( 04 Feb 2023 08:00 )             36.7     02-04    144  |  107  |  16  ----------------------------<  127<H>  2.8<LL>   |  26  |  0.78    Ca    7.9<L>      04 Feb 2023 12:00  Phos  2.6     02-04  Mg     1.6     02-04    TPro  5.8<L>  /  Alb  2.0<L>  /  TBili  0.6  /  DBili  0.2  /  AST  28  /  ALT  39  /  AlkPhos  168<H>  02-04            I&O's Summary    03 Feb 2023 07:01  -  04 Feb 2023 07:00  --------------------------------------------------------  IN: 0 mL / OUT: 935 mL / NET: -935 mL    04 Feb 2023 07:01  -  04 Feb 2023 18:45  --------------------------------------------------------  IN: 120 mL / OUT: 180 mL / NET: -60 mL      BNP    RADIOLOGY & ADDITIONAL STUDIES:    ECHO:      impression  hypotension tachycardia mst likely due to volume depletion with nausea and vomitus and fluid loss after recent cholycystecomty.  would als consider thrombosis evaluation with venous doppler studies and possible scan if appropriate ? anticoagulation candidate with and indwelling drain    discussed with dr tianna dodson and surgical harvey cleary     Follow up for  SUBJ:  asked to respond urgently to a code. when patient was seen he was improved with iv fluid resuscitation    PMH  Hypertension    History of BPH    Malignant neoplasm of colon    Femur neck fracture    History of glaucoma    GERD (gastroesophageal reflux disease)    Iron deficiency anemia    Hyperlipidemia        MEDICATIONS  (STANDING):  acetaminophen     Tablet .. 650 milliGRAM(s) Oral every 6 hours  aspirin enteric coated 81 milliGRAM(s) Oral daily  enoxaparin Injectable 30 milliGRAM(s) SubCutaneous every 24 hours  finasteride 5 milliGRAM(s) Oral daily  piperacillin/tazobactam IVPB.. 3.375 Gram(s) IV Intermittent every 8 hours  potassium chloride  10 mEq/100 mL IVPB 10 milliEquivalent(s) IV Intermittent every 1 hour  potassium chloride  10 mEq/100 mL IVPB 10 milliEquivalent(s) IV Intermittent every 1 hour  sodium chloride 0.9% with potassium chloride 20 mEq/L 1000 milliLiter(s) (75 mL/Hr) IV Continuous <Continuous>  tamsulosin 0.8 milliGRAM(s) Oral at bedtime    MEDICATIONS  (PRN):  aluminum hydroxide/magnesium hydroxide/simethicone Suspension 30 milliLiter(s) Oral every 4 hours PRN Dyspepsia  melatonin 3 milliGRAM(s) Oral at bedtime PRN Insomnia  ondansetron Injectable 4 milliGRAM(s) IV Push every 8 hours PRN Nausea and/or Vomiting  oxyCODONE    IR 5 milliGRAM(s) Oral every 6 hours PRN Moderate Pain (4 - 6)  zinc oxide 40% Paste 1 Application(s) Topical three times a day PRN rash        PHYSICAL EXAM:  Vital Signs Last 24 Hrs  T(C): 36.3 (04 Feb 2023 11:22), Max: 36.4 (03 Feb 2023 20:34)  T(F): 97.4 (04 Feb 2023 11:22), Max: 97.5 (03 Feb 2023 20:34)  HR: 128 (04 Feb 2023 11:22) (83 - 128)  BP: 123/50 (04 Feb 2023 05:55) (117/67 - 123/50)  BP(mean): --  RR: 18 (04 Feb 2023 11:22) (17 - 18)  SpO2: 96% (04 Feb 2023 11:22) (95% - 96%)    Parameters below as of 04 Feb 2023 11:22  Patient On (Oxygen Delivery Method): nasal cannula  O2 Flow (L/min): 2      GENERAL: NAD, elderly frail gentleman with a drain  HEAD:  Atraumatic, Normocephalic  EYES: EOMI, PERRLA, conjunctiva and sclera clear  ENT: Moist mucous membranes,  NECK: Supple, No JVD, no bruits  CHEST/LUNG: Clear to percussion bilaterally; No rales, rhonchi, wheezing, or rubs  HEART: Regular rate and rhythm; No murmurs, rubs, or gallops PMI non displaced.  ABDOMEN: Soft, Nontender, Nondistended; Bowel sounds present  EXTREMITIES:  2+ Peripheral Pulses, No clubbing, cyanosis, or edema  SKIN: No rashes or lesions  NERVOUS SYSTEM:  Cranial Nerves II-XII intact      TELEMETRY:    afib    ECG:    < from: 12 Lead ECG (01.30.23 @ 11:46) >    Diagnosis Line SVT, repeat ECG  Nonspecific ST abnormality  Abnormal ECG  When compared with ECG of 22-NOV-2022 18:39,  Supraventricular tachycardia has replaced Sinus rhythm  Confirmed by Ba Acuna ([id number]) on 1/31/2023 7:31:11 AM    < end of copied text >    ECHO:    < from: TTE Echo Complete w/o Contrast w/ Doppler (02.04.23 @ 13:58) >    Summary:   1. Left ventricular ejection fraction, by visual estimation, is 50 to   55%.   2. Normal global left ventricular systolic function.   3. Atypical septal motion suspect bundle branch block.   4. Spectral Doppler shows impaired relaxation pattern of left   ventricular myocardial filling (Grade I diastolic dysfunction).   5. Trace mitral valve regurgitation.   6.Mild tricuspid regurgitation.   7. Sclerotic aortic valve with normal opening.   8. Estimated pulmonary artery systolic pressure is 37.6 mmHg assuming a   right atrial pressure of 10 mmHg, which is consistent with borderline   pulmonary hypertension.    [id number] Vineet Ceja , Electronically signed on 2/4/2023 at   5:03:35 PM            *** Final ***    < end of copied text >      LABS:                        11.8   14.05 )-----------( 210      ( 04 Feb 2023 08:00 )             36.7     02-04    144  |  107  |  16  ----------------------------<  127<H>  2.8<LL>   |  26  |  0.78    Ca    7.9<L>      04 Feb 2023 12:00  Phos  2.6     02-04  Mg     1.6     02-04    TPro  5.8<L>  /  Alb  2.0<L>  /  TBili  0.6  /  DBili  0.2  /  AST  28  /  ALT  39  /  AlkPhos  168<H>  02-04            I&O's Summary    03 Feb 2023 07:01  -  04 Feb 2023 07:00  --------------------------------------------------------  IN: 0 mL / OUT: 935 mL / NET: -935 mL    04 Feb 2023 07:01  -  04 Feb 2023 18:45  --------------------------------------------------------  IN: 120 mL / OUT: 180 mL / NET: -60 mL      BNP    RADIOLOGY & ADDITIONAL STUDIES:    ECHO:      impression  hypotension tachycardia mst likely due to volume depletion with nausea and vomitus and fluid loss after recent cholycystecomty.  would als consider thrombosis evaluation with venous doppler studies and possible scan if appropriate ? anticoagulation candidate with and indwelling drain    discussed with dr tianna dodson and surgical harvey cleary     Follow up for  SUBJ:  asked to respond urgently to a code. when patient was seen he was improved with iv fluid resuscitation    PMH  Hypertension    History of BPH    Malignant neoplasm of colon    Femur neck fracture    History of glaucoma    GERD (gastroesophageal reflux disease)    Iron deficiency anemia    Hyperlipidemia        MEDICATIONS  (STANDING):  acetaminophen     Tablet .. 650 milliGRAM(s) Oral every 6 hours  aspirin enteric coated 81 milliGRAM(s) Oral daily  enoxaparin Injectable 30 milliGRAM(s) SubCutaneous every 24 hours  finasteride 5 milliGRAM(s) Oral daily  piperacillin/tazobactam IVPB.. 3.375 Gram(s) IV Intermittent every 8 hours  potassium chloride  10 mEq/100 mL IVPB 10 milliEquivalent(s) IV Intermittent every 1 hour  potassium chloride  10 mEq/100 mL IVPB 10 milliEquivalent(s) IV Intermittent every 1 hour  sodium chloride 0.9% with potassium chloride 20 mEq/L 1000 milliLiter(s) (75 mL/Hr) IV Continuous <Continuous>  tamsulosin 0.8 milliGRAM(s) Oral at bedtime    MEDICATIONS  (PRN):  aluminum hydroxide/magnesium hydroxide/simethicone Suspension 30 milliLiter(s) Oral every 4 hours PRN Dyspepsia  melatonin 3 milliGRAM(s) Oral at bedtime PRN Insomnia  ondansetron Injectable 4 milliGRAM(s) IV Push every 8 hours PRN Nausea and/or Vomiting  oxyCODONE    IR 5 milliGRAM(s) Oral every 6 hours PRN Moderate Pain (4 - 6)  zinc oxide 40% Paste 1 Application(s) Topical three times a day PRN rash        PHYSICAL EXAM:  Vital Signs Last 24 Hrs  T(C): 36.3 (04 Feb 2023 11:22), Max: 36.4 (03 Feb 2023 20:34)  T(F): 97.4 (04 Feb 2023 11:22), Max: 97.5 (03 Feb 2023 20:34)  HR: 128 (04 Feb 2023 11:22) (83 - 128)  BP: 123/50 (04 Feb 2023 05:55) (117/67 - 123/50)  BP(mean): --  RR: 18 (04 Feb 2023 11:22) (17 - 18)  SpO2: 96% (04 Feb 2023 11:22) (95% - 96%)    Parameters below as of 04 Feb 2023 11:22  Patient On (Oxygen Delivery Method): nasal cannula  O2 Flow (L/min): 2      GENERAL: NAD, elderly frail gentleman with a drain  HEAD:  Atraumatic, Normocephalic  EYES: EOMI, PERRLA, conjunctiva and sclera clear  ENT: Moist mucous membranes,  NECK: Supple, No JVD, no bruits  CHEST/LUNG: Clear to percussion bilaterally; No rales, rhonchi, wheezing, or rubs  HEART: Regular rate and rhythm; No murmurs, rubs, or gallops PMI non displaced.  ABDOMEN: Soft, Nontender, Nondistended; Bowel sounds present  EXTREMITIES:  2+ Peripheral Pulses, No clubbing, cyanosis, or edema  SKIN: No rashes or lesions  NERVOUS SYSTEM:  Cranial Nerves II-XII intact      TELEMETRY:    afib    ECG:    < from: 12 Lead ECG (01.30.23 @ 11:46) >    Diagnosis Line SVT, repeat ECG  Nonspecific ST abnormality  Abnormal ECG  When compared with ECG of 22-NOV-2022 18:39,  Supraventricular tachycardia has replaced Sinus rhythm  Confirmed by Ba Acuna ([id number]) on 1/31/2023 7:31:11 AM    < end of copied text >    ECHO:    < from: TTE Echo Complete w/o Contrast w/ Doppler (02.04.23 @ 13:58) >    Summary:   1. Left ventricular ejection fraction, by visual estimation, is 50 to   55%.   2. Normal global left ventricular systolic function.   3. Atypical septal motion suspect bundle branch block.   4. Spectral Doppler shows impaired relaxation pattern of left   ventricular myocardial filling (Grade I diastolic dysfunction).   5. Trace mitral valve regurgitation.   6.Mild tricuspid regurgitation.   7. Sclerotic aortic valve with normal opening.   8. Estimated pulmonary artery systolic pressure is 37.6 mmHg assuming a   right atrial pressure of 10 mmHg, which is consistent with borderline   pulmonary hypertension.    [id number] Vineet Ceja , Electronically signed on 2/4/2023 at   5:03:35 PM            *** Final ***    < end of copied text >      LABS:                        11.8   14.05 )-----------( 210      ( 04 Feb 2023 08:00 )             36.7     02-04    144  |  107  |  16  ----------------------------<  127<H>  2.8<LL>   |  26  |  0.78    Ca    7.9<L>      04 Feb 2023 12:00  Phos  2.6     02-04  Mg     1.6     02-04    TPro  5.8<L>  /  Alb  2.0<L>  /  TBili  0.6  /  DBili  0.2  /  AST  28  /  ALT  39  /  AlkPhos  168<H>  02-04            I&O's Summary    03 Feb 2023 07:01  -  04 Feb 2023 07:00  --------------------------------------------------------  IN: 0 mL / OUT: 935 mL / NET: -935 mL    04 Feb 2023 07:01  -  04 Feb 2023 18:45  --------------------------------------------------------  IN: 120 mL / OUT: 180 mL / NET: -60 mL      BNP    RADIOLOGY & ADDITIONAL STUDIES:    ECHO:      impression  hypotension tachycardia mst likely due to volume depletion with nausea and vomitus and fluid loss after recent cholycystecomty.  would als consider thrombosis evaluation with venous doppler studies and possible scan if appropriate ? anticoagulation candidate with and indwelling drain    discussed with dr tianna dodson and surgical harvey cleary

## 2023-02-04 NOTE — PROGRESS NOTE ADULT - NS ATTEND AMEND GEN_ALL_CORE FT
This is a pleasant 89M who presented with acute gangrenous cholecystitis with perforation who is POD#5 s/p laparoscopic cholecystectomy. Intraop there was perforation into the liver bed, with empyema. He tolerated the procedure well. He was extubated and continues to be afebrile and hemodynamically stable. No evidence of gallstone ileus intraop. Patient noted to have severe ileus. Thus, NG initially placed intra-op to decreased risk of aspiration PNA, but patient removed POD 0.     Had multiple episodes of diarrhea overnight, with one episode of emesis which coincided with a brief period of hypotension to the 70s, and tachycardia. He had a rapid response called, and he responded promptly to a fluid bolus. He denied any dizziness, chest pain or SOB during this episode. Abdominal pain continues to improve, no worsening complaints. Prior to this episode he was tolerating a full liquid diet, and continued to have flatus. No subjective fevers, chills or sweats.    He also was noted to have a bladder scan showing urinary retention of 350 cc of fluid. Thus, the felipe was replaced.     At the time of my evaluation his SBP improved to the 100s and tachycardia had resolved with the 1 L bolus of fluid    General appearance: No acute distress, lying comfortably. Alert  Respiratory: Nonlabored breathing  Abdomen: benign, appropriate incisional tenderness, incision c/d/i, drain with serous output. reducible left inguinal hernia. slightly less tympanic.  Extremities: No edema  Neuro: No focal deficits appreciated  Psych: Calm    Labs:   WBC 14 up from 9 Hb 11.8 from 12.4 BUN/cr 17/0.8 from 14/0.73. K was 3.7 this am, with decrease to 2.8 on repeat.  Mg 1.6 Phos 2.6    I believe he likely was hypovolemic, as his IVF were stopped with the advancement of his diet yesterday. Still, he has had diarrhea, and likely was unable to maintain input to balance his output. He was however, fluid responsive. Abdomen continues to be benign, and he continues to be alert and oriented. Likely the episode of emesis was secondary to the brief episode of hypotension.     - Would maintain NPO with IVF for now. CTA of the chest ordered by medicine to rule out PE. Will also undergo CT abdomen/pelvis with IV contrast to r/o intra-abdominal abscess or other abdominal issue.     - If there is an intra-abdominal abscess, would likely benefit from IR drainage. If no acute pathology found, OK to resume regular diet with TID ensures.     - OK for anticoagulation from surgical standpoint if PE is diagnosed.  - Continue IV abx per ID. Trend leukocytosis, which up-trended this am.  - Patient severely hypokalemic this am, likely from diarrhea. Would aggressively replete with IV K. Continue to trend electrolytes to minimize risk factors for ileus. Continue to check daily Mg /Phos to am. labs. Goal K of 4.0, Mg of 2.0.   - If he continues to have diarrhea, would check a C. diff.  - Would continue felipe, given failed void trial and arrange for outpatient urologic follow up regarding this issue.  - Continue drain to bulb suction for now. Will remove prior to discharge. Currently having benign, serous output.  - Continue lovenox for DVT and asa for cardio/stroke protection. Hb stable.  - Cardiology re-consulted regarding brief episode of hypotension. ECHO today demonstrated EF 50-55%, grade I diastolic dysfunction and borderline pulmonary HTN. Their recommendations were to continue supportive management.   - Non operative management of incidental left inguinal hernia at this time. Easily reducible. Will discuss further options as outpatient.    Called patient's daughter to update her on recent events. Awaiting CTA chest / abdomen/pelvis. Per RN, it appears there was a delay, due to difficulty obtaining appropriate sized IV for the study. I was told the ICU PA did successfully obtain an IV and that the CT should be performed shortly.    Plan was also discussed with Cindy (medical PA) and Dr. Bailey (hospitalist).

## 2023-02-05 DIAGNOSIS — R33.9 RETENTION OF URINE, UNSPECIFIED: ICD-10-CM

## 2023-02-05 LAB
ALBUMIN SERPL ELPH-MCNC: 1.8 G/DL — LOW (ref 3.3–5)
ALP SERPL-CCNC: 154 U/L — HIGH (ref 40–120)
ALT FLD-CCNC: 30 U/L — SIGNIFICANT CHANGE UP (ref 10–45)
ANION GAP SERPL CALC-SCNC: 7 MMOL/L — SIGNIFICANT CHANGE UP (ref 5–17)
AST SERPL-CCNC: 27 U/L — SIGNIFICANT CHANGE UP (ref 10–40)
BASOPHILS # BLD AUTO: 0.03 K/UL — SIGNIFICANT CHANGE UP (ref 0–0.2)
BASOPHILS NFR BLD AUTO: 0.3 % — SIGNIFICANT CHANGE UP (ref 0–2)
BILIRUB DIRECT SERPL-MCNC: 0.2 MG/DL — SIGNIFICANT CHANGE UP (ref 0–0.3)
BILIRUB INDIRECT FLD-MCNC: 0.2 MG/DL — SIGNIFICANT CHANGE UP (ref 0.2–1)
BILIRUB SERPL-MCNC: 0.4 MG/DL — SIGNIFICANT CHANGE UP (ref 0.2–1.2)
BUN SERPL-MCNC: 14 MG/DL — SIGNIFICANT CHANGE UP (ref 7–23)
C DIFF BY PCR RESULT: SIGNIFICANT CHANGE UP
CALCIUM SERPL-MCNC: 7.9 MG/DL — LOW (ref 8.4–10.5)
CHLORIDE SERPL-SCNC: 111 MMOL/L — HIGH (ref 96–108)
CO2 SERPL-SCNC: 28 MMOL/L — SIGNIFICANT CHANGE UP (ref 22–31)
CREAT SERPL-MCNC: 0.66 MG/DL — SIGNIFICANT CHANGE UP (ref 0.5–1.3)
EGFR: 86 ML/MIN/1.73M2 — SIGNIFICANT CHANGE UP
EOSINOPHIL # BLD AUTO: 0.12 K/UL — SIGNIFICANT CHANGE UP (ref 0–0.5)
EOSINOPHIL NFR BLD AUTO: 1.3 % — SIGNIFICANT CHANGE UP (ref 0–6)
GLUCOSE SERPL-MCNC: 86 MG/DL — SIGNIFICANT CHANGE UP (ref 70–99)
HCT VFR BLD CALC: 35.3 % — LOW (ref 39–50)
HGB BLD-MCNC: 11 G/DL — LOW (ref 13–17)
IMM GRANULOCYTES NFR BLD AUTO: 1 % — HIGH (ref 0–0.9)
LYMPHOCYTES # BLD AUTO: 0.64 K/UL — LOW (ref 1–3.3)
LYMPHOCYTES # BLD AUTO: 7 % — LOW (ref 13–44)
MAGNESIUM SERPL-MCNC: 2.1 MG/DL — SIGNIFICANT CHANGE UP (ref 1.6–2.6)
MCHC RBC-ENTMCNC: 30.1 PG — SIGNIFICANT CHANGE UP (ref 27–34)
MCHC RBC-ENTMCNC: 31.2 GM/DL — LOW (ref 32–36)
MCV RBC AUTO: 96.7 FL — SIGNIFICANT CHANGE UP (ref 80–100)
MONOCYTES # BLD AUTO: 0.38 K/UL — SIGNIFICANT CHANGE UP (ref 0–0.9)
MONOCYTES NFR BLD AUTO: 4.1 % — SIGNIFICANT CHANGE UP (ref 2–14)
NEUTROPHILS # BLD AUTO: 7.9 K/UL — HIGH (ref 1.8–7.4)
NEUTROPHILS NFR BLD AUTO: 86.3 % — HIGH (ref 43–77)
NRBC # BLD: 0 /100 WBCS — SIGNIFICANT CHANGE UP (ref 0–0)
PHOSPHATE SERPL-MCNC: 2.5 MG/DL — SIGNIFICANT CHANGE UP (ref 2.5–4.5)
PLATELET # BLD AUTO: 188 K/UL — SIGNIFICANT CHANGE UP (ref 150–400)
POTASSIUM SERPL-MCNC: 3.6 MMOL/L — SIGNIFICANT CHANGE UP (ref 3.5–5.3)
POTASSIUM SERPL-SCNC: 3.6 MMOL/L — SIGNIFICANT CHANGE UP (ref 3.5–5.3)
PROT SERPL-MCNC: 5.3 G/DL — LOW (ref 6–8.3)
RBC # BLD: 3.65 M/UL — LOW (ref 4.2–5.8)
RBC # FLD: 15.1 % — HIGH (ref 10.3–14.5)
SODIUM SERPL-SCNC: 146 MMOL/L — HIGH (ref 135–145)
WBC # BLD: 9.16 K/UL — SIGNIFICANT CHANGE UP (ref 3.8–10.5)
WBC # FLD AUTO: 9.16 K/UL — SIGNIFICANT CHANGE UP (ref 3.8–10.5)

## 2023-02-05 PROCEDURE — 99232 SBSQ HOSP IP/OBS MODERATE 35: CPT

## 2023-02-05 PROCEDURE — 99233 SBSQ HOSP IP/OBS HIGH 50: CPT

## 2023-02-05 RX ORDER — SODIUM CHLORIDE 9 MG/ML
1000 INJECTION, SOLUTION INTRAVENOUS
Refills: 0 | Status: DISCONTINUED | OUTPATIENT
Start: 2023-02-05 | End: 2023-02-05

## 2023-02-05 RX ORDER — POTASSIUM CHLORIDE 20 MEQ
10 PACKET (EA) ORAL
Refills: 0 | Status: COMPLETED | OUTPATIENT
Start: 2023-02-05 | End: 2023-02-05

## 2023-02-05 RX ORDER — DIATRIZOATE MEGLUMINE 180 MG/ML
30 INJECTION, SOLUTION INTRAVESICAL ONCE
Refills: 0 | Status: DISCONTINUED | OUTPATIENT
Start: 2023-02-05 | End: 2023-02-06

## 2023-02-05 RX ORDER — CEFTRIAXONE 500 MG/1
1000 INJECTION, POWDER, FOR SOLUTION INTRAMUSCULAR; INTRAVENOUS EVERY 24 HOURS
Refills: 0 | Status: DISCONTINUED | OUTPATIENT
Start: 2023-02-05 | End: 2023-02-06

## 2023-02-05 RX ORDER — SODIUM CHLORIDE 9 MG/ML
1000 INJECTION, SOLUTION INTRAVENOUS
Refills: 0 | Status: DISCONTINUED | OUTPATIENT
Start: 2023-02-05 | End: 2023-02-06

## 2023-02-05 RX ADMIN — Medication 100 MILLIEQUIVALENT(S): at 13:58

## 2023-02-05 RX ADMIN — Medication 650 MILLIGRAM(S): at 11:57

## 2023-02-05 RX ADMIN — Medication 650 MILLIGRAM(S): at 12:57

## 2023-02-05 RX ADMIN — Medication 100 MILLIEQUIVALENT(S): at 11:57

## 2023-02-05 RX ADMIN — TAMSULOSIN HYDROCHLORIDE 0.8 MILLIGRAM(S): 0.4 CAPSULE ORAL at 22:20

## 2023-02-05 RX ADMIN — FINASTERIDE 5 MILLIGRAM(S): 5 TABLET, FILM COATED ORAL at 11:57

## 2023-02-05 RX ADMIN — PIPERACILLIN AND TAZOBACTAM 25 GRAM(S): 4; .5 INJECTION, POWDER, LYOPHILIZED, FOR SOLUTION INTRAVENOUS at 10:32

## 2023-02-05 RX ADMIN — SODIUM CHLORIDE 75 MILLILITER(S): 9 INJECTION, SOLUTION INTRAVENOUS at 11:57

## 2023-02-05 RX ADMIN — ENOXAPARIN SODIUM 30 MILLIGRAM(S): 100 INJECTION SUBCUTANEOUS at 11:57

## 2023-02-05 RX ADMIN — Medication 81 MILLIGRAM(S): at 11:57

## 2023-02-05 RX ADMIN — CEFTRIAXONE 100 MILLIGRAM(S): 500 INJECTION, POWDER, FOR SOLUTION INTRAMUSCULAR; INTRAVENOUS at 17:39

## 2023-02-05 RX ADMIN — Medication 100 MILLIEQUIVALENT(S): at 12:50

## 2023-02-05 NOTE — CONSULT NOTE ADULT - SUBJECTIVE AND OBJECTIVE BOX
Patient is a 89y old  Male who presents with a chief complaint of abdominal pain (05 Feb 2023 09:16)      HPI:  PRETTY DOLAN is a 89y year old Male with PMH of recent L hip fx (11/2022), TIA (11/2022) who presents to the ER from [facility] for abdominal pain, nausea for 4 days.     Patient states his symptoms have been worsening over the last 4 days; had bloodwork done at [facility] which showed elevated LFTs which prompted ER visit. patietn states he has had poor appetite for the same time period. Episode of vomiting in ER, nbnb.   Daughter at bedside who states patient has been participating with therapy however has been more fatigued over the last week.   Denies fever, chills, chest pain, shortness of breath.     On ER arrival, /68, HR 71, RR 17 T97.1, sat 95% on room air. Found to have acute cholecystitis on RUQ US. (30 Jan 2023 15:36)    recurrent urinary retention, on flomax 0.8,mg ... patient denies voiding symptoms, catheter replaced yesterday for > 500ml. prior retention 11/22      PAST MEDICAL & SURGICAL HISTORY:  History of BPH      Malignant neoplasm of colon      Femur neck fracture      History of glaucoma      GERD (gastroesophageal reflux disease)      Iron deficiency anemia      Hyperlipidemia      History of colon surgery          REVIEW OF SYSTEMS:    CONSTITUTIONAL:  no fever or chills  HEENT: No visual changes  ENDO: No sweating  NECK: No pain or stiffness  MUSCULOSKELETAL: No back pain, no joint pain  RESPIRATORY: No shortness of breath  CARDIOVASCULAR: No chest pain  GASTROINTESTINAL: No abdominal or epigastric pain. No nausea, vomiting,  No diarrhea or constipation.   NEUROLOGICAL: No mental status changes  PSYCH: No depression, no mood changes  SKIN: No itching      MEDICATIONS  (STANDING):  acetaminophen     Tablet .. 650 milliGRAM(s) Oral every 6 hours  aspirin enteric coated 81 milliGRAM(s) Oral daily  enoxaparin Injectable 30 milliGRAM(s) SubCutaneous every 24 hours  finasteride 5 milliGRAM(s) Oral daily  lactated ringers. 1000 milliLiter(s) (75 mL/Hr) IV Continuous <Continuous>  piperacillin/tazobactam IVPB.. 3.375 Gram(s) IV Intermittent every 8 hours  potassium chloride  10 mEq/100 mL IVPB 10 milliEquivalent(s) IV Intermittent every 1 hour  tamsulosin 0.8 milliGRAM(s) Oral at bedtime    MEDICATIONS  (PRN):  aluminum hydroxide/magnesium hydroxide/simethicone Suspension 30 milliLiter(s) Oral every 4 hours PRN Dyspepsia  melatonin 3 milliGRAM(s) Oral at bedtime PRN Insomnia  ondansetron Injectable 4 milliGRAM(s) IV Push every 8 hours PRN Nausea and/or Vomiting  oxyCODONE    IR 5 milliGRAM(s) Oral every 6 hours PRN Moderate Pain (4 - 6)  zinc oxide 40% Paste 1 Application(s) Topical three times a day PRN rash      Allergies    No Known Allergies    Intolerances        SOCIAL HISTORY: No illicit drug use    FAMILY HISTORY:  FH: asthma (Mother)          T(C): 36.4 (02-04-23 @ 23:30), Max: 37.2 (02-03-23 @ 11:54)  T(F): 97.6 (02-04-23 @ 23:30), Max: 98.9 (02-03-23 @ 11:54)  HR: 102 (02-04-23 @ 23:30) (83 - 128)  BP: 125/65 (02-04-23 @ 23:30) (117/67 - 125/65)  RR: 15 (02-04-23 @ 23:30) (15 - 18)  SpO2: 94% (02-04-23 @ 23:30) (94% - 96%)      PHYSICAL EXAM:    Constitutional: alert, no acute distress  Back: No CVA tenderness  Respiratory: No accessory respiratory muscle use  Abd: Soft, NT/ND  no organomegaly  reducible left inguinal hernia  : no bladder distention, uncirc, felipe yellow  Extremities: no edema  Neurological: A/O x 3  Psychiatric: Normal mood, normal affect  Skin: No rashes      02-02-23 @ 07:01  -  02-03-23 @ 07:00  --------------------------------------------------------  IN:  Total IN: 0 mL    OUT:    Indwelling Catheter - Urethral (mL): 200 mL  Total OUT: 200 mL    Total NET: -200 mL      02-03-23 @ 07:01  -  02-04-23 @ 07:00  --------------------------------------------------------  IN:  Total IN: 0 mL    OUT:    Post-Void Residual per Intermittent Catheterization (mL): 450 mL  Total OUT: 450 mL    Total NET: -450 mL      02-04-23 @ 07:01  -  02-05-23 @ 07:00  --------------------------------------------------------  IN:  Total IN: 0 mL    OUT:    Indwelling Catheter - Urethral (mL): 300 mL  Total OUT: 300 mL    Total NET: -300 mL        02-04-23 @ 07:01  -  02-05-23 @ 07:00  --------------------------------------------------------  IN: 120 mL / OUT: 560 mL / NET: -440 mL    02-05-23 @ 07:01  -  02-05-23 @ 10:41  --------------------------------------------------------  IN: 0 mL / OUT: 90 mL / NET: -90 mL            LABS:                        11.0   9.16  )-----------( 188      ( 05 Feb 2023 09:20 )             35.3                 RADIOLOGY & ADDITIONAL STUDIES: Patient is a 89y old  Male who presents with a chief complaint of abdominal pain (05 Feb 2023 09:16)      HPI:  PRETTY DOLAN is a 89y year old Male with PMH of recent L hip fx (11/2022), TIA (11/2022) who presents to the ER from [facility] for abdominal pain, nausea for 4 days.     Patient states his symptoms have been worsening over the last 4 days; had bloodwork done at [facility] which showed elevated LFTs which prompted ER visit. patietn states he has had poor appetite for the same time period. Episode of vomiting in ER, nbnb.   Daughter at bedside who states patient has been participating with therapy however has been more fatigued over the last week.   Denies fever, chills, chest pain, shortness of breath.     On ER arrival, /68, HR 71, RR 17 T97.1, sat 95% on room air. Found to have acute cholecystitis on RUQ US. (30 Jan 2023 15:36)    recurrent urinary retention, on flomax 0.8,mg ... patient denies voiding symptoms, catheter replaced yesterday for > 500ml. prior retention 11/22      PAST MEDICAL & SURGICAL HISTORY:  History of BPH      Malignant neoplasm of colon      Femur neck fracture      History of glaucoma      GERD (gastroesophageal reflux disease)      Iron deficiency anemia      Hyperlipidemia      History of colon surgery          REVIEW OF SYSTEMS:    CONSTITUTIONAL:  no fever or chills  HEENT: No visual changes  ENDO: No sweating  NECK: No pain or stiffness  MUSCULOSKELETAL: No back pain, no joint pain  RESPIRATORY: No shortness of breath  CARDIOVASCULAR: No chest pain  GASTROINTESTINAL: No abdominal or epigastric pain. No nausea, vomiting,  No diarrhea or constipation.   NEUROLOGICAL: No mental status changes  PSYCH: No depression, no mood changes  SKIN: No itching      MEDICATIONS  (STANDING):  acetaminophen     Tablet .. 650 milliGRAM(s) Oral every 6 hours  aspirin enteric coated 81 milliGRAM(s) Oral daily  enoxaparin Injectable 30 milliGRAM(s) SubCutaneous every 24 hours  finasteride 5 milliGRAM(s) Oral daily  lactated ringers. 1000 milliLiter(s) (75 mL/Hr) IV Continuous <Continuous>  piperacillin/tazobactam IVPB.. 3.375 Gram(s) IV Intermittent every 8 hours  potassium chloride  10 mEq/100 mL IVPB 10 milliEquivalent(s) IV Intermittent every 1 hour  tamsulosin 0.8 milliGRAM(s) Oral at bedtime    MEDICATIONS  (PRN):  aluminum hydroxide/magnesium hydroxide/simethicone Suspension 30 milliLiter(s) Oral every 4 hours PRN Dyspepsia  melatonin 3 milliGRAM(s) Oral at bedtime PRN Insomnia  ondansetron Injectable 4 milliGRAM(s) IV Push every 8 hours PRN Nausea and/or Vomiting  oxyCODONE    IR 5 milliGRAM(s) Oral every 6 hours PRN Moderate Pain (4 - 6)  zinc oxide 40% Paste 1 Application(s) Topical three times a day PRN rash      Allergies    No Known Allergies    Intolerances        SOCIAL HISTORY: No illicit drug use    FAMILY HISTORY:  FH: asthma (Mother)          T(C): 36.4 (02-04-23 @ 23:30), Max: 37.2 (02-03-23 @ 11:54)  T(F): 97.6 (02-04-23 @ 23:30), Max: 98.9 (02-03-23 @ 11:54)  HR: 102 (02-04-23 @ 23:30) (83 - 128)  BP: 125/65 (02-04-23 @ 23:30) (117/67 - 125/65)  RR: 15 (02-04-23 @ 23:30) (15 - 18)  SpO2: 94% (02-04-23 @ 23:30) (94% - 96%)      PHYSICAL EXAM:    Constitutional: alert, no acute distress  Back: No CVA tenderness  Respiratory: No accessory respiratory muscle use  Abd: Soft, NT/ND  no organomegaly  reducible left inguinal hernia  : no bladder distention, uncirc, felipe yellow  Extremities: no edema  Neurological: A/O x 3  Psychiatric: Normal mood, normal affect  Skin: No rashes      02-02-23 @ 07:01  -  02-03-23 @ 07:00  --------------------------------------------------------  IN:  Total IN: 0 mL    OUT:    Indwelling Catheter - Urethral (mL): 200 mL  Total OUT: 200 mL    Total NET: -200 mL      02-03-23 @ 07:01  -  02-04-23 @ 07:00  --------------------------------------------------------  IN:  Total IN: 0 mL    OUT:    Post-Void Residual per Intermittent Catheterization (mL): 450 mL  Total OUT: 450 mL    Total NET: -450 mL      02-04-23 @ 07:01  -  02-05-23 @ 07:00  --------------------------------------------------------  IN:  Total IN: 0 mL    OUT:    Indwelling Catheter - Urethral (mL): 300 mL  Total OUT: 300 mL    Total NET: -300 mL        02-04-23 @ 07:01  -  02-05-23 @ 07:00  --------------------------------------------------------  IN: 120 mL / OUT: 560 mL / NET: -440 mL    02-05-23 @ 07:01  -  02-05-23 @ 10:41  --------------------------------------------------------  IN: 0 mL / OUT: 90 mL / NET: -90 mL            LABS:                        11.0   9.16  )-----------( 188      ( 05 Feb 2023 09:20 )             35.3                 RADIOLOGY & ADDITIONAL STUDIES: Patient is a 89y old  Male who presents with a chief complaint of abdominal pain (05 Feb 2023 09:16)      HPI:  PRETTY DOLAN is a 89y year old Male with PMH of recent L hip fx (11/2022), TIA (11/2022) who presents to the ER from [facility] for abdominal pain, nausea for 4 days.     Patient states his symptoms have been worsening over the last 4 days; had bloodwork done at [facility] which showed elevated LFTs which prompted ER visit. patietn states he has had poor appetite for the same time period. Episode of vomiting in ER, nbnb.   Daughter at bedside who states patient has been participating with therapy however has been more fatigued over the last week.   Denies fever, chills, chest pain, shortness of breath.     On ER arrival, /68, HR 71, RR 17 T97.1, sat 95% on room air. Found to have acute cholecystitis on RUQ US. (30 Jan 2023 15:36)    recurrent urinary retention, on flomax 0.8,mg ... patient denies voiding symptoms, catheter replaced yesterday for > 500ml. prior retention 11/22      PAST MEDICAL & SURGICAL HISTORY:  History of BPH      Malignant neoplasm of colon      Femur neck fracture      History of glaucoma      GERD (gastroesophageal reflux disease)      Iron deficiency anemia      Hyperlipidemia      History of colon surgery          REVIEW OF SYSTEMS:    CONSTITUTIONAL:  no fever or chills  HEENT: No visual changes  ENDO: No sweating  NECK: No pain or stiffness  MUSCULOSKELETAL: No back pain, no joint pain  RESPIRATORY: No shortness of breath  CARDIOVASCULAR: No chest pain  GASTROINTESTINAL: No abdominal or epigastric pain. No nausea, vomiting,  No diarrhea or constipation.   NEUROLOGICAL: No mental status changes  PSYCH: No depression, no mood changes  SKIN: No itching      MEDICATIONS  (STANDING):  acetaminophen     Tablet .. 650 milliGRAM(s) Oral every 6 hours  aspirin enteric coated 81 milliGRAM(s) Oral daily  enoxaparin Injectable 30 milliGRAM(s) SubCutaneous every 24 hours  finasteride 5 milliGRAM(s) Oral daily  lactated ringers. 1000 milliLiter(s) (75 mL/Hr) IV Continuous <Continuous>  piperacillin/tazobactam IVPB.. 3.375 Gram(s) IV Intermittent every 8 hours  potassium chloride  10 mEq/100 mL IVPB 10 milliEquivalent(s) IV Intermittent every 1 hour  tamsulosin 0.8 milliGRAM(s) Oral at bedtime    MEDICATIONS  (PRN):  aluminum hydroxide/magnesium hydroxide/simethicone Suspension 30 milliLiter(s) Oral every 4 hours PRN Dyspepsia  melatonin 3 milliGRAM(s) Oral at bedtime PRN Insomnia  ondansetron Injectable 4 milliGRAM(s) IV Push every 8 hours PRN Nausea and/or Vomiting  oxyCODONE    IR 5 milliGRAM(s) Oral every 6 hours PRN Moderate Pain (4 - 6)  zinc oxide 40% Paste 1 Application(s) Topical three times a day PRN rash      Allergies    No Known Allergies    Intolerances        SOCIAL HISTORY: No illicit drug use    FAMILY HISTORY:  FH: asthma (Mother)          T(C): 36.4 (02-04-23 @ 23:30), Max: 37.2 (02-03-23 @ 11:54)  T(F): 97.6 (02-04-23 @ 23:30), Max: 98.9 (02-03-23 @ 11:54)  HR: 102 (02-04-23 @ 23:30) (83 - 128)  BP: 125/65 (02-04-23 @ 23:30) (117/67 - 125/65)  RR: 15 (02-04-23 @ 23:30) (15 - 18)  SpO2: 94% (02-04-23 @ 23:30) (94% - 96%)      PHYSICAL EXAM:    Constitutional: alert, no acute distress  Back: No CVA tenderness  Respiratory: No accessory respiratory muscle use  Abd: Soft, NT/ND  no organomegaly  reducible left inguinal hernia  : no bladder distention, uncirc, felipe yellow  Extremities: no edema  Neurological: A/O x 3  Psychiatric: Normal mood, normal affect  Skin: No rashes      02-02-23 @ 07:01  -  02-03-23 @ 07:00  --------------------------------------------------------  IN:  Total IN: 0 mL    OUT:    Indwelling Catheter - Urethral (mL): 200 mL  Total OUT: 200 mL    Total NET: -200 mL      02-03-23 @ 07:01  -  02-04-23 @ 07:00  --------------------------------------------------------  IN:  Total IN: 0 mL    OUT:    Post-Void Residual per Intermittent Catheterization (mL): 450 mL  Total OUT: 450 mL    Total NET: -450 mL      02-04-23 @ 07:01  -  02-05-23 @ 07:00  --------------------------------------------------------  IN:  Total IN: 0 mL    OUT:    Indwelling Catheter - Urethral (mL): 300 mL  Total OUT: 300 mL    Total NET: -300 mL        02-04-23 @ 07:01  -  02-05-23 @ 07:00  --------------------------------------------------------  IN: 120 mL / OUT: 560 mL / NET: -440 mL    02-05-23 @ 07:01  -  02-05-23 @ 10:41  --------------------------------------------------------  IN: 0 mL / OUT: 90 mL / NET: -90 mL            LABS:                        11.0   9.16  )-----------( 188      ( 05 Feb 2023 09:20 )             35.3                 RADIOLOGY & ADDITIONAL STUDIES:

## 2023-02-05 NOTE — CONSULT NOTE ADULT - SUBJECTIVE AND OBJECTIVE BOX
PULMONARY CONSULT  Location of Patient :  [facility] [id number] ( [facility])  Attending requesting Consult:Lynn Bailey  Chief Complaint :  abd pain  Reason For consult : Hypoxia       Initial HPI on admission:  HPI: 89y year old Male with PMH of recent L hip fx (11/2022), TIA (11/2022) who presents to the ER from [facility] for abdominal pain, nausea for 4 days.     Patient states his symptoms have been worsening over the last 4 days; had bloodwork done at [facility] which showed elevated LFTs which prompted ER visit. patietn states he has had poor appetite for the same time period. Episode of vomiting in ER, nbnb.   Daughter at bedside who states patient has been participating with therapy however has been more fatigued over the last week.   Denies fever, chills, chest pain, shortness of breath.     On ER arrival, /68, HR 71, RR 17 T97.1, sat 95% on room air. Found to have acute cholecystitis on RUQ US. (30 Jan 2023 15:36)  patient underwent cholcystectomy on 1/30/23  pulmonary consult called for low o2, and pl effusion on CT    BRIEF HOSPITAL COURSE: ***    PAST MEDICAL & SURGICAL HISTORY:  History of BPH      Malignant neoplasm of colon      Femur neck fracture      History of glaucoma      GERD (gastroesophageal reflux disease)      Iron deficiency anemia      Hyperlipidemia      History of colon surgery        Allergies    No Known Allergies    Intolerances      FAMILY HISTORY:  FH: asthma (Mother)      Social history: Social History:  remote tobacco use. denies alcohol or illicit drug use (30 Jan 2023 15:36)       Smoking:     Drinking:     Drug use:    Review of Systems: as stated above    CONSTITUTIONAL: No fever, No chills, No fatigue  EYES: No eye pain, No visual disturbances, No discharge  ENMT:  No difficulty hearing, No tinnitus, No vertigo; No sinus or throat pain  NECK: No pain, No stiffness  RESPIRATORY: No Cough, No SOB, No Secretions  CARDIOVASCULAR: No chest pain, No palpitations, No dizziness, or No leg swelling  GASTROINTESTINAL: No abdominal or epigastric pain. No nausea, No vomiting, No hematemesis; No diarrhea, No constipation. No melena, No hematochezia.  GENITOURINARY: No dysuria, No frequency, No hematuria, No incontinence  NEUROLOGICAL: No headaches, No memory loss, No loss of strength, No numbness, No tremors  SKIN: No itching, No burning, No rashes, No lesions   MUSCULOSKELETAL: No joint pain or swelling; No muscle, back, No extremity pain  PSYCHIATRIC: No depression, No anxiety, No mood swings, No difficulty sleeping      Medications:  MEDICATIONS  (STANDING):  acetaminophen     Tablet .. 650 milliGRAM(s) Oral every 6 hours  aspirin enteric coated 81 milliGRAM(s) Oral daily  dextrose 5% + sodium chloride 0.45%. 1000 milliLiter(s) (75 mL/Hr) IV Continuous <Continuous>  diatrizoate meglumine/diatrizoate sodium. 30 milliLiter(s) Oral once  enoxaparin Injectable 30 milliGRAM(s) SubCutaneous every 24 hours  finasteride 5 milliGRAM(s) Oral daily  piperacillin/tazobactam IVPB.. 3.375 Gram(s) IV Intermittent every 8 hours  potassium chloride  10 mEq/100 mL IVPB 10 milliEquivalent(s) IV Intermittent every 1 hour  potassium chloride  10 mEq/100 mL IVPB 10 milliEquivalent(s) IV Intermittent every 1 hour  tamsulosin 0.8 milliGRAM(s) Oral at bedtime    MEDICATIONS  (PRN):  aluminum hydroxide/magnesium hydroxide/simethicone Suspension 30 milliLiter(s) Oral every 4 hours PRN Dyspepsia  melatonin 3 milliGRAM(s) Oral at bedtime PRN Insomnia  ondansetron Injectable 4 milliGRAM(s) IV Push every 8 hours PRN Nausea and/or Vomiting  oxyCODONE    IR 5 milliGRAM(s) Oral every 6 hours PRN Moderate Pain (4 - 6)  zinc oxide 40% Paste 1 Application(s) Topical three times a day PRN rash      Antibiotics History  cefepime   IVPB 1000 milliGRAM(s) IV Intermittent once, 01-30-23 @ 12:53, Stop order after: 1 Doses  metroNIDAZOLE  IVPB 500 milliGRAM(s) IV Intermittent once, 01-30-23 @ 14:31, Stop order after: 1 Doses  piperacillin/tazobactam IVPB. 3.375 Gram(s) IV Intermittent once, 01-30-23 @ 15:30, Stop order after: 1 Doses  piperacillin/tazobactam IVPB.- 3.375 Gram(s) IV Intermittent once, 01-31-23 @ 08:00  piperacillin/tazobactam IVPB.- 3.375 Gram(s) IV Intermittent once, 01-31-23 @ 02:00  piperacillin/tazobactam IVPB.- 3.375 Gram(s) IV Intermittent once, 01-30-23 @ 19:00  piperacillin/tazobactam IVPB.- 3.375 Gram(s) IV Intermittent once, 01-30-23 @ 23:41, Stop order after: 1 Doses  piperacillin/tazobactam IVPB.- 3.375 Gram(s) IV Intermittent once, 01-30-23 @ 23:41, Stop order after: 1 Doses  piperacillin/tazobactam IVPB.. 3.375 Gram(s) IV Intermittent every 8 hours, 01-30-23 @ 23:41, Stop order after: 7 Days  piperacillin/tazobactam IVPB.. 3.375 Gram(s) IV Intermittent every 8 hours, 01-31-23 @ 14:00, Stop order after: 7 Days      Heme Medications   aspirin enteric coated 81 milliGRAM(s) Oral daily, 02-02-23 @ 13:08  enoxaparin Injectable 30 milliGRAM(s) SubCutaneous every 24 hours, 02-02-23 @ 10:12      GI Medications  aluminum hydroxide/magnesium hydroxide/simethicone Suspension 30 milliLiter(s) Oral every 4 hours, 01-30-23 @ 23:41,  PRN        Home Medications:  Last Order Reconciliation Date: 01-30-23 @ 16:00 (Admission Reconciliation)  aspirin 81 mg oral delayed release tablet: 1 tab(s) orally once a day (11-23-22 @ 13:49)  atorvastatin 80 mg oral tablet: 1 tab(s) orally once a day (at bedtime) (11-23-22 @ 13:49)  Feosol 325 mg (65 mg elemental iron) oral tablet: 1 tab(s) orally 2 times a day (11-23-22 @ 00:06)  finasteride 5 mg oral tablet: 1 tab(s) orally once a day (11-23-22 @ 00:06)  Lovenox 40 mg/0.4 mL injectable solution: 40 unit(s) injectable once a day (11-23-22 @ 00:05)  Milk of Magnesia 8% oral suspension:  (11-23-22 @ 00:04)  omeprazole 40 mg oral delayed release capsule: 1 cap(s) orally once a day (11-23-22 @ 00:06)  oxyCODONE 5 mg oral tablet: 1 tab(s) orally every 4 hours, As Needed (11-23-22 @ 13:49)  senna leaf extract oral tablet: 2 tab(s) orally once a day (at bedtime) (11-23-22 @ 13:49)  tamsulosin 0.4 mg oral capsule: 1 cap(s) orally once a day (11-23-22 @ 00:06)  Tylenol 325 mg oral tablet: 3 tab(s) orally every 8 hours, As Needed (11-23-22 @ 00:07)      LABS:                        11.0   9.16  )-----------( 188      ( 05 Feb 2023 09:20 )             35.3     02-05    146<H>  |  111<H>  |  14  ----------------------------<  86  3.6   |  28  |  0.66    Ca    7.9<L>      05 Feb 2023 09:20  Phos  2.5     02-05  Mg     2.1     02-05    TPro  5.3<L>  /  Alb  1.8<L>  /  TBili  0.4  /  DBili  0.2  /  AST  27  /  ALT  30  /  AlkPhos  154<H>  02-05    HIT ab -- 02-04 @ 12:00  HIT ab EIA --  D Dimer -[id number]                      CULTURES: (if applicable)    Culture - Urine (collected 01-30-23 @ 12:38)  Source: Catheterized Catheterized  Final Report (02-01-23 @ 19:29):    >100,000 CFU/ml Escherichia coli    >100,000 CFU/ml Aerococcus species    "Aerococcus spp. are predictably susceptible to penicillin,    ampicillin, tetracycline, and vancomycin.  All isolates are    resistant to sulfonamides"  Organism: Escherichia coli (02-01-23 @ 19:29)  Organism: Escherichia coli (02-01-23 @ 19:29)      -  Amikacin: S <=16      -  Amoxicillin/Clavulanic Acid: S <=8/4      -  Ampicillin: S <=8 These ampicillin results predict results for amoxicillin      -  Ampicillin/Sulbactam: S <=4/2 Enterobacter, Klebsiella aerogenes, Citrobacter, and Serratia may develop resistance during prolonged therapy (3-4 days)      -  Aztreonam: S <=4      -  Cefazolin: S <=2 For uncomplicated UTI with K. pneumoniae, E. coli, or P. mirablis: PHOEBE <=16 is sensitive and PHOEBE >=32 is resistant. This also predicts results for oral agents cefaclor, cefdinir, cefpodoxime, cefprozil, cefuroxime axetil, cephalexin and locarbef for uncomplicated UTI. Note that some isolates may be susceptible to these agents while testing resistant to cefazolin.      -  Cefepime: S 8      -  Cefoxitin: S <=8      -  Ceftriaxone: S <=1 Enterobacter, Klebsiella aerogenes, Citrobacter, and Serratia may develop resistance during prolonged therapy      -  Cefuroxime: S <=4      -  Ciprofloxacin: S <=0.25      -  Ertapenem: S <=0.5      -  Gentamicin: S <=2      -  Imipenem: S <=1      -  Levofloxacin: S <=0.5      -  Meropenem: S <=1      -  Nitrofurantoin: S <=32 Should not be used to treat pyelonephritis      -  Piperacillin/Tazobactam: S <=8      -  Tobramycin: S <=2      -  Trimethoprim/Sulfamethoxazole: S <=0.5/9.5      Method Type: PHOEBE    Culture - Blood (collected 01-30-23 @ 12:27)  Source: .Blood Blood-Peripheral  Final Report (02-04-23 @ 15:08):    No Growth Final    Culture - Blood (collected 01-30-23 @ 12:10)  Source: .Blood Blood-Peripheral  Gram Stain (01-31-23 @ 15:34):    Growth in anaerobic bottle: Gram positive cocci in pairs  Final Report (02-01-23 @ 13:38):    Growth in anaerobic bottle: Aerococcus urinae    "Susceptibilities not performed"    ***Blood Panel PCR results on this specimen are available    approximately 3 hours after the Gram stain result.***    Gram stain, PCR, and/or culture results may not always    correspond due to difference in methodologies.    ************************************************************    This PCR assay was performed by multiplex PCR. This    Assay tests for 66 bacterial and resistance gene targets.    Please refer to the [facility] Labs test directory    at [email or web address] for details.  Organism: Blood Culture PCR (02-01-23 @ 13:38)  Organism: Blood Culture PCR (02-01-23 @ 13:38)      -  Blood PCR Panel: NEG      Method Type: PCR            CAPILLARY BLOOD GLUCOSE      POCT Blood Glucose.: 113 mg/dL (04 Feb 2023 11:46)      RADIOLOGY  CXR:      CT:    ECHO:      VITALS:  T(C): 36.4 (02-04-23 @ 23:30), Max: 36.4 (02-04-23 @ 23:30)  T(F): 97.6 (02-04-23 @ 23:30), Max: 97.6 (02-04-23 @ 23:30)  HR: 102 (02-04-23 @ 23:30) (102 - 102)  BP: 125/65 (02-04-23 @ 23:30) (125/65 - 125/65)  BP(mean): --  ABP: --  ABP(mean): --  RR: 15 (02-04-23 @ 23:30) (15 - 15)  SpO2: 94% (02-04-23 @ 23:30) (94% - 94%)  CVP(mm Hg): --  CVP(cm H2O): --    Ins and Outs     02-04-23 @ 07:01  -  02-05-23 @ 07:00  --------------------------------------------------------  IN: 120 mL / OUT: 560 mL / NET: -440 mL    02-05-23 @ 07:01  -  02-05-23 @ 11:30  --------------------------------------------------------  IN: 0 mL / OUT: 90 mL / NET: -90 mL                I&O's Detail    04 Feb 2023 07:01  -  05 Feb 2023 07:00  --------------------------------------------------------  IN:    Oral Fluid: 120 mL  Total IN: 120 mL    OUT:    Bulb (mL): 260 mL    Indwelling Catheter - Urethral (mL): 300 mL  Total OUT: 560 mL    Total NET: -440 mL      05 Feb 2023 07:01  -  05 Feb 2023 11:30  --------------------------------------------------------  IN:  Total IN: 0 mL    OUT:    Bulb (mL): 90 mL    Oral Fluid: 0 mL  Total OUT: 90 mL    Total NET: -90 mL          Physical Examination:  GENERAL:               Alert, Oriented, No acute distress.    HEENT:                    Pupils equal, reactive to light.  Symmetric. No JVD, Moist MM  PULM:                     Bilateral air entry, Clear to auscultation bilaterally, no significant sputum production, No Rales, No Rhonchi, No Wheezing  CVS:                         S1, S2,  No Murmur  ABD:                        Soft, nondistended, nontender, normoactive bowel sounds,   EXT:                         No edema, nontender, No Cyanosis or Clubbing   Vascular:                Warm Extremities, Normal Capillary refill, Normal Distal Pulses  SKIN:                       Warm and well perfused, no rashes noted.   NEURO:                  Alert, oriented, interactive, nonfocal, follows commands  PSYC:                      Calm, + Insight.     PULMONARY CONSULT  Location of Patient :  [facility] [address] ( [facility])  Attending requesting Consult:Lynn Bailey  Chief Complaint :  abd pain  Reason For consult : Hypoxia       Initial HPI on admission:  HPI: 89y year old Male with PMH of recent L hip fx (11/2022), TIA (11/2022) who presents to the ER from [facility] for abdominal pain, nausea for 4 days.     Patient states his symptoms have been worsening over the last 4 days; had bloodwork done at [facility] which showed elevated LFTs which prompted ER visit. patietn states he has had poor appetite for the same time period. Episode of vomiting in ER, nbnb.   Daughter at bedside who states patient has been participating with therapy however has been more fatigued over the last week.   Denies fever, chills, chest pain, shortness of breath.     On ER arrival, /68, HR 71, RR 17 T97.1, sat 95% on room air. Found to have acute cholecystitis on RUQ US. (30 Jan 2023 15:36)  patient underwent cholcystectomy on 1/30/23  pulmonary consult called for low o2, and pl effusion on CT    BRIEF HOSPITAL COURSE: ***    PAST MEDICAL & SURGICAL HISTORY:  History of BPH      Malignant neoplasm of colon      Femur neck fracture      History of glaucoma      GERD (gastroesophageal reflux disease)      Iron deficiency anemia      Hyperlipidemia      History of colon surgery        Allergies    No Known Allergies    Intolerances      FAMILY HISTORY:  FH: asthma (Mother)      Social history: Social History:  remote tobacco use. denies alcohol or illicit drug use (30 Jan 2023 15:36)       Smoking:     Drinking:     Drug use:    Review of Systems: as stated above    CONSTITUTIONAL: No fever, No chills, No fatigue  EYES: No eye pain, No visual disturbances, No discharge  ENMT:  No difficulty hearing, No tinnitus, No vertigo; No sinus or throat pain  NECK: No pain, No stiffness  RESPIRATORY: No Cough, No SOB, No Secretions  CARDIOVASCULAR: No chest pain, No palpitations, No dizziness, or No leg swelling  GASTROINTESTINAL: No abdominal or epigastric pain. No nausea, No vomiting, No hematemesis; No diarrhea, No constipation. No melena, No hematochezia.  GENITOURINARY: No dysuria, No frequency, No hematuria, No incontinence  NEUROLOGICAL: No headaches, No memory loss, No loss of strength, No numbness, No tremors  SKIN: No itching, No burning, No rashes, No lesions   MUSCULOSKELETAL: No joint pain or swelling; No muscle, back, No extremity pain  PSYCHIATRIC: No depression, No anxiety, No mood swings, No difficulty sleeping      Medications:  MEDICATIONS  (STANDING):  acetaminophen     Tablet .. 650 milliGRAM(s) Oral every 6 hours  aspirin enteric coated 81 milliGRAM(s) Oral daily  dextrose 5% + sodium chloride 0.45%. 1000 milliLiter(s) (75 mL/Hr) IV Continuous <Continuous>  diatrizoate meglumine/diatrizoate sodium. 30 milliLiter(s) Oral once  enoxaparin Injectable 30 milliGRAM(s) SubCutaneous every 24 hours  finasteride 5 milliGRAM(s) Oral daily  piperacillin/tazobactam IVPB.. 3.375 Gram(s) IV Intermittent every 8 hours  potassium chloride  10 mEq/100 mL IVPB 10 milliEquivalent(s) IV Intermittent every 1 hour  potassium chloride  10 mEq/100 mL IVPB 10 milliEquivalent(s) IV Intermittent every 1 hour  tamsulosin 0.8 milliGRAM(s) Oral at bedtime    MEDICATIONS  (PRN):  aluminum hydroxide/magnesium hydroxide/simethicone Suspension 30 milliLiter(s) Oral every 4 hours PRN Dyspepsia  melatonin 3 milliGRAM(s) Oral at bedtime PRN Insomnia  ondansetron Injectable 4 milliGRAM(s) IV Push every 8 hours PRN Nausea and/or Vomiting  oxyCODONE    IR 5 milliGRAM(s) Oral every 6 hours PRN Moderate Pain (4 - 6)  zinc oxide 40% Paste 1 Application(s) Topical three times a day PRN rash      Antibiotics History  cefepime   IVPB 1000 milliGRAM(s) IV Intermittent once, 01-30-23 @ 12:53, Stop order after: 1 Doses  metroNIDAZOLE  IVPB 500 milliGRAM(s) IV Intermittent once, 01-30-23 @ 14:31, Stop order after: 1 Doses  piperacillin/tazobactam IVPB. 3.375 Gram(s) IV Intermittent once, 01-30-23 @ 15:30, Stop order after: 1 Doses  piperacillin/tazobactam IVPB.- 3.375 Gram(s) IV Intermittent once, 01-31-23 @ 08:00  piperacillin/tazobactam IVPB.- 3.375 Gram(s) IV Intermittent once, 01-31-23 @ 02:00  piperacillin/tazobactam IVPB.- 3.375 Gram(s) IV Intermittent once, 01-30-23 @ 19:00  piperacillin/tazobactam IVPB.- 3.375 Gram(s) IV Intermittent once, 01-30-23 @ 23:41, Stop order after: 1 Doses  piperacillin/tazobactam IVPB.- 3.375 Gram(s) IV Intermittent once, 01-30-23 @ 23:41, Stop order after: 1 Doses  piperacillin/tazobactam IVPB.. 3.375 Gram(s) IV Intermittent every 8 hours, 01-30-23 @ 23:41, Stop order after: 7 Days  piperacillin/tazobactam IVPB.. 3.375 Gram(s) IV Intermittent every 8 hours, 01-31-23 @ 14:00, Stop order after: 7 Days      Heme Medications   aspirin enteric coated 81 milliGRAM(s) Oral daily, 02-02-23 @ 13:08  enoxaparin Injectable 30 milliGRAM(s) SubCutaneous every 24 hours, 02-02-23 @ 10:12      GI Medications  aluminum hydroxide/magnesium hydroxide/simethicone Suspension 30 milliLiter(s) Oral every 4 hours, 01-30-23 @ 23:41,  PRN        Home Medications:  Last Order Reconciliation Date: 01-30-23 @ 16:00 (Admission Reconciliation)  aspirin 81 mg oral delayed release tablet: 1 tab(s) orally once a day (11-23-22 @ 13:49)  atorvastatin 80 mg oral tablet: 1 tab(s) orally once a day (at bedtime) (11-23-22 @ 13:49)  Feosol 325 mg (65 mg elemental iron) oral tablet: 1 tab(s) orally 2 times a day (11-23-22 @ 00:06)  finasteride 5 mg oral tablet: 1 tab(s) orally once a day (11-23-22 @ 00:06)  Lovenox 40 mg/0.4 mL injectable solution: 40 unit(s) injectable once a day (11-23-22 @ 00:05)  Milk of Magnesia 8% oral suspension:  (11-23-22 @ 00:04)  omeprazole 40 mg oral delayed release capsule: 1 cap(s) orally once a day (11-23-22 @ 00:06)  oxyCODONE 5 mg oral tablet: 1 tab(s) orally every 4 hours, As Needed (11-23-22 @ 13:49)  senna leaf extract oral tablet: 2 tab(s) orally once a day (at bedtime) (11-23-22 @ 13:49)  tamsulosin 0.4 mg oral capsule: 1 cap(s) orally once a day (11-23-22 @ 00:06)  Tylenol 325 mg oral tablet: 3 tab(s) orally every 8 hours, As Needed (11-23-22 @ 00:07)      LABS:                        11.0   9.16  )-----------( 188      ( 05 Feb 2023 09:20 )             35.3     02-05    146<H>  |  111<H>  |  14  ----------------------------<  86  3.6   |  28  |  0.66    Ca    7.9<L>      05 Feb 2023 09:20  Phos  2.5     02-05  Mg     2.1     02-05    TPro  5.3<L>  /  Alb  1.8<L>  /  TBili  0.4  /  DBili  0.2  /  AST  27  /  ALT  30  /  AlkPhos  154<H>  02-05    HIT ab -- 02-04 @ 12:00  HIT ab EIA --  D Dimer -[id number]                      CULTURES: (if applicable)    Culture - Urine (collected 01-30-23 @ 12:38)  Source: Catheterized Catheterized  Final Report (02-01-23 @ 19:29):    >100,000 CFU/ml Escherichia coli    >100,000 CFU/ml Aerococcus species    "Aerococcus spp. are predictably susceptible to penicillin,    ampicillin, tetracycline, and vancomycin.  All isolates are    resistant to sulfonamides"  Organism: Escherichia coli (02-01-23 @ 19:29)  Organism: Escherichia coli (02-01-23 @ 19:29)      -  Amikacin: S <=16      -  Amoxicillin/Clavulanic Acid: S <=8/4      -  Ampicillin: S <=8 These ampicillin results predict results for amoxicillin      -  Ampicillin/Sulbactam: S <=4/2 Enterobacter, Klebsiella aerogenes, Citrobacter, and Serratia may develop resistance during prolonged therapy (3-4 days)      -  Aztreonam: S <=4      -  Cefazolin: S <=2 For uncomplicated UTI with K. pneumoniae, E. coli, or P. mirablis: PHOEBE <=16 is sensitive and PHOEBE >=32 is resistant. This also predicts results for oral agents cefaclor, cefdinir, cefpodoxime, cefprozil, cefuroxime axetil, cephalexin and locarbef for uncomplicated UTI. Note that some isolates may be susceptible to these agents while testing resistant to cefazolin.      -  Cefepime: S 8      -  Cefoxitin: S <=8      -  Ceftriaxone: S <=1 Enterobacter, Klebsiella aerogenes, Citrobacter, and Serratia may develop resistance during prolonged therapy      -  Cefuroxime: S <=4      -  Ciprofloxacin: S <=0.25      -  Ertapenem: S <=0.5      -  Gentamicin: S <=2      -  Imipenem: S <=1      -  Levofloxacin: S <=0.5      -  Meropenem: S <=1      -  Nitrofurantoin: S <=32 Should not be used to treat pyelonephritis      -  Piperacillin/Tazobactam: S <=8      -  Tobramycin: S <=2      -  Trimethoprim/Sulfamethoxazole: S <=0.5/9.5      Method Type: PHOEBE    Culture - Blood (collected 01-30-23 @ 12:27)  Source: .Blood Blood-Peripheral  Final Report (02-04-23 @ 15:08):    No Growth Final    Culture - Blood (collected 01-30-23 @ 12:10)  Source: .Blood Blood-Peripheral  Gram Stain (01-31-23 @ 15:34):    Growth in anaerobic bottle: Gram positive cocci in pairs  Final Report (02-01-23 @ 13:38):    Growth in anaerobic bottle: Aerococcus urinae    "Susceptibilities not performed"    ***Blood Panel PCR results on this specimen are available    approximately 3 hours after the Gram stain result.***    Gram stain, PCR, and/or culture results may not always    correspond due to difference in methodologies.    ************************************************************    This PCR assay was performed by multiplex PCR. This    Assay tests for 66 bacterial and resistance gene targets.    Please refer to the [facility] Labs test directory    at [email or web address] for details.  Organism: Blood Culture PCR (02-01-23 @ 13:38)  Organism: Blood Culture PCR (02-01-23 @ 13:38)      -  Blood PCR Panel: NEG      Method Type: PCR            CAPILLARY BLOOD GLUCOSE      POCT Blood Glucose.: 113 mg/dL (04 Feb 2023 11:46)      RADIOLOGY  CXR:      CT:    ECHO:      VITALS:  T(C): 36.4 (02-04-23 @ 23:30), Max: 36.4 (02-04-23 @ 23:30)  T(F): 97.6 (02-04-23 @ 23:30), Max: 97.6 (02-04-23 @ 23:30)  HR: 102 (02-04-23 @ 23:30) (102 - 102)  BP: 125/65 (02-04-23 @ 23:30) (125/65 - 125/65)  BP(mean): --  ABP: --  ABP(mean): --  RR: 15 (02-04-23 @ 23:30) (15 - 15)  SpO2: 94% (02-04-23 @ 23:30) (94% - 94%)  CVP(mm Hg): --  CVP(cm H2O): --    Ins and Outs     02-04-23 @ 07:01  -  02-05-23 @ 07:00  --------------------------------------------------------  IN: 120 mL / OUT: 560 mL / NET: -440 mL    02-05-23 @ 07:01  -  02-05-23 @ 11:30  --------------------------------------------------------  IN: 0 mL / OUT: 90 mL / NET: -90 mL                I&O's Detail    04 Feb 2023 07:01  -  05 Feb 2023 07:00  --------------------------------------------------------  IN:    Oral Fluid: 120 mL  Total IN: 120 mL    OUT:    Bulb (mL): 260 mL    Indwelling Catheter - Urethral (mL): 300 mL  Total OUT: 560 mL    Total NET: -440 mL      05 Feb 2023 07:01  -  05 Feb 2023 11:30  --------------------------------------------------------  IN:  Total IN: 0 mL    OUT:    Bulb (mL): 90 mL    Oral Fluid: 0 mL  Total OUT: 90 mL    Total NET: -90 mL          Physical Examination:  GENERAL:               Alert, Oriented, No acute distress.    HEENT:                    Pupils equal, reactive to light.  Symmetric. No JVD, Moist MM  PULM:                     Bilateral air entry, Clear to auscultation bilaterally, no significant sputum production, No Rales, No Rhonchi, No Wheezing  CVS:                         S1, S2,  No Murmur  ABD:                        Soft, nondistended, nontender, normoactive bowel sounds,   EXT:                         No edema, nontender, No Cyanosis or Clubbing   Vascular:                Warm Extremities, Normal Capillary refill, Normal Distal Pulses  SKIN:                       Warm and well perfused, no rashes noted.   NEURO:                  Alert, oriented, interactive, nonfocal, follows commands  PSYC:                      Calm, + Insight.     PULMONARY CONSULT  Location of Patient :  [facility] [id number] ( [facility])  Attending requesting Consult:Lynn Bailey  Chief Complaint :  abd pain  Reason For consult : Hypoxia       Initial HPI on admission:  HPI: 89y year old Male with PMH of recent L hip fx (11/2022), TIA (11/2022) who presents to the ER from [facility] for abdominal pain, nausea for 4 days.     Patient states his symptoms have been worsening over the last 4 days; had bloodwork done at [facility] which showed elevated LFTs which prompted ER visit. patietn states he has had poor appetite for the same time period. Episode of vomiting in ER, nbnb.   Daughter at bedside who states patient has been participating with therapy however has been more fatigued over the last week.   Denies fever, chills, chest pain, shortness of breath.     On ER arrival, /68, HR 71, RR 17 T97.1, sat 95% on room air. Found to have acute cholecystitis on RUQ US. (30 Jan 2023 15:36)  patient underwent cholcystectomy on 1/30/23  pulmonary consult called for low o2, and pl effusion on CT    BRIEF HOSPITAL COURSE: ***    PAST MEDICAL & SURGICAL HISTORY:  History of BPH      Malignant neoplasm of colon      Femur neck fracture      History of glaucoma      GERD (gastroesophageal reflux disease)      Iron deficiency anemia      Hyperlipidemia      History of colon surgery        Allergies    No Known Allergies    Intolerances      FAMILY HISTORY:  FH: asthma (Mother)      Social history: Social History:  remote tobacco use. denies alcohol or illicit drug use (30 Jan 2023 15:36)       Smoking:     Drinking:     Drug use:    Review of Systems: as stated above    CONSTITUTIONAL: No fever, No chills, No fatigue  EYES: No eye pain, No visual disturbances, No discharge  ENMT:  No difficulty hearing, No tinnitus, No vertigo; No sinus or throat pain  NECK: No pain, No stiffness  RESPIRATORY: No Cough, No SOB, No Secretions  CARDIOVASCULAR: No chest pain, No palpitations, No dizziness, or No leg swelling  GASTROINTESTINAL: No abdominal or epigastric pain. No nausea, No vomiting, No hematemesis; No diarrhea, No constipation. No melena, No hematochezia.  GENITOURINARY: No dysuria, No frequency, No hematuria, No incontinence  NEUROLOGICAL: No headaches, No memory loss, No loss of strength, No numbness, No tremors  SKIN: No itching, No burning, No rashes, No lesions   MUSCULOSKELETAL: No joint pain or swelling; No muscle, back, No extremity pain  PSYCHIATRIC: No depression, No anxiety, No mood swings, No difficulty sleeping      Medications:  MEDICATIONS  (STANDING):  acetaminophen     Tablet .. 650 milliGRAM(s) Oral every 6 hours  aspirin enteric coated 81 milliGRAM(s) Oral daily  dextrose 5% + sodium chloride 0.45%. 1000 milliLiter(s) (75 mL/Hr) IV Continuous <Continuous>  diatrizoate meglumine/diatrizoate sodium. 30 milliLiter(s) Oral once  enoxaparin Injectable 30 milliGRAM(s) SubCutaneous every 24 hours  finasteride 5 milliGRAM(s) Oral daily  piperacillin/tazobactam IVPB.. 3.375 Gram(s) IV Intermittent every 8 hours  potassium chloride  10 mEq/100 mL IVPB 10 milliEquivalent(s) IV Intermittent every 1 hour  potassium chloride  10 mEq/100 mL IVPB 10 milliEquivalent(s) IV Intermittent every 1 hour  tamsulosin 0.8 milliGRAM(s) Oral at bedtime    MEDICATIONS  (PRN):  aluminum hydroxide/magnesium hydroxide/simethicone Suspension 30 milliLiter(s) Oral every 4 hours PRN Dyspepsia  melatonin 3 milliGRAM(s) Oral at bedtime PRN Insomnia  ondansetron Injectable 4 milliGRAM(s) IV Push every 8 hours PRN Nausea and/or Vomiting  oxyCODONE    IR 5 milliGRAM(s) Oral every 6 hours PRN Moderate Pain (4 - 6)  zinc oxide 40% Paste 1 Application(s) Topical three times a day PRN rash      Antibiotics History  cefepime   IVPB 1000 milliGRAM(s) IV Intermittent once, 01-30-23 @ 12:53, Stop order after: 1 Doses  metroNIDAZOLE  IVPB 500 milliGRAM(s) IV Intermittent once, 01-30-23 @ 14:31, Stop order after: 1 Doses  piperacillin/tazobactam IVPB. 3.375 Gram(s) IV Intermittent once, 01-30-23 @ 15:30, Stop order after: 1 Doses  piperacillin/tazobactam IVPB.- 3.375 Gram(s) IV Intermittent once, 01-31-23 @ 08:00  piperacillin/tazobactam IVPB.- 3.375 Gram(s) IV Intermittent once, 01-31-23 @ 02:00  piperacillin/tazobactam IVPB.- 3.375 Gram(s) IV Intermittent once, 01-30-23 @ 19:00  piperacillin/tazobactam IVPB.- 3.375 Gram(s) IV Intermittent once, 01-30-23 @ 23:41, Stop order after: 1 Doses  piperacillin/tazobactam IVPB.- 3.375 Gram(s) IV Intermittent once, 01-30-23 @ 23:41, Stop order after: 1 Doses  piperacillin/tazobactam IVPB.. 3.375 Gram(s) IV Intermittent every 8 hours, 01-30-23 @ 23:41, Stop order after: 7 Days  piperacillin/tazobactam IVPB.. 3.375 Gram(s) IV Intermittent every 8 hours, 01-31-23 @ 14:00, Stop order after: 7 Days      Heme Medications   aspirin enteric coated 81 milliGRAM(s) Oral daily, 02-02-23 @ 13:08  enoxaparin Injectable 30 milliGRAM(s) SubCutaneous every 24 hours, 02-02-23 @ 10:12      GI Medications  aluminum hydroxide/magnesium hydroxide/simethicone Suspension 30 milliLiter(s) Oral every 4 hours, 01-30-23 @ 23:41,  PRN        Home Medications:  Last Order Reconciliation Date: 01-30-23 @ 16:00 (Admission Reconciliation)  aspirin 81 mg oral delayed release tablet: 1 tab(s) orally once a day (11-23-22 @ 13:49)  atorvastatin 80 mg oral tablet: 1 tab(s) orally once a day (at bedtime) (11-23-22 @ 13:49)  Feosol 325 mg (65 mg elemental iron) oral tablet: 1 tab(s) orally 2 times a day (11-23-22 @ 00:06)  finasteride 5 mg oral tablet: 1 tab(s) orally once a day (11-23-22 @ 00:06)  Lovenox 40 mg/0.4 mL injectable solution: 40 unit(s) injectable once a day (11-23-22 @ 00:05)  Milk of Magnesia 8% oral suspension:  (11-23-22 @ 00:04)  omeprazole 40 mg oral delayed release capsule: 1 cap(s) orally once a day (11-23-22 @ 00:06)  oxyCODONE 5 mg oral tablet: 1 tab(s) orally every 4 hours, As Needed (11-23-22 @ 13:49)  senna leaf extract oral tablet: 2 tab(s) orally once a day (at bedtime) (11-23-22 @ 13:49)  tamsulosin 0.4 mg oral capsule: 1 cap(s) orally once a day (11-23-22 @ 00:06)  Tylenol 325 mg oral tablet: 3 tab(s) orally every 8 hours, As Needed (11-23-22 @ 00:07)      LABS:                        11.0   9.16  )-----------( 188      ( 05 Feb 2023 09:20 )             35.3     02-05    146<H>  |  111<H>  |  14  ----------------------------<  86  3.6   |  28  |  0.66    Ca    7.9<L>      05 Feb 2023 09:20  Phos  2.5     02-05  Mg     2.1     02-05    TPro  5.3<L>  /  Alb  1.8<L>  /  TBili  0.4  /  DBili  0.2  /  AST  27  /  ALT  30  /  AlkPhos  154<H>  02-05    HIT ab -- 02-04 @ 12:00  HIT ab EIA --  D Dimer -[id number]                      CULTURES: (if applicable)    Culture - Urine (collected 01-30-23 @ 12:38)  Source: Catheterized Catheterized  Final Report (02-01-23 @ 19:29):    >100,000 CFU/ml Escherichia coli    >100,000 CFU/ml Aerococcus species    "Aerococcus spp. are predictably susceptible to penicillin,    ampicillin, tetracycline, and vancomycin.  All isolates are    resistant to sulfonamides"  Organism: Escherichia coli (02-01-23 @ 19:29)  Organism: Escherichia coli (02-01-23 @ 19:29)      -  Amikacin: S <=16      -  Amoxicillin/Clavulanic Acid: S <=8/4      -  Ampicillin: S <=8 These ampicillin results predict results for amoxicillin      -  Ampicillin/Sulbactam: S <=4/2 Enterobacter, Klebsiella aerogenes, Citrobacter, and Serratia may develop resistance during prolonged therapy (3-4 days)      -  Aztreonam: S <=4      -  Cefazolin: S <=2 For uncomplicated UTI with K. pneumoniae, E. coli, or P. mirablis: PHOEBE <=16 is sensitive and PHOEBE >=32 is resistant. This also predicts results for oral agents cefaclor, cefdinir, cefpodoxime, cefprozil, cefuroxime axetil, cephalexin and locarbef for uncomplicated UTI. Note that some isolates may be susceptible to these agents while testing resistant to cefazolin.      -  Cefepime: S 8      -  Cefoxitin: S <=8      -  Ceftriaxone: S <=1 Enterobacter, Klebsiella aerogenes, Citrobacter, and Serratia may develop resistance during prolonged therapy      -  Cefuroxime: S <=4      -  Ciprofloxacin: S <=0.25      -  Ertapenem: S <=0.5      -  Gentamicin: S <=2      -  Imipenem: S <=1      -  Levofloxacin: S <=0.5      -  Meropenem: S <=1      -  Nitrofurantoin: S <=32 Should not be used to treat pyelonephritis      -  Piperacillin/Tazobactam: S <=8      -  Tobramycin: S <=2      -  Trimethoprim/Sulfamethoxazole: S <=0.5/9.5      Method Type: PHOEBE    Culture - Blood (collected 01-30-23 @ 12:27)  Source: .Blood Blood-Peripheral  Final Report (02-04-23 @ 15:08):    No Growth Final    Culture - Blood (collected 01-30-23 @ 12:10)  Source: .Blood Blood-Peripheral  Gram Stain (01-31-23 @ 15:34):    Growth in anaerobic bottle: Gram positive cocci in pairs  Final Report (02-01-23 @ 13:38):    Growth in anaerobic bottle: Aerococcus urinae    "Susceptibilities not performed"    ***Blood Panel PCR results on this specimen are available    approximately 3 hours after the Gram stain result.***    Gram stain, PCR, and/or culture results may not always    correspond due to difference in methodologies.    ************************************************************    This PCR assay was performed by multiplex PCR. This    Assay tests for 66 bacterial and resistance gene targets.    Please refer to the [facility] test directory    at [email or web address] for details.  Organism: Blood Culture PCR (02-01-23 @ 13:38)  Organism: Blood Culture PCR (02-01-23 @ 13:38)      -  Blood PCR Panel: NEG      Method Type: PCR            CAPILLARY BLOOD GLUCOSE      POCT Blood Glucose.: 113 mg/dL (04 Feb 2023 11:46)      RADIOLOGY  CXR:      CT:    ECHO:      VITALS:  T(C): 36.4 (02-04-23 @ 23:30), Max: 36.4 (02-04-23 @ 23:30)  T(F): 97.6 (02-04-23 @ 23:30), Max: 97.6 (02-04-23 @ 23:30)  HR: 102 (02-04-23 @ 23:30) (102 - 102)  BP: 125/65 (02-04-23 @ 23:30) (125/65 - 125/65)  BP(mean): --  ABP: --  ABP(mean): --  RR: 15 (02-04-23 @ 23:30) (15 - 15)  SpO2: 94% (02-04-23 @ 23:30) (94% - 94%)  CVP(mm Hg): --  CVP(cm H2O): --    Ins and Outs     02-04-23 @ 07:01  -  02-05-23 @ 07:00  --------------------------------------------------------  IN: 120 mL / OUT: 560 mL / NET: -440 mL    02-05-23 @ 07:01  -  02-05-23 @ 11:30  --------------------------------------------------------  IN: 0 mL / OUT: 90 mL / NET: -90 mL                I&O's Detail    04 Feb 2023 07:01  -  05 Feb 2023 07:00  --------------------------------------------------------  IN:    Oral Fluid: 120 mL  Total IN: 120 mL    OUT:    Bulb (mL): 260 mL    Indwelling Catheter - Urethral (mL): 300 mL  Total OUT: 560 mL    Total NET: -440 mL      05 Feb 2023 07:01  -  05 Feb 2023 11:30  --------------------------------------------------------  IN:  Total IN: 0 mL    OUT:    Bulb (mL): 90 mL    Oral Fluid: 0 mL  Total OUT: 90 mL    Total NET: -90 mL          Physical Examination:  GENERAL:               Alert, Oriented, No acute distress.    HEENT:                    Pupils equal, reactive to light.  Symmetric. No JVD, Moist MM  PULM:                     Bilateral air entry, Clear to auscultation bilaterally, no significant sputum production, No Rales, No Rhonchi, No Wheezing  CVS:                         S1, S2,  No Murmur  ABD:                        Soft, nondistended, nontender, normoactive bowel sounds,   EXT:                         No edema, nontender, No Cyanosis or Clubbing   Vascular:                Warm Extremities, Normal Capillary refill, Normal Distal Pulses  SKIN:                       Warm and well perfused, no rashes noted.   NEURO:                  Alert, oriented, interactive, nonfocal, follows commands  PSYC:                      Calm, + Insight.     PULMONARY CONSULT  Location of Patient :  [facility] [id number] ( [facility])  Attending requesting Consult:Lynn Bailey  Chief Complaint :  abd pain  Reason For consult : Hypoxia       Initial HPI on admission:  HPI: 89y year old Male with PMH of recent L hip fx (11/2022), TIA (11/2022) who presents to the ER from [facility] for abdominal pain, nausea for 4 days.     Patient states his symptoms have been worsening over the last 4 days; had bloodwork done at [facility] which showed elevated LFTs which prompted ER visit. patietn states he has had poor appetite for the same time period. Episode of vomiting in ER, nbnb.   Daughter at bedside who states patient has been participating with therapy however has been more fatigued over the last week.   Denies fever, chills, chest pain, shortness of breath.     On ER arrival, /68, HR 71, RR 17 T97.1, sat 95% on room air. Found to have acute cholecystitis on RUQ US. (30 Jan 2023 15:36)  patient underwent cholcystectomy on 1/30/23  pulmonary consult called for low o2, and pl effusion on CT  patient sats 100% on 3 L n.c on room air sat 92-94%     patient seen and examined  states he feels well, his respiratory status is fair  denies, cp, sob, cough, secretions, n/v, abd pain.       PAST MEDICAL & SURGICAL HISTORY:  History of BPH  Malignant neoplasm of colon  Femur neck fracture  History of glaucoma  GERD (gastroesophageal reflux disease)  Iron deficiency anemia  Hyperlipidemia  History of colon surgery    Allergies    No Known Allergies    Intolerances      FAMILY HISTORY:  FH: asthma (Mother)      Social history: Social History:  remote tobacco use quit 10-15 years ago smoked 1-1.5 pks/day since age 18. denies alcohol or illicit drug use (30 Jan 2023 15:36)       Review of Systems: as stated above    CONSTITUTIONAL: No fever, No chills, +fatigue  EYES: No eye pain, No visual disturbances, No discharge  ENMT:  No difficulty hearing, No tinnitus, No vertigo; No sinus or throat pain  NECK: No pain, No stiffness  RESPIRATORY: No Cough, No SOB, No Secretions  CARDIOVASCULAR: No chest pain, No palpitations, No dizziness, or No leg swelling  GASTROINTESTINAL: Mild abdominal /epigastric pain. No nausea, No vomiting, No hematemesis; No diarrhea, No constipation. No melena, No hematochezia.  GENITOURINARY: No dysuria, No frequency, No hematuria, No incontinence  NEUROLOGICAL: No headaches, No memory loss, No loss of strength, No numbness, No tremors  SKIN: No itching, No burning, No rashes, No lesions   MUSCULOSKELETAL: No joint pain or swelling; No muscle, back, No extremity pain  PSYCHIATRIC: No depression, No anxiety, No mood swings, No difficulty sleeping      Medications:  MEDICATIONS  (STANDING):  acetaminophen     Tablet .. 650 milliGRAM(s) Oral every 6 hours  aspirin enteric coated 81 milliGRAM(s) Oral daily  dextrose 5% + sodium chloride 0.45%. 1000 milliLiter(s) (75 mL/Hr) IV Continuous <Continuous>  diatrizoate meglumine/diatrizoate sodium. 30 milliLiter(s) Oral once  enoxaparin Injectable 30 milliGRAM(s) SubCutaneous every 24 hours  finasteride 5 milliGRAM(s) Oral daily  piperacillin/tazobactam IVPB.. 3.375 Gram(s) IV Intermittent every 8 hours  potassium chloride  10 mEq/100 mL IVPB 10 milliEquivalent(s) IV Intermittent every 1 hour  potassium chloride  10 mEq/100 mL IVPB 10 milliEquivalent(s) IV Intermittent every 1 hour  tamsulosin 0.8 milliGRAM(s) Oral at bedtime    MEDICATIONS  (PRN):  aluminum hydroxide/magnesium hydroxide/simethicone Suspension 30 milliLiter(s) Oral every 4 hours PRN Dyspepsia  melatonin 3 milliGRAM(s) Oral at bedtime PRN Insomnia  ondansetron Injectable 4 milliGRAM(s) IV Push every 8 hours PRN Nausea and/or Vomiting  oxyCODONE    IR 5 milliGRAM(s) Oral every 6 hours PRN Moderate Pain (4 - 6)  zinc oxide 40% Paste 1 Application(s) Topical three times a day PRN rash      Antibiotics History  cefepime   IVPB 1000 milliGRAM(s) IV Intermittent once, 01-30-23 @ 12:53, Stop order after: 1 Doses  metroNIDAZOLE  IVPB 500 milliGRAM(s) IV Intermittent once, 01-30-23 @ 14:31, Stop order after: 1 Doses  piperacillin/tazobactam IVPB. 3.375 Gram(s) IV Intermittent once, 01-30-23 @ 15:30, Stop order after: 1 Doses  piperacillin/tazobactam IVPB.- 3.375 Gram(s) IV Intermittent once, 01-31-23 @ 08:00  piperacillin/tazobactam IVPB.- 3.375 Gram(s) IV Intermittent once, 01-31-23 @ 02:00  piperacillin/tazobactam IVPB.- 3.375 Gram(s) IV Intermittent once, 01-30-23 @ 19:00  piperacillin/tazobactam IVPB.- 3.375 Gram(s) IV Intermittent once, 01-30-23 @ 23:41, Stop order after: 1 Doses  piperacillin/tazobactam IVPB.- 3.375 Gram(s) IV Intermittent once, 01-30-23 @ 23:41, Stop order after: 1 Doses  piperacillin/tazobactam IVPB.. 3.375 Gram(s) IV Intermittent every 8 hours, 01-30-23 @ 23:41, Stop order after: 7 Days  piperacillin/tazobactam IVPB.. 3.375 Gram(s) IV Intermittent every 8 hours, 01-31-23 @ 14:00, Stop order after: 7 Days      Heme Medications   aspirin enteric coated 81 milliGRAM(s) Oral daily, 02-02-23 @ 13:08  enoxaparin Injectable 30 milliGRAM(s) SubCutaneous every 24 hours, 02-02-23 @ 10:12      GI Medications  aluminum hydroxide/magnesium hydroxide/simethicone Suspension 30 milliLiter(s) Oral every 4 hours, 01-30-23 @ 23:41,  PRN        Home Medications:  Last Order Reconciliation Date: 01-30-23 @ 16:00 (Admission Reconciliation)  aspirin 81 mg oral delayed release tablet: 1 tab(s) orally once a day (11-23-22 @ 13:49)  atorvastatin 80 mg oral tablet: 1 tab(s) orally once a day (at bedtime) (11-23-22 @ 13:49)  Feosol 325 mg (65 mg elemental iron) oral tablet: 1 tab(s) orally 2 times a day (11-23-22 @ 00:06)  finasteride 5 mg oral tablet: 1 tab(s) orally once a day (11-23-22 @ 00:06)  Lovenox 40 mg/0.4 mL injectable solution: 40 unit(s) injectable once a day (11-23-22 @ 00:05)  Milk of Magnesia 8% oral suspension:  (11-23-22 @ 00:04)  omeprazole 40 mg oral delayed release capsule: 1 cap(s) orally once a day (11-23-22 @ 00:06)  oxyCODONE 5 mg oral tablet: 1 tab(s) orally every 4 hours, As Needed (11-23-22 @ 13:49)  senna leaf extract oral tablet: 2 tab(s) orally once a day (at bedtime) (11-23-22 @ 13:49)  tamsulosin 0.4 mg oral capsule: 1 cap(s) orally once a day (11-23-22 @ 00:06)  Tylenol 325 mg oral tablet: 3 tab(s) orally every 8 hours, As Needed (11-23-22 @ 00:07)      LABS:                        11.0   9.16  )-----------( 188      ( 05 Feb 2023 09:20 )             35.3     02-05    146<H>  |  111<H>  |  14  ----------------------------<  86  3.6   |  28  |  0.66    Ca    7.9<L>      05 Feb 2023 09:20  Phos  2.5     02-05  Mg     2.1     02-05    TPro  5.3<L>  /  Alb  1.8<L>  /  TBili  0.4  /  DBili  0.2  /  AST  27  /  ALT  30  /  AlkPhos  154<H>  02-05    HIT ab -- 02-04 @ 12:00  HIT ab EIA --  D Dimer -[id number]             CULTURES: (if applicable)    Culture - Urine (collected 01-30-23 @ 12:38)  Source: Catheterized Catheterized  Final Report (02-01-23 @ 19:29):    >100,000 CFU/ml Escherichia coli    >100,000 CFU/ml Aerococcus species    "Aerococcus spp. are predictably susceptible to penicillin,    ampicillin, tetracycline, and vancomycin.  All isolates are    resistant to sulfonamides"  Organism: Escherichia coli (02-01-23 @ 19:29)  Organism: Escherichia coli (02-01-23 @ 19:29)      -  Amikacin: S <=16      -  Amoxicillin/Clavulanic Acid: S <=8/4      -  Ampicillin: S <=8 These ampicillin results predict results for amoxicillin      -  Ampicillin/Sulbactam: S <=4/2 Enterobacter, Klebsiella aerogenes, Citrobacter, and Serratia may develop resistance during prolonged therapy (3-4 days)      -  Aztreonam: S <=4      -  Cefazolin: S <=2 For uncomplicated UTI with K. pneumoniae, E. coli, or P. mirablis: PHOEBE <=16 is sensitive and PHOEBE >=32 is resistant. This also predicts results for oral agents cefaclor, cefdinir, cefpodoxime, cefprozil, cefuroxime axetil, cephalexin and locarbef for uncomplicated UTI. Note that some isolates may be susceptible to these agents while testing resistant to cefazolin.      -  Cefepime: S 8      -  Cefoxitin: S <=8      -  Ceftriaxone: S <=1 Enterobacter, Klebsiella aerogenes, Citrobacter, and Serratia may develop resistance during prolonged therapy      -  Cefuroxime: S <=4      -  Ciprofloxacin: S <=0.25      -  Ertapenem: S <=0.5      -  Gentamicin: S <=2      -  Imipenem: S <=1      -  Levofloxacin: S <=0.5      -  Meropenem: S <=1      -  Nitrofurantoin: S <=32 Should not be used to treat pyelonephritis      -  Piperacillin/Tazobactam: S <=8      -  Tobramycin: S <=2      -  Trimethoprim/Sulfamethoxazole: S <=0.5/9.5      Method Type: PHOEBE    Culture - Blood (collected 01-30-23 @ 12:27)  Source: .Blood Blood-Peripheral  Final Report (02-04-23 @ 15:08):    No Growth Final    Culture - Blood (collected 01-30-23 @ 12:10)  Source: .Blood Blood-Peripheral  Gram Stain (01-31-23 @ 15:34):    Growth in anaerobic bottle: Gram positive cocci in pairs  Final Report (02-01-23 @ 13:38):    Growth in anaerobic bottle: Aerococcus urinae    "Susceptibilities not performed"    ***Blood Panel PCR results on this specimen are available    approximately 3 hours after the Gram stain result.***    Gram stain, PCR, and/or culture results may not always    correspond due to difference in methodologies.    ************************************************************    This PCR assay was performed by multiplex PCR. This    Assay tests for 66 bacterial and resistance gene targets.    Please refer to the [facility] test directory    at [email or web address] for details.  Organism: Blood Culture PCR (02-01-23 @ 13:38)  Organism: Blood Culture PCR (02-01-23 @ 13:38)      -  Blood PCR Panel: NEG      Method Type: PCR            CAPILLARY BLOOD GLUCOSE      POCT Blood Glucose.: 113 mg/dL (04 Feb 2023 11:46)      RADIOLOGY  CXR:      CT:  < from: CT Abdomen and Pelvis w/ IV Cont (01.30.23 @ 13:47) >  PROCEDURE DATE:  01/30/2023          INTERPRETATION:  CLINICAL INFORMATION: 89 years  Male with epigastric and   RUQ pain.    COMPARISON: None.    CONTRAST/COMPLICATIONS:  IV Contrast: Omnipaque 350  90 cc administered   10 cc discarded  Oral Contrast: NONE  Complications: None reported at time of study completion    PROCEDURE:  CT of the Abdomen and Pelvis was performed.  Sagittal and coronal reformats were performed.    FINDINGS:  LOWER CHEST: Within normal limits.    LIVER: 1.5 cm hepatic dome cyst.  BILE DUCTS: Normal caliber.  GALLBLADDER: Markedly irregular gallbladder wall with areas of   discontinuity. Pericholecystic fluid. Air-fluid level in the gallbladder.   Consistent with either gallstone ileus or gangrenous cholecystitis.  SPLEEN: Within normal limits.  PANCREAS: Within normal limits.  ADRENALS: Within normal limits.  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 1.4 cm right renal   cyst. No hydroureteronephrosis.    BLADDER: Completely decompressed. Musa balloon is distended in the   prostatic urethra.  REPRODUCTIVE ORGANS: Enlarged prostate 5.7 x 3.9 cm. Musa balloon   distended in the prostatic urethra. Prostate partially obscured by streak   artifact.    BOWEL: Distended fluid-filled small bowel loops measuring upto 4.0 cm to   the level of the ileocolic anastomosis. Unobstructed descending and   sigmoid colon extends into a left inguinal hernia. Constipated rectum.  PERITONEUM: No ascites.  VESSELS: Atherosclerotic changes. 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  RETROPERITONEUM/LYMPH NODES: No lymphadenopathy.  ABDOMINAL WALL: Tip arthroplasty.  BONES: Degenerative changes.    IMPRESSION:  Markedly irregular gallbladder wall with areas of discontinuity. Air in the gallbladder. Pericholecystic fluid. Distal small bowel obstruction.   Findings suggestive of gallstone ileus however no gallstone is visualized with certainty. Consider gangrenous cholecystitis as well.    Musa balloon distended within the prostatic urethra.Recommend repositioning.  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  Nonobstructed colon in the left inguinal hernia, does NOT appear to be  related to the bowel obstruction.        --- End of Report ---    < end of copied text >  PROCEDURE DATE: 02/04/2023  ******PRELIMINARY REPORT******    ******PRELIMINARY REPORT******          INTERPRETATION: Moderate size right pleural effusion with associated infiltrate and/or atelectasis right lung base.    No evidence pulmonary embolism.    Right-sided drainage catheter within the abdomen.    No free air or abscess.    Postsurgical changes right colon. Ileocolic anastomosis present.    Distended and obstructed appearing small bowel within the abdomen and pelvis. Left inguinal hernia present but appears to contain only colon and does NOT appear to be source of obstruction. Similar findings on prior CT January 30, 2023. Surgical consultation and follow-up recommended.    No pneumatosis or portal venous gas to suggest intestinal ischemia.        ******PRELIMINARY REPORT******    ******PRELIMINARY REPORT******      ECHO:      VITALS:  T(C): 36.4 (02-04-23 @ 23:30), Max: 36.4 (02-04-23 @ 23:30)  T(F): 97.6 (02-04-23 @ 23:30), Max: 97.6 (02-04-23 @ 23:30)  HR: 102 (02-04-23 @ 23:30) (102 - 102)  BP: 125/65 (02-04-23 @ 23:30) (125/65 - 125/65)  BP(mean): --  ABP: --  ABP(mean): --  RR: 15 (02-04-23 @ 23:30) (15 - 15)  SpO2: 94% (02-04-23 @ 23:30) (94% - 94%)  CVP(mm Hg): --  CVP(cm H2O): --    Ins and Outs     02-04-23 @ 07:01  -  02-05-23 @ 07:00  --------------------------------------------------------  IN: 120 mL / OUT: 560 mL / NET: -440 mL    02-05-23 @ 07:01  -  02-05-23 @ 11:30  --------------------------------------------------------  IN: 0 mL / OUT: 90 mL / NET: -90 mL                I&O's Detail    04 Feb 2023 07:01  -  05 Feb 2023 07:00  --------------------------------------------------------  IN:    Oral Fluid: 120 mL  Total IN: 120 mL    OUT:    Bulb (mL): 260 mL    Indwelling Catheter - Urethral (mL): 300 mL  Total OUT: 560 mL    Total NET: -440 mL      05 Feb 2023 07:01  -  05 Feb 2023 11:30  --------------------------------------------------------  IN:  Total IN: 0 mL    OUT:    Bulb (mL): 90 mL    Oral Fluid: 0 mL  Total OUT: 90 mL    Total NET: -90 mL          Physical Examination:  GENERAL:               Alert, Oriented, No acute distress.    HEENT:                     No JVD, Moist MM  PULM:                     Bilateral air entry, diminished to auscultation bilaterally, no significant sputum production, No Rales, No Rhonchi, No Wheezing  CVS:                         S1, S2,  +Murmur  ABD:                        Soft, nondistended, nontender, normoactive bowel sounds, post op incision sites intact  EXT:                         No edema, nontender, No Cyanosis or Clubbing    NEURO:                  Alert, oriented, interactive, nonfocal, follows commands  PSYC:                      Calm, + Insight.     PULMONARY CONSULT  Location of Patient :  [facility] [id number] ( [facility])  Attending requesting Consult:Lynn Bailey  Chief Complaint :  abd pain  Reason For consult : Hypoxia       Initial HPI on admission:  HPI: 89y year old Male with PMH of recent L hip fx (11/2022), TIA (11/2022) who presents to the ER from [facility] for abdominal pain, nausea for 4 days.     Patient states his symptoms have been worsening over the last 4 days; had bloodwork done at [facility] which showed elevated LFTs which prompted ER visit. patietn states he has had poor appetite for the same time period. Episode of vomiting in ER, nbnb.   Daughter at bedside who states patient has been participating with therapy however has been more fatigued over the last week.   Denies fever, chills, chest pain, shortness of breath.     On ER arrival, /68, HR 71, RR 17 T97.1, sat 95% on room air. Found to have acute cholecystitis on RUQ US. (30 Jan 2023 15:36)  patient underwent cholcystectomy on 1/30/23  pulmonary consult called for low o2, and pl effusion on CT  patient sats 100% on 3 L n.c on room air sat 92-94%     patient seen and examined  states he feels well, his respiratory status is fair  denies, cp, sob, cough, secretions, n/v, abd pain.       PAST MEDICAL & SURGICAL HISTORY:  History of BPH  Malignant neoplasm of colon  Femur neck fracture  History of glaucoma  GERD (gastroesophageal reflux disease)  Iron deficiency anemia  Hyperlipidemia  History of colon surgery    Allergies    No Known Allergies    Intolerances      FAMILY HISTORY:  FH: asthma (Mother)      Social history: Social History:  remote tobacco use quit 10-15 years ago smoked 1-1.5 pks/day since age 18. denies alcohol or illicit drug use (30 Jan 2023 15:36)       Review of Systems: as stated above    CONSTITUTIONAL: No fever, No chills, +fatigue  EYES: No eye pain, No visual disturbances, No discharge  ENMT:  No difficulty hearing, No tinnitus, No vertigo; No sinus or throat pain  NECK: No pain, No stiffness  RESPIRATORY: No Cough, No SOB, No Secretions  CARDIOVASCULAR: No chest pain, No palpitations, No dizziness, or No leg swelling  GASTROINTESTINAL: Mild abdominal /epigastric pain. No nausea, No vomiting, No hematemesis; No diarrhea, No constipation. No melena, No hematochezia.  GENITOURINARY: No dysuria, No frequency, No hematuria, No incontinence  NEUROLOGICAL: No headaches, No memory loss, No loss of strength, No numbness, No tremors  SKIN: No itching, No burning, No rashes, No lesions   MUSCULOSKELETAL: No joint pain or swelling; No muscle, back, No extremity pain  PSYCHIATRIC: No depression, No anxiety, No mood swings, No difficulty sleeping      Medications:  MEDICATIONS  (STANDING):  acetaminophen     Tablet .. 650 milliGRAM(s) Oral every 6 hours  aspirin enteric coated 81 milliGRAM(s) Oral daily  dextrose 5% + sodium chloride 0.45%. 1000 milliLiter(s) (75 mL/Hr) IV Continuous <Continuous>  diatrizoate meglumine/diatrizoate sodium. 30 milliLiter(s) Oral once  enoxaparin Injectable 30 milliGRAM(s) SubCutaneous every 24 hours  finasteride 5 milliGRAM(s) Oral daily  piperacillin/tazobactam IVPB.. 3.375 Gram(s) IV Intermittent every 8 hours  potassium chloride  10 mEq/100 mL IVPB 10 milliEquivalent(s) IV Intermittent every 1 hour  potassium chloride  10 mEq/100 mL IVPB 10 milliEquivalent(s) IV Intermittent every 1 hour  tamsulosin 0.8 milliGRAM(s) Oral at bedtime    MEDICATIONS  (PRN):  aluminum hydroxide/magnesium hydroxide/simethicone Suspension 30 milliLiter(s) Oral every 4 hours PRN Dyspepsia  melatonin 3 milliGRAM(s) Oral at bedtime PRN Insomnia  ondansetron Injectable 4 milliGRAM(s) IV Push every 8 hours PRN Nausea and/or Vomiting  oxyCODONE    IR 5 milliGRAM(s) Oral every 6 hours PRN Moderate Pain (4 - 6)  zinc oxide 40% Paste 1 Application(s) Topical three times a day PRN rash      Antibiotics History  cefepime   IVPB 1000 milliGRAM(s) IV Intermittent once, 01-30-23 @ 12:53, Stop order after: 1 Doses  metroNIDAZOLE  IVPB 500 milliGRAM(s) IV Intermittent once, 01-30-23 @ 14:31, Stop order after: 1 Doses  piperacillin/tazobactam IVPB. 3.375 Gram(s) IV Intermittent once, 01-30-23 @ 15:30, Stop order after: 1 Doses  piperacillin/tazobactam IVPB.- 3.375 Gram(s) IV Intermittent once, 01-31-23 @ 08:00  piperacillin/tazobactam IVPB.- 3.375 Gram(s) IV Intermittent once, 01-31-23 @ 02:00  piperacillin/tazobactam IVPB.- 3.375 Gram(s) IV Intermittent once, 01-30-23 @ 19:00  piperacillin/tazobactam IVPB.- 3.375 Gram(s) IV Intermittent once, 01-30-23 @ 23:41, Stop order after: 1 Doses  piperacillin/tazobactam IVPB.- 3.375 Gram(s) IV Intermittent once, 01-30-23 @ 23:41, Stop order after: 1 Doses  piperacillin/tazobactam IVPB.. 3.375 Gram(s) IV Intermittent every 8 hours, 01-30-23 @ 23:41, Stop order after: 7 Days  piperacillin/tazobactam IVPB.. 3.375 Gram(s) IV Intermittent every 8 hours, 01-31-23 @ 14:00, Stop order after: 7 Days      Heme Medications   aspirin enteric coated 81 milliGRAM(s) Oral daily, 02-02-23 @ 13:08  enoxaparin Injectable 30 milliGRAM(s) SubCutaneous every 24 hours, 02-02-23 @ 10:12      GI Medications  aluminum hydroxide/magnesium hydroxide/simethicone Suspension 30 milliLiter(s) Oral every 4 hours, 01-30-23 @ 23:41,  PRN        Home Medications:  Last Order Reconciliation Date: 01-30-23 @ 16:00 (Admission Reconciliation)  aspirin 81 mg oral delayed release tablet: 1 tab(s) orally once a day (11-23-22 @ 13:49)  atorvastatin 80 mg oral tablet: 1 tab(s) orally once a day (at bedtime) (11-23-22 @ 13:49)  Feosol 325 mg (65 mg elemental iron) oral tablet: 1 tab(s) orally 2 times a day (11-23-22 @ 00:06)  finasteride 5 mg oral tablet: 1 tab(s) orally once a day (11-23-22 @ 00:06)  Lovenox 40 mg/0.4 mL injectable solution: 40 unit(s) injectable once a day (11-23-22 @ 00:05)  Milk of Magnesia 8% oral suspension:  (11-23-22 @ 00:04)  omeprazole 40 mg oral delayed release capsule: 1 cap(s) orally once a day (11-23-22 @ 00:06)  oxyCODONE 5 mg oral tablet: 1 tab(s) orally every 4 hours, As Needed (11-23-22 @ 13:49)  senna leaf extract oral tablet: 2 tab(s) orally once a day (at bedtime) (11-23-22 @ 13:49)  tamsulosin 0.4 mg oral capsule: 1 cap(s) orally once a day (11-23-22 @ 00:06)  Tylenol 325 mg oral tablet: 3 tab(s) orally every 8 hours, As Needed (11-23-22 @ 00:07)      LABS:                        11.0   9.16  )-----------( 188      ( 05 Feb 2023 09:20 )             35.3     02-05    146<H>  |  111<H>  |  14  ----------------------------<  86  3.6   |  28  |  0.66    Ca    7.9<L>      05 Feb 2023 09:20  Phos  2.5     02-05  Mg     2.1     02-05    TPro  5.3<L>  /  Alb  1.8<L>  /  TBili  0.4  /  DBili  0.2  /  AST  27  /  ALT  30  /  AlkPhos  154<H>  02-05    HIT ab -- 02-04 @ 12:00  HIT ab EIA --  D Dimer -[id number]             CULTURES: (if applicable)    Culture - Urine (collected 01-30-23 @ 12:38)  Source: Catheterized Catheterized  Final Report (02-01-23 @ 19:29):    >100,000 CFU/ml Escherichia coli    >100,000 CFU/ml Aerococcus species    "Aerococcus spp. are predictably susceptible to penicillin,    ampicillin, tetracycline, and vancomycin.  All isolates are    resistant to sulfonamides"  Organism: Escherichia coli (02-01-23 @ 19:29)  Organism: Escherichia coli (02-01-23 @ 19:29)      -  Amikacin: S <=16      -  Amoxicillin/Clavulanic Acid: S <=8/4      -  Ampicillin: S <=8 These ampicillin results predict results for amoxicillin      -  Ampicillin/Sulbactam: S <=4/2 Enterobacter, Klebsiella aerogenes, Citrobacter, and Serratia may develop resistance during prolonged therapy (3-4 days)      -  Aztreonam: S <=4      -  Cefazolin: S <=2 For uncomplicated UTI with K. pneumoniae, E. coli, or P. mirablis: PHOEBE <=16 is sensitive and PHOEBE >=32 is resistant. This also predicts results for oral agents cefaclor, cefdinir, cefpodoxime, cefprozil, cefuroxime axetil, cephalexin and locarbef for uncomplicated UTI. Note that some isolates may be susceptible to these agents while testing resistant to cefazolin.      -  Cefepime: S 8      -  Cefoxitin: S <=8      -  Ceftriaxone: S <=1 Enterobacter, Klebsiella aerogenes, Citrobacter, and Serratia may develop resistance during prolonged therapy      -  Cefuroxime: S <=4      -  Ciprofloxacin: S <=0.25      -  Ertapenem: S <=0.5      -  Gentamicin: S <=2      -  Imipenem: S <=1      -  Levofloxacin: S <=0.5      -  Meropenem: S <=1      -  Nitrofurantoin: S <=32 Should not be used to treat pyelonephritis      -  Piperacillin/Tazobactam: S <=8      -  Tobramycin: S <=2      -  Trimethoprim/Sulfamethoxazole: S <=0.5/9.5      Method Type: PHOEBE    Culture - Blood (collected 01-30-23 @ 12:27)  Source: .Blood Blood-Peripheral  Final Report (02-04-23 @ 15:08):    No Growth Final    Culture - Blood (collected 01-30-23 @ 12:10)  Source: .Blood Blood-Peripheral  Gram Stain (01-31-23 @ 15:34):    Growth in anaerobic bottle: Gram positive cocci in pairs  Final Report (02-01-23 @ 13:38):    Growth in anaerobic bottle: Aerococcus urinae    "Susceptibilities not performed"    ***Blood Panel PCR results on this specimen are available    approximately 3 hours after the Gram stain result.***    Gram stain, PCR, and/or culture results may not always    correspond due to difference in methodologies.    ************************************************************    This PCR assay was performed by multiplex PCR. This    Assay tests for 66 bacterial and resistance gene targets.    Please refer to the [facility] test directory    at [email or web address] for details.  Organism: Blood Culture PCR (02-01-23 @ 13:38)  Organism: Blood Culture PCR (02-01-23 @ 13:38)      -  Blood PCR Panel: NEG      Method Type: PCR            CAPILLARY BLOOD GLUCOSE      POCT Blood Glucose.: 113 mg/dL (04 Feb 2023 11:46)      RADIOLOGY  CXR:      CT:  < from: CT Abdomen and Pelvis w/ IV Cont (01.30.23 @ 13:47) >  PROCEDURE DATE:  01/30/2023          INTERPRETATION:  CLINICAL INFORMATION: 89 years  Male with epigastric and   RUQ pain.    COMPARISON: None.    CONTRAST/COMPLICATIONS:  IV Contrast: Omnipaque 350  90 cc administered   10 cc discarded  Oral Contrast: NONE  Complications: None reported at time of study completion    PROCEDURE:  CT of the Abdomen and Pelvis was performed.  Sagittal and coronal reformats were performed.    FINDINGS:  LOWER CHEST: Within normal limits.    LIVER: 1.5 cm hepatic dome cyst.  BILE DUCTS: Normal caliber.  GALLBLADDER: Markedly irregular gallbladder wall with areas of   discontinuity. Pericholecystic fluid. Air-fluid level in the gallbladder.   Consistent with either gallstone ileus or gangrenous cholecystitis.  SPLEEN: Within normal limits.  PANCREAS: Within normal limits.  ADRENALS: Within normal limits.  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 1.4 cm right renal   cyst. No hydroureteronephrosis.    BLADDER: Completely decompressed. Musa balloon is distended in the   prostatic urethra.  REPRODUCTIVE ORGANS: Enlarged prostate 5.7 x 3.9 cm. Musa balloon   distended in the prostatic urethra. Prostate partially obscured by streak   artifact.    BOWEL: Distended fluid-filled small bowel loops measuring upto 4.0 cm to   the level of the ileocolic anastomosis. Unobstructed descending and   sigmoid colon extends into a left inguinal hernia. Constipated rectum.  PERITONEUM: No ascites.  VESSELS: Atherosclerotic changes. 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  RETROPERITONEUM/LYMPH NODES: No lymphadenopathy.  ABDOMINAL WALL: Tip arthroplasty.  BONES: Degenerative changes.    IMPRESSION:  Markedly irregular gallbladder wall with areas of discontinuity. Air in the gallbladder. Pericholecystic fluid. Distal small bowel obstruction.   Findings suggestive of gallstone ileus however no gallstone is visualized with certainty. Consider gangrenous cholecystitis as well.    Musa balloon distended within the prostatic urethra.Recommend repositioning.  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  Nonobstructed colon in the left inguinal hernia, does NOT appear to be  related to the bowel obstruction.        --- End of Report ---    < end of copied text >  PROCEDURE DATE: 02/04/2023  ******PRELIMINARY REPORT******    ******PRELIMINARY REPORT******          INTERPRETATION: Moderate size right pleural effusion with associated infiltrate and/or atelectasis right lung base.    No evidence pulmonary embolism.    Right-sided drainage catheter within the abdomen.    No free air or abscess.    Postsurgical changes right colon. Ileocolic anastomosis present.    Distended and obstructed appearing small bowel within the abdomen and pelvis. Left inguinal hernia present but appears to contain only colon and does NOT appear to be source of obstruction. Similar findings on prior CT January 30, 2023. Surgical consultation and follow-up recommended.    No pneumatosis or portal venous gas to suggest intestinal ischemia.        ******PRELIMINARY REPORT******    ******PRELIMINARY REPORT******      ECHO:      VITALS:  T(C): 36.4 (02-04-23 @ 23:30), Max: 36.4 (02-04-23 @ 23:30)  T(F): 97.6 (02-04-23 @ 23:30), Max: 97.6 (02-04-23 @ 23:30)  HR: 102 (02-04-23 @ 23:30) (102 - 102)  BP: 125/65 (02-04-23 @ 23:30) (125/65 - 125/65)  BP(mean): --  ABP: --  ABP(mean): --  RR: 15 (02-04-23 @ 23:30) (15 - 15)  SpO2: 94% (02-04-23 @ 23:30) (94% - 94%)  CVP(mm Hg): --  CVP(cm H2O): --    Ins and Outs     02-04-23 @ 07:01  -  02-05-23 @ 07:00  --------------------------------------------------------  IN: 120 mL / OUT: 560 mL / NET: -440 mL    02-05-23 @ 07:01  -  02-05-23 @ 11:30  --------------------------------------------------------  IN: 0 mL / OUT: 90 mL / NET: -90 mL                I&O's Detail    04 Feb 2023 07:01  -  05 Feb 2023 07:00  --------------------------------------------------------  IN:    Oral Fluid: 120 mL  Total IN: 120 mL    OUT:    Bulb (mL): 260 mL    Indwelling Catheter - Urethral (mL): 300 mL  Total OUT: 560 mL    Total NET: -440 mL      05 Feb 2023 07:01  -  05 Feb 2023 11:30  --------------------------------------------------------  IN:  Total IN: 0 mL    OUT:    Bulb (mL): 90 mL    Oral Fluid: 0 mL  Total OUT: 90 mL    Total NET: -90 mL          Physical Examination:  GENERAL:               Alert, Oriented, No acute distress.    HEENT:                     No JVD, Moist MM  PULM:                     Bilateral air entry, diminished to auscultation bilaterally, no significant sputum production, No Rales, No Rhonchi, No Wheezing  CVS:                         S1, S2,  +Murmur  ABD:                        Soft, nondistended, nontender, normoactive bowel sounds, post op incision sites intact  EXT:                         No edema, nontender, No Cyanosis or Clubbing    NEURO:                  Alert, oriented, interactive, nonfocal, follows commands  PSYC:                      Calm, + Insight.     PULMONARY CONSULT  Location of Patient :  [facility] [id number] ( [facility])  Attending requesting Consult:Lynn Bailey  Chief Complaint :  abd pain  Reason For consult : Hypoxia       Initial HPI on admission:  HPI: 89y year old Male with PMH of recent L hip fx (11/2022), TIA (11/2022) who presents to the ER from [facility] for abdominal pain, nausea for 4 days.     Patient states his symptoms have been worsening over the last 4 days; had bloodwork done at [facility] which showed elevated LFTs which prompted ER visit. patietn states he has had poor appetite for the same time period. Episode of vomiting in ER, nbnb.   Daughter at bedside who states patient has been participating with therapy however has been more fatigued over the last week.   Denies fever, chills, chest pain, shortness of breath.     On ER arrival, /68, HR 71, RR 17 T97.1, sat 95% on room air. Found to have acute cholecystitis on RUQ US. (30 Jan 2023 15:36)  patient underwent cholcystectomy on 1/30/23  pulmonary consult called for low o2, and pl effusion on CT  patient sats 100% on 3 L n.c on room air sat 92-94%     patient seen and examined  states he feels well, his respiratory status is fair  denies, cp, sob, cough, secretions, n/v, abd pain.       PAST MEDICAL & SURGICAL HISTORY:  History of BPH  Malignant neoplasm of colon  Femur neck fracture  History of glaucoma  GERD (gastroesophageal reflux disease)  Iron deficiency anemia  Hyperlipidemia  History of colon surgery    Allergies    No Known Allergies    Intolerances      FAMILY HISTORY:  FH: asthma (Mother)      Social history: Social History:  remote tobacco use quit 10-15 years ago smoked 1-1.5 pks/day since age 18. denies alcohol or illicit drug use (30 Jan 2023 15:36)       Review of Systems: as stated above    CONSTITUTIONAL: No fever, No chills, +fatigue  EYES: No eye pain, No visual disturbances, No discharge  ENMT:  No difficulty hearing, No tinnitus, No vertigo; No sinus or throat pain  NECK: No pain, No stiffness  RESPIRATORY: No Cough, No SOB, No Secretions  CARDIOVASCULAR: No chest pain, No palpitations, No dizziness, or No leg swelling  GASTROINTESTINAL: Mild abdominal /epigastric pain. No nausea, No vomiting, No hematemesis; No diarrhea, No constipation. No melena, No hematochezia.  GENITOURINARY: No dysuria, No frequency, No hematuria, No incontinence  NEUROLOGICAL: No headaches, No memory loss, No loss of strength, No numbness, No tremors  SKIN: No itching, No burning, No rashes, No lesions   MUSCULOSKELETAL: No joint pain or swelling; No muscle, back, No extremity pain  PSYCHIATRIC: No depression, No anxiety, No mood swings, No difficulty sleeping      Medications:  MEDICATIONS  (STANDING):  acetaminophen     Tablet .. 650 milliGRAM(s) Oral every 6 hours  aspirin enteric coated 81 milliGRAM(s) Oral daily  dextrose 5% + sodium chloride 0.45%. 1000 milliLiter(s) (75 mL/Hr) IV Continuous <Continuous>  diatrizoate meglumine/diatrizoate sodium. 30 milliLiter(s) Oral once  enoxaparin Injectable 30 milliGRAM(s) SubCutaneous every 24 hours  finasteride 5 milliGRAM(s) Oral daily  piperacillin/tazobactam IVPB.. 3.375 Gram(s) IV Intermittent every 8 hours  potassium chloride  10 mEq/100 mL IVPB 10 milliEquivalent(s) IV Intermittent every 1 hour  potassium chloride  10 mEq/100 mL IVPB 10 milliEquivalent(s) IV Intermittent every 1 hour  tamsulosin 0.8 milliGRAM(s) Oral at bedtime    MEDICATIONS  (PRN):  aluminum hydroxide/magnesium hydroxide/simethicone Suspension 30 milliLiter(s) Oral every 4 hours PRN Dyspepsia  melatonin 3 milliGRAM(s) Oral at bedtime PRN Insomnia  ondansetron Injectable 4 milliGRAM(s) IV Push every 8 hours PRN Nausea and/or Vomiting  oxyCODONE    IR 5 milliGRAM(s) Oral every 6 hours PRN Moderate Pain (4 - 6)  zinc oxide 40% Paste 1 Application(s) Topical three times a day PRN rash      Antibiotics History  cefepime   IVPB 1000 milliGRAM(s) IV Intermittent once, 01-30-23 @ 12:53, Stop order after: 1 Doses  metroNIDAZOLE  IVPB 500 milliGRAM(s) IV Intermittent once, 01-30-23 @ 14:31, Stop order after: 1 Doses  piperacillin/tazobactam IVPB. 3.375 Gram(s) IV Intermittent once, 01-30-23 @ 15:30, Stop order after: 1 Doses  piperacillin/tazobactam IVPB.- 3.375 Gram(s) IV Intermittent once, 01-31-23 @ 08:00  piperacillin/tazobactam IVPB.- 3.375 Gram(s) IV Intermittent once, 01-31-23 @ 02:00  piperacillin/tazobactam IVPB.- 3.375 Gram(s) IV Intermittent once, 01-30-23 @ 19:00  piperacillin/tazobactam IVPB.- 3.375 Gram(s) IV Intermittent once, 01-30-23 @ 23:41, Stop order after: 1 Doses  piperacillin/tazobactam IVPB.- 3.375 Gram(s) IV Intermittent once, 01-30-23 @ 23:41, Stop order after: 1 Doses  piperacillin/tazobactam IVPB.. 3.375 Gram(s) IV Intermittent every 8 hours, 01-30-23 @ 23:41, Stop order after: 7 Days  piperacillin/tazobactam IVPB.. 3.375 Gram(s) IV Intermittent every 8 hours, 01-31-23 @ 14:00, Stop order after: 7 Days      Heme Medications   aspirin enteric coated 81 milliGRAM(s) Oral daily, 02-02-23 @ 13:08  enoxaparin Injectable 30 milliGRAM(s) SubCutaneous every 24 hours, 02-02-23 @ 10:12      GI Medications  aluminum hydroxide/magnesium hydroxide/simethicone Suspension 30 milliLiter(s) Oral every 4 hours, 01-30-23 @ 23:41,  PRN        Home Medications:  Last Order Reconciliation Date: 01-30-23 @ 16:00 (Admission Reconciliation)  aspirin 81 mg oral delayed release tablet: 1 tab(s) orally once a day (11-23-22 @ 13:49)  atorvastatin 80 mg oral tablet: 1 tab(s) orally once a day (at bedtime) (11-23-22 @ 13:49)  Feosol 325 mg (65 mg elemental iron) oral tablet: 1 tab(s) orally 2 times a day (11-23-22 @ 00:06)  finasteride 5 mg oral tablet: 1 tab(s) orally once a day (11-23-22 @ 00:06)  Lovenox 40 mg/0.4 mL injectable solution: 40 unit(s) injectable once a day (11-23-22 @ 00:05)  Milk of Magnesia 8% oral suspension:  (11-23-22 @ 00:04)  omeprazole 40 mg oral delayed release capsule: 1 cap(s) orally once a day (11-23-22 @ 00:06)  oxyCODONE 5 mg oral tablet: 1 tab(s) orally every 4 hours, As Needed (11-23-22 @ 13:49)  senna leaf extract oral tablet: 2 tab(s) orally once a day (at bedtime) (11-23-22 @ 13:49)  tamsulosin 0.4 mg oral capsule: 1 cap(s) orally once a day (11-23-22 @ 00:06)  Tylenol 325 mg oral tablet: 3 tab(s) orally every 8 hours, As Needed (11-23-22 @ 00:07)      LABS:                        11.0   9.16  )-----------( 188      ( 05 Feb 2023 09:20 )             35.3     02-05    146<H>  |  111<H>  |  14  ----------------------------<  86  3.6   |  28  |  0.66    Ca    7.9<L>      05 Feb 2023 09:20  Phos  2.5     02-05  Mg     2.1     02-05    TPro  5.3<L>  /  Alb  1.8<L>  /  TBili  0.4  /  DBili  0.2  /  AST  27  /  ALT  30  /  AlkPhos  154<H>  02-05    HIT ab -- 02-04 @ 12:00  HIT ab EIA --  D Dimer -[id number]             CULTURES: (if applicable)    Culture - Urine (collected 01-30-23 @ 12:38)  Source: Catheterized Catheterized  Final Report (02-01-23 @ 19:29):    >100,000 CFU/ml Escherichia coli    >100,000 CFU/ml Aerococcus species    "Aerococcus spp. are predictably susceptible to penicillin,    ampicillin, tetracycline, and vancomycin.  All isolates are    resistant to sulfonamides"  Organism: Escherichia coli (02-01-23 @ 19:29)  Organism: Escherichia coli (02-01-23 @ 19:29)      -  Amikacin: S <=16      -  Amoxicillin/Clavulanic Acid: S <=8/4      -  Ampicillin: S <=8 These ampicillin results predict results for amoxicillin      -  Ampicillin/Sulbactam: S <=4/2 Enterobacter, Klebsiella aerogenes, Citrobacter, and Serratia may develop resistance during prolonged therapy (3-4 days)      -  Aztreonam: S <=4      -  Cefazolin: S <=2 For uncomplicated UTI with K. pneumoniae, E. coli, or P. mirablis: PHOEBE <=16 is sensitive and PHOEBE >=32 is resistant. This also predicts results for oral agents cefaclor, cefdinir, cefpodoxime, cefprozil, cefuroxime axetil, cephalexin and locarbef for uncomplicated UTI. Note that some isolates may be susceptible to these agents while testing resistant to cefazolin.      -  Cefepime: S 8      -  Cefoxitin: S <=8      -  Ceftriaxone: S <=1 Enterobacter, Klebsiella aerogenes, Citrobacter, and Serratia may develop resistance during prolonged therapy      -  Cefuroxime: S <=4      -  Ciprofloxacin: S <=0.25      -  Ertapenem: S <=0.5      -  Gentamicin: S <=2      -  Imipenem: S <=1      -  Levofloxacin: S <=0.5      -  Meropenem: S <=1      -  Nitrofurantoin: S <=32 Should not be used to treat pyelonephritis      -  Piperacillin/Tazobactam: S <=8      -  Tobramycin: S <=2      -  Trimethoprim/Sulfamethoxazole: S <=0.5/9.5      Method Type: PHOEBE    Culture - Blood (collected 01-30-23 @ 12:27)  Source: .Blood Blood-Peripheral  Final Report (02-04-23 @ 15:08):    No Growth Final    Culture - Blood (collected 01-30-23 @ 12:10)  Source: .Blood Blood-Peripheral  Gram Stain (01-31-23 @ 15:34):    Growth in anaerobic bottle: Gram positive cocci in pairs  Final Report (02-01-23 @ 13:38):    Growth in anaerobic bottle: Aerococcus urinae    "Susceptibilities not performed"    ***Blood Panel PCR results on this specimen are available    approximately 3 hours after the Gram stain result.***    Gram stain, PCR, and/or culture results may not always    correspond due to difference in methodologies.    ************************************************************    This PCR assay was performed by multiplex PCR. This    Assay tests for 66 bacterial and resistance gene targets.    Please refer to the [facility] test directory    at [email or web address] for details.  Organism: Blood Culture PCR (02-01-23 @ 13:38)  Organism: Blood Culture PCR (02-01-23 @ 13:38)      -  Blood PCR Panel: NEG      Method Type: PCR            CAPILLARY BLOOD GLUCOSE      POCT Blood Glucose.: 113 mg/dL (04 Feb 2023 11:46)      RADIOLOGY  CXR:      CT:  < from: CT Abdomen and Pelvis w/ IV Cont (01.30.23 @ 13:47) >  PROCEDURE DATE:  01/30/2023          INTERPRETATION:  CLINICAL INFORMATION: 89 years  Male with epigastric and   RUQ pain.    COMPARISON: None.    CONTRAST/COMPLICATIONS:  IV Contrast: Omnipaque 350  90 cc administered   10 cc discarded  Oral Contrast: NONE  Complications: None reported at time of study completion    PROCEDURE:  CT of the Abdomen and Pelvis was performed.  Sagittal and coronal reformats were performed.    FINDINGS:  LOWER CHEST: Within normal limits.    LIVER: 1.5 cm hepatic dome cyst.  BILE DUCTS: Normal caliber.  GALLBLADDER: Markedly irregular gallbladder wall with areas of   discontinuity. Pericholecystic fluid. Air-fluid level in the gallbladder.   Consistent with either gallstone ileus or gangrenous cholecystitis.  SPLEEN: Within normal limits.  PANCREAS: Within normal limits.  ADRENALS: Within normal limits.  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 1.4 cm right renal   cyst. No hydroureteronephrosis.    BLADDER: Completely decompressed. Musa balloon is distended in the   prostatic urethra.  REPRODUCTIVE ORGANS: Enlarged prostate 5.7 x 3.9 cm. Musa balloon   distended in the prostatic urethra. Prostate partially obscured by streak   artifact.    BOWEL: Distended fluid-filled small bowel loops measuring upto 4.0 cm to   the level of the ileocolic anastomosis. Unobstructed descending and   sigmoid colon extends into a left inguinal hernia. Constipated rectum.  PERITONEUM: No ascites.  VESSELS: Atherosclerotic changes. 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  RETROPERITONEUM/LYMPH NODES: No lymphadenopathy.  ABDOMINAL WALL: Tip arthroplasty.  BONES: Degenerative changes.    IMPRESSION:  Markedly irregular gallbladder wall with areas of discontinuity. Air in the gallbladder. Pericholecystic fluid. Distal small bowel obstruction.   Findings suggestive of gallstone ileus however no gallstone is visualized with certainty. Consider gangrenous cholecystitis as well.    Musa balloon distended within the prostatic urethra.Recommend repositioning.  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  Nonobstructed colon in the left inguinal hernia, does NOT appear to be  related to the bowel obstruction.        --- End of Report ---    < end of copied text >  PROCEDURE DATE: 02/04/2023  ******PRELIMINARY REPORT******    ******PRELIMINARY REPORT******          INTERPRETATION: Moderate size right pleural effusion with associated infiltrate and/or atelectasis right lung base.    No evidence pulmonary embolism.    Right-sided drainage catheter within the abdomen.    No free air or abscess.    Postsurgical changes right colon. Ileocolic anastomosis present.    Distended and obstructed appearing small bowel within the abdomen and pelvis. Left inguinal hernia present but appears to contain only colon and does NOT appear to be source of obstruction. Similar findings on prior CT January 30, 2023. Surgical consultation and follow-up recommended.    No pneumatosis or portal venous gas to suggest intestinal ischemia.        ******PRELIMINARY REPORT******    ******PRELIMINARY REPORT******      ECHO:      VITALS:  T(C): 36.4 (02-04-23 @ 23:30), Max: 36.4 (02-04-23 @ 23:30)  T(F): 97.6 (02-04-23 @ 23:30), Max: 97.6 (02-04-23 @ 23:30)  HR: 102 (02-04-23 @ 23:30) (102 - 102)  BP: 125/65 (02-04-23 @ 23:30) (125/65 - 125/65)  BP(mean): --  ABP: --  ABP(mean): --  RR: 15 (02-04-23 @ 23:30) (15 - 15)  SpO2: 94% (02-04-23 @ 23:30) (94% - 94%)  CVP(mm Hg): --  CVP(cm H2O): --    Ins and Outs     02-04-23 @ 07:01  -  02-05-23 @ 07:00  --------------------------------------------------------  IN: 120 mL / OUT: 560 mL / NET: -440 mL    02-05-23 @ 07:01  -  02-05-23 @ 11:30  --------------------------------------------------------  IN: 0 mL / OUT: 90 mL / NET: -90 mL                I&O's Detail    04 Feb 2023 07:01  -  05 Feb 2023 07:00  --------------------------------------------------------  IN:    Oral Fluid: 120 mL  Total IN: 120 mL    OUT:    Bulb (mL): 260 mL    Indwelling Catheter - Urethral (mL): 300 mL  Total OUT: 560 mL    Total NET: -440 mL      05 Feb 2023 07:01  -  05 Feb 2023 11:30  --------------------------------------------------------  IN:  Total IN: 0 mL    OUT:    Bulb (mL): 90 mL    Oral Fluid: 0 mL  Total OUT: 90 mL    Total NET: -90 mL          Physical Examination:  GENERAL:               Alert, Oriented, No acute distress.    HEENT:                     No JVD, Moist MM  PULM:                     Bilateral air entry, diminished to auscultation bilaterally, no significant sputum production, No Rales, No Rhonchi, No Wheezing  CVS:                         S1, S2,  +Murmur  ABD:                        Soft, nondistended, nontender, normoactive bowel sounds, post op incision sites intact  EXT:                         No edema, nontender, No Cyanosis or Clubbing    NEURO:                  Alert, oriented, interactive, nonfocal, follows commands  PSYC:                      Calm, + Insight.

## 2023-02-05 NOTE — PROGRESS NOTE ADULT - NS ATTEND AMEND GEN_ALL_CORE FT
This is a pleasant 89M who presented with acute gangrenous cholecystitis with perforation who is POD#6 s/p laparoscopic cholecystectomy. Intraop there was perforation into the liver bed, with empyema. He tolerated the procedure well. He was extubated and continues to be afebrile and hemodynamically stable. No evidence of gallstone ileus intraop. Patient noted to have severe ileus. Thus, NG initially placed intra-op to decreased risk of aspiration PNA, but patient removed POD 0.     CTA of chest / abdomen/pelvis was obtained overnight for brief episode of hypotension which resolved with IV fluids. I reviewed it personally and reviewed the prelim read. It showed resolving ileus, expected post op changes and right pleural effusion. Pulmonary team was consulted - effusion too small for thoracentesis. Will manage conservatively.    Patient doing well today. Pain is well-controlled. He reports + flatus, although hard to tell how reliable this history is. That said, he has been having multiple liquid BMs. C diff was sent, which was negative. No N/V overnight. No fevers, chills or sweats.     General appearance: No acute distress, lying comfortably. Alert  Respiratory: Nonlabored breathing  Abdomen: benign, appropriate incisional tenderness, incision c/d/i, drain with serous output. reducible left inguinal hernia. slightly less distended.  Extremities: No edema  Neuro: No focal deficits appreciated  Psych: Calm    Labs:   WBC down to 9 from 14 Hb 11.0 from 11.8 BUN/Cr 14/0.66 from 17/0.8 K was 3.6 this am Mg 2.1 Phos 2.5    Hypovolemia from diarrhea resolving. Patient is afebrile, hemodynamically stable this am. No evidence of infectious process or peritonitis today. No abscess noted on CT abdomen / pelvis. He has resolving ileus. Given difficulty in assessing reliability of patient's reported history of flatus and potential aspiration risk, will obtain gastrografin study with serial XRs to assess for passage of contrast into colon. I expect this is likely ileus as opposed to actual obstruction. Will restart diet with ensures once contrast is demonstrated in colon.     - Would maintain NPO with IVF for now. Sips/ ice chips for comfort for now.   - Continue IV abx per ID. Leukocytosis down-trended today.  - Would aggressively electrolytes daily, given patient's continued losses from diarrhea. Of note, C. diff was negative. Continue to trend electrolytes to minimize risk factors for ileus. Continue to check daily Mg /Phos to am. labs. Goal K of 4.0, Mg of 2.0.   - Would continue felipe, given failed void trial and arrange for outpatient urologic follow up regarding this issue.  - Continue drain to bulb suction for now. Will remove prior to discharge. Currently having benign, serous output.  - Continue lovenox for DVT and asa for cardio/stroke protection. Hb stable.  - Cardiology re-consulted on 2/4 regarding brief episode of hypotension. ECHO today demonstrated EF 50-55%, grade I diastolic dysfunction and borderline pulmonary HTN. Their recommendations were to continue supportive management.   - Non operative management of incidental left inguinal hernia at this time. Easily reducible. Will discuss further options as outpatient.    Called patient's daughter to update her on CT results and subseqent plans moving forward.     Plan was also discussed with Cindy surgical PA) and Dr. Bailey (hospitalist). This is a pleasant 89M who presented with acute gangrenous cholecystitis with perforation who is POD#6 s/p laparoscopic cholecystectomy. Intraop there was perforation into the liver bed, with empyema. He tolerated the procedure well. He was extubated and continues to be afebrile and hemodynamically stable. No evidence of gallstone ileus intraop. Patient noted to have severe ileus. Thus, NG initially placed intra-op to decreased risk of aspiration PNA, but patient removed POD 0.     CTA of chest / abdomen/pelvis was obtained overnight for brief episode of hypotension which resolved with IV fluids. I reviewed it personally and reviewed the prelim read. It showed resolving ileus, expected post op changes and right pleural effusion. Pulmonary team was consulted - effusion too small for thoracentesis. Will manage conservatively.    Patient doing well today. Pain is well-controlled. He reports + flatus, although hard to tell how reliable this history is. That said, he has been having multiple liquid BMs. C diff was sent, which was negative. No N/V overnight. No fevers, chills or sweats.     General appearance: No acute distress, lying comfortably. Alert  Respiratory: Nonlabored breathing  Abdomen: benign, appropriate incisional tenderness, incision c/d/i, drain with serous output. reducible left inguinal hernia. slightly less distended.  Extremities: No edema  Neuro: No focal deficits appreciated  Psych: Calm    Labs:   WBC down to 9 from 14 Hb 11.0 from 11.8 BUN/Cr 14/0.66 from 17/0.8 K was 3.6 this am Mg 2.1 Phos 2.5    Hypovolemia from diarrhea resolving. Patient is afebrile, hemodynamically stable this am. No evidence of infectious process or peritonitis today. No abscess noted on CT abdomen / pelvis. He has resolving ileus. Given difficulty in assessing reliability of patient's reported history of flatus and potential aspiration risk, will obtain gastrografin study with serial XRs to assess for passage of contrast into colon. I expect this is likely ileus as opposed to actual obstruction. Will restart diet with ensures once contrast is demonstrated in colon.     - Would maintain NPO with IVF for now. Sips/ ice chips for comfort for now.   - Continue IV abx per ID. Leukocytosis down-trended today.  - Would aggressively electrolytes daily, given patient's continued losses from diarrhea. Of note, C. diff was negative. Continue to trend electrolytes to minimize risk factors for ileus. Continue to check daily Mg /Phos to am. labs. Goal K of 4.0, Mg of 2.0.   - Would continue felipe, given failed void trial and arrange for outpatient urologic follow up regarding this issue.  - Continue drain to bulb suction for now. Will remove prior to discharge. Currently having benign, serous output.  - Continue lovenox for DVT and asa for cardio/stroke protection. Hb stable.  - Cardiology re-consulted on 2/4 regarding brief episode of hypotension. ECHO today demonstrated EF 50-55%, grade I diastolic dysfunction and borderline pulmonary HTN. Their recommendations were to continue supportive management.   - Non operative management of incidental left inguinal hernia at this time. Easily reducible. Will discuss further options as outpatient.    Called patient's daughter to update her on CT results and subseqent plans moving forward.     Plan was also discussed with Cindy surgical PA) and Dr. Bailye (hospitalist).

## 2023-02-05 NOTE — CONSULT NOTE ADULT - ASSESSMENT
Assessment  1. Hypoxemia  - post op  2. Abnormal CT chest with small Right effusion   3. Elevated ddimer - no PE on CTA (prelim read)  4. Cholecystitis s/p cholecystectomy, now with bacteremia and UTI  5. Ex smoker possible COPD underlying     Plan  n/c O2 prn to maintain sat > 90%  no evidence of acute COPD exacerbation at this time  OOB, PT  Incentive spirometry  CT reviewed pleural effusion too small to drain,   monitor CXR in few days  IV antibiotics as per ID  at this time thoracentesis will cause more risk than benefit as effusion small on my review of imaging Assessment  1. Hypoxemia  - post op  2. Abnormal CT chest with small Right effusion   3. Elevated ddimer - no PE on CTA (prelim read)  4. Cholecystitis s/p cholecystectomy, now with bacteremia and UTI  5. Ex smoker possible COPD underlying     Plan  n/c O2 prn to maintain sat > 90%  no evidence of acute COPD exacerbation at this time  OOB, PT  Incentive spirometry  CT reviewed pleural effusion too small to drain, at this time thoracentesis will cause more risk than benefit as effusion small on my review of imaging  monitor CXR in few days  IV antibiotics as per ID  elevated ddimer non specific will check US Duplex LE

## 2023-02-05 NOTE — PROGRESS NOTE ADULT - SUBJECTIVE AND OBJECTIVE BOX
INTERVAL HPI/OVERNIGHT EVENTS:    PRETTY DOLAN is a 89y year old Male with PMH of recent L hip fx (11/2022), TIA (11/2022) who presented to the ER from [facility] for abdominal pain, nausea for 4 days.   Patient states his symptoms have been worsening over the last 4 days; had bloodwork done at [facility] which showed elevated LFTs which prompted ER visit. patient  states he has had poor appetite for the same time period. Episode of vomiting in ER, nbnb.   Daughter at bedside who states patient has been participating with therapy however has been more fatigued over the last week.   Denies fever, chills, chest pain, shortness of breath.   On ER arrival, /68, HR 71, RR 17 T97.1, sat 95% on room air. Found to have acute cholecystitis on RUQ US.    Patient seen by me this morning, resting comfortable in bed. HR 85, has no complaints.  Has been having diarrhea.  CTA done yesterday, no PE.  Felipe in place, Dr. Pastrana saw patient today, will continue felipe for now.    MEDICATIONS  (STANDING):  acetaminophen     Tablet .. 650 milliGRAM(s) Oral every 6 hours  aspirin enteric coated 81 milliGRAM(s) Oral daily  dextrose 5% + sodium chloride 0.45%. 1000 milliLiter(s) (75 mL/Hr) IV Continuous <Continuous>  diatrizoate meglumine/diatrizoate sodium. 30 milliLiter(s) Oral once  enoxaparin Injectable 30 milliGRAM(s) SubCutaneous every 24 hours  finasteride 5 milliGRAM(s) Oral daily  piperacillin/tazobactam IVPB.. 3.375 Gram(s) IV Intermittent every 8 hours  potassium chloride  10 mEq/100 mL IVPB 10 milliEquivalent(s) IV Intermittent every 1 hour  potassium chloride  10 mEq/100 mL IVPB 10 milliEquivalent(s) IV Intermittent every 1 hour  tamsulosin 0.8 milliGRAM(s) Oral at bedtime    MEDICATIONS  (PRN):  aluminum hydroxide/magnesium hydroxide/simethicone Suspension 30 milliLiter(s) Oral every 4 hours PRN Dyspepsia  melatonin 3 milliGRAM(s) Oral at bedtime PRN Insomnia  ondansetron Injectable 4 milliGRAM(s) IV Push every 8 hours PRN Nausea and/or Vomiting  oxyCODONE    IR 5 milliGRAM(s) Oral every 6 hours PRN Moderate Pain (4 - 6)  zinc oxide 40% Paste 1 Application(s) Topical three times a day PRN rash      Allergies    No Known Allergies    Vital Signs Last 24 Hrs  T(C): 36.4 (04 Feb 2023 23:30), Max: 36.4 (04 Feb 2023 23:30)  T(F): 97.6 (04 Feb 2023 23:30), Max: 97.6 (04 Feb 2023 23:30)  HR: 102 (04 Feb 2023 23:30) (102 - 128)  BP: 125/65 (04 Feb 2023 23:30) (125/65 - 125/65)  BP(mean): --  RR: 15 (04 Feb 2023 23:30) (15 - 18)  SpO2: 94% (04 Feb 2023 23:30) (94% - 96%)    Parameters below as of 04 Feb 2023 23:30  Patient On (Oxygen Delivery Method): nasal cannula  O2 Flow (L/min): 2    General: NAD, pleasant. Aert, oriented x 1  Cardiovascular: S1, S2, irregular rhythm.  Respiratory:   Addomen: soft, +BS, LIH reduced at bedside. R RUPINDER drain in place with serosanguinous fluid.  RUPINDER output 260cc/24 hours.  Drained 90cc form 5a to now.    LABS:                        11.0   9.16  )-----------( 188      ( 05 Feb 2023 09:20 )             35.3     02-05    146<H>  |  111<H>  |  14  ----------------------------<  86  3.6   |  28  |  0.66    Ca    7.9<L>      05 Feb 2023 09:20  Phos  2.5     02-05  Mg     2.1     02-05    TPro  5.3<L>  /  Alb  1.8<L>  /  TBili  0.4  /  DBili  0.2  /  AST  27  /  ALT  30  /  AlkPhos  154<H>  02-05      RADIOLOGY & ADDITIONAL TESTS:  CT chest done yesterday not officially read but prelim form Dr. Jay was no PE.  Dr. Thompson reviewed CT abdomen. still stool in rectal vault, though improved from last CT.     Will follow official results.  INTERVAL HPI/OVERNIGHT EVENTS:    PRETTY DOLAN is a 89y year old Male with PMH of recent L hip fx (11/2022), TIA (11/2022) who presented to the ER from [facility] for abdominal pain, nausea for 4 days.   Patient states his symptoms have been worsening over the last 4 days; had bloodwork done at [facility] which showed elevated LFTs which prompted ER visit. patient  states he has had poor appetite for the same time period. Episode of vomiting in ER, nbnb.   Daughter at bedside who states patient has been participating with therapy however has been more fatigued over the last week.   Denies fever, chills, chest pain, shortness of breath.   On ER arrival, /68, HR 71, RR 17 T97.1, sat 95% on room air. Found to have acute cholecystitis on RUQ US.    Patient seen by me this morning, resting comfortable in bed. HR 85, has no complaints.  Has been having diarrhea.  CTA done yesterday, no PE.  Felipe in place, Dr. Pastrana saw patient today, will continue felipe for now.    MEDICATIONS  (STANDING):  acetaminophen     Tablet .. 650 milliGRAM(s) Oral every 6 hours  aspirin enteric coated 81 milliGRAM(s) Oral daily  dextrose 5% + sodium chloride 0.45%. 1000 milliLiter(s) (75 mL/Hr) IV Continuous <Continuous>  diatrizoate meglumine/diatrizoate sodium. 30 milliLiter(s) Oral once  enoxaparin Injectable 30 milliGRAM(s) SubCutaneous every 24 hours  finasteride 5 milliGRAM(s) Oral daily  piperacillin/tazobactam IVPB.. 3.375 Gram(s) IV Intermittent every 8 hours  potassium chloride  10 mEq/100 mL IVPB 10 milliEquivalent(s) IV Intermittent every 1 hour  potassium chloride  10 mEq/100 mL IVPB 10 milliEquivalent(s) IV Intermittent every 1 hour  tamsulosin 0.8 milliGRAM(s) Oral at bedtime    MEDICATIONS  (PRN):  aluminum hydroxide/magnesium hydroxide/simethicone Suspension 30 milliLiter(s) Oral every 4 hours PRN Dyspepsia  melatonin 3 milliGRAM(s) Oral at bedtime PRN Insomnia  ondansetron Injectable 4 milliGRAM(s) IV Push every 8 hours PRN Nausea and/or Vomiting  oxyCODONE    IR 5 milliGRAM(s) Oral every 6 hours PRN Moderate Pain (4 - 6)  zinc oxide 40% Paste 1 Application(s) Topical three times a day PRN rash      Allergies    No Known Allergies    Vital Signs Last 24 Hrs  T(C): 36.4 (04 Feb 2023 23:30), Max: 36.4 (04 Feb 2023 23:30)  T(F): 97.6 (04 Feb 2023 23:30), Max: 97.6 (04 Feb 2023 23:30)  HR: 102 (04 Feb 2023 23:30) (102 - 128)  BP: 125/65 (04 Feb 2023 23:30) (125/65 - 125/65)  BP(mean): --  RR: 15 (04 Feb 2023 23:30) (15 - 18)  SpO2: 94% (04 Feb 2023 23:30) (94% - 96%)    Parameters below as of 04 Feb 2023 23:30  Patient On (Oxygen Delivery Method): nasal cannula  O2 Flow (L/min): 2    General: NAD, pleasant. Aert, oriented x 1  Cardiovascular: S1, S2, irregular rhythm.  Respiratory:   Addomen: soft, +BS, LIH reduced at bedside. R RUPINDER drain in place with serosanguinous fluid.  RUPINDER output 260cc/24 hours.  Drained 90cc form 5a to now.    LABS:                        11.0   9.16  )-----------( 188      ( 05 Feb 2023 09:20 )             35.3     02-05    146<H>  |  111<H>  |  14  ----------------------------<  86  3.6   |  28  |  0.66    Ca    7.9<L>      05 Feb 2023 09:20  Phos  2.5     02-05  Mg     2.1     02-05    TPro  5.3<L>  /  Alb  1.8<L>  /  TBili  0.4  /  DBili  0.2  /  AST  27  /  ALT  30  /  AlkPhos  154<H>  02-05      RADIOLOGY & ADDITIONAL TESTS:  CT chest done yesterday not officially read but prelim form Dr. Jay was no PE.  Dr. Thompson reviewed CT abdomen. still stool in rectal vault, though improved from last CT.     Will follow official results.  INTERVAL HPI/OVERNIGHT EVENTS:    PRETTY DOLAN is a 89y year old Male with PMH of recent L hip fx (11/2022), TIA (11/2022) who presented to the ER from [facility] for abdominal pain, nausea for 4 days.   Patient states his symptoms have been worsening over the last 4 days; had bloodwork done at [facility] which showed elevated LFTs which prompted ER visit. patient  states he has had poor appetite for the same time period. Episode of vomiting in ER, nbnb.   Daughter at bedside who states patient has been participating with therapy however has been more fatigued over the last week.   Denies fever, chills, chest pain, shortness of breath.   On ER arrival, /68, HR 71, RR 17 T97.1, sat 95% on room air. Found to have acute cholecystitis on RUQ US.    Patient seen by me this morning, resting comfortable in bed. HR 85, has no complaints.  Has been having diarrhea.  CTA done yesterday, no PE.  Felipe in place, Dr. Pastrana saw patient today, will continue felipe for now.    MEDICATIONS  (STANDING):  acetaminophen     Tablet .. 650 milliGRAM(s) Oral every 6 hours  aspirin enteric coated 81 milliGRAM(s) Oral daily  dextrose 5% + sodium chloride 0.45%. 1000 milliLiter(s) (75 mL/Hr) IV Continuous <Continuous>  diatrizoate meglumine/diatrizoate sodium. 30 milliLiter(s) Oral once  enoxaparin Injectable 30 milliGRAM(s) SubCutaneous every 24 hours  finasteride 5 milliGRAM(s) Oral daily  piperacillin/tazobactam IVPB.. 3.375 Gram(s) IV Intermittent every 8 hours  potassium chloride  10 mEq/100 mL IVPB 10 milliEquivalent(s) IV Intermittent every 1 hour  potassium chloride  10 mEq/100 mL IVPB 10 milliEquivalent(s) IV Intermittent every 1 hour  tamsulosin 0.8 milliGRAM(s) Oral at bedtime    MEDICATIONS  (PRN):  aluminum hydroxide/magnesium hydroxide/simethicone Suspension 30 milliLiter(s) Oral every 4 hours PRN Dyspepsia  melatonin 3 milliGRAM(s) Oral at bedtime PRN Insomnia  ondansetron Injectable 4 milliGRAM(s) IV Push every 8 hours PRN Nausea and/or Vomiting  oxyCODONE    IR 5 milliGRAM(s) Oral every 6 hours PRN Moderate Pain (4 - 6)  zinc oxide 40% Paste 1 Application(s) Topical three times a day PRN rash      Allergies    No Known Allergies    Vital Signs Last 24 Hrs  T(C): 36.4 (04 Feb 2023 23:30), Max: 36.4 (04 Feb 2023 23:30)  T(F): 97.6 (04 Feb 2023 23:30), Max: 97.6 (04 Feb 2023 23:30)  HR: 102 (04 Feb 2023 23:30) (102 - 128)  BP: 125/65 (04 Feb 2023 23:30) (125/65 - 125/65)  BP(mean): --  RR: 15 (04 Feb 2023 23:30) (15 - 18)  SpO2: 94% (04 Feb 2023 23:30) (94% - 96%)    Parameters below as of 04 Feb 2023 23:30  Patient On (Oxygen Delivery Method): nasal cannula  O2 Flow (L/min): 2    General: NAD, pleasant. Aert, oriented x 1  Cardiovascular: S1, S2, irregular rhythm.  Respiratory:   Addomen: soft, +BS, LIH reduced at bedside. R RUPINDER drain in place with serosanguinous fluid.  RUPINDER output 260cc/24 hours.  Drained 90cc form 5a to now.    LABS:                        11.0   9.16  )-----------( 188      ( 05 Feb 2023 09:20 )             35.3     02-05    146<H>  |  111<H>  |  14  ----------------------------<  86  3.6   |  28  |  0.66    Ca    7.9<L>      05 Feb 2023 09:20  Phos  2.5     02-05  Mg     2.1     02-05    TPro  5.3<L>  /  Alb  1.8<L>  /  TBili  0.4  /  DBili  0.2  /  AST  27  /  ALT  30  /  AlkPhos  154<H>  02-05      RADIOLOGY & ADDITIONAL TESTS:  CT chest done yesterday not officially read but prelim form Dr. Jay was no PE.  Dr. Thompson reviewed CT abdomen. still stool in rectal vault, though improved from last CT.     Will follow official results.

## 2023-02-05 NOTE — PROGRESS NOTE ADULT - SUBJECTIVE AND OBJECTIVE BOX
Patient is a 89y old  Male who presents with a chief complaint of abdominal pain (04 Feb 2023 18:08)      24 HOUR EVENTS:  No overnight events reported.     SUBJECTIVE:  Patient seen and examined at bedside.     ALLERGIES:  No Known Allergies    MEDICATIONS  (STANDING):  acetaminophen     Tablet .. 650 milliGRAM(s) Oral every 6 hours  aspirin enteric coated 81 milliGRAM(s) Oral daily  enoxaparin Injectable 30 milliGRAM(s) SubCutaneous every 24 hours  finasteride 5 milliGRAM(s) Oral daily  lactated ringers. 1000 milliLiter(s) (75 mL/Hr) IV Continuous <Continuous>  piperacillin/tazobactam IVPB.. 3.375 Gram(s) IV Intermittent every 8 hours  potassium chloride  10 mEq/100 mL IVPB 10 milliEquivalent(s) IV Intermittent every 1 hour  tamsulosin 0.8 milliGRAM(s) Oral at bedtime    MEDICATIONS  (PRN):  aluminum hydroxide/magnesium hydroxide/simethicone Suspension 30 milliLiter(s) Oral every 4 hours PRN Dyspepsia  melatonin 3 milliGRAM(s) Oral at bedtime PRN Insomnia  ondansetron Injectable 4 milliGRAM(s) IV Push every 8 hours PRN Nausea and/or Vomiting  oxyCODONE    IR 5 milliGRAM(s) Oral every 6 hours PRN Moderate Pain (4 - 6)  zinc oxide 40% Paste 1 Application(s) Topical three times a day PRN rash    Vital Signs Last 24 Hrs  T(F): 97.6 (04 Feb 2023 23:30), Max: 97.6 (04 Feb 2023 23:30)  HR: 102 (04 Feb 2023 23:30) (102 - 128)  BP: 125/65 (04 Feb 2023 23:30) (125/65 - 125/65)  RR: 15 (04 Feb 2023 23:30) (15 - 18)  SpO2: 94% (04 Feb 2023 23:30) (94% - 96%)  I&O's Summary    04 Feb 2023 07:01  -  05 Feb 2023 07:00  --------------------------------------------------------  IN: 120 mL / OUT: 560 mL / NET: -440 mL      PHYSICAL EXAM:  General: NAD, Awake and alert  ENT: Moist mucous membranes, no thrush  Neck: Supple, No JVD  Lungs: Clear to auscultation bilaterally, good air entry, non-labored breathing  Cardio: RRR, S1/S2, No murmur  Abdomen: Soft, Nontender, Nondistended; Bowel sounds present  Extremities: No calf tenderness, No pitting edema, no contractures.    LABS:                        11.8   14.05 )-----------( 210      ( 04 Feb 2023 08:00 )             36.7     02-04    144  |  109  |  17  ----------------------------<  105  3.7   |  29  |  0.81    Ca    7.8      04 Feb 2023 20:20  Phos  2.6     02-04  Mg     1.6     02-04    TPro  5.8  /  Alb  2.0  /  TBili  0.6  /  DBili  0.2  /  AST  28  /  ALT  39  /  AlkPhos  168  02-04            Lactate, Blood: 1.5 mmol/L (02-04 @ 12:00)          11-23 Chol 160 mg/dL LDL -- HDL 44 mg/dL Trig 95 mg/dL              POCT Blood Glucose.: 113 mg/dL (04 Feb 2023 11:46)          Culture - Urine (collected 30 Jan 2023 12:38)  Source: Catheterized Catheterized  Final Report (01 Feb 2023 19:29):    >100,000 CFU/ml Escherichia coli    >100,000 CFU/ml Aerococcus species    "Aerococcus spp. are predictably susceptible to penicillin,    ampicillin, tetracycline, and vancomycin.  All isolates are    resistant to sulfonamides"  Organism: Escherichia coli (01 Feb 2023 19:29)  Organism: Escherichia coli (01 Feb 2023 19:29)      -  Amikacin: S <=16      -  Amoxicillin/Clavulanic Acid: S <=8/4      -  Ampicillin: S <=8 These ampicillin results predict results for amoxicillin      -  Ampicillin/Sulbactam: S <=4/2 Enterobacter, Klebsiella aerogenes, Citrobacter, and Serratia may develop resistance during prolonged therapy (3-4 days)      -  Aztreonam: S <=4      -  Cefazolin: S <=2 For uncomplicated UTI with K. pneumoniae, E. coli, or P. mirablis: PHOEBE <=16 is sensitive and PHOEBE >=32 is resistant. This also predicts results for oral agents cefaclor, cefdinir, cefpodoxime, cefprozil, cefuroxime axetil, cephalexin and locarbef for uncomplicated UTI. Note that some isolates may be susceptible to these agents while testing resistant to cefazolin.      -  Cefepime: S 8      -  Cefoxitin: S <=8      -  Ceftriaxone: S <=1 Enterobacter, Klebsiella aerogenes, Citrobacter, and Serratia may develop resistance during prolonged therapy      -  Cefuroxime: S <=4      -  Ciprofloxacin: S <=0.25      -  Ertapenem: S <=0.5      -  Gentamicin: S <=2      -  Imipenem: S <=1      -  Levofloxacin: S <=0.5      -  Meropenem: S <=1      -  Nitrofurantoin: S <=32 Should not be used to treat pyelonephritis      -  Piperacillin/Tazobactam: S <=8      -  Tobramycin: S <=2      -  Trimethoprim/Sulfamethoxazole: S <=0.5/9.5      Method Type: PHOEBE    Culture - Blood (collected 30 Jan 2023 12:27)  Source: .Blood Blood-Peripheral  Final Report (04 Feb 2023 15:08):    No Growth Final    Culture - Blood (collected 30 Jan 2023 12:10)  Source: .Blood Blood-Peripheral  Gram Stain (31 Jan 2023 15:34):    Growth in anaerobic bottle: Gram positive cocci in pairs  Final Report (01 Feb 2023 13:38):    Growth in anaerobic bottle: Aerococcus urinae    "Susceptibilities not performed"    ***Blood Panel PCR results on this specimen are available    approximately 3 hours after the Gram stain result.***    Gram stain, PCR, and/or culture results may not always    correspond due to difference in methodologies.    ************************************************************    This PCR assay was performed by multiplex PCR. This    Assay tests for 66 bacterial and resistance gene targets.    Please refer to the [facility] test directory    at [email or web address] for details.  Organism: Blood Culture PCR (01 Feb 2023 13:38)  Organism: Blood Culture PCR (01 Feb 2023 13:38)      -  Blood PCR Panel: NEG      Method Type: PCR      COVID-19 PCR: NotDetec (01-30-23 @ 12:25)    RADIOLOGY & ADDITIONAL TESTS:    Care Discussed with Consultants/Other Providers:    Patient is a 89y old  Male who presents with a chief complaint of abdominal pain (04 Feb 2023 18:08)      24 HOUR EVENTS:  No overnight events reported.     SUBJECTIVE:  Patient seen and examined at bedside.     ALLERGIES:  No Known Allergies    MEDICATIONS  (STANDING):  acetaminophen     Tablet .. 650 milliGRAM(s) Oral every 6 hours  aspirin enteric coated 81 milliGRAM(s) Oral daily  enoxaparin Injectable 30 milliGRAM(s) SubCutaneous every 24 hours  finasteride 5 milliGRAM(s) Oral daily  lactated ringers. 1000 milliLiter(s) (75 mL/Hr) IV Continuous <Continuous>  piperacillin/tazobactam IVPB.. 3.375 Gram(s) IV Intermittent every 8 hours  potassium chloride  10 mEq/100 mL IVPB 10 milliEquivalent(s) IV Intermittent every 1 hour  tamsulosin 0.8 milliGRAM(s) Oral at bedtime    MEDICATIONS  (PRN):  aluminum hydroxide/magnesium hydroxide/simethicone Suspension 30 milliLiter(s) Oral every 4 hours PRN Dyspepsia  melatonin 3 milliGRAM(s) Oral at bedtime PRN Insomnia  ondansetron Injectable 4 milliGRAM(s) IV Push every 8 hours PRN Nausea and/or Vomiting  oxyCODONE    IR 5 milliGRAM(s) Oral every 6 hours PRN Moderate Pain (4 - 6)  zinc oxide 40% Paste 1 Application(s) Topical three times a day PRN rash    Vital Signs Last 24 Hrs  T(F): 97.6 (04 Feb 2023 23:30), Max: 97.6 (04 Feb 2023 23:30)  HR: 102 (04 Feb 2023 23:30) (102 - 128)  BP: 125/65 (04 Feb 2023 23:30) (125/65 - 125/65)  RR: 15 (04 Feb 2023 23:30) (15 - 18)  SpO2: 94% (04 Feb 2023 23:30) (94% - 96%)  I&O's Summary    04 Feb 2023 07:01  -  05 Feb 2023 07:00  --------------------------------------------------------  IN: 120 mL / OUT: 560 mL / NET: -440 mL      PHYSICAL EXAM:  General: NAD, Awake and alert  ENT: Moist mucous membranes, no thrush  Neck: Supple, No JVD  Lungs: Clear to auscultation bilaterally, good air entry, non-labored breathing  Cardio: RRR, S1/S2, No murmur  Abdomen: Soft, Nontender, Nondistended; Bowel sounds present  Extremities: No calf tenderness, No pitting edema, no contractures.    LABS:                        11.8   14.05 )-----------( 210      ( 04 Feb 2023 08:00 )             36.7     02-04    144  |  109  |  17  ----------------------------<  105  3.7   |  29  |  0.81    Ca    7.8      04 Feb 2023 20:20  Phos  2.6     02-04  Mg     1.6     02-04    TPro  5.8  /  Alb  2.0  /  TBili  0.6  /  DBili  0.2  /  AST  28  /  ALT  39  /  AlkPhos  168  02-04            Lactate, Blood: 1.5 mmol/L (02-04 @ 12:00)          11-23 Chol 160 mg/dL LDL -- HDL 44 mg/dL Trig 95 mg/dL              POCT Blood Glucose.: 113 mg/dL (04 Feb 2023 11:46)          Culture - Urine (collected 30 Jan 2023 12:38)  Source: Catheterized Catheterized  Final Report (01 Feb 2023 19:29):    >100,000 CFU/ml Escherichia coli    >100,000 CFU/ml Aerococcus species    "Aerococcus spp. are predictably susceptible to penicillin,    ampicillin, tetracycline, and vancomycin.  All isolates are    resistant to sulfonamides"  Organism: Escherichia coli (01 Feb 2023 19:29)  Organism: Escherichia coli (01 Feb 2023 19:29)      -  Amikacin: S <=16      -  Amoxicillin/Clavulanic Acid: S <=8/4      -  Ampicillin: S <=8 These ampicillin results predict results for amoxicillin      -  Ampicillin/Sulbactam: S <=4/2 Enterobacter, Klebsiella aerogenes, Citrobacter, and Serratia may develop resistance during prolonged therapy (3-4 days)      -  Aztreonam: S <=4      -  Cefazolin: S <=2 For uncomplicated UTI with K. pneumoniae, E. coli, or P. mirablis: PHOEBE <=16 is sensitive and PHOEBE >=32 is resistant. This also predicts results for oral agents cefaclor, cefdinir, cefpodoxime, cefprozil, cefuroxime axetil, cephalexin and locarbef for uncomplicated UTI. Note that some isolates may be susceptible to these agents while testing resistant to cefazolin.      -  Cefepime: S 8      -  Cefoxitin: S <=8      -  Ceftriaxone: S <=1 Enterobacter, Klebsiella aerogenes, Citrobacter, and Serratia may develop resistance during prolonged therapy      -  Cefuroxime: S <=4      -  Ciprofloxacin: S <=0.25      -  Ertapenem: S <=0.5      -  Gentamicin: S <=2      -  Imipenem: S <=1      -  Levofloxacin: S <=0.5      -  Meropenem: S <=1      -  Nitrofurantoin: S <=32 Should not be used to treat pyelonephritis      -  Piperacillin/Tazobactam: S <=8      -  Tobramycin: S <=2      -  Trimethoprim/Sulfamethoxazole: S <=0.5/9.5      Method Type: PHOEBE    Culture - Blood (collected 30 Jan 2023 12:27)  Source: .Blood Blood-Peripheral  Final Report (04 Feb 2023 15:08):    No Growth Final    Culture - Blood (collected 30 Jan 2023 12:10)  Source: .Blood Blood-Peripheral  Gram Stain (31 Jan 2023 15:34):    Growth in anaerobic bottle: Gram positive cocci in pairs  Final Report (01 Feb 2023 13:38):    Growth in anaerobic bottle: Aerococcus urinae    "Susceptibilities not performed"    ***Blood Panel PCR results on this specimen are available    approximately 3 hours after the Gram stain result.***    Gram stain, PCR, and/or culture results may not always    correspond due to difference in methodologies.    ************************************************************    This PCR assay was performed by multiplex PCR. This    Assay tests for 66 bacterial and resistance gene targets.    Please refer to the [facility] test directory    at [email or web address] for details.  Organism: Blood Culture PCR (01 Feb 2023 13:38)  Organism: Blood Culture PCR (01 Feb 2023 13:38)      -  Blood PCR Panel: NEG      Method Type: PCR      COVID-19 PCR: NotDetec (01-30-23 @ 12:25)    RADIOLOGY & ADDITIONAL TESTS:    Care Discussed with Consultants/Other Providers:    Patient is a 89y old  Male who presents with a chief complaint of abdominal pain (04 Feb 2023 18:08)      24 HOUR EVENTS:  No overnight events reported.     SUBJECTIVE:  Patient seen and examined at bedside.     ALLERGIES:  No Known Allergies    MEDICATIONS  (STANDING):  acetaminophen     Tablet .. 650 milliGRAM(s) Oral every 6 hours  aspirin enteric coated 81 milliGRAM(s) Oral daily  enoxaparin Injectable 30 milliGRAM(s) SubCutaneous every 24 hours  finasteride 5 milliGRAM(s) Oral daily  lactated ringers. 1000 milliLiter(s) (75 mL/Hr) IV Continuous <Continuous>  piperacillin/tazobactam IVPB.. 3.375 Gram(s) IV Intermittent every 8 hours  potassium chloride  10 mEq/100 mL IVPB 10 milliEquivalent(s) IV Intermittent every 1 hour  tamsulosin 0.8 milliGRAM(s) Oral at bedtime    MEDICATIONS  (PRN):  aluminum hydroxide/magnesium hydroxide/simethicone Suspension 30 milliLiter(s) Oral every 4 hours PRN Dyspepsia  melatonin 3 milliGRAM(s) Oral at bedtime PRN Insomnia  ondansetron Injectable 4 milliGRAM(s) IV Push every 8 hours PRN Nausea and/or Vomiting  oxyCODONE    IR 5 milliGRAM(s) Oral every 6 hours PRN Moderate Pain (4 - 6)  zinc oxide 40% Paste 1 Application(s) Topical three times a day PRN rash    Vital Signs Last 24 Hrs  T(F): 97.6 (04 Feb 2023 23:30), Max: 97.6 (04 Feb 2023 23:30)  HR: 102 (04 Feb 2023 23:30) (102 - 128)  BP: 125/65 (04 Feb 2023 23:30) (125/65 - 125/65)  RR: 15 (04 Feb 2023 23:30) (15 - 18)  SpO2: 94% (04 Feb 2023 23:30) (94% - 96%)  I&O's Summary    04 Feb 2023 07:01  -  05 Feb 2023 07:00  --------------------------------------------------------  IN: 120 mL / OUT: 560 mL / NET: -440 mL      PHYSICAL EXAM:  General: NAD, Awake and alert  ENT: Moist mucous membranes, no thrush  Neck: Supple, No JVD  Lungs: Clear to auscultation bilaterally, good air entry, non-labored breathing  Cardio: RRR, S1/S2, No murmur  Abdomen: Soft, Nontender, Nondistended; Bowel sounds present  Extremities: No calf tenderness, No pitting edema, no contractures.    LABS:                        11.8   14.05 )-----------( 210      ( 04 Feb 2023 08:00 )             36.7     02-04    144  |  109  |  17  ----------------------------<  105  3.7   |  29  |  0.81    Ca    7.8      04 Feb 2023 20:20  Phos  2.6     02-04  Mg     1.6     02-04    TPro  5.8  /  Alb  2.0  /  TBili  0.6  /  DBili  0.2  /  AST  28  /  ALT  39  /  AlkPhos  168  02-04            Lactate, Blood: 1.5 mmol/L (02-04 @ 12:00)          11-23 Chol 160 mg/dL LDL -- HDL 44 mg/dL Trig 95 mg/dL              POCT Blood Glucose.: 113 mg/dL (04 Feb 2023 11:46)          Culture - Urine (collected 30 Jan 2023 12:38)  Source: Catheterized Catheterized  Final Report (01 Feb 2023 19:29):    >100,000 CFU/ml Escherichia coli    >100,000 CFU/ml Aerococcus species    "Aerococcus spp. are predictably susceptible to penicillin,    ampicillin, tetracycline, and vancomycin.  All isolates are    resistant to sulfonamides"  Organism: Escherichia coli (01 Feb 2023 19:29)  Organism: Escherichia coli (01 Feb 2023 19:29)      -  Amikacin: S <=16      -  Amoxicillin/Clavulanic Acid: S <=8/4      -  Ampicillin: S <=8 These ampicillin results predict results for amoxicillin      -  Ampicillin/Sulbactam: S <=4/2 Enterobacter, Klebsiella aerogenes, Citrobacter, and Serratia may develop resistance during prolonged therapy (3-4 days)      -  Aztreonam: S <=4      -  Cefazolin: S <=2 For uncomplicated UTI with K. pneumoniae, E. coli, or P. mirablis: PHOEBE <=16 is sensitive and PHOEBE >=32 is resistant. This also predicts results for oral agents cefaclor, cefdinir, cefpodoxime, cefprozil, cefuroxime axetil, cephalexin and locarbef for uncomplicated UTI. Note that some isolates may be susceptible to these agents while testing resistant to cefazolin.      -  Cefepime: S 8      -  Cefoxitin: S <=8      -  Ceftriaxone: S <=1 Enterobacter, Klebsiella aerogenes, Citrobacter, and Serratia may develop resistance during prolonged therapy      -  Cefuroxime: S <=4      -  Ciprofloxacin: S <=0.25      -  Ertapenem: S <=0.5      -  Gentamicin: S <=2      -  Imipenem: S <=1      -  Levofloxacin: S <=0.5      -  Meropenem: S <=1      -  Nitrofurantoin: S <=32 Should not be used to treat pyelonephritis      -  Piperacillin/Tazobactam: S <=8      -  Tobramycin: S <=2      -  Trimethoprim/Sulfamethoxazole: S <=0.5/9.5      Method Type: PHOEBE    Culture - Blood (collected 30 Jan 2023 12:27)  Source: .Blood Blood-Peripheral  Final Report (04 Feb 2023 15:08):    No Growth Final    Culture - Blood (collected 30 Jan 2023 12:10)  Source: .Blood Blood-Peripheral  Gram Stain (31 Jan 2023 15:34):    Growth in anaerobic bottle: Gram positive cocci in pairs  Final Report (01 Feb 2023 13:38):    Growth in anaerobic bottle: Aerococcus urinae    "Susceptibilities not performed"    ***Blood Panel PCR results on this specimen are available    approximately 3 hours after the Gram stain result.***    Gram stain, PCR, and/or culture results may not always    correspond due to difference in methodologies.    ************************************************************    This PCR assay was performed by multiplex PCR. This    Assay tests for 66 bacterial and resistance gene targets.    Please refer to the [facility] test directory    at [email or web address] for details.  Organism: Blood Culture PCR (01 Feb 2023 13:38)  Organism: Blood Culture PCR (01 Feb 2023 13:38)      -  Blood PCR Panel: NEG      Method Type: PCR      COVID-19 PCR: NotDetec (01-30-23 @ 12:25)    RADIOLOGY & ADDITIONAL TESTS:    Care Discussed with Consultants/Other Providers:    Patient is a 89y old  Male who presents with a chief complaint of abdominal pain (04 Feb 2023 18:08)      24 HOUR EVENTS:  No overnight events reported.     SUBJECTIVE:  Patient seen and examined at bedside. He does not offer any complaints.  He continues to have diarrhea.    ALLERGIES:  No Known Allergies    MEDICATIONS  (STANDING):  acetaminophen     Tablet .. 650 milliGRAM(s) Oral every 6 hours  aspirin enteric coated 81 milliGRAM(s) Oral daily  enoxaparin Injectable 30 milliGRAM(s) SubCutaneous every 24 hours  finasteride 5 milliGRAM(s) Oral daily  lactated ringers. 1000 milliLiter(s) (75 mL/Hr) IV Continuous <Continuous>  piperacillin/tazobactam IVPB.. 3.375 Gram(s) IV Intermittent every 8 hours  potassium chloride  10 mEq/100 mL IVPB 10 milliEquivalent(s) IV Intermittent every 1 hour  tamsulosin 0.8 milliGRAM(s) Oral at bedtime    MEDICATIONS  (PRN):  aluminum hydroxide/magnesium hydroxide/simethicone Suspension 30 milliLiter(s) Oral every 4 hours PRN Dyspepsia  melatonin 3 milliGRAM(s) Oral at bedtime PRN Insomnia  ondansetron Injectable 4 milliGRAM(s) IV Push every 8 hours PRN Nausea and/or Vomiting  oxyCODONE    IR 5 milliGRAM(s) Oral every 6 hours PRN Moderate Pain (4 - 6)  zinc oxide 40% Paste 1 Application(s) Topical three times a day PRN rash    Vital Signs Last 24 Hrs  T(F): 97.6 (04 Feb 2023 23:30), Max: 97.6 (04 Feb 2023 23:30)  HR: 102 (04 Feb 2023 23:30) (102 - 128)  BP: 125/65 (04 Feb 2023 23:30) (125/65 - 125/65)  RR: 15 (04 Feb 2023 23:30) (15 - 18)  SpO2: 94% (04 Feb 2023 23:30) (94% - 96%)  I&O's Summary    04 Feb 2023 07:01  -  05 Feb 2023 07:00  --------------------------------------------------------  IN: 120 mL / OUT: 560 mL / NET: -440 mL      PHYSICAL EXAM:  General: NAD, Awake and alert, debilitated/elderly appearing  ENT: Moist mucous membranes, no thrush  Neck: Supple, No JVD  Lungs: Clear to auscultation bilaterally, good air entry, non-labored breathing  Cardio: RRR, S1/S2, No murmur  Abdomen: Soft, Nontender, Nondistended; Bowel sounds present, indwelling felipe catheter with clear urine.  Extremities: No calf tenderness, No pitting edema, no contractures.    LABS:                        11.8   14.05 )-----------( 210      ( 04 Feb 2023 08:00 )             36.7     02-04    144  |  109  |  17  ----------------------------<  105  3.7   |  29  |  0.81    Ca    7.8      04 Feb 2023 20:20  Phos  2.6     02-04  Mg     1.6     02-04    TPro  5.8  /  Alb  2.0  /  TBili  0.6  /  DBili  0.2  /  AST  28  /  ALT  39  /  AlkPhos  168  02-04            Lactate, Blood: 1.5 mmol/L (02-04 @ 12:00)          11-23 Chol 160 mg/dL LDL -- HDL 44 mg/dL Trig 95 mg/dL              POCT Blood Glucose.: 113 mg/dL (04 Feb 2023 11:46)          Culture - Urine (collected 30 Jan 2023 12:38)  Source: Catheterized Catheterized  Final Report (01 Feb 2023 19:29):    >100,000 CFU/ml Escherichia coli    >100,000 CFU/ml Aerococcus species    "Aerococcus spp. are predictably susceptible to penicillin,    ampicillin, tetracycline, and vancomycin.  All isolates are    resistant to sulfonamides"  Organism: Escherichia coli (01 Feb 2023 19:29)  Organism: Escherichia coli (01 Feb 2023 19:29)      -  Amikacin: S <=16      -  Amoxicillin/Clavulanic Acid: S <=8/4      -  Ampicillin: S <=8 These ampicillin results predict results for amoxicillin      -  Ampicillin/Sulbactam: S <=4/2 Enterobacter, Klebsiella aerogenes, Citrobacter, and Serratia may develop resistance during prolonged therapy (3-4 days)      -  Aztreonam: S <=4      -  Cefazolin: S <=2 For uncomplicated UTI with K. pneumoniae, E. coli, or P. mirablis: PHOEBE <=16 is sensitive and PHOEBE >=32 is resistant. This also predicts results for oral agents cefaclor, cefdinir, cefpodoxime, cefprozil, cefuroxime axetil, cephalexin and locarbef for uncomplicated UTI. Note that some isolates may be susceptible to these agents while testing resistant to cefazolin.      -  Cefepime: S 8      -  Cefoxitin: S <=8      -  Ceftriaxone: S <=1 Enterobacter, Klebsiella aerogenes, Citrobacter, and Serratia may develop resistance during prolonged therapy      -  Cefuroxime: S <=4      -  Ciprofloxacin: S <=0.25      -  Ertapenem: S <=0.5      -  Gentamicin: S <=2      -  Imipenem: S <=1      -  Levofloxacin: S <=0.5      -  Meropenem: S <=1      -  Nitrofurantoin: S <=32 Should not be used to treat pyelonephritis      -  Piperacillin/Tazobactam: S <=8      -  Tobramycin: S <=2      -  Trimethoprim/Sulfamethoxazole: S <=0.5/9.5      Method Type: PHOEBE    Culture - Blood (collected 30 Jan 2023 12:27)  Source: .Blood Blood-Peripheral  Final Report (04 Feb 2023 15:08):    No Growth Final    Culture - Blood (collected 30 Jan 2023 12:10)  Source: .Blood Blood-Peripheral  Gram Stain (31 Jan 2023 15:34):    Growth in anaerobic bottle: Gram positive cocci in pairs  Final Report (01 Feb 2023 13:38):    Growth in anaerobic bottle: Aerococcus urinae    "Susceptibilities not performed"    ***Blood Panel PCR results on this specimen are available    approximately 3 hours after the Gram stain result.***    Gram stain, PCR, and/or culture results may not always    correspond due to difference in methodologies.    ************************************************************    This PCR assay was performed by multiplex PCR. This    Assay tests for 66 bacterial and resistance gene targets.    Please refer to the [facility] test directory    at [email or web address] for details.  Organism: Blood Culture PCR (01 Feb 2023 13:38)  Organism: Blood Culture PCR (01 Feb 2023 13:38)      -  Blood PCR Panel: NEG      Method Type: PCR      COVID-19 PCR: NotDetec (01-30-23 @ 12:25)    RADIOLOGY & ADDITIONAL TESTS:  Preliminary report showing atelectasis R. lung base w/ ri    Care Discussed with Consultants/Other Providers: Sherrill Bergeron   Patient is a 89y old  Male who presents with a chief complaint of abdominal pain (04 Feb 2023 18:08)      24 HOUR EVENTS:  No overnight events reported.     SUBJECTIVE:  Patient seen and examined at bedside. He does not offer any complaints.  He continues to have diarrhea.    ALLERGIES:  No Known Allergies    MEDICATIONS  (STANDING):  acetaminophen     Tablet .. 650 milliGRAM(s) Oral every 6 hours  aspirin enteric coated 81 milliGRAM(s) Oral daily  enoxaparin Injectable 30 milliGRAM(s) SubCutaneous every 24 hours  finasteride 5 milliGRAM(s) Oral daily  lactated ringers. 1000 milliLiter(s) (75 mL/Hr) IV Continuous <Continuous>  piperacillin/tazobactam IVPB.. 3.375 Gram(s) IV Intermittent every 8 hours  potassium chloride  10 mEq/100 mL IVPB 10 milliEquivalent(s) IV Intermittent every 1 hour  tamsulosin 0.8 milliGRAM(s) Oral at bedtime    MEDICATIONS  (PRN):  aluminum hydroxide/magnesium hydroxide/simethicone Suspension 30 milliLiter(s) Oral every 4 hours PRN Dyspepsia  melatonin 3 milliGRAM(s) Oral at bedtime PRN Insomnia  ondansetron Injectable 4 milliGRAM(s) IV Push every 8 hours PRN Nausea and/or Vomiting  oxyCODONE    IR 5 milliGRAM(s) Oral every 6 hours PRN Moderate Pain (4 - 6)  zinc oxide 40% Paste 1 Application(s) Topical three times a day PRN rash    Vital Signs Last 24 Hrs  T(F): 97.6 (04 Feb 2023 23:30), Max: 97.6 (04 Feb 2023 23:30)  HR: 102 (04 Feb 2023 23:30) (102 - 128)  BP: 125/65 (04 Feb 2023 23:30) (125/65 - 125/65)  RR: 15 (04 Feb 2023 23:30) (15 - 18)  SpO2: 94% (04 Feb 2023 23:30) (94% - 96%)  I&O's Summary    04 Feb 2023 07:01  -  05 Feb 2023 07:00  --------------------------------------------------------  IN: 120 mL / OUT: 560 mL / NET: -440 mL      PHYSICAL EXAM:  General: NAD, Awake and alert, debilitated/elderly appearing  ENT: Moist mucous membranes, no thrush  Neck: Supple, No JVD  Lungs: Clear to auscultation bilaterally, good air entry, non-labored breathing  Cardio: RRR, S1/S2, No murmur  Abdomen: Soft, Nontender, Nondistended; Bowel sounds present, indwelling felipe catheter with clear urine.  Extremities: No calf tenderness, No pitting edema, no contractures.    LABS:                        11.8   14.05 )-----------( 210      ( 04 Feb 2023 08:00 )             36.7     02-04    144  |  109  |  17  ----------------------------<  105  3.7   |  29  |  0.81    Ca    7.8      04 Feb 2023 20:20  Phos  2.6     02-04  Mg     1.6     02-04    TPro  5.8  /  Alb  2.0  /  TBili  0.6  /  DBili  0.2  /  AST  28  /  ALT  39  /  AlkPhos  168  02-04            Lactate, Blood: 1.5 mmol/L (02-04 @ 12:00)          11-23 Chol 160 mg/dL LDL -- HDL 44 mg/dL Trig 95 mg/dL              POCT Blood Glucose.: 113 mg/dL (04 Feb 2023 11:46)          Culture - Urine (collected 30 Jan 2023 12:38)  Source: Catheterized Catheterized  Final Report (01 Feb 2023 19:29):    >100,000 CFU/ml Escherichia coli    >100,000 CFU/ml Aerococcus species    "Aerococcus spp. are predictably susceptible to penicillin,    ampicillin, tetracycline, and vancomycin.  All isolates are    resistant to sulfonamides"  Organism: Escherichia coli (01 Feb 2023 19:29)  Organism: Escherichia coli (01 Feb 2023 19:29)      -  Amikacin: S <=16      -  Amoxicillin/Clavulanic Acid: S <=8/4      -  Ampicillin: S <=8 These ampicillin results predict results for amoxicillin      -  Ampicillin/Sulbactam: S <=4/2 Enterobacter, Klebsiella aerogenes, Citrobacter, and Serratia may develop resistance during prolonged therapy (3-4 days)      -  Aztreonam: S <=4      -  Cefazolin: S <=2 For uncomplicated UTI with K. pneumoniae, E. coli, or P. mirablis: PHOEBE <=16 is sensitive and PHOEBE >=32 is resistant. This also predicts results for oral agents cefaclor, cefdinir, cefpodoxime, cefprozil, cefuroxime axetil, cephalexin and locarbef for uncomplicated UTI. Note that some isolates may be susceptible to these agents while testing resistant to cefazolin.      -  Cefepime: S 8      -  Cefoxitin: S <=8      -  Ceftriaxone: S <=1 Enterobacter, Klebsiella aerogenes, Citrobacter, and Serratia may develop resistance during prolonged therapy      -  Cefuroxime: S <=4      -  Ciprofloxacin: S <=0.25      -  Ertapenem: S <=0.5      -  Gentamicin: S <=2      -  Imipenem: S <=1      -  Levofloxacin: S <=0.5      -  Meropenem: S <=1      -  Nitrofurantoin: S <=32 Should not be used to treat pyelonephritis      -  Piperacillin/Tazobactam: S <=8      -  Tobramycin: S <=2      -  Trimethoprim/Sulfamethoxazole: S <=0.5/9.5      Method Type: PHOEBE    Culture - Blood (collected 30 Jan 2023 12:27)  Source: .Blood Blood-Peripheral  Final Report (04 Feb 2023 15:08):    No Growth Final    Culture - Blood (collected 30 Jan 2023 12:10)  Source: .Blood Blood-Peripheral  Gram Stain (31 Jan 2023 15:34):    Growth in anaerobic bottle: Gram positive cocci in pairs  Final Report (01 Feb 2023 13:38):    Growth in anaerobic bottle: Aerococcus urinae    "Susceptibilities not performed"    ***Blood Panel PCR results on this specimen are available    approximately 3 hours after the Gram stain result.***    Gram stain, PCR, and/or culture results may not always    correspond due to difference in methodologies.    ************************************************************    This PCR assay was performed by multiplex PCR. This    Assay tests for 66 bacterial and resistance gene targets.    Please refer to the [facility] test directory    at [email or web address] for details.  Organism: Blood Culture PCR (01 Feb 2023 13:38)  Organism: Blood Culture PCR (01 Feb 2023 13:38)      -  Blood PCR Panel: NEG      Method Type: PCR      COVID-19 PCR: NotDetec (01-30-23 @ 12:25)    RADIOLOGY & ADDITIONAL TESTS:  Preliminary report showing atelectasis R. lung base w/ ri    Care Discussed with Consultants/Other Providers: Sherrill Bergeron   Patient is a 89y old  Male who presents with a chief complaint of abdominal pain (04 Feb 2023 18:08)      24 HOUR EVENTS:  No overnight events reported.     SUBJECTIVE:  Patient seen and examined at bedside. He does not offer any complaints.  He continues to have diarrhea.    ALLERGIES:  No Known Allergies    MEDICATIONS  (STANDING):  acetaminophen     Tablet .. 650 milliGRAM(s) Oral every 6 hours  aspirin enteric coated 81 milliGRAM(s) Oral daily  enoxaparin Injectable 30 milliGRAM(s) SubCutaneous every 24 hours  finasteride 5 milliGRAM(s) Oral daily  lactated ringers. 1000 milliLiter(s) (75 mL/Hr) IV Continuous <Continuous>  piperacillin/tazobactam IVPB.. 3.375 Gram(s) IV Intermittent every 8 hours  potassium chloride  10 mEq/100 mL IVPB 10 milliEquivalent(s) IV Intermittent every 1 hour  tamsulosin 0.8 milliGRAM(s) Oral at bedtime    MEDICATIONS  (PRN):  aluminum hydroxide/magnesium hydroxide/simethicone Suspension 30 milliLiter(s) Oral every 4 hours PRN Dyspepsia  melatonin 3 milliGRAM(s) Oral at bedtime PRN Insomnia  ondansetron Injectable 4 milliGRAM(s) IV Push every 8 hours PRN Nausea and/or Vomiting  oxyCODONE    IR 5 milliGRAM(s) Oral every 6 hours PRN Moderate Pain (4 - 6)  zinc oxide 40% Paste 1 Application(s) Topical three times a day PRN rash    Vital Signs Last 24 Hrs  T(F): 97.6 (04 Feb 2023 23:30), Max: 97.6 (04 Feb 2023 23:30)  HR: 102 (04 Feb 2023 23:30) (102 - 128)  BP: 125/65 (04 Feb 2023 23:30) (125/65 - 125/65)  RR: 15 (04 Feb 2023 23:30) (15 - 18)  SpO2: 94% (04 Feb 2023 23:30) (94% - 96%)  I&O's Summary    04 Feb 2023 07:01  -  05 Feb 2023 07:00  --------------------------------------------------------  IN: 120 mL / OUT: 560 mL / NET: -440 mL      PHYSICAL EXAM:  General: NAD, Awake and alert, debilitated/elderly appearing  ENT: Moist mucous membranes, no thrush  Neck: Supple, No JVD  Lungs: Clear to auscultation bilaterally, good air entry, non-labored breathing  Cardio: RRR, S1/S2, No murmur  Abdomen: Soft, Nontender, Nondistended; Bowel sounds present, indwelling felipe catheter with clear urine.  Extremities: No calf tenderness, No pitting edema, no contractures.    LABS:                        11.8   14.05 )-----------( 210      ( 04 Feb 2023 08:00 )             36.7     02-04    144  |  109  |  17  ----------------------------<  105  3.7   |  29  |  0.81    Ca    7.8      04 Feb 2023 20:20  Phos  2.6     02-04  Mg     1.6     02-04    TPro  5.8  /  Alb  2.0  /  TBili  0.6  /  DBili  0.2  /  AST  28  /  ALT  39  /  AlkPhos  168  02-04            Lactate, Blood: 1.5 mmol/L (02-04 @ 12:00)          11-23 Chol 160 mg/dL LDL -- HDL 44 mg/dL Trig 95 mg/dL              POCT Blood Glucose.: 113 mg/dL (04 Feb 2023 11:46)          Culture - Urine (collected 30 Jan 2023 12:38)  Source: Catheterized Catheterized  Final Report (01 Feb 2023 19:29):    >100,000 CFU/ml Escherichia coli    >100,000 CFU/ml Aerococcus species    "Aerococcus spp. are predictably susceptible to penicillin,    ampicillin, tetracycline, and vancomycin.  All isolates are    resistant to sulfonamides"  Organism: Escherichia coli (01 Feb 2023 19:29)  Organism: Escherichia coli (01 Feb 2023 19:29)      -  Amikacin: S <=16      -  Amoxicillin/Clavulanic Acid: S <=8/4      -  Ampicillin: S <=8 These ampicillin results predict results for amoxicillin      -  Ampicillin/Sulbactam: S <=4/2 Enterobacter, Klebsiella aerogenes, Citrobacter, and Serratia may develop resistance during prolonged therapy (3-4 days)      -  Aztreonam: S <=4      -  Cefazolin: S <=2 For uncomplicated UTI with K. pneumoniae, E. coli, or P. mirablis: PHOEBE <=16 is sensitive and PHOEBE >=32 is resistant. This also predicts results for oral agents cefaclor, cefdinir, cefpodoxime, cefprozil, cefuroxime axetil, cephalexin and locarbef for uncomplicated UTI. Note that some isolates may be susceptible to these agents while testing resistant to cefazolin.      -  Cefepime: S 8      -  Cefoxitin: S <=8      -  Ceftriaxone: S <=1 Enterobacter, Klebsiella aerogenes, Citrobacter, and Serratia may develop resistance during prolonged therapy      -  Cefuroxime: S <=4      -  Ciprofloxacin: S <=0.25      -  Ertapenem: S <=0.5      -  Gentamicin: S <=2      -  Imipenem: S <=1      -  Levofloxacin: S <=0.5      -  Meropenem: S <=1      -  Nitrofurantoin: S <=32 Should not be used to treat pyelonephritis      -  Piperacillin/Tazobactam: S <=8      -  Tobramycin: S <=2      -  Trimethoprim/Sulfamethoxazole: S <=0.5/9.5      Method Type: PHOEBE    Culture - Blood (collected 30 Jan 2023 12:27)  Source: .Blood Blood-Peripheral  Final Report (04 Feb 2023 15:08):    No Growth Final    Culture - Blood (collected 30 Jan 2023 12:10)  Source: .Blood Blood-Peripheral  Gram Stain (31 Jan 2023 15:34):    Growth in anaerobic bottle: Gram positive cocci in pairs  Final Report (01 Feb 2023 13:38):    Growth in anaerobic bottle: Aerococcus urinae    "Susceptibilities not performed"    ***Blood Panel PCR results on this specimen are available    approximately 3 hours after the Gram stain result.***    Gram stain, PCR, and/or culture results may not always    correspond due to difference in methodologies.    ************************************************************    This PCR assay was performed by multiplex PCR. This    Assay tests for 66 bacterial and resistance gene targets.    Please refer to the [facility] test directory    at [email or web address] for details.  Organism: Blood Culture PCR (01 Feb 2023 13:38)  Organism: Blood Culture PCR (01 Feb 2023 13:38)      -  Blood PCR Panel: NEG      Method Type: PCR      COVID-19 PCR: NotDetec (01-30-23 @ 12:25)    RADIOLOGY & ADDITIONAL TESTS:  Preliminary report showing atelectasis R. lung base w/ ri    Care Discussed with Consultants/Other Providers: Sherrill Bergeron   Patient is a 89y old  Male who presents with a chief complaint of abdominal pain (04 Feb 2023 18:08)      24 HOUR EVENTS:  No overnight events reported.     SUBJECTIVE:  Patient seen and examined at bedside. He does not offer any complaints.  He continues to have diarrhea.    ALLERGIES:  No Known Allergies    MEDICATIONS  (STANDING):  acetaminophen     Tablet .. 650 milliGRAM(s) Oral every 6 hours  aspirin enteric coated 81 milliGRAM(s) Oral daily  enoxaparin Injectable 30 milliGRAM(s) SubCutaneous every 24 hours  finasteride 5 milliGRAM(s) Oral daily  lactated ringers. 1000 milliLiter(s) (75 mL/Hr) IV Continuous <Continuous>  piperacillin/tazobactam IVPB.. 3.375 Gram(s) IV Intermittent every 8 hours  potassium chloride  10 mEq/100 mL IVPB 10 milliEquivalent(s) IV Intermittent every 1 hour  tamsulosin 0.8 milliGRAM(s) Oral at bedtime    MEDICATIONS  (PRN):  aluminum hydroxide/magnesium hydroxide/simethicone Suspension 30 milliLiter(s) Oral every 4 hours PRN Dyspepsia  melatonin 3 milliGRAM(s) Oral at bedtime PRN Insomnia  ondansetron Injectable 4 milliGRAM(s) IV Push every 8 hours PRN Nausea and/or Vomiting  oxyCODONE    IR 5 milliGRAM(s) Oral every 6 hours PRN Moderate Pain (4 - 6)  zinc oxide 40% Paste 1 Application(s) Topical three times a day PRN rash    Vital Signs Last 24 Hrs  T(F): 97.6 (04 Feb 2023 23:30), Max: 97.6 (04 Feb 2023 23:30)  HR: 102 (04 Feb 2023 23:30) (102 - 128)  BP: 125/65 (04 Feb 2023 23:30) (125/65 - 125/65)  RR: 15 (04 Feb 2023 23:30) (15 - 18)  SpO2: 94% (04 Feb 2023 23:30) (94% - 96%)  I&O's Summary    04 Feb 2023 07:01  -  05 Feb 2023 07:00  --------------------------------------------------------  IN: 120 mL / OUT: 560 mL / NET: -440 mL      PHYSICAL EXAM:  General: NAD, Awake and alert, debilitated/elderly appearing  ENT: Moist mucous membranes, no thrush  Neck: Supple, No JVD  Lungs: Clear to auscultation bilaterally, good air entry, non-labored breathing  Cardio: RRR, S1/S2, No murmur  Abdomen: Soft, Nontender, Nondistended; Bowel sounds present, indwelling felipe catheter with clear urine.  Extremities: No calf tenderness, No pitting edema, no contractures.    LABS:                        11.8   14.05 )-----------( 210      ( 04 Feb 2023 08:00 )             36.7     02-04    144  |  109  |  17  ----------------------------<  105  3.7   |  29  |  0.81    Ca    7.8      04 Feb 2023 20:20  Phos  2.6     02-04  Mg     1.6     02-04    TPro  5.8  /  Alb  2.0  /  TBili  0.6  /  DBili  0.2  /  AST  28  /  ALT  39  /  AlkPhos  168  02-04            Lactate, Blood: 1.5 mmol/L (02-04 @ 12:00)          11-23 Chol 160 mg/dL LDL -- HDL 44 mg/dL Trig 95 mg/dL              POCT Blood Glucose.: 113 mg/dL (04 Feb 2023 11:46)          Culture - Urine (collected 30 Jan 2023 12:38)  Source: Catheterized Catheterized  Final Report (01 Feb 2023 19:29):    >100,000 CFU/ml Escherichia coli    >100,000 CFU/ml Aerococcus species    "Aerococcus spp. are predictably susceptible to penicillin,    ampicillin, tetracycline, and vancomycin.  All isolates are    resistant to sulfonamides"  Organism: Escherichia coli (01 Feb 2023 19:29)  Organism: Escherichia coli (01 Feb 2023 19:29)      -  Amikacin: S <=16      -  Amoxicillin/Clavulanic Acid: S <=8/4      -  Ampicillin: S <=8 These ampicillin results predict results for amoxicillin      -  Ampicillin/Sulbactam: S <=4/2 Enterobacter, Klebsiella aerogenes, Citrobacter, and Serratia may develop resistance during prolonged therapy (3-4 days)      -  Aztreonam: S <=4      -  Cefazolin: S <=2 For uncomplicated UTI with K. pneumoniae, E. coli, or P. mirablis: PHOEBE <=16 is sensitive and PHOEBE >=32 is resistant. This also predicts results for oral agents cefaclor, cefdinir, cefpodoxime, cefprozil, cefuroxime axetil, cephalexin and locarbef for uncomplicated UTI. Note that some isolates may be susceptible to these agents while testing resistant to cefazolin.      -  Cefepime: S 8      -  Cefoxitin: S <=8      -  Ceftriaxone: S <=1 Enterobacter, Klebsiella aerogenes, Citrobacter, and Serratia may develop resistance during prolonged therapy      -  Cefuroxime: S <=4      -  Ciprofloxacin: S <=0.25      -  Ertapenem: S <=0.5      -  Gentamicin: S <=2      -  Imipenem: S <=1      -  Levofloxacin: S <=0.5      -  Meropenem: S <=1      -  Nitrofurantoin: S <=32 Should not be used to treat pyelonephritis      -  Piperacillin/Tazobactam: S <=8      -  Tobramycin: S <=2      -  Trimethoprim/Sulfamethoxazole: S <=0.5/9.5      Method Type: PHOEBE    Culture - Blood (collected 30 Jan 2023 12:27)  Source: .Blood Blood-Peripheral  Final Report (04 Feb 2023 15:08):    No Growth Final    Culture - Blood (collected 30 Jan 2023 12:10)  Source: .Blood Blood-Peripheral  Gram Stain (31 Jan 2023 15:34):    Growth in anaerobic bottle: Gram positive cocci in pairs  Final Report (01 Feb 2023 13:38):    Growth in anaerobic bottle: Aerococcus urinae    "Susceptibilities not performed"    ***Blood Panel PCR results on this specimen are available    approximately 3 hours after the Gram stain result.***    Gram stain, PCR, and/or culture results may not always    correspond due to difference in methodologies.    ************************************************************    This PCR assay was performed by multiplex PCR. This    Assay tests for 66 bacterial and resistance gene targets.    Please refer to the [facility] Labs test directory    at [email or web address] for details.  Organism: Blood Culture PCR (01 Feb 2023 13:38)  Organism: Blood Culture PCR (01 Feb 2023 13:38)      -  Blood PCR Panel: NEG      Method Type: PCR      COVID-19 PCR: NotDetec (01-30-23 @ 12:25)    RADIOLOGY & ADDITIONAL TESTS:    ******PRELIMINARY REPORT******      INTERPRETATION: Moderate size right pleural effusion with associated infiltrate and/or atelectasis right lung base.    No evidence pulmonary embolism.    Right-sided drainage catheter within the abdomen.    No free air or abscess.    Postsurgical changes right colon. Ileocolic anastomosis present.    Distended and obstructed appearing small bowel within the abdomen and pelvis. Left inguinal hernia present but appears to contain only colon and does NOT appear to be source of obstruction. Similar findings on prior CT January 30, 2023. Surgical consultation and follow-up recommended.    No pneumatosis or portal venous gas to suggest intestinal ischemia.    < from: TTE Echo Complete w/o Contrast w/ Doppler (02.04.23 @ 13:58) >  Summary:   1. Left ventricular ejection fraction, by visual estimation, is 50 to   55%.   2. Normal global left ventricular systolic function.   3. Atypical septal motion suspect bundle branch block.   4. Spectral Doppler shows impaired relaxation pattern of left   ventricular myocardial filling (Grade I diastolic dysfunction).   5. Trace mitral valve regurgitation.   6.Mild tricuspid regurgitation.   7. Sclerotic aortic valve with normal opening.   8. Estimated pulmonary artery systolic pressure is 37.6 mmHg assuming a   right atrial pressure of 10 mmHg, which is consistent with borderline   pulmonary hypertension.    < end of copied text >      Care Discussed with Consultants/Other Providers: Sherrill Bergeron   Patient is a 89y old  Male who presents with a chief complaint of abdominal pain (04 Feb 2023 18:08)      24 HOUR EVENTS:  No overnight events reported.     SUBJECTIVE:  Patient seen and examined at bedside. He does not offer any complaints.  He continues to have diarrhea.    ALLERGIES:  No Known Allergies    MEDICATIONS  (STANDING):  acetaminophen     Tablet .. 650 milliGRAM(s) Oral every 6 hours  aspirin enteric coated 81 milliGRAM(s) Oral daily  enoxaparin Injectable 30 milliGRAM(s) SubCutaneous every 24 hours  finasteride 5 milliGRAM(s) Oral daily  lactated ringers. 1000 milliLiter(s) (75 mL/Hr) IV Continuous <Continuous>  piperacillin/tazobactam IVPB.. 3.375 Gram(s) IV Intermittent every 8 hours  potassium chloride  10 mEq/100 mL IVPB 10 milliEquivalent(s) IV Intermittent every 1 hour  tamsulosin 0.8 milliGRAM(s) Oral at bedtime    MEDICATIONS  (PRN):  aluminum hydroxide/magnesium hydroxide/simethicone Suspension 30 milliLiter(s) Oral every 4 hours PRN Dyspepsia  melatonin 3 milliGRAM(s) Oral at bedtime PRN Insomnia  ondansetron Injectable 4 milliGRAM(s) IV Push every 8 hours PRN Nausea and/or Vomiting  oxyCODONE    IR 5 milliGRAM(s) Oral every 6 hours PRN Moderate Pain (4 - 6)  zinc oxide 40% Paste 1 Application(s) Topical three times a day PRN rash    Vital Signs Last 24 Hrs  T(F): 97.6 (04 Feb 2023 23:30), Max: 97.6 (04 Feb 2023 23:30)  HR: 102 (04 Feb 2023 23:30) (102 - 128)  BP: 125/65 (04 Feb 2023 23:30) (125/65 - 125/65)  RR: 15 (04 Feb 2023 23:30) (15 - 18)  SpO2: 94% (04 Feb 2023 23:30) (94% - 96%)  I&O's Summary    04 Feb 2023 07:01  -  05 Feb 2023 07:00  --------------------------------------------------------  IN: 120 mL / OUT: 560 mL / NET: -440 mL      PHYSICAL EXAM:  General: NAD, Awake and alert, debilitated/elderly appearing  ENT: Moist mucous membranes, no thrush  Neck: Supple, No JVD  Lungs: Clear to auscultation bilaterally, good air entry, non-labored breathing  Cardio: RRR, S1/S2, No murmur  Abdomen: Soft, Nontender, Nondistended; Bowel sounds present, indwelling felipe catheter with clear urine.  Extremities: No calf tenderness, No pitting edema, no contractures.    LABS:                        11.8   14.05 )-----------( 210      ( 04 Feb 2023 08:00 )             36.7     02-04    144  |  109  |  17  ----------------------------<  105  3.7   |  29  |  0.81    Ca    7.8      04 Feb 2023 20:20  Phos  2.6     02-04  Mg     1.6     02-04    TPro  5.8  /  Alb  2.0  /  TBili  0.6  /  DBili  0.2  /  AST  28  /  ALT  39  /  AlkPhos  168  02-04            Lactate, Blood: 1.5 mmol/L (02-04 @ 12:00)          11-23 Chol 160 mg/dL LDL -- HDL 44 mg/dL Trig 95 mg/dL              POCT Blood Glucose.: 113 mg/dL (04 Feb 2023 11:46)          Culture - Urine (collected 30 Jan 2023 12:38)  Source: Catheterized Catheterized  Final Report (01 Feb 2023 19:29):    >100,000 CFU/ml Escherichia coli    >100,000 CFU/ml Aerococcus species    "Aerococcus spp. are predictably susceptible to penicillin,    ampicillin, tetracycline, and vancomycin.  All isolates are    resistant to sulfonamides"  Organism: Escherichia coli (01 Feb 2023 19:29)  Organism: Escherichia coli (01 Feb 2023 19:29)      -  Amikacin: S <=16      -  Amoxicillin/Clavulanic Acid: S <=8/4      -  Ampicillin: S <=8 These ampicillin results predict results for amoxicillin      -  Ampicillin/Sulbactam: S <=4/2 Enterobacter, Klebsiella aerogenes, Citrobacter, and Serratia may develop resistance during prolonged therapy (3-4 days)      -  Aztreonam: S <=4      -  Cefazolin: S <=2 For uncomplicated UTI with K. pneumoniae, E. coli, or P. mirablis: PHOEBE <=16 is sensitive and PHOEBE >=32 is resistant. This also predicts results for oral agents cefaclor, cefdinir, cefpodoxime, cefprozil, cefuroxime axetil, cephalexin and locarbef for uncomplicated UTI. Note that some isolates may be susceptible to these agents while testing resistant to cefazolin.      -  Cefepime: S 8      -  Cefoxitin: S <=8      -  Ceftriaxone: S <=1 Enterobacter, Klebsiella aerogenes, Citrobacter, and Serratia may develop resistance during prolonged therapy      -  Cefuroxime: S <=4      -  Ciprofloxacin: S <=0.25      -  Ertapenem: S <=0.5      -  Gentamicin: S <=2      -  Imipenem: S <=1      -  Levofloxacin: S <=0.5      -  Meropenem: S <=1      -  Nitrofurantoin: S <=32 Should not be used to treat pyelonephritis      -  Piperacillin/Tazobactam: S <=8      -  Tobramycin: S <=2      -  Trimethoprim/Sulfamethoxazole: S <=0.5/9.5      Method Type: PHOEBE    Culture - Blood (collected 30 Jan 2023 12:27)  Source: .Blood Blood-Peripheral  Final Report (04 Feb 2023 15:08):    No Growth Final    Culture - Blood (collected 30 Jan 2023 12:10)  Source: .Blood Blood-Peripheral  Gram Stain (31 Jan 2023 15:34):    Growth in anaerobic bottle: Gram positive cocci in pairs  Final Report (01 Feb 2023 13:38):    Growth in anaerobic bottle: Aerococcus urinae    "Susceptibilities not performed"    ***Blood Panel PCR results on this specimen are available    approximately 3 hours after the Gram stain result.***    Gram stain, PCR, and/or culture results may not always    correspond due to difference in methodologies.    ************************************************************    This PCR assay was performed by multiplex PCR. This    Assay tests for 66 bacterial and resistance gene targets.    Please refer to the [facility] test directory    at [email or web address] for details.  Organism: Blood Culture PCR (01 Feb 2023 13:38)  Organism: Blood Culture PCR (01 Feb 2023 13:38)      -  Blood PCR Panel: NEG      Method Type: PCR      COVID-19 PCR: NotDetec (01-30-23 @ 12:25)    RADIOLOGY & ADDITIONAL TESTS:    ******PRELIMINARY REPORT******      INTERPRETATION: Moderate size right pleural effusion with associated infiltrate and/or atelectasis right lung base.    No evidence pulmonary embolism.    Right-sided drainage catheter within the abdomen.    No free air or abscess.    Postsurgical changes right colon. Ileocolic anastomosis present.    Distended and obstructed appearing small bowel within the abdomen and pelvis. Left inguinal hernia present but appears to contain only colon and does NOT appear to be source of obstruction. Similar findings on prior CT January 30, 2023. Surgical consultation and follow-up recommended.    No pneumatosis or portal venous gas to suggest intestinal ischemia.    < from: TTE Echo Complete w/o Contrast w/ Doppler (02.04.23 @ 13:58) >  Summary:   1. Left ventricular ejection fraction, by visual estimation, is 50 to   55%.   2. Normal global left ventricular systolic function.   3. Atypical septal motion suspect bundle branch block.   4. Spectral Doppler shows impaired relaxation pattern of left   ventricular myocardial filling (Grade I diastolic dysfunction).   5. Trace mitral valve regurgitation.   6.Mild tricuspid regurgitation.   7. Sclerotic aortic valve with normal opening.   8. Estimated pulmonary artery systolic pressure is 37.6 mmHg assuming a   right atrial pressure of 10 mmHg, which is consistent with borderline   pulmonary hypertension.    < end of copied text >      Care Discussed with Consultants/Other Providers: Sherrill Bergeron   Patient is a 89y old  Male who presents with a chief complaint of abdominal pain (04 Feb 2023 18:08)      24 HOUR EVENTS:  No overnight events reported.     SUBJECTIVE:  Patient seen and examined at bedside. He does not offer any complaints.  He continues to have diarrhea.    ALLERGIES:  No Known Allergies    MEDICATIONS  (STANDING):  acetaminophen     Tablet .. 650 milliGRAM(s) Oral every 6 hours  aspirin enteric coated 81 milliGRAM(s) Oral daily  enoxaparin Injectable 30 milliGRAM(s) SubCutaneous every 24 hours  finasteride 5 milliGRAM(s) Oral daily  lactated ringers. 1000 milliLiter(s) (75 mL/Hr) IV Continuous <Continuous>  piperacillin/tazobactam IVPB.. 3.375 Gram(s) IV Intermittent every 8 hours  potassium chloride  10 mEq/100 mL IVPB 10 milliEquivalent(s) IV Intermittent every 1 hour  tamsulosin 0.8 milliGRAM(s) Oral at bedtime    MEDICATIONS  (PRN):  aluminum hydroxide/magnesium hydroxide/simethicone Suspension 30 milliLiter(s) Oral every 4 hours PRN Dyspepsia  melatonin 3 milliGRAM(s) Oral at bedtime PRN Insomnia  ondansetron Injectable 4 milliGRAM(s) IV Push every 8 hours PRN Nausea and/or Vomiting  oxyCODONE    IR 5 milliGRAM(s) Oral every 6 hours PRN Moderate Pain (4 - 6)  zinc oxide 40% Paste 1 Application(s) Topical three times a day PRN rash    Vital Signs Last 24 Hrs  T(F): 97.6 (04 Feb 2023 23:30), Max: 97.6 (04 Feb 2023 23:30)  HR: 102 (04 Feb 2023 23:30) (102 - 128)  BP: 125/65 (04 Feb 2023 23:30) (125/65 - 125/65)  RR: 15 (04 Feb 2023 23:30) (15 - 18)  SpO2: 94% (04 Feb 2023 23:30) (94% - 96%)  I&O's Summary    04 Feb 2023 07:01  -  05 Feb 2023 07:00  --------------------------------------------------------  IN: 120 mL / OUT: 560 mL / NET: -440 mL      PHYSICAL EXAM:  General: NAD, Awake and alert, debilitated/elderly appearing  ENT: Moist mucous membranes, no thrush  Neck: Supple, No JVD  Lungs: Clear to auscultation bilaterally, good air entry, non-labored breathing  Cardio: RRR, S1/S2, No murmur  Abdomen: Soft, Nontender, Nondistended; Bowel sounds present, indwelling felipe catheter with clear urine.  Extremities: No calf tenderness, No pitting edema, no contractures.    LABS:                        11.8   14.05 )-----------( 210      ( 04 Feb 2023 08:00 )             36.7     02-04    144  |  109  |  17  ----------------------------<  105  3.7   |  29  |  0.81    Ca    7.8      04 Feb 2023 20:20  Phos  2.6     02-04  Mg     1.6     02-04    TPro  5.8  /  Alb  2.0  /  TBili  0.6  /  DBili  0.2  /  AST  28  /  ALT  39  /  AlkPhos  168  02-04            Lactate, Blood: 1.5 mmol/L (02-04 @ 12:00)          11-23 Chol 160 mg/dL LDL -- HDL 44 mg/dL Trig 95 mg/dL              POCT Blood Glucose.: 113 mg/dL (04 Feb 2023 11:46)          Culture - Urine (collected 30 Jan 2023 12:38)  Source: Catheterized Catheterized  Final Report (01 Feb 2023 19:29):    >100,000 CFU/ml Escherichia coli    >100,000 CFU/ml Aerococcus species    "Aerococcus spp. are predictably susceptible to penicillin,    ampicillin, tetracycline, and vancomycin.  All isolates are    resistant to sulfonamides"  Organism: Escherichia coli (01 Feb 2023 19:29)  Organism: Escherichia coli (01 Feb 2023 19:29)      -  Amikacin: S <=16      -  Amoxicillin/Clavulanic Acid: S <=8/4      -  Ampicillin: S <=8 These ampicillin results predict results for amoxicillin      -  Ampicillin/Sulbactam: S <=4/2 Enterobacter, Klebsiella aerogenes, Citrobacter, and Serratia may develop resistance during prolonged therapy (3-4 days)      -  Aztreonam: S <=4      -  Cefazolin: S <=2 For uncomplicated UTI with K. pneumoniae, E. coli, or P. mirablis: PHOEBE <=16 is sensitive and PHOEBE >=32 is resistant. This also predicts results for oral agents cefaclor, cefdinir, cefpodoxime, cefprozil, cefuroxime axetil, cephalexin and locarbef for uncomplicated UTI. Note that some isolates may be susceptible to these agents while testing resistant to cefazolin.      -  Cefepime: S 8      -  Cefoxitin: S <=8      -  Ceftriaxone: S <=1 Enterobacter, Klebsiella aerogenes, Citrobacter, and Serratia may develop resistance during prolonged therapy      -  Cefuroxime: S <=4      -  Ciprofloxacin: S <=0.25      -  Ertapenem: S <=0.5      -  Gentamicin: S <=2      -  Imipenem: S <=1      -  Levofloxacin: S <=0.5      -  Meropenem: S <=1      -  Nitrofurantoin: S <=32 Should not be used to treat pyelonephritis      -  Piperacillin/Tazobactam: S <=8      -  Tobramycin: S <=2      -  Trimethoprim/Sulfamethoxazole: S <=0.5/9.5      Method Type: PHOEBE    Culture - Blood (collected 30 Jan 2023 12:27)  Source: .Blood Blood-Peripheral  Final Report (04 Feb 2023 15:08):    No Growth Final    Culture - Blood (collected 30 Jan 2023 12:10)  Source: .Blood Blood-Peripheral  Gram Stain (31 Jan 2023 15:34):    Growth in anaerobic bottle: Gram positive cocci in pairs  Final Report (01 Feb 2023 13:38):    Growth in anaerobic bottle: Aerococcus urinae    "Susceptibilities not performed"    ***Blood Panel PCR results on this specimen are available    approximately 3 hours after the Gram stain result.***    Gram stain, PCR, and/or culture results may not always    correspond due to difference in methodologies.    ************************************************************    This PCR assay was performed by multiplex PCR. This    Assay tests for 66 bacterial and resistance gene targets.    Please refer to the [facility] test directory    at [email or web address] for details.  Organism: Blood Culture PCR (01 Feb 2023 13:38)  Organism: Blood Culture PCR (01 Feb 2023 13:38)      -  Blood PCR Panel: NEG      Method Type: PCR      COVID-19 PCR: NotDetec (01-30-23 @ 12:25)    RADIOLOGY & ADDITIONAL TESTS:    ******PRELIMINARY REPORT******      INTERPRETATION: Moderate size right pleural effusion with associated infiltrate and/or atelectasis right lung base.    No evidence pulmonary embolism.    Right-sided drainage catheter within the abdomen.    No free air or abscess.    Postsurgical changes right colon. Ileocolic anastomosis present.    Distended and obstructed appearing small bowel within the abdomen and pelvis. Left inguinal hernia present but appears to contain only colon and does NOT appear to be source of obstruction. Similar findings on prior CT January 30, 2023. Surgical consultation and follow-up recommended.    No pneumatosis or portal venous gas to suggest intestinal ischemia.    < from: TTE Echo Complete w/o Contrast w/ Doppler (02.04.23 @ 13:58) >  Summary:   1. Left ventricular ejection fraction, by visual estimation, is 50 to   55%.   2. Normal global left ventricular systolic function.   3. Atypical septal motion suspect bundle branch block.   4. Spectral Doppler shows impaired relaxation pattern of left   ventricular myocardial filling (Grade I diastolic dysfunction).   5. Trace mitral valve regurgitation.   6.Mild tricuspid regurgitation.   7. Sclerotic aortic valve with normal opening.   8. Estimated pulmonary artery systolic pressure is 37.6 mmHg assuming a   right atrial pressure of 10 mmHg, which is consistent with borderline   pulmonary hypertension.    < end of copied text >      Care Discussed with Consultants/Other Providers: Sherrill Bergeron

## 2023-02-05 NOTE — PROGRESS NOTE ADULT - SUBJECTIVE AND OBJECTIVE BOX
CC: f/u for  aerococcus bacteremia and cholecystitis  Patient reports  he is ok today  REVIEW OF SYSTEMS:  All other review of systems negative (Comprehensive ROS)    Antimicrobials Day #  :6/10  piperacillin/tazobactam IVPB.. 3.375 Gram(s) IV Intermittent every 8 hours    Other Medications Reviewed    T(F): 98 (02-05-23 @ 12:30), Max: 98 (02-05-23 @ 12:30)  HR: 81 (02-05-23 @ 12:30)  BP: 115/57 (02-05-23 @ 12:30)  RR: 18 (02-05-23 @ 12:30)  SpO2: 96% (02-05-23 @ 12:30)  Wt(kg): --    PHYSICAL EXAM:  General: alert, no acute distress  Eyes:  anicteric, no conjunctival injection, no discharge  Oropharynx: no lesions or injection 	  Neck: supple, without adenopathy  Lungs: clear to auscultation  Heart: irregular rate and rhythm; no murmur, rubs or gallops  Abdomen: soft, nondistended, nontender, without mass or organomegaly, wounds clean ruq drain  Skin: no lesions  Extremities: no clubbing, cyanosis, or edema  Neurologic: alert, , moves all extremities    LAB RESULTS:                        11.0   9.16  )-----------( 188      ( 05 Feb 2023 09:20 )             35.3     02-05    146<H>  |  111<H>  |  14  ----------------------------<  86  3.6   |  28  |  0.66    Ca    7.9<L>      05 Feb 2023 09:20  Phos  2.5     02-05  Mg     2.1     02-05    TPro  5.3<L>  /  Alb  1.8<L>  /  TBili  0.4  /  DBili  0.2  /  AST  27  /  ALT  30  /  AlkPhos  154<H>  02-05    LIVER FUNCTIONS - ( 05 Feb 2023 09:20 )  Alb: 1.8 g/dL / Pro: 5.3 g/dL / ALK PHOS: 154 U/L / ALT: 30 U/L / AST: 27 U/L / GGT: x             MICROBIOLOGY:  RECENT CULTURES:      RADIOLOGY REVIEWED:    < from: Xray Chest 1 View- PORTABLE-Urgent (Xray Chest 1 View- PORTABLE-Urgent .) (02.04.23 @ 11:34) >  HUSSEIN     PROCEDURE DATE:  02/04/2023          INTERPRETATION:  TIME OF EXAM: February 4, 2023 at 11:20 AM.    CLINICAL INFORMATION: Post laparoscopic cholecystectomy for gangrenous   gallbladder. Increased white blood cell count.    COMPARISON:  January 30, 2023.    TECHNIQUE:   AP Portable chest x-ray.    INTERPRETATION:    The heart is not enlarged. The thoracic aorta is calcified and tortuous.  Previous enteric tube not seen.  Continued low lung volumes.  New mild patchy right perihilar/midlung opacity and hazy right basilar   opacity. Remainder of lungs are clear.  No pleural effusion or pneumothorax.  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  What may be a drain projects over the abdomen.        IMPRESSION:  Low lung volumes.    New mild patchy right perihilar/midlung opacity which could be due to   atelectasis or developing infection.    New hazy right basilar opacity, possibly subsegmental atelectasis, a   small layering right pleural effusion with associated passive   atelectasis, or developing infection.    --- End of Report ---              < end of copied text >            < from: CT Abdomen and Pelvis w/ IV Cont (01.30.23 @ 13:47) >    ACC: [id number] EXAM:  CT ABDOMEN AND PELVIS IC   ORDERED BY: DYLAN WILLIS     *** ADDENDUM # 1 ***    Findings discussed with  Dr. Alicia Rawls at 1/30/2023 3:34 PM with   readback.    --- End of Report ---    *** END OF ADDENDUM # 1 ***      PROCEDURE DATE:  01/30/2023          INTERPRETATION:  CLINICAL INFORMATION: 89 years  Male with epigastric and   RUQ pain.    COMPARISON: None.    CONTRAST/COMPLICATIONS:  IV Contrast: Omnipaque 350  90 cc administered   10 cc discarded  Oral Contrast: NONE  Complications: None reported at time of study completion    PROCEDURE:  CT of the Abdomen and Pelvis was performed.  Sagittal and coronal reformats were performed.    FINDINGS:  LOWER CHEST: Within normal limits.    LIVER: 1.5 cm hepatic dome cyst.  BILE DUCTS: Normal caliber.  GALLBLADDER: Markedly irregular gallbladder wall with areas of   discontinuity. Pericholecystic fluid. Air-fluid level in the gallbladder.   Consistent with either gallstone ileus or gangrenous cholecystitis.  SPLEEN: Within normal limits.  PANCREAS: Within normal limits.  ADRENALS: Within normal limits.  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 1.4 cm right renal   cyst. No hydroureteronephrosis.    BLADDER: Completely decompressed. Felipe balloon is distended in the   prostatic urethra.  REPRODUCTIVE ORGANS: Enlarged prostate 5.7 x 3.9 cm. Felipe balloon   distended in the prostatic urethra. Prostate partially obscured by streak   artifact.    BOWEL: Distended fluid-filled small bowel loops measuring upto 4.0 cm to   the level of the ileocolic anastomosis. Unobstructed descending and   sigmoid colon extends into a left inguinal hernia. Constipated rectum.  PERITONEUM: No ascites.  VESSELS: Atherosclerotic changes. 1.7 cm right common iliac artery with   ulcerating plaque (2:79).  RETROPERITONEUM/LYMPH NODES: No lymphadenopathy.  ABDOMINAL WALL: Tip arthroplasty.  BONES: Degenerative changes.    IMPRESSION:  Markedly irregular gallbladder wall with areas of discontinuity. Air in   the gallbladder. Pericholecystic fluid. Distal small bowel obstruction.   Findings suggestive of gallstone ileus however no gallstone is visualized   with certainty. Consider gangrenous cholecystitis as well.    Felipe balloon distended within the prostatic urethra.Recommend   repositioning.    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    Nonobstructed colon in the left inguinal hernia, does NOT appear to be   related to the bowel obstruction.        --- End of Report ---    ***Please see the addendum at the top of this report. It may contain   additional important information or changes.****        DEB CHARLTON MD; Attending Radiologist  This document has been electronically signed. Jan 30 2023  2:50PM  1st Addendum: DEB CHARLTON MD; Attending Radiologist  The first addendum was electronically signed on: Jan 30 2023  3:34PM.    < end of copied text >  Assessment:  Patient with afib, had chronic felipe since hip surgery in november, came in with abdomen pain. He was found to have aerococcus in the urine and blood and perforated gangrenous gall bladder. He had lap choley and was s/p removal of felipe for tov. Yesterday diarrhea, vomiting, rapid afib. Felipe now replaced. Concern raised for cdif but for now will hold bowel regimen and will change zosyn to ceftriaxone which may have less promotion of diarrhea. Will also limit abx to just 1 more day   Plan:  continue antibiotics with ctx,  day 6/7 post op abx  monitor diarrhea, if persists will need to do cdif testing  supsect cxr more atelectasis but will monitor for pneumonia       CC: f/u for  aerococcus bacteremia and cholecystitis  Patient reports  he is ok today  REVIEW OF SYSTEMS:  All other review of systems negative (Comprehensive ROS)    Antimicrobials Day #  :6/10  piperacillin/tazobactam IVPB.. 3.375 Gram(s) IV Intermittent every 8 hours    Other Medications Reviewed    T(F): 98 (02-05-23 @ 12:30), Max: 98 (02-05-23 @ 12:30)  HR: 81 (02-05-23 @ 12:30)  BP: 115/57 (02-05-23 @ 12:30)  RR: 18 (02-05-23 @ 12:30)  SpO2: 96% (02-05-23 @ 12:30)  Wt(kg): --    PHYSICAL EXAM:  General: alert, no acute distress  Eyes:  anicteric, no conjunctival injection, no discharge  Oropharynx: no lesions or injection 	  Neck: supple, without adenopathy  Lungs: clear to auscultation  Heart: irregular rate and rhythm; no murmur, rubs or gallops  Abdomen: soft, nondistended, nontender, without mass or organomegaly, wounds clean ruq drain  Skin: no lesions  Extremities: no clubbing, cyanosis, or edema  Neurologic: alert, , moves all extremities    LAB RESULTS:                        11.0   9.16  )-----------( 188      ( 05 Feb 2023 09:20 )             35.3     02-05    146<H>  |  111<H>  |  14  ----------------------------<  86  3.6   |  28  |  0.66    Ca    7.9<L>      05 Feb 2023 09:20  Phos  2.5     02-05  Mg     2.1     02-05    TPro  5.3<L>  /  Alb  1.8<L>  /  TBili  0.4  /  DBili  0.2  /  AST  27  /  ALT  30  /  AlkPhos  154<H>  02-05    LIVER FUNCTIONS - ( 05 Feb 2023 09:20 )  Alb: 1.8 g/dL / Pro: 5.3 g/dL / ALK PHOS: 154 U/L / ALT: 30 U/L / AST: 27 U/L / GGT: x             MICROBIOLOGY:  RECENT CULTURES:      RADIOLOGY REVIEWED:    < from: Xray Chest 1 View- PORTABLE-Urgent (Xray Chest 1 View- PORTABLE-Urgent .) (02.04.23 @ 11:34) >  HUSSEIN     PROCEDURE DATE:  02/04/2023          INTERPRETATION:  TIME OF EXAM: February 4, 2023 at 11:20 AM.    CLINICAL INFORMATION: Post laparoscopic cholecystectomy for gangrenous   gallbladder. Increased white blood cell count.    COMPARISON:  January 30, 2023.    TECHNIQUE:   AP Portable chest x-ray.    INTERPRETATION:    The heart is not enlarged. The thoracic aorta is calcified and tortuous.  Previous enteric tube not seen.  Continued low lung volumes.  New mild patchy right perihilar/midlung opacity and hazy right basilar   opacity. Remainder of lungs are clear.  No pleural effusion or pneumothorax.  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  What may be a drain projects over the abdomen.        IMPRESSION:  Low lung volumes.    New mild patchy right perihilar/midlung opacity which could be due to   atelectasis or developing infection.    New hazy right basilar opacity, possibly subsegmental atelectasis, a   small layering right pleural effusion with associated passive   atelectasis, or developing infection.    --- End of Report ---              < end of copied text >            < from: CT Abdomen and Pelvis w/ IV Cont (01.30.23 @ 13:47) >    ACC: [id number] EXAM:  CT ABDOMEN AND PELVIS IC   ORDERED BY: DYLAN WILLIS     *** ADDENDUM # 1 ***    Findings discussed with  Dr. Alicia Rawls at 1/30/2023 3:34 PM with   readback.    --- End of Report ---    *** END OF ADDENDUM # 1 ***      PROCEDURE DATE:  01/30/2023          INTERPRETATION:  CLINICAL INFORMATION: 89 years  Male with epigastric and   RUQ pain.    COMPARISON: None.    CONTRAST/COMPLICATIONS:  IV Contrast: Omnipaque 350  90 cc administered   10 cc discarded  Oral Contrast: NONE  Complications: None reported at time of study completion    PROCEDURE:  CT of the Abdomen and Pelvis was performed.  Sagittal and coronal reformats were performed.    FINDINGS:  LOWER CHEST: Within normal limits.    LIVER: 1.5 cm hepatic dome cyst.  BILE DUCTS: Normal caliber.  GALLBLADDER: Markedly irregular gallbladder wall with areas of   discontinuity. Pericholecystic fluid. Air-fluid level in the gallbladder.   Consistent with either gallstone ileus or gangrenous cholecystitis.  SPLEEN: Within normal limits.  PANCREAS: Within normal limits.  ADRENALS: Within normal limits.  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 1.4 cm right renal   cyst. No hydroureteronephrosis.    BLADDER: Completely decompressed. Felipe balloon is distended in the   prostatic urethra.  REPRODUCTIVE ORGANS: Enlarged prostate 5.7 x 3.9 cm. Felipe balloon   distended in the prostatic urethra. Prostate partially obscured by streak   artifact.    BOWEL: Distended fluid-filled small bowel loops measuring upto 4.0 cm to   the level of the ileocolic anastomosis. Unobstructed descending and   sigmoid colon extends into a left inguinal hernia. Constipated rectum.  PERITONEUM: No ascites.  VESSELS: Atherosclerotic changes. 1.7 cm right common iliac artery with   ulcerating plaque (2:79).  RETROPERITONEUM/LYMPH NODES: No lymphadenopathy.  ABDOMINAL WALL: Tip arthroplasty.  BONES: Degenerative changes.    IMPRESSION:  Markedly irregular gallbladder wall with areas of discontinuity. Air in   the gallbladder. Pericholecystic fluid. Distal small bowel obstruction.   Findings suggestive of gallstone ileus however no gallstone is visualized   with certainty. Consider gangrenous cholecystitis as well.    Felipe balloon distended within the prostatic urethra.Recommend   repositioning.    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    Nonobstructed colon in the left inguinal hernia, does NOT appear to be   related to the bowel obstruction.        --- End of Report ---    ***Please see the addendum at the top of this report. It may contain   additional important information or changes.****        DEB CHARLTON MD; Attending Radiologist  This document has been electronically signed. Jan 30 2023  2:50PM  1st Addendum: DEB CHARLTON MD; Attending Radiologist  The first addendum was electronically signed on: Jan 30 2023  3:34PM.    < end of copied text >  Assessment:  Patient with afib, had chronic felipe since hip surgery in november, came in with abdomen pain. He was found to have aerococcus in the urine and blood and perforated gangrenous gall bladder. He had lap choley and was s/p removal of felipe for tov. Yesterday diarrhea, vomiting, rapid afib. Felipe now replaced. Concern raised for cdif but for now will hold bowel regimen and will change zosyn to ceftriaxone which may have less promotion of diarrhea. Will also limit abx to just 1 more day   Plan:  continue antibiotics with ctx,  day 6/7 post op abx  monitor diarrhea, if persists will need to do cdif testing  supsect cxr more atelectasis but will monitor for pneumonia       CC: f/u for  aerococcus bacteremia and cholecystitis  Patient reports  he is ok today  REVIEW OF SYSTEMS:  All other review of systems negative (Comprehensive ROS)    Antimicrobials Day #  :6/10  piperacillin/tazobactam IVPB.. 3.375 Gram(s) IV Intermittent every 8 hours    Other Medications Reviewed    T(F): 98 (02-05-23 @ 12:30), Max: 98 (02-05-23 @ 12:30)  HR: 81 (02-05-23 @ 12:30)  BP: 115/57 (02-05-23 @ 12:30)  RR: 18 (02-05-23 @ 12:30)  SpO2: 96% (02-05-23 @ 12:30)  Wt(kg): --    PHYSICAL EXAM:  General: alert, no acute distress  Eyes:  anicteric, no conjunctival injection, no discharge  Oropharynx: no lesions or injection 	  Neck: supple, without adenopathy  Lungs: clear to auscultation  Heart: irregular rate and rhythm; no murmur, rubs or gallops  Abdomen: soft, nondistended, nontender, without mass or organomegaly, wounds clean ruq drain  Skin: no lesions  Extremities: no clubbing, cyanosis, or edema  Neurologic: alert, , moves all extremities    LAB RESULTS:                        11.0   9.16  )-----------( 188      ( 05 Feb 2023 09:20 )             35.3     02-05    146<H>  |  111<H>  |  14  ----------------------------<  86  3.6   |  28  |  0.66    Ca    7.9<L>      05 Feb 2023 09:20  Phos  2.5     02-05  Mg     2.1     02-05    TPro  5.3<L>  /  Alb  1.8<L>  /  TBili  0.4  /  DBili  0.2  /  AST  27  /  ALT  30  /  AlkPhos  154<H>  02-05    LIVER FUNCTIONS - ( 05 Feb 2023 09:20 )  Alb: 1.8 g/dL / Pro: 5.3 g/dL / ALK PHOS: 154 U/L / ALT: 30 U/L / AST: 27 U/L / GGT: x             MICROBIOLOGY:  RECENT CULTURES:      RADIOLOGY REVIEWED:    < from: Xray Chest 1 View- PORTABLE-Urgent (Xray Chest 1 View- PORTABLE-Urgent .) (02.04.23 @ 11:34) >  HUSSEIN     PROCEDURE DATE:  02/04/2023          INTERPRETATION:  TIME OF EXAM: February 4, 2023 at 11:20 AM.    CLINICAL INFORMATION: Post laparoscopic cholecystectomy for gangrenous   gallbladder. Increased white blood cell count.    COMPARISON:  January 30, 2023.    TECHNIQUE:   AP Portable chest x-ray.    INTERPRETATION:    The heart is not enlarged. The thoracic aorta is calcified and tortuous.  Previous enteric tube not seen.  Continued low lung volumes.  New mild patchy right perihilar/midlung opacity and hazy right basilar   opacity. Remainder of lungs are clear.  No pleural effusion or pneumothorax.  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  What may be a drain projects over the abdomen.        IMPRESSION:  Low lung volumes.    New mild patchy right perihilar/midlung opacity which could be due to   atelectasis or developing infection.    New hazy right basilar opacity, possibly subsegmental atelectasis, a   small layering right pleural effusion with associated passive   atelectasis, or developing infection.    --- End of Report ---              < end of copied text >            < from: CT Abdomen and Pelvis w/ IV Cont (01.30.23 @ 13:47) >    ACC: [id number] EXAM:  CT ABDOMEN AND PELVIS IC   ORDERED BY: DYLAN WILLIS     *** ADDENDUM # 1 ***    Findings discussed with  Dr. Alicia Rawls at 1/30/2023 3:34 PM with   readback.    --- End of Report ---    *** END OF ADDENDUM # 1 ***      PROCEDURE DATE:  01/30/2023          INTERPRETATION:  CLINICAL INFORMATION: 89 years  Male with epigastric and   RUQ pain.    COMPARISON: None.    CONTRAST/COMPLICATIONS:  IV Contrast: Omnipaque 350  90 cc administered   10 cc discarded  Oral Contrast: NONE  Complications: None reported at time of study completion    PROCEDURE:  CT of the Abdomen and Pelvis was performed.  Sagittal and coronal reformats were performed.    FINDINGS:  LOWER CHEST: Within normal limits.    LIVER: 1.5 cm hepatic dome cyst.  BILE DUCTS: Normal caliber.  GALLBLADDER: Markedly irregular gallbladder wall with areas of   discontinuity. Pericholecystic fluid. Air-fluid level in the gallbladder.   Consistent with either gallstone ileus or gangrenous cholecystitis.  SPLEEN: Within normal limits.  PANCREAS: Within normal limits.  ADRENALS: Within normal limits.  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 1.4 cm right renal   cyst. No hydroureteronephrosis.    BLADDER: Completely decompressed. Felipe balloon is distended in the   prostatic urethra.  REPRODUCTIVE ORGANS: Enlarged prostate 5.7 x 3.9 cm. Felipe balloon   distended in the prostatic urethra. Prostate partially obscured by streak   artifact.    BOWEL: Distended fluid-filled small bowel loops measuring upto 4.0 cm to   the level of the ileocolic anastomosis. Unobstructed descending and   sigmoid colon extends into a left inguinal hernia. Constipated rectum.  PERITONEUM: No ascites.  VESSELS: Atherosclerotic changes. 1.7 cm right common iliac artery with   ulcerating plaque (2:79).  RETROPERITONEUM/LYMPH NODES: No lymphadenopathy.  ABDOMINAL WALL: Tip arthroplasty.  BONES: Degenerative changes.    IMPRESSION:  Markedly irregular gallbladder wall with areas of discontinuity. Air in   the gallbladder. Pericholecystic fluid. Distal small bowel obstruction.   Findings suggestive of gallstone ileus however no gallstone is visualized   with certainty. Consider gangrenous cholecystitis as well.    Felipe balloon distended within the prostatic urethra.Recommend   repositioning.    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    Nonobstructed colon in the left inguinal hernia, does NOT appear to be   related to the bowel obstruction.        --- End of Report ---    ***Please see the addendum at the top of this report. It may contain   additional important information or changes.****        DEB CHARLTON MD; Attending Radiologist  This document has been electronically signed. Jan 30 2023  2:50PM  1st Addendum: DEB CHARLTON MD; Attending Radiologist  The first addendum was electronically signed on: Jan 30 2023  3:34PM.    < end of copied text >  Assessment:  Patient with afib, had chronic felipe since hip surgery in november, came in with abdomen pain. He was found to have aerococcus in the urine and blood and perforated gangrenous gall bladder. He had lap choley and was s/p removal of felipe for tov. Yesterday diarrhea, vomiting, rapid afib. Felipe now replaced. Concern raised for cdif but for now will hold bowel regimen and will change zosyn to ceftriaxone which may have less promotion of diarrhea. Will also limit abx to just 1 more day   Plan:  continue antibiotics with ctx,  day 6/7 post op abx  monitor diarrhea, if persists will need to do cdif testing  supsect cxr more atelectasis but will monitor for pneumonia

## 2023-02-05 NOTE — CONSULT NOTE ADULT - PROBLEM SELECTOR RECOMMENDATION 9
felipe, flomax 0.8mg and proscar.    now with concern for C. dif. keep felipe for now. mobilize. consider void trial when clinically stable and mobilizing OOB

## 2023-02-05 NOTE — PROGRESS NOTE ADULT - SUBJECTIVE AND OBJECTIVE BOX
Follow up for  SUBJ:    looks much improved    PMH  Hypertension    History of BPH    Malignant neoplasm of colon    Femur neck fracture    History of glaucoma    GERD (gastroesophageal reflux disease)    Iron deficiency anemia    Hyperlipidemia        MEDICATIONS  (STANDING):  acetaminophen     Tablet .. 650 milliGRAM(s) Oral every 6 hours  aspirin enteric coated 81 milliGRAM(s) Oral daily  cefTRIAXone   IVPB 1000 milliGRAM(s) IV Intermittent every 24 hours  dextrose 5% + sodium chloride 0.45%. 1000 milliLiter(s) (75 mL/Hr) IV Continuous <Continuous>  diatrizoate meglumine/diatrizoate sodium. 30 milliLiter(s) Oral once  enoxaparin Injectable 30 milliGRAM(s) SubCutaneous every 24 hours  finasteride 5 milliGRAM(s) Oral daily  potassium chloride  10 mEq/100 mL IVPB 10 milliEquivalent(s) IV Intermittent every 1 hour  tamsulosin 0.8 milliGRAM(s) Oral at bedtime    MEDICATIONS  (PRN):  aluminum hydroxide/magnesium hydroxide/simethicone Suspension 30 milliLiter(s) Oral every 4 hours PRN Dyspepsia  melatonin 3 milliGRAM(s) Oral at bedtime PRN Insomnia  ondansetron Injectable 4 milliGRAM(s) IV Push every 8 hours PRN Nausea and/or Vomiting  oxyCODONE    IR 5 milliGRAM(s) Oral every 6 hours PRN Moderate Pain (4 - 6)  zinc oxide 40% Paste 1 Application(s) Topical three times a day PRN rash        PHYSICAL EXAM:  Vital Signs Last 24 Hrs  T(C): 36.7 (05 Feb 2023 12:30), Max: 36.7 (05 Feb 2023 12:30)  T(F): 98 (05 Feb 2023 12:30), Max: 98 (05 Feb 2023 12:30)  HR: 81 (05 Feb 2023 12:30) (81 - 102)  BP: 115/57 (05 Feb 2023 12:30) (115/57 - 125/65)  BP(mean): --  RR: 18 (05 Feb 2023 12:30) (15 - 18)  SpO2: 96% (05 Feb 2023 12:30) (94% - 96%)    Parameters below as of 05 Feb 2023 12:30  Patient On (Oxygen Delivery Method): room air  O2 Flow (L/min): 2      GENERAL: NAD, elderly appearing  HEAD:  Atraumatic, Normocephalic  EYES: EOMI, PERRLA, conjunctiva and sclera clear  ENT: Moist mucous membranes,  NECK: Supple, No JVD, no bruits  CHEST/LUNG: Clear to percussion bilaterally; No rales, rhonchi, wheezing, or rubs  HEART: Regular rate and rhythm; No murmurs, rubs, or gallops PMI non displaced.  ABDOMEN: Soft, Nontender, Nondistended; Bowel sounds present  EXTREMITIES:  2+ Peripheral Pulses, No clubbing, cyanosis, or edema  SKIN: No rashes or lesions  NERVOUS SYSTEM:  Cranial Nerves II-XII intact      TELEMETRY:    svt     ECG:    mat most likely    < from: 12 Lead ECG (02.04.23 @ 11:14) >    Diagnosis Line Atrial fibrillation with rapid ventricular response  vs multifocal atrial tachycardia favor the latter  Counterclockwise rotation  Marked ST abnormality, possible septal subendocardial injury  Abnormal ECG  When compared with ECG of 30-JAN-2023 11:46,  Previous ECG has undetermined rhythm, needs review  Inverted T waves have replaced nonspecific T wave abnormality in Anterior leads  Confirmed by PINA HOWE MD ([id number]) on 2/5/2023 7:31:09 AM    < end of copied text >    ECHO:    < from: TTE Echo Complete w/o Contrast w/ Doppler (02.04.23 @ 13:58) >  Summary:   1. Left ventricular ejection fraction, by visual estimation, is 50 to   55%.   2. Normal global left ventricular systolic function.   3. Atypical septal motion suspect bundle branch block.   4. Spectral Doppler shows impaired relaxation pattern of left   ventricular myocardial filling (Grade I diastolic dysfunction).   5. Trace mitral valve regurgitation.   6.Mild tricuspid regurgitation.   7. Sclerotic aortic valve with normal opening.   8. Estimated pulmonary artery systolic pressure is 37.6 mmHg assuming a   right atrial pressure of 10 mmHg, which is consistent with borderline   pulmonary hypertension.    [id number] Pina Howe , Electronically signed on 2/4/2023 at   5:03:35 PM            *** Final ***    < end of copied text >      LABS:                        11.0   9.16  )-----------( 188      ( 05 Feb 2023 09:20 )             35.3     02-05    146<H>  |  111<H>  |  14  ----------------------------<  86  3.6   |  28  |  0.66    Ca    7.9<L>      05 Feb 2023 09:20  Phos  2.5     02-05  Mg     2.1     02-05    TPro  5.3<L>  /  Alb  1.8<L>  /  TBili  0.4  /  DBili  0.2  /  AST  27  /  ALT  30  /  AlkPhos  154<H>  02-05      I&O's Summary    04 Feb 2023 07:01  -  05 Feb 2023 07:00  --------------------------------------------------------  IN: 120 mL / OUT: 560 mL / NET: -440 mL    05 Feb 2023 07:01  -  05 Feb 2023 20:18  --------------------------------------------------------  IN: 0 mL / OUT: 420 mL / NET: -420 mL      BNP    RADIOLOGY & ADDITIONAL STUDIES:    < from: Xray Chest 1 View- PORTABLE-Urgent (Xray Chest 1 View- PORTABLE-Urgent .) (02.04.23 @ 11:34) >    IMPRESSION:  Low lung volumes.    New mild patchy right perihilar/midlung opacity which could be due to   atelectasis or developing infection.    New hazy right basilar opacity, possibly subsegmental atelectasis, a   small layering right pleural effusion with associated passive   atelectasis, or developing infection.    --- End of Report ---      RASHI RODRIGUEZ MD; Attending Radiologist  This document has been electronically signed. Feb4 2023 12:22PM    < end of copied text >      ECHO:    impression  tachycardia   tachycardia resolving most likely reflects a multifocal atrial tachycardia in  the setting of volume depletion and postoperative state along with a mild anemia. the risk of anticoagulation probably outweigh the benefit . would continue fluid resuscitation. the EKG is reviewed and demonstrates some ST segment depression and anterior leads consist with ischemia. d dimer 1872 duplex sonography pending serum troponin 13will continue to support in this frail elderly gentleman asked postoperative. EKG in AM     discussed with family at bedside  Dr. Harden's comments appreciated discuss with Dr. Bailey       Follow up for  SUBJ:    looks much improved    PMH  Hypertension    History of BPH    Malignant neoplasm of colon    Femur neck fracture    History of glaucoma    GERD (gastroesophageal reflux disease)    Iron deficiency anemia    Hyperlipidemia        MEDICATIONS  (STANDING):  acetaminophen     Tablet .. 650 milliGRAM(s) Oral every 6 hours  aspirin enteric coated 81 milliGRAM(s) Oral daily  cefTRIAXone   IVPB 1000 milliGRAM(s) IV Intermittent every 24 hours  dextrose 5% + sodium chloride 0.45%. 1000 milliLiter(s) (75 mL/Hr) IV Continuous <Continuous>  diatrizoate meglumine/diatrizoate sodium. 30 milliLiter(s) Oral once  enoxaparin Injectable 30 milliGRAM(s) SubCutaneous every 24 hours  finasteride 5 milliGRAM(s) Oral daily  potassium chloride  10 mEq/100 mL IVPB 10 milliEquivalent(s) IV Intermittent every 1 hour  tamsulosin 0.8 milliGRAM(s) Oral at bedtime    MEDICATIONS  (PRN):  aluminum hydroxide/magnesium hydroxide/simethicone Suspension 30 milliLiter(s) Oral every 4 hours PRN Dyspepsia  melatonin 3 milliGRAM(s) Oral at bedtime PRN Insomnia  ondansetron Injectable 4 milliGRAM(s) IV Push every 8 hours PRN Nausea and/or Vomiting  oxyCODONE    IR 5 milliGRAM(s) Oral every 6 hours PRN Moderate Pain (4 - 6)  zinc oxide 40% Paste 1 Application(s) Topical three times a day PRN rash        PHYSICAL EXAM:  Vital Signs Last 24 Hrs  T(C): 36.7 (05 Feb 2023 12:30), Max: 36.7 (05 Feb 2023 12:30)  T(F): 98 (05 Feb 2023 12:30), Max: 98 (05 Feb 2023 12:30)  HR: 81 (05 Feb 2023 12:30) (81 - 102)  BP: 115/57 (05 Feb 2023 12:30) (115/57 - 125/65)  BP(mean): --  RR: 18 (05 Feb 2023 12:30) (15 - 18)  SpO2: 96% (05 Feb 2023 12:30) (94% - 96%)    Parameters below as of 05 Feb 2023 12:30  Patient On (Oxygen Delivery Method): room air  O2 Flow (L/min): 2      GENERAL: NAD, elderly appearing  HEAD:  Atraumatic, Normocephalic  EYES: EOMI, PERRLA, conjunctiva and sclera clear  ENT: Moist mucous membranes,  NECK: Supple, No JVD, no bruits  CHEST/LUNG: Clear to percussion bilaterally; No rales, rhonchi, wheezing, or rubs  HEART: Regular rate and rhythm; No murmurs, rubs, or gallops PMI non displaced.  ABDOMEN: Soft, Nontender, Nondistended; Bowel sounds present  EXTREMITIES:  2+ Peripheral Pulses, No clubbing, cyanosis, or edema  SKIN: No rashes or lesions  NERVOUS SYSTEM:  Cranial Nerves II-XII intact      TELEMETRY:    svt     ECG:    mat most likely    < from: 12 Lead ECG (02.04.23 @ 11:14) >    Diagnosis Line Atrial fibrillation with rapid ventricular response  vs multifocal atrial tachycardia favor the latter  Counterclockwise rotation  Marked ST abnormality, possible septal subendocardial injury  Abnormal ECG  When compared with ECG of 30-JAN-2023 11:46,  Previous ECG has undetermined rhythm, needs review  Inverted T waves have replaced nonspecific T wave abnormality in Anterior leads  Confirmed by PINA HOWE MD ([id number]) on 2/5/2023 7:31:09 AM    < end of copied text >    ECHO:    < from: TTE Echo Complete w/o Contrast w/ Doppler (02.04.23 @ 13:58) >  Summary:   1. Left ventricular ejection fraction, by visual estimation, is 50 to   55%.   2. Normal global left ventricular systolic function.   3. Atypical septal motion suspect bundle branch block.   4. Spectral Doppler shows impaired relaxation pattern of left   ventricular myocardial filling (Grade I diastolic dysfunction).   5. Trace mitral valve regurgitation.   6.Mild tricuspid regurgitation.   7. Sclerotic aortic valve with normal opening.   8. Estimated pulmonary artery systolic pressure is 37.6 mmHg assuming a   right atrial pressure of 10 mmHg, which is consistent with borderline   pulmonary hypertension.    [id number] Pina Howe , Electronically signed on 2/4/2023 at   5:03:35 PM            *** Final ***    < end of copied text >      LABS:                        11.0   9.16  )-----------( 188      ( 05 Feb 2023 09:20 )             35.3     02-05    146<H>  |  111<H>  |  14  ----------------------------<  86  3.6   |  28  |  0.66    Ca    7.9<L>      05 Feb 2023 09:20  Phos  2.5     02-05  Mg     2.1     02-05    TPro  5.3<L>  /  Alb  1.8<L>  /  TBili  0.4  /  DBili  0.2  /  AST  27  /  ALT  30  /  AlkPhos  154<H>  02-05      I&O's Summary    04 Feb 2023 07:01  -  05 Feb 2023 07:00  --------------------------------------------------------  IN: 120 mL / OUT: 560 mL / NET: -440 mL    05 Feb 2023 07:01  -  05 Feb 2023 20:18  --------------------------------------------------------  IN: 0 mL / OUT: 420 mL / NET: -420 mL      BNP    RADIOLOGY & ADDITIONAL STUDIES:    < from: Xray Chest 1 View- PORTABLE-Urgent (Xray Chest 1 View- PORTABLE-Urgent .) (02.04.23 @ 11:34) >    IMPRESSION:  Low lung volumes.    New mild patchy right perihilar/midlung opacity which could be due to   atelectasis or developing infection.    New hazy right basilar opacity, possibly subsegmental atelectasis, a   small layering right pleural effusion with associated passive   atelectasis, or developing infection.    --- End of Report ---      RASHI RODRIGUEZ MD; Attending Radiologist  This document has been electronically signed. Feb4 2023 12:22PM    < end of copied text >      ECHO:    impression  tachycardia   tachycardia resolving most likely reflects a multifocal atrial tachycardia in  the setting of volume depletion and postoperative state along with a mild anemia. the risk of anticoagulation probably outweigh the benefit . would continue fluid resuscitation. the EKG is reviewed and demonstrates some ST segment depression and anterior leads consist with ischemia. d dimer 1872 duplex sonography pending serum troponin 13will continue to support in this frail elderly gentleman asked postoperative. EKG in AM     discussed with family at bedside  Dr. Harden's comments appreciated discuss with Dr. Bailey       Follow up for  SUBJ:    looks much improved    PMH  Hypertension    History of BPH    Malignant neoplasm of colon    Femur neck fracture    History of glaucoma    GERD (gastroesophageal reflux disease)    Iron deficiency anemia    Hyperlipidemia        MEDICATIONS  (STANDING):  acetaminophen     Tablet .. 650 milliGRAM(s) Oral every 6 hours  aspirin enteric coated 81 milliGRAM(s) Oral daily  cefTRIAXone   IVPB 1000 milliGRAM(s) IV Intermittent every 24 hours  dextrose 5% + sodium chloride 0.45%. 1000 milliLiter(s) (75 mL/Hr) IV Continuous <Continuous>  diatrizoate meglumine/diatrizoate sodium. 30 milliLiter(s) Oral once  enoxaparin Injectable 30 milliGRAM(s) SubCutaneous every 24 hours  finasteride 5 milliGRAM(s) Oral daily  potassium chloride  10 mEq/100 mL IVPB 10 milliEquivalent(s) IV Intermittent every 1 hour  tamsulosin 0.8 milliGRAM(s) Oral at bedtime    MEDICATIONS  (PRN):  aluminum hydroxide/magnesium hydroxide/simethicone Suspension 30 milliLiter(s) Oral every 4 hours PRN Dyspepsia  melatonin 3 milliGRAM(s) Oral at bedtime PRN Insomnia  ondansetron Injectable 4 milliGRAM(s) IV Push every 8 hours PRN Nausea and/or Vomiting  oxyCODONE    IR 5 milliGRAM(s) Oral every 6 hours PRN Moderate Pain (4 - 6)  zinc oxide 40% Paste 1 Application(s) Topical three times a day PRN rash        PHYSICAL EXAM:  Vital Signs Last 24 Hrs  T(C): 36.7 (05 Feb 2023 12:30), Max: 36.7 (05 Feb 2023 12:30)  T(F): 98 (05 Feb 2023 12:30), Max: 98 (05 Feb 2023 12:30)  HR: 81 (05 Feb 2023 12:30) (81 - 102)  BP: 115/57 (05 Feb 2023 12:30) (115/57 - 125/65)  BP(mean): --  RR: 18 (05 Feb 2023 12:30) (15 - 18)  SpO2: 96% (05 Feb 2023 12:30) (94% - 96%)    Parameters below as of 05 Feb 2023 12:30  Patient On (Oxygen Delivery Method): room air  O2 Flow (L/min): 2      GENERAL: NAD, elderly appearing  HEAD:  Atraumatic, Normocephalic  EYES: EOMI, PERRLA, conjunctiva and sclera clear  ENT: Moist mucous membranes,  NECK: Supple, No JVD, no bruits  CHEST/LUNG: Clear to percussion bilaterally; No rales, rhonchi, wheezing, or rubs  HEART: Regular rate and rhythm; No murmurs, rubs, or gallops PMI non displaced.  ABDOMEN: Soft, Nontender, Nondistended; Bowel sounds present  EXTREMITIES:  2+ Peripheral Pulses, No clubbing, cyanosis, or edema  SKIN: No rashes or lesions  NERVOUS SYSTEM:  Cranial Nerves II-XII intact      TELEMETRY:    svt     ECG:    mat most likely    < from: 12 Lead ECG (02.04.23 @ 11:14) >    Diagnosis Line Atrial fibrillation with rapid ventricular response  vs multifocal atrial tachycardia favor the latter  Counterclockwise rotation  Marked ST abnormality, possible septal subendocardial injury  Abnormal ECG  When compared with ECG of 30-JAN-2023 11:46,  Previous ECG has undetermined rhythm, needs review  Inverted T waves have replaced nonspecific T wave abnormality in Anterior leads  Confirmed by PINA HOWE MD ([id number]) on 2/5/2023 7:31:09 AM    < end of copied text >    ECHO:    < from: TTE Echo Complete w/o Contrast w/ Doppler (02.04.23 @ 13:58) >  Summary:   1. Left ventricular ejection fraction, by visual estimation, is 50 to   55%.   2. Normal global left ventricular systolic function.   3. Atypical septal motion suspect bundle branch block.   4. Spectral Doppler shows impaired relaxation pattern of left   ventricular myocardial filling (Grade I diastolic dysfunction).   5. Trace mitral valve regurgitation.   6.Mild tricuspid regurgitation.   7. Sclerotic aortic valve with normal opening.   8. Estimated pulmonary artery systolic pressure is 37.6 mmHg assuming a   right atrial pressure of 10 mmHg, which is consistent with borderline   pulmonary hypertension.    [id number] Pina Howe , Electronically signed on 2/4/2023 at   5:03:35 PM            *** Final ***    < end of copied text >      LABS:                        11.0   9.16  )-----------( 188      ( 05 Feb 2023 09:20 )             35.3     02-05    146<H>  |  111<H>  |  14  ----------------------------<  86  3.6   |  28  |  0.66    Ca    7.9<L>      05 Feb 2023 09:20  Phos  2.5     02-05  Mg     2.1     02-05    TPro  5.3<L>  /  Alb  1.8<L>  /  TBili  0.4  /  DBili  0.2  /  AST  27  /  ALT  30  /  AlkPhos  154<H>  02-05      I&O's Summary    04 Feb 2023 07:01  -  05 Feb 2023 07:00  --------------------------------------------------------  IN: 120 mL / OUT: 560 mL / NET: -440 mL    05 Feb 2023 07:01  -  05 Feb 2023 20:18  --------------------------------------------------------  IN: 0 mL / OUT: 420 mL / NET: -420 mL      BNP    RADIOLOGY & ADDITIONAL STUDIES:    < from: Xray Chest 1 View- PORTABLE-Urgent (Xray Chest 1 View- PORTABLE-Urgent .) (02.04.23 @ 11:34) >    IMPRESSION:  Low lung volumes.    New mild patchy right perihilar/midlung opacity which could be due to   atelectasis or developing infection.    New hazy right basilar opacity, possibly subsegmental atelectasis, a   small layering right pleural effusion with associated passive   atelectasis, or developing infection.    --- End of Report ---      RASHI RODRIGUEZ MD; Attending Radiologist  This document has been electronically signed. Feb4 2023 12:22PM    < end of copied text >      ECHO:    impression  tachycardia   tachycardia resolving most likely reflects a multifocal atrial tachycardia in  the setting of volume depletion and postoperative state along with a mild anemia. the risk of anticoagulation probably outweigh the benefit . would continue fluid resuscitation. the EKG is reviewed and demonstrates some ST segment depression and anterior leads consist with ischemia. d dimer 1872 duplex sonography pending serum troponin 13will continue to support in this frail elderly gentleman asked postoperative. EKG in AM     discussed with family at bedside  Dr. Harden's comments appreciated discuss with Dr. Bailey

## 2023-02-05 NOTE — CONSULT NOTE ADULT - CONSULT REASON
New onset atrial fibrillation
acute cholecystitis
hypoxia
gangrenous cholecystitis,gallbladder perf, bacteremia
urinary retention

## 2023-02-05 NOTE — CHART NOTE - NSCHARTNOTEFT_GEN_A_CORE
Called Radiology on call earlier and prelim verbal report suggests L inguinal hernia with SB loop and distended SB suggesting partial or complete SBO. Pt was re-examined and denied any abd complaints with no L inguinal pain. No NV. No SOB or CP.  PE only sig for some mild abd distention. +persistent diarrhea.   D/W Surgery.   check C diff.     Final prelim report as follows ---- PLEASE FU OFFICIAL FINAL REPORT.    PROCEDURE DATE: 02/04/2023  ******PRELIMINARY REPORT******    ******PRELIMINARY REPORT******          INTERPRETATION: Moderate size right pleural effusion with associated infiltrate and/or atelectasis right lung base.    No evidence pulmonary embolism.    Right-sided drainage catheter within the abdomen.    No free air or abscess.    Postsurgical changes right colon. Ileocolic anastomosis present.    Distended and obstructed appearing small bowel within the abdomen and pelvis. Left inguinal hernia present but appears to contain only colon and does NOT appear to be source of obstruction. Similar findings on prior CT January 30, 2023. Surgical consultation and follow-up recommended.    No pneumatosis or portal venous gas to suggest intestinal ischemia.        ******PRELIMINARY REPORT******

## 2023-02-05 NOTE — PROGRESS NOTE ADULT - ASSESSMENT
S/P Laparoscopic cholecystectomy for gangrenous gallbladder Jan 30   -- NPO except meds  -- diarrhea   -- urinary retention     Plan:  Discussed with Dr Thompson and medicine                IV fluids                NPO except meds and ice chips               CT abdomen done 2/4, follow up official read               CTA chest done 2/4, prelim negative for PE, follow up official read               felipe catheter in place. Dr. Pastrana note appreciated               gastrographin study

## 2023-02-06 DIAGNOSIS — D17.71 BENIGN LIPOMATOUS NEOPLASM OF KIDNEY: ICD-10-CM

## 2023-02-06 LAB
ALBUMIN SERPL ELPH-MCNC: 1.7 G/DL — LOW (ref 3.3–5)
ALP SERPL-CCNC: 142 U/L — HIGH (ref 40–120)
ALT FLD-CCNC: 26 U/L — SIGNIFICANT CHANGE UP (ref 10–45)
ANION GAP SERPL CALC-SCNC: 5 MMOL/L — SIGNIFICANT CHANGE UP (ref 5–17)
AST SERPL-CCNC: 33 U/L — SIGNIFICANT CHANGE UP (ref 10–40)
BILIRUB DIRECT SERPL-MCNC: 0.1 MG/DL — SIGNIFICANT CHANGE UP (ref 0–0.3)
BILIRUB INDIRECT FLD-MCNC: 0.3 MG/DL — SIGNIFICANT CHANGE UP (ref 0.2–1)
BILIRUB SERPL-MCNC: 0.4 MG/DL — SIGNIFICANT CHANGE UP (ref 0.2–1.2)
BUN SERPL-MCNC: 10 MG/DL — SIGNIFICANT CHANGE UP (ref 7–23)
CALCIUM SERPL-MCNC: 7.5 MG/DL — LOW (ref 8.4–10.5)
CHLORIDE SERPL-SCNC: 110 MMOL/L — HIGH (ref 96–108)
CO2 SERPL-SCNC: 27 MMOL/L — SIGNIFICANT CHANGE UP (ref 22–31)
CREAT SERPL-MCNC: 0.52 MG/DL — SIGNIFICANT CHANGE UP (ref 0.5–1.3)
EGFR: 93 ML/MIN/1.73M2 — SIGNIFICANT CHANGE UP
GLUCOSE SERPL-MCNC: 102 MG/DL — HIGH (ref 70–99)
HCT VFR BLD CALC: 32.1 % — LOW (ref 39–50)
HGB BLD-MCNC: 10 G/DL — LOW (ref 13–17)
MAGNESIUM SERPL-MCNC: 1.7 MG/DL — SIGNIFICANT CHANGE UP (ref 1.6–2.6)
MCHC RBC-ENTMCNC: 30 PG — SIGNIFICANT CHANGE UP (ref 27–34)
MCHC RBC-ENTMCNC: 31.2 GM/DL — LOW (ref 32–36)
MCV RBC AUTO: 96.4 FL — SIGNIFICANT CHANGE UP (ref 80–100)
NRBC # BLD: 0 /100 WBCS — SIGNIFICANT CHANGE UP (ref 0–0)
PHOSPHATE SERPL-MCNC: 1.6 MG/DL — LOW (ref 2.5–4.5)
PLATELET # BLD AUTO: 201 K/UL — SIGNIFICANT CHANGE UP (ref 150–400)
POTASSIUM SERPL-MCNC: 3 MMOL/L — LOW (ref 3.5–5.3)
POTASSIUM SERPL-SCNC: 3 MMOL/L — LOW (ref 3.5–5.3)
PROT SERPL-MCNC: 4.9 G/DL — LOW (ref 6–8.3)
RBC # BLD: 3.33 M/UL — LOW (ref 4.2–5.8)
RBC # FLD: 15.3 % — HIGH (ref 10.3–14.5)
SODIUM SERPL-SCNC: 142 MMOL/L — SIGNIFICANT CHANGE UP (ref 135–145)
WBC # BLD: 6.91 K/UL — SIGNIFICANT CHANGE UP (ref 3.8–10.5)
WBC # FLD AUTO: 6.91 K/UL — SIGNIFICANT CHANGE UP (ref 3.8–10.5)

## 2023-02-06 PROCEDURE — 99233 SBSQ HOSP IP/OBS HIGH 50: CPT

## 2023-02-06 PROCEDURE — 74220 X-RAY XM ESOPHAGUS 1CNTRST: CPT | Mod: 26

## 2023-02-06 PROCEDURE — 93970 EXTREMITY STUDY: CPT | Mod: 26

## 2023-02-06 RX ORDER — MAGNESIUM SULFATE 500 MG/ML
2 VIAL (ML) INJECTION ONCE
Refills: 0 | Status: COMPLETED | OUTPATIENT
Start: 2023-02-06 | End: 2023-02-06

## 2023-02-06 RX ORDER — DIATRIZOATE MEGLUMINE 180 MG/ML
30 INJECTION, SOLUTION INTRAVESICAL ONCE
Refills: 0 | Status: DISCONTINUED | OUTPATIENT
Start: 2023-02-06 | End: 2023-02-06

## 2023-02-06 RX ORDER — IOHEXOL 300 MG/ML
30 INJECTION, SOLUTION INTRAVENOUS ONCE
Refills: 0 | Status: DISCONTINUED | OUTPATIENT
Start: 2023-02-06 | End: 2023-02-06

## 2023-02-06 RX ORDER — POTASSIUM CHLORIDE 20 MEQ
10 PACKET (EA) ORAL
Refills: 0 | Status: COMPLETED | OUTPATIENT
Start: 2023-02-06 | End: 2023-02-06

## 2023-02-06 RX ORDER — DIATRIZOATE MEGLUMINE 180 MG/ML
30 INJECTION, SOLUTION INTRAVESICAL ONCE
Refills: 0 | Status: COMPLETED | OUTPATIENT
Start: 2023-02-06 | End: 2023-02-06

## 2023-02-06 RX ORDER — POTASSIUM CHLORIDE 20 MEQ
10 PACKET (EA) ORAL
Refills: 0 | Status: DISCONTINUED | OUTPATIENT
Start: 2023-02-06 | End: 2023-02-06

## 2023-02-06 RX ORDER — POTASSIUM PHOSPHATE, MONOBASIC POTASSIUM PHOSPHATE, DIBASIC 236; 224 MG/ML; MG/ML
30 INJECTION, SOLUTION INTRAVENOUS ONCE
Refills: 0 | Status: COMPLETED | OUTPATIENT
Start: 2023-02-06 | End: 2023-02-06

## 2023-02-06 RX ORDER — SODIUM CHLORIDE 9 MG/ML
1000 INJECTION, SOLUTION INTRAVENOUS
Refills: 0 | Status: DISCONTINUED | OUTPATIENT
Start: 2023-02-06 | End: 2023-02-06

## 2023-02-06 RX ORDER — DEXTROSE MONOHYDRATE, SODIUM CHLORIDE, AND POTASSIUM CHLORIDE 50; .745; 4.5 G/1000ML; G/1000ML; G/1000ML
1000 INJECTION, SOLUTION INTRAVENOUS
Refills: 0 | Status: DISCONTINUED | OUTPATIENT
Start: 2023-02-06 | End: 2023-02-07

## 2023-02-06 RX ADMIN — Medication 100 MILLIEQUIVALENT(S): at 21:06

## 2023-02-06 RX ADMIN — Medication 81 MILLIGRAM(S): at 12:49

## 2023-02-06 RX ADMIN — DEXTROSE MONOHYDRATE, SODIUM CHLORIDE, AND POTASSIUM CHLORIDE 75 MILLILITER(S): 50; .745; 4.5 INJECTION, SOLUTION INTRAVENOUS at 13:00

## 2023-02-06 RX ADMIN — Medication 100 MILLIEQUIVALENT(S): at 20:09

## 2023-02-06 RX ADMIN — SODIUM CHLORIDE 75 MILLILITER(S): 9 INJECTION, SOLUTION INTRAVENOUS at 08:28

## 2023-02-06 RX ADMIN — DIATRIZOATE MEGLUMINE 30 MILLILITER(S): 180 INJECTION, SOLUTION INTRAVESICAL at 21:07

## 2023-02-06 RX ADMIN — Medication 100 MILLIEQUIVALENT(S): at 22:12

## 2023-02-06 RX ADMIN — SODIUM CHLORIDE 75 MILLILITER(S): 9 INJECTION, SOLUTION INTRAVENOUS at 01:55

## 2023-02-06 RX ADMIN — POTASSIUM PHOSPHATE, MONOBASIC POTASSIUM PHOSPHATE, DIBASIC 83.33 MILLIMOLE(S): 236; 224 INJECTION, SOLUTION INTRAVENOUS at 13:00

## 2023-02-06 RX ADMIN — FINASTERIDE 5 MILLIGRAM(S): 5 TABLET, FILM COATED ORAL at 12:49

## 2023-02-06 RX ADMIN — TAMSULOSIN HYDROCHLORIDE 0.8 MILLIGRAM(S): 0.4 CAPSULE ORAL at 22:12

## 2023-02-06 RX ADMIN — Medication 25 GRAM(S): at 19:18

## 2023-02-06 RX ADMIN — ENOXAPARIN SODIUM 30 MILLIGRAM(S): 100 INJECTION SUBCUTANEOUS at 12:49

## 2023-02-06 NOTE — PROGRESS NOTE ADULT - ASSESSMENT
5y year old Male with PMH of recent L hip fx (11/2022), TIA (11/2022) who presents to the ER from [facility] for abdominal pain, nausea for 4 days. Admitted for acute gangrenous cholecystitis, c/b perforated gallbladder, s/p lap kellee on 1/30, with empyema found.    #Acute cholecystitis c/b perforated gallbladder  #Transaminitis, Hyperbilirubinemia  - s/p lap kellee with Dr Thompson on 1/30, drain with high output  - Maintain RUPINDER-SS  - Pt  NPO due to vomiting/SBO  - Blood cultures growing gram + cocci, urine culture with E Coli and Aerococcus  - Continue Zosyn, ID following.   - Trend LFTs, downtrending  - Pain control, standing tylenol dose with oxy/dilaudid PRN  - lovenox for dvt ppx.     # small bowel obstruction  - possibly due to post-op ileus or adhesions?  - seen on CT AP   - conservative management, keep NPO w/ IVF  - Surgery following, d/w Dr. Thompson  - optimize electrolytes  - f/u Gastrografin study    # Acute hypoxic respiratory failure likely due to atelectasis/right pleural effusion  - discussed w/ Pulm, Dr. Harden. Effusion too small to drain  - Wean off of O2, incentive spirometry    # Diarrhea  c/b hypokalemia and hypophosphatemia - potassium and phosphorous repletion today.  - hold bowel regimen  - negative C diff  - IVF while NPO    # Tachycardia likely due to multifocal atrial tachycardia  w/ associated hypotension from diarrhea/volume depletion/anemia  There was a question if patient was in Paroxysmal atrial fibrillation, though information reviewed by Cardiologist suggests this is MAT. Evidence of ischemia w/ ST segment depression in anterior leads. CTA chest obtained and negative for PE. Echo w/ EF 50-55%. Per cardio, risk of AC seems to outweigh benefit at this point, and this does not seem to be afib.  - f/u EKG this AM  - f/u LE duplex b/l  - pt may need extended cardiac monitoring on discharge. OP Cardio f/u.     # Leukocytosis, multifactorial, likely due to above issues.    #Urinary retention  - Patient with chronic felipe - was placed in November after fall/hip fx, no TOV has been done as per daughter. No hx of urinary retention prior to that event   - Felipe replaced on 2/4/2023  - Cont increased Flomax dosing at 0.8mg qHS  - urology consulted (Dr. Pastrana notified)    #Acute Kidney Injury - Improved   - Monitor BMP  - Avoid nephrotoxins  - felipe catheter    # Hypernatremia - improved.    #Hx of TIA  - c/w Statin, ASA    #?Dysphagia  - daughter would like a speech and swallow evaluation when he is PO    #DVT Prophylaxis  - Lovenox   - SCD   - ambulation    GOC: DNR/I, MOLST in chart    Dispo: D/C to [facility] when cleared by surgery and cardio    Update daughter Shelli Cell [phone]      5y year old Male with PMH of recent L hip fx (11/2022), TIA (11/2022) who presents to the ER from [facility] for abdominal pain, nausea for 4 days. Admitted for acute gangrenous cholecystitis, c/b perforated gallbladder, s/p lap kellee on 1/30, with empyema found.    #Acute cholecystitis c/b perforated gallbladder  #Transaminitis, Hyperbilirubinemia  - s/p lap kellee with Dr Thompson on 1/30, drain with high output  - Maintain RUPINDER-SS  - Pt  NPO due to vomiting/SBO  - Blood cultures growing gram + cocci, urine culture with E Coli and Aerococcus  - Continue Zosyn, ID following.   - Trend LFTs, downtrending  - Pain control, standing tylenol dose with oxy/dilaudid PRN  - lovenox for dvt ppx.     # small bowel obstruction  - possibly due to post-op ileus or adhesions?  - seen on CT AP   - conservative management, keep NPO w/ IVF  - Surgery following, d/w Dr. Thompson  - optimize electrolytes  - f/u Gastrografin study    # Acute hypoxic respiratory failure likely due to atelectasis/right pleural effusion  - discussed w/ Pulm, Dr. Harden. Effusion too small to drain  - Wean off of O2, incentive spirometry    # Diarrhea  c/b hypokalemia and hypophosphatemia - potassium and phosphorous repletion today.  - hold bowel regimen  - negative C diff  - IVF while NPO    # Tachycardia likely due to multifocal atrial tachycardia  w/ associated hypotension from diarrhea/volume depletion/anemia  There was a question if patient was in Paroxysmal atrial fibrillation, though information reviewed by Cardiologist suggests this is MAT. Evidence of ischemia w/ ST segment depression in anterior leads. CTA chest obtained and negative for PE. Echo w/ EF 50-55%. Per cardio, risk of AC seems to outweigh benefit at this point, and this does not seem to be afib.  - f/u EKG this AM  - f/u LE duplex b/l  - pt may need extended cardiac monitoring on discharge. OP Cardio f/u.     # Leukocytosis, multifactorial, likely due to above issues.    #Urinary retention  - Patient with chronic felipe - was placed in November after fall/hip fx, no TOV has been done as per daughter. No hx of urinary retention prior to that event   - Felipe replaced on 2/4/2023  - Cont increased Flomax dosing at 0.8mg qHS  - urology consulted (Dr. Pastrana notified)    #Acute Kidney Injury - Improved   - Monitor BMP  - Avoid nephrotoxins  - felipe catheter    # Hypernatremia - improved.    #Hx of TIA  - c/w Statin, ASA    #?Dysphagia  - daughter would like a speech and swallow evaluation when he is PO    #DVT Prophylaxis  - Lovenox   - SCD   - ambulation    GOC: DNR/I, MOLST in chart    Dispo: D/C to [facility] when cleared by surgery and cardio    Update daughter Shelli Cell [phone]      5y year old Male with PMH of recent L hip fx (11/2022), TIA (11/2022) who presents to the ER from [facility] for abdominal pain, nausea for 4 days. Admitted for acute gangrenous cholecystitis, c/b perforated gallbladder, s/p lap kellee on 1/30, with empyema found.    #Acute cholecystitis c/b perforated gallbladder  #Transaminitis, Hyperbilirubinemia  - s/p lap kellee with Dr Thompson on 1/30, drain with high output  - Maintain RUPINDER-SS  - Pt  NPO due to vomiting/SBO  - Blood cultures growing gram + cocci, urine culture with E Coli and Aerococcus  - Continue Zosyn, ID following.   - Trend LFTs, downtrending  - Pain control, standing tylenol dose with oxy/dilaudid PRN  - lovenox for dvt ppx.     # small bowel obstruction  - possibly due to post-op ileus or adhesions?  - seen on CT AP   - conservative management, keep NPO w/ IVF  - Surgery following, d/w Dr. Thompson  - optimize electrolytes  - f/u Gastrografin study    # Acute hypoxic respiratory failure likely due to atelectasis/right pleural effusion  - discussed w/ Pulm, Dr. Harden. Effusion too small to drain  - Wean off of O2, incentive spirometry    # Diarrhea  c/b hypokalemia and hypophosphatemia - potassium and phosphorous repletion today.  - hold bowel regimen  - negative C diff  - IVF while NPO    # Tachycardia likely due to multifocal atrial tachycardia  w/ associated hypotension from diarrhea/volume depletion/anemia  There was a question if patient was in Paroxysmal atrial fibrillation, though information reviewed by Cardiologist suggests this is MAT. Evidence of ischemia w/ ST segment depression in anterior leads. CTA chest obtained and negative for PE. Echo w/ EF 50-55%. Per cardio, risk of AC seems to outweigh benefit at this point, and this does not seem to be afib.  - f/u EKG this AM  - f/u LE duplex b/l  - pt may need extended cardiac monitoring on discharge. OP Cardio f/u.     # Leukocytosis, multifactorial, likely due to above issues.    #Urinary retention  - Patient with chronic felipe - was placed in November after fall/hip fx, no TOV has been done as per daughter. No hx of urinary retention prior to that event   - Felipe replaced on 2/4/2023  - Cont increased Flomax dosing at 0.8mg qHS  - urology consulted (Dr. Pastrana notified)    #Acute Kidney Injury - Improved   - Monitor BMP  - Avoid nephrotoxins  - felipe catheter    # Hypernatremia - improved.    #Hx of TIA  - c/w Statin, ASA    #?Dysphagia  - daughter would like a speech and swallow evaluation when he is PO    #DVT Prophylaxis  - Lovenox   - SCD   - ambulation    GOC: DNR/I, MOLST in chart    Dispo: D/C to [facility] when cleared by surgery and cardio    Update daughter Shelli Cell [phone]      5y year old Male with PMH of recent L hip fx (11/2022), TIA (11/2022) who presents to the ER from [facility] for abdominal pain, nausea for 4 days. Admitted for acute gangrenous cholecystitis, c/b perforated gallbladder, s/p lap kellee on 1/30, with empyema found.    #Acute cholecystitis c/b perforated gallbladder  #Transaminitis, Hyperbilirubinemia  - s/p lap kellee with Dr Thompson on 1/30, drain with high output  - Maintain RUPINDER-SS  - Pt  NPO due to vomiting/SBO  - Blood cultures growing gram + cocci, urine culture with E Coli and Aerococcus  - Continue Zosyn, ID following.   - Trend LFTs, downtrending  - Pain control, standing tylenol dose with oxy/dilaudid PRN  - lovenox for dvt ppx.     # small bowel obstruction  - possibly due to post-op ileus or adhesions?  - seen on CT AP   - conservative management, keep NPO w/ IVF  - Surgery following, d/w Dr. Thompson  - optimize electrolytes  - f/u Gastrografin study    # Acute hypoxic respiratory failure likely due to atelectasis/right pleural effusion  - discussed w/ Pulm, Dr. Harden. Effusion too small to drain  - Wean off of O2, incentive spirometry    # Diarrhea  c/b hypokalemia and hypophosphatemia - potassium and phosphorous repletion today.  - hold bowel regimen  - negative C diff  - IVF while NPO    # Tachycardia likely due to multifocal atrial tachycardia  w/ associated hypotension from diarrhea/volume depletion/anemia  There was a question if patient was in Paroxysmal atrial fibrillation, though information reviewed by Cardiologist suggests this is MAT. Evidence of ischemia w/ ST segment depression in anterior leads. CTA chest obtained and negative for PE. Echo w/ EF 50-55%. Per cardio, risk of AC seems to outweigh benefit at this point, and this does not seem to be afib.  - telemetry overnight report with SR 71-94 w/ PVC occ.  - f/u EKG this AM  - f/u LE duplex b/l  - pt may need extended cardiac monitoring on discharge. OP Cardio f/u.     # Leukocytosis, multifactorial, likely due to above issues.    #Urinary retention  - Patient with chronic felipe - was placed in November after fall/hip fx, no TOV has been done as per daughter. No hx of urinary retention prior to that event   - Felipe replaced on 2/4/2023  - Cont increased Flomax dosing at 0.8mg qHS  - urology consulted (Dr. Pastrana notified)    #Acute Kidney Injury - Improved   - Monitor BMP  - Avoid nephrotoxins  - felipe catheter    # Hypernatremia - improved.    #Hx of TIA  - c/w Statin, ASA    #?Dysphagia  - daughter would like a speech and swallow evaluation when he is PO    #DVT Prophylaxis  - Lovenox   - SCD   - ambulation    GOC: DNR/I, MOLST in chart    Dispo: D/C to [facility] when cleared by surgery and cardio    Update daughter Shelli Cell [phone]      5y year old Male with PMH of recent L hip fx (11/2022), TIA (11/2022) who presents to the ER from [facility] for abdominal pain, nausea for 4 days. Admitted for acute gangrenous cholecystitis, c/b perforated gallbladder, s/p lap kellee on 1/30, with empyema found.    #Acute cholecystitis c/b perforated gallbladder  #Transaminitis, Hyperbilirubinemia  - s/p lap kellee with Dr Thompson on 1/30, drain with high output  - Maintain RUPINDER-SS  - Pt  NPO due to vomiting/SBO  - Blood cultures growing gram + cocci, urine culture with E Coli and Aerococcus  - Continue Zosyn, ID following.   - Trend LFTs, downtrending  - Pain control, standing tylenol dose with oxy/dilaudid PRN  - lovenox for dvt ppx.     # small bowel obstruction  - possibly due to post-op ileus or adhesions?  - seen on CT AP   - conservative management, keep NPO w/ IVF  - Surgery following, d/w Dr. Thompson  - optimize electrolytes  - f/u Gastrografin study    # Acute hypoxic respiratory failure likely due to atelectasis/right pleural effusion  - discussed w/ Pulm, Dr. Harden. Effusion too small to drain  - Wean off of O2, incentive spirometry    # Diarrhea  c/b hypokalemia and hypophosphatemia - potassium and phosphorous repletion today.  - hold bowel regimen  - negative C diff  - IVF while NPO    # Tachycardia likely due to multifocal atrial tachycardia  w/ associated hypotension from diarrhea/volume depletion/anemia  There was a question if patient was in Paroxysmal atrial fibrillation, though information reviewed by Cardiologist suggests this is MAT. Evidence of ischemia w/ ST segment depression in anterior leads. CTA chest obtained and negative for PE. Echo w/ EF 50-55%. Per cardio, risk of AC seems to outweigh benefit at this point, and this does not seem to be afib.  - telemetry overnight report with SR 71-94 w/ PVC occ.  - f/u EKG this AM  - f/u LE duplex b/l  - pt may need extended cardiac monitoring on discharge. OP Cardio f/u.     # Leukocytosis, multifactorial, likely due to above issues.    #Urinary retention  - Patient with chronic felipe - was placed in November after fall/hip fx, no TOV has been done as per daughter. No hx of urinary retention prior to that event   - Felipe replaced on 2/4/2023  - Cont increased Flomax dosing at 0.8mg qHS  - urology consulted (Dr. Pastrana notified)    #Acute Kidney Injury - Improved   - Monitor BMP  - Avoid nephrotoxins  - felipe catheter    # Hypernatremia - improved.    #Hx of TIA  - c/w Statin, ASA    #?Dysphagia  - daughter would like a speech and swallow evaluation when he is PO    #DVT Prophylaxis  - Lovenox   - SCD   - ambulation    GOC: DNR/I, MOLST in chart    Dispo: D/C to [facility] when cleared by surgery and cardio    Update daughter Shelli Cell [phone]      5y year old Male with PMH of recent L hip fx (11/2022), TIA (11/2022) who presents to the ER from [facility] for abdominal pain, nausea for 4 days. Admitted for acute gangrenous cholecystitis, c/b perforated gallbladder, s/p lap kellee on 1/30, with empyema found.    #Acute cholecystitis c/b perforated gallbladder  #Transaminitis, Hyperbilirubinemia  - s/p lap kellee with Dr Thompson on 1/30, drain with high output  - Maintain RUPINDER-SS  - Pt  NPO due to vomiting/SBO  - Blood cultures growing gram + cocci, urine culture with E Coli and Aerococcus  - Continue Zosyn, ID following.   - Trend LFTs, downtrending  - Pain control, standing tylenol dose with oxy/dilaudid PRN  - lovenox for dvt ppx.     # small bowel obstruction  - possibly due to post-op ileus or adhesions?  - seen on CT AP   - conservative management, keep NPO w/ IVF  - Surgery following, d/w Dr. Thompson  - optimize electrolytes  - f/u Gastrografin study    # Acute hypoxic respiratory failure likely due to atelectasis/right pleural effusion  - discussed w/ Pulm, Dr. Harden. Effusion too small to drain  - Wean off of O2, incentive spirometry    # Diarrhea  c/b hypokalemia and hypophosphatemia - potassium and phosphorous repletion today.  - hold bowel regimen  - negative C diff  - IVF while NPO    # Tachycardia likely due to multifocal atrial tachycardia  w/ associated hypotension from diarrhea/volume depletion/anemia  There was a question if patient was in Paroxysmal atrial fibrillation, though information reviewed by Cardiologist suggests this is MAT. Evidence of ischemia w/ ST segment depression in anterior leads. CTA chest obtained and negative for PE. Echo w/ EF 50-55%. Per cardio, risk of AC seems to outweigh benefit at this point, and this does not seem to be afib.  - telemetry overnight report with SR 71-94 w/ PVC occ.  - f/u EKG this AM  - f/u LE duplex b/l  - pt may need extended cardiac monitoring on discharge. OP Cardio f/u.     # Leukocytosis, multifactorial, likely due to above issues.    #Urinary retention  - Patient with chronic felipe - was placed in November after fall/hip fx, no TOV has been done as per daughter. No hx of urinary retention prior to that event   - Felipe replaced on 2/4/2023  - Cont increased Flomax dosing at 0.8mg qHS  - urology consulted (Dr. Pastrana notified)    #Acute Kidney Injury - Improved   - Monitor BMP  - Avoid nephrotoxins  - felipe catheter    # Hypernatremia - improved.    #Hx of TIA  - c/w Statin, ASA    #?Dysphagia  - daughter would like a speech and swallow evaluation when he is PO    #DVT Prophylaxis  - Lovenox   - SCD   - ambulation    GOC: DNR/I, MOLST in chart    Dispo: D/C to [facility] when cleared by surgery and cardio    Update daughter Shelli Cell [phone]

## 2023-02-06 NOTE — PROGRESS NOTE ADULT - ASSESSMENT
5y year old Male with PMH of recent L hip fx (11/2022), TIA (11/2022) who presents to the ER from [facility] for abdominal pain, nausea for 4 days. Admitted for acute gangrenous cholecystitis, c/b perforated gallbladder, s/p lap kellee on 1/30, with empyema found.    #Acute cholecystitis c/b perforated gallbladder  #Transaminitis, Hyperbilirubinemia  - s/p lap kellee with Dr Thompson on 1/30, drain with high output  - Maintain RUPINDER-SS  - NPO due to vomiting, CT of A/P on 2/4 with probable SBO  - Blood cultures growing gram + cocci, urine culture with E Coli and Aerococcus  - Continue Zosyn, day 7/10-- ID following.   - Trend LFTs, downtrending  - Pain control, standing tylenol dose with oxy/dilaudid PRN    # SBO  - possibly due to post-op ileus or adhesions?  - seen on CT AP as above.  - conservative management, keep NPO w/ IVF  - Surgery following, d/w Dr. Thompson  - optimize electrolytes  - f/u Gastrografin study    # Acute hypoxic respiratory failure likely due to atelectasis/right pleural effusion  - Pulm, Dr. Harden following; the effusionis  too small to drain  - Wean off of O2, incentive spirometry    # Diarrhea  c/b hypokalemia - K repleted and now wnl.  - hold bowel regimen  - stool sample sent for C diff: negative  - IVF    # Paroxysmal atrial fibrillation  w/ associated hypotension from diarrhea/volume depletion  -Pt has been going in and out of a fib. Cardiology consulted. CTA negative. Echo as above w/ EF 50-55%, sclerotic AV, Grade I DD.   -d-dimer ordered, if elevated will order CTA  -Cardiology consult (Dr. Ceja) - is pt an anti-coagulation candidate? From surgical perspective, ok to start.     # Leukocytosis, multifactorial, likely due to above issues.    #Urinary retention  - Patient with chronic felipe - was placed in November after fall/hip fx, no TOV has been done as per daughter. No hx of urinary retention prior to that event   - Felipe replaced on 2/4/2023  - Cont increased Flomax dosing at 0.8mg qHS  - urology consulted, note appreciated    #Acute Kidney Injury - Improved   - Monitor BMP  - Avoid nephrotoxins  - felipe catheter    # Hypernatremia  - modify IVF    #Hx of TIA  - c/w Statin, ASA    #?Dysphagia  - daughter would like a speech and swallow evaluation when he is PO    #DVT Prophylaxis  - Lovenox   - SCD   - ambulation    GOC: DNR/I, MOLST in chart    Dispo: D/C to [facility] when cleared by surgery and cardio    Updated daughter Shelli Cell [phone] regarding pt's current status and plan of care     5y year old Male with PMH of recent L hip fx (11/2022), TIA (11/2022) who presents to the ER from [facility] for abdominal pain, nausea for 4 days. Admitted for acute gangrenous cholecystitis, c/b perforated gallbladder, s/p lap kellee on 1/30, with empyema found.    #Acute cholecystitis c/b perforated gallbladder  #Transaminitis, Hyperbilirubinemia  - s/p lap kellee with Dr Thompson on 1/30, drain with high output  - Maintain RUPINDER-SS  - NPO due to vomiting, CT of A/P on 2/4 with probable SBO  - Blood cultures growing gram + cocci, urine culture with E Coli and Aerococcus  - Continue Zosyn, day 7/10-- ID following.   - Trend LFTs, downtrending  - Pain control, standing tylenol dose with oxy/dilaudid PRN    # SBO  - possibly due to post-op ileus or adhesions?  - seen on CT AP as above.  - conservative management, keep NPO w/ IVF  - Surgery following, d/w Dr. Thompson  - optimize electrolytes  - f/u Gastrografin study    # Acute hypoxic respiratory failure likely due to atelectasis/right pleural effusion  - Pulm, Dr. Harden following; the effusionis  too small to drain  - Wean off of O2, incentive spirometry    # Diarrhea  c/b hypokalemia - K repleted and now wnl.  - hold bowel regimen  - stool sample sent for C diff: negative  - IVF    # Paroxysmal atrial fibrillation  w/ associated hypotension from diarrhea/volume depletion  -Pt has been going in and out of a fib. Cardiology consulted. CTA negative. Echo as above w/ EF 50-55%, sclerotic AV, Grade I DD.   -d-dimer ordered, if elevated will order CTA  -Cardiology consult (Dr. Ceja) - is pt an anti-coagulation candidate? From surgical perspective, ok to start.     # Leukocytosis, multifactorial, likely due to above issues.    #Urinary retention  - Patient with chronic felipe - was placed in November after fall/hip fx, no TOV has been done as per daughter. No hx of urinary retention prior to that event   - Felipe replaced on 2/4/2023  - Cont increased Flomax dosing at 0.8mg qHS  - urology consulted, note appreciated    #Acute Kidney Injury - Improved   - Monitor BMP  - Avoid nephrotoxins  - felipe catheter    # Hypernatremia  - modify IVF    #Hx of TIA  - c/w Statin, ASA    #?Dysphagia  - daughter would like a speech and swallow evaluation when he is PO    #DVT Prophylaxis  - Lovenox   - SCD   - ambulation    GOC: DNR/I, MOLST in chart    Dispo: D/C to [facility] when cleared by surgery and cardio    Updated daughter Shelli Cell [phone] regarding pt's current status and plan of care     5y year old Male with PMH of recent L hip fx (11/2022), TIA (11/2022) who presents to the ER from [facility] for abdominal pain, nausea for 4 days. Admitted for acute gangrenous cholecystitis, c/b perforated gallbladder, s/p lap kellee on 1/30, with empyema found.    #Acute cholecystitis c/b perforated gallbladder  #Transaminitis, Hyperbilirubinemia  - s/p lap kellee with Dr Thompson on 1/30, drain with high output  - Maintain RUPINDER-SS  - NPO due to vomiting, CT of A/P on 2/4 with probable SBO  - Blood cultures growing gram + cocci, urine culture with E Coli and Aerococcus  - Continue Zosyn, day 7/10-- ID following.   - Trend LFTs, downtrending  - Pain control, standing tylenol dose with oxy/dilaudid PRN    # SBO  - possibly due to post-op ileus or adhesions?  - seen on CT AP as above.  - conservative management, keep NPO w/ IVF  - Surgery following, d/w Dr. Thompson  - optimize electrolytes  - f/u Gastrografin study    # Acute hypoxic respiratory failure likely due to atelectasis/right pleural effusion  - Pulm, Dr. Harden following; the effusionis  too small to drain  - Wean off of O2, incentive spirometry    # Diarrhea  c/b hypokalemia - K repleted and now wnl.  - hold bowel regimen  - stool sample sent for C diff: negative  - IVF    # Paroxysmal atrial fibrillation  w/ associated hypotension from diarrhea/volume depletion  -Pt has been going in and out of a fib. Cardiology consulted. CTA negative. Echo as above w/ EF 50-55%, sclerotic AV, Grade I DD.   -d-dimer ordered, if elevated will order CTA  -Cardiology consult (Dr. Ceja) - is pt an anti-coagulation candidate? From surgical perspective, ok to start.     # Leukocytosis, multifactorial, likely due to above issues.    #Urinary retention  - Patient with chronic felipe - was placed in November after fall/hip fx, no TOV has been done as per daughter. No hx of urinary retention prior to that event   - Felipe replaced on 2/4/2023  - Cont increased Flomax dosing at 0.8mg qHS  - urology consulted, note appreciated    #Acute Kidney Injury - Improved   - Monitor BMP  - Avoid nephrotoxins  - felipe catheter    # Hypernatremia  - modify IVF    #Hx of TIA  - c/w Statin, ASA    #?Dysphagia  - daughter would like a speech and swallow evaluation when he is PO    #DVT Prophylaxis  - Lovenox   - SCD   - ambulation    GOC: DNR/I, MOLST in chart    Dispo: D/C to [facility] when cleared by surgery and cardio    Updated daughter Shelli Cell [phone] regarding pt's current status and plan of care

## 2023-02-06 NOTE — PROGRESS NOTE ADULT - ASSESSMENT
POD #7 lap cholecystectomy/ perforated  gangrenous gallbladder/post op ileus vs SBO/ wbc  and LFT's trending down    P: per Dr Thompson -   obtain gastrografin study with serial XRs to assess for passage of contrast into colon. likely ileus as opposed to actual obstruction. Will restart diet with ensures once contrast is demonstrated in colon.   NPO ice chips only    IVF   IV ABX  contnue felipe/flomax per urology  continue drain to bulb suction          POD #7 lap cholecystectomy/ perforated  gangrenous gallbladder/post op ileus vs SBO/ wbc  and LFT's trending down    P: per Dr Thompson -   obtain gastrografin study with serial XRs to assess for passage of contrast into colon. likely ileus as opposed to actual obstruction. Will restart diet with ensures once contrast is demonstrated in colon.   NPO ice chips only    IVF   IV ABX  contnue felipe/flomax per urology  continue drain to bulb suction  pts. daughter updated this am.

## 2023-02-06 NOTE — PROGRESS NOTE ADULT - SUBJECTIVE AND OBJECTIVE BOX
Follow-up Pulmonary Progress Note  Chief Complaint : Acute cholecystitis      patient on RA sat 94%  denies cp, sob, palp, n/v        Allergies :No Known Allergies      PAST MEDICAL & SURGICAL HISTORY:  History of BPH    Malignant neoplasm of colon    Femur neck fracture    History of glaucoma    GERD (gastroesophageal reflux disease)    Iron deficiency anemia    Hyperlipidemia    History of colon surgery        Medications:  MEDICATIONS  (STANDING):  acetaminophen     Tablet .. 650 milliGRAM(s) Oral every 6 hours  aspirin enteric coated 81 milliGRAM(s) Oral daily  cefTRIAXone   IVPB 1000 milliGRAM(s) IV Intermittent every 24 hours  dextrose 5% + sodium chloride 0.45% with potassium chloride 20 mEq/L 1000 milliLiter(s) (75 mL/Hr) IV Continuous <Continuous>  diatrizoate meglumine/diatrizoate sodium. 30 milliLiter(s) Oral once  enoxaparin Injectable 30 milliGRAM(s) SubCutaneous every 24 hours  finasteride 5 milliGRAM(s) Oral daily  magnesium sulfate  IVPB 2 Gram(s) IV Intermittent once  potassium chloride  10 mEq/100 mL IVPB 10 milliEquivalent(s) IV Intermittent every 1 hour  tamsulosin 0.8 milliGRAM(s) Oral at bedtime    MEDICATIONS  (PRN):  aluminum hydroxide/magnesium hydroxide/simethicone Suspension 30 milliLiter(s) Oral every 4 hours PRN Dyspepsia  melatonin 3 milliGRAM(s) Oral at bedtime PRN Insomnia  ondansetron Injectable 4 milliGRAM(s) IV Push every 8 hours PRN Nausea and/or Vomiting  oxyCODONE    IR 5 milliGRAM(s) Oral every 6 hours PRN Moderate Pain (4 - 6)  zinc oxide 40% Paste 1 Application(s) Topical three times a day PRN rash      Antibiotics History  cefepime   IVPB 1000 milliGRAM(s) IV Intermittent once, 01-30-23 @ 12:53, Stop order after: 1 Doses  cefTRIAXone   IVPB 1000 milliGRAM(s) IV Intermittent every 24 hours, 02-05-23 @ 13:48, Stop order after: 8 Days  metroNIDAZOLE  IVPB 500 milliGRAM(s) IV Intermittent once, 01-30-23 @ 14:31, Stop order after: 1 Doses  piperacillin/tazobactam IVPB. 3.375 Gram(s) IV Intermittent once, 01-30-23 @ 15:30, Stop order after: 1 Doses  piperacillin/tazobactam IVPB.- 3.375 Gram(s) IV Intermittent once, 01-31-23 @ 08:00  piperacillin/tazobactam IVPB.- 3.375 Gram(s) IV Intermittent once, 01-31-23 @ 02:00  piperacillin/tazobactam IVPB.- 3.375 Gram(s) IV Intermittent once, 01-30-23 @ 19:00  piperacillin/tazobactam IVPB.- 3.375 Gram(s) IV Intermittent once, 01-30-23 @ 23:41, Stop order after: 1 Doses  piperacillin/tazobactam IVPB.- 3.375 Gram(s) IV Intermittent once, 01-30-23 @ 23:41, Stop order after: 1 Doses  piperacillin/tazobactam IVPB.. 3.375 Gram(s) IV Intermittent every 8 hours, 01-30-23 @ 23:41, Stop order after: 7 Days  piperacillin/tazobactam IVPB.. 3.375 Gram(s) IV Intermittent every 8 hours, 01-31-23 @ 14:00, Stop order after: 7 Days      Heme Medications   aspirin enteric coated 81 milliGRAM(s) Oral daily, 02-02-23 @ 13:08  enoxaparin Injectable 30 milliGRAM(s) SubCutaneous every 24 hours, 02-02-23 @ 10:12      GI Medications  aluminum hydroxide/magnesium hydroxide/simethicone Suspension 30 milliLiter(s) Oral every 4 hours, 01-30-23 @ 23:41,  PRN        LABS:                        10.0   6.91  )-----------( 201      ( 06 Feb 2023 08:10 )             32.1     02-06    142  |  110<H>  |  10  ----------------------------<  102<H>  3.0<L>   |  27  |  0.52    Ca    7.5<L>      06 Feb 2023 08:10  Phos  1.6     02-06  Mg     1.7     02-06    TPro  4.9<L>  /  Alb  1.7<L>  /  TBili  0.4  /  DBili  0.1  /  AST  33  /  ALT  26  /  AlkPhos  142<H>  02-06    HIT ab -- 02-04 @ 12:00  HIT ab EIA --  D Dimer -[id number]       CULTURES: (if applicable)    Culture - Blood (collected 02-04-23 @ 12:00)  Source: .Blood Blood-Peripheral  Preliminary Report (02-05-23 @ 22:01):    No growth to date.    Culture - Blood (collected 02-04-23 @ 12:00)  Source: .Blood Blood-Peripheral  Preliminary Report (02-05-23 @ 22:01):    No growth to date.    Culture - Urine (collected 01-30-23 @ 12:38)  Source: Catheterized Catheterized  Final Report (02-01-23 @ 19:29):    >100,000 CFU/ml Escherichia coli    >100,000 CFU/ml Aerococcus species    "Aerococcus spp. are predictably susceptible to penicillin,    ampicillin, tetracycline, and vancomycin.  All isolates are    resistant to sulfonamides"  Organism: Escherichia coli (02-01-23 @ 19:29)  Organism: Escherichia coli (02-01-23 @ 19:29)      -  Amikacin: S <=16      -  Amoxicillin/Clavulanic Acid: S <=8/4      -  Ampicillin: S <=8 These ampicillin results predict results for amoxicillin      -  Ampicillin/Sulbactam: S <=4/2 Enterobacter, Klebsiella aerogenes, Citrobacter, and Serratia may develop resistance during prolonged therapy (3-4 days)      -  Aztreonam: S <=4      -  Cefazolin: S <=2 For uncomplicated UTI with K. pneumoniae, E. coli, or P. mirablis: PHOEBE <=16 is sensitive and PHOEBE >=32 is resistant. This also predicts results for oral agents cefaclor, cefdinir, cefpodoxime, cefprozil, cefuroxime axetil, cephalexin and locarbef for uncomplicated UTI. Note that some isolates may be susceptible to these agents while testing resistant to cefazolin.      -  Cefepime: S 8      -  Cefoxitin: S <=8      -  Ceftriaxone: S <=1 Enterobacter, Klebsiella aerogenes, Citrobacter, and Serratia may develop resistance during prolonged therapy      -  Cefuroxime: S <=4      -  Ciprofloxacin: S <=0.25      -  Ertapenem: S <=0.5      -  Gentamicin: S <=2      -  Imipenem: S <=1      -  Levofloxacin: S <=0.5      -  Meropenem: S <=1      -  Nitrofurantoin: S <=32 Should not be used to treat pyelonephritis      -  Piperacillin/Tazobactam: S <=8      -  Tobramycin: S <=2      -  Trimethoprim/Sulfamethoxazole: S <=0.5/9.5      Method Type: PHOEBE    Culture - Blood (collected 01-30-23 @ 12:27)  Source: .Blood Blood-Peripheral  Final Report (02-04-23 @ 15:08):    No Growth Final    Culture - Blood (collected 01-30-23 @ 12:10)  Source: .Blood Blood-Peripheral  Gram Stain (01-31-23 @ 15:34):    Growth in anaerobic bottle: Gram positive cocci in pairs  Final Report (02-01-23 @ 13:38):    Growth in anaerobic bottle: Aerococcus urinae    "Susceptibilities not performed"    ***Blood Panel PCR results on this specimen are available    approximately 3 hours after the Gram stain result.***    Gram stain, PCR, and/or culture results may not always    correspond due to difference in methodologies.    ************************************************************    This PCR assay was performed by multiplex PCR. This    Assay tests for 66 bacterial and resistance gene targets.    Please refer to the [facility] test directory    at [email or web address] for details.  Organism: Blood Culture PCR (02-01-23 @ 13:38)  Organism: Blood Culture PCR (02-01-23 @ 13:38)      -  Blood PCR Panel: NEG      Method Type: PCR      US LE No DVT      VITALS:  T(C): 36.8 (02-06-23 @ 13:34), Max: 36.8 (02-06-23 @ 13:34)  T(F): 98.3 (02-06-23 @ 13:34), Max: 98.3 (02-06-23 @ 13:34)  HR: 82 (02-06-23 @ 13:34) (82 - 88)  BP: 125/61 (02-06-23 @ 13:34) (118/74 - 125/61)  BP(mean): 80 (02-06-23 @ 13:34) (80 - 80)  ABP: --  ABP(mean): --  RR: 18 (02-06-23 @ 13:34) (18 - 18)  SpO2: 96% (02-06-23 @ 13:34) (96% - 97%)  CVP(mm Hg): --  CVP(cm H2O): --    Ins and Outs     02-05-23 @ 07:01  -  02-06-23 @ 07:00  --------------------------------------------------------  IN: 900 mL / OUT: 710 mL / NET: 190 mL    02-06-23 @ 07:01  -  02-06-23 @ 15:05  --------------------------------------------------------  IN: 0 mL / OUT: 190 mL / NET: -190 mL                I&O's Detail    05 Feb 2023 07:01  -  06 Feb 2023 07:00  --------------------------------------------------------  IN:    dextrose 5% + sodium chloride 0.45%: 900 mL  Total IN: 900 mL    OUT:    Bulb (mL): 210 mL    Indwelling Catheter - Urethral (mL): 500 mL    Oral Fluid: 0 mL  Total OUT: 710 mL    Total NET: 190 mL      06 Feb 2023 07:01  -  06 Feb 2023 15:05  --------------------------------------------------------  IN:  Total IN: 0 mL    OUT:    Bulb (mL): 190 mL  Total OUT: 190 mL    Total NET: -190 mL          Follow-up Pulmonary Progress Note  Chief Complaint : Acute cholecystitis      patient on RA sat 94%  denies cp, sob, palp, n/v        Allergies :No Known Allergies      PAST MEDICAL & SURGICAL HISTORY:  History of BPH    Malignant neoplasm of colon    Femur neck fracture    History of glaucoma    GERD (gastroesophageal reflux disease)    Iron deficiency anemia    Hyperlipidemia    History of colon surgery        Medications:  MEDICATIONS  (STANDING):  acetaminophen     Tablet .. 650 milliGRAM(s) Oral every 6 hours  aspirin enteric coated 81 milliGRAM(s) Oral daily  cefTRIAXone   IVPB 1000 milliGRAM(s) IV Intermittent every 24 hours  dextrose 5% + sodium chloride 0.45% with potassium chloride 20 mEq/L 1000 milliLiter(s) (75 mL/Hr) IV Continuous <Continuous>  diatrizoate meglumine/diatrizoate sodium. 30 milliLiter(s) Oral once  enoxaparin Injectable 30 milliGRAM(s) SubCutaneous every 24 hours  finasteride 5 milliGRAM(s) Oral daily  magnesium sulfate  IVPB 2 Gram(s) IV Intermittent once  potassium chloride  10 mEq/100 mL IVPB 10 milliEquivalent(s) IV Intermittent every 1 hour  tamsulosin 0.8 milliGRAM(s) Oral at bedtime    MEDICATIONS  (PRN):  aluminum hydroxide/magnesium hydroxide/simethicone Suspension 30 milliLiter(s) Oral every 4 hours PRN Dyspepsia  melatonin 3 milliGRAM(s) Oral at bedtime PRN Insomnia  ondansetron Injectable 4 milliGRAM(s) IV Push every 8 hours PRN Nausea and/or Vomiting  oxyCODONE    IR 5 milliGRAM(s) Oral every 6 hours PRN Moderate Pain (4 - 6)  zinc oxide 40% Paste 1 Application(s) Topical three times a day PRN rash      Antibiotics History  cefepime   IVPB 1000 milliGRAM(s) IV Intermittent once, 01-30-23 @ 12:53, Stop order after: 1 Doses  cefTRIAXone   IVPB 1000 milliGRAM(s) IV Intermittent every 24 hours, 02-05-23 @ 13:48, Stop order after: 8 Days  metroNIDAZOLE  IVPB 500 milliGRAM(s) IV Intermittent once, 01-30-23 @ 14:31, Stop order after: 1 Doses  piperacillin/tazobactam IVPB. 3.375 Gram(s) IV Intermittent once, 01-30-23 @ 15:30, Stop order after: 1 Doses  piperacillin/tazobactam IVPB.- 3.375 Gram(s) IV Intermittent once, 01-31-23 @ 08:00  piperacillin/tazobactam IVPB.- 3.375 Gram(s) IV Intermittent once, 01-31-23 @ 02:00  piperacillin/tazobactam IVPB.- 3.375 Gram(s) IV Intermittent once, 01-30-23 @ 19:00  piperacillin/tazobactam IVPB.- 3.375 Gram(s) IV Intermittent once, 01-30-23 @ 23:41, Stop order after: 1 Doses  piperacillin/tazobactam IVPB.- 3.375 Gram(s) IV Intermittent once, 01-30-23 @ 23:41, Stop order after: 1 Doses  piperacillin/tazobactam IVPB.. 3.375 Gram(s) IV Intermittent every 8 hours, 01-30-23 @ 23:41, Stop order after: 7 Days  piperacillin/tazobactam IVPB.. 3.375 Gram(s) IV Intermittent every 8 hours, 01-31-23 @ 14:00, Stop order after: 7 Days      Heme Medications   aspirin enteric coated 81 milliGRAM(s) Oral daily, 02-02-23 @ 13:08  enoxaparin Injectable 30 milliGRAM(s) SubCutaneous every 24 hours, 02-02-23 @ 10:12      GI Medications  aluminum hydroxide/magnesium hydroxide/simethicone Suspension 30 milliLiter(s) Oral every 4 hours, 01-30-23 @ 23:41,  PRN        LABS:                        10.0   6.91  )-----------( 201      ( 06 Feb 2023 08:10 )             32.1     02-06    142  |  110<H>  |  10  ----------------------------<  102<H>  3.0<L>   |  27  |  0.52    Ca    7.5<L>      06 Feb 2023 08:10  Phos  1.6     02-06  Mg     1.7     02-06    TPro  4.9<L>  /  Alb  1.7<L>  /  TBili  0.4  /  DBili  0.1  /  AST  33  /  ALT  26  /  AlkPhos  142<H>  02-06    HIT ab -- 02-04 @ 12:00  HIT ab EIA --  D Dimer -1872       CULTURES: (if applicable)    Culture - Blood (collected 02-04-23 @ 12:00)  Source: .Blood Blood-Peripheral  Preliminary Report (02-05-23 @ 22:01):    No growth to date.    Culture - Blood (collected 02-04-23 @ 12:00)  Source: .Blood Blood-Peripheral  Preliminary Report (02-05-23 @ 22:01):    No growth to date.    Culture - Urine (collected 01-30-23 @ 12:38)  Source: Catheterized Catheterized  Final Report (02-01-23 @ 19:29):    >100,000 CFU/ml Escherichia coli    >100,000 CFU/ml Aerococcus species    "Aerococcus spp. are predictably susceptible to penicillin,    ampicillin, tetracycline, and vancomycin.  All isolates are    resistant to sulfonamides"  Organism: Escherichia coli (02-01-23 @ 19:29)  Organism: Escherichia coli (02-01-23 @ 19:29)      -  Amikacin: S <=16      -  Amoxicillin/Clavulanic Acid: S <=8/4      -  Ampicillin: S <=8 These ampicillin results predict results for amoxicillin      -  Ampicillin/Sulbactam: S <=4/2 Enterobacter, Klebsiella aerogenes, Citrobacter, and Serratia may develop resistance during prolonged therapy (3-4 days)      -  Aztreonam: S <=4      -  Cefazolin: S <=2 For uncomplicated UTI with K. pneumoniae, E. coli, or P. mirablis: PHOEBE <=16 is sensitive and PHOEBE >=32 is resistant. This also predicts results for oral agents cefaclor, cefdinir, cefpodoxime, cefprozil, cefuroxime axetil, cephalexin and locarbef for uncomplicated UTI. Note that some isolates may be susceptible to these agents while testing resistant to cefazolin.      -  Cefepime: S 8      -  Cefoxitin: S <=8      -  Ceftriaxone: S <=1 Enterobacter, Klebsiella aerogenes, Citrobacter, and Serratia may develop resistance during prolonged therapy      -  Cefuroxime: S <=4      -  Ciprofloxacin: S <=0.25      -  Ertapenem: S <=0.5      -  Gentamicin: S <=2      -  Imipenem: S <=1      -  Levofloxacin: S <=0.5      -  Meropenem: S <=1      -  Nitrofurantoin: S <=32 Should not be used to treat pyelonephritis      -  Piperacillin/Tazobactam: S <=8      -  Tobramycin: S <=2      -  Trimethoprim/Sulfamethoxazole: S <=0.5/9.5      Method Type: PHOEBE    Culture - Blood (collected 01-30-23 @ 12:27)  Source: .Blood Blood-Peripheral  Final Report (02-04-23 @ 15:08):    No Growth Final    Culture - Blood (collected 01-30-23 @ 12:10)  Source: .Blood Blood-Peripheral  Gram Stain (01-31-23 @ 15:34):    Growth in anaerobic bottle: Gram positive cocci in pairs  Final Report (02-01-23 @ 13:38):    Growth in anaerobic bottle: Aerococcus urinae    "Susceptibilities not performed"    ***Blood Panel PCR results on this specimen are available    approximately 3 hours after the Gram stain result.***    Gram stain, PCR, and/or culture results may not always    correspond due to difference in methodologies.    ************************************************************    This PCR assay was performed by multiplex PCR. This    Assay tests for 66 bacterial and resistance gene targets.    Please refer to the [facility] test directory    at [email or web address] for details.  Organism: Blood Culture PCR (02-01-23 @ 13:38)  Organism: Blood Culture PCR (02-01-23 @ 13:38)      -  Blood PCR Panel: NEG      Method Type: PCR      US LE No DVT      VITALS:  T(C): 36.8 (02-06-23 @ 13:34), Max: 36.8 (02-06-23 @ 13:34)  T(F): 98.3 (02-06-23 @ 13:34), Max: 98.3 (02-06-23 @ 13:34)  HR: 82 (02-06-23 @ 13:34) (82 - 88)  BP: 125/61 (02-06-23 @ 13:34) (118/74 - 125/61)  BP(mean): 80 (02-06-23 @ 13:34) (80 - 80)  ABP: --  ABP(mean): --  RR: 18 (02-06-23 @ 13:34) (18 - 18)  SpO2: 96% (02-06-23 @ 13:34) (96% - 97%)  CVP(mm Hg): --  CVP(cm H2O): --    Ins and Outs     02-05-23 @ 07:01  -  02-06-23 @ 07:00  --------------------------------------------------------  IN: 900 mL / OUT: 710 mL / NET: 190 mL    02-06-23 @ 07:01  -  02-06-23 @ 15:05  --------------------------------------------------------  IN: 0 mL / OUT: 190 mL / NET: -190 mL                I&O's Detail    05 Feb 2023 07:01  -  06 Feb 2023 07:00  --------------------------------------------------------  IN:    dextrose 5% + sodium chloride 0.45%: 900 mL  Total IN: 900 mL    OUT:    Bulb (mL): 210 mL    Indwelling Catheter - Urethral (mL): 500 mL    Oral Fluid: 0 mL  Total OUT: 710 mL    Total NET: 190 mL      06 Feb 2023 07:01  -  06 Feb 2023 15:05  --------------------------------------------------------  IN:  Total IN: 0 mL    OUT:    Bulb (mL): 190 mL  Total OUT: 190 mL    Total NET: -190 mL          Follow-up Pulmonary Progress Note  Chief Complaint : Acute cholecystitis      patient on RA sat 94%  denies cp, sob, palp, n/v        Allergies :No Known Allergies      PAST MEDICAL & SURGICAL HISTORY:  History of BPH    Malignant neoplasm of colon    Femur neck fracture    History of glaucoma    GERD (gastroesophageal reflux disease)    Iron deficiency anemia    Hyperlipidemia    History of colon surgery        Medications:  MEDICATIONS  (STANDING):  acetaminophen     Tablet .. 650 milliGRAM(s) Oral every 6 hours  aspirin enteric coated 81 milliGRAM(s) Oral daily  cefTRIAXone   IVPB 1000 milliGRAM(s) IV Intermittent every 24 hours  dextrose 5% + sodium chloride 0.45% with potassium chloride 20 mEq/L 1000 milliLiter(s) (75 mL/Hr) IV Continuous <Continuous>  diatrizoate meglumine/diatrizoate sodium. 30 milliLiter(s) Oral once  enoxaparin Injectable 30 milliGRAM(s) SubCutaneous every 24 hours  finasteride 5 milliGRAM(s) Oral daily  magnesium sulfate  IVPB 2 Gram(s) IV Intermittent once  potassium chloride  10 mEq/100 mL IVPB 10 milliEquivalent(s) IV Intermittent every 1 hour  tamsulosin 0.8 milliGRAM(s) Oral at bedtime    MEDICATIONS  (PRN):  aluminum hydroxide/magnesium hydroxide/simethicone Suspension 30 milliLiter(s) Oral every 4 hours PRN Dyspepsia  melatonin 3 milliGRAM(s) Oral at bedtime PRN Insomnia  ondansetron Injectable 4 milliGRAM(s) IV Push every 8 hours PRN Nausea and/or Vomiting  oxyCODONE    IR 5 milliGRAM(s) Oral every 6 hours PRN Moderate Pain (4 - 6)  zinc oxide 40% Paste 1 Application(s) Topical three times a day PRN rash      Antibiotics History  cefepime   IVPB 1000 milliGRAM(s) IV Intermittent once, 01-30-23 @ 12:53, Stop order after: 1 Doses  cefTRIAXone   IVPB 1000 milliGRAM(s) IV Intermittent every 24 hours, 02-05-23 @ 13:48, Stop order after: 8 Days  metroNIDAZOLE  IVPB 500 milliGRAM(s) IV Intermittent once, 01-30-23 @ 14:31, Stop order after: 1 Doses  piperacillin/tazobactam IVPB. 3.375 Gram(s) IV Intermittent once, 01-30-23 @ 15:30, Stop order after: 1 Doses  piperacillin/tazobactam IVPB.- 3.375 Gram(s) IV Intermittent once, 01-31-23 @ 08:00  piperacillin/tazobactam IVPB.- 3.375 Gram(s) IV Intermittent once, 01-31-23 @ 02:00  piperacillin/tazobactam IVPB.- 3.375 Gram(s) IV Intermittent once, 01-30-23 @ 19:00  piperacillin/tazobactam IVPB.- 3.375 Gram(s) IV Intermittent once, 01-30-23 @ 23:41, Stop order after: 1 Doses  piperacillin/tazobactam IVPB.- 3.375 Gram(s) IV Intermittent once, 01-30-23 @ 23:41, Stop order after: 1 Doses  piperacillin/tazobactam IVPB.. 3.375 Gram(s) IV Intermittent every 8 hours, 01-30-23 @ 23:41, Stop order after: 7 Days  piperacillin/tazobactam IVPB.. 3.375 Gram(s) IV Intermittent every 8 hours, 01-31-23 @ 14:00, Stop order after: 7 Days      Heme Medications   aspirin enteric coated 81 milliGRAM(s) Oral daily, 02-02-23 @ 13:08  enoxaparin Injectable 30 milliGRAM(s) SubCutaneous every 24 hours, 02-02-23 @ 10:12      GI Medications  aluminum hydroxide/magnesium hydroxide/simethicone Suspension 30 milliLiter(s) Oral every 4 hours, 01-30-23 @ 23:41,  PRN        LABS:                        10.0   6.91  )-----------( 201      ( 06 Feb 2023 08:10 )             32.1     02-06    142  |  110<H>  |  10  ----------------------------<  102<H>  3.0<L>   |  27  |  0.52    Ca    7.5<L>      06 Feb 2023 08:10  Phos  1.6     02-06  Mg     1.7     02-06    TPro  4.9<L>  /  Alb  1.7<L>  /  TBili  0.4  /  DBili  0.1  /  AST  33  /  ALT  26  /  AlkPhos  142<H>  02-06    HIT ab -- 02-04 @ 12:00  HIT ab EIA --  D Dimer -1872       CULTURES: (if applicable)    Culture - Blood (collected 02-04-23 @ 12:00)  Source: .Blood Blood-Peripheral  Preliminary Report (02-05-23 @ 22:01):    No growth to date.    Culture - Blood (collected 02-04-23 @ 12:00)  Source: .Blood Blood-Peripheral  Preliminary Report (02-05-23 @ 22:01):    No growth to date.    Culture - Urine (collected 01-30-23 @ 12:38)  Source: Catheterized Catheterized  Final Report (02-01-23 @ 19:29):    >100,000 CFU/ml Escherichia coli    >100,000 CFU/ml Aerococcus species    "Aerococcus spp. are predictably susceptible to penicillin,    ampicillin, tetracycline, and vancomycin.  All isolates are    resistant to sulfonamides"  Organism: Escherichia coli (02-01-23 @ 19:29)  Organism: Escherichia coli (02-01-23 @ 19:29)      -  Amikacin: S <=16      -  Amoxicillin/Clavulanic Acid: S <=8/4      -  Ampicillin: S <=8 These ampicillin results predict results for amoxicillin      -  Ampicillin/Sulbactam: S <=4/2 Enterobacter, Klebsiella aerogenes, Citrobacter, and Serratia may develop resistance during prolonged therapy (3-4 days)      -  Aztreonam: S <=4      -  Cefazolin: S <=2 For uncomplicated UTI with K. pneumoniae, E. coli, or P. mirablis: PHOEBE <=16 is sensitive and PHOEBE >=32 is resistant. This also predicts results for oral agents cefaclor, cefdinir, cefpodoxime, cefprozil, cefuroxime axetil, cephalexin and locarbef for uncomplicated UTI. Note that some isolates may be susceptible to these agents while testing resistant to cefazolin.      -  Cefepime: S 8      -  Cefoxitin: S <=8      -  Ceftriaxone: S <=1 Enterobacter, Klebsiella aerogenes, Citrobacter, and Serratia may develop resistance during prolonged therapy      -  Cefuroxime: S <=4      -  Ciprofloxacin: S <=0.25      -  Ertapenem: S <=0.5      -  Gentamicin: S <=2      -  Imipenem: S <=1      -  Levofloxacin: S <=0.5      -  Meropenem: S <=1      -  Nitrofurantoin: S <=32 Should not be used to treat pyelonephritis      -  Piperacillin/Tazobactam: S <=8      -  Tobramycin: S <=2      -  Trimethoprim/Sulfamethoxazole: S <=0.5/9.5      Method Type: PHOEBE    Culture - Blood (collected 01-30-23 @ 12:27)  Source: .Blood Blood-Peripheral  Final Report (02-04-23 @ 15:08):    No Growth Final    Culture - Blood (collected 01-30-23 @ 12:10)  Source: .Blood Blood-Peripheral  Gram Stain (01-31-23 @ 15:34):    Growth in anaerobic bottle: Gram positive cocci in pairs  Final Report (02-01-23 @ 13:38):    Growth in anaerobic bottle: Aerococcus urinae    "Susceptibilities not performed"    ***Blood Panel PCR results on this specimen are available    approximately 3 hours after the Gram stain result.***    Gram stain, PCR, and/or culture results may not always    correspond due to difference in methodologies.    ************************************************************    This PCR assay was performed by multiplex PCR. This    Assay tests for 66 bacterial and resistance gene targets.    Please refer to the [facility] test directory    at [email or web address] for details.  Organism: Blood Culture PCR (02-01-23 @ 13:38)  Organism: Blood Culture PCR (02-01-23 @ 13:38)      -  Blood PCR Panel: NEG      Method Type: PCR      US LE No DVT      VITALS:  T(C): 36.8 (02-06-23 @ 13:34), Max: 36.8 (02-06-23 @ 13:34)  T(F): 98.3 (02-06-23 @ 13:34), Max: 98.3 (02-06-23 @ 13:34)  HR: 82 (02-06-23 @ 13:34) (82 - 88)  BP: 125/61 (02-06-23 @ 13:34) (118/74 - 125/61)  BP(mean): 80 (02-06-23 @ 13:34) (80 - 80)  ABP: --  ABP(mean): --  RR: 18 (02-06-23 @ 13:34) (18 - 18)  SpO2: 96% (02-06-23 @ 13:34) (96% - 97%)  CVP(mm Hg): --  CVP(cm H2O): --    Ins and Outs     02-05-23 @ 07:01  -  02-06-23 @ 07:00  --------------------------------------------------------  IN: 900 mL / OUT: 710 mL / NET: 190 mL    02-06-23 @ 07:01  -  02-06-23 @ 15:05  --------------------------------------------------------  IN: 0 mL / OUT: 190 mL / NET: -190 mL                I&O's Detail    05 Feb 2023 07:01  -  06 Feb 2023 07:00  --------------------------------------------------------  IN:    dextrose 5% + sodium chloride 0.45%: 900 mL  Total IN: 900 mL    OUT:    Bulb (mL): 210 mL    Indwelling Catheter - Urethral (mL): 500 mL    Oral Fluid: 0 mL  Total OUT: 710 mL    Total NET: 190 mL      06 Feb 2023 07:01  -  06 Feb 2023 15:05  --------------------------------------------------------  IN:  Total IN: 0 mL    OUT:    Bulb (mL): 190 mL  Total OUT: 190 mL    Total NET: -190 mL

## 2023-02-06 NOTE — PROGRESS NOTE ADULT - SUBJECTIVE AND OBJECTIVE BOX
Patient is a 89y old  Male who presents with a chief complaint of abdominal pain (06 Feb 2023 07:52)    HPI:  PRETTY DOLAN is a 89y year old Male with PMH of recent L hip fx (11/2022), TIA (11/2022) who presents to the ER from [facility] for abdominal pain, nausea for 4 days.     Patient states his symptoms have been worsening over the last 4 days; had bloodwork done at [facility] which showed elevated LFTs which prompted ER visit. patietn states he has had poor appetite for the same time period. Episode of vomiting in ER, nbnb.   Daughter at bedside who states patient has been participating with therapy however has been more fatigued over the last week.   Denies fever, chills, chest pain, shortness of breath.     On ER arrival, /68, HR 71, RR 17 T97.1, sat 95% on room air. Found to have acute cholecystitis on RUQ US. (30 Jan 2023 15:36)    felipe draining yellow    Interval Events:  Patient seen and examined at bedside.    MEDICATIONS:  MEDICATIONS  (STANDING):  acetaminophen     Tablet .. 650 milliGRAM(s) Oral every 6 hours  aspirin enteric coated 81 milliGRAM(s) Oral daily  cefTRIAXone   IVPB 1000 milliGRAM(s) IV Intermittent every 24 hours  dextrose 5%. 1000 milliLiter(s) (75 mL/Hr) IV Continuous <Continuous>  diatrizoate meglumine/diatrizoate sodium. 30 milliLiter(s) Oral once  enoxaparin Injectable 30 milliGRAM(s) SubCutaneous every 24 hours  finasteride 5 milliGRAM(s) Oral daily  potassium chloride  10 mEq/100 mL IVPB 10 milliEquivalent(s) IV Intermittent every 1 hour  tamsulosin 0.8 milliGRAM(s) Oral at bedtime    MEDICATIONS  (PRN):  aluminum hydroxide/magnesium hydroxide/simethicone Suspension 30 milliLiter(s) Oral every 4 hours PRN Dyspepsia  melatonin 3 milliGRAM(s) Oral at bedtime PRN Insomnia  ondansetron Injectable 4 milliGRAM(s) IV Push every 8 hours PRN Nausea and/or Vomiting  oxyCODONE    IR 5 milliGRAM(s) Oral every 6 hours PRN Moderate Pain (4 - 6)  zinc oxide 40% Paste 1 Application(s) Topical three times a day PRN rash      Allergies    No Known Allergies    Intolerances        T(C): 36.7 (02-06-23 @ 06:52), Max: 36.7 (02-05-23 @ 12:30)  T(F): 98 (02-06-23 @ 06:52), Max: 98 (02-05-23 @ 12:30)  HR: 82 (02-06-23 @ 06:52) (81 - 128)  BP: 120/63 (02-06-23 @ 06:52) (115/57 - 125/65)  RR: 18 (02-06-23 @ 06:52) (15 - 18)  SpO2: 97% (02-06-23 @ 06:52) (94% - 97%)          02-04-23 @ 07:01  -  02-05-23 @ 07:00  --------------------------------------------------------  IN:  Total IN: 0 mL    OUT:    Indwelling Catheter - Urethral (mL): 300 mL  Total OUT: 300 mL    Total NET: -300 mL      02-05-23 @ 07:01  -  02-06-23 @ 07:00  --------------------------------------------------------  IN:  Total IN: 0 mL    OUT:    Indwelling Catheter - Urethral (mL): 500 mL  Total OUT: 500 mL    Total NET: -500 mL          LABS:      CBC Full  -  ( 06 Feb 2023 08:10 )  WBC Count : 6.91 K/uL  RBC Count : 3.33 M/uL  Hemoglobin : 10.0 g/dL  Hematocrit : 32.1 %  Platelet Count - Automated : 201 K/uL  Mean Cell Volume : 96.4 fl  Mean Cell Hemoglobin : 30.0 pg  Mean Cell Hemoglobin Concentration : 31.2 gm/dL  Auto Neutrophil # : x  Auto Lymphocyte # : x  Auto Monocyte # : x  Auto Eosinophil # : x  Auto Basophil # : x  Auto Neutrophil % : x  Auto Lymphocyte % : x  Auto Monocyte % : x  Auto Eosinophil % : x  Auto Basophil % : x    02-05    146<H>  |  111<H>  |  14  ----------------------------<  86  3.6   |  28  |  0.66    Ca    7.9<L>      05 Feb 2023 09:20  Phos  2.5     02-05  Mg     2.1     02-05    TPro  5.3<L>  /  Alb  1.8<L>  /  TBili  0.4  /  DBili  0.2  /  AST  27  /  ALT  30  /  AlkPhos  154<H>  02-05                  Physical Exam    Constitutional: alert, no acute distress    Abdomen: soft, nontender, nondistended, no HSM    Genitourinary: no bladder distention, felipe yellow           Patient is a 89y old  Male who presents with a chief complaint of abdominal pain (06 Feb 2023 07:52)    HPI:  PRETTY DOLAN is a 89y year old Male with PMH of recent L hip fx (11/2022), TIA (11/2022) who presents to the ER from [facility] for abdominal pain, nausea for 4 days.     Patient states his symptoms have been worsening over the last 4 days; had bloodwork done at [facility] which showed elevated LFTs which prompted ER visit. patietn states he has had poor appetite for the same time period. Episode of vomiting in ER, nbnb.   Daughter at bedside who states patient has been participating with therapy however has been more fatigued over the last week.   Denies fever, chills, chest pain, shortness of breath.     On ER arrival, /68, HR 71, RR 17 T97.1, sat 95% on room air. Found to have acute cholecystitis on RUQ US. (30 Jan 2023 15:36)    felipe draining yellow    Interval Events:  Patient seen and examined at bedside.    MEDICATIONS:  MEDICATIONS  (STANDING):  acetaminophen     Tablet .. 650 milliGRAM(s) Oral every 6 hours  aspirin enteric coated 81 milliGRAM(s) Oral daily  cefTRIAXone   IVPB 1000 milliGRAM(s) IV Intermittent every 24 hours  dextrose 5%. 1000 milliLiter(s) (75 mL/Hr) IV Continuous <Continuous>  diatrizoate meglumine/diatrizoate sodium. 30 milliLiter(s) Oral once  enoxaparin Injectable 30 milliGRAM(s) SubCutaneous every 24 hours  finasteride 5 milliGRAM(s) Oral daily  potassium chloride  10 mEq/100 mL IVPB 10 milliEquivalent(s) IV Intermittent every 1 hour  tamsulosin 0.8 milliGRAM(s) Oral at bedtime    MEDICATIONS  (PRN):  aluminum hydroxide/magnesium hydroxide/simethicone Suspension 30 milliLiter(s) Oral every 4 hours PRN Dyspepsia  melatonin 3 milliGRAM(s) Oral at bedtime PRN Insomnia  ondansetron Injectable 4 milliGRAM(s) IV Push every 8 hours PRN Nausea and/or Vomiting  oxyCODONE    IR 5 milliGRAM(s) Oral every 6 hours PRN Moderate Pain (4 - 6)  zinc oxide 40% Paste 1 Application(s) Topical three times a day PRN rash      Allergies    No Known Allergies    Intolerances        T(C): 36.7 (02-06-23 @ 06:52), Max: 36.7 (02-05-23 @ 12:30)  T(F): 98 (02-06-23 @ 06:52), Max: 98 (02-05-23 @ 12:30)  HR: 82 (02-06-23 @ 06:52) (81 - 128)  BP: 120/63 (02-06-23 @ 06:52) (115/57 - 125/65)  RR: 18 (02-06-23 @ 06:52) (15 - 18)  SpO2: 97% (02-06-23 @ 06:52) (94% - 97%)          02-04-23 @ 07:01  -  02-05-23 @ 07:00  --------------------------------------------------------  IN:  Total IN: 0 mL    OUT:    Indwelling Catheter - Urethral (mL): 300 mL  Total OUT: 300 mL    Total NET: -300 mL      02-05-23 @ 07:01  -  02-06-23 @ 07:00  --------------------------------------------------------  IN:  Total IN: 0 mL    OUT:    Indwelling Catheter - Urethral (mL): 500 mL  Total OUT: 500 mL    Total NET: -500 mL          LABS:      CBC Full  -  ( 06 Feb 2023 08:10 )  WBC Count : 6.91 K/uL  RBC Count : 3.33 M/uL  Hemoglobin : 10.0 g/dL  Hematocrit : 32.1 %  Platelet Count - Automated : 201 K/uL  Mean Cell Volume : 96.4 fl  Mean Cell Hemoglobin : 30.0 pg  Mean Cell Hemoglobin Concentration : 31.2 gm/dL  Auto Neutrophil # : x  Auto Lymphocyte # : x  Auto Monocyte # : x  Auto Eosinophil # : x  Auto Basophil # : x  Auto Neutrophil % : x  Auto Lymphocyte % : x  Auto Monocyte % : x  Auto Eosinophil % : x  Auto Basophil % : x    02-05    146<H>  |  111<H>  |  14  ----------------------------<  86  3.6   |  28  |  0.66    Ca    7.9<L>      05 Feb 2023 09:20  Phos  2.5     02-05  Mg     2.1     02-05    TPro  5.3<L>  /  Alb  1.8<L>  /  TBili  0.4  /  DBili  0.2  /  AST  27  /  ALT  30  /  AlkPhos  154<H>  02-05                  Physical Exam    Constitutional: alert, no acute distress    Abdomen: soft, nontender, nondistended, no HSM    Genitourinary: no bladder distention, felipe yellow           Patient is a 89y old  Male who presents with a chief complaint of abdominal pain (06 Feb 2023 07:52)    HPI:  PRETTY DOLAN is a 89y year old Male with PMH of recent L hip fx (11/2022), TIA (11/2022) who presents to the ER from [facility] for abdominal pain, nausea for 4 days.     Patient states his symptoms have been worsening over the last 4 days; had bloodwork done at [facility] which showed elevated LFTs which prompted ER visit. patietn states he has had poor appetite for the same time period. Episode of vomiting in ER, nbnb.   Daughter at bedside who states patient has been participating with therapy however has been more fatigued over the last week.   Denies fever, chills, chest pain, shortness of breath.     On ER arrival, /68, HR 71, RR 17 T97.1, sat 95% on room air. Found to have acute cholecystitis on RUQ US. (30 Jan 2023 15:36)    felipe draining yellow    Interval Events:  Patient seen and examined at bedside.    MEDICATIONS:  MEDICATIONS  (STANDING):  acetaminophen     Tablet .. 650 milliGRAM(s) Oral every 6 hours  aspirin enteric coated 81 milliGRAM(s) Oral daily  cefTRIAXone   IVPB 1000 milliGRAM(s) IV Intermittent every 24 hours  dextrose 5%. 1000 milliLiter(s) (75 mL/Hr) IV Continuous <Continuous>  diatrizoate meglumine/diatrizoate sodium. 30 milliLiter(s) Oral once  enoxaparin Injectable 30 milliGRAM(s) SubCutaneous every 24 hours  finasteride 5 milliGRAM(s) Oral daily  potassium chloride  10 mEq/100 mL IVPB 10 milliEquivalent(s) IV Intermittent every 1 hour  tamsulosin 0.8 milliGRAM(s) Oral at bedtime    MEDICATIONS  (PRN):  aluminum hydroxide/magnesium hydroxide/simethicone Suspension 30 milliLiter(s) Oral every 4 hours PRN Dyspepsia  melatonin 3 milliGRAM(s) Oral at bedtime PRN Insomnia  ondansetron Injectable 4 milliGRAM(s) IV Push every 8 hours PRN Nausea and/or Vomiting  oxyCODONE    IR 5 milliGRAM(s) Oral every 6 hours PRN Moderate Pain (4 - 6)  zinc oxide 40% Paste 1 Application(s) Topical three times a day PRN rash      Allergies    No Known Allergies    Intolerances        T(C): 36.7 (02-06-23 @ 06:52), Max: 36.7 (02-05-23 @ 12:30)  T(F): 98 (02-06-23 @ 06:52), Max: 98 (02-05-23 @ 12:30)  HR: 82 (02-06-23 @ 06:52) (81 - 128)  BP: 120/63 (02-06-23 @ 06:52) (115/57 - 125/65)  RR: 18 (02-06-23 @ 06:52) (15 - 18)  SpO2: 97% (02-06-23 @ 06:52) (94% - 97%)          02-04-23 @ 07:01  -  02-05-23 @ 07:00  --------------------------------------------------------  IN:  Total IN: 0 mL    OUT:    Indwelling Catheter - Urethral (mL): 300 mL  Total OUT: 300 mL    Total NET: -300 mL      02-05-23 @ 07:01  -  02-06-23 @ 07:00  --------------------------------------------------------  IN:  Total IN: 0 mL    OUT:    Indwelling Catheter - Urethral (mL): 500 mL  Total OUT: 500 mL    Total NET: -500 mL          LABS:      CBC Full  -  ( 06 Feb 2023 08:10 )  WBC Count : 6.91 K/uL  RBC Count : 3.33 M/uL  Hemoglobin : 10.0 g/dL  Hematocrit : 32.1 %  Platelet Count - Automated : 201 K/uL  Mean Cell Volume : 96.4 fl  Mean Cell Hemoglobin : 30.0 pg  Mean Cell Hemoglobin Concentration : 31.2 gm/dL  Auto Neutrophil # : x  Auto Lymphocyte # : x  Auto Monocyte # : x  Auto Eosinophil # : x  Auto Basophil # : x  Auto Neutrophil % : x  Auto Lymphocyte % : x  Auto Monocyte % : x  Auto Eosinophil % : x  Auto Basophil % : x    02-05    146<H>  |  111<H>  |  14  ----------------------------<  86  3.6   |  28  |  0.66    Ca    7.9<L>      05 Feb 2023 09:20  Phos  2.5     02-05  Mg     2.1     02-05    TPro  5.3<L>  /  Alb  1.8<L>  /  TBili  0.4  /  DBili  0.2  /  AST  27  /  ALT  30  /  AlkPhos  154<H>  02-05                  Physical Exam    Constitutional: alert, no acute distress    Abdomen: soft, nontender, nondistended, no HSM    Genitourinary: no bladder distention, felipe yellow

## 2023-02-06 NOTE — PROGRESS NOTE ADULT - SUBJECTIVE AND OBJECTIVE BOX
Patient is a 89y old  Male who presents with a chief complaint of abdominal pain (05 Feb 2023 20:18)      Patient seen and examined at bedside. No overnight events reported.     ALLERGIES:  No Known Allergies    MEDICATIONS  (STANDING):  acetaminophen     Tablet .. 650 milliGRAM(s) Oral every 6 hours  aspirin enteric coated 81 milliGRAM(s) Oral daily  cefTRIAXone   IVPB 1000 milliGRAM(s) IV Intermittent every 24 hours  dextrose 5%. 1000 milliLiter(s) (75 mL/Hr) IV Continuous <Continuous>  diatrizoate meglumine/diatrizoate sodium. 30 milliLiter(s) Oral once  enoxaparin Injectable 30 milliGRAM(s) SubCutaneous every 24 hours  finasteride 5 milliGRAM(s) Oral daily  potassium chloride  10 mEq/100 mL IVPB 10 milliEquivalent(s) IV Intermittent every 1 hour  tamsulosin 0.8 milliGRAM(s) Oral at bedtime    MEDICATIONS  (PRN):  aluminum hydroxide/magnesium hydroxide/simethicone Suspension 30 milliLiter(s) Oral every 4 hours PRN Dyspepsia  melatonin 3 milliGRAM(s) Oral at bedtime PRN Insomnia  ondansetron Injectable 4 milliGRAM(s) IV Push every 8 hours PRN Nausea and/or Vomiting  oxyCODONE    IR 5 milliGRAM(s) Oral every 6 hours PRN Moderate Pain (4 - 6)  zinc oxide 40% Paste 1 Application(s) Topical three times a day PRN rash    Vital Signs Last 24 Hrs  T(F): 98 (06 Feb 2023 06:52), Max: 98 (05 Feb 2023 12:30)  HR: 82 (06 Feb 2023 06:52) (81 - 88)  BP: 120/63 (06 Feb 2023 06:52) (115/57 - 120/63)  RR: 18 (06 Feb 2023 06:52) (18 - 18)  SpO2: 97% (06 Feb 2023 06:52) (96% - 97%)  I&O's Summary    05 Feb 2023 07:01  -  06 Feb 2023 07:00  --------------------------------------------------------  IN: 900 mL / OUT: 710 mL / NET: 190 mL      PHYSICAL EXAM:  General: NAD, A/O x 3  ENT: No gross hearing impairment, Moist mucous membranes, no thrush  Neck: Supple, No JVD  Lungs: Clear to auscultation bilaterally, good air entry, non-labored breathing  Cardio: RRR, S1/S2, No murmur  Abdomen: Soft, Nontender, Nondistended; Bowel sounds present  Extremities: No calf tenderness, No cyanosis, No pitting edema  Psych: Appropriate mood and affect    LABS:                        11.0   9.16  )-----------( 188      ( 05 Feb 2023 09:20 )             35.3     02-05    146  |  111  |  14  ----------------------------<  86  3.6   |  28  |  0.66    Ca    7.9      05 Feb 2023 09:20  Phos  2.5     02-05  Mg     2.1     02-05    TPro  5.3  /  Alb  1.8  /  TBili  0.4  /  DBili  0.2  /  AST  27  /  ALT  30  /  AlkPhos  154  02-05            Lactate, Blood: 1.5 mmol/L (02-04 @ 12:00)          11-23 Chol 160 mg/dL LDL -- HDL 44 mg/dL Trig 95 mg/dL                      Culture - Blood (collected 04 Feb 2023 12:00)  Source: .Blood Blood-Peripheral  Preliminary Report (05 Feb 2023 22:01):    No growth to date.    Culture - Blood (collected 04 Feb 2023 12:00)  Source: .Blood Blood-Peripheral  Preliminary Report (05 Feb 2023 22:01):    No growth to date.    Culture - Urine (collected 30 Jan 2023 12:38)  Source: Catheterized Catheterized  Final Report (01 Feb 2023 19:29):    >100,000 CFU/ml Escherichia coli    >100,000 CFU/ml Aerococcus species    "Aerococcus spp. are predictably susceptible to penicillin,    ampicillin, tetracycline, and vancomycin.  All isolates are    resistant to sulfonamides"  Organism: Escherichia coli (01 Feb 2023 19:29)  Organism: Escherichia coli (01 Feb 2023 19:29)      -  Amikacin: S <=16      -  Amoxicillin/Clavulanic Acid: S <=8/4      -  Ampicillin: S <=8 These ampicillin results predict results for amoxicillin      -  Ampicillin/Sulbactam: S <=4/2 Enterobacter, Klebsiella aerogenes, Citrobacter, and Serratia may develop resistance during prolonged therapy (3-4 days)      -  Aztreonam: S <=4      -  Cefazolin: S <=2 For uncomplicated UTI with K. pneumoniae, E. coli, or P. mirablis: PHOEBE <=16 is sensitive and PHOEBE >=32 is resistant. This also predicts results for oral agents cefaclor, cefdinir, cefpodoxime, cefprozil, cefuroxime axetil, cephalexin and locarbef for uncomplicated UTI. Note that some isolates may be susceptible to these agents while testing resistant to cefazolin.      -  Cefepime: S 8      -  Cefoxitin: S <=8      -  Ceftriaxone: S <=1 Enterobacter, Klebsiella aerogenes, Citrobacter, and Serratia may develop resistance during prolonged therapy      -  Cefuroxime: S <=4      -  Ciprofloxacin: S <=0.25      -  Ertapenem: S <=0.5      -  Gentamicin: S <=2      -  Imipenem: S <=1      -  Levofloxacin: S <=0.5      -  Meropenem: S <=1      -  Nitrofurantoin: S <=32 Should not be used to treat pyelonephritis      -  Piperacillin/Tazobactam: S <=8      -  Tobramycin: S <=2      -  Trimethoprim/Sulfamethoxazole: S <=0.5/9.5      Method Type: PHOEBE    Culture - Blood (collected 30 Jan 2023 12:27)  Source: .Blood Blood-Peripheral  Final Report (04 Feb 2023 15:08):    No Growth Final    Culture - Blood (collected 30 Jan 2023 12:10)  Source: .Blood Blood-Peripheral  Gram Stain (31 Jan 2023 15:34):    Growth in anaerobic bottle: Gram positive cocci in pairs  Final Report (01 Feb 2023 13:38):    Growth in anaerobic bottle: Aerococcus urinae    "Susceptibilities not performed"    ***Blood Panel PCR results on this specimen are available    approximately 3 hours after the Gram stain result.***    Gram stain, PCR, and/or culture results may not always    correspond due to difference in methodologies.    ************************************************************    This PCR assay was performed by multiplex PCR. This    Assay tests for 66 bacterial and resistance gene targets.    Please refer to the [facility] test directory    at [email or web address] for details.  Organism: Blood Culture PCR (01 Feb 2023 13:38)  Organism: Blood Culture PCR (01 Feb 2023 13:38)      -  Blood PCR Panel: NEG      Method Type: PCR      COVID-19 PCR: NotDetec (01-30-23 @ 12:25)    RADIOLOGY & ADDITIONAL TESTS:  < from: CT Abdomen and Pelvis w/ IV Cont (02.04.23 @ 22:13) >    IMPRESSION:  No pulmonary embolism.    Small to moderate right pleural effusion.    Small right middle lobe opacity, either infectious/inflammatory or   atelectasis.    Status post cholecystectomy with a small amountof fluid in the   gallbladder fossa which can be postoperative in etiology.    Couple of tiny droplets of free air which are likely postoperative in   etiology.    Drain via a right anterior approach with the tip in the left upper   quadrant.    Dilated loops of small and large bowel with no discrete transition point,   likely an ileus.    Left inguinal hernia containing a normal caliber loop of sigmoid colon.    A preliminary report was provided.      < end of copied text >    Care Discussed with Consultants/Other Providers:    Patient is a 89y old  Male who presents with a chief complaint of abdominal pain (05 Feb 2023 20:18)      Patient seen and examined at bedside. No overnight events reported.     ALLERGIES:  No Known Allergies    MEDICATIONS  (STANDING):  acetaminophen     Tablet .. 650 milliGRAM(s) Oral every 6 hours  aspirin enteric coated 81 milliGRAM(s) Oral daily  cefTRIAXone   IVPB 1000 milliGRAM(s) IV Intermittent every 24 hours  dextrose 5%. 1000 milliLiter(s) (75 mL/Hr) IV Continuous <Continuous>  diatrizoate meglumine/diatrizoate sodium. 30 milliLiter(s) Oral once  enoxaparin Injectable 30 milliGRAM(s) SubCutaneous every 24 hours  finasteride 5 milliGRAM(s) Oral daily  potassium chloride  10 mEq/100 mL IVPB 10 milliEquivalent(s) IV Intermittent every 1 hour  tamsulosin 0.8 milliGRAM(s) Oral at bedtime    MEDICATIONS  (PRN):  aluminum hydroxide/magnesium hydroxide/simethicone Suspension 30 milliLiter(s) Oral every 4 hours PRN Dyspepsia  melatonin 3 milliGRAM(s) Oral at bedtime PRN Insomnia  ondansetron Injectable 4 milliGRAM(s) IV Push every 8 hours PRN Nausea and/or Vomiting  oxyCODONE    IR 5 milliGRAM(s) Oral every 6 hours PRN Moderate Pain (4 - 6)  zinc oxide 40% Paste 1 Application(s) Topical three times a day PRN rash    Vital Signs Last 24 Hrs  T(F): 98 (06 Feb 2023 06:52), Max: 98 (05 Feb 2023 12:30)  HR: 82 (06 Feb 2023 06:52) (81 - 88)  BP: 120/63 (06 Feb 2023 06:52) (115/57 - 120/63)  RR: 18 (06 Feb 2023 06:52) (18 - 18)  SpO2: 97% (06 Feb 2023 06:52) (96% - 97%)  I&O's Summary    05 Feb 2023 07:01  -  06 Feb 2023 07:00  --------------------------------------------------------  IN: 900 mL / OUT: 710 mL / NET: 190 mL      PHYSICAL EXAM:  General: NAD, A/O x 3  ENT: No gross hearing impairment, Moist mucous membranes, no thrush  Neck: Supple, No JVD  Lungs: Clear to auscultation bilaterally, good air entry, non-labored breathing  Cardio: RRR, S1/S2, No murmur  Abdomen: Soft, Nontender, Nondistended; Bowel sounds present  Extremities: No calf tenderness, No cyanosis, No pitting edema  Psych: Appropriate mood and affect    LABS:                        11.0   9.16  )-----------( 188      ( 05 Feb 2023 09:20 )             35.3     02-05    146  |  111  |  14  ----------------------------<  86  3.6   |  28  |  0.66    Ca    7.9      05 Feb 2023 09:20  Phos  2.5     02-05  Mg     2.1     02-05    TPro  5.3  /  Alb  1.8  /  TBili  0.4  /  DBili  0.2  /  AST  27  /  ALT  30  /  AlkPhos  154  02-05            Lactate, Blood: 1.5 mmol/L (02-04 @ 12:00)          11-23 Chol 160 mg/dL LDL -- HDL 44 mg/dL Trig 95 mg/dL                      Culture - Blood (collected 04 Feb 2023 12:00)  Source: .Blood Blood-Peripheral  Preliminary Report (05 Feb 2023 22:01):    No growth to date.    Culture - Blood (collected 04 Feb 2023 12:00)  Source: .Blood Blood-Peripheral  Preliminary Report (05 Feb 2023 22:01):    No growth to date.    Culture - Urine (collected 30 Jan 2023 12:38)  Source: Catheterized Catheterized  Final Report (01 Feb 2023 19:29):    >100,000 CFU/ml Escherichia coli    >100,000 CFU/ml Aerococcus species    "Aerococcus spp. are predictably susceptible to penicillin,    ampicillin, tetracycline, and vancomycin.  All isolates are    resistant to sulfonamides"  Organism: Escherichia coli (01 Feb 2023 19:29)  Organism: Escherichia coli (01 Feb 2023 19:29)      -  Amikacin: S <=16      -  Amoxicillin/Clavulanic Acid: S <=8/4      -  Ampicillin: S <=8 These ampicillin results predict results for amoxicillin      -  Ampicillin/Sulbactam: S <=4/2 Enterobacter, Klebsiella aerogenes, Citrobacter, and Serratia may develop resistance during prolonged therapy (3-4 days)      -  Aztreonam: S <=4      -  Cefazolin: S <=2 For uncomplicated UTI with K. pneumoniae, E. coli, or P. mirablis: PHOEBE <=16 is sensitive and PHOEBE >=32 is resistant. This also predicts results for oral agents cefaclor, cefdinir, cefpodoxime, cefprozil, cefuroxime axetil, cephalexin and locarbef for uncomplicated UTI. Note that some isolates may be susceptible to these agents while testing resistant to cefazolin.      -  Cefepime: S 8      -  Cefoxitin: S <=8      -  Ceftriaxone: S <=1 Enterobacter, Klebsiella aerogenes, Citrobacter, and Serratia may develop resistance during prolonged therapy      -  Cefuroxime: S <=4      -  Ciprofloxacin: S <=0.25      -  Ertapenem: S <=0.5      -  Gentamicin: S <=2      -  Imipenem: S <=1      -  Levofloxacin: S <=0.5      -  Meropenem: S <=1      -  Nitrofurantoin: S <=32 Should not be used to treat pyelonephritis      -  Piperacillin/Tazobactam: S <=8      -  Tobramycin: S <=2      -  Trimethoprim/Sulfamethoxazole: S <=0.5/9.5      Method Type: PHOEBE    Culture - Blood (collected 30 Jan 2023 12:27)  Source: .Blood Blood-Peripheral  Final Report (04 Feb 2023 15:08):    No Growth Final    Culture - Blood (collected 30 Jan 2023 12:10)  Source: .Blood Blood-Peripheral  Gram Stain (31 Jan 2023 15:34):    Growth in anaerobic bottle: Gram positive cocci in pairs  Final Report (01 Feb 2023 13:38):    Growth in anaerobic bottle: Aerococcus urinae    "Susceptibilities not performed"    ***Blood Panel PCR results on this specimen are available    approximately 3 hours after the Gram stain result.***    Gram stain, PCR, and/or culture results may not always    correspond due to difference in methodologies.    ************************************************************    This PCR assay was performed by multiplex PCR. This    Assay tests for 66 bacterial and resistance gene targets.    Please refer to the [facility] test directory    at [email or web address] for details.  Organism: Blood Culture PCR (01 Feb 2023 13:38)  Organism: Blood Culture PCR (01 Feb 2023 13:38)      -  Blood PCR Panel: NEG      Method Type: PCR      COVID-19 PCR: NotDetec (01-30-23 @ 12:25)    RADIOLOGY & ADDITIONAL TESTS:  < from: CT Abdomen and Pelvis w/ IV Cont (02.04.23 @ 22:13) >    IMPRESSION:  No pulmonary embolism.    Small to moderate right pleural effusion.    Small right middle lobe opacity, either infectious/inflammatory or   atelectasis.    Status post cholecystectomy with a small amountof fluid in the   gallbladder fossa which can be postoperative in etiology.    Couple of tiny droplets of free air which are likely postoperative in   etiology.    Drain via a right anterior approach with the tip in the left upper   quadrant.    Dilated loops of small and large bowel with no discrete transition point,   likely an ileus.    Left inguinal hernia containing a normal caliber loop of sigmoid colon.    A preliminary report was provided.      < end of copied text >    Care Discussed with Consultants/Other Providers:    Patient is a 89y old  Male who presents with a chief complaint of abdominal pain (05 Feb 2023 20:18)      Patient seen and examined at bedside. No overnight events reported.     ALLERGIES:  No Known Allergies    MEDICATIONS  (STANDING):  acetaminophen     Tablet .. 650 milliGRAM(s) Oral every 6 hours  aspirin enteric coated 81 milliGRAM(s) Oral daily  cefTRIAXone   IVPB 1000 milliGRAM(s) IV Intermittent every 24 hours  dextrose 5%. 1000 milliLiter(s) (75 mL/Hr) IV Continuous <Continuous>  diatrizoate meglumine/diatrizoate sodium. 30 milliLiter(s) Oral once  enoxaparin Injectable 30 milliGRAM(s) SubCutaneous every 24 hours  finasteride 5 milliGRAM(s) Oral daily  potassium chloride  10 mEq/100 mL IVPB 10 milliEquivalent(s) IV Intermittent every 1 hour  tamsulosin 0.8 milliGRAM(s) Oral at bedtime    MEDICATIONS  (PRN):  aluminum hydroxide/magnesium hydroxide/simethicone Suspension 30 milliLiter(s) Oral every 4 hours PRN Dyspepsia  melatonin 3 milliGRAM(s) Oral at bedtime PRN Insomnia  ondansetron Injectable 4 milliGRAM(s) IV Push every 8 hours PRN Nausea and/or Vomiting  oxyCODONE    IR 5 milliGRAM(s) Oral every 6 hours PRN Moderate Pain (4 - 6)  zinc oxide 40% Paste 1 Application(s) Topical three times a day PRN rash    Vital Signs Last 24 Hrs  T(F): 98 (06 Feb 2023 06:52), Max: 98 (05 Feb 2023 12:30)  HR: 82 (06 Feb 2023 06:52) (81 - 88)  BP: 120/63 (06 Feb 2023 06:52) (115/57 - 120/63)  RR: 18 (06 Feb 2023 06:52) (18 - 18)  SpO2: 97% (06 Feb 2023 06:52) (96% - 97%)  I&O's Summary    05 Feb 2023 07:01  -  06 Feb 2023 07:00  --------------------------------------------------------  IN: 900 mL / OUT: 710 mL / NET: 190 mL      PHYSICAL EXAM:  General: NAD, A/O x 3  ENT: No gross hearing impairment, Moist mucous membranes, no thrush  Neck: Supple, No JVD  Lungs: Clear to auscultation bilaterally, good air entry, non-labored breathing  Cardio: RRR, S1/S2, No murmur  Abdomen: Soft, Nontender, Nondistended; Bowel sounds present  Extremities: No calf tenderness, No cyanosis, No pitting edema  Psych: Appropriate mood and affect    LABS:                        11.0   9.16  )-----------( 188      ( 05 Feb 2023 09:20 )             35.3     02-05    146  |  111  |  14  ----------------------------<  86  3.6   |  28  |  0.66    Ca    7.9      05 Feb 2023 09:20  Phos  2.5     02-05  Mg     2.1     02-05    TPro  5.3  /  Alb  1.8  /  TBili  0.4  /  DBili  0.2  /  AST  27  /  ALT  30  /  AlkPhos  154  02-05            Lactate, Blood: 1.5 mmol/L (02-04 @ 12:00)          11-23 Chol 160 mg/dL LDL -- HDL 44 mg/dL Trig 95 mg/dL                      Culture - Blood (collected 04 Feb 2023 12:00)  Source: .Blood Blood-Peripheral  Preliminary Report (05 Feb 2023 22:01):    No growth to date.    Culture - Blood (collected 04 Feb 2023 12:00)  Source: .Blood Blood-Peripheral  Preliminary Report (05 Feb 2023 22:01):    No growth to date.    Culture - Urine (collected 30 Jan 2023 12:38)  Source: Catheterized Catheterized  Final Report (01 Feb 2023 19:29):    >100,000 CFU/ml Escherichia coli    >100,000 CFU/ml Aerococcus species    "Aerococcus spp. are predictably susceptible to penicillin,    ampicillin, tetracycline, and vancomycin.  All isolates are    resistant to sulfonamides"  Organism: Escherichia coli (01 Feb 2023 19:29)  Organism: Escherichia coli (01 Feb 2023 19:29)      -  Amikacin: S <=16      -  Amoxicillin/Clavulanic Acid: S <=8/4      -  Ampicillin: S <=8 These ampicillin results predict results for amoxicillin      -  Ampicillin/Sulbactam: S <=4/2 Enterobacter, Klebsiella aerogenes, Citrobacter, and Serratia may develop resistance during prolonged therapy (3-4 days)      -  Aztreonam: S <=4      -  Cefazolin: S <=2 For uncomplicated UTI with K. pneumoniae, E. coli, or P. mirablis: PHOEBE <=16 is sensitive and PHOEBE >=32 is resistant. This also predicts results for oral agents cefaclor, cefdinir, cefpodoxime, cefprozil, cefuroxime axetil, cephalexin and locarbef for uncomplicated UTI. Note that some isolates may be susceptible to these agents while testing resistant to cefazolin.      -  Cefepime: S 8      -  Cefoxitin: S <=8      -  Ceftriaxone: S <=1 Enterobacter, Klebsiella aerogenes, Citrobacter, and Serratia may develop resistance during prolonged therapy      -  Cefuroxime: S <=4      -  Ciprofloxacin: S <=0.25      -  Ertapenem: S <=0.5      -  Gentamicin: S <=2      -  Imipenem: S <=1      -  Levofloxacin: S <=0.5      -  Meropenem: S <=1      -  Nitrofurantoin: S <=32 Should not be used to treat pyelonephritis      -  Piperacillin/Tazobactam: S <=8      -  Tobramycin: S <=2      -  Trimethoprim/Sulfamethoxazole: S <=0.5/9.5      Method Type: PHOEBE    Culture - Blood (collected 30 Jan 2023 12:27)  Source: .Blood Blood-Peripheral  Final Report (04 Feb 2023 15:08):    No Growth Final    Culture - Blood (collected 30 Jan 2023 12:10)  Source: .Blood Blood-Peripheral  Gram Stain (31 Jan 2023 15:34):    Growth in anaerobic bottle: Gram positive cocci in pairs  Final Report (01 Feb 2023 13:38):    Growth in anaerobic bottle: Aerococcus urinae    "Susceptibilities not performed"    ***Blood Panel PCR results on this specimen are available    approximately 3 hours after the Gram stain result.***    Gram stain, PCR, and/or culture results may not always    correspond due to difference in methodologies.    ************************************************************    This PCR assay was performed by multiplex PCR. This    Assay tests for 66 bacterial and resistance gene targets.    Please refer to the [facility] test directory    at [email or web address] for details.  Organism: Blood Culture PCR (01 Feb 2023 13:38)  Organism: Blood Culture PCR (01 Feb 2023 13:38)      -  Blood PCR Panel: NEG      Method Type: PCR      COVID-19 PCR: NotDetec (01-30-23 @ 12:25)    RADIOLOGY & ADDITIONAL TESTS:  < from: CT Abdomen and Pelvis w/ IV Cont (02.04.23 @ 22:13) >    IMPRESSION:  No pulmonary embolism.    Small to moderate right pleural effusion.    Small right middle lobe opacity, either infectious/inflammatory or   atelectasis.    Status post cholecystectomy with a small amountof fluid in the   gallbladder fossa which can be postoperative in etiology.    Couple of tiny droplets of free air which are likely postoperative in   etiology.    Drain via a right anterior approach with the tip in the left upper   quadrant.    Dilated loops of small and large bowel with no discrete transition point,   likely an ileus.    Left inguinal hernia containing a normal caliber loop of sigmoid colon.    A preliminary report was provided.      < end of copied text >    Care Discussed with Consultants/Other Providers:

## 2023-02-06 NOTE — PROGRESS NOTE ADULT - NS ATTEND AMEND GEN_ALL_CORE FT
This is a pleasant 89M who presented with acute gangrenous cholecystitis with perforation who is POD#7 s/p laparoscopic cholecystectomy. Intraop there was perforation into the liver bed, with empyema. He tolerated the procedure well. He was extubated and continues to be afebrile and hemodynamically stable. No evidence of gallstone ileus intraop. Patient noted to have severe ileus. Thus, NG initially placed intra-op to decreased risk of aspiration PNA, but patient removed POD 0.     CTA of chest / abdomen/pelvis was obtained overnight for brief episode of hypotension 2/4 which resolved with IV fluids. I reviewed it personally and reviewed the prelim read. It showed resolving ileus, expected post op changes and right pleural effusion. Pulmonary team was consulted - effusion too small for thoracentesis. Will manage conservatively.    Patient continues to do well. Pain is well-controlled. He continues to have multiple liquid BMs. C diff was sent, which was negative. No N/V overnight. No fevers, chills or sweats.     General appearance: No acute distress, lying comfortably. Alert  Respiratory: Nonlabored breathing  Abdomen: benign, appropriate incisional tenderness, incision c/d/i, drain with serous output. reducible left inguinal hernia. slightly less distended.  Extremities: No edema  Neuro: No focal deficits appreciated  Psych: Calm    Labs:   WBC down to 6.9 from 9 Hb 10.0 from 11 BUN/Cr 10/0.522 from 14/0.66 K was 3.0 from 3.6 this am Mg 1.7 Phos 1.6    Hypovolemia from diarrhea resolved. Patient is afebrile, hemodynamically stable this am. No evidence of infectious process or peritonitis today. No abscess noted on CT abdomen / pelvis. He has resolving ileus. Given difficulty in assessing reliability of patient's reported history of flatus and potential aspiration risk, will obtain gastrografin study with serial XRs to assess for passage of contrast into colon. I expect this is likely ileus as opposed to actual obstruction. Will restart diet with ensures once contrast is demonstrated in colon.     - Would maintain NPO with IVF for now. Sips/ ice chips for comfort for now. Swallow demonstrated zenker's diverticulum, but no aspiration.   - Continue IV abx per ID. Leukocytosis resolved.  - Would aggressively electrolytes daily, given patient's continued losses from diarrhea. Of note, C. diff was negative. Continue to trend electrolytes to minimize risk factors for ileus. Continue to check daily Mg /Phos to am. labs. Goal K of 4.0, Mg of 2.0.   - Defer management of urinary retention to urology.  - Continue drain to bulb suction for now. Will remove prior to discharge. Currently having benign, serous output.  - Continue lovenox for DVT and asa for cardio/stroke protection. Hb stable.  - Cardiology re-consulted on 2/4 regarding brief episode of hypotension. ECHO today demonstrated EF 50-55%, grade I diastolic dysfunction and borderline pulmonary HTN. Their recommendations were to continue supportive management.   - Non operative management of incidental left inguinal hernia at this time. Easily reducible. Will discuss further options as outpatient.    Called patient's daughter to update her on his progress. All questions answered.    Plan was also discussed with Kiesha NEVAREZ and Dr. Bailey (hospitalist).    Await gastrografin study. If AXR shows contrast in colon, OK for regular diet with ensures TID.

## 2023-02-06 NOTE — PROGRESS NOTE ADULT - SUBJECTIVE AND OBJECTIVE BOX
I&O's Detail    05 Feb 2023 07:01  -  06 Feb 2023 07:00  --------------------------------------------------------  IN:    dextrose 5% + sodium chloride 0.45%: 900 mL  Total IN: 900 mL    OUT:    Bulb (mL): 210 mL    Indwelling Catheter - Urethral (mL): 500 mL    Oral Fluid: 0 mL  Total OUT: 710 mL    Total NET: 190 mL      POD #7 LAP cholecystectomy/perf gallbladder    Pt is resting comfortably in bed. No acute events overnight per RN. no new complaints. no N/V.  +diarrhea neg for cdiff, NPO felipe  in place drain to bulb suction serosanguinous IV ABX IVF awaiting gastrograffin/esophagram study today    Vital Signs Last 24 Hrs  T(C): 36.7 (06 Feb 2023 06:52), Max: 36.7 (05 Feb 2023 12:30)  T(F): 98 (06 Feb 2023 06:52), Max: 98 (05 Feb 2023 12:30)  HR: 82 (06 Feb 2023 06:52) (81 - 88)  BP: 120/63 (06 Feb 2023 06:52) (115/57 - 120/63)  BP(mean): --  RR: 18 (06 Feb 2023 06:52) (18 - 18)  SpO2: 97% (06 Feb 2023 06:52) (96% - 97%)    Parameters below as of 06 Feb 2023 06:52  Patient On (Oxygen Delivery Method): room air        PE: no new changes                        10.0   6.91  )-----------( 201      ( 06 Feb 2023 08:10 )             32.1   02-05    146<H>  |  111<H>  |  14  ----------------------------<  86  3.6   |  28  |  0.66    Ca    7.9<L>      05 Feb 2023 09:20  Phos  2.5     02-05  Mg     2.1     02-05    TPro  5.3<L>  /  Alb  1.8<L>  /  TBili  0.4  /  DBili  0.2  /  AST  27  /  ALT  30  /  AlkPhos  154<H>  02-05    I&O's Detail    05 Feb 2023 07:01  -  06 Feb 2023 07:00  --------------------------------------------------------  IN:    dextrose 5% + sodium chloride 0.45%: 900 mL  Total IN: 900 mL    OUT:    Bulb (mL): 210 mL    Indwelling Catheter - Urethral (mL): 500 mL    Oral Fluid: 0 mL  Total OUT: 710 mL    Total NET: 190 mL    < from: CT Abdomen and Pelvis w/ IV Cont (02.04.23 @ 22:13) >  ACC: [id number] EXAM:  CT ABDOMEN AND PELVIS IC   ORDERED BY: ANASTACIA SUNG     ACC: [id number] EXAM:  CT ANGIO CHEST PULM ART WAWIC   ORDERED BY: ANASTACIA SUNG     PROCEDURE DATE:  02/04/2023          INTERPRETATION:  CLINICAL INFORMATION: New onset A. fib. Evaluate for   perforated gallbladder.    COMPARISON: None.    CONTRAST/COMPLICATIONS:  IV Contrast: Omnipaque 350  90 cc administered   10 cc discarded  Oral Contrast: NONE  Complications: None reported at time of study completion    PROCEDURE:  CT Angiography of the Chest was performed followed by portal venous phase   imaging of the Abdomen and Pelvis.  Sagittal and coronal reformats were performed as well as 3D (MIP)   reconstructions.    FINDINGS:  CHEST:  LUNGS AND LARGE AIRWAYS: Mildly degraded by motion artifact. Central   airways are patent. Patchy opacity at the posterior aspect of the right   middle lobe (series 5 image 225), either infectious/inflammatory or   atelectasis. 4.0 cm subpleural bleb at the anterior aspect of the left   upper lobe (series 3 image 13).  PLEURA: Small right pleural effusion with compressive atelectasis in the   right lower lobe.  VESSELS: No pulmonary embolism. Thoracic ureter normal in caliber with   atherosclerotic changes. Ulcerated plaque in the thoracic aorta. Coronary   artery calcifications.  HEART: Heart size is normal. No pericardial effusion.  MEDIASTINUM AND LOLA: No lymphadenopathy.  CHEST WALL AND LOWER NECK: No enlarged axillary lymph nodes.    ABDOMEN AND PELVIS:  LIVER: Cyst in the right hepatic lobe and subcentimeter low-attenuation   lesions, too small to characterize.  BILE DUCTS: Normal caliber.  GALLBLADDER: Cholecystectomy. Small amount of fluid in the gallbladder   fossa. Linear foci of high density in the gallbladder fossa with no   discrete organized collection.  SPLEEN: Within normal limits.  PANCREAS: Atrophic with fatty infiltration.  ADRENALS: Within normal limits.  KIDNEYS/URETERS: No hydronephrosis. Bilateral renal cysts.    BLADDER: Limited evaluation of the pelvis secondary to streak artifact   from a left hip arthroplasty. Felipe catheter in urinary bladder. Air in   urinary bladder compatible with the instrumentation. High-density the   posterior aspect of the urinary bladder (series 3 image 95), either   calculi, calcifications in the wall, or excreted intravenous contrast.  REPRODUCTIVE ORGANS: Enlarged prostate.    BOWEL: Left inguinal hernia containing a normal caliber loop of sigmoid   colon. Ileocolic anastomosis in theright upper quadrant. No dilated   loops of small bowel and dilated colon through the distal transverse   colon with no discrete transition point, likely an ileus. Couple of tiny   droplets of free air which are likely postoperative in etiology (series 2   image 50).  PERITONEUM: Drain via a right anterior approach with the tip in the left   upper quadrant.  VESSELS: Atherosclerotic changes including ulcerated plaque in the   abdominal aorta. Abdominal aorta normal in caliber. Dilated right common   iliac artery measuring 1.5 cm.  RETROPERITONEUM/LYMPH NODES: No lymphadenopathy.  ABDOMINAL WALL: Left inguinal hernia, also described above. Simultaneous   droplet of air in the anterior abdominal wall which can be from   subcutaneous injections.  BONES: Left hip arthroplasty.    IMPRESSION:  No pulmonary embolism.    Small to moderate right pleural effusion.    Small right middle lobe opacity, either infectious/inflammatory or   atelectasis.    Status post cholecystectomy with a small amountof fluid in the   gallbladder fossa which can be postoperative in etiology.    Couple of tiny droplets of free air which are likely postoperative in   etiology.    Drain via a right anterior approach with the tip in the left upper   quadrant.    Dilated loops of small and large bowel with no discrete transition point,   likely an ileus.    Left inguinal hernia containing a normal caliber loop of sigmoid colon.    A preliminary report was provided.        --- End of Report ---            ABRAM PAREDES MD; Attending Radiologist  This document has been electronically signed. Feb 5 2023  1:39PM    < end of copied text >         I&O's Detail    05 Feb 2023 07:01  -  06 Feb 2023 07:00  --------------------------------------------------------  IN:    dextrose 5% + sodium chloride 0.45%: 900 mL  Total IN: 900 mL    OUT:    Bulb (mL): 210 mL    Indwelling Catheter - Urethral (mL): 500 mL    Oral Fluid: 0 mL  Total OUT: 710 mL    Total NET: 190 mL      POD #7 LAP cholecystectomy/perf gallbladder    Pt is resting comfortably in bed. No acute events overnight per RN. no new complaints. no N/V.  +diarrhea neg for cdiff, NPO felipe  in place drain to bulb suction serosanguinous IV ABX IVF awaiting gastrograffin/esophagram study today    Vital Signs Last 24 Hrs  T(C): 36.7 (06 Feb 2023 06:52), Max: 36.7 (05 Feb 2023 12:30)  T(F): 98 (06 Feb 2023 06:52), Max: 98 (05 Feb 2023 12:30)  HR: 82 (06 Feb 2023 06:52) (81 - 88)  BP: 120/63 (06 Feb 2023 06:52) (115/57 - 120/63)  BP(mean): --  RR: 18 (06 Feb 2023 06:52) (18 - 18)  SpO2: 97% (06 Feb 2023 06:52) (96% - 97%)    Parameters below as of 06 Feb 2023 06:52  Patient On (Oxygen Delivery Method): room air        PE: no new changes                        10.0   6.91  )-----------( 201      ( 06 Feb 2023 08:10 )             32.1   02-05    146<H>  |  111<H>  |  14  ----------------------------<  86  3.6   |  28  |  0.66    Ca    7.9<L>      05 Feb 2023 09:20  Phos  2.5     02-05  Mg     2.1     02-05    TPro  5.3<L>  /  Alb  1.8<L>  /  TBili  0.4  /  DBili  0.2  /  AST  27  /  ALT  30  /  AlkPhos  154<H>  02-05    I&O's Detail    05 Feb 2023 07:01  -  06 Feb 2023 07:00  --------------------------------------------------------  IN:    dextrose 5% + sodium chloride 0.45%: 900 mL  Total IN: 900 mL    OUT:    Bulb (mL): 210 mL    Indwelling Catheter - Urethral (mL): 500 mL    Oral Fluid: 0 mL  Total OUT: 710 mL    Total NET: 190 mL    < from: CT Abdomen and Pelvis w/ IV Cont (02.04.23 @ 22:13) >  ACC: [id number] EXAM:  CT ABDOMEN AND PELVIS IC   ORDERED BY: ANASTACIA SUNG     ACC: [id number] EXAM:  CT ANGIO CHEST PULM ART WAWIC   ORDERED BY: ANASTACIA SUNG     PROCEDURE DATE:  02/04/2023          INTERPRETATION:  CLINICAL INFORMATION: New onset A. fib. Evaluate for   perforated gallbladder.    COMPARISON: None.    CONTRAST/COMPLICATIONS:  IV Contrast: Omnipaque 350  90 cc administered   10 cc discarded  Oral Contrast: NONE  Complications: None reported at time of study completion    PROCEDURE:  CT Angiography of the Chest was performed followed by portal venous phase   imaging of the Abdomen and Pelvis.  Sagittal and coronal reformats were performed as well as 3D (MIP)   reconstructions.    FINDINGS:  CHEST:  LUNGS AND LARGE AIRWAYS: Mildly degraded by motion artifact. Central   airways are patent. Patchy opacity at the posterior aspect of the right   middle lobe (series 5 image 225), either infectious/inflammatory or   atelectasis. 4.0 cm subpleural bleb at the anterior aspect of the left   upper lobe (series 3 image 13).  PLEURA: Small right pleural effusion with compressive atelectasis in the   right lower lobe.  VESSELS: No pulmonary embolism. Thoracic ureter normal in caliber with   atherosclerotic changes. Ulcerated plaque in the thoracic aorta. Coronary   artery calcifications.  HEART: Heart size is normal. No pericardial effusion.  MEDIASTINUM AND LOLA: No lymphadenopathy.  CHEST WALL AND LOWER NECK: No enlarged axillary lymph nodes.    ABDOMEN AND PELVIS:  LIVER: Cyst in the right hepatic lobe and subcentimeter low-attenuation   lesions, too small to characterize.  BILE DUCTS: Normal caliber.  GALLBLADDER: Cholecystectomy. Small amount of fluid in the gallbladder   fossa. Linear foci of high density in the gallbladder fossa with no   discrete organized collection.  SPLEEN: Within normal limits.  PANCREAS: Atrophic with fatty infiltration.  ADRENALS: Within normal limits.  KIDNEYS/URETERS: No hydronephrosis. Bilateral renal cysts.    BLADDER: Limited evaluation of the pelvis secondary to streak artifact   from a left hip arthroplasty. Felipe catheter in urinary bladder. Air in   urinary bladder compatible with the instrumentation. High-density the   posterior aspect of the urinary bladder (series 3 image 95), either   calculi, calcifications in the wall, or excreted intravenous contrast.  REPRODUCTIVE ORGANS: Enlarged prostate.    BOWEL: Left inguinal hernia containing a normal caliber loop of sigmoid   colon. Ileocolic anastomosis in theright upper quadrant. No dilated   loops of small bowel and dilated colon through the distal transverse   colon with no discrete transition point, likely an ileus. Couple of tiny   droplets of free air which are likely postoperative in etiology (series 2   image 50).  PERITONEUM: Drain via a right anterior approach with the tip in the left   upper quadrant.  VESSELS: Atherosclerotic changes including ulcerated plaque in the   abdominal aorta. Abdominal aorta normal in caliber. Dilated right common   iliac artery measuring 1.5 cm.  RETROPERITONEUM/LYMPH NODES: No lymphadenopathy.  ABDOMINAL WALL: Left inguinal hernia, also described above. Simultaneous   droplet of air in the anterior abdominal wall which can be from   subcutaneous injections.  BONES: Left hip arthroplasty.    IMPRESSION:  No pulmonary embolism.    Small to moderate right pleural effusion.    Small right middle lobe opacity, either infectious/inflammatory or   atelectasis.    Status post cholecystectomy with a small amountof fluid in the   gallbladder fossa which can be postoperative in etiology.    Couple of tiny droplets of free air which are likely postoperative in   etiology.    Drain via a right anterior approach with the tip in the left upper   quadrant.    Dilated loops of small and large bowel with no discrete transition point,   likely an ileus.    Left inguinal hernia containing a normal caliber loop of sigmoid colon.    A preliminary report was provided.        --- End of Report ---            ABRAM PAREDES MD; Attending Radiologist  This document has been electronically signed. Feb 5 2023  1:39PM    < end of copied text >         I&O's Detail    05 Feb 2023 07:01  -  06 Feb 2023 07:00  --------------------------------------------------------  IN:    dextrose 5% + sodium chloride 0.45%: 900 mL  Total IN: 900 mL    OUT:    Bulb (mL): 210 mL    Indwelling Catheter - Urethral (mL): 500 mL    Oral Fluid: 0 mL  Total OUT: 710 mL    Total NET: 190 mL      POD #7 LAP cholecystectomy/perf gallbladder    Pt is resting comfortably in bed. No acute events overnight per RN. no new complaints. no N/V.  +diarrhea neg for cdiff, NPO felipe  in place drain to bulb suction serosanguinous IV ABX IVF awaiting gastrograffin/esophagram study today    Vital Signs Last 24 Hrs  T(C): 36.7 (06 Feb 2023 06:52), Max: 36.7 (05 Feb 2023 12:30)  T(F): 98 (06 Feb 2023 06:52), Max: 98 (05 Feb 2023 12:30)  HR: 82 (06 Feb 2023 06:52) (81 - 88)  BP: 120/63 (06 Feb 2023 06:52) (115/57 - 120/63)  BP(mean): --  RR: 18 (06 Feb 2023 06:52) (18 - 18)  SpO2: 97% (06 Feb 2023 06:52) (96% - 97%)    Parameters below as of 06 Feb 2023 06:52  Patient On (Oxygen Delivery Method): room air        PE: no new changes                        10.0   6.91  )-----------( 201      ( 06 Feb 2023 08:10 )             32.1   02-05    146<H>  |  111<H>  |  14  ----------------------------<  86  3.6   |  28  |  0.66    Ca    7.9<L>      05 Feb 2023 09:20  Phos  2.5     02-05  Mg     2.1     02-05    TPro  5.3<L>  /  Alb  1.8<L>  /  TBili  0.4  /  DBili  0.2  /  AST  27  /  ALT  30  /  AlkPhos  154<H>  02-05    I&O's Detail    05 Feb 2023 07:01  -  06 Feb 2023 07:00  --------------------------------------------------------  IN:    dextrose 5% + sodium chloride 0.45%: 900 mL  Total IN: 900 mL    OUT:    Bulb (mL): 210 mL    Indwelling Catheter - Urethral (mL): 500 mL    Oral Fluid: 0 mL  Total OUT: 710 mL    Total NET: 190 mL    < from: CT Abdomen and Pelvis w/ IV Cont (02.04.23 @ 22:13) >  ACC: [id number] EXAM:  CT ABDOMEN AND PELVIS IC   ORDERED BY: ANASTACIA SUNG     ACC: [id number] EXAM:  CT ANGIO CHEST PULM ART WAWIC   ORDERED BY: ANASTACIA SUNG     PROCEDURE DATE:  02/04/2023          INTERPRETATION:  CLINICAL INFORMATION: New onset A. fib. Evaluate for   perforated gallbladder.    COMPARISON: None.    CONTRAST/COMPLICATIONS:  IV Contrast: Omnipaque 350  90 cc administered   10 cc discarded  Oral Contrast: NONE  Complications: None reported at time of study completion    PROCEDURE:  CT Angiography of the Chest was performed followed by portal venous phase   imaging of the Abdomen and Pelvis.  Sagittal and coronal reformats were performed as well as 3D (MIP)   reconstructions.    FINDINGS:  CHEST:  LUNGS AND LARGE AIRWAYS: Mildly degraded by motion artifact. Central   airways are patent. Patchy opacity at the posterior aspect of the right   middle lobe (series 5 image 225), either infectious/inflammatory or   atelectasis. 4.0 cm subpleural bleb at the anterior aspect of the left   upper lobe (series 3 image 13).  PLEURA: Small right pleural effusion with compressive atelectasis in the   right lower lobe.  VESSELS: No pulmonary embolism. Thoracic ureter normal in caliber with   atherosclerotic changes. Ulcerated plaque in the thoracic aorta. Coronary   artery calcifications.  HEART: Heart size is normal. No pericardial effusion.  MEDIASTINUM AND LOLA: No lymphadenopathy.  CHEST WALL AND LOWER NECK: No enlarged axillary lymph nodes.    ABDOMEN AND PELVIS:  LIVER: Cyst in the right hepatic lobe and subcentimeter low-attenuation   lesions, too small to characterize.  BILE DUCTS: Normal caliber.  GALLBLADDER: Cholecystectomy. Small amount of fluid in the gallbladder   fossa. Linear foci of high density in the gallbladder fossa with no   discrete organized collection.  SPLEEN: Within normal limits.  PANCREAS: Atrophic with fatty infiltration.  ADRENALS: Within normal limits.  KIDNEYS/URETERS: No hydronephrosis. Bilateral renal cysts.    BLADDER: Limited evaluation of the pelvis secondary to streak artifact   from a left hip arthroplasty. Felipe catheter in urinary bladder. Air in   urinary bladder compatible with the instrumentation. High-density the   posterior aspect of the urinary bladder (series 3 image 95), either   calculi, calcifications in the wall, or excreted intravenous contrast.  REPRODUCTIVE ORGANS: Enlarged prostate.    BOWEL: Left inguinal hernia containing a normal caliber loop of sigmoid   colon. Ileocolic anastomosis in theright upper quadrant. No dilated   loops of small bowel and dilated colon through the distal transverse   colon with no discrete transition point, likely an ileus. Couple of tiny   droplets of free air which are likely postoperative in etiology (series 2   image 50).  PERITONEUM: Drain via a right anterior approach with the tip in the left   upper quadrant.  VESSELS: Atherosclerotic changes including ulcerated plaque in the   abdominal aorta. Abdominal aorta normal in caliber. Dilated right common   iliac artery measuring 1.5 cm.  RETROPERITONEUM/LYMPH NODES: No lymphadenopathy.  ABDOMINAL WALL: Left inguinal hernia, also described above. Simultaneous   droplet of air in the anterior abdominal wall which can be from   subcutaneous injections.  BONES: Left hip arthroplasty.    IMPRESSION:  No pulmonary embolism.    Small to moderate right pleural effusion.    Small right middle lobe opacity, either infectious/inflammatory or   atelectasis.    Status post cholecystectomy with a small amountof fluid in the   gallbladder fossa which can be postoperative in etiology.    Couple of tiny droplets of free air which are likely postoperative in   etiology.    Drain via a right anterior approach with the tip in the left upper   quadrant.    Dilated loops of small and large bowel with no discrete transition point,   likely an ileus.    Left inguinal hernia containing a normal caliber loop of sigmoid colon.    A preliminary report was provided.        --- End of Report ---            ABRAM PAREDES MD; Attending Radiologist  This document has been electronically signed. Feb 5 2023  1:39PM    < end of copied text >           POD #7 LAP cholecystectomy/perf gallbladder    Pt is resting comfortably in bed. No acute events overnight per RN. no new complaints. no N/V.  +diarrhea neg for cdiff, NPO felipe  in place drain to bulb suction serosanguinous IV ABX IVF awaiting gastrograffin/esophagram study today    Vital Signs Last 24 Hrs  T(C): 36.7 (06 Feb 2023 06:52), Max: 36.7 (05 Feb 2023 12:30)  T(F): 98 (06 Feb 2023 06:52), Max: 98 (05 Feb 2023 12:30)  HR: 82 (06 Feb 2023 06:52) (81 - 88)  BP: 120/63 (06 Feb 2023 06:52) (115/57 - 120/63)  BP(mean): --  RR: 18 (06 Feb 2023 06:52) (18 - 18)  SpO2: 97% (06 Feb 2023 06:52) (96% - 97%)    Parameters below as of 06 Feb 2023 06:52  Patient On (Oxygen Delivery Method): room air        PE: no new changes                        10.0   6.91  )-----------( 201      ( 06 Feb 2023 08:10 )             32.1   02-05    146<H>  |  111<H>  |  14  ----------------------------<  86  3.6   |  28  |  0.66    Ca    7.9<L>      05 Feb 2023 09:20  Phos  2.5     02-05  Mg     2.1     02-05    TPro  5.3<L>  /  Alb  1.8<L>  /  TBili  0.4  /  DBili  0.2  /  AST  27  /  ALT  30  /  AlkPhos  154<H>  02-05    I&O's Detail    05 Feb 2023 07:01  -  06 Feb 2023 07:00  --------------------------------------------------------  IN:    dextrose 5% + sodium chloride 0.45%: 900 mL  Total IN: 900 mL    OUT:    Bulb (mL): 210 mL    Indwelling Catheter - Urethral (mL): 500 mL    Oral Fluid: 0 mL  Total OUT: 710 mL    Total NET: 190 mL    < from: CT Abdomen and Pelvis w/ IV Cont (02.04.23 @ 22:13) >  ACC: [id number] EXAM:  CT ABDOMEN AND PELVIS IC   ORDERED BY: ANASTACIA SUNG     ACC: [id number] EXAM:  CT ANGIO CHEST PULM ART WAWIC   ORDERED BY: ANASTACIA SUGN     PROCEDURE DATE:  02/04/2023          INTERPRETATION:  CLINICAL INFORMATION: New onset A. fib. Evaluate for   perforated gallbladder.    COMPARISON: None.    CONTRAST/COMPLICATIONS:  IV Contrast: Omnipaque 350  90 cc administered   10 cc discarded  Oral Contrast: NONE  Complications: None reported at time of study completion    PROCEDURE:  CT Angiography of the Chest was performed followed by portal venous phase   imaging of the Abdomen and Pelvis.  Sagittal and coronal reformats were performed as well as 3D (MIP)   reconstructions.    FINDINGS:  CHEST:  LUNGS AND LARGE AIRWAYS: Mildly degraded by motion artifact. Central   airways are patent. Patchy opacity at the posterior aspect of the right   middle lobe (series 5 image 225), either infectious/inflammatory or   atelectasis. 4.0 cm subpleural bleb at the anterior aspect of the left   upper lobe (series 3 image 13).  PLEURA: Small right pleural effusion with compressive atelectasis in the   right lower lobe.  VESSELS: No pulmonary embolism. Thoracic ureter normal in caliber with   atherosclerotic changes. Ulcerated plaque in the thoracic aorta. Coronary   artery calcifications.  HEART: Heart size is normal. No pericardial effusion.  MEDIASTINUM AND LOLA: No lymphadenopathy.  CHEST WALL AND LOWER NECK: No enlarged axillary lymph nodes.    ABDOMEN AND PELVIS:  LIVER: Cyst in the right hepatic lobe and subcentimeter low-attenuation   lesions, too small to characterize.  BILE DUCTS: Normal caliber.  GALLBLADDER: Cholecystectomy. Small amount of fluid in the gallbladder   fossa. Linear foci of high density in the gallbladder fossa with no   discrete organized collection.  SPLEEN: Within normal limits.  PANCREAS: Atrophic with fatty infiltration.  ADRENALS: Within normal limits.  KIDNEYS/URETERS: No hydronephrosis. Bilateral renal cysts.    BLADDER: Limited evaluation of the pelvis secondary to streak artifact   from a left hip arthroplasty. Felipe catheter in urinary bladder. Air in   urinary bladder compatible with the instrumentation. High-density the   posterior aspect of the urinary bladder (series 3 image 95), either   calculi, calcifications in the wall, or excreted intravenous contrast.  REPRODUCTIVE ORGANS: Enlarged prostate.    BOWEL: Left inguinal hernia containing a normal caliber loop of sigmoid   colon. Ileocolic anastomosis in theright upper quadrant. No dilated   loops of small bowel and dilated colon through the distal transverse   colon with no discrete transition point, likely an ileus. Couple of tiny   droplets of free air which are likely postoperative in etiology (series 2   image 50).  PERITONEUM: Drain via a right anterior approach with the tip in the left   upper quadrant.  VESSELS: Atherosclerotic changes including ulcerated plaque in the   abdominal aorta. Abdominal aorta normal in caliber. Dilated right common   iliac artery measuring 1.5 cm.  RETROPERITONEUM/LYMPH NODES: No lymphadenopathy.  ABDOMINAL WALL: Left inguinal hernia, also described above. Simultaneous   droplet of air in the anterior abdominal wall which can be from   subcutaneous injections.  BONES: Left hip arthroplasty.    IMPRESSION:  No pulmonary embolism.    Small to moderate right pleural effusion.    Small right middle lobe opacity, either infectious/inflammatory or   atelectasis.    Status post cholecystectomy with a small amountof fluid in the   gallbladder fossa which can be postoperative in etiology.    Couple of tiny droplets of free air which are likely postoperative in   etiology.    Drain via a right anterior approach with the tip in the left upper   quadrant.    Dilated loops of small and large bowel with no discrete transition point,   likely an ileus.    Left inguinal hernia containing a normal caliber loop of sigmoid colon.    A preliminary report was provided.        --- End of Report ---            ABRAM PAREDES MD; Attending Radiologist  This document has been electronically signed. Feb 5 2023  1:39PM    < end of copied text >           POD #7 LAP cholecystectomy/perf gallbladder    Pt is resting comfortably in bed. No acute events overnight per RN. no new complaints. no N/V.  +diarrhea neg for cdiff, NPO felipe  in place drain to bulb suction serosanguinous IV ABX IVF awaiting gastrograffin/esophagram study today    Vital Signs Last 24 Hrs  T(C): 36.7 (06 Feb 2023 06:52), Max: 36.7 (05 Feb 2023 12:30)  T(F): 98 (06 Feb 2023 06:52), Max: 98 (05 Feb 2023 12:30)  HR: 82 (06 Feb 2023 06:52) (81 - 88)  BP: 120/63 (06 Feb 2023 06:52) (115/57 - 120/63)  BP(mean): --  RR: 18 (06 Feb 2023 06:52) (18 - 18)  SpO2: 97% (06 Feb 2023 06:52) (96% - 97%)    Parameters below as of 06 Feb 2023 06:52  Patient On (Oxygen Delivery Method): room air        PE: no new changes                        10.0   6.91  )-----------( 201      ( 06 Feb 2023 08:10 )             32.1   02-05    146<H>  |  111<H>  |  14  ----------------------------<  86  3.6   |  28  |  0.66    Ca    7.9<L>      05 Feb 2023 09:20  Phos  2.5     02-05  Mg     2.1     02-05    TPro  5.3<L>  /  Alb  1.8<L>  /  TBili  0.4  /  DBili  0.2  /  AST  27  /  ALT  30  /  AlkPhos  154<H>  02-05    I&O's Detail    05 Feb 2023 07:01  -  06 Feb 2023 07:00  --------------------------------------------------------  IN:    dextrose 5% + sodium chloride 0.45%: 900 mL  Total IN: 900 mL    OUT:    Bulb (mL): 210 mL    Indwelling Catheter - Urethral (mL): 500 mL    Oral Fluid: 0 mL  Total OUT: 710 mL    Total NET: 190 mL    < from: CT Abdomen and Pelvis w/ IV Cont (02.04.23 @ 22:13) >  ACC: [id number] EXAM:  CT ABDOMEN AND PELVIS IC   ORDERED BY: ANASTACIA SUNG     ACC: [id number] EXAM:  CT ANGIO CHEST PULM ART WAWIC   ORDERED BY: ANASTACIA SUNG     PROCEDURE DATE:  02/04/2023          INTERPRETATION:  CLINICAL INFORMATION: New onset A. fib. Evaluate for   perforated gallbladder.    COMPARISON: None.    CONTRAST/COMPLICATIONS:  IV Contrast: Omnipaque 350  90 cc administered   10 cc discarded  Oral Contrast: NONE  Complications: None reported at time of study completion    PROCEDURE:  CT Angiography of the Chest was performed followed by portal venous phase   imaging of the Abdomen and Pelvis.  Sagittal and coronal reformats were performed as well as 3D (MIP)   reconstructions.    FINDINGS:  CHEST:  LUNGS AND LARGE AIRWAYS: Mildly degraded by motion artifact. Central   airways are patent. Patchy opacity at the posterior aspect of the right   middle lobe (series 5 image 225), either infectious/inflammatory or   atelectasis. 4.0 cm subpleural bleb at the anterior aspect of the left   upper lobe (series 3 image 13).  PLEURA: Small right pleural effusion with compressive atelectasis in the   right lower lobe.  VESSELS: No pulmonary embolism. Thoracic ureter normal in caliber with   atherosclerotic changes. Ulcerated plaque in the thoracic aorta. Coronary   artery calcifications.  HEART: Heart size is normal. No pericardial effusion.  MEDIASTINUM AND LOLA: No lymphadenopathy.  CHEST WALL AND LOWER NECK: No enlarged axillary lymph nodes.    ABDOMEN AND PELVIS:  LIVER: Cyst in the right hepatic lobe and subcentimeter low-attenuation   lesions, too small to characterize.  BILE DUCTS: Normal caliber.  GALLBLADDER: Cholecystectomy. Small amount of fluid in the gallbladder   fossa. Linear foci of high density in the gallbladder fossa with no   discrete organized collection.  SPLEEN: Within normal limits.  PANCREAS: Atrophic with fatty infiltration.  ADRENALS: Within normal limits.  KIDNEYS/URETERS: No hydronephrosis. Bilateral renal cysts.    BLADDER: Limited evaluation of the pelvis secondary to streak artifact   from a left hip arthroplasty. Felipe catheter in urinary bladder. Air in   urinary bladder compatible with the instrumentation. High-density the   posterior aspect of the urinary bladder (series 3 image 95), either   calculi, calcifications in the wall, or excreted intravenous contrast.  REPRODUCTIVE ORGANS: Enlarged prostate.    BOWEL: Left inguinal hernia containing a normal caliber loop of sigmoid   colon. Ileocolic anastomosis in theright upper quadrant. No dilated   loops of small bowel and dilated colon through the distal transverse   colon with no discrete transition point, likely an ileus. Couple of tiny   droplets of free air which are likely postoperative in etiology (series 2   image 50).  PERITONEUM: Drain via a right anterior approach with the tip in the left   upper quadrant.  VESSELS: Atherosclerotic changes including ulcerated plaque in the   abdominal aorta. Abdominal aorta normal in caliber. Dilated right common   iliac artery measuring 1.5 cm.  RETROPERITONEUM/LYMPH NODES: No lymphadenopathy.  ABDOMINAL WALL: Left inguinal hernia, also described above. Simultaneous   droplet of air in the anterior abdominal wall which can be from   subcutaneous injections.  BONES: Left hip arthroplasty.    IMPRESSION:  No pulmonary embolism.    Small to moderate right pleural effusion.    Small right middle lobe opacity, either infectious/inflammatory or   atelectasis.    Status post cholecystectomy with a small amountof fluid in the   gallbladder fossa which can be postoperative in etiology.    Couple of tiny droplets of free air which are likely postoperative in   etiology.    Drain via a right anterior approach with the tip in the left upper   quadrant.    Dilated loops of small and large bowel with no discrete transition point,   likely an ileus.    Left inguinal hernia containing a normal caliber loop of sigmoid colon.    A preliminary report was provided.        --- End of Report ---            ABRAM PAREDES MD; Attending Radiologist  This document has been electronically signed. Feb 5 2023  1:39PM    < end of copied text >           POD #7 LAP cholecystectomy/perf gallbladder    Pt is resting comfortably in bed. No acute events overnight per RN. no new complaints. no N/V.  +diarrhea neg for cdiff, NPO felipe  in place drain to bulb suction serosanguinous IV ABX IVF awaiting gastrograffin/esophagram study today    Vital Signs Last 24 Hrs  T(C): 36.7 (06 Feb 2023 06:52), Max: 36.7 (05 Feb 2023 12:30)  T(F): 98 (06 Feb 2023 06:52), Max: 98 (05 Feb 2023 12:30)  HR: 82 (06 Feb 2023 06:52) (81 - 88)  BP: 120/63 (06 Feb 2023 06:52) (115/57 - 120/63)  BP(mean): --  RR: 18 (06 Feb 2023 06:52) (18 - 18)  SpO2: 97% (06 Feb 2023 06:52) (96% - 97%)    Parameters below as of 06 Feb 2023 06:52  Patient On (Oxygen Delivery Method): room air        PE: no new changes                        10.0   6.91  )-----------( 201      ( 06 Feb 2023 08:10 )             32.1   02-05    146<H>  |  111<H>  |  14  ----------------------------<  86  3.6   |  28  |  0.66    Ca    7.9<L>      05 Feb 2023 09:20  Phos  2.5     02-05  Mg     2.1     02-05    TPro  5.3<L>  /  Alb  1.8<L>  /  TBili  0.4  /  DBili  0.2  /  AST  27  /  ALT  30  /  AlkPhos  154<H>  02-05    I&O's Detail    05 Feb 2023 07:01  -  06 Feb 2023 07:00  --------------------------------------------------------  IN:    dextrose 5% + sodium chloride 0.45%: 900 mL  Total IN: 900 mL    OUT:    Bulb (mL): 210 mL    Indwelling Catheter - Urethral (mL): 500 mL    Oral Fluid: 0 mL  Total OUT: 710 mL    Total NET: 190 mL    < from: CT Abdomen and Pelvis w/ IV Cont (02.04.23 @ 22:13) >  ACC: [id number] EXAM:  CT ABDOMEN AND PELVIS IC   ORDERED BY: ANASTACIA SUNG     ACC: [id number] EXAM:  CT ANGIO CHEST PULM ART WAWIC   ORDERED BY: ANASTACIA SUNG     PROCEDURE DATE:  02/04/2023          INTERPRETATION:  CLINICAL INFORMATION: New onset A. fib. Evaluate for   perforated gallbladder.    COMPARISON: None.    CONTRAST/COMPLICATIONS:  IV Contrast: Omnipaque 350  90 cc administered   10 cc discarded  Oral Contrast: NONE  Complications: None reported at time of study completion    PROCEDURE:  CT Angiography of the Chest was performed followed by portal venous phase   imaging of the Abdomen and Pelvis.  Sagittal and coronal reformats were performed as well as 3D (MIP)   reconstructions.    FINDINGS:  CHEST:  LUNGS AND LARGE AIRWAYS: Mildly degraded by motion artifact. Central   airways are patent. Patchy opacity at the posterior aspect of the right   middle lobe (series 5 image 225), either infectious/inflammatory or   atelectasis. 4.0 cm subpleural bleb at the anterior aspect of the left   upper lobe (series 3 image 13).  PLEURA: Small right pleural effusion with compressive atelectasis in the   right lower lobe.  VESSELS: No pulmonary embolism. Thoracic ureter normal in caliber with   atherosclerotic changes. Ulcerated plaque in the thoracic aorta. Coronary   artery calcifications.  HEART: Heart size is normal. No pericardial effusion.  MEDIASTINUM AND LOLA: No lymphadenopathy.  CHEST WALL AND LOWER NECK: No enlarged axillary lymph nodes.    ABDOMEN AND PELVIS:  LIVER: Cyst in the right hepatic lobe and subcentimeter low-attenuation   lesions, too small to characterize.  BILE DUCTS: Normal caliber.  GALLBLADDER: Cholecystectomy. Small amount of fluid in the gallbladder   fossa. Linear foci of high density in the gallbladder fossa with no   discrete organized collection.  SPLEEN: Within normal limits.  PANCREAS: Atrophic with fatty infiltration.  ADRENALS: Within normal limits.  KIDNEYS/URETERS: No hydronephrosis. Bilateral renal cysts.    BLADDER: Limited evaluation of the pelvis secondary to streak artifact   from a left hip arthroplasty. Felipe catheter in urinary bladder. Air in   urinary bladder compatible with the instrumentation. High-density the   posterior aspect of the urinary bladder (series 3 image 95), either   calculi, calcifications in the wall, or excreted intravenous contrast.  REPRODUCTIVE ORGANS: Enlarged prostate.    BOWEL: Left inguinal hernia containing a normal caliber loop of sigmoid   colon. Ileocolic anastomosis in theright upper quadrant. No dilated   loops of small bowel and dilated colon through the distal transverse   colon with no discrete transition point, likely an ileus. Couple of tiny   droplets of free air which are likely postoperative in etiology (series 2   image 50).  PERITONEUM: Drain via a right anterior approach with the tip in the left   upper quadrant.  VESSELS: Atherosclerotic changes including ulcerated plaque in the   abdominal aorta. Abdominal aorta normal in caliber. Dilated right common   iliac artery measuring 1.5 cm.  RETROPERITONEUM/LYMPH NODES: No lymphadenopathy.  ABDOMINAL WALL: Left inguinal hernia, also described above. Simultaneous   droplet of air in the anterior abdominal wall which can be from   subcutaneous injections.  BONES: Left hip arthroplasty.    IMPRESSION:  No pulmonary embolism.    Small to moderate right pleural effusion.    Small right middle lobe opacity, either infectious/inflammatory or   atelectasis.    Status post cholecystectomy with a small amountof fluid in the   gallbladder fossa which can be postoperative in etiology.    Couple of tiny droplets of free air which are likely postoperative in   etiology.    Drain via a right anterior approach with the tip in the left upper   quadrant.    Dilated loops of small and large bowel with no discrete transition point,   likely an ileus.    Left inguinal hernia containing a normal caliber loop of sigmoid colon.    A preliminary report was provided.        --- End of Report ---            ABRAM PAREDES MD; Attending Radiologist  This document has been electronically signed. Feb 5 2023  1:39PM    < end of copied text >

## 2023-02-06 NOTE — PROGRESS NOTE ADULT - ASSESSMENT
Physical Examination:  GENERAL:               Alert, Oriented, No acute distress.    HEENT:                     No JVD, Moist MM  PULM:                     Bilateral air entry, diminished to auscultation bilaterally, no significant sputum production, No Rales, No Rhonchi, No Wheezing  CVS:                         S1, S2,  +Murmur  ABD:                        Soft, nondistended, nontender, normoactive bowel sounds, post op incision sites intact  EXT:                         No edema, nontender, No Cyanosis or Clubbing    NEURO:                  Alert, oriented, interactive, nonfocal, follows commands  PSYC:                      Calm, + Insight.       Assessment  1. Hypoxemia  - post op likely due to atelectasis and small pleural effusion   2. Abnormal CT chest with small Right effusion   3. Elevated ddimer - no PE on CTA or LE DVT  4. Cholecystitis s/p cholecystectomy, now with bacteremia and UTI  5. Ex smoker possible COPD underlying     Plan  n/C o2 prn, keep sat > 90%  no evidence of acute COPD exacerbation at this time  OOB, PT  Incentive spirometry  CT reviewed pleural effusion too small to drain, at this time thoracentesis will cause more risk than benefit as effusion small on my review of imaging  monitor CXR in few days to evaluate pleural effusion  IV antibiotics as per ID

## 2023-02-06 NOTE — PROGRESS NOTE ADULT - SUBJECTIVE AND OBJECTIVE BOX
Patient is a 89y old  Male who presents with a chief complaint of abdominal pain (06 Feb 2023 08:46)      24 HOUR EVENTS:  No overnight events reported.     SUBJECTIVE:  Patient seen and examined at bedside. Pt does not offer complaints - he says he continues to have diarrhea.    ALLERGIES:  No Known Allergies    MEDICATIONS  (STANDING):  acetaminophen     Tablet .. 650 milliGRAM(s) Oral every 6 hours  aspirin enteric coated 81 milliGRAM(s) Oral daily  cefTRIAXone   IVPB 1000 milliGRAM(s) IV Intermittent every 24 hours  dextrose 5% + sodium chloride 0.45% with potassium chloride 20 mEq/L 1000 milliLiter(s) (75 mL/Hr) IV Continuous <Continuous>  diatrizoate meglumine/diatrizoate sodium. 30 milliLiter(s) Oral once  enoxaparin Injectable 30 milliGRAM(s) SubCutaneous every 24 hours  finasteride 5 milliGRAM(s) Oral daily  magnesium sulfate  IVPB 2 Gram(s) IV Intermittent once  potassium chloride  10 mEq/100 mL IVPB 10 milliEquivalent(s) IV Intermittent every 1 hour  potassium phosphate IVPB 30 milliMole(s) IV Intermittent once  tamsulosin 0.8 milliGRAM(s) Oral at bedtime    MEDICATIONS  (PRN):  aluminum hydroxide/magnesium hydroxide/simethicone Suspension 30 milliLiter(s) Oral every 4 hours PRN Dyspepsia  melatonin 3 milliGRAM(s) Oral at bedtime PRN Insomnia  ondansetron Injectable 4 milliGRAM(s) IV Push every 8 hours PRN Nausea and/or Vomiting  oxyCODONE    IR 5 milliGRAM(s) Oral every 6 hours PRN Moderate Pain (4 - 6)  zinc oxide 40% Paste 1 Application(s) Topical three times a day PRN rash    Vital Signs Last 24 Hrs  T(F): 98 (06 Feb 2023 06:52), Max: 98 (06 Feb 2023 06:52)  HR: 82 (06 Feb 2023 06:52) (82 - 88)  BP: 120/63 (06 Feb 2023 06:52) (118/74 - 120/63)  RR: 18 (06 Feb 2023 06:52) (18 - 18)  SpO2: 97% (06 Feb 2023 06:52) (97% - 97%)  I&O's Summary    05 Feb 2023 07:01  -  06 Feb 2023 07:00  --------------------------------------------------------  IN: 900 mL / OUT: 710 mL / NET: 190 mL      PHYSICAL EXAM:  General: NAD, Awake and alert, elderly.  ENT: Moist mucous membranes, no thrush  Neck: Supple, No JVD  Lungs: Clear to auscultation bilaterally, good air entry, non-labored breathing  Cardio: RRR, S1/S2, No murmur  Abdomen: Soft, Nontender, Nondistended; Bowel sounds present  Extremities: No calf tenderness, No pitting edema, no contractures.    LABS:                        10.0   6.91  )-----------( 201      ( 06 Feb 2023 08:10 )             32.1     02-06    142  |  110  |  10  ----------------------------<  102  3.0   |  27  |  0.52    Ca    7.5      06 Feb 2023 08:10  Phos  1.6     02-06  Mg     1.7     02-06    TPro  4.9  /  Alb  1.7  /  TBili  0.4  /  DBili  0.1  /  AST  33  /  ALT  26  /  AlkPhos  142  02-06            Lactate, Blood: 1.5 mmol/L (02-04 @ 12:00)          11-23 Chol 160 mg/dL LDL -- HDL 44 mg/dL Trig 95 mg/dL                      Culture - Blood (collected 04 Feb 2023 12:00)  Source: .Blood Blood-Peripheral  Preliminary Report (05 Feb 2023 22:01):    No growth to date.    Culture - Blood (collected 04 Feb 2023 12:00)  Source: .Blood Blood-Peripheral  Preliminary Report (05 Feb 2023 22:01):    No growth to date.    Culture - Urine (collected 30 Jan 2023 12:38)  Source: Catheterized Catheterized  Final Report (01 Feb 2023 19:29):    >100,000 CFU/ml Escherichia coli    >100,000 CFU/ml Aerococcus species    "Aerococcus spp. are predictably susceptible to penicillin,    ampicillin, tetracycline, and vancomycin.  All isolates are    resistant to sulfonamides"  Organism: Escherichia coli (01 Feb 2023 19:29)  Organism: Escherichia coli (01 Feb 2023 19:29)      -  Amikacin: S <=16      -  Amoxicillin/Clavulanic Acid: S <=8/4      -  Ampicillin: S <=8 These ampicillin results predict results for amoxicillin      -  Ampicillin/Sulbactam: S <=4/2 Enterobacter, Klebsiella aerogenes, Citrobacter, and Serratia may develop resistance during prolonged therapy (3-4 days)      -  Aztreonam: S <=4      -  Cefazolin: S <=2 For uncomplicated UTI with K. pneumoniae, E. coli, or P. mirablis: PHOEBE <=16 is sensitive and PHEOBE >=32 is resistant. This also predicts results for oral agents cefaclor, cefdinir, cefpodoxime, cefprozil, cefuroxime axetil, cephalexin and locarbef for uncomplicated UTI. Note that some isolates may be susceptible to these agents while testing resistant to cefazolin.      -  Cefepime: S 8      -  Cefoxitin: S <=8      -  Ceftriaxone: S <=1 Enterobacter, Klebsiella aerogenes, Citrobacter, and Serratia may develop resistance during prolonged therapy      -  Cefuroxime: S <=4      -  Ciprofloxacin: S <=0.25      -  Ertapenem: S <=0.5      -  Gentamicin: S <=2      -  Imipenem: S <=1      -  Levofloxacin: S <=0.5      -  Meropenem: S <=1      -  Nitrofurantoin: S <=32 Should not be used to treat pyelonephritis      -  Piperacillin/Tazobactam: S <=8      -  Tobramycin: S <=2      -  Trimethoprim/Sulfamethoxazole: S <=0.5/9.5      Method Type: PHOEBE      COVID-19 PCR: Melissa (01-30-23 @ 12:25)    RADIOLOGY & ADDITIONAL TESTS:    Care Discussed with Consultants/Other Providers:    Patient is a 89y old  Male who presents with a chief complaint of abdominal pain (06 Feb 2023 08:46)      24 HOUR EVENTS:  No overnight events reported.     SUBJECTIVE:  Patient seen and examined at bedside. Pt does not offer complaints - he says he continues to have diarrhea.    ALLERGIES:  No Known Allergies    MEDICATIONS  (STANDING):  acetaminophen     Tablet .. 650 milliGRAM(s) Oral every 6 hours  aspirin enteric coated 81 milliGRAM(s) Oral daily  cefTRIAXone   IVPB 1000 milliGRAM(s) IV Intermittent every 24 hours  dextrose 5% + sodium chloride 0.45% with potassium chloride 20 mEq/L 1000 milliLiter(s) (75 mL/Hr) IV Continuous <Continuous>  diatrizoate meglumine/diatrizoate sodium. 30 milliLiter(s) Oral once  enoxaparin Injectable 30 milliGRAM(s) SubCutaneous every 24 hours  finasteride 5 milliGRAM(s) Oral daily  magnesium sulfate  IVPB 2 Gram(s) IV Intermittent once  potassium chloride  10 mEq/100 mL IVPB 10 milliEquivalent(s) IV Intermittent every 1 hour  potassium phosphate IVPB 30 milliMole(s) IV Intermittent once  tamsulosin 0.8 milliGRAM(s) Oral at bedtime    MEDICATIONS  (PRN):  aluminum hydroxide/magnesium hydroxide/simethicone Suspension 30 milliLiter(s) Oral every 4 hours PRN Dyspepsia  melatonin 3 milliGRAM(s) Oral at bedtime PRN Insomnia  ondansetron Injectable 4 milliGRAM(s) IV Push every 8 hours PRN Nausea and/or Vomiting  oxyCODONE    IR 5 milliGRAM(s) Oral every 6 hours PRN Moderate Pain (4 - 6)  zinc oxide 40% Paste 1 Application(s) Topical three times a day PRN rash    Vital Signs Last 24 Hrs  T(F): 98 (06 Feb 2023 06:52), Max: 98 (06 Feb 2023 06:52)  HR: 82 (06 Feb 2023 06:52) (82 - 88)  BP: 120/63 (06 Feb 2023 06:52) (118/74 - 120/63)  RR: 18 (06 Feb 2023 06:52) (18 - 18)  SpO2: 97% (06 Feb 2023 06:52) (97% - 97%)  I&O's Summary    05 Feb 2023 07:01  -  06 Feb 2023 07:00  --------------------------------------------------------  IN: 900 mL / OUT: 710 mL / NET: 190 mL      PHYSICAL EXAM:  General: NAD, Awake and alert, elderly.  ENT: Moist mucous membranes, no thrush  Neck: Supple, No JVD  Lungs: Clear to auscultation bilaterally, good air entry, non-labored breathing  Cardio: RRR, S1/S2, No murmur  Abdomen: Soft, Nontender, Nondistended; Bowel sounds present  Extremities: No calf tenderness, No pitting edema, no contractures.    LABS:                        10.0   6.91  )-----------( 201      ( 06 Feb 2023 08:10 )             32.1     02-06    142  |  110  |  10  ----------------------------<  102  3.0   |  27  |  0.52    Ca    7.5      06 Feb 2023 08:10  Phos  1.6     02-06  Mg     1.7     02-06    TPro  4.9  /  Alb  1.7  /  TBili  0.4  /  DBili  0.1  /  AST  33  /  ALT  26  /  AlkPhos  142  02-06            Lactate, Blood: 1.5 mmol/L (02-04 @ 12:00)          11-23 Chol 160 mg/dL LDL -- HDL 44 mg/dL Trig 95 mg/dL                      Culture - Blood (collected 04 Feb 2023 12:00)  Source: .Blood Blood-Peripheral  Preliminary Report (05 Feb 2023 22:01):    No growth to date.    Culture - Blood (collected 04 Feb 2023 12:00)  Source: .Blood Blood-Peripheral  Preliminary Report (05 Feb 2023 22:01):    No growth to date.    Culture - Urine (collected 30 Jan 2023 12:38)  Source: Catheterized Catheterized  Final Report (01 Feb 2023 19:29):    >100,000 CFU/ml Escherichia coli    >100,000 CFU/ml Aerococcus species    "Aerococcus spp. are predictably susceptible to penicillin,    ampicillin, tetracycline, and vancomycin.  All isolates are    resistant to sulfonamides"  Organism: Escherichia coli (01 Feb 2023 19:29)  Organism: Escherichia coli (01 Feb 2023 19:29)      -  Amikacin: S <=16      -  Amoxicillin/Clavulanic Acid: S <=8/4      -  Ampicillin: S <=8 These ampicillin results predict results for amoxicillin      -  Ampicillin/Sulbactam: S <=4/2 Enterobacter, Klebsiella aerogenes, Citrobacter, and Serratia may develop resistance during prolonged therapy (3-4 days)      -  Aztreonam: S <=4      -  Cefazolin: S <=2 For uncomplicated UTI with K. pneumoniae, E. coli, or P. mirablis: PHOEBE <=16 is sensitive and PHOEBE >=32 is resistant. This also predicts results for oral agents cefaclor, cefdinir, cefpodoxime, cefprozil, cefuroxime axetil, cephalexin and locarbef for uncomplicated UTI. Note that some isolates may be susceptible to these agents while testing resistant to cefazolin.      -  Cefepime: S 8      -  Cefoxitin: S <=8      -  Ceftriaxone: S <=1 Enterobacter, Klebsiella aerogenes, Citrobacter, and Serratia may develop resistance during prolonged therapy      -  Cefuroxime: S <=4      -  Ciprofloxacin: S <=0.25      -  Ertapenem: S <=0.5      -  Gentamicin: S <=2      -  Imipenem: S <=1      -  Levofloxacin: S <=0.5      -  Meropenem: S <=1      -  Nitrofurantoin: S <=32 Should not be used to treat pyelonephritis      -  Piperacillin/Tazobactam: S <=8      -  Tobramycin: S <=2      -  Trimethoprim/Sulfamethoxazole: S <=0.5/9.5      Method Type: PHOEBE      COVID-19 PCR: Melissa (01-30-23 @ 12:25)    RADIOLOGY & ADDITIONAL TESTS:    Care Discussed with Consultants/Other Providers:

## 2023-02-07 LAB
ANION GAP SERPL CALC-SCNC: 7 MMOL/L — SIGNIFICANT CHANGE UP (ref 5–17)
BUN SERPL-MCNC: 6 MG/DL — LOW (ref 7–23)
CALCIUM SERPL-MCNC: 7.9 MG/DL — LOW (ref 8.4–10.5)
CHLORIDE SERPL-SCNC: 107 MMOL/L — SIGNIFICANT CHANGE UP (ref 96–108)
CO2 SERPL-SCNC: 25 MMOL/L — SIGNIFICANT CHANGE UP (ref 22–31)
CREAT SERPL-MCNC: 0.55 MG/DL — SIGNIFICANT CHANGE UP (ref 0.5–1.3)
EGFR: 91 ML/MIN/1.73M2 — SIGNIFICANT CHANGE UP
GLUCOSE SERPL-MCNC: 114 MG/DL — HIGH (ref 70–99)
HCT VFR BLD CALC: 38.3 % — LOW (ref 39–50)
HGB BLD-MCNC: 12 G/DL — LOW (ref 13–17)
MAGNESIUM SERPL-MCNC: 2 MG/DL — SIGNIFICANT CHANGE UP (ref 1.6–2.6)
MCHC RBC-ENTMCNC: 29.8 PG — SIGNIFICANT CHANGE UP (ref 27–34)
MCHC RBC-ENTMCNC: 31.3 GM/DL — LOW (ref 32–36)
MCV RBC AUTO: 95 FL — SIGNIFICANT CHANGE UP (ref 80–100)
NRBC # BLD: 0 /100 WBCS — SIGNIFICANT CHANGE UP (ref 0–0)
PHOSPHATE SERPL-MCNC: 2.1 MG/DL — LOW (ref 2.5–4.5)
PLATELET # BLD AUTO: 186 K/UL — SIGNIFICANT CHANGE UP (ref 150–400)
POTASSIUM SERPL-MCNC: 3.8 MMOL/L — SIGNIFICANT CHANGE UP (ref 3.5–5.3)
POTASSIUM SERPL-SCNC: 3.8 MMOL/L — SIGNIFICANT CHANGE UP (ref 3.5–5.3)
RBC # BLD: 4.03 M/UL — LOW (ref 4.2–5.8)
RBC # FLD: 15.4 % — HIGH (ref 10.3–14.5)
SODIUM SERPL-SCNC: 139 MMOL/L — SIGNIFICANT CHANGE UP (ref 135–145)
WBC # BLD: 7.61 K/UL — SIGNIFICANT CHANGE UP (ref 3.8–10.5)
WBC # FLD AUTO: 7.61 K/UL — SIGNIFICANT CHANGE UP (ref 3.8–10.5)

## 2023-02-07 PROCEDURE — 74018 RADEX ABDOMEN 1 VIEW: CPT | Mod: 26

## 2023-02-07 PROCEDURE — 74018 RADEX ABDOMEN 1 VIEW: CPT | Mod: 26,77

## 2023-02-07 PROCEDURE — 99232 SBSQ HOSP IP/OBS MODERATE 35: CPT

## 2023-02-07 RX ORDER — POTASSIUM PHOSPHATE, MONOBASIC POTASSIUM PHOSPHATE, DIBASIC 236; 224 MG/ML; MG/ML
30 INJECTION, SOLUTION INTRAVENOUS ONCE
Refills: 0 | Status: COMPLETED | OUTPATIENT
Start: 2023-02-07 | End: 2023-02-07

## 2023-02-07 RX ADMIN — DEXTROSE MONOHYDRATE, SODIUM CHLORIDE, AND POTASSIUM CHLORIDE 75 MILLILITER(S): 50; .745; 4.5 INJECTION, SOLUTION INTRAVENOUS at 04:22

## 2023-02-07 RX ADMIN — POTASSIUM PHOSPHATE, MONOBASIC POTASSIUM PHOSPHATE, DIBASIC 83.33 MILLIMOLE(S): 236; 224 INJECTION, SOLUTION INTRAVENOUS at 13:24

## 2023-02-07 RX ADMIN — TAMSULOSIN HYDROCHLORIDE 0.8 MILLIGRAM(S): 0.4 CAPSULE ORAL at 21:12

## 2023-02-07 RX ADMIN — Medication 81 MILLIGRAM(S): at 11:28

## 2023-02-07 RX ADMIN — FINASTERIDE 5 MILLIGRAM(S): 5 TABLET, FILM COATED ORAL at 11:28

## 2023-02-07 RX ADMIN — ENOXAPARIN SODIUM 30 MILLIGRAM(S): 100 INJECTION SUBCUTANEOUS at 11:28

## 2023-02-07 RX ADMIN — DEXTROSE MONOHYDRATE, SODIUM CHLORIDE, AND POTASSIUM CHLORIDE 75 MILLILITER(S): 50; .745; 4.5 INJECTION, SOLUTION INTRAVENOUS at 21:12

## 2023-02-07 NOTE — PROGRESS NOTE ADULT - SUBJECTIVE AND OBJECTIVE BOX
CC: f/u for aerococcus bacteremia and gangrenous cholecystitis    Patient reports: he is comfortable, diarrhea is slowing down, still not allowed to eat    REVIEW OF SYSTEMS:  All other review of systems negative (Comprehensive ROS): weak, debilitated    Antimicrobials Day #  :off    Other Medications Reviewed  MEDICATIONS  (STANDING):  acetaminophen     Tablet .. 650 milliGRAM(s) Oral every 6 hours  aspirin enteric coated 81 milliGRAM(s) Oral daily  dextrose 5% + sodium chloride 0.45% with potassium chloride 20 mEq/L 1000 milliLiter(s) (75 mL/Hr) IV Continuous <Continuous>  enoxaparin Injectable 30 milliGRAM(s) SubCutaneous every 24 hours  finasteride 5 milliGRAM(s) Oral daily  potassium phosphate IVPB 30 milliMole(s) IV Intermittent once  tamsulosin 0.8 milliGRAM(s) Oral at bedtime    T(F): 98 (02-07-23 @ 05:05), Max: 98.3 (02-06-23 @ 13:34)  HR: 83 (02-07-23 @ 05:05)  BP: 135/69 (02-07-23 @ 05:05)  RR: 18 (02-07-23 @ 05:05)  SpO2: 94% (02-07-23 @ 05:05)  Wt(kg): --    PHYSICAL EXAM:  General: alert, no acute distress, frail, debilitated  Eyes:  anicteric, no conjunctival injection, no discharge  Oropharynx: no lesions or injection 	  Neck: supple, without adenopathy  Lungs: clear to auscultation  Heart: regular rate and rhythm; no murmur, rubs or gallops  Abdomen: soft, nondistended, nontender, without mass or organomegaly, RUPINDER with serous drainage  Skin: no lesions  Extremities: no clubbing, cyanosis, or edema  Neurologic: alert, oriented, moves all extremities   felipe  LAB RESULTS:                        12.0   7.61  )-----------( 186      ( 07 Feb 2023 06:56 )             38.3     02-07    139  |  107  |  6<L>  ----------------------------<  114<H>  3.8   |  25  |  0.55    Ca    7.9<L>      07 Feb 2023 07:10  Phos  2.1     02-07  Mg     2.0     02-07    TPro  4.9<L>  /  Alb  1.7<L>  /  TBili  0.4  /  DBili  0.1  /  AST  33  /  ALT  26  /  AlkPhos  142<H>  02-06    LIVER FUNCTIONS - ( 06 Feb 2023 08:10 )  Alb: 1.7 g/dL / Pro: 4.9 g/dL / ALK PHOS: 142 U/L / ALT: 26 U/L / AST: 33 U/L / GGT: x             MICROBIOLOGY:  RECENT CULTURES:  02-04 @ 12:00 .Blood Blood-Peripheral     No growth to date.          RADIOLOGY REVIEWED:    < from: US Duplex Venous Lower Ext Complete, Bilateral (02.06.23 @ 14:07) >  IMPRESSION:  No evidence of deep venous thrombosis in either lower extremity.          < end of copied text >  < from: Xray Esophagram Single Contrast (02.06.23 @ 11:29) >    IMPRESSION: Zenker's diverticulum.    --- End of Report ---    < end of copied text >  < from: CT Abdomen and Pelvis w/ IV Cont (02.04.23 @ 22:13) >  IMPRESSION:  No pulmonary embolism.    Small to moderate right pleural effusion.    Small right middle lobe opacity, either infectious/inflammatory or   atelectasis.    Status post cholecystectomy with a small amountof fluid in the   gallbladder fossa which can be postoperative in etiology.    Couple of tiny droplets of free air which are likely postoperative in   etiology.    Drain via a right anterior approach with the tip in the left upper   quadrant.    Dilated loops of small and large bowel with no discrete transition point,   likely an ileus.    Left inguinal hernia containing a normal caliber loop of sigmoid colon.    A preliminary report was provided.    < end of copied text >

## 2023-02-07 NOTE — PROGRESS NOTE ADULT - ASSESSMENT
Patient is a 89y male with a past history of a TIA,BPH,GERD, Fe def anemia, remote colon cancer s/p surgical resection, recent fall with left hip fracture, s/p left hip replacement in November of 2022, who was at a rehab facility when he developed abdominal pain, n/v, elevated LFT's and elevated wbc and was sent to ER on 1/30.His imaging revealed a perforated GB and he was taken to OR. He had laparoscopic E Lap with removal of gangrenous, perforated gallbladder with adhesions to small bowel.He was placed on zosyn in ER and he is growing a GPC in anaerobic bottle of admission blood culture.  He had an irregular rhythm on admission, ? A fib, is now in NSR.He was extubated in RR, is alert, has a felipe and is requesting water.  Zosyn should be adequate coverage for gangrenous cholecystitis.His blood isolate has now been identified as an aerococcus urinae, this is a typical  organism which should be covered by zosyn. his repeat blood cultures are negative so far.Zosyn should provide adequate  coverage of E Coli and aerococcus . The admission CT showed the felipe in the prostatic urethra, ? if this was prior to felipe catheter change in ER.  He has completed 1 week of post op antibiotics to cover both  infection and gallbladder, stopped 2/6- zosyn and CTX.  Post op tachypnea and tachycardia improved .CTA negative for PE.  Suggest:  1. Monitor off antibiotics  2.Diarrhea moderating- CDT negative, likely non infectious.  3.Felipe for chronic restention  4.diet per surgery- issues include post op ileus and swallowing difficulties  5.Suppoertive care per medicine

## 2023-02-07 NOTE — PROGRESS NOTE ADULT - ASSESSMENT
Physical Examination:  GENERAL:               Alert, Oriented, No acute distress.    HEENT:                     No JVD, Moist MM  PULM:                     Bilateral air entry, diminished to auscultation bilaterally, no significant sputum production, No Rales, No Rhonchi, No Wheezing  CVS:                         S1, S2,  +Murmur  ABD:                        Soft, nondistended, nontender, normoactive bowel sounds, post op incision sites intact  EXT:                         No edema, nontender, No Cyanosis or Clubbing    NEURO:                  Alert, oriented, interactive, nonfocal, follows commands  PSYC:                      Calm, + Insight.       Assessment  1. Hypoxemia  - post op likely due to atelectasis and small pleural effusion   2. Abnormal CT chest with small Right effusion   3. Elevated ddimer - no PE on CTA or LE DVT  4. Cholecystitis s/p cholecystectomy, now with bacteremia and UTI  5. Ex smoker possible COPD underlying     Plan  n/C o2 prn, keep sat > 90%  no evidence of acute COPD exacerbation at this time  OOB, PT  Incentive spirometry  CT reviewed pleural effusion too small to drain, at this time thoracentesis will cause more risk than benefit as effusion small on my review of imaging  repeat CXR in am  IV antibiotics as per ID

## 2023-02-07 NOTE — PROGRESS NOTE ADULT - NS ATTEND AMEND GEN_ALL_CORE FT
89y year old Male with PMH of recent L hip fx (11/2022), TIA (11/2022) who presents to the ER from [facility] for abdominal pain, nausea for 4 days. Admitted for acute gangrenous cholecystitis, c/b perforated gallbladder, s/p lap kellee on 1/30, with empyema found. Pt is s/p zosyn, infectious disease consulting.  Small bowel follow through pending - awaiting surgery recs about advancing diet. Speech and swallow eval when cleared to have a diet. 89y year old Male with PMH of recent L hip fx (11/2022), TIA (11/2022) who presents to the ER from [facility] for abdominal pain, nausea for 4 days. Admitted for acute gangrenous cholecystitis, c/b perforated gallbladder, s/p lap kellee on 1/30, with empyema found. Pt is s/p zosyn, infectious disease consulting.  Small bowel follow through pending - awaiting surgery recs about advancing diet. Speech and swallow eval when cleared to have a diet. 89y year old Male with PMH of recent L hip fx (11/2022), TIA (11/2022) who presents to the ER from [facility] for abdominal pain, nausea for 4 days. Admitted for acute gangrenous cholecystitis, c/b perforated gallbladder, s/p lap kellee on 1/30, with empyema found. Pt is s/p zosyn, infectious disease consulting.  Small bowel follow through pending - awaiting surgery recs about advancing diet. Speech and swallow eval when cleared to have a diet.

## 2023-02-07 NOTE — PROGRESS NOTE ADULT - SUBJECTIVE AND OBJECTIVE BOX
Follow-up Critical Care Progress Note  Chief Complaint : Acute cholecystitis      patient seen and eamined  on room air  comfortable  no cp, sob, palp, n/v        Allergies :No Known Allergies      PAST MEDICAL & SURGICAL HISTORY:  History of BPH  Malignant neoplasm of colon  Femur neck fracture  History of glaucoma  GERD (gastroesophageal reflux disease)  Iron deficiency anemia  Hyperlipidemia  History of colon surgery        Medications:  MEDICATIONS  (STANDING):  acetaminophen     Tablet .. 650 milliGRAM(s) Oral every 6 hours  aspirin enteric coated 81 milliGRAM(s) Oral daily  dextrose 5% + sodium chloride 0.45% with potassium chloride 20 mEq/L 1000 milliLiter(s) (75 mL/Hr) IV Continuous <Continuous>  enoxaparin Injectable 30 milliGRAM(s) SubCutaneous every 24 hours  finasteride 5 milliGRAM(s) Oral daily  tamsulosin 0.8 milliGRAM(s) Oral at bedtime    MEDICATIONS  (PRN):  aluminum hydroxide/magnesium hydroxide/simethicone Suspension 30 milliLiter(s) Oral every 4 hours PRN Dyspepsia  melatonin 3 milliGRAM(s) Oral at bedtime PRN Insomnia  ondansetron Injectable 4 milliGRAM(s) IV Push every 8 hours PRN Nausea and/or Vomiting  oxyCODONE    IR 5 milliGRAM(s) Oral every 6 hours PRN Moderate Pain (4 - 6)  zinc oxide 40% Paste 1 Application(s) Topical three times a day PRN rash      Antibiotics History  cefepime   IVPB 1000 milliGRAM(s) IV Intermittent once, 01-30-23 @ 12:53, Stop order after: 1 Doses  cefTRIAXone   IVPB 1000 milliGRAM(s) IV Intermittent every 24 hours, 02-05-23 @ 13:48, Stop order after: 8 Days  metroNIDAZOLE  IVPB 500 milliGRAM(s) IV Intermittent once, 01-30-23 @ 14:31, Stop order after: 1 Doses  piperacillin/tazobactam IVPB. 3.375 Gram(s) IV Intermittent once, 01-30-23 @ 15:30, Stop order after: 1 Doses  piperacillin/tazobactam IVPB.- 3.375 Gram(s) IV Intermittent once, 01-31-23 @ 08:00  piperacillin/tazobactam IVPB.- 3.375 Gram(s) IV Intermittent once, 01-31-23 @ 02:00  piperacillin/tazobactam IVPB.- 3.375 Gram(s) IV Intermittent once, 01-30-23 @ 19:00  piperacillin/tazobactam IVPB.- 3.375 Gram(s) IV Intermittent once, 01-30-23 @ 23:41, Stop order after: 1 Doses  piperacillin/tazobactam IVPB.- 3.375 Gram(s) IV Intermittent once, 01-30-23 @ 23:41, Stop order after: 1 Doses  piperacillin/tazobactam IVPB.. 3.375 Gram(s) IV Intermittent every 8 hours, 01-30-23 @ 23:41, Stop order after: 7 Days  piperacillin/tazobactam IVPB.. 3.375 Gram(s) IV Intermittent every 8 hours, 01-31-23 @ 14:00, Stop order after: 7 Days      Heme Medications   aspirin enteric coated 81 milliGRAM(s) Oral daily, 02-02-23 @ 13:08  enoxaparin Injectable 30 milliGRAM(s) SubCutaneous every 24 hours, 02-02-23 @ 10:12      GI Medications  aluminum hydroxide/magnesium hydroxide/simethicone Suspension 30 milliLiter(s) Oral every 4 hours, 01-30-23 @ 23:41,  PRN      COVID  01-30-23 @ 12:25  COVID -   NotDetec  11-22-22 @ 18:10  COVID -   NotDetec      COVID Biomarkers    02-04-23 @ 12:00 ESR --  ---  CRP --  ---  DDimer  1872<H>   ---   LDH --   ---   Ferritin --         WBC Trend  02-07-23 @ 06:56   -  7.61  02-06-23 @ 08:10   -  6.91  02-05-23 @ 09:20   -  9.16    H/H Trend  02-07-23 @ 06:56   -   12.0<L>/ 38.3<L>  02-06-23 @ 08:10   -   10.0<L>/ 32.1<L>  02-05-23 @ 09:20   -   11.0<L>/ 35.3<L>  02-04-23 @ 08:00   -   11.8<L>/ 36.7<L>  02-03-23 @ 06:00   -   12.4<L>/ 38.6<L>  02-02-23 @ 07:15   -   11.4<L>/ 37.1<L>      Platelet Trend  02-07-23 @ 06:56   -  186  02-06-23 @ 08:10   -  201  02-05-23 @ 09:20   -  188    Trend Sodium  02-07-23 @ 07:10   -  139  02-06-23 @ 08:10   -  142  02-05-23 @ 09:20   -  146<H>  02-04-23 @ 20:20   -  144    Trend Potassium  02-07-23 @ 07:10   -  3.8  02-06-23 @ 08:10   -  3.0<L>  02-05-23 @ 09:20   -  3.6  02-04-23 @ 20:20   -  3.7    Trend Bun/Cr  02-07-23 @ 07:10  BUN/CR -  6<L> / 0.55  02-06-23 @ 08:10  BUN/CR -  10 / 0.52  02-05-23 @ 09:20  BUN/CR -  14 / 0.66  02-04-23 @ 20:20  BUN/CR -  17 / 0.81    Lactic Acid Trend  02-04-23 @ 12:00   -   1.5  01-30-23 @ 17:21   -   1.0  01-30-23 @ 12:27   -   2.5<H>    ABG Trend    Trend AST/ALT/ALK Phos/Bili  02-06-23 @ 08:10   33/26/142<H>/0.4  02-05-23 @ 09:20   27/30/154<H>/0.4  02-04-23 @ 12:00   28/39/168<H>/0.6  02-04-23 @ 07:40   31/35/174<H>/0.6     Amylase / Lipase Trend  01-30-23 @ 12:27   -   -- / 30<L>      Albumin Trend  02-06-23 @ 08:10   -   1.7<L>  02-05-23 @ 09:20   -   1.8<L>  02-04-23 @ 12:00   -   2.0<L>  02-04-23 @ 07:40   -   1.9<L>  02-03-23 @ 06:00   -   2.0<L>  02-02-23 @ 07:15   -   1.8<L>      PTT - PT - INR Trend  01-30-23 @ 15:10   -   23.8<L> - 15.2<H> - 1.31<H>  11-22-22 @ 17:55   -   28.4 - 13.5<H> - 1.16    Glucose Trend  02-07-23 @ 07:10   -  -- 114<H> --  02-06-23 @ 08:10   -  -- 102<H> --  02-05-23 @ 09:20   -  -- 86 --  02-04-23 @ 20:20   -  -- 105<H> --    A1C with Estimated Average Glucose Result: 4.8 % [4.0 - 5.6] (11-23-22 @ 06:15)      LABS:                        12.0   7.61  )-----------( 186      ( 07 Feb 2023 06:56 )             38.3     02-07    139  |  107  |  6<L>  ----------------------------<  114<H>  3.8   |  25  |  0.55    Ca    7.9<L>      07 Feb 2023 07:10  Phos  2.1     02-07  Mg     2.0     02-07    TPro  4.9<L>  /  Alb  1.7<L>  /  TBili  0.4  /  DBili  0.1  /  AST  33  /  ALT  26  /  AlkPhos  142<H>  02-06       CULTURES: (if applicable)    Culture - Blood (collected 02-04-23 @ 12:00)  Source: .Blood Blood-Peripheral  Preliminary Report (02-05-23 @ 22:01):    No growth to date.    Culture - Blood (collected 02-04-23 @ 12:00)  Source: .Blood Blood-Peripheral  Preliminary Report (02-05-23 @ 22:01):    No growth to date.    Culture - Urine (collected 01-30-23 @ 12:38)  Source: Catheterized Catheterized  Final Report (02-01-23 @ 19:29):    >100,000 CFU/ml Escherichia coli    >100,000 CFU/ml Aerococcus species    "Aerococcus spp. are predictably susceptible to penicillin,    ampicillin, tetracycline, and vancomycin.  All isolates are    resistant to sulfonamides"  Organism: Escherichia coli (02-01-23 @ 19:29)  Organism: Escherichia coli (02-01-23 @ 19:29)      -  Amikacin: S <=16      -  Amoxicillin/Clavulanic Acid: S <=8/4      -  Ampicillin: S <=8 These ampicillin results predict results for amoxicillin      -  Ampicillin/Sulbactam: S <=4/2 Enterobacter, Klebsiella aerogenes, Citrobacter, and Serratia may develop resistance during prolonged therapy (3-4 days)      -  Aztreonam: S <=4      -  Cefazolin: S <=2 For uncomplicated UTI with K. pneumoniae, E. coli, or P. mirablis: PHOEBE <=16 is sensitive and PHOEBE >=32 is resistant. This also predicts results for oral agents cefaclor, cefdinir, cefpodoxime, cefprozil, cefuroxime axetil, cephalexin and locarbef for uncomplicated UTI. Note that some isolates may be susceptible to these agents while testing resistant to cefazolin.      -  Cefepime: S 8      -  Cefoxitin: S <=8      -  Ceftriaxone: S <=1 Enterobacter, Klebsiella aerogenes, Citrobacter, and Serratia may develop resistance during prolonged therapy      -  Cefuroxime: S <=4      -  Ciprofloxacin: S <=0.25      -  Ertapenem: S <=0.5      -  Gentamicin: S <=2      -  Imipenem: S <=1      -  Levofloxacin: S <=0.5      -  Meropenem: S <=1      -  Nitrofurantoin: S <=32 Should not be used to treat pyelonephritis      -  Piperacillin/Tazobactam: S <=8      -  Tobramycin: S <=2      -  Trimethoprim/Sulfamethoxazole: S <=0.5/9.5      Method Type: PHOEBE    Culture - Blood (collected 01-30-23 @ 12:27)  Source: .Blood Blood-Peripheral  Final Report (02-04-23 @ 15:08):    No Growth Final    Culture - Blood (collected 01-30-23 @ 12:10)  Source: .Blood Blood-Peripheral  Gram Stain (01-31-23 @ 15:34):    Growth in anaerobic bottle: Gram positive cocci in pairs  Final Report (02-01-23 @ 13:38):    Growth in anaerobic bottle: Aerococcus urinae    "Susceptibilities not performed"    ***Blood Panel PCR results on this specimen are available    approximately 3 hours after the Gram stain result.***    Gram stain, PCR, and/or culture results may not always    correspond due to difference in methodologies.    ************************************************************    This PCR assay was performed by multiplex PCR. This    Assay tests for 66 bacterial and resistance gene targets.    Please refer to the [facility] test directory    at [email or web address] for details.  Organism: Blood Culture PCR (02-01-23 @ 13:38)  Organism: Blood Culture PCR (02-01-23 @ 13:38)      -  Blood PCR Panel: NEG      Method Type: PCR            VITALS:  T(C): 36.3 (02-07-23 @ 13:09), Max: 36.7 (02-07-23 @ 05:05)  T(F): 97.4 (02-07-23 @ 13:09), Max: 98 (02-07-23 @ 05:05)  HR: 94 (02-07-23 @ 13:09) (78 - 94)  BP: 120/77 (02-07-23 @ 13:09) (120/77 - 135/69)  BP(mean): 91 (02-07-23 @ 13:09) (91 - 91)  ABP: --  ABP(mean): --  RR: 18 (02-07-23 @ 13:09) (18 - 19)  SpO2: 95% (02-07-23 @ 13:09) (94% - 95%)  CVP(mm Hg): --  CVP(cm H2O): --    Ins and Outs     02-06-23 @ 07:01  -  02-07-23 @ 07:00  --------------------------------------------------------  IN: 1275 mL / OUT: 850 mL / NET: 425 mL                I&O's Detail    06 Feb 2023 07:01  -  07 Feb 2023 07:00  --------------------------------------------------------  IN:    dextrose 5%: 75 mL    dextrose 5% + sodium chloride 0.45% w/ Additives: 1200 mL  Total IN: 1275 mL    OUT:    Bulb (mL): 400 mL    Indwelling Catheter - Urethral (mL): 450 mL  Total OUT: 850 mL    Total NET: 425 mL                  Follow-up Critical Care Progress Note  Chief Complaint : Acute cholecystitis      patient seen and eamined  on room air  comfortable  no cp, sob, palp, n/v        Allergies :No Known Allergies      PAST MEDICAL & SURGICAL HISTORY:  History of BPH  Malignant neoplasm of colon  Femur neck fracture  History of glaucoma  GERD (gastroesophageal reflux disease)  Iron deficiency anemia  Hyperlipidemia  History of colon surgery        Medications:  MEDICATIONS  (STANDING):  acetaminophen     Tablet .. 650 milliGRAM(s) Oral every 6 hours  aspirin enteric coated 81 milliGRAM(s) Oral daily  dextrose 5% + sodium chloride 0.45% with potassium chloride 20 mEq/L 1000 milliLiter(s) (75 mL/Hr) IV Continuous <Continuous>  enoxaparin Injectable 30 milliGRAM(s) SubCutaneous every 24 hours  finasteride 5 milliGRAM(s) Oral daily  tamsulosin 0.8 milliGRAM(s) Oral at bedtime    MEDICATIONS  (PRN):  aluminum hydroxide/magnesium hydroxide/simethicone Suspension 30 milliLiter(s) Oral every 4 hours PRN Dyspepsia  melatonin 3 milliGRAM(s) Oral at bedtime PRN Insomnia  ondansetron Injectable 4 milliGRAM(s) IV Push every 8 hours PRN Nausea and/or Vomiting  oxyCODONE    IR 5 milliGRAM(s) Oral every 6 hours PRN Moderate Pain (4 - 6)  zinc oxide 40% Paste 1 Application(s) Topical three times a day PRN rash      Antibiotics History  cefepime   IVPB 1000 milliGRAM(s) IV Intermittent once, 01-30-23 @ 12:53, Stop order after: 1 Doses  cefTRIAXone   IVPB 1000 milliGRAM(s) IV Intermittent every 24 hours, 02-05-23 @ 13:48, Stop order after: 8 Days  metroNIDAZOLE  IVPB 500 milliGRAM(s) IV Intermittent once, 01-30-23 @ 14:31, Stop order after: 1 Doses  piperacillin/tazobactam IVPB. 3.375 Gram(s) IV Intermittent once, 01-30-23 @ 15:30, Stop order after: 1 Doses  piperacillin/tazobactam IVPB.- 3.375 Gram(s) IV Intermittent once, 01-31-23 @ 08:00  piperacillin/tazobactam IVPB.- 3.375 Gram(s) IV Intermittent once, 01-31-23 @ 02:00  piperacillin/tazobactam IVPB.- 3.375 Gram(s) IV Intermittent once, 01-30-23 @ 19:00  piperacillin/tazobactam IVPB.- 3.375 Gram(s) IV Intermittent once, 01-30-23 @ 23:41, Stop order after: 1 Doses  piperacillin/tazobactam IVPB.- 3.375 Gram(s) IV Intermittent once, 01-30-23 @ 23:41, Stop order after: 1 Doses  piperacillin/tazobactam IVPB.. 3.375 Gram(s) IV Intermittent every 8 hours, 01-30-23 @ 23:41, Stop order after: 7 Days  piperacillin/tazobactam IVPB.. 3.375 Gram(s) IV Intermittent every 8 hours, 01-31-23 @ 14:00, Stop order after: 7 Days      Heme Medications   aspirin enteric coated 81 milliGRAM(s) Oral daily, 02-02-23 @ 13:08  enoxaparin Injectable 30 milliGRAM(s) SubCutaneous every 24 hours, 02-02-23 @ 10:12      GI Medications  aluminum hydroxide/magnesium hydroxide/simethicone Suspension 30 milliLiter(s) Oral every 4 hours, 01-30-23 @ 23:41,  PRN      COVID  01-30-23 @ 12:25  COVID -   NotDetec  11-22-22 @ 18:10  COVID -   NotDetec      COVID Biomarkers    02-04-23 @ 12:00 ESR --  ---  CRP --  ---  DDimer  1872<H>   ---   LDH --   ---   Ferritin --         WBC Trend  02-07-23 @ 06:56   -  7.61  02-06-23 @ 08:10   -  6.91  02-05-23 @ 09:20   -  9.16    H/H Trend  02-07-23 @ 06:56   -   12.0<L>/ 38.3<L>  02-06-23 @ 08:10   -   10.0<L>/ 32.1<L>  02-05-23 @ 09:20   -   11.0<L>/ 35.3<L>  02-04-23 @ 08:00   -   11.8<L>/ 36.7<L>  02-03-23 @ 06:00   -   12.4<L>/ 38.6<L>  02-02-23 @ 07:15   -   11.4<L>/ 37.1<L>      Platelet Trend  02-07-23 @ 06:56   -  186  02-06-23 @ 08:10   -  201  02-05-23 @ 09:20   -  188    Trend Sodium  02-07-23 @ 07:10   -  139  02-06-23 @ 08:10   -  142  02-05-23 @ 09:20   -  146<H>  02-04-23 @ 20:20   -  144    Trend Potassium  02-07-23 @ 07:10   -  3.8  02-06-23 @ 08:10   -  3.0<L>  02-05-23 @ 09:20   -  3.6  02-04-23 @ 20:20   -  3.7    Trend Bun/Cr  02-07-23 @ 07:10  BUN/CR -  6<L> / 0.55  02-06-23 @ 08:10  BUN/CR -  10 / 0.52  02-05-23 @ 09:20  BUN/CR -  14 / 0.66  02-04-23 @ 20:20  BUN/CR -  17 / 0.81    Lactic Acid Trend  02-04-23 @ 12:00   -   1.5  01-30-23 @ 17:21   -   1.0  01-30-23 @ 12:27   -   2.5<H>    ABG Trend    Trend AST/ALT/ALK Phos/Bili  02-06-23 @ 08:10   33/26/142<H>/0.4  02-05-23 @ 09:20   27/30/154<H>/0.4  02-04-23 @ 12:00   28/39/168<H>/0.6  02-04-23 @ 07:40   31/35/174<H>/0.6     Amylase / Lipase Trend  01-30-23 @ 12:27   -   -- / 30<L>      Albumin Trend  02-06-23 @ 08:10   -   1.7<L>  02-05-23 @ 09:20   -   1.8<L>  02-04-23 @ 12:00   -   2.0<L>  02-04-23 @ 07:40   -   1.9<L>  02-03-23 @ 06:00   -   2.0<L>  02-02-23 @ 07:15   -   1.8<L>      PTT - PT - INR Trend  01-30-23 @ 15:10   -   23.8<L> - 15.2<H> - 1.31<H>  11-22-22 @ 17:55   -   28.4 - 13.5<H> - 1.16    Glucose Trend  02-07-23 @ 07:10   -  -- 114<H> --  02-06-23 @ 08:10   -  -- 102<H> --  02-05-23 @ 09:20   -  -- 86 --  02-04-23 @ 20:20   -  -- 105<H> --    A1C with Estimated Average Glucose Result: 4.8 % [4.0 - 5.6] (11-23-22 @ 06:15)      LABS:                        12.0   7.61  )-----------( 186      ( 07 Feb 2023 06:56 )             38.3     02-07    139  |  107  |  6<L>  ----------------------------<  114<H>  3.8   |  25  |  0.55    Ca    7.9<L>      07 Feb 2023 07:10  Phos  2.1     02-07  Mg     2.0     02-07    TPro  4.9<L>  /  Alb  1.7<L>  /  TBili  0.4  /  DBili  0.1  /  AST  33  /  ALT  26  /  AlkPhos  142<H>  02-06       CULTURES: (if applicable)    Culture - Blood (collected 02-04-23 @ 12:00)  Source: .Blood Blood-Peripheral  Preliminary Report (02-05-23 @ 22:01):    No growth to date.    Culture - Blood (collected 02-04-23 @ 12:00)  Source: .Blood Blood-Peripheral  Preliminary Report (02-05-23 @ 22:01):    No growth to date.    Culture - Urine (collected 01-30-23 @ 12:38)  Source: Catheterized Catheterized  Final Report (02-01-23 @ 19:29):    >100,000 CFU/ml Escherichia coli    >100,000 CFU/ml Aerococcus species    "Aerococcus spp. are predictably susceptible to penicillin,    ampicillin, tetracycline, and vancomycin.  All isolates are    resistant to sulfonamides"  Organism: Escherichia coli (02-01-23 @ 19:29)  Organism: Escherichia coli (02-01-23 @ 19:29)      -  Amikacin: S <=16      -  Amoxicillin/Clavulanic Acid: S <=8/4      -  Ampicillin: S <=8 These ampicillin results predict results for amoxicillin      -  Ampicillin/Sulbactam: S <=4/2 Enterobacter, Klebsiella aerogenes, Citrobacter, and Serratia may develop resistance during prolonged therapy (3-4 days)      -  Aztreonam: S <=4      -  Cefazolin: S <=2 For uncomplicated UTI with K. pneumoniae, E. coli, or P. mirablis: PHOEBE <=16 is sensitive and PHOEBE >=32 is resistant. This also predicts results for oral agents cefaclor, cefdinir, cefpodoxime, cefprozil, cefuroxime axetil, cephalexin and locarbef for uncomplicated UTI. Note that some isolates may be susceptible to these agents while testing resistant to cefazolin.      -  Cefepime: S 8      -  Cefoxitin: S <=8      -  Ceftriaxone: S <=1 Enterobacter, Klebsiella aerogenes, Citrobacter, and Serratia may develop resistance during prolonged therapy      -  Cefuroxime: S <=4      -  Ciprofloxacin: S <=0.25      -  Ertapenem: S <=0.5      -  Gentamicin: S <=2      -  Imipenem: S <=1      -  Levofloxacin: S <=0.5      -  Meropenem: S <=1      -  Nitrofurantoin: S <=32 Should not be used to treat pyelonephritis      -  Piperacillin/Tazobactam: S <=8      -  Tobramycin: S <=2      -  Trimethoprim/Sulfamethoxazole: S <=0.5/9.5      Method Type: PHOEBE    Culture - Blood (collected 01-30-23 @ 12:27)  Source: .Blood Blood-Peripheral  Final Report (02-04-23 @ 15:08):    No Growth Final    Culture - Blood (collected 01-30-23 @ 12:10)  Source: .Blood Blood-Peripheral  Gram Stain (01-31-23 @ 15:34):    Growth in anaerobic bottle: Gram positive cocci in pairs  Final Report (02-01-23 @ 13:38):    Growth in anaerobic bottle: Aerococcus urinae    "Susceptibilities not performed"    ***Blood Panel PCR results on this specimen are available    approximately 3 hours after the Gram stain result.***    Gram stain, PCR, and/or culture results may not always    correspond due to difference in methodologies.    ************************************************************    This PCR assay was performed by multiplex PCR. This    Assay tests for 66 bacterial and resistance gene targets.    Please refer to the [facility] test directory    at [email or web address] for details.  Organism: Blood Culture PCR (02-01-23 @ 13:38)  Organism: Blood Culture PCR (02-01-23 @ 13:38)      -  Blood PCR Panel: NEG      Method Type: PCR            VITALS:  T(C): 36.3 (02-07-23 @ 13:09), Max: 36.7 (02-07-23 @ 05:05)  T(F): 97.4 (02-07-23 @ 13:09), Max: 98 (02-07-23 @ 05:05)  HR: 94 (02-07-23 @ 13:09) (78 - 94)  BP: 120/77 (02-07-23 @ 13:09) (120/77 - 135/69)  BP(mean): 91 (02-07-23 @ 13:09) (91 - 91)  ABP: --  ABP(mean): --  RR: 18 (02-07-23 @ 13:09) (18 - 19)  SpO2: 95% (02-07-23 @ 13:09) (94% - 95%)  CVP(mm Hg): --  CVP(cm H2O): --    Ins and Outs     02-06-23 @ 07:01  -  02-07-23 @ 07:00  --------------------------------------------------------  IN: 1275 mL / OUT: 850 mL / NET: 425 mL                I&O's Detail    06 Feb 2023 07:01  -  07 Feb 2023 07:00  --------------------------------------------------------  IN:    dextrose 5%: 75 mL    dextrose 5% + sodium chloride 0.45% w/ Additives: 1200 mL  Total IN: 1275 mL    OUT:    Bulb (mL): 400 mL    Indwelling Catheter - Urethral (mL): 450 mL  Total OUT: 850 mL    Total NET: 425 mL                  Follow-up Critical Care Progress Note  Chief Complaint : Acute cholecystitis      patient seen and eamined  on room air  comfortable  no cp, sob, palp, n/v        Allergies :No Known Allergies      PAST MEDICAL & SURGICAL HISTORY:  History of BPH  Malignant neoplasm of colon  Femur neck fracture  History of glaucoma  GERD (gastroesophageal reflux disease)  Iron deficiency anemia  Hyperlipidemia  History of colon surgery        Medications:  MEDICATIONS  (STANDING):  acetaminophen     Tablet .. 650 milliGRAM(s) Oral every 6 hours  aspirin enteric coated 81 milliGRAM(s) Oral daily  dextrose 5% + sodium chloride 0.45% with potassium chloride 20 mEq/L 1000 milliLiter(s) (75 mL/Hr) IV Continuous <Continuous>  enoxaparin Injectable 30 milliGRAM(s) SubCutaneous every 24 hours  finasteride 5 milliGRAM(s) Oral daily  tamsulosin 0.8 milliGRAM(s) Oral at bedtime    MEDICATIONS  (PRN):  aluminum hydroxide/magnesium hydroxide/simethicone Suspension 30 milliLiter(s) Oral every 4 hours PRN Dyspepsia  melatonin 3 milliGRAM(s) Oral at bedtime PRN Insomnia  ondansetron Injectable 4 milliGRAM(s) IV Push every 8 hours PRN Nausea and/or Vomiting  oxyCODONE    IR 5 milliGRAM(s) Oral every 6 hours PRN Moderate Pain (4 - 6)  zinc oxide 40% Paste 1 Application(s) Topical three times a day PRN rash      Antibiotics History  cefepime   IVPB 1000 milliGRAM(s) IV Intermittent once, 01-30-23 @ 12:53, Stop order after: 1 Doses  cefTRIAXone   IVPB 1000 milliGRAM(s) IV Intermittent every 24 hours, 02-05-23 @ 13:48, Stop order after: 8 Days  metroNIDAZOLE  IVPB 500 milliGRAM(s) IV Intermittent once, 01-30-23 @ 14:31, Stop order after: 1 Doses  piperacillin/tazobactam IVPB. 3.375 Gram(s) IV Intermittent once, 01-30-23 @ 15:30, Stop order after: 1 Doses  piperacillin/tazobactam IVPB.- 3.375 Gram(s) IV Intermittent once, 01-31-23 @ 08:00  piperacillin/tazobactam IVPB.- 3.375 Gram(s) IV Intermittent once, 01-31-23 @ 02:00  piperacillin/tazobactam IVPB.- 3.375 Gram(s) IV Intermittent once, 01-30-23 @ 19:00  piperacillin/tazobactam IVPB.- 3.375 Gram(s) IV Intermittent once, 01-30-23 @ 23:41, Stop order after: 1 Doses  piperacillin/tazobactam IVPB.- 3.375 Gram(s) IV Intermittent once, 01-30-23 @ 23:41, Stop order after: 1 Doses  piperacillin/tazobactam IVPB.. 3.375 Gram(s) IV Intermittent every 8 hours, 01-30-23 @ 23:41, Stop order after: 7 Days  piperacillin/tazobactam IVPB.. 3.375 Gram(s) IV Intermittent every 8 hours, 01-31-23 @ 14:00, Stop order after: 7 Days      Heme Medications   aspirin enteric coated 81 milliGRAM(s) Oral daily, 02-02-23 @ 13:08  enoxaparin Injectable 30 milliGRAM(s) SubCutaneous every 24 hours, 02-02-23 @ 10:12      GI Medications  aluminum hydroxide/magnesium hydroxide/simethicone Suspension 30 milliLiter(s) Oral every 4 hours, 01-30-23 @ 23:41,  PRN      COVID  01-30-23 @ 12:25  COVID -   NotDetec  11-22-22 @ 18:10  COVID -   NotDetec      COVID Biomarkers    02-04-23 @ 12:00 ESR --  ---  CRP --  ---  DDimer  1872<H>   ---   LDH --   ---   Ferritin --         WBC Trend  02-07-23 @ 06:56   -  7.61  02-06-23 @ 08:10   -  6.91  02-05-23 @ 09:20   -  9.16    H/H Trend  02-07-23 @ 06:56   -   12.0<L>/ 38.3<L>  02-06-23 @ 08:10   -   10.0<L>/ 32.1<L>  02-05-23 @ 09:20   -   11.0<L>/ 35.3<L>  02-04-23 @ 08:00   -   11.8<L>/ 36.7<L>  02-03-23 @ 06:00   -   12.4<L>/ 38.6<L>  02-02-23 @ 07:15   -   11.4<L>/ 37.1<L>      Platelet Trend  02-07-23 @ 06:56   -  186  02-06-23 @ 08:10   -  201  02-05-23 @ 09:20   -  188    Trend Sodium  02-07-23 @ 07:10   -  139  02-06-23 @ 08:10   -  142  02-05-23 @ 09:20   -  146<H>  02-04-23 @ 20:20   -  144    Trend Potassium  02-07-23 @ 07:10   -  3.8  02-06-23 @ 08:10   -  3.0<L>  02-05-23 @ 09:20   -  3.6  02-04-23 @ 20:20   -  3.7    Trend Bun/Cr  02-07-23 @ 07:10  BUN/CR -  6<L> / 0.55  02-06-23 @ 08:10  BUN/CR -  10 / 0.52  02-05-23 @ 09:20  BUN/CR -  14 / 0.66  02-04-23 @ 20:20  BUN/CR -  17 / 0.81    Lactic Acid Trend  02-04-23 @ 12:00   -   1.5  01-30-23 @ 17:21   -   1.0  01-30-23 @ 12:27   -   2.5<H>    ABG Trend    Trend AST/ALT/ALK Phos/Bili  02-06-23 @ 08:10   33/26/142<H>/0.4  02-05-23 @ 09:20   27/30/154<H>/0.4  02-04-23 @ 12:00   28/39/168<H>/0.6  02-04-23 @ 07:40   31/35/174<H>/0.6     Amylase / Lipase Trend  01-30-23 @ 12:27   -   -- / 30<L>      Albumin Trend  02-06-23 @ 08:10   -   1.7<L>  02-05-23 @ 09:20   -   1.8<L>  02-04-23 @ 12:00   -   2.0<L>  02-04-23 @ 07:40   -   1.9<L>  02-03-23 @ 06:00   -   2.0<L>  02-02-23 @ 07:15   -   1.8<L>      PTT - PT - INR Trend  01-30-23 @ 15:10   -   23.8<L> - 15.2<H> - 1.31<H>  11-22-22 @ 17:55   -   28.4 - 13.5<H> - 1.16    Glucose Trend  02-07-23 @ 07:10   -  -- 114<H> --  02-06-23 @ 08:10   -  -- 102<H> --  02-05-23 @ 09:20   -  -- 86 --  02-04-23 @ 20:20   -  -- 105<H> --    A1C with Estimated Average Glucose Result: 4.8 % [4.0 - 5.6] (11-23-22 @ 06:15)      LABS:                        12.0   7.61  )-----------( 186      ( 07 Feb 2023 06:56 )             38.3     02-07    139  |  107  |  6<L>  ----------------------------<  114<H>  3.8   |  25  |  0.55    Ca    7.9<L>      07 Feb 2023 07:10  Phos  2.1     02-07  Mg     2.0     02-07    TPro  4.9<L>  /  Alb  1.7<L>  /  TBili  0.4  /  DBili  0.1  /  AST  33  /  ALT  26  /  AlkPhos  142<H>  02-06       CULTURES: (if applicable)    Culture - Blood (collected 02-04-23 @ 12:00)  Source: .Blood Blood-Peripheral  Preliminary Report (02-05-23 @ 22:01):    No growth to date.    Culture - Blood (collected 02-04-23 @ 12:00)  Source: .Blood Blood-Peripheral  Preliminary Report (02-05-23 @ 22:01):    No growth to date.    Culture - Urine (collected 01-30-23 @ 12:38)  Source: Catheterized Catheterized  Final Report (02-01-23 @ 19:29):    >100,000 CFU/ml Escherichia coli    >100,000 CFU/ml Aerococcus species    "Aerococcus spp. are predictably susceptible to penicillin,    ampicillin, tetracycline, and vancomycin.  All isolates are    resistant to sulfonamides"  Organism: Escherichia coli (02-01-23 @ 19:29)  Organism: Escherichia coli (02-01-23 @ 19:29)      -  Amikacin: S <=16      -  Amoxicillin/Clavulanic Acid: S <=8/4      -  Ampicillin: S <=8 These ampicillin results predict results for amoxicillin      -  Ampicillin/Sulbactam: S <=4/2 Enterobacter, Klebsiella aerogenes, Citrobacter, and Serratia may develop resistance during prolonged therapy (3-4 days)      -  Aztreonam: S <=4      -  Cefazolin: S <=2 For uncomplicated UTI with K. pneumoniae, E. coli, or P. mirablis: PHOEBE <=16 is sensitive and PHOEBE >=32 is resistant. This also predicts results for oral agents cefaclor, cefdinir, cefpodoxime, cefprozil, cefuroxime axetil, cephalexin and locarbef for uncomplicated UTI. Note that some isolates may be susceptible to these agents while testing resistant to cefazolin.      -  Cefepime: S 8      -  Cefoxitin: S <=8      -  Ceftriaxone: S <=1 Enterobacter, Klebsiella aerogenes, Citrobacter, and Serratia may develop resistance during prolonged therapy      -  Cefuroxime: S <=4      -  Ciprofloxacin: S <=0.25      -  Ertapenem: S <=0.5      -  Gentamicin: S <=2      -  Imipenem: S <=1      -  Levofloxacin: S <=0.5      -  Meropenem: S <=1      -  Nitrofurantoin: S <=32 Should not be used to treat pyelonephritis      -  Piperacillin/Tazobactam: S <=8      -  Tobramycin: S <=2      -  Trimethoprim/Sulfamethoxazole: S <=0.5/9.5      Method Type: PHOEBE    Culture - Blood (collected 01-30-23 @ 12:27)  Source: .Blood Blood-Peripheral  Final Report (02-04-23 @ 15:08):    No Growth Final    Culture - Blood (collected 01-30-23 @ 12:10)  Source: .Blood Blood-Peripheral  Gram Stain (01-31-23 @ 15:34):    Growth in anaerobic bottle: Gram positive cocci in pairs  Final Report (02-01-23 @ 13:38):    Growth in anaerobic bottle: Aerococcus urinae    "Susceptibilities not performed"    ***Blood Panel PCR results on this specimen are available    approximately 3 hours after the Gram stain result.***    Gram stain, PCR, and/or culture results may not always    correspond due to difference in methodologies.    ************************************************************    This PCR assay was performed by multiplex PCR. This    Assay tests for 66 bacterial and resistance gene targets.    Please refer to the [facility] test directory    at [email or web address] for details.  Organism: Blood Culture PCR (02-01-23 @ 13:38)  Organism: Blood Culture PCR (02-01-23 @ 13:38)      -  Blood PCR Panel: NEG      Method Type: PCR            VITALS:  T(C): 36.3 (02-07-23 @ 13:09), Max: 36.7 (02-07-23 @ 05:05)  T(F): 97.4 (02-07-23 @ 13:09), Max: 98 (02-07-23 @ 05:05)  HR: 94 (02-07-23 @ 13:09) (78 - 94)  BP: 120/77 (02-07-23 @ 13:09) (120/77 - 135/69)  BP(mean): 91 (02-07-23 @ 13:09) (91 - 91)  ABP: --  ABP(mean): --  RR: 18 (02-07-23 @ 13:09) (18 - 19)  SpO2: 95% (02-07-23 @ 13:09) (94% - 95%)  CVP(mm Hg): --  CVP(cm H2O): --    Ins and Outs     02-06-23 @ 07:01  -  02-07-23 @ 07:00  --------------------------------------------------------  IN: 1275 mL / OUT: 850 mL / NET: 425 mL                I&O's Detail    06 Feb 2023 07:01  -  07 Feb 2023 07:00  --------------------------------------------------------  IN:    dextrose 5%: 75 mL    dextrose 5% + sodium chloride 0.45% w/ Additives: 1200 mL  Total IN: 1275 mL    OUT:    Bulb (mL): 400 mL    Indwelling Catheter - Urethral (mL): 450 mL  Total OUT: 850 mL    Total NET: 425 mL

## 2023-02-07 NOTE — PROGRESS NOTE ADULT - ASSESSMENT
5y year old Male with PMH of recent L hip fx (11/2022), TIA (11/2022) who presents to the ER from [facility] for abdominal pain, nausea for 4 days. Admitted for acute gangrenous cholecystitis, c/b perforated gallbladder, s/p lap kellee on 1/30, with empyema found.    #Acute cholecystitis c/b perforated gallbladder  #Transaminitis, Hyperbilirubinemia  - s/p lap kellee with Dr Thompson on 1/30, drain still in place with high output  - Maintain RUPINDER-SS  - Pt  NPO due to vomiting/SBO  - Blood cultures growing gram + cocci, urine culture with E Coli and Aerococcus  - ID following, off of antibx  - Trend LFTs, downtrending  - Pain control, standing tylenol dose with oxy/dilaudid prn  - lovenox for dvt ppx.     # Small bowel obstruction vs Ileus  - seen on CT of [place]   - conservative management, keep NPO w/ IVF  - Surgery following, d/w Dr. Thompson  - optimize electrolytes  - repeat Gastrografin study today    # Acute hypoxic respiratory failure likely due to atelectasis/right pleural effusion  - discussed w/ Pulm, Dr. Harden. Effusion too small to drain  - Wean off of O2, incentive spirometry    # Diarrhea  c/b hypokalemia and hypophosphatemia - potassium and phosphorous repletion today.  - hold bowel regimen  - negative C diff  - IVF while NPO    # Tachycardia likely due to multifocal atrial tachycardia  w/ associated hypotension from diarrhea/volume depletion/anemia  There was a question if patient was in Paroxysmal atrial fibrillation, though information reviewed by Cardiologist suggests this is MAT. Evidence of ischemia w/ ST segment depression in anterior leads. CTA chest obtained and negative for PE. Echo w/ EF 50-55%. Per cardio, risk of AC seems to outweigh benefit at this point, and this does not seem to be Atrial Fib.  - telemetry overnight report with SR 71-94 w/ PVC occ.  - dopplers neg for DVT  - pt may need extended cardiac monitoring on discharge. OP Cardio f/u.     # Leukocytosis, resolved    #Urinary retention  - Patient with chronic felipe - was placed in November after fall/hip fx, no TOV has been done as per daughter. No hx of urinary retention prior to that event   - Felipe replaced on 2/4/2023  - Cont increased Flomax dosing at 0.8mg qhs  - Urology consulted noted; cont Proscar, Flomax and delayed void trial    #Acute Kidney Injury - Improved   - Monitor BMP  - Avoid nephrotoxins  - felipe catheter    # Hypernatremia - improved.    #Hx of TIA  - c/w Statin, ASA    #?Dysphagia  - daughter would like a speech and swallow evaluation when he is eating by mouth    #DVT Prophylaxis- Lovenox   GOC: DNR/I, MOLST in chart    Dispo: D/C to [facility] when cleared by surgery and cardio    Update daughter Shelli Cell [phone]      5y year old Male with PMH of recent L hip fx (11/2022), TIA (11/2022) who presents to the ER from [facility] for abdominal pain, nausea for 4 days. Admitted for acute gangrenous cholecystitis, c/b perforated gallbladder, s/p lap kellee on 1/30, with empyema found.    #Acute cholecystitis c/b perforated gallbladder  #Transaminitis, Hyperbilirubinemia  - s/p lap kellee with Dr Thompson on 1/30, drain still in place with high output  - Maintain RUPINDER-SS  - Pt  NPO due to vomiting/SBO  - Blood cultures growing gram + cocci, urine culture with E Coli and Aerococcus  - ID following, off of antibx  - Trend LFTs, downtrending  - Pain control, standing tylenol dose with oxy/dilaudid prn  - lovenox for dvt ppx.     # Small bowel obstruction vs Ileus  - seen on CT of [place]   - conservative management, keep NPO w/ IVF  - Surgery following, d/w Dr. Thompson  - optimize electrolytes  - repeat Gastrografin study today    # Acute hypoxic respiratory failure likely due to atelectasis/right pleural effusion  - discussed w/ Pulm, Dr. Harden. Effusion too small to drain  - Wean off of O2, incentive spirometry    # Diarrhea  c/b hypokalemia and hypophosphatemia - potassium and phosphorous repletion today.  - hold bowel regimen  - negative C diff  - IVF while NPO    # Tachycardia likely due to multifocal atrial tachycardia  w/ associated hypotension from diarrhea/volume depletion/anemia  There was a question if patient was in Paroxysmal atrial fibrillation, though information reviewed by Cardiologist suggests this is MAT. Evidence of ischemia w/ ST segment depression in anterior leads. CTA chest obtained and negative for PE. Echo w/ EF 50-55%. Per cardio, risk of AC seems to outweigh benefit at this point, and this does not seem to be Atrial Fib.  - telemetry overnight report with SR 71-94 w/ PVC occ.  - dopplers neg for DVT  - pt may need extended cardiac monitoring on discharge. OP Cardio f/u.     # Leukocytosis, resolved    #Urinary retention  - Patient with chronic felipe - was placed in November after fall/hip fx, no TOV has been done as per daughter. No hx of urinary retention prior to that event   - Felipe replaced on 2/4/2023  - Cont increased Flomax dosing at 0.8mg qhs  - Urology consulted noted; cont Proscar, Flomax and delayed void trial    #Acute Kidney Injury - Improved   - Monitor BMP  - Avoid nephrotoxins  - feliep catheter    # Hypernatremia - improved.    #Hx of TIA  - c/w Statin, ASA    #?Dysphagia  - daughter would like a speech and swallow evaluation when he is eating by mouth    #DVT Prophylaxis- Lovenox   GOC: DNR/I, MOLST in chart    Dispo: D/C to [facility] when cleared by surgery and cardio    Update daughter Shelli Cell [phone]      5y year old Male with PMH of recent L hip fx (11/2022), TIA (11/2022) who presents to the ER from [facility] for abdominal pain, nausea for 4 days. Admitted for acute gangrenous cholecystitis, c/b perforated gallbladder, s/p lap kellee on 1/30, with empyema found.    #Acute cholecystitis c/b perforated gallbladder  #Transaminitis, Hyperbilirubinemia  - s/p lap kellee with Dr Thompson on 1/30, drain still in place with high output  - Maintain RUPINDER-SS  - Pt  NPO due to vomiting/SBO  - Blood cultures growing gram + cocci, urine culture with E Coli and Aerococcus  - ID following, off of antibx  - Trend LFTs, downtrending  - Pain control, standing tylenol dose with oxy/dilaudid prn  - lovenox for dvt ppx.     # Small bowel obstruction vs Ileus  - seen on CT of [place]   - conservative management, keep NPO w/ IVF  - Surgery following, d/w Dr. Thompson  - optimize electrolytes  - repeat Gastrografin study today    # Acute hypoxic respiratory failure likely due to atelectasis/right pleural effusion  - discussed w/ Pulm, Dr. Harden. Effusion too small to drain  - Wean off of O2, incentive spirometry    # Diarrhea  c/b hypokalemia and hypophosphatemia - potassium and phosphorous repletion today.  - hold bowel regimen  - negative C diff  - IVF while NPO    # Tachycardia likely due to multifocal atrial tachycardia  w/ associated hypotension from diarrhea/volume depletion/anemia  There was a question if patient was in Paroxysmal atrial fibrillation, though information reviewed by Cardiologist suggests this is MAT. Evidence of ischemia w/ ST segment depression in anterior leads. CTA chest obtained and negative for PE. Echo w/ EF 50-55%. Per cardio, risk of AC seems to outweigh benefit at this point, and this does not seem to be Atrial Fib.  - telemetry overnight report with SR 71-94 w/ PVC occ.  - dopplers neg for DVT  - pt may need extended cardiac monitoring on discharge. OP Cardio f/u.     # Leukocytosis, resolved    #Urinary retention  - Patient with chronic felipe - was placed in November after fall/hip fx, no TOV has been done as per daughter. No hx of urinary retention prior to that event   - Felipe replaced on 2/4/2023  - Cont increased Flomax dosing at 0.8mg qhs  - Urology consulted noted; cont Proscar, Flomax and delayed void trial    #Acute Kidney Injury - Improved   - Monitor BMP  - Avoid nephrotoxins  - felipe catheter    # Hypernatremia - improved.    #Hx of TIA  - c/w Statin, ASA    #?Dysphagia  - daughter would like a speech and swallow evaluation when he is eating by mouth    #DVT Prophylaxis- Lovenox   GOC: DNR/I, MOLST in chart    Dispo: D/C to [facility] when cleared by surgery and cardio    Update daughter Shelli Cell [phone]      5y year old Male with PMH of recent L hip fx (11/2022), TIA (11/2022) who presents to the ER from [facility] for abdominal pain, nausea for 4 days. Admitted for acute gangrenous cholecystitis, c/b perforated gallbladder, s/p lap kellee on 1/30, with empyema found.    #Acute cholecystitis c/b perforated gallbladder  #Transaminitis, Hyperbilirubinemia  - s/p lap kellee with Dr Thompson on 1/30, drain still in place with high output  - Maintain RUPINDER-SS  - Pt  NPO due to vomiting/SBO  - Blood cultures growing gram + cocci, urine culture with E Coli and Aerococcus  - ID following, now off of antibx  - Trend LFTs, downtrending  - Pain control, standing tylenol dose with oxy/dilaudid prn  - lovenox for dvt ppx.     # Small bowel obstruction likely due to ileus.  - seen on CT of [facility]   - conservative management, keep NPO w/ IVF  - Surgery following, d/w Dr. Thompson  - optimize electrolytes  - repeat Gastrografin study today  - advance diet per Surgery.    # Acute hypoxic respiratory failure likely due to atelectasis/right pleural effusion  - discussed w/ Pulm, Dr. Harden. Effusion too small to drain  - Wean off of O2, incentive spirometry    # Diarrhea  c/b hypokalemia and hypophosphatemia - potassium and phosphorous repletion today.  - hold bowel regimen  - negative C diff  - IVF while NPO    # Tachycardia likely due to multifocal atrial tachycardia  w/ associated hypotension from diarrhea/volume depletion/anemia  There was a question if patient was in Paroxysmal atrial fibrillation, though information reviewed by Cardiologist suggests this is MAT. Evidence of ischemia w/ ST segment depression in anterior leads. CTA chest obtained and negative for PE. Echo w/ EF 50-55%. Per cardio, risk of AC seems to outweigh benefit at this point, and this does not seem to be Atrial Fib.  - telemetry overnight report with SR 71-94 w/ PVC occ.  - dopplers neg for DVT  - pt may need extended cardiac monitoring on discharge. OP Cardio f/u.     # Leukocytosis, resolved    #Urinary retention  - Patient with chronic felipe - was placed in November after fall/hip fx, no TOV has been done as per daughter. No hx of urinary retention prior to that event   - Felipe replaced on 2/4/2023  - Cont increased Flomax dosing at 0.8mg qhs  - Urology consulted noted; cont Proscar, Flomax and delayed void trial    #Acute Kidney Injury - Improved   - Monitor BMP  - Avoid nephrotoxins  - felipe catheter    # Hypernatremia - improved.    #Hx of TIA  - c/w Statin, ASA    #?Dysphagia  - daughter would like a speech and swallow evaluation when he is eating by mouth    #DVT Prophylaxis- Lovenox   GOC: DNR/I, MOLST in chart    Dispo: D/C to [facility] when cleared by surgery and cardio    Update daughter Shelli Cell [phone]      5y year old Male with PMH of recent L hip fx (11/2022), TIA (11/2022) who presents to the ER from [facility] for abdominal pain, nausea for 4 days. Admitted for acute gangrenous cholecystitis, c/b perforated gallbladder, s/p lap kellee on 1/30, with empyema found.    #Acute cholecystitis c/b perforated gallbladder  #Transaminitis, Hyperbilirubinemia  - s/p lap kellee with Dr Thompson on 1/30, drain still in place with high output  - Maintain RUPINDER-SS  - Pt  NPO due to vomiting/SBO  - Blood cultures growing gram + cocci, urine culture with E Coli and Aerococcus  - ID following, now off of antibx  - Trend LFTs, downtrending  - Pain control, standing tylenol dose with oxy/dilaudid prn  - lovenox for dvt ppx.     # Small bowel obstruction likely due to ileus.  - seen on CT of [facility]   - conservative management, keep NPO w/ IVF  - Surgery following, d/w Dr. Thompson  - optimize electrolytes  - repeat Gastrografin study today  - advance diet per Surgery.    # Acute hypoxic respiratory failure likely due to atelectasis/right pleural effusion  - discussed w/ Pulm, Dr. Harden. Effusion too small to drain  - Wean off of O2, incentive spirometry    # Diarrhea  c/b hypokalemia and hypophosphatemia - potassium and phosphorous repletion today.  - hold bowel regimen  - negative C diff  - IVF while NPO    # Tachycardia likely due to multifocal atrial tachycardia  w/ associated hypotension from diarrhea/volume depletion/anemia  There was a question if patient was in Paroxysmal atrial fibrillation, though information reviewed by Cardiologist suggests this is MAT. Evidence of ischemia w/ ST segment depression in anterior leads. CTA chest obtained and negative for PE. Echo w/ EF 50-55%. Per cardio, risk of AC seems to outweigh benefit at this point, and this does not seem to be Atrial Fib.  - telemetry overnight report with SR 71-94 w/ PVC occ.  - dopplers neg for DVT  - pt may need extended cardiac monitoring on discharge. OP Cardio f/u.     # Leukocytosis, resolved    #Urinary retention  - Patient with chronic felipe - was placed in November after fall/hip fx, no TOV has been done as per daughter. No hx of urinary retention prior to that event   - Feliep replaced on 2/4/2023  - Cont increased Flomax dosing at 0.8mg qhs  - Urology consulted noted; cont Proscar, Flomax and delayed void trial    #Acute Kidney Injury - Improved   - Monitor BMP  - Avoid nephrotoxins  - felipe catheter    # Hypernatremia - improved.    #Hx of TIA  - c/w Statin, ASA    #?Dysphagia  - daughter would like a speech and swallow evaluation when he is eating by mouth    #DVT Prophylaxis- Lovenox   GOC: DNR/I, MOLST in chart    Dispo: D/C to [facility] when cleared by surgery and cardio    Update daughter Shelli Cell [phone]      5y year old Male with PMH of recent L hip fx (11/2022), TIA (11/2022) who presents to the ER from [facility] for abdominal pain, nausea for 4 days. Admitted for acute gangrenous cholecystitis, c/b perforated gallbladder, s/p lap kellee on 1/30, with empyema found.    #Acute cholecystitis c/b perforated gallbladder  #Transaminitis, Hyperbilirubinemia  - s/p lap kellee with Dr Thompson on 1/30, drain still in place with high output  - Maintain RUPINDER-SS  - Pt  NPO due to vomiting/SBO  - Blood cultures growing gram + cocci, urine culture with E Coli and Aerococcus  - ID following, now off of antibx  - Trend LFTs, downtrending  - Pain control, standing tylenol dose with oxy/dilaudid prn  - lovenox for dvt ppx.     # Small bowel obstruction likely due to ileus.  - seen on CT of [facility]   - conservative management, keep NPO w/ IVF  - Surgery following, d/w Dr. Thompson  - optimize electrolytes  - repeat Gastrografin study today  - advance diet per Surgery.    # Acute hypoxic respiratory failure likely due to atelectasis/right pleural effusion  - discussed w/ Pulm, Dr. Harden. Effusion too small to drain  - Wean off of O2, incentive spirometry    # Diarrhea  c/b hypokalemia and hypophosphatemia - potassium and phosphorous repletion today.  - hold bowel regimen  - negative C diff  - IVF while NPO    # Tachycardia likely due to multifocal atrial tachycardia  w/ associated hypotension from diarrhea/volume depletion/anemia  There was a question if patient was in Paroxysmal atrial fibrillation, though information reviewed by Cardiologist suggests this is MAT. Evidence of ischemia w/ ST segment depression in anterior leads. CTA chest obtained and negative for PE. Echo w/ EF 50-55%. Per cardio, risk of AC seems to outweigh benefit at this point, and this does not seem to be Atrial Fib.  - telemetry overnight report with SR 71-94 w/ PVC occ.  - dopplers neg for DVT  - pt may need extended cardiac monitoring on discharge. OP Cardio f/u.     # Leukocytosis, resolved    #Urinary retention  - Patient with chronic felipe - was placed in November after fall/hip fx, no TOV has been done as per daughter. No hx of urinary retention prior to that event   - Felipe replaced on 2/4/2023  - Cont increased Flomax dosing at 0.8mg qhs  - Urology consulted noted; cont Proscar, Flomax and delayed void trial    #Acute Kidney Injury - Improved   - Monitor BMP  - Avoid nephrotoxins  - felipe catheter    # Hypernatremia - improved.    #Hx of TIA  - c/w Statin, ASA    #?Dysphagia  - daughter would like a speech and swallow evaluation when he is eating by mouth    #DVT Prophylaxis- Lovenox   GOC: DNR/I, MOLST in chart    Dispo: D/C to [facility] when cleared by surgery and cardio    Update daughter Shelli Cell [phone]      5y year old Male with PMH of recent L hip fx (11/2022), TIA (11/2022) who presents to the ER from [facility] for abdominal pain, nausea for 4 days. Admitted for acute gangrenous cholecystitis, c/b perforated gallbladder, s/p lap kellee on 1/30, with empyema found.    #Acute cholecystitis c/b perforated gallbladder  #Transaminitis, Hyperbilirubinemia  - s/p lap kellee with Dr Thompson on 1/30, drain still in place with high output  - Maintain RUPINDER-SS  - Pt  NPO due to vomiting/SBO  - Blood cultures growing gram + cocci, urine culture with E Coli and Aerococcus  - ID following, now off of antibx  - Trend LFTs, downtrending  - Pain control, standing tylenol dose with oxy/dilaudid prn  - lovenox for dvt ppx.     # Small bowel obstruction likely due to ileus.  - seen on CT of [facility]   - conservative management, keep NPO w/ IVF  - Surgery following, d/w Dr. Thompson  - optimize electrolytes  - repeat Gastrografin study today  - advance diet per Surgery.    # Acute hypoxic respiratory failure likely due to atelectasis/right pleural effusion  - discussed w/ Pulm, Dr. Harden. Effusion too small to drain  - Wean off of O2, incentive spirometry  - repeat CXR in AM.    # Diarrhea  c/b hypokalemia and hypophosphatemia - potassium and phosphorous repletion today.  - hold bowel regimen  - negative C diff  - IVF while NPO    # Tachycardia likely due to multifocal atrial tachycardia  w/ associated hypotension from diarrhea/volume depletion/anemia  There was a question if patient was in Paroxysmal atrial fibrillation, though information reviewed by Cardiologist suggests this is MAT. Evidence of ischemia w/ ST segment depression in anterior leads. CTA chest obtained and negative for PE. Echo w/ EF 50-55%. Per cardio, risk of AC seems to outweigh benefit at this point, and this does not seem to be Atrial Fib.  - telemetry overnight report with SR 71-94 w/ PVC occ.  - dopplers neg for DVT  - pt may need extended cardiac monitoring on discharge. OP Cardio f/u.     # Leukocytosis, resolved    #Urinary retention  - Patient with chronic felipe - was placed in November after fall/hip fx, no TOV has been done as per daughter. No hx of urinary retention prior to that event   - Felipe replaced on 2/4/2023  - Cont increased Flomax dosing at 0.8mg qhs  - Urology consulted noted; cont Proscar, Flomax and delayed void trial    #Acute Kidney Injury - Improved   - Monitor BMP  - Avoid nephrotoxins  - felipe catheter    # Hypernatremia - improved.    #Hx of TIA  - c/w Statin, ASA    #?Dysphagia  - daughter would like a speech and swallow evaluation when he is eating by mouth    #DVT Prophylaxis- Lovenox   GOC: DNR/I, MOLST in chart    Dispo: D/C to [facility] when cleared by surgery and cardio    Update daughter Shelli Cell [phone]      5y year old Male with PMH of recent L hip fx (11/2022), TIA (11/2022) who presents to the ER from [facility] for abdominal pain, nausea for 4 days. Admitted for acute gangrenous cholecystitis, c/b perforated gallbladder, s/p lap kellee on 1/30, with empyema found.    #Acute cholecystitis c/b perforated gallbladder  #Transaminitis, Hyperbilirubinemia  - s/p lap kellee with Dr Thompson on 1/30, drain still in place with high output  - Maintain RUPINDER-SS  - Pt  NPO due to vomiting/SBO  - Blood cultures growing gram + cocci, urine culture with E Coli and Aerococcus  - ID following, now off of antibx  - Trend LFTs, downtrending  - Pain control, standing tylenol dose with oxy/dilaudid prn  - lovenox for dvt ppx.     # Small bowel obstruction likely due to ileus.  - seen on CT of [facility]   - conservative management, keep NPO w/ IVF  - Surgery following, d/w Dr. Thompson  - optimize electrolytes  - repeat Gastrografin study today  - advance diet per Surgery.    # Acute hypoxic respiratory failure likely due to atelectasis/right pleural effusion  - discussed w/ Pulm, Dr. Harden. Effusion too small to drain  - Wean off of O2, incentive spirometry  - repeat CXR in AM.    # Diarrhea  c/b hypokalemia and hypophosphatemia - potassium and phosphorous repletion today.  - hold bowel regimen  - negative C diff  - IVF while NPO    # Tachycardia likely due to multifocal atrial tachycardia  w/ associated hypotension from diarrhea/volume depletion/anemia  There was a question if patient was in Paroxysmal atrial fibrillation, though information reviewed by Cardiologist suggests this is MAT. Evidence of ischemia w/ ST segment depression in anterior leads. CTA chest obtained and negative for PE. Echo w/ EF 50-55%. Per cardio, risk of AC seems to outweigh benefit at this point, and this does not seem to be Atrial Fib.  - telemetry overnight report with SR 71-94 w/ PVC occ.  - dopplers neg for DVT  - pt may need extended cardiac monitoring on discharge. OP Cardio f/u.     # Leukocytosis, resolved    #Urinary retention  - Patient with chronic felipe - was placed in November after fall/hip fx, no TOV has been done as per daughter. No hx of urinary retention prior to that event   - Felipe replaced on 2/4/2023  - Cont increased Flomax dosing at 0.8mg qhs  - Urology consulted noted; cont Proscar, Flomax and delayed void trial    #Acute Kidney Injury - Improved   - Monitor BMP  - Avoid nephrotoxins  - felipe catheter    # Hypernatremia - improved.    #Hx of TIA  - c/w Statin, ASA    #?Dysphagia  - daughter would like a speech and swallow evaluation when he is eating by mouth    #DVT Prophylaxis- Lovenox   GOC: DNR/I, MOLST in chart    Dispo: D/C to [facility] when cleared by surgery and cardio    Update daughter Shelli Cell [phone]      5y year old Male with PMH of recent L hip fx (11/2022), TIA (11/2022) who presents to the ER from [facility] for abdominal pain, nausea for 4 days. Admitted for acute gangrenous cholecystitis, c/b perforated gallbladder, s/p lap kellee on 1/30, with empyema found.    #Acute cholecystitis c/b perforated gallbladder  #Transaminitis, Hyperbilirubinemia  - s/p lap kellee with Dr Thompson on 1/30, drain still in place with high output  - Maintain RUPINDER-SS  - Pt  NPO due to vomiting/SBO  - Blood cultures growing gram + cocci, urine culture with E Coli and Aerococcus  - ID following, now off of antibx  - Trend LFTs, downtrending  - Pain control, standing tylenol dose with oxy/dilaudid prn  - lovenox for dvt ppx.     # Small bowel obstruction likely due to ileus.  - seen on CT of [facility]   - conservative management, keep NPO w/ IVF  - Surgery following, d/w Dr. Thompson  - optimize electrolytes  - repeat Gastrografin study today  - advance diet per Surgery.    # Acute hypoxic respiratory failure likely due to atelectasis/right pleural effusion  - discussed w/ Pulm, Dr. Harden. Effusion too small to drain  - Wean off of O2, incentive spirometry  - repeat CXR in AM.    # Diarrhea  c/b hypokalemia and hypophosphatemia - potassium and phosphorous repletion today.  - hold bowel regimen  - negative C diff  - IVF while NPO    # Tachycardia likely due to multifocal atrial tachycardia  w/ associated hypotension from diarrhea/volume depletion/anemia  There was a question if patient was in Paroxysmal atrial fibrillation, though information reviewed by Cardiologist suggests this is MAT. Evidence of ischemia w/ ST segment depression in anterior leads. CTA chest obtained and negative for PE. Echo w/ EF 50-55%. Per cardio, risk of AC seems to outweigh benefit at this point, and this does not seem to be Atrial Fib.  - telemetry overnight report with SR 71-94 w/ PVC occ.  - dopplers neg for DVT  - pt may need extended cardiac monitoring on discharge. OP Cardio f/u.     # Leukocytosis, resolved    #Urinary retention  - Patient with chronic felipe - was placed in November after fall/hip fx, no TOV has been done as per daughter. No hx of urinary retention prior to that event   - Felipe replaced on 2/4/2023  - Cont increased Flomax dosing at 0.8mg qhs  - Urology consulted noted; cont Proscar, Flomax and delayed void trial    #Acute Kidney Injury - Improved   - Monitor BMP  - Avoid nephrotoxins  - felipe catheter    # Hypernatremia - improved.    #Hx of TIA  - c/w Statin, ASA    #?Dysphagia  - daughter would like a speech and swallow evaluation when he is eating by mouth    #DVT Prophylaxis- Lovenox   GOC: DNR/I, MOLST in chart    Dispo: D/C to [facility] when cleared by surgery and cardio    Update daughter Shelli Cell [phone]

## 2023-02-07 NOTE — PROGRESS NOTE ADULT - SUBJECTIVE AND OBJECTIVE BOX
SURGERY PROGRESS NOTE  Pt. seen and examined at bedside resting comfortably. No acute events overnight. No complaints. Pt admits to bm overnight, loose. Denies N/V.  Felipe in place. Denies fever/chills, chest pain, dyspnea, cough, dizziness.     Vital Signs Last 24 Hrs  T(C): 36.7 (07 Feb 2023 05:05), Max: 36.8 (06 Feb 2023 13:34)  T(F): 98 (07 Feb 2023 05:05), Max: 98.3 (06 Feb 2023 13:34)  HR: 83 (07 Feb 2023 05:05) (78 - 83)  BP: 135/69 (07 Feb 2023 05:05) (121/73 - 135/69)  BP(mean): 80 (06 Feb 2023 13:34) (80 - 80)  RR: 18 (07 Feb 2023 05:05) (18 - 19)  SpO2: 94% (07 Feb 2023 05:05) (94% - 96%)    Parameters below as of 07 Feb 2023 05:05  Patient On (Oxygen Delivery Method): room air      PHYSICAL EXAM:    GENERAL: NAD  HEAD:  Atraumatic, Normocephalic  CHEST/LUNG: Clear to ausculation, bilaterally   HEART: S1S2  ABDOMEN: soft, nd, mild ttp in RUQ and surrounding drain site. RUPINDER draining to suction, straw colored. Surgical incisions healing well. Felipe in place draining clear yellow urine.   EXTREMITIES:  calf soft, non tender b/l, sensorimotor intact    I&O's Detail    06 Feb 2023 07:01  -  07 Feb 2023 07:00  --------------------------------------------------------  IN:    dextrose 5%: 75 mL    dextrose 5% + sodium chloride 0.45% w/ Additives: 1200 mL  Total IN: 1275 mL    OUT:    Bulb (mL): 400 mL    Indwelling Catheter - Urethral (mL): 450 mL  Total OUT: 850 mL    Total NET: 425 mL      LABS:                        12.0   7.61  )-----------( 186      ( 07 Feb 2023 06:56 )             38.3     02-07    139  |  107  |  6<L>  ----------------------------<  114<H>  3.8   |  25  |  0.55    Ca    7.9<L>      07 Feb 2023 07:10  Phos  2.1     02-07  Mg     2.0     02-07    TPro  4.9<L>  /  Alb  1.7<L>  /  TBili  0.4  /  DBili  0.1  /  AST  33  /  ALT  26  /  AlkPhos  142<H>  02-06        RADIOLOGY & ADDITIONAL STUDIES:  < from: Xray Kidney Ureter Bladder (02.07.23 @ 09:56) >  ACC: [id number] EXAM:  XR KUB 1 VIEW   ORDERED BY:  TANIA THOMPSON     PROCEDURE DATE:  02/07/2023          INTERPRETATION:  Clinical history: 89-year-old male, SBO.    Frontal view of the abdomen is correlated with the CT of 2/4/2023.    FINDINGS: Mild to moderate diffuse small bowel dilatation, slightly   improved.    No pneumoperitoneum or acute osseous finding.    Surgical clips in the right upper quadrant, post removal of gangrenous   gallbladder, unchanged    IMPRESSION:    Mild to moderate diffuse small bowel dilatation, improved. Clinical note   2/7/2023 Abdomen: Soft, nondistended, nontender J.P. with serous drainage    --- End of Report ---        ROBIN HORNA DO; Attending Radiologist  This document has been electronically signed. Feb 7 2023 11:57AM    < end of copied text >    Impression: 89y Male now POD #8 lap cholecystectomy/ perforated  gangrenous gallbladder/post op ileus vs SBO. Pt HD stable, wbc wnl. Drain output Q24 400cc straw color.     Plan:  - F/u gastrografin study w/ serial XRs to assess for passage of contrast, gastrograffin administered last night.   - Plan to restart diet w/ ensures once contrast demonstrated in colon  - K/Phos repleted  - NPO w/ ice chips  - IVF  - IV abx  - Continue felipe/flomax per urology  - C/w RLQ drain to bulb suction, record output    Discussed w/ Dr. Thompson  General Surgery #[id number] SURGERY PROGRESS NOTE  Pt. seen and examined at bedside resting comfortably. No acute events overnight. No complaints. Pt admits to bm overnight, loose. Denies N/V.  Felipe in place. Denies fever/chills, chest pain, dyspnea, cough, dizziness.     Vital Signs Last 24 Hrs  T(C): 36.7 (07 Feb 2023 05:05), Max: 36.8 (06 Feb 2023 13:34)  T(F): 98 (07 Feb 2023 05:05), Max: 98.3 (06 Feb 2023 13:34)  HR: 83 (07 Feb 2023 05:05) (78 - 83)  BP: 135/69 (07 Feb 2023 05:05) (121/73 - 135/69)  BP(mean): 80 (06 Feb 2023 13:34) (80 - 80)  RR: 18 (07 Feb 2023 05:05) (18 - 19)  SpO2: 94% (07 Feb 2023 05:05) (94% - 96%)    Parameters below as of 07 Feb 2023 05:05  Patient On (Oxygen Delivery Method): room air      PHYSICAL EXAM:    GENERAL: NAD  HEAD:  Atraumatic, Normocephalic  CHEST/LUNG: Clear to ausculation, bilaterally   HEART: S1S2  ABDOMEN: soft, nd, mild ttp in RUQ and surrounding drain site. RUPINDER draining to suction, straw colored. Surgical incisions healing well. Felipe in place draining clear yellow urine.   EXTREMITIES:  calf soft, non tender b/l, sensorimotor intact    I&O's Detail    06 Feb 2023 07:01  -  07 Feb 2023 07:00  --------------------------------------------------------  IN:    dextrose 5%: 75 mL    dextrose 5% + sodium chloride 0.45% w/ Additives: 1200 mL  Total IN: 1275 mL    OUT:    Bulb (mL): 400 mL    Indwelling Catheter - Urethral (mL): 450 mL  Total OUT: 850 mL    Total NET: 425 mL      LABS:                        12.0   7.61  )-----------( 186      ( 07 Feb 2023 06:56 )             38.3     02-07    139  |  107  |  6<L>  ----------------------------<  114<H>  3.8   |  25  |  0.55    Ca    7.9<L>      07 Feb 2023 07:10  Phos  2.1     02-07  Mg     2.0     02-07    TPro  4.9<L>  /  Alb  1.7<L>  /  TBili  0.4  /  DBili  0.1  /  AST  33  /  ALT  26  /  AlkPhos  142<H>  02-06        RADIOLOGY & ADDITIONAL STUDIES:  < from: Xray Kidney Ureter Bladder (02.07.23 @ 09:56) >  ACC: [id number] EXAM:  XR KUB 1 VIEW   ORDERED BY:  TANIA THOMPSON     PROCEDURE DATE:  02/07/2023          INTERPRETATION:  Clinical history: 89-year-old male, SBO.    Frontal view of the abdomen is correlated with the CT of 2/4/2023.    FINDINGS: Mild to moderate diffuse small bowel dilatation, slightly   improved.    No pneumoperitoneum or acute osseous finding.    Surgical clips in the right upper quadrant, post removal of gangrenous   gallbladder, unchanged    IMPRESSION:    Mild to moderate diffuse small bowel dilatation, improved. Clinical note   2/7/2023 Abdomen: Soft, nondistended, nontender J.P. with serous drainage    --- End of Report ---        ROBIN HORAN DO; Attending Radiologist  This document has been electronically signed. Feb 7 2023 11:57AM    < end of copied text >    Impression: 89y Male now POD #8 lap cholecystectomy/ perforated  gangrenous gallbladder/post op ileus vs SBO. Pt HD stable, wbc wnl. Drain output Q24 400cc straw color.     Plan:  - F/u gastrografin study w/ serial XRs to assess for passage of contrast, gastrograffin administered last night.   - Plan to restart diet w/ ensures once contrast demonstrated in colon  - K/Phos repleted  - NPO w/ ice chips  - IVF  - IV abx  - Continue felipe/flomax per urology  - C/w RLQ drain to bulb suction, record output    Discussed w/ Dr. Thompson  General Surgery #[id number] SURGERY PROGRESS NOTE  Pt. seen and examined at bedside resting comfortably. No acute events overnight. No complaints. Pt admits to bm overnight, loose. Denies N/V.  Felipe in place. Denies fever/chills, chest pain, dyspnea, cough, dizziness.     Vital Signs Last 24 Hrs  T(C): 36.7 (07 Feb 2023 05:05), Max: 36.8 (06 Feb 2023 13:34)  T(F): 98 (07 Feb 2023 05:05), Max: 98.3 (06 Feb 2023 13:34)  HR: 83 (07 Feb 2023 05:05) (78 - 83)  BP: 135/69 (07 Feb 2023 05:05) (121/73 - 135/69)  BP(mean): 80 (06 Feb 2023 13:34) (80 - 80)  RR: 18 (07 Feb 2023 05:05) (18 - 19)  SpO2: 94% (07 Feb 2023 05:05) (94% - 96%)    Parameters below as of 07 Feb 2023 05:05  Patient On (Oxygen Delivery Method): room air      PHYSICAL EXAM:    GENERAL: NAD  HEAD:  Atraumatic, Normocephalic  CHEST/LUNG: Clear to ausculation, bilaterally   HEART: S1S2  ABDOMEN: soft, nd, mild ttp in RUQ and surrounding drain site. RUPINDER draining to suction, straw colored. Surgical incisions healing well. Felipe in place draining clear yellow urine.   EXTREMITIES:  calf soft, non tender b/l, sensorimotor intact    I&O's Detail    06 Feb 2023 07:01  -  07 Feb 2023 07:00  --------------------------------------------------------  IN:    dextrose 5%: 75 mL    dextrose 5% + sodium chloride 0.45% w/ Additives: 1200 mL  Total IN: 1275 mL    OUT:    Bulb (mL): 400 mL    Indwelling Catheter - Urethral (mL): 450 mL  Total OUT: 850 mL    Total NET: 425 mL      LABS:                        12.0   7.61  )-----------( 186      ( 07 Feb 2023 06:56 )             38.3     02-07    139  |  107  |  6<L>  ----------------------------<  114<H>  3.8   |  25  |  0.55    Ca    7.9<L>      07 Feb 2023 07:10  Phos  2.1     02-07  Mg     2.0     02-07    TPro  4.9<L>  /  Alb  1.7<L>  /  TBili  0.4  /  DBili  0.1  /  AST  33  /  ALT  26  /  AlkPhos  142<H>  02-06        RADIOLOGY & ADDITIONAL STUDIES:  < from: Xray Kidney Ureter Bladder (02.07.23 @ 09:56) >  ACC: [id number] EXAM:  XR KUB 1 VIEW   ORDERED BY:  TANIA THOMPSON     PROCEDURE DATE:  02/07/2023          INTERPRETATION:  Clinical history: 89-year-old male, SBO.    Frontal view of the abdomen is correlated with the CT of 2/4/2023.    FINDINGS: Mild to moderate diffuse small bowel dilatation, slightly   improved.    No pneumoperitoneum or acute osseous finding.    Surgical clips in the right upper quadrant, post removal of gangrenous   gallbladder, unchanged    IMPRESSION:    Mild to moderate diffuse small bowel dilatation, improved. Clinical note   2/7/2023 Abdomen: Soft, nondistended, nontender J.P. with serous drainage    --- End of Report ---        ROBIN HORAN DO; Attending Radiologist  This document has been electronically signed. Feb 7 2023 11:57AM    < end of copied text >    Impression: 89y Male now POD #8 lap cholecystectomy/ perforated  gangrenous gallbladder/post op ileus vs SBO. Pt HD stable, wbc wnl. Drain output Q24 400cc straw color.     Plan:  - F/u gastrografin study w/ serial XRs to assess for passage of contrast, gastrograffin administered last night.   - Plan to restart diet w/ ensures once contrast demonstrated in colon  - K/Phos repleted  - NPO w/ ice chips  - IVF  - IV abx  - Continue felipe/flomax per urology  - C/w RLQ drain to bulb suction, record output    Discussed w/ Dr. Thompson  General Surgery #[id number] SURGERY PROGRESS NOTE  Pt. seen and examined at bedside resting comfortably. No acute events overnight. No complaints. Pt admits to bm overnight, loose. Denies N/V.  Felipe in place. Denies fever/chills, chest pain, dyspnea, cough, dizziness.     Vital Signs Last 24 Hrs  T(C): 36.7 (07 Feb 2023 05:05), Max: 36.8 (06 Feb 2023 13:34)  T(F): 98 (07 Feb 2023 05:05), Max: 98.3 (06 Feb 2023 13:34)  HR: 83 (07 Feb 2023 05:05) (78 - 83)  BP: 135/69 (07 Feb 2023 05:05) (121/73 - 135/69)  BP(mean): 80 (06 Feb 2023 13:34) (80 - 80)  RR: 18 (07 Feb 2023 05:05) (18 - 19)  SpO2: 94% (07 Feb 2023 05:05) (94% - 96%)    Parameters below as of 07 Feb 2023 05:05  Patient On (Oxygen Delivery Method): room air      PHYSICAL EXAM:    GENERAL: NAD  HEAD:  Atraumatic, Normocephalic  CHEST/LUNG: Clear to ausculation, bilaterally   HEART: S1S2  ABDOMEN: soft, nd, mild ttp in RUQ and surrounding drain site. RUPINDER draining to suction, straw colored. Surgical incisions healing well. Felipe in place draining clear yellow urine.   EXTREMITIES:  calf soft, non tender b/l, sensorimotor intact    I&O's Detail    06 Feb 2023 07:01  -  07 Feb 2023 07:00  --------------------------------------------------------  IN:    dextrose 5%: 75 mL    dextrose 5% + sodium chloride 0.45% w/ Additives: 1200 mL  Total IN: 1275 mL    OUT:    Bulb (mL): 400 mL    Indwelling Catheter - Urethral (mL): 450 mL  Total OUT: 850 mL    Total NET: 425 mL      LABS:                        12.0   7.61  )-----------( 186      ( 07 Feb 2023 06:56 )             38.3     02-07    139  |  107  |  6<L>  ----------------------------<  114<H>  3.8   |  25  |  0.55    Ca    7.9<L>      07 Feb 2023 07:10  Phos  2.1     02-07  Mg     2.0     02-07    TPro  4.9<L>  /  Alb  1.7<L>  /  TBili  0.4  /  DBili  0.1  /  AST  33  /  ALT  26  /  AlkPhos  142<H>  02-06        RADIOLOGY & ADDITIONAL STUDIES:  < from: Xray Kidney Ureter Bladder (02.07.23 @ 09:56) >  ACC: [id number] EXAM:  XR KUB 1 VIEW   ORDERED BY:  TANIA THOMPSON     PROCEDURE DATE:  02/07/2023          INTERPRETATION:  Clinical history: 89-year-old male, SBO.    Frontal view of the abdomen is correlated with the CT of 2/4/2023.    FINDINGS: Mild to moderate diffuse small bowel dilatation, slightly   improved.    No pneumoperitoneum or acute osseous finding.    Surgical clips in the right upper quadrant, post removal of gangrenous   gallbladder, unchanged    IMPRESSION:    Mild to moderate diffuse small bowel dilatation, improved. Clinical note   2/7/2023 Abdomen: Soft, nondistended, nontender J.P. with serous drainage    --- End of Report ---        ROBIN HORAN DO; Attending Radiologist  This document has been electronically signed. Feb 7 2023 11:57AM    < end of copied text >    Impression: 89y Male now POD #8 lap cholecystectomy/ perforated  gangrenous gallbladder/post op ileus vs SBO. Pt HD stable, wbc wnl. Drain output Q24 400cc straw color.     Plan:  - F/u gastrografin study w/ serial XRs to assess for passage of contrast, gastrograffin administered last night.   - Plan to restart diet w/ ensures once contrast demonstrated in colon  - K/Phos repleted  - NPO w/ ice chips  - IVF  - IV abx stopped per ID  - Continue felipe/flomax per urology  - C/w RLQ drain to bulb suction, record output    Discussed w/ Dr. Thompson  General Surgery #[id number] SURGERY PROGRESS NOTE  Pt. seen and examined at bedside resting comfortably. No acute events overnight. No complaints. Pt admits to bm overnight, loose. Denies N/V.  Felipe in place. Denies fever/chills, chest pain, dyspnea, cough, dizziness.     Vital Signs Last 24 Hrs  T(C): 36.7 (07 Feb 2023 05:05), Max: 36.8 (06 Feb 2023 13:34)  T(F): 98 (07 Feb 2023 05:05), Max: 98.3 (06 Feb 2023 13:34)  HR: 83 (07 Feb 2023 05:05) (78 - 83)  BP: 135/69 (07 Feb 2023 05:05) (121/73 - 135/69)  BP(mean): 80 (06 Feb 2023 13:34) (80 - 80)  RR: 18 (07 Feb 2023 05:05) (18 - 19)  SpO2: 94% (07 Feb 2023 05:05) (94% - 96%)    Parameters below as of 07 Feb 2023 05:05  Patient On (Oxygen Delivery Method): room air      PHYSICAL EXAM:    GENERAL: NAD  HEAD:  Atraumatic, Normocephalic  CHEST/LUNG: Clear to ausculation, bilaterally   HEART: S1S2  ABDOMEN: soft, nd, mild ttp in RUQ and surrounding drain site. RUPINDER draining to suction, straw colored. Surgical incisions healing well. Felipe in place draining clear yellow urine.   EXTREMITIES:  calf soft, non tender b/l, sensorimotor intact    I&O's Detail    06 Feb 2023 07:01  -  07 Feb 2023 07:00  --------------------------------------------------------  IN:    dextrose 5%: 75 mL    dextrose 5% + sodium chloride 0.45% w/ Additives: 1200 mL  Total IN: 1275 mL    OUT:    Bulb (mL): 400 mL    Indwelling Catheter - Urethral (mL): 450 mL  Total OUT: 850 mL    Total NET: 425 mL      LABS:                        12.0   7.61  )-----------( 186      ( 07 Feb 2023 06:56 )             38.3     02-07    139  |  107  |  6<L>  ----------------------------<  114<H>  3.8   |  25  |  0.55    Ca    7.9<L>      07 Feb 2023 07:10  Phos  2.1     02-07  Mg     2.0     02-07    TPro  4.9<L>  /  Alb  1.7<L>  /  TBili  0.4  /  DBili  0.1  /  AST  33  /  ALT  26  /  AlkPhos  142<H>  02-06        RADIOLOGY & ADDITIONAL STUDIES:  < from: Xray Kidney Ureter Bladder (02.07.23 @ 09:56) >  ACC: [id number] EXAM:  XR KUB 1 VIEW   ORDERED BY:  TANIA THOMPSON     PROCEDURE DATE:  02/07/2023          INTERPRETATION:  Clinical history: 89-year-old male, SBO.    Frontal view of the abdomen is correlated with the CT of 2/4/2023.    FINDINGS: Mild to moderate diffuse small bowel dilatation, slightly   improved.    No pneumoperitoneum or acute osseous finding.    Surgical clips in the right upper quadrant, post removal of gangrenous   gallbladder, unchanged    IMPRESSION:    Mild to moderate diffuse small bowel dilatation, improved. Clinical note   2/7/2023 Abdomen: Soft, nondistended, nontender J.P. with serous drainage    --- End of Report ---        ROBIN HORAN DO; Attending Radiologist  This document has been electronically signed. Feb 7 2023 11:57AM    < end of copied text >    Impression: 89y Male now POD #8 lap cholecystectomy/ perforated  gangrenous gallbladder/post op ileus vs SBO. Pt HD stable, wbc wnl. Drain output Q24 400cc straw color.     Plan:  - F/u gastrografin study w/ serial XRs to assess for passage of contrast, gastrograffin administered last night.   - Plan to restart diet w/ ensures once contrast demonstrated in colon  - K/Phos repleted  - NPO w/ ice chips  - IVF  - IV abx stopped per ID  - Continue felipe/flomax per urology  - C/w RLQ drain to bulb suction, record output    Discussed w/ Dr. Thompson  General Surgery #[id number] SURGERY PROGRESS NOTE  Pt. seen and examined at bedside resting comfortably. No acute events overnight. No complaints. Pt admits to bm overnight, loose. Denies N/V.  Felipe in place. Denies fever/chills, chest pain, dyspnea, cough, dizziness.     Vital Signs Last 24 Hrs  T(C): 36.7 (07 Feb 2023 05:05), Max: 36.8 (06 Feb 2023 13:34)  T(F): 98 (07 Feb 2023 05:05), Max: 98.3 (06 Feb 2023 13:34)  HR: 83 (07 Feb 2023 05:05) (78 - 83)  BP: 135/69 (07 Feb 2023 05:05) (121/73 - 135/69)  BP(mean): 80 (06 Feb 2023 13:34) (80 - 80)  RR: 18 (07 Feb 2023 05:05) (18 - 19)  SpO2: 94% (07 Feb 2023 05:05) (94% - 96%)    Parameters below as of 07 Feb 2023 05:05  Patient On (Oxygen Delivery Method): room air      PHYSICAL EXAM:    GENERAL: NAD  HEAD:  Atraumatic, Normocephalic  CHEST/LUNG: Clear to ausculation, bilaterally   HEART: S1S2  ABDOMEN: soft, nd, mild ttp in RUQ and surrounding drain site. RUPINDER draining to suction, straw colored. Surgical incisions healing well. Felipe in place draining clear yellow urine.   EXTREMITIES:  calf soft, non tender b/l, sensorimotor intact    I&O's Detail    06 Feb 2023 07:01  -  07 Feb 2023 07:00  --------------------------------------------------------  IN:    dextrose 5%: 75 mL    dextrose 5% + sodium chloride 0.45% w/ Additives: 1200 mL  Total IN: 1275 mL    OUT:    Bulb (mL): 400 mL    Indwelling Catheter - Urethral (mL): 450 mL  Total OUT: 850 mL    Total NET: 425 mL      LABS:                        12.0   7.61  )-----------( 186      ( 07 Feb 2023 06:56 )             38.3     02-07    139  |  107  |  6<L>  ----------------------------<  114<H>  3.8   |  25  |  0.55    Ca    7.9<L>      07 Feb 2023 07:10  Phos  2.1     02-07  Mg     2.0     02-07    TPro  4.9<L>  /  Alb  1.7<L>  /  TBili  0.4  /  DBili  0.1  /  AST  33  /  ALT  26  /  AlkPhos  142<H>  02-06        RADIOLOGY & ADDITIONAL STUDIES:  < from: Xray Kidney Ureter Bladder (02.07.23 @ 09:56) >  ACC: [id number] EXAM:  XR KUB 1 VIEW   ORDERED BY:  TANIA THOMPSON     PROCEDURE DATE:  02/07/2023          INTERPRETATION:  Clinical history: 89-year-old male, SBO.    Frontal view of the abdomen is correlated with the CT of 2/4/2023.    FINDINGS: Mild to moderate diffuse small bowel dilatation, slightly   improved.    No pneumoperitoneum or acute osseous finding.    Surgical clips in the right upper quadrant, post removal of gangrenous   gallbladder, unchanged    IMPRESSION:    Mild to moderate diffuse small bowel dilatation, improved. Clinical note   2/7/2023 Abdomen: Soft, nondistended, nontender J.P. with serous drainage    --- End of Report ---        ROBIN HORAN DO; Attending Radiologist  This document has been electronically signed. Feb 7 2023 11:57AM    < end of copied text >    Impression: 89y Male now POD #8 lap cholecystectomy/ perforated  gangrenous gallbladder/post op ileus vs SBO. Pt HD stable, wbc wnl. Drain output Q24 400cc straw color.     Plan:  - F/u gastrografin study w/ serial XRs to assess for passage of contrast, gastrograffin administered last night.   - Plan to restart diet w/ ensures once contrast demonstrated in colon  - K/Phos repleted  - NPO w/ ice chips  - IVF  - IV abx stopped per ID  - Continue felipe/flomax per urology  - C/w RLQ drain to bulb suction, record output    Discussed w/ Dr. Thompson  General Surgery #[id number] SURGERY PROGRESS NOTE  Pt. seen and examined at bedside resting comfortably. No acute events overnight. No complaints. Pt admits to bm overnight, loose. Denies N/V.  Felipe in place. Denies fever/chills, chest pain, dyspnea, cough, dizziness.     Vital Signs Last 24 Hrs  T(C): 36.7 (07 Feb 2023 05:05), Max: 36.8 (06 Feb 2023 13:34)  T(F): 98 (07 Feb 2023 05:05), Max: 98.3 (06 Feb 2023 13:34)  HR: 83 (07 Feb 2023 05:05) (78 - 83)  BP: 135/69 (07 Feb 2023 05:05) (121/73 - 135/69)  BP(mean): 80 (06 Feb 2023 13:34) (80 - 80)  RR: 18 (07 Feb 2023 05:05) (18 - 19)  SpO2: 94% (07 Feb 2023 05:05) (94% - 96%)    Parameters below as of 07 Feb 2023 05:05  Patient On (Oxygen Delivery Method): room air      PHYSICAL EXAM:    GENERAL: NAD  HEAD:  Atraumatic, Normocephalic  CHEST/LUNG: Clear to ausculation, bilaterally   HEART: S1S2  ABDOMEN: soft, nd, mild ttp in RUQ and surrounding drain site. RUPINDER draining to suction, straw colored. Surgical incisions healing well. Felipe in place draining clear yellow urine.   EXTREMITIES:  calf soft, non tender b/l, sensorimotor intact    I&O's Detail    06 Feb 2023 07:01  -  07 Feb 2023 07:00  --------------------------------------------------------  IN:    dextrose 5%: 75 mL    dextrose 5% + sodium chloride 0.45% w/ Additives: 1200 mL  Total IN: 1275 mL    OUT:    Bulb (mL): 400 mL    Indwelling Catheter - Urethral (mL): 450 mL  Total OUT: 850 mL    Total NET: 425 mL      LABS:                        12.0   7.61  )-----------( 186      ( 07 Feb 2023 06:56 )             38.3     02-07    139  |  107  |  6<L>  ----------------------------<  114<H>  3.8   |  25  |  0.55    Ca    7.9<L>      07 Feb 2023 07:10  Phos  2.1     02-07  Mg     2.0     02-07    TPro  4.9<L>  /  Alb  1.7<L>  /  TBili  0.4  /  DBili  0.1  /  AST  33  /  ALT  26  /  AlkPhos  142<H>  02-06        RADIOLOGY & ADDITIONAL STUDIES:  < from: Xray Kidney Ureter Bladder (02.07.23 @ 09:56) >  ACC: [id number] EXAM:  XR KUB 1 VIEW   ORDERED BY:  TANIA THOMPSON     PROCEDURE DATE:  02/07/2023          INTERPRETATION:  Clinical history: 89-year-old male, SBO.    Frontal view of the abdomen is correlated with the CT of 2/4/2023.    FINDINGS: Mild to moderate diffuse small bowel dilatation, slightly   improved.    No pneumoperitoneum or acute osseous finding.    Surgical clips in the right upper quadrant, post removal of gangrenous   gallbladder, unchanged    IMPRESSION:    Mild to moderate diffuse small bowel dilatation, improved. Clinical note   2/7/2023 Abdomen: Soft, nondistended, nontender J.P. with serous drainage    --- End of Report ---        ROBIN HORAN DO; Attending Radiologist  This document has been electronically signed. Feb 7 2023 11:57AM    < end of copied text >    Impression: 89y Male now POD #8 lap cholecystectomy/ perforated  gangrenous gallbladder/post op ileus vs SBO. Pt HD stable, wbc wnl. Drain output Q24 400cc straw color. No n/v, pt reports bm overnight, abd soft ND, sbo vs ileus resolving.     Plan:  - F/u gastrografin study w/ serial XRs to assess for passage of contrast, gastrograffin administered last night.   - Plan to restart diet w/ ensures once contrast demonstrated in colon  - K/Phos repleted  - NPO w/ ice chips  - IVF  - IV abx stopped per ID  - Continue felipe/flomax per urology  - C/w RLQ drain to bulb suction, record output    Discussed w/ Dr. Thompson  General Surgery #[id number] SURGERY PROGRESS NOTE  Pt. seen and examined at bedside resting comfortably. No acute events overnight. No complaints. Pt admits to bm overnight, loose. Denies N/V.  Felipe in place. Denies fever/chills, chest pain, dyspnea, cough, dizziness.     Vital Signs Last 24 Hrs  T(C): 36.7 (07 Feb 2023 05:05), Max: 36.8 (06 Feb 2023 13:34)  T(F): 98 (07 Feb 2023 05:05), Max: 98.3 (06 Feb 2023 13:34)  HR: 83 (07 Feb 2023 05:05) (78 - 83)  BP: 135/69 (07 Feb 2023 05:05) (121/73 - 135/69)  BP(mean): 80 (06 Feb 2023 13:34) (80 - 80)  RR: 18 (07 Feb 2023 05:05) (18 - 19)  SpO2: 94% (07 Feb 2023 05:05) (94% - 96%)    Parameters below as of 07 Feb 2023 05:05  Patient On (Oxygen Delivery Method): room air      PHYSICAL EXAM:    GENERAL: NAD  HEAD:  Atraumatic, Normocephalic  CHEST/LUNG: Clear to ausculation, bilaterally   HEART: S1S2  ABDOMEN: soft, nd, mild ttp in RUQ and surrounding drain site. RUPINDER draining to suction, straw colored. Surgical incisions healing well. Felipe in place draining clear yellow urine.   EXTREMITIES:  calf soft, non tender b/l, sensorimotor intact    I&O's Detail    06 Feb 2023 07:01  -  07 Feb 2023 07:00  --------------------------------------------------------  IN:    dextrose 5%: 75 mL    dextrose 5% + sodium chloride 0.45% w/ Additives: 1200 mL  Total IN: 1275 mL    OUT:    Bulb (mL): 400 mL    Indwelling Catheter - Urethral (mL): 450 mL  Total OUT: 850 mL    Total NET: 425 mL      LABS:                        12.0   7.61  )-----------( 186      ( 07 Feb 2023 06:56 )             38.3     02-07    139  |  107  |  6<L>  ----------------------------<  114<H>  3.8   |  25  |  0.55    Ca    7.9<L>      07 Feb 2023 07:10  Phos  2.1     02-07  Mg     2.0     02-07    TPro  4.9<L>  /  Alb  1.7<L>  /  TBili  0.4  /  DBili  0.1  /  AST  33  /  ALT  26  /  AlkPhos  142<H>  02-06        RADIOLOGY & ADDITIONAL STUDIES:  < from: Xray Kidney Ureter Bladder (02.07.23 @ 09:56) >  ACC: [id number] EXAM:  XR KUB 1 VIEW   ORDERED BY:  TANIA THOMPSON     PROCEDURE DATE:  02/07/2023          INTERPRETATION:  Clinical history: 89-year-old male, SBO.    Frontal view of the abdomen is correlated with the CT of 2/4/2023.    FINDINGS: Mild to moderate diffuse small bowel dilatation, slightly   improved.    No pneumoperitoneum or acute osseous finding.    Surgical clips in the right upper quadrant, post removal of gangrenous   gallbladder, unchanged    IMPRESSION:    Mild to moderate diffuse small bowel dilatation, improved. Clinical note   2/7/2023 Abdomen: Soft, nondistended, nontender J.P. with serous drainage    --- End of Report ---        ROBIN HORAN DO; Attending Radiologist  This document has been electronically signed. Feb 7 2023 11:57AM    < end of copied text >    Impression: 89y Male now POD #8 lap cholecystectomy/ perforated  gangrenous gallbladder/post op ileus vs SBO. Pt HD stable, wbc wnl. Drain output Q24 400cc straw color. No n/v, pt reports bm overnight, abd soft ND, sbo vs ileus resolving.     Plan:  - F/u gastrografin study w/ serial XRs to assess for passage of contrast, gastrograffin administered last night.   - Plan to restart diet w/ ensures once contrast demonstrated in colon  - K/Phos repleted  - NPO w/ ice chips  - IVF  - IV abx stopped per ID  - Continue felipe/flomax per urology  - C/w RLQ drain to bulb suction, record output    Discussed w/ Dr. Thompson  General Surgery #[id number] SURGERY PROGRESS NOTE  Pt. seen and examined at bedside resting comfortably. No acute events overnight. No complaints. Pt admits to bm overnight, loose. Denies N/V.  Felipe in place. Denies fever/chills, chest pain, dyspnea, cough, dizziness.     Vital Signs Last 24 Hrs  T(C): 36.7 (07 Feb 2023 05:05), Max: 36.8 (06 Feb 2023 13:34)  T(F): 98 (07 Feb 2023 05:05), Max: 98.3 (06 Feb 2023 13:34)  HR: 83 (07 Feb 2023 05:05) (78 - 83)  BP: 135/69 (07 Feb 2023 05:05) (121/73 - 135/69)  BP(mean): 80 (06 Feb 2023 13:34) (80 - 80)  RR: 18 (07 Feb 2023 05:05) (18 - 19)  SpO2: 94% (07 Feb 2023 05:05) (94% - 96%)    Parameters below as of 07 Feb 2023 05:05  Patient On (Oxygen Delivery Method): room air      PHYSICAL EXAM:    GENERAL: NAD  HEAD:  Atraumatic, Normocephalic  CHEST/LUNG: Clear to ausculation, bilaterally   HEART: S1S2  ABDOMEN: soft, nd, mild ttp in RUQ and surrounding drain site. RUPINDER draining to suction, straw colored. Surgical incisions healing well. Felipe in place draining clear yellow urine.   EXTREMITIES:  calf soft, non tender b/l, sensorimotor intact    I&O's Detail    06 Feb 2023 07:01  -  07 Feb 2023 07:00  --------------------------------------------------------  IN:    dextrose 5%: 75 mL    dextrose 5% + sodium chloride 0.45% w/ Additives: 1200 mL  Total IN: 1275 mL    OUT:    Bulb (mL): 400 mL    Indwelling Catheter - Urethral (mL): 450 mL  Total OUT: 850 mL    Total NET: 425 mL      LABS:                        12.0   7.61  )-----------( 186      ( 07 Feb 2023 06:56 )             38.3     02-07    139  |  107  |  6<L>  ----------------------------<  114<H>  3.8   |  25  |  0.55    Ca    7.9<L>      07 Feb 2023 07:10  Phos  2.1     02-07  Mg     2.0     02-07    TPro  4.9<L>  /  Alb  1.7<L>  /  TBili  0.4  /  DBili  0.1  /  AST  33  /  ALT  26  /  AlkPhos  142<H>  02-06        RADIOLOGY & ADDITIONAL STUDIES:  < from: Xray Kidney Ureter Bladder (02.07.23 @ 09:56) >  ACC: [id number] EXAM:  XR KUB 1 VIEW   ORDERED BY:  TANIA THOMPSON     PROCEDURE DATE:  02/07/2023          INTERPRETATION:  Clinical history: 89-year-old male, SBO.    Frontal view of the abdomen is correlated with the CT of 2/4/2023.    FINDINGS: Mild to moderate diffuse small bowel dilatation, slightly   improved.    No pneumoperitoneum or acute osseous finding.    Surgical clips in the right upper quadrant, post removal of gangrenous   gallbladder, unchanged    IMPRESSION:    Mild to moderate diffuse small bowel dilatation, improved. Clinical note   2/7/2023 Abdomen: Soft, nondistended, nontender J.P. with serous drainage    --- End of Report ---        ROBIN HORAN DO; Attending Radiologist  This document has been electronically signed. Feb 7 2023 11:57AM    < end of copied text >    Impression: 89y Male now POD #8 lap cholecystectomy/ perforated  gangrenous gallbladder/post op ileus vs SBO. Pt HD stable, wbc wnl. Drain output Q24 400cc straw color. No n/v, pt reports bm overnight, abd soft ND, sbo vs ileus resolving.     Plan:  - F/u gastrografin study w/ serial XRs to assess for passage of contrast, gastrograffin administered last night.   - Plan to restart diet w/ ensures once contrast demonstrated in colon  - K/Phos repleted  - NPO w/ ice chips  - IVF  - IV abx stopped per ID  - Continue felipe/flomax per urology  - C/w RLQ drain to bulb suction, record output    Discussed w/ Dr. Thompson  General Surgery #[id number] SURGERY PROGRESS NOTE  Pt. seen and examined at bedside resting comfortably. No acute events overnight. No complaints. Pt admits to bm overnight, loose. Denies N/V.  Felipe in place. Denies fever/chills, chest pain, dyspnea, cough, dizziness.     Vital Signs Last 24 Hrs  T(C): 36.7 (07 Feb 2023 05:05), Max: 36.8 (06 Feb 2023 13:34)  T(F): 98 (07 Feb 2023 05:05), Max: 98.3 (06 Feb 2023 13:34)  HR: 83 (07 Feb 2023 05:05) (78 - 83)  BP: 135/69 (07 Feb 2023 05:05) (121/73 - 135/69)  BP(mean): 80 (06 Feb 2023 13:34) (80 - 80)  RR: 18 (07 Feb 2023 05:05) (18 - 19)  SpO2: 94% (07 Feb 2023 05:05) (94% - 96%)    Parameters below as of 07 Feb 2023 05:05  Patient On (Oxygen Delivery Method): room air      PHYSICAL EXAM:    GENERAL: NAD  HEAD:  Atraumatic, Normocephalic  CHEST/LUNG: Clear to ausculation, bilaterally   HEART: S1S2  ABDOMEN: soft, nd, mild ttp in RUQ and surrounding drain site. RUPINDER draining to suction, straw colored. Surgical incisions healing well. Felipe in place draining clear yellow urine.   EXTREMITIES:  calf soft, non tender b/l, sensorimotor intact    I&O's Detail    06 Feb 2023 07:01  -  07 Feb 2023 07:00  --------------------------------------------------------  IN:    dextrose 5%: 75 mL    dextrose 5% + sodium chloride 0.45% w/ Additives: 1200 mL  Total IN: 1275 mL    OUT:    Bulb (mL): 400 mL    Indwelling Catheter - Urethral (mL): 450 mL  Total OUT: 850 mL    Total NET: 425 mL      LABS:                        12.0   7.61  )-----------( 186      ( 07 Feb 2023 06:56 )             38.3     02-07    139  |  107  |  6<L>  ----------------------------<  114<H>  3.8   |  25  |  0.55    Ca    7.9<L>      07 Feb 2023 07:10  Phos  2.1     02-07  Mg     2.0     02-07    TPro  4.9<L>  /  Alb  1.7<L>  /  TBili  0.4  /  DBili  0.1  /  AST  33  /  ALT  26  /  AlkPhos  142<H>  02-06        RADIOLOGY & ADDITIONAL STUDIES:  < from: Xray Kidney Ureter Bladder (02.07.23 @ 09:56) >  ACC: [id number] EXAM:  XR KUB 1 VIEW   ORDERED BY:  TANIA THOMPSON     PROCEDURE DATE:  02/07/2023          INTERPRETATION:  Clinical history: 89-year-old male, SBO.    Frontal view of the abdomen is correlated with the CT of 2/4/2023.    FINDINGS: Mild to moderate diffuse small bowel dilatation, slightly   improved.    No pneumoperitoneum or acute osseous finding.    Surgical clips in the right upper quadrant, post removal of gangrenous   gallbladder, unchanged    IMPRESSION:    Mild to moderate diffuse small bowel dilatation, improved. Clinical note   2/7/2023 Abdomen: Soft, nondistended, nontender J.P. with serous drainage    --- End of Report ---        ROBIN HORAN DO; Attending Radiologist  This document has been electronically signed. Feb 7 2023 11:57AM    < end of copied text >    Impression: 89y Male now POD #8 lap cholecystectomy/ perforated  gangrenous gallbladder/post op ileus vs SBO. Pt HD stable, wbc wnl. Drain output Q24 400cc straw color. No n/v, pt reports bm overnight, abd soft ND, sbo vs ileus resolving.     Plan:  - Gastrografin study XR reviewed, contrast noted in colon, restart diet w/ Ensures TID  - K/Phos repleted  - IVF  - IV abx stopped per ID  - Continue felipe/flomax per urology  - C/w RLQ drain to bulb suction, record output    Discussed w/ Dr. Thompson  General Surgery #[id number] SURGERY PROGRESS NOTE  Pt. seen and examined at bedside resting comfortably. No acute events overnight. No complaints. Pt admits to bm overnight, loose. Denies N/V.  Felipe in place. Denies fever/chills, chest pain, dyspnea, cough, dizziness.     Vital Signs Last 24 Hrs  T(C): 36.7 (07 Feb 2023 05:05), Max: 36.8 (06 Feb 2023 13:34)  T(F): 98 (07 Feb 2023 05:05), Max: 98.3 (06 Feb 2023 13:34)  HR: 83 (07 Feb 2023 05:05) (78 - 83)  BP: 135/69 (07 Feb 2023 05:05) (121/73 - 135/69)  BP(mean): 80 (06 Feb 2023 13:34) (80 - 80)  RR: 18 (07 Feb 2023 05:05) (18 - 19)  SpO2: 94% (07 Feb 2023 05:05) (94% - 96%)    Parameters below as of 07 Feb 2023 05:05  Patient On (Oxygen Delivery Method): room air      PHYSICAL EXAM:    GENERAL: NAD  HEAD:  Atraumatic, Normocephalic  CHEST/LUNG: Clear to ausculation, bilaterally   HEART: S1S2  ABDOMEN: soft, nd, mild ttp in RUQ and surrounding drain site. RUPINDER draining to suction, straw colored. Surgical incisions healing well. Eflipe in place draining clear yellow urine.   EXTREMITIES:  calf soft, non tender b/l, sensorimotor intact    I&O's Detail    06 Feb 2023 07:01  -  07 Feb 2023 07:00  --------------------------------------------------------  IN:    dextrose 5%: 75 mL    dextrose 5% + sodium chloride 0.45% w/ Additives: 1200 mL  Total IN: 1275 mL    OUT:    Bulb (mL): 400 mL    Indwelling Catheter - Urethral (mL): 450 mL  Total OUT: 850 mL    Total NET: 425 mL      LABS:                        12.0   7.61  )-----------( 186      ( 07 Feb 2023 06:56 )             38.3     02-07    139  |  107  |  6<L>  ----------------------------<  114<H>  3.8   |  25  |  0.55    Ca    7.9<L>      07 Feb 2023 07:10  Phos  2.1     02-07  Mg     2.0     02-07    TPro  4.9<L>  /  Alb  1.7<L>  /  TBili  0.4  /  DBili  0.1  /  AST  33  /  ALT  26  /  AlkPhos  142<H>  02-06        RADIOLOGY & ADDITIONAL STUDIES:  < from: Xray Kidney Ureter Bladder (02.07.23 @ 09:56) >  ACC: [id number] EXAM:  XR KUB 1 VIEW   ORDERED BY:  TANIA THOMPSON     PROCEDURE DATE:  02/07/2023          INTERPRETATION:  Clinical history: 89-year-old male, SBO.    Frontal view of the abdomen is correlated with the CT of 2/4/2023.    FINDINGS: Mild to moderate diffuse small bowel dilatation, slightly   improved.    No pneumoperitoneum or acute osseous finding.    Surgical clips in the right upper quadrant, post removal of gangrenous   gallbladder, unchanged    IMPRESSION:    Mild to moderate diffuse small bowel dilatation, improved. Clinical note   2/7/2023 Abdomen: Soft, nondistended, nontender J.P. with serous drainage    --- End of Report ---        ROBIN HORAN DO; Attending Radiologist  This document has been electronically signed. Feb 7 2023 11:57AM    < end of copied text >    Impression: 89y Male now POD #8 lap cholecystectomy/ perforated  gangrenous gallbladder/post op ileus vs SBO. Pt HD stable, wbc wnl. Drain output Q24 400cc straw color. No n/v, pt reports bm overnight, abd soft ND, sbo vs ileus resolving.     Plan:  - Gastrografin study XR reviewed, contrast noted in colon, restart diet w/ Ensures TID  - K/Phos repleted  - IVF  - IV abx stopped per ID  - Continue felipe/flomax per urology  - C/w RLQ drain to bulb suction, record output    Discussed w/ Dr. Thompson  General Surgery #[id number] SURGERY PROGRESS NOTE  Pt. seen and examined at bedside resting comfortably. No acute events overnight. No complaints. Pt admits to bm overnight, loose. Denies N/V.  Felipe in place. Denies fever/chills, chest pain, dyspnea, cough, dizziness.     Vital Signs Last 24 Hrs  T(C): 36.7 (07 Feb 2023 05:05), Max: 36.8 (06 Feb 2023 13:34)  T(F): 98 (07 Feb 2023 05:05), Max: 98.3 (06 Feb 2023 13:34)  HR: 83 (07 Feb 2023 05:05) (78 - 83)  BP: 135/69 (07 Feb 2023 05:05) (121/73 - 135/69)  BP(mean): 80 (06 Feb 2023 13:34) (80 - 80)  RR: 18 (07 Feb 2023 05:05) (18 - 19)  SpO2: 94% (07 Feb 2023 05:05) (94% - 96%)    Parameters below as of 07 Feb 2023 05:05  Patient On (Oxygen Delivery Method): room air      PHYSICAL EXAM:    GENERAL: NAD  HEAD:  Atraumatic, Normocephalic  CHEST/LUNG: Clear to ausculation, bilaterally   HEART: S1S2  ABDOMEN: soft, nd, mild ttp in RUQ and surrounding drain site. RUPINDER draining to suction, straw colored. Surgical incisions healing well. Felipe in place draining clear yellow urine.   EXTREMITIES:  calf soft, non tender b/l, sensorimotor intact    I&O's Detail    06 Feb 2023 07:01  -  07 Feb 2023 07:00  --------------------------------------------------------  IN:    dextrose 5%: 75 mL    dextrose 5% + sodium chloride 0.45% w/ Additives: 1200 mL  Total IN: 1275 mL    OUT:    Bulb (mL): 400 mL    Indwelling Catheter - Urethral (mL): 450 mL  Total OUT: 850 mL    Total NET: 425 mL      LABS:                        12.0   7.61  )-----------( 186      ( 07 Feb 2023 06:56 )             38.3     02-07    139  |  107  |  6<L>  ----------------------------<  114<H>  3.8   |  25  |  0.55    Ca    7.9<L>      07 Feb 2023 07:10  Phos  2.1     02-07  Mg     2.0     02-07    TPro  4.9<L>  /  Alb  1.7<L>  /  TBili  0.4  /  DBili  0.1  /  AST  33  /  ALT  26  /  AlkPhos  142<H>  02-06        RADIOLOGY & ADDITIONAL STUDIES:  < from: Xray Kidney Ureter Bladder (02.07.23 @ 09:56) >  ACC: [id number] EXAM:  XR KUB 1 VIEW   ORDERED BY:  TANIA THOMPSON     PROCEDURE DATE:  02/07/2023          INTERPRETATION:  Clinical history: 89-year-old male, SBO.    Frontal view of the abdomen is correlated with the CT of 2/4/2023.    FINDINGS: Mild to moderate diffuse small bowel dilatation, slightly   improved.    No pneumoperitoneum or acute osseous finding.    Surgical clips in the right upper quadrant, post removal of gangrenous   gallbladder, unchanged    IMPRESSION:    Mild to moderate diffuse small bowel dilatation, improved. Clinical note   2/7/2023 Abdomen: Soft, nondistended, nontender J.P. with serous drainage    --- End of Report ---        ROBIN HORAN DO; Attending Radiologist  This document has been electronically signed. Feb 7 2023 11:57AM    < end of copied text >    Impression: 89y Male now POD #8 lap cholecystectomy/ perforated  gangrenous gallbladder/post op ileus vs SBO. Pt HD stable, wbc wnl. Drain output Q24 400cc straw color. No n/v, pt reports bm overnight, abd soft ND, sbo vs ileus resolving.     Plan:  - Gastrografin study XR reviewed, contrast noted in colon, restart diet w/ Ensures TID  - K/Phos repleted  - IVF  - IV abx stopped per ID  - Continue felipe/flomax per urology  - C/w RLQ drain to bulb suction, record output    Discussed w/ Dr. Thompson  General Surgery #[id number]

## 2023-02-07 NOTE — PROGRESS NOTE ADULT - SUBJECTIVE AND OBJECTIVE BOX
Patient is a 89y old  Male who presents with a chief complaint of abdominal pain (06 Feb 2023 16:14)      Patient seen and examined at bedside. No overnight events reported.   Pt somewhat frustrated about still being in the hospital, he has no pain, no N/V.    ALLERGIES:  No Known Allergies    MEDICATIONS  (STANDING):  acetaminophen     Tablet .. 650 milliGRAM(s) Oral every 6 hours  aspirin enteric coated 81 milliGRAM(s) Oral daily  dextrose 5% + sodium chloride 0.45% with potassium chloride 20 mEq/L 1000 milliLiter(s) (75 mL/Hr) IV Continuous <Continuous>  enoxaparin Injectable 30 milliGRAM(s) SubCutaneous every 24 hours  finasteride 5 milliGRAM(s) Oral daily  potassium phosphate IVPB 30 milliMole(s) IV Intermittent once  tamsulosin 0.8 milliGRAM(s) Oral at bedtime    MEDICATIONS  (PRN):  aluminum hydroxide/magnesium hydroxide/simethicone Suspension 30 milliLiter(s) Oral every 4 hours PRN Dyspepsia  melatonin 3 milliGRAM(s) Oral at bedtime PRN Insomnia  ondansetron Injectable 4 milliGRAM(s) IV Push every 8 hours PRN Nausea and/or Vomiting  oxyCODONE    IR 5 milliGRAM(s) Oral every 6 hours PRN Moderate Pain (4 - 6)  zinc oxide 40% Paste 1 Application(s) Topical three times a day PRN rash    Vital Signs Last 24 Hrs  T(F): 98 (07 Feb 2023 05:05), Max: 98.3 (06 Feb 2023 13:34)  HR: 83 (07 Feb 2023 05:05) (78 - 83)  BP: 135/69 (07 Feb 2023 05:05) (121/73 - 135/69)  RR: 18 (07 Feb 2023 05:05) (18 - 19)  SpO2: 94% (07 Feb 2023 05:05) (94% - 96%)  I&O's Summary    06 Feb 2023 07:01  -  07 Feb 2023 07:00  --------------------------------------------------------  IN: 1275 mL / OUT: 850 mL / NET: 425 mL      PHYSICAL EXAM:  General: NAD, A/O x 3  ENT: No gross hearing impairment, Moist mucous membranes, no thrush  Neck: Supple, No JVD  Lungs: Clear to auscultation bilaterally, good air entry, non-labored breathing  Cardio: RRR, S1/S2, No murmur  Abdomen: Soft, mildly tender to deep palp. +drain in place still draining, Bowel sounds present  Extremities: No calf tenderness, No cyanosis, No pitting edema  Psych: Appropriate mood and affect    LABS:                        12.0   7.61  )-----------( 186      ( 07 Feb 2023 06:56 )             38.3     02-07    139  |  107  |  6   ----------------------------<  114  3.8   |  25  |  0.55    Ca    7.9      07 Feb 2023 07:10  Phos  2.1     02-07  Mg     2.0     02-07    TPro  4.9  /  Alb  1.7  /  TBili  0.4  /  DBili  0.1  /  AST  33  /  ALT  26  /  AlkPhos  142  02-06            Lactate, Blood: 1.5 mmol/L (02-04 @ 12:00)          11-23 Chol 160 mg/dL LDL -- HDL 44 mg/dL Trig 95 mg/dL                      Culture - Blood (collected 04 Feb 2023 12:00)  Source: .Blood Blood-Peripheral  Preliminary Report (05 Feb 2023 22:01):    No growth to date.    Culture - Blood (collected 04 Feb 2023 12:00)  Source: .Blood Blood-Peripheral  Preliminary Report (05 Feb 2023 22:01):    No growth to date.      COVID-19 PCR: NotDetec (01-30-23 @ 12:25)    RADIOLOGY & ADDITIONAL TESTS:  < from: US Duplex Venous Lower Ext Complete, Bilateral (02.06.23 @ 14:07) >    IMPRESSION:  No evidence of deep venous thrombosis in either lower extremity.      < end of copied text >  < from: Xray Esophagram Single Contrast (02.06.23 @ 11:29) >      INTERPRETATION:  Esophagram. Patient is postop cholecystectomy. Barium   was used as contrast.    There is a Zenker's diverticulum.    There is flow down the esophagus into the stomach without impedance.    IMPRESSION: Zenker's diverticulum.      < end of copied text >    Care Discussed with Consultants/Other Providers:

## 2023-02-07 NOTE — CHART NOTE - NSCHARTNOTEFT_GEN_A_CORE
Nutrition Follow Up Note  Hospital Course (Per Electronic Medical Record):   Source: Medical Record [X] Nursing Staff [X]     Diet: NPO    Patient remains NPO POD #8 ,  gastrografin study in progress to r/o obstruction ,  diarrhea continues , D5% 1/2NS 20meg Potassium Chloride @ 75ml/hr infusing. no further vomiting noted     Current Weight: no follow up weight since admit weight    Pertinent Medications: MEDICATIONS  (STANDING):  acetaminophen     Tablet .. 650 milliGRAM(s) Oral every 6 hours  aspirin enteric coated 81 milliGRAM(s) Oral daily  dextrose 5% + sodium chloride 0.45% with potassium chloride 20 mEq/L 1000 milliLiter(s) (75 mL/Hr) IV Continuous <Continuous>  enoxaparin Injectable 30 milliGRAM(s) SubCutaneous every 24 hours  finasteride 5 milliGRAM(s) Oral daily  potassium phosphate IVPB 30 milliMole(s) IV Intermittent once  tamsulosin 0.8 milliGRAM(s) Oral at bedtime    MEDICATIONS  (PRN):  aluminum hydroxide/magnesium hydroxide/simethicone Suspension 30 milliLiter(s) Oral every 4 hours PRN Dyspepsia  melatonin 3 milliGRAM(s) Oral at bedtime PRN Insomnia  ondansetron Injectable 4 milliGRAM(s) IV Push every 8 hours PRN Nausea and/or Vomiting  oxyCODONE    IR 5 milliGRAM(s) Oral every 6 hours PRN Moderate Pain (4 - 6)  zinc oxide 40% Paste 1 Application(s) Topical three times a day PRN rash      Pertinent Labs:  02-07 Na139 mmol/L Glu 114 mg/dL<H> K+ 3.8 mmol/L Cr  0.55 mg/dL BUN 6 mg/dL<L> 02-07 Phos 2.1 mg/dL<L> 02-06 Alb 1.7 g/dL<L>  Hgb/Hct 12.0/38.3kg <L>       Skin: surgical incision noted     Edema: none    Last BM: (2/7)     Estimated Needs:   [X] No Change since Previous Assessment  [X] Recalculated:     Previous Nutrition Diagnosis: Severe Protein Calorie Malnutrition     Nutrition Diagnosis is [X] Ongoing         New Nutrition Diagnosis:   [X] Altered GI Function related to surgery noted POD #8 , NPO due to r/o obstruction , ? ileus .       Interventions:   1. Recommend advance diet as medically indicated  2. Request current weight from nursing     Monitoring & Evaluation: will monitor:  [X] Weights   [X] NPO status  [X] Follow Up (Per Protocol)  [X] Tolerance to Diet Prescription       RD to follow as per Nutrition protocol  Devora Tatum RDN

## 2023-02-07 NOTE — PROGRESS NOTE ADULT - NS ATTEND AMEND GEN_ALL_CORE FT
This is a pleasant 89M who presented with acute gangrenous cholecystitis with perforation who is POD#8 s/p laparoscopic cholecystectomy. Intraop there was perforation into the liver bed, with empyema. He tolerated the procedure well. He was extubated and continues to be afebrile and hemodynamically stable. No evidence of gallstone ileus intraop. Patient noted to have severe ileus. Thus, NG initially placed intra-op to decreased risk of aspiration PNA, but patient removed POD 0.     CTA of chest / abdomen/pelvis was obtained overnight for brief episode of hypotension 2/4 which resolved with IV fluids. I reviewed it personally and reviewed the prelim read. It showed resolving ileus, expected post op changes and right pleural effusion. Pulmonary team was consulted - effusion too small for thoracentesis. Will manage conservatively.    Patient continues to do well. Pain is well-controlled. He continues to have multiple liquid BMs. C diff was sent, which was negative. No N/V overnight. No fevers, chills or sweats. Gastrografin study showed contrast in colon    General appearance: No acute distress, lying comfortably. Alert  Respiratory: Nonlabored breathing  Abdomen: benign, appropriate incisional tenderness, incision c/d/i, drain with serous output. reducible left inguinal hernia. slightly less distended.  Extremities: No edema  Neuro: No focal deficits appreciated  Psych: Calm    Labs:   WBC 7.6 from 6.9 Hb 12 from 10.0 BUN/Cr 6/0.55 from 10/0.522 K was 3.8 from 3.0 this am Mg 2.0 Phos 2.1    Hypovolemia from diarrhea resolved. Patient is afebrile, hemodynamically stable this am. No evidence of infectious process or peritonitis today. No abscess noted on CT abdomen / pelvis. He has resolving ileus.     Gastrografin study with contrast in colon - OK for full liquid diet with ensures TID with advancement to regular diet with ensures TID tomorrow if tolerated.  - Strict I/Os. If patient can keep up PO intake to balance diarrhea and diarrhea slows down can try to wean IV fluids.  - Of note, swallow study demonstrated zenker's diverticulum, but no aspiration. Discussed with the patient and his daughter who would not like referral to specialist for intervention at this time given his age.  - Right pleural effusion too small for thoracentesis. Likely reactive, from the gangrenous cholecystitis. Would recommend aggressive IS / acapella. Pulmonary following. CXR in the AM.   - Continue IV abx per ID. Leukocytosis resolved.  - Would aggressively electrolytes daily, given patient's continued losses from diarrhea. Of note, C. diff was negative. Continue to trend electrolytes to minimize risk factors for ileus. Continue to check daily Mg /Phos to am. labs. Goal K of 4.0, Mg of 2.0.   - Defer management of urinary retention to urology.  - Continue drain to bulb suction for now. Will likely remove tomorrow. Currently having benign, serous output.  - Continue lovenox for DVT and asa for cardio/stroke protection. Hb stable.  - Cardiology re-consulted on 2/4 regarding brief episode of hypotension. ECHO today demonstrated EF 50-55%, grade I diastolic dysfunction and borderline pulmonary HTN. Their recommendations were to continue supportive management.   - Non operative management of incidental left inguinal hernia at this time. Easily reducible. Will discuss further options as outpatient.    Updated the patient's daughter and answered all questions at bedside today.    Plan was also discussed with [facility] surgical PA and patient's RN.

## 2023-02-08 ENCOUNTER — TRANSCRIPTION ENCOUNTER (OUTPATIENT)
Age: 88
End: 2023-02-08

## 2023-02-08 LAB
ANION GAP SERPL CALC-SCNC: 8 MMOL/L — SIGNIFICANT CHANGE UP (ref 5–17)
BUN SERPL-MCNC: 4 MG/DL — LOW (ref 7–23)
CALCIUM SERPL-MCNC: 8 MG/DL — LOW (ref 8.4–10.5)
CHLORIDE SERPL-SCNC: 106 MMOL/L — SIGNIFICANT CHANGE UP (ref 96–108)
CO2 SERPL-SCNC: 27 MMOL/L — SIGNIFICANT CHANGE UP (ref 22–31)
CREAT SERPL-MCNC: 0.64 MG/DL — SIGNIFICANT CHANGE UP (ref 0.5–1.3)
EGFR: 87 ML/MIN/1.73M2 — SIGNIFICANT CHANGE UP
GLUCOSE SERPL-MCNC: 97 MG/DL — SIGNIFICANT CHANGE UP (ref 70–99)
HCT VFR BLD CALC: 36.9 % — LOW (ref 39–50)
HGB BLD-MCNC: 11.7 G/DL — LOW (ref 13–17)
MAGNESIUM SERPL-MCNC: 1.8 MG/DL — SIGNIFICANT CHANGE UP (ref 1.6–2.6)
MCHC RBC-ENTMCNC: 30.1 PG — SIGNIFICANT CHANGE UP (ref 27–34)
MCHC RBC-ENTMCNC: 31.7 GM/DL — LOW (ref 32–36)
MCV RBC AUTO: 94.9 FL — SIGNIFICANT CHANGE UP (ref 80–100)
NRBC # BLD: 0 /100 WBCS — SIGNIFICANT CHANGE UP (ref 0–0)
NT-PROBNP SERPL-SCNC: 2206 PG/ML — HIGH (ref 0–300)
PHOSPHATE SERPL-MCNC: 2.7 MG/DL — SIGNIFICANT CHANGE UP (ref 2.5–4.5)
PLATELET # BLD AUTO: 282 K/UL — SIGNIFICANT CHANGE UP (ref 150–400)
POTASSIUM SERPL-MCNC: 4.4 MMOL/L — SIGNIFICANT CHANGE UP (ref 3.5–5.3)
POTASSIUM SERPL-SCNC: 4.4 MMOL/L — SIGNIFICANT CHANGE UP (ref 3.5–5.3)
RBC # BLD: 3.89 M/UL — LOW (ref 4.2–5.8)
RBC # FLD: 15.6 % — HIGH (ref 10.3–14.5)
SODIUM SERPL-SCNC: 141 MMOL/L — SIGNIFICANT CHANGE UP (ref 135–145)
WBC # BLD: 9.89 K/UL — SIGNIFICANT CHANGE UP (ref 3.8–10.5)
WBC # FLD AUTO: 9.89 K/UL — SIGNIFICANT CHANGE UP (ref 3.8–10.5)

## 2023-02-08 PROCEDURE — 93010 ELECTROCARDIOGRAM REPORT: CPT

## 2023-02-08 PROCEDURE — 99233 SBSQ HOSP IP/OBS HIGH 50: CPT

## 2023-02-08 PROCEDURE — 71045 X-RAY EXAM CHEST 1 VIEW: CPT | Mod: 26

## 2023-02-08 PROCEDURE — 99232 SBSQ HOSP IP/OBS MODERATE 35: CPT

## 2023-02-08 RX ORDER — FUROSEMIDE 40 MG
20 TABLET ORAL ONCE
Refills: 0 | Status: COMPLETED | OUTPATIENT
Start: 2023-02-08 | End: 2023-02-08

## 2023-02-08 RX ORDER — METOPROLOL TARTRATE 50 MG
25 TABLET ORAL
Refills: 0 | Status: DISCONTINUED | OUTPATIENT
Start: 2023-02-08 | End: 2023-02-09

## 2023-02-08 RX ADMIN — Medication 25 MILLIGRAM(S): at 17:21

## 2023-02-08 RX ADMIN — ENOXAPARIN SODIUM 30 MILLIGRAM(S): 100 INJECTION SUBCUTANEOUS at 11:19

## 2023-02-08 RX ADMIN — Medication 650 MILLIGRAM(S): at 23:56

## 2023-02-08 RX ADMIN — FINASTERIDE 5 MILLIGRAM(S): 5 TABLET, FILM COATED ORAL at 11:19

## 2023-02-08 RX ADMIN — Medication 20 MILLIGRAM(S): at 17:21

## 2023-02-08 RX ADMIN — Medication 81 MILLIGRAM(S): at 11:22

## 2023-02-08 RX ADMIN — TAMSULOSIN HYDROCHLORIDE 0.8 MILLIGRAM(S): 0.4 CAPSULE ORAL at 21:17

## 2023-02-08 NOTE — SWALLOW BEDSIDE ASSESSMENT ADULT - SLP PERTINENT HISTORY OF CURRENT PROBLEM
89y year old Male with PMH of recent L hip fx (11/2022), TIA (11/2022) who presents to the ER from [facility] for abdominal pain, nausea for 4 days. Admitted for acute gangrenous cholecystitis, c/b perforated gallbladder, s/p lap kellee on 1/30 with empyema found, s/p drain in place. Pt is s/p zosyn, infectious disease consulting. Post op course complicated by multifocal atrial tachycardia and small bowel obstruction due to ileus. He is now on a full liquid diet. Speech and swallow eval pending. Acute cholecystitis c/b perforated gallbladder 89y year old Male with PMH of recent L hip fx (11/2022), TIA (11/2022) who presents to the ER from [facility] for abdominal pain, nausea for 4 days. Admitted for acute gangrenous cholecystitis, c/b perforated gallbladder, s/p lap kellee on 1/30 with empyema found, s/p drain in place. Pt is s/p zosyn, infectious disease consulting. Post op course complicated by multifocal atrial tachycardia and small bowel obstruction due to ileus. He is now on a full liquid diet. Speech and swallow eval pending. Acute cholecystitis c/b perforated gallbladder 89y year old Male with PMH of recent L hip fx (11/2022), TIA (11/2022) who presents to the ER from [facility] for abdominal pain, nausea for 4 days. Admitted for acute gangrenous cholecystitis, c/b perforated gallbladder, s/p lap kellee on 1/30 with empyema found, s/p drain in place. Pt is s/p zosyn, infectious disease consulting. Post op course complicated by multifocal atrial tachycardia and small bowel obstruction due to ileus. He is now on a full liquid diet. Speech and swallow eval pending. Acute cholecystitis c/b perforated gallbladder

## 2023-02-08 NOTE — DISCHARGE NOTE PROVIDER - CARE PROVIDER_API CALL
Alicia Thompson)  Surgery  [facility]  [address] [facility], [address] [place] [address]  Phone: [phone]  Fax: [phone]  Follow Up Time:    Alicia Thompson)  Surgery  [facility]  10 [facility], [address] [place] [address]  Phone: [phone]  Fax: [phone]  Follow Up Time:    Alicia Thompson)  Surgery  [place]  10 [facility], [address] [place] [address]  Phone: [phone]  Fax: [phone]  Follow Up Time:

## 2023-02-08 NOTE — SWALLOW BEDSIDE ASSESSMENT ADULT - SLP GENERAL OBSERVATIONS
Patient received awake, alert in bed. Requesting repositioning in bed and states he is cold. Provided rationale for ST service and intervention and agreeable to assessment

## 2023-02-08 NOTE — DISCHARGE NOTE PROVIDER - NSDCCPCAREPLAN_GEN_ALL_CORE_FT
PRINCIPAL DISCHARGE DIAGNOSIS  Diagnosis: Acute cholecystitis  Assessment and Plan of Treatment: you had surgery to remove your gallbladder.  you completed antibiotics and are now surgically and medically cleared to be discharged.       PRINCIPAL DISCHARGE DIAGNOSIS  Diagnosis: Acute cholecystitis  Assessment and Plan of Treatment: you had surgery to remove your gallbladder.  you completed antibiotics and are now surgically and medically cleared to be discharged. assure orthostatics are improved.

## 2023-02-08 NOTE — SWALLOW BEDSIDE ASSESSMENT ADULT - SWALLOW EVAL: DIAGNOSIS
Patient seen for clinical swallow evaluation and presents with mild oral dysphagia 2/2 missing posterior dentition. Dysphagia is characterized by prolonged and disorganized mastication of advanced solids and munch chew mastication pattern. Patient demonstrates adequate bolus collection and A-P transit, age appropriate timing of pharyngeal swallow trigger post transport, and palpable hylo-laryngeal elevation. Patient did not demonstrate overt s/s of aspiration on assessment across consistencies

## 2023-02-08 NOTE — PROGRESS NOTE ADULT - SUBJECTIVE AND OBJECTIVE BOX
Follow-up Pulmonary Progress Note  Chief Complaint : Acute cholecystitis          No new events overnight.  Denies SOB/CP.   on room air  no complaints      Allergies :No Known Allergies      PAST MEDICAL & SURGICAL HISTORY:  History of BPH    Malignant neoplasm of colon    Femur neck fracture    History of glaucoma    GERD (gastroesophageal reflux disease)    Iron deficiency anemia    Hyperlipidemia    History of colon surgery        Medications:  MEDICATIONS  (STANDING):  acetaminophen     Tablet .. 650 milliGRAM(s) Oral every 6 hours  aspirin enteric coated 81 milliGRAM(s) Oral daily  enoxaparin Injectable 30 milliGRAM(s) SubCutaneous every 24 hours  finasteride 5 milliGRAM(s) Oral daily  tamsulosin 0.8 milliGRAM(s) Oral at bedtime    MEDICATIONS  (PRN):  aluminum hydroxide/magnesium hydroxide/simethicone Suspension 30 milliLiter(s) Oral every 4 hours PRN Dyspepsia  melatonin 3 milliGRAM(s) Oral at bedtime PRN Insomnia  ondansetron Injectable 4 milliGRAM(s) IV Push every 8 hours PRN Nausea and/or Vomiting  zinc oxide 40% Paste 1 Application(s) Topical three times a day PRN rash      Antibiotics History  cefepime   IVPB 1000 milliGRAM(s) IV Intermittent once, 01-30-23 @ 12:53, Stop order after: 1 Doses  cefTRIAXone   IVPB 1000 milliGRAM(s) IV Intermittent every 24 hours, 02-05-23 @ 13:48, Stop order after: 8 Days  metroNIDAZOLE  IVPB 500 milliGRAM(s) IV Intermittent once, 01-30-23 @ 14:31, Stop order after: 1 Doses  piperacillin/tazobactam IVPB. 3.375 Gram(s) IV Intermittent once, 01-30-23 @ 15:30, Stop order after: 1 Doses  piperacillin/tazobactam IVPB.- 3.375 Gram(s) IV Intermittent once, 01-31-23 @ 08:00  piperacillin/tazobactam IVPB.- 3.375 Gram(s) IV Intermittent once, 01-31-23 @ 02:00  piperacillin/tazobactam IVPB.- 3.375 Gram(s) IV Intermittent once, 01-30-23 @ 19:00  piperacillin/tazobactam IVPB.- 3.375 Gram(s) IV Intermittent once, 01-30-23 @ 23:41, Stop order after: 1 Doses  piperacillin/tazobactam IVPB.- 3.375 Gram(s) IV Intermittent once, 01-30-23 @ 23:41, Stop order after: 1 Doses  piperacillin/tazobactam IVPB.. 3.375 Gram(s) IV Intermittent every 8 hours, 01-30-23 @ 23:41, Stop order after: 7 Days  piperacillin/tazobactam IVPB.. 3.375 Gram(s) IV Intermittent every 8 hours, 01-31-23 @ 14:00, Stop order after: 7 Days      Heme Medications   aspirin enteric coated 81 milliGRAM(s) Oral daily, 02-02-23 @ 13:08  enoxaparin Injectable 30 milliGRAM(s) SubCutaneous every 24 hours, 02-02-23 @ 10:12      GI Medications  aluminum hydroxide/magnesium hydroxide/simethicone Suspension 30 milliLiter(s) Oral every 4 hours, 01-30-23 @ 23:41,  PRN        LABS:                        11.7   9.89  )-----------( 282      ( 08 Feb 2023 06:30 )             36.9     02-08    141  |  106  |  4<L>  ----------------------------<  97  4.4   |  27  |  0.64    Ca    8.0<L>      08 Feb 2023 06:30  Phos  2.7     02-08  Mg     1.8     02-08      HIT ab -- 02-04 @ 12:00  HIT ab EIA --  D Dimer -[id number]       CULTURES: (if applicable)    Culture - Blood (collected 02-04-23 @ 12:00)  Source: .Blood Blood-Peripheral  Preliminary Report (02-05-23 @ 22:01):    No growth to date.    Culture - Blood (collected 02-04-23 @ 12:00)  Source: .Blood Blood-Peripheral  Preliminary Report (02-05-23 @ 22:01):    No growth to date.    Culture - Urine (collected 01-30-23 @ 12:38)  Source: Catheterized Catheterized  Final Report (02-01-23 @ 19:29):    >100,000 CFU/ml Escherichia coli    >100,000 CFU/ml Aerococcus species    "Aerococcus spp. are predictably susceptible to penicillin,    ampicillin, tetracycline, and vancomycin.  All isolates are    resistant to sulfonamides"  Organism: Escherichia coli (02-01-23 @ 19:29)  Organism: Escherichia coli (02-01-23 @ 19:29)      -  Amikacin: S <=16      -  Amoxicillin/Clavulanic Acid: S <=8/4      -  Ampicillin: S <=8 These ampicillin results predict results for amoxicillin      -  Ampicillin/Sulbactam: S <=4/2 Enterobacter, Klebsiella aerogenes, Citrobacter, and Serratia may develop resistance during prolonged therapy (3-4 days)      -  Aztreonam: S <=4      -  Cefazolin: S <=2 For uncomplicated UTI with K. pneumoniae, E. coli, or P. mirablis: PHOEBE <=16 is sensitive and PHOEBE >=32 is resistant. This also predicts results for oral agents cefaclor, cefdinir, cefpodoxime, cefprozil, cefuroxime axetil, cephalexin and locarbef for uncomplicated UTI. Note that some isolates may be susceptible to these agents while testing resistant to cefazolin.      -  Cefepime: S 8      -  Cefoxitin: S <=8      -  Ceftriaxone: S <=1 Enterobacter, Klebsiella aerogenes, Citrobacter, and Serratia may develop resistance during prolonged therapy      -  Cefuroxime: S <=4      -  Ciprofloxacin: S <=0.25      -  Ertapenem: S <=0.5      -  Gentamicin: S <=2      -  Imipenem: S <=1      -  Levofloxacin: S <=0.5      -  Meropenem: S <=1      -  Nitrofurantoin: S <=32 Should not be used to treat pyelonephritis      -  Piperacillin/Tazobactam: S <=8      -  Tobramycin: S <=2      -  Trimethoprim/Sulfamethoxazole: S <=0.5/9.5      Method Type: PHOEBE    Culture - Blood (collected 01-30-23 @ 12:27)  Source: .Blood Blood-Peripheral  Final Report (02-04-23 @ 15:08):    No Growth Final    Culture - Blood (collected 01-30-23 @ 12:10)  Source: .Blood Blood-Peripheral  Gram Stain (01-31-23 @ 15:34):    Growth in anaerobic bottle: Gram positive cocci in pairs  Final Report (02-01-23 @ 13:38):    Growth in anaerobic bottle: Aerococcus urinae    "Susceptibilities not performed"    ***Blood Panel PCR results on this specimen are available    approximately 3 hours after the Gram stain result.***    Gram stain, PCR, and/or culture results may not always    correspond due to difference in methodologies.    ************************************************************    This PCR assay was performed by multiplex PCR. This    Assay tests for 66 bacterial and resistance gene targets.    Please refer to the [facility] test directory    at [email or web address] for details.  Organism: Blood Culture PCR (02-01-23 @ 13:38)  Organism: Blood Culture PCR (02-01-23 @ 13:38)      -  Blood PCR Panel: NEG      Method Type: PCR        RADIOLOGY  CXR:  < from: Xray Chest 1 View- PORTABLE-Routine (Xray Chest 1 View- PORTABLE-Routine in AM.) (02.08.23 @ 08:55) >    ACC: [id number] EXAM:  XR CHEST PORTABLE ROUTINE 1V   ORDERED BY: SANDI BUTT     PROCEDURE DATE:  02/08/2023          INTERPRETATION:  Chest one view    HISTORY: Right pleural effusion    COMPARISON STUDY: 2/4/2023    Frontal expiratory view of the chest shows the heart to be normal in   size. The lungs show questionable developing the lower right infiltrate   and there is no evidence of pneumothorax nor pleural effusion.    IMPRESSION:  Questionable right infiltrate. Follow-up study is recommended as clinically warranted.      Thank you for the courtesy of this referral.    --- End of Report ---    < end of copied text >           VITALS:  T(C): 36.3 (02-08-23 @ 13:31), Max: 36.5 (02-08-23 @ 05:40)  T(F): 97.4 (02-08-23 @ 13:31), Max: 97.7 (02-08-23 @ 05:40)  HR: 103 (02-08-23 @ 13:31) (75 - 103)  BP: 103/69 (02-08-23 @ 13:31) (103/69 - 153/73)  BP(mean): --  ABP: --  ABP(mean): --  RR: 18 (02-08-23 @ 13:31) (18 - 18)  SpO2: 95% (02-08-23 @ 13:31) (95% - 95%)  CVP(mm Hg): --  CVP(cm H2O): --    Ins and Outs     02-07-23 @ 07:01  -  02-08-23 @ 07:00  --------------------------------------------------------  IN: 925 mL / OUT: 2075 mL / NET: -1150 mL    02-08-23 @ 07:01  -  02-08-23 @ 15:10  --------------------------------------------------------  IN: 0 mL / OUT: 90 mL / NET: -90 mL                I&O's Detail    07 Feb 2023 07:01  -  08 Feb 2023 07:00  --------------------------------------------------------  IN:    dextrose 5% + sodium chloride 0.45% w/ Additives: 825 mL    Oral Fluid: 100 mL  Total IN: 925 mL    OUT:    Bulb (mL): 275 mL    Indwelling Catheter - Urethral (mL): 1800 mL  Total OUT: 2075 mL    Total NET: -1150 mL      08 Feb 2023 07:01  -  08 Feb 2023 15:10  --------------------------------------------------------  IN:  Total IN: 0 mL    OUT:    Bulb (mL): 90 mL  Total OUT: 90 mL    Total NET: -90 mL                Follow-up Pulmonary Progress Note  Chief Complaint : Acute cholecystitis          No new events overnight.  Denies SOB/CP.   on room air  no complaints      Allergies :No Known Allergies      PAST MEDICAL & SURGICAL HISTORY:  History of BPH    Malignant neoplasm of colon    Femur neck fracture    History of glaucoma    GERD (gastroesophageal reflux disease)    Iron deficiency anemia    Hyperlipidemia    History of colon surgery        Medications:  MEDICATIONS  (STANDING):  acetaminophen     Tablet .. 650 milliGRAM(s) Oral every 6 hours  aspirin enteric coated 81 milliGRAM(s) Oral daily  enoxaparin Injectable 30 milliGRAM(s) SubCutaneous every 24 hours  finasteride 5 milliGRAM(s) Oral daily  tamsulosin 0.8 milliGRAM(s) Oral at bedtime    MEDICATIONS  (PRN):  aluminum hydroxide/magnesium hydroxide/simethicone Suspension 30 milliLiter(s) Oral every 4 hours PRN Dyspepsia  melatonin 3 milliGRAM(s) Oral at bedtime PRN Insomnia  ondansetron Injectable 4 milliGRAM(s) IV Push every 8 hours PRN Nausea and/or Vomiting  zinc oxide 40% Paste 1 Application(s) Topical three times a day PRN rash      Antibiotics History  cefepime   IVPB 1000 milliGRAM(s) IV Intermittent once, 01-30-23 @ 12:53, Stop order after: 1 Doses  cefTRIAXone   IVPB 1000 milliGRAM(s) IV Intermittent every 24 hours, 02-05-23 @ 13:48, Stop order after: 8 Days  metroNIDAZOLE  IVPB 500 milliGRAM(s) IV Intermittent once, 01-30-23 @ 14:31, Stop order after: 1 Doses  piperacillin/tazobactam IVPB. 3.375 Gram(s) IV Intermittent once, 01-30-23 @ 15:30, Stop order after: 1 Doses  piperacillin/tazobactam IVPB.- 3.375 Gram(s) IV Intermittent once, 01-31-23 @ 08:00  piperacillin/tazobactam IVPB.- 3.375 Gram(s) IV Intermittent once, 01-31-23 @ 02:00  piperacillin/tazobactam IVPB.- 3.375 Gram(s) IV Intermittent once, 01-30-23 @ 19:00  piperacillin/tazobactam IVPB.- 3.375 Gram(s) IV Intermittent once, 01-30-23 @ 23:41, Stop order after: 1 Doses  piperacillin/tazobactam IVPB.- 3.375 Gram(s) IV Intermittent once, 01-30-23 @ 23:41, Stop order after: 1 Doses  piperacillin/tazobactam IVPB.. 3.375 Gram(s) IV Intermittent every 8 hours, 01-30-23 @ 23:41, Stop order after: 7 Days  piperacillin/tazobactam IVPB.. 3.375 Gram(s) IV Intermittent every 8 hours, 01-31-23 @ 14:00, Stop order after: 7 Days      Heme Medications   aspirin enteric coated 81 milliGRAM(s) Oral daily, 02-02-23 @ 13:08  enoxaparin Injectable 30 milliGRAM(s) SubCutaneous every 24 hours, 02-02-23 @ 10:12      GI Medications  aluminum hydroxide/magnesium hydroxide/simethicone Suspension 30 milliLiter(s) Oral every 4 hours, 01-30-23 @ 23:41,  PRN        LABS:                        11.7   9.89  )-----------( 282      ( 08 Feb 2023 06:30 )             36.9     02-08    141  |  106  |  4<L>  ----------------------------<  97  4.4   |  27  |  0.64    Ca    8.0<L>      08 Feb 2023 06:30  Phos  2.7     02-08  Mg     1.8     02-08      HIT ab -- 02-04 @ 12:00  HIT ab EIA --  D Dimer -1872       CULTURES: (if applicable)    Culture - Blood (collected 02-04-23 @ 12:00)  Source: .Blood Blood-Peripheral  Preliminary Report (02-05-23 @ 22:01):    No growth to date.    Culture - Blood (collected 02-04-23 @ 12:00)  Source: .Blood Blood-Peripheral  Preliminary Report (02-05-23 @ 22:01):    No growth to date.    Culture - Urine (collected 01-30-23 @ 12:38)  Source: Catheterized Catheterized  Final Report (02-01-23 @ 19:29):    >100,000 CFU/ml Escherichia coli    >100,000 CFU/ml Aerococcus species    "Aerococcus spp. are predictably susceptible to penicillin,    ampicillin, tetracycline, and vancomycin.  All isolates are    resistant to sulfonamides"  Organism: Escherichia coli (02-01-23 @ 19:29)  Organism: Escherichia coli (02-01-23 @ 19:29)      -  Amikacin: S <=16      -  Amoxicillin/Clavulanic Acid: S <=8/4      -  Ampicillin: S <=8 These ampicillin results predict results for amoxicillin      -  Ampicillin/Sulbactam: S <=4/2 Enterobacter, Klebsiella aerogenes, Citrobacter, and Serratia may develop resistance during prolonged therapy (3-4 days)      -  Aztreonam: S <=4      -  Cefazolin: S <=2 For uncomplicated UTI with K. pneumoniae, E. coli, or P. mirablis: PHOEBE <=16 is sensitive and PHOEBE >=32 is resistant. This also predicts results for oral agents cefaclor, cefdinir, cefpodoxime, cefprozil, cefuroxime axetil, cephalexin and locarbef for uncomplicated UTI. Note that some isolates may be susceptible to these agents while testing resistant to cefazolin.      -  Cefepime: S 8      -  Cefoxitin: S <=8      -  Ceftriaxone: S <=1 Enterobacter, Klebsiella aerogenes, Citrobacter, and Serratia may develop resistance during prolonged therapy      -  Cefuroxime: S <=4      -  Ciprofloxacin: S <=0.25      -  Ertapenem: S <=0.5      -  Gentamicin: S <=2      -  Imipenem: S <=1      -  Levofloxacin: S <=0.5      -  Meropenem: S <=1      -  Nitrofurantoin: S <=32 Should not be used to treat pyelonephritis      -  Piperacillin/Tazobactam: S <=8      -  Tobramycin: S <=2      -  Trimethoprim/Sulfamethoxazole: S <=0.5/9.5      Method Type: PHOEBE    Culture - Blood (collected 01-30-23 @ 12:27)  Source: .Blood Blood-Peripheral  Final Report (02-04-23 @ 15:08):    No Growth Final    Culture - Blood (collected 01-30-23 @ 12:10)  Source: .Blood Blood-Peripheral  Gram Stain (01-31-23 @ 15:34):    Growth in anaerobic bottle: Gram positive cocci in pairs  Final Report (02-01-23 @ 13:38):    Growth in anaerobic bottle: Aerococcus urinae    "Susceptibilities not performed"    ***Blood Panel PCR results on this specimen are available    approximately 3 hours after the Gram stain result.***    Gram stain, PCR, and/or culture results may not always    correspond due to difference in methodologies.    ************************************************************    This PCR assay was performed by multiplex PCR. This    Assay tests for 66 bacterial and resistance gene targets.    Please refer to the [facility] test directory    at [email or web address] for details.  Organism: Blood Culture PCR (02-01-23 @ 13:38)  Organism: Blood Culture PCR (02-01-23 @ 13:38)      -  Blood PCR Panel: NEG      Method Type: PCR        RADIOLOGY  CXR:  < from: Xray Chest 1 View- PORTABLE-Routine (Xray Chest 1 View- PORTABLE-Routine in AM.) (02.08.23 @ 08:55) >    ACC: [id number] EXAM:  XR CHEST PORTABLE ROUTINE 1V   ORDERED BY: SANDI BUTT     PROCEDURE DATE:  02/08/2023          INTERPRETATION:  Chest one view    HISTORY: Right pleural effusion    COMPARISON STUDY: 2/4/2023    Frontal expiratory view of the chest shows the heart to be normal in   size. The lungs show questionable developing the lower right infiltrate   and there is no evidence of pneumothorax nor pleural effusion.    IMPRESSION:  Questionable right infiltrate. Follow-up study is recommended as clinically warranted.      Thank you for the courtesy of this referral.    --- End of Report ---    < end of copied text >           VITALS:  T(C): 36.3 (02-08-23 @ 13:31), Max: 36.5 (02-08-23 @ 05:40)  T(F): 97.4 (02-08-23 @ 13:31), Max: 97.7 (02-08-23 @ 05:40)  HR: 103 (02-08-23 @ 13:31) (75 - 103)  BP: 103/69 (02-08-23 @ 13:31) (103/69 - 153/73)  BP(mean): --  ABP: --  ABP(mean): --  RR: 18 (02-08-23 @ 13:31) (18 - 18)  SpO2: 95% (02-08-23 @ 13:31) (95% - 95%)  CVP(mm Hg): --  CVP(cm H2O): --    Ins and Outs     02-07-23 @ 07:01  -  02-08-23 @ 07:00  --------------------------------------------------------  IN: 925 mL / OUT: 2075 mL / NET: -1150 mL    02-08-23 @ 07:01  -  02-08-23 @ 15:10  --------------------------------------------------------  IN: 0 mL / OUT: 90 mL / NET: -90 mL                I&O's Detail    07 Feb 2023 07:01  -  08 Feb 2023 07:00  --------------------------------------------------------  IN:    dextrose 5% + sodium chloride 0.45% w/ Additives: 825 mL    Oral Fluid: 100 mL  Total IN: 925 mL    OUT:    Bulb (mL): 275 mL    Indwelling Catheter - Urethral (mL): 1800 mL  Total OUT: 2075 mL    Total NET: -1150 mL      08 Feb 2023 07:01  -  08 Feb 2023 15:10  --------------------------------------------------------  IN:  Total IN: 0 mL    OUT:    Bulb (mL): 90 mL  Total OUT: 90 mL    Total NET: -90 mL                Follow-up Pulmonary Progress Note  Chief Complaint : Acute cholecystitis          No new events overnight.  Denies SOB/CP.   on room air  no complaints      Allergies :No Known Allergies      PAST MEDICAL & SURGICAL HISTORY:  History of BPH    Malignant neoplasm of colon    Femur neck fracture    History of glaucoma    GERD (gastroesophageal reflux disease)    Iron deficiency anemia    Hyperlipidemia    History of colon surgery        Medications:  MEDICATIONS  (STANDING):  acetaminophen     Tablet .. 650 milliGRAM(s) Oral every 6 hours  aspirin enteric coated 81 milliGRAM(s) Oral daily  enoxaparin Injectable 30 milliGRAM(s) SubCutaneous every 24 hours  finasteride 5 milliGRAM(s) Oral daily  tamsulosin 0.8 milliGRAM(s) Oral at bedtime    MEDICATIONS  (PRN):  aluminum hydroxide/magnesium hydroxide/simethicone Suspension 30 milliLiter(s) Oral every 4 hours PRN Dyspepsia  melatonin 3 milliGRAM(s) Oral at bedtime PRN Insomnia  ondansetron Injectable 4 milliGRAM(s) IV Push every 8 hours PRN Nausea and/or Vomiting  zinc oxide 40% Paste 1 Application(s) Topical three times a day PRN rash      Antibiotics History  cefepime   IVPB 1000 milliGRAM(s) IV Intermittent once, 01-30-23 @ 12:53, Stop order after: 1 Doses  cefTRIAXone   IVPB 1000 milliGRAM(s) IV Intermittent every 24 hours, 02-05-23 @ 13:48, Stop order after: 8 Days  metroNIDAZOLE  IVPB 500 milliGRAM(s) IV Intermittent once, 01-30-23 @ 14:31, Stop order after: 1 Doses  piperacillin/tazobactam IVPB. 3.375 Gram(s) IV Intermittent once, 01-30-23 @ 15:30, Stop order after: 1 Doses  piperacillin/tazobactam IVPB.- 3.375 Gram(s) IV Intermittent once, 01-31-23 @ 08:00  piperacillin/tazobactam IVPB.- 3.375 Gram(s) IV Intermittent once, 01-31-23 @ 02:00  piperacillin/tazobactam IVPB.- 3.375 Gram(s) IV Intermittent once, 01-30-23 @ 19:00  piperacillin/tazobactam IVPB.- 3.375 Gram(s) IV Intermittent once, 01-30-23 @ 23:41, Stop order after: 1 Doses  piperacillin/tazobactam IVPB.- 3.375 Gram(s) IV Intermittent once, 01-30-23 @ 23:41, Stop order after: 1 Doses  piperacillin/tazobactam IVPB.. 3.375 Gram(s) IV Intermittent every 8 hours, 01-30-23 @ 23:41, Stop order after: 7 Days  piperacillin/tazobactam IVPB.. 3.375 Gram(s) IV Intermittent every 8 hours, 01-31-23 @ 14:00, Stop order after: 7 Days      Heme Medications   aspirin enteric coated 81 milliGRAM(s) Oral daily, 02-02-23 @ 13:08  enoxaparin Injectable 30 milliGRAM(s) SubCutaneous every 24 hours, 02-02-23 @ 10:12      GI Medications  aluminum hydroxide/magnesium hydroxide/simethicone Suspension 30 milliLiter(s) Oral every 4 hours, 01-30-23 @ 23:41,  PRN        LABS:                        11.7   9.89  )-----------( 282      ( 08 Feb 2023 06:30 )             36.9     02-08    141  |  106  |  4<L>  ----------------------------<  97  4.4   |  27  |  0.64    Ca    8.0<L>      08 Feb 2023 06:30  Phos  2.7     02-08  Mg     1.8     02-08      HIT ab -- 02-04 @ 12:00  HIT ab EIA --  D Dimer -1872       CULTURES: (if applicable)    Culture - Blood (collected 02-04-23 @ 12:00)  Source: .Blood Blood-Peripheral  Preliminary Report (02-05-23 @ 22:01):    No growth to date.    Culture - Blood (collected 02-04-23 @ 12:00)  Source: .Blood Blood-Peripheral  Preliminary Report (02-05-23 @ 22:01):    No growth to date.    Culture - Urine (collected 01-30-23 @ 12:38)  Source: Catheterized Catheterized  Final Report (02-01-23 @ 19:29):    >100,000 CFU/ml Escherichia coli    >100,000 CFU/ml Aerococcus species    "Aerococcus spp. are predictably susceptible to penicillin,    ampicillin, tetracycline, and vancomycin.  All isolates are    resistant to sulfonamides"  Organism: Escherichia coli (02-01-23 @ 19:29)  Organism: Escherichia coli (02-01-23 @ 19:29)      -  Amikacin: S <=16      -  Amoxicillin/Clavulanic Acid: S <=8/4      -  Ampicillin: S <=8 These ampicillin results predict results for amoxicillin      -  Ampicillin/Sulbactam: S <=4/2 Enterobacter, Klebsiella aerogenes, Citrobacter, and Serratia may develop resistance during prolonged therapy (3-4 days)      -  Aztreonam: S <=4      -  Cefazolin: S <=2 For uncomplicated UTI with K. pneumoniae, E. coli, or P. mirablis: PHOEBE <=16 is sensitive and PHOEBE >=32 is resistant. This also predicts results for oral agents cefaclor, cefdinir, cefpodoxime, cefprozil, cefuroxime axetil, cephalexin and locarbef for uncomplicated UTI. Note that some isolates may be susceptible to these agents while testing resistant to cefazolin.      -  Cefepime: S 8      -  Cefoxitin: S <=8      -  Ceftriaxone: S <=1 Enterobacter, Klebsiella aerogenes, Citrobacter, and Serratia may develop resistance during prolonged therapy      -  Cefuroxime: S <=4      -  Ciprofloxacin: S <=0.25      -  Ertapenem: S <=0.5      -  Gentamicin: S <=2      -  Imipenem: S <=1      -  Levofloxacin: S <=0.5      -  Meropenem: S <=1      -  Nitrofurantoin: S <=32 Should not be used to treat pyelonephritis      -  Piperacillin/Tazobactam: S <=8      -  Tobramycin: S <=2      -  Trimethoprim/Sulfamethoxazole: S <=0.5/9.5      Method Type: PHOEBE    Culture - Blood (collected 01-30-23 @ 12:27)  Source: .Blood Blood-Peripheral  Final Report (02-04-23 @ 15:08):    No Growth Final    Culture - Blood (collected 01-30-23 @ 12:10)  Source: .Blood Blood-Peripheral  Gram Stain (01-31-23 @ 15:34):    Growth in anaerobic bottle: Gram positive cocci in pairs  Final Report (02-01-23 @ 13:38):    Growth in anaerobic bottle: Aerococcus urinae    "Susceptibilities not performed"    ***Blood Panel PCR results on this specimen are available    approximately 3 hours after the Gram stain result.***    Gram stain, PCR, and/or culture results may not always    correspond due to difference in methodologies.    ************************************************************    This PCR assay was performed by multiplex PCR. This    Assay tests for 66 bacterial and resistance gene targets.    Please refer to the [facility] test directory    at [email or web address] for details.  Organism: Blood Culture PCR (02-01-23 @ 13:38)  Organism: Blood Culture PCR (02-01-23 @ 13:38)      -  Blood PCR Panel: NEG      Method Type: PCR        RADIOLOGY  CXR:  < from: Xray Chest 1 View- PORTABLE-Routine (Xray Chest 1 View- PORTABLE-Routine in AM.) (02.08.23 @ 08:55) >    ACC: [id number] EXAM:  XR CHEST PORTABLE ROUTINE 1V   ORDERED BY: SANDI BUTT     PROCEDURE DATE:  02/08/2023          INTERPRETATION:  Chest one view    HISTORY: Right pleural effusion    COMPARISON STUDY: 2/4/2023    Frontal expiratory view of the chest shows the heart to be normal in   size. The lungs show questionable developing the lower right infiltrate   and there is no evidence of pneumothorax nor pleural effusion.    IMPRESSION:  Questionable right infiltrate. Follow-up study is recommended as clinically warranted.      Thank you for the courtesy of this referral.    --- End of Report ---    < end of copied text >           VITALS:  T(C): 36.3 (02-08-23 @ 13:31), Max: 36.5 (02-08-23 @ 05:40)  T(F): 97.4 (02-08-23 @ 13:31), Max: 97.7 (02-08-23 @ 05:40)  HR: 103 (02-08-23 @ 13:31) (75 - 103)  BP: 103/69 (02-08-23 @ 13:31) (103/69 - 153/73)  BP(mean): --  ABP: --  ABP(mean): --  RR: 18 (02-08-23 @ 13:31) (18 - 18)  SpO2: 95% (02-08-23 @ 13:31) (95% - 95%)  CVP(mm Hg): --  CVP(cm H2O): --    Ins and Outs     02-07-23 @ 07:01  -  02-08-23 @ 07:00  --------------------------------------------------------  IN: 925 mL / OUT: 2075 mL / NET: -1150 mL    02-08-23 @ 07:01  -  02-08-23 @ 15:10  --------------------------------------------------------  IN: 0 mL / OUT: 90 mL / NET: -90 mL                I&O's Detail    07 Feb 2023 07:01  -  08 Feb 2023 07:00  --------------------------------------------------------  IN:    dextrose 5% + sodium chloride 0.45% w/ Additives: 825 mL    Oral Fluid: 100 mL  Total IN: 925 mL    OUT:    Bulb (mL): 275 mL    Indwelling Catheter - Urethral (mL): 1800 mL  Total OUT: 2075 mL    Total NET: -1150 mL      08 Feb 2023 07:01  -  08 Feb 2023 15:10  --------------------------------------------------------  IN:  Total IN: 0 mL    OUT:    Bulb (mL): 90 mL  Total OUT: 90 mL    Total NET: -90 mL

## 2023-02-08 NOTE — PROGRESS NOTE ADULT - SUBJECTIVE AND OBJECTIVE BOX
PRETTY DOLAN  [id number]    Cardiology asked to re-evaluate patient due to tachycardia     Chief Complaint: Perforated gallbladder s/p laparoscopic cholecystectomy/Atrial tachycardia    Interval History: The patient reports doing well, denies palpitations, chest pain or shortness of breath    Tele: sinus rhythm, atrial tachycardia 120s BPM      Current meds:   acetaminophen     Tablet .. 650 milliGRAM(s) Oral every 6 hours  aluminum hydroxide/magnesium hydroxide/simethicone Suspension 30 milliLiter(s) Oral every 4 hours PRN  aspirin enteric coated 81 milliGRAM(s) Oral daily  enoxaparin Injectable 30 milliGRAM(s) SubCutaneous every 24 hours  finasteride 5 milliGRAM(s) Oral daily  melatonin 3 milliGRAM(s) Oral at bedtime PRN  ondansetron Injectable 4 milliGRAM(s) IV Push every 8 hours PRN  tamsulosin 0.8 milliGRAM(s) Oral at bedtime  zinc oxide 40% Paste 1 Application(s) Topical three times a day PRN      Objective:     Vital Signs:  T(C): 36.3 (02-08-23 @ 13:31), Max: 36.5 (02-08-23 @ 05:40)  HR: 103 (02-08-23 @ 13:31) (75 - 103)  BP: 103/69 (02-08-23 @ 13:31) (103/69 - 153/73)  RR: 18 (02-08-23 @ 13:31) (18 - 18)  SpO2: 95% (02-08-23 @ 13:31) (95% - 95%)  Wt(kg): --    PHYSICAL EXAM:  General: no distress  HEENT: sclera anicteric  Neck: supple  CVS: JVP ~ 7 cm H20, RRR, s1, s2  Chest: unlabored respirations, coarse breath sounds  Extremities: no lower extremity edema b/l  Neuro: awake, alert & oriented x 3  Psych: normal affect      Labs:   08 Feb 2023 06:30    141    |  106    |  4      ----------------------------<  97     4.4     |  27     |  0.64     Ca    8.0        08 Feb 2023 06:30  Phos  2.7       08 Feb 2023 06:30  Mg     1.8       08 Feb 2023 06:30                            11.7   9.89  )-----------( 282      ( 08 Feb 2023 06:30 )             36.9       ECG (2/4/22): SVT suggestive of atrial tachycardia, poor baseline    TTE (2/4/23):  LVEF 50-55%, atypical septal motion suspect bundle branch block  Mild TR     PRETTY DOLAN  [id number]    Cardiology asked to re-evaluate patient due to tachycardia     Chief Complaint: Perforated gallbladder s/p laparoscopic cholecystectomy/Atrial tachycardia    Interval History: The patient reports doing well, denies palpitations, chest pain or shortness of breath    Tele: sinus rhythm, atrial tachycardia 120s BPM      Current meds:   acetaminophen     Tablet .. 650 milliGRAM(s) Oral every 6 hours  aluminum hydroxide/magnesium hydroxide/simethicone Suspension 30 milliLiter(s) Oral every 4 hours PRN  aspirin enteric coated 81 milliGRAM(s) Oral daily  enoxaparin Injectable 30 milliGRAM(s) SubCutaneous every 24 hours  finasteride 5 milliGRAM(s) Oral daily  melatonin 3 milliGRAM(s) Oral at bedtime PRN  ondansetron Injectable 4 milliGRAM(s) IV Push every 8 hours PRN  tamsulosin 0.8 milliGRAM(s) Oral at bedtime  zinc oxide 40% Paste 1 Application(s) Topical three times a day PRN      Objective:     Vital Signs:  T(C): 36.3 (02-08-23 @ 13:31), Max: 36.5 (02-08-23 @ 05:40)  HR: 103 (02-08-23 @ 13:31) (75 - 103)  BP: 103/69 (02-08-23 @ 13:31) (103/69 - 153/73)  RR: 18 (02-08-23 @ 13:31) (18 - 18)  SpO2: 95% (02-08-23 @ 13:31) (95% - 95%)  Wt(kg): --    PHYSICAL EXAM:  General: no distress  HEENT: sclera anicteric  Neck: supple  CVS: JVP ~ 7 cm H20, RRR, s1, s2  Chest: unlabored respirations, coarse breath sounds  Extremities: no lower extremity edema b/l  Neuro: awake, alert & oriented x 3  Psych: normal affect      Labs:   08 Feb 2023 06:30    141    |  106    |  4      ----------------------------<  97     4.4     |  27     |  0.64     Ca    8.0        08 Feb 2023 06:30  Phos  2.7       08 Feb 2023 06:30  Mg     1.8       08 Feb 2023 06:30                            11.7   9.89  )-----------( 282      ( 08 Feb 2023 06:30 )             36.9       ECG (2/4/22): SVT suggestive of atrial tachycardia, poor baseline    TTE (2/4/23):  LVEF 50-55%, atypical septal motion suspect bundle branch block  Mild TR     PRETTY DOLAN  [id number]    Cardiology asked to re-evaluate patient due to tachycardia     Chief Complaint: Perforated gallbladder s/p laparoscopic cholecystectomy/Atrial tachycardia    Interval History: The patient reports doing well, denies palpitations, chest pain or shortness of breath    Tele: sinus rhythm, atrial tachycardia 120s BPM      Current meds:   acetaminophen     Tablet .. 650 milliGRAM(s) Oral every 6 hours  aluminum hydroxide/magnesium hydroxide/simethicone Suspension 30 milliLiter(s) Oral every 4 hours PRN  aspirin enteric coated 81 milliGRAM(s) Oral daily  enoxaparin Injectable 30 milliGRAM(s) SubCutaneous every 24 hours  finasteride 5 milliGRAM(s) Oral daily  melatonin 3 milliGRAM(s) Oral at bedtime PRN  ondansetron Injectable 4 milliGRAM(s) IV Push every 8 hours PRN  tamsulosin 0.8 milliGRAM(s) Oral at bedtime  zinc oxide 40% Paste 1 Application(s) Topical three times a day PRN      Objective:     Vital Signs:  T(C): 36.3 (02-08-23 @ 13:31), Max: 36.5 (02-08-23 @ 05:40)  HR: 103 (02-08-23 @ 13:31) (75 - 103)  BP: 103/69 (02-08-23 @ 13:31) (103/69 - 153/73)  RR: 18 (02-08-23 @ 13:31) (18 - 18)  SpO2: 95% (02-08-23 @ 13:31) (95% - 95%)  Wt(kg): --    PHYSICAL EXAM:  General: no distress  HEENT: sclera anicteric  Neck: supple  CVS: JVP ~ 7 cm H20, RRR, s1, s2  Chest: unlabored respirations, coarse breath sounds  Extremities: no lower extremity edema b/l  Neuro: awake, alert & oriented x 3  Psych: normal affect      Labs:   08 Feb 2023 06:30    141    |  106    |  4      ----------------------------<  97     4.4     |  27     |  0.64     Ca    8.0        08 Feb 2023 06:30  Phos  2.7       08 Feb 2023 06:30  Mg     1.8       08 Feb 2023 06:30                            11.7   9.89  )-----------( 282      ( 08 Feb 2023 06:30 )             36.9       ECG (2/4/22): SVT suggestive of atrial tachycardia, poor baseline    TTE (2/4/23):  LVEF 50-55%, atypical septal motion suspect bundle branch block  Mild TR

## 2023-02-08 NOTE — PROGRESS NOTE ADULT - NS ATTEND AMEND GEN_ALL_CORE FT
This is a pleasant 89M who presented with acute gangrenous cholecystitis with perforation who is POD#9 s/p laparoscopic cholecystectomy. Intraop there was perforation into the liver bed, with empyema. He tolerated the procedure well. He was extubated and continues to be afebrile and hemodynamically stable. No evidence of gallstone ileus intraop. Patient noted to have severe ileus. Thus, NG initially placed intra-op to decreased risk of aspiration PNA, but patient removed POD 0.     CTA of chest / abdomen/pelvis was obtained overnight for brief episode of hypotension 2/4 which resolved with IV fluids. I reviewed it personally and reviewed the prelim read. It showed resolving ileus, expected post op changes and right pleural effusion. Pulmonary team was consulted - effusion too small for thoracentesis. Will manage conservatively.    Patient continues to do well. Pain is well-controlled. He continues to have multiple liquid BMs. C diff was sent, which was negative. No N/V overnight. No fevers, chills or sweats. Gastrografin study showed contrast in colon    General appearance: No acute distress, lying comfortably. Alert  Respiratory: Nonlabored breathing  Abdomen: benign, appropriate incisional tenderness, incision c/d/i, drain with serous output. reducible left inguinal hernia. slightly less distended.  Extremities: No edema  Neuro: No focal deficits appreciated  Psych: Calm    Labs:   WBC 9.8 from 7.6 Hb 11.7 from 12 BUN/Cr4/0.64 K 4.4 Mg 1.8 Phos 2.7    Hypovolemia from diarrhea resolved. Patient is afebrile, hemodynamically stable this am. No evidence of infectious process or peritonitis today. No abscess noted on CT abdomen / pelvis. He has resolving ileus.     Gastrografin study with contrast in colon - OK for full liquid diet with ensures TID with advancement to regular diet with ensures TID as tolerated.  - Strict I/Os. Monitor to see if patient can keep up PO intake to balance diarrhea. Per PA, diarrhea has improved a great deal.   - Of note, swallow study demonstrated zenker's diverticulum, but no aspiration. Discussed with the patient and his daughter who would not like referral to specialist for intervention at this time given his age.  - Right pleural effusion too small for thoracentesis. Likely reactive, from the gangrenous cholecystitis. Would recommend aggressive IS / acapella. Pulmonary following. Persistent pleural effusion on the right. Would obtain weight tomorrow. If up from admission, would personally recommend 25 mg IV lasix.  - Antibiotic regimen resolved. Blood cx negative. ID following. Leukocytosis resolved.  - Would aggressively electrolytes daily, given patient's continued losses from diarrhea. Of note, C. diff was negative. Continue to trend electrolytes to minimize risk factors for ileus. Continue to check daily Mg /Phos to am. labs. Goal K of 4.0, Mg of 2.0.   - Defer management of urinary retention to urology.  - Drain d/carolann 2/8.   - Continue lovenox for DVT and asa for cardio/stroke protection. Hb stable.  - Cardiology re-consulted on 2/4 regarding brief episode of hypotension. ECHO today demonstrated EF 50-55%, grade I diastolic dysfunction and borderline pulmonary HTN. Their recommendations were to continue supportive management. Per PA, there were some occasional episode of tachycardia which resolved spontaneously today. Care per cardiology.  - Non operative management of incidental left inguinal hernia at this time. Easily reducible. Will discuss further options as outpatient.    Patient progressing well. Possible d/c tomorrow or the next day barring any pulmonary / cardiology issues. Will follow up as outpatient in my clinic in 2 weeks.

## 2023-02-08 NOTE — DISCHARGE NOTE PROVIDER - NSDCMRMEDTOKEN_GEN_ALL_CORE_FT
aspirin 81 mg oral delayed release tablet: 1 tab(s) orally once a day  atorvastatin 80 mg oral tablet: 1 tab(s) orally once a day (at bedtime)  Feosol 325 mg (65 mg elemental iron) oral tablet: 1 tab(s) orally 2 times a day  finasteride 5 mg oral tablet: 1 tab(s) orally once a day  Lovenox 40 mg/0.4 mL injectable solution: 40 unit(s) injectable once a day  Milk of Magnesia 8% oral suspension:   omeprazole 40 mg oral delayed release capsule: 1 cap(s) orally once a day  oxyCODONE 5 mg oral tablet: 1 tab(s) orally every 4 hours, As Needed  senna leaf extract oral tablet: 2 tab(s) orally once a day (at bedtime)  tamsulosin 0.4 mg oral capsule: 1 cap(s) orally once a day  Tylenol 325 mg oral tablet: 3 tab(s) orally every 8 hours, As Needed   aspirin 81 mg oral delayed release tablet: 1 tab(s) orally once a day  atorvastatin 80 mg oral tablet: 1 tab(s) orally once a day (at bedtime)  Feosol 325 mg (65 mg elemental iron) oral tablet: 1 tab(s) orally 2 times a day  finasteride 5 mg oral tablet: 1 tab(s) orally once a day  Lovenox 40 mg/0.4 mL injectable solution: 40 unit(s) injectable once a day  metoprolol tartrate 25 mg oral tablet: 1 tab(s) orally 2 times a day  Milk of Magnesia 8% oral suspension:   omeprazole 40 mg oral delayed release capsule: 1 cap(s) orally once a day  senna leaf extract oral tablet: 2 tab(s) orally once a day (at bedtime)  tamsulosin 0.4 mg oral capsule: 1 cap(s) orally once a day  Tylenol 325 mg oral tablet: 3 tab(s) orally every 8 hours, As Needed   aspirin 81 mg oral delayed release tablet: 1 tab(s) orally once a day  atorvastatin 80 mg oral tablet: 1 tab(s) orally once a day (at bedtime)  Feosol 325 mg (65 mg elemental iron) oral tablet: 1 tab(s) orally 2 times a day  finasteride 5 mg oral tablet: 1 tab(s) orally once a day  metoprolol tartrate 25 mg oral tablet: 1 tab(s) orally 2 times a day  Milk of Magnesia 8% oral suspension:   omeprazole 40 mg oral delayed release capsule: 1 cap(s) orally once a day  senna leaf extract oral tablet: 2 tab(s) orally once a day (at bedtime)  tamsulosin 0.4 mg oral capsule: 1 cap(s) orally once a day  Tylenol 325 mg oral tablet: 3 tab(s) orally every 8 hours, As Needed

## 2023-02-08 NOTE — PROGRESS NOTE ADULT - SUBJECTIVE AND OBJECTIVE BOX
Patient is a 89y old  Male who presents with a chief complaint of abdominal pain (08 Feb 2023 09:00)  pt POD # 9 s/p lap kellee  no events noted overnight  pt feels well, tolerating full liquid diet  denies n/v/f/c    Patient seen and examined at bedside    ALLERGIES:  No Known Allergies    MEDICATIONS  (STANDING):  acetaminophen     Tablet .. 650 milliGRAM(s) Oral every 6 hours  aspirin enteric coated 81 milliGRAM(s) Oral daily  enoxaparin Injectable 30 milliGRAM(s) SubCutaneous every 24 hours  finasteride 5 milliGRAM(s) Oral daily  tamsulosin 0.8 milliGRAM(s) Oral at bedtime    MEDICATIONS  (PRN):  aluminum hydroxide/magnesium hydroxide/simethicone Suspension 30 milliLiter(s) Oral every 4 hours PRN Dyspepsia  melatonin 3 milliGRAM(s) Oral at bedtime PRN Insomnia  ondansetron Injectable 4 milliGRAM(s) IV Push every 8 hours PRN Nausea and/or Vomiting  zinc oxide 40% Paste 1 Application(s) Topical three times a day PRN rash    Vital Signs Last 24 Hrs  T(F): 97.7 (08 Feb 2023 05:40), Max: 97.7 (08 Feb 2023 05:40)  HR: 94 (08 Feb 2023 05:40) (75 - 94)  BP: 122/63 (08 Feb 2023 05:40) (120/77 - 153/73)  RR: 18 (08 Feb 2023 05:40) (18 - 18)  SpO2: 95% (08 Feb 2023 05:40) (95% - 95%)    I&O's Summary    07 Feb 2023 07:01  -  08 Feb 2023 07:00  --------------------------------------------------------  IN: 925 mL / OUT: 2075 mL / NET: -1150 mL    PHYSICAL EXAM:  General: NAD, A/O x 3  ENT: MMM  Neck: Supple, No JVD  Abdomen: soft, nd, mildly ttp, incisions c/d/i, +oswaldo drain with serous fluid, reducible left inguinal hernia noted  Extremities: No calf tenderness, No pitting edema  : + Musa catheter, urine clear     LABS:                        11.7   9.89  )-----------( 282      ( 08 Feb 2023 06:30 )             36.9     02-08    141  |  106  |  4   ----------------------------<  97  4.4   |  27  |  0.64    Ca    8.0      08 Feb 2023 06:30  Phos  2.7     02-08  Mg     1.8     02-08    TPro  4.9  /  Alb  1.7  /  TBili  0.4  /  DBili  0.1  /  AST  33  /  ALT  26  /  AlkPhos  142  02-06    11-23 Chol 160 mg/dL LDL -- HDL 44 mg/dL Trig 95 mg/dL    Culture - Blood (collected 04 Feb 2023 12:00)  Source: .Blood Blood-Peripheral  Preliminary Report (05 Feb 2023 22:01):    No growth to date.    Culture - Blood (collected 04 Feb 2023 12:00)  Source: .Blood Blood-Peripheral  Preliminary Report (05 Feb 2023 22:01):    No growth to date.    COVID-19 PCR: Melissa (01-30-23 @ 12:25)    CXR - pending   Patient is a 89y old  Male who presents with a chief complaint of abdominal pain (08 Feb 2023 09:00)  pt POD # 9 s/p lap kellee  no events noted overnight  pt feels well, tolerating full liquid diet, + flatus  denies n/v/f/c    Patient seen and examined at bedside    ALLERGIES:  No Known Allergies    MEDICATIONS  (STANDING):  acetaminophen     Tablet .. 650 milliGRAM(s) Oral every 6 hours  aspirin enteric coated 81 milliGRAM(s) Oral daily  enoxaparin Injectable 30 milliGRAM(s) SubCutaneous every 24 hours  finasteride 5 milliGRAM(s) Oral daily  tamsulosin 0.8 milliGRAM(s) Oral at bedtime    MEDICATIONS  (PRN):  aluminum hydroxide/magnesium hydroxide/simethicone Suspension 30 milliLiter(s) Oral every 4 hours PRN Dyspepsia  melatonin 3 milliGRAM(s) Oral at bedtime PRN Insomnia  ondansetron Injectable 4 milliGRAM(s) IV Push every 8 hours PRN Nausea and/or Vomiting  zinc oxide 40% Paste 1 Application(s) Topical three times a day PRN rash    Vital Signs Last 24 Hrs  T(F): 97.7 (08 Feb 2023 05:40), Max: 97.7 (08 Feb 2023 05:40)  HR: 94 (08 Feb 2023 05:40) (75 - 94)  BP: 122/63 (08 Feb 2023 05:40) (120/77 - 153/73)  RR: 18 (08 Feb 2023 05:40) (18 - 18)  SpO2: 95% (08 Feb 2023 05:40) (95% - 95%)    I&O's Summary    07 Feb 2023 07:01  -  08 Feb 2023 07:00  --------------------------------------------------------  IN: 925 mL / OUT: 2075 mL / NET: -1150 mL    PHYSICAL EXAM:  General: NAD, A/O x 3  ENT: MMM  Neck: Supple, No JVD  Abdomen: soft, nd, mildly ttp, incisions c/d/i, +oswaldo drain with serous fluid, reducible left inguinal hernia noted  Extremities: No calf tenderness, No pitting edema  : + Musa catheter, urine clear     LABS:                        11.7   9.89  )-----------( 282      ( 08 Feb 2023 06:30 )             36.9     02-08    141  |  106  |  4   ----------------------------<  97  4.4   |  27  |  0.64    Ca    8.0      08 Feb 2023 06:30  Phos  2.7     02-08  Mg     1.8     02-08    TPro  4.9  /  Alb  1.7  /  TBili  0.4  /  DBili  0.1  /  AST  33  /  ALT  26  /  AlkPhos  142  02-06    11-23 Chol 160 mg/dL LDL -- HDL 44 mg/dL Trig 95 mg/dL    Culture - Blood (collected 04 Feb 2023 12:00)  Source: .Blood Blood-Peripheral  Preliminary Report (05 Feb 2023 22:01):    No growth to date.    Culture - Blood (collected 04 Feb 2023 12:00)  Source: .Blood Blood-Peripheral  Preliminary Report (05 Feb 2023 22:01):    No growth to date.    COVID-19 PCR: NotDetec (01-30-23 @ 12:25)    CXR - pending   Patient is a 89y old  Male who presents with a chief complaint of abdominal pain (08 Feb 2023 09:00)  pt POD # 9 s/p lap kellee  no events noted overnight  pt feels well, tolerating full liquid diet, + flatus, + BM  denies n/v/f/c    Patient seen and examined at bedside    ALLERGIES:  No Known Allergies    MEDICATIONS  (STANDING):  acetaminophen     Tablet .. 650 milliGRAM(s) Oral every 6 hours  aspirin enteric coated 81 milliGRAM(s) Oral daily  enoxaparin Injectable 30 milliGRAM(s) SubCutaneous every 24 hours  finasteride 5 milliGRAM(s) Oral daily  tamsulosin 0.8 milliGRAM(s) Oral at bedtime    MEDICATIONS  (PRN):  aluminum hydroxide/magnesium hydroxide/simethicone Suspension 30 milliLiter(s) Oral every 4 hours PRN Dyspepsia  melatonin 3 milliGRAM(s) Oral at bedtime PRN Insomnia  ondansetron Injectable 4 milliGRAM(s) IV Push every 8 hours PRN Nausea and/or Vomiting  zinc oxide 40% Paste 1 Application(s) Topical three times a day PRN rash    Vital Signs Last 24 Hrs  T(F): 97.7 (08 Feb 2023 05:40), Max: 97.7 (08 Feb 2023 05:40)  HR: 94 (08 Feb 2023 05:40) (75 - 94)  BP: 122/63 (08 Feb 2023 05:40) (120/77 - 153/73)  RR: 18 (08 Feb 2023 05:40) (18 - 18)  SpO2: 95% (08 Feb 2023 05:40) (95% - 95%)    I&O's Summary    07 Feb 2023 07:01  -  08 Feb 2023 07:00  --------------------------------------------------------  IN: 925 mL / OUT: 2075 mL / NET: -1150 mL    PHYSICAL EXAM:  General: NAD, A/O x 3  ENT: MMM  Neck: Supple, No JVD  Abdomen: soft, nd, mildly ttp, incisions c/d/i, +oswaldo drain with serous fluid, reducible left inguinal hernia noted  Extremities: No calf tenderness, No pitting edema  : + Musa catheter, urine clear     LABS:                        11.7   9.89  )-----------( 282      ( 08 Feb 2023 06:30 )             36.9     02-08    141  |  106  |  4   ----------------------------<  97  4.4   |  27  |  0.64    Ca    8.0      08 Feb 2023 06:30  Phos  2.7     02-08  Mg     1.8     02-08    TPro  4.9  /  Alb  1.7  /  TBili  0.4  /  DBili  0.1  /  AST  33  /  ALT  26  /  AlkPhos  142  02-06    11-23 Chol 160 mg/dL LDL -- HDL 44 mg/dL Trig 95 mg/dL    Culture - Blood (collected 04 Feb 2023 12:00)  Source: .Blood Blood-Peripheral  Preliminary Report (05 Feb 2023 22:01):    No growth to date.    Culture - Blood (collected 04 Feb 2023 12:00)  Source: .Blood Blood-Peripheral  Preliminary Report (05 Feb 2023 22:01):    No growth to date.    COVID-19 PCR: NotNatalie (01-30-23 @ 12:25)    CXR - pending

## 2023-02-08 NOTE — PROGRESS NOTE ADULT - SUBJECTIVE AND OBJECTIVE BOX
CC: f/u for aerococcus bacteremia and gangrenous cholecystitis    Patient reports: he is a little confused, is being considered for discharge    REVIEW OF SYSTEMS:  All other review of systems negative (Comprehensive ROS)    Antimicrobials Day #  :off    Other Medications Reviewed  MEDICATIONS  (STANDING):  acetaminophen     Tablet .. 650 milliGRAM(s) Oral every 6 hours  aspirin enteric coated 81 milliGRAM(s) Oral daily  enoxaparin Injectable 30 milliGRAM(s) SubCutaneous every 24 hours  finasteride 5 milliGRAM(s) Oral daily  furosemide   Injectable 20 milliGRAM(s) IV Push once  metoprolol tartrate 25 milliGRAM(s) Oral two times a day  tamsulosin 0.8 milliGRAM(s) Oral at bedtime    T(F): 97.4 (02-08-23 @ 13:31), Max: 97.7 (02-08-23 @ 05:40)  HR: 103 (02-08-23 @ 13:31)  BP: 103/69 (02-08-23 @ 13:31)  RR: 18 (02-08-23 @ 13:31)  SpO2: 95% (02-08-23 @ 13:31)  Wt(kg): --    PHYSICAL EXAM:  General: alert, no acute distress  Eyes:  anicteric, no conjunctival injection, no discharge  Oropharynx: no lesions or injection 	  Neck: supple, without adenopathy  Lungs: coarse BS  Heart: regular rate and rhythm; no murmur, rubs or gallops  Abdomen: soft, nondistended, nontender, without mass or organomegaly, drain pulled  Skin: no lesions  Extremities: no clubbing, cyanosis, or edema  Neurologic: alert, confused,, moves all extremities    LAB RESULTS:                        11.7   9.89  )-----------( 282      ( 08 Feb 2023 06:30 )             36.9     02-08    141  |  106  |  4<L>  ----------------------------<  97  4.4   |  27  |  0.64    Ca    8.0<L>      08 Feb 2023 06:30  Phos  2.7     02-08  Mg     1.8     02-08          MICROBIOLOGY:  RECENT CULTURES:  02-04 @ 12:00 .Blood Blood-Peripheral     No growth to date.          RADIOLOGY REVIEWED:    < from: Xray Chest 1 View- PORTABLE-Routine (Xray Chest 1 View- PORTABLE-Routine in AM.) (02.08.23 @ 08:55) >  IMPRESSION:  Questionable right infiltrate. Follow-up study is recommended as   clinically warranted.    < end of copied text >

## 2023-02-08 NOTE — PROGRESS NOTE ADULT - SUBJECTIVE AND OBJECTIVE BOX
Patient is a 89y old  Male who presents with a chief complaint of abdominal pain (07 Feb 2023 14:02)      24 HOUR EVENTS:  No overnight events reported.     SUBJECTIVE:  Patient seen and examined at bedside.     ALLERGIES:  No Known Allergies    MEDICATIONS  (STANDING):  acetaminophen     Tablet .. 650 milliGRAM(s) Oral every 6 hours  aspirin enteric coated 81 milliGRAM(s) Oral daily  enoxaparin Injectable 30 milliGRAM(s) SubCutaneous every 24 hours  finasteride 5 milliGRAM(s) Oral daily  tamsulosin 0.8 milliGRAM(s) Oral at bedtime    MEDICATIONS  (PRN):  aluminum hydroxide/magnesium hydroxide/simethicone Suspension 30 milliLiter(s) Oral every 4 hours PRN Dyspepsia  melatonin 3 milliGRAM(s) Oral at bedtime PRN Insomnia  ondansetron Injectable 4 milliGRAM(s) IV Push every 8 hours PRN Nausea and/or Vomiting  zinc oxide 40% Paste 1 Application(s) Topical three times a day PRN rash    Vital Signs Last 24 Hrs  T(F): 97.7 (08 Feb 2023 05:40), Max: 97.7 (08 Feb 2023 05:40)  HR: 94 (08 Feb 2023 05:40) (75 - 94)  BP: 122/63 (08 Feb 2023 05:40) (120/77 - 153/73)  RR: 18 (08 Feb 2023 05:40) (18 - 18)  SpO2: 95% (08 Feb 2023 05:40) (95% - 95%)  I&O's Summary    07 Feb 2023 07:01  -  08 Feb 2023 07:00  --------------------------------------------------------  IN: 925 mL / OUT: 2075 mL / NET: -1150 mL      PHYSICAL EXAM:  General: NAD, Awake and alert  ENT: Moist mucous membranes, no thrush  Neck: Supple, No JVD  Lungs: Clear to auscultation bilaterally, good air entry, non-labored breathing  Cardio: RRR, S1/S2, No murmur  Abdomen: Soft, Nontender, Nondistended; Bowel sounds present  Extremities: No calf tenderness, No pitting edema, no contractures.    LABS:                        11.7   9.89  )-----------( 282      ( 08 Feb 2023 06:30 )             36.9     02-08    141  |  106  |  4   ----------------------------<  97  4.4   |  27  |  0.64    Ca    8.0      08 Feb 2023 06:30  Phos  2.7     02-08  Mg     1.8     02-08    TPro  4.9  /  Alb  1.7  /  TBili  0.4  /  DBili  0.1  /  AST  33  /  ALT  26  /  AlkPhos  142  02-06      11-23 Chol 160 mg/dL LDL -- HDL 44 mg/dL Trig 95 mg/dL    Culture - Blood (collected 04 Feb 2023 12:00)  Source: .Blood Blood-Peripheral  Preliminary Report (05 Feb 2023 22:01):    No growth to date.    Culture - Blood (collected 04 Feb 2023 12:00)  Source: .Blood Blood-Peripheral  Preliminary Report (05 Feb 2023 22:01):    No growth to date.      COVID-19 PCR: NotDetec (01-30-23 @ 12:25)    RADIOLOGY & ADDITIONAL TESTS:  CXR obtained today, haziness over right lung diaphragmatic recess, f/u final read.    Care Discussed with Consultants/Other Providers:    Patient is a 89y old  Male who presents with a chief complaint of abdominal pain (07 Feb 2023 14:02)      24 HOUR EVENTS:  No overnight events reported.     SUBJECTIVE:  Patient seen and examined at bedside.   He reports that he tolerated the full liquid diet, not sure if he is ready to have diet advanced.   denies fevers and chills. no abd discomfort.     ALLERGIES:  No Known Allergies    MEDICATIONS  (STANDING):  acetaminophen     Tablet .. 650 milliGRAM(s) Oral every 6 hours  aspirin enteric coated 81 milliGRAM(s) Oral daily  enoxaparin Injectable 30 milliGRAM(s) SubCutaneous every 24 hours  finasteride 5 milliGRAM(s) Oral daily  tamsulosin 0.8 milliGRAM(s) Oral at bedtime    MEDICATIONS  (PRN):  aluminum hydroxide/magnesium hydroxide/simethicone Suspension 30 milliLiter(s) Oral every 4 hours PRN Dyspepsia  melatonin 3 milliGRAM(s) Oral at bedtime PRN Insomnia  ondansetron Injectable 4 milliGRAM(s) IV Push every 8 hours PRN Nausea and/or Vomiting  zinc oxide 40% Paste 1 Application(s) Topical three times a day PRN rash    Vital Signs Last 24 Hrs  T(F): 97.7 (08 Feb 2023 05:40), Max: 97.7 (08 Feb 2023 05:40)  HR: 94 (08 Feb 2023 05:40) (75 - 94)  BP: 122/63 (08 Feb 2023 05:40) (120/77 - 153/73)  RR: 18 (08 Feb 2023 05:40) (18 - 18)  SpO2: 95% (08 Feb 2023 05:40) (95% - 95%)  I&O's Summary    07 Feb 2023 07:01  -  08 Feb 2023 07:00  --------------------------------------------------------  IN: 925 mL / OUT: 2075 mL / NET: -1150 mL      PHYSICAL EXAM:  General: NAD, Awake and alert  ENT: Moist mucous membranes, no thrush  Neck: Supple, No JVD  Lungs: Clear to auscultation bilaterally, good air entry, non-labored breathing  Cardio: RRR, S1/S2, No murmur  Abdomen: Soft, Nontender, Nondistended; Bowel sounds present, drain in place with serosanguinous fluid  Extremities: No calf tenderness, No pitting edema, no contractures.    LABS:                        11.7   9.89  )-----------( 282      ( 08 Feb 2023 06:30 )             36.9     02-08    141  |  106  |  4   ----------------------------<  97  4.4   |  27  |  0.64    Ca    8.0      08 Feb 2023 06:30  Phos  2.7     02-08  Mg     1.8     02-08    TPro  4.9  /  Alb  1.7  /  TBili  0.4  /  DBili  0.1  /  AST  33  /  ALT  26  /  AlkPhos  142  02-06      11-23 Chol 160 mg/dL LDL -- HDL 44 mg/dL Trig 95 mg/dL    Culture - Blood (collected 04 Feb 2023 12:00)  Source: .Blood Blood-Peripheral  Preliminary Report (05 Feb 2023 22:01):    No growth to date.    Culture - Blood (collected 04 Feb 2023 12:00)  Source: .Blood Blood-Peripheral  Preliminary Report (05 Feb 2023 22:01):    No growth to date.      COVID-19 PCR: NotDetec (01-30-23 @ 12:25)    RADIOLOGY & ADDITIONAL TESTS:  CXR obtained today, haziness over right lung diaphragmatic recess, f/u final read.    Care Discussed with Consultants/Other Providers: Malina surgical PA.

## 2023-02-08 NOTE — PROGRESS NOTE ADULT - ASSESSMENT
Patient is a 89y old  Male who presents with a chief complaint of abdominal pain (08 Feb 2023 09:00)  pt POD # 9 s/p lap kellee  no events noted overnight  pt feels well, tolerating full liquid diet  denies n/v/f/c    pt doing well from surgical standpoint    labs wnl    plan  - continue full liquid diet with Ensure supplement with every meal  - OOB to chair, ambulate with assistance  - dvt ppx  - incentive spirometry  - medical management per primary team    will d/w Dr. Thompson

## 2023-02-08 NOTE — PROGRESS NOTE ADULT - ASSESSMENT
89y year old Male with PMH of recent L hip fx (11/2022), TIA (11/2022) who presents to the ER from [facility] for abdominal pain, nausea for 4 days. Admitted for acute gangrenous cholecystitis, c/b perforated gallbladder, s/p lap kellee on 1/30 with empyema found, s/p drain in place. Pt is s/p zosyn, infectious disease consulting. Post op course complicated by multifocal atrial tachycardia and small bowel obstruction due to ileus. He is now on a full liquid diet. Speech and swallow eval pending.    #Acute cholecystitis c/b perforated gallbladder  #Transaminitis, Hyperbilirubinemia  - s/p lap kellee with Dr Thompson on 1/30, drain still in place with high output  - Maintain RUPINDER-SS, may be removed today by Surgery.  - Blood cultures grew gram + cocci, urine culture with E Coli and Aerococcus. Infectious disease consulted. S/p zosyn. Resolution of leukocytosis. Now monitoring off of abx.  - Trend LFTs, downtrending  - Pain control, standing tylenol dose with oxy/dilaudid prn  - lovenox for dvt ppx.     # Small bowel obstruction likely due to ileus.  - seen on CT of [facility]   - small bowel follow through on 2/7 showed contrast in the colon, pt advanced to full liquid diet w/ ensure TID. Advance diet per surgery, likely to regular diet today if tolerating.  - Surgery following, d/w Dr. Thompson  - optimize electrolytes    # Acute hypoxic respiratory failure likely due to atelectasis/right pleural effusion - resolved, pt now on room air.  - discussed w/ Pulm, Dr. Harden. Effusion too small to drain  - cont incentive spirometry.  - CXR personal read with haziness over right lung border. Will d/w Dr. Harden.    # Diarrhea - improving.  c/b hypokalemia and hypophosphatemia - potassium and phosphorous repletion  - hold bowel regimen  - negative C diff    # Tachycardia likely due to multifocal atrial tachycardia  w/ associated hypotension from diarrhea/volume depletion/anemia  There was a question if patient was in Paroxysmal atrial fibrillation, though information reviewed by Cardiologist suggests this is MAT. Evidence of ischemia w/ ST segment depression in anterior leads. CTA chest obtained and negative for PE. Echo w/ EF 50-55%. Per cardio, risk of AC seems to outweigh benefit at this point, and this does not seem to be Atrial Fib.  - telemetry overnight w/ HRs 100s-110s, sinus with PACs  - pt may need extended cardiac monitoring on discharge. OP Cardio f/u.     #Urinary retention  - Patient with chronic felipe - was placed in November after fall/hip fx, no TOV has been done as per daughter. No hx of urinary retention prior to that event   - Felipe replaced on 2/4/2023, continue.   - Cont increased Flomax dosing at 0.8mg qhs  - Urology consulted noted; cont Proscar, Flomax and delayed void trial as outpatient.     #Acute Kidney Injury - Improved   - Monitor BMP  - Avoid nephrotoxins  - felipe catheter    # Hypernatremia - improved.    #Hx of TIA  - c/w Statin, ASA    #?Dysphagia  -  Speech and swallow eval ordered for today.    #DVT Prophylaxis- Lovenox   GOC: DNR/I, MOLST in chart    Dispo: D/C to [facility] when cleared by surgery and cardio    Update daughter Shelli Cell [phone]      89y year old Male with PMH of recent L hip fx (11/2022), TIA (11/2022) who presents to the ER from [facility] for abdominal pain, nausea for 4 days. Admitted for acute gangrenous cholecystitis, c/b perforated gallbladder, s/p lap kellee on 1/30 with empyema found, s/p drain in place. Pt is s/p zosyn, infectious disease consulting. Post op course complicated by multifocal atrial tachycardia and small bowel obstruction due to ileus. He is now on a full liquid diet. Speech and swallow eval pending.    #Acute cholecystitis c/b perforated gallbladder  #Transaminitis, Hyperbilirubinemia  - s/p lap kellee with Dr Thompson on 1/30, drain still in place with high output  - Maintain RUPINDER-SS, may be removed today by Surgery.  - Blood cultures grew gram + cocci, urine culture with E Coli and Aerococcus. Infectious disease consulted. S/p zosyn. Resolution of leukocytosis. Now monitoring off of abx.  - Trend LFTs, downtrending  - Pain control, standing tylenol dose with oxy/dilaudid prn  - lovenox for dvt ppx.     # Small bowel obstruction likely due to ileus.  - seen on CT of [facility]   - small bowel follow through on 2/7 showed contrast in the colon, pt advanced to full liquid diet w/ ensure TID. Advance diet per surgery, likely to regular diet today if tolerating.  - Surgery following, d/w Dr. Thompson  - optimize electrolytes    # Acute hypoxic respiratory failure likely due to atelectasis/right pleural effusion - resolved, pt now on room air.  - discussed w/ Pulm, Dr. Harden. Effusion too small to drain  - cont incentive spirometry.  - CXR personal read with haziness over right lung border. Will d/w Dr. Harden.    # Diarrhea - improving.  c/b hypokalemia and hypophosphatemia - potassium and phosphorous repletion  - hold bowel regimen  - negative C diff    # Tachycardia likely due to multifocal atrial tachycardia  w/ associated hypotension from diarrhea/volume depletion/anemia  There was a question if patient was in Paroxysmal atrial fibrillation, though information reviewed by Cardiologist suggests this is MAT. Evidence of ischemia w/ ST segment depression in anterior leads. CTA chest obtained and negative for PE. Echo w/ EF 50-55%. Per cardio, risk of AC seems to outweigh benefit at this point, and this does not seem to be Atrial Fib.  - telemetry overnight w/ HRs 100s-110s, sinus with PACs  - pt may need extended cardiac monitoring on discharge. OP Cardio f/u.     #Urinary retention  - Patient with chronic felipe - was placed in November after fall/hip fx, no TOV has been done as per daughter. No hx of urinary retention prior to that event   - Felipe replaced on 2/4/2023, continue.   - Cont increased Flomax dosing at 0.8mg qhs  - Urology consulted noted; cont Proscar, Flomax and delayed void trial as outpatient.     #Acute Kidney Injury - Improved   - Monitor BMP  - Avoid nephrotoxins  - felipe catheter    # Hypernatremia - improved.    #Hx of TIA  - c/w Statin, ASA    #?Dysphagia  -  Speech and swallow eval ordered for today.    #DVT Prophylaxis- Lovenox   GOC: DNR/I, MOLST in chart    Dispo: D/C to [facility] when cleared by surgery and cardio    Update daughter Shelli Cell [phone]      89y year old Male with PMH of recent L hip fx (11/2022), TIA (11/2022) who presents to the ER from [facility] for abdominal pain, nausea for 4 days. Admitted for acute gangrenous cholecystitis, c/b perforated gallbladder, s/p lap kellee on 1/30 with empyema found, s/p drain in place. Pt is s/p zosyn, infectious disease consulting. Post op course complicated by multifocal atrial tachycardia and small bowel obstruction due to ileus. He is now on a full liquid diet. Speech and swallow eval pending.    #Acute cholecystitis c/b perforated gallbladder  #Transaminitis, Hyperbilirubinemia  - s/p lap kellee with Dr Thompson on 1/30, drain still in place with high output  - Maintain RUPINDER-SS, may be removed today by Surgery.  - Blood cultures grew gram + cocci, urine culture with E Coli and Aerococcus. Infectious disease consulted. S/p zosyn. Resolution of leukocytosis. Now monitoring off of abx.  - Trend LFTs, downtrending  - Pain control, standing tylenol dose with oxy/dilaudid prn  - lovenox for dvt ppx.     # Small bowel obstruction likely due to ileus.  - seen on CT of [facility]   - small bowel follow through on 2/7 showed contrast in the colon, pt advanced to full liquid diet w/ ensure TID. Advance diet per surgery, likely to regular diet today if tolerating.  - Surgery following, d/w Dr. Thompson  - optimize electrolytes    # Acute hypoxic respiratory failure likely due to atelectasis/right pleural effusion - resolved, pt now on room air.  - discussed w/ Pulm, Dr. Harden. Effusion too small to drain  - cont incentive spirometry.  - CXR personal read with haziness over right lung border. Will d/w Dr. Harden.    # Diarrhea - improving.  c/b hypokalemia and hypophosphatemia - potassium and phosphorous repletion  - hold bowel regimen  - negative C diff    # Tachycardia likely due to multifocal atrial tachycardia  w/ associated hypotension from diarrhea/volume depletion/anemia  There was a question if patient was in Paroxysmal atrial fibrillation, though information reviewed by Cardiologist suggests this is MAT. Evidence of ischemia w/ ST segment depression in anterior leads. CTA chest obtained and negative for PE. Echo w/ EF 50-55%. Per cardio, risk of AC seems to outweigh benefit at this point, and this does not seem to be Atrial Fib.  - telemetry overnight w/ HRs 100s-110s, sinus with PACs  - pt may need extended cardiac monitoring on discharge. OP Cardio f/u.     #Urinary retention  - Patient with chronic felipe - was placed in November after fall/hip fx, no TOV has been done as per daughter. No hx of urinary retention prior to that event   - Felipe replaced on 2/4/2023, continue.   - Cont increased Flomax dosing at 0.8mg qhs  - Urology consulted noted; cont Proscar, Flomax and delayed void trial as outpatient.     #Acute Kidney Injury - Improved   - Monitor BMP  - Avoid nephrotoxins  - felipe catheter    # Hypernatremia - improved.    #Hx of TIA  - c/w Statin, ASA    #?Dysphagia  -  Speech and swallow eval ordered for today.    #DVT Prophylaxis- Lovenox   GOC: DNR/I, MOLST in chart    Dispo: D/C to [facility] when cleared by surgery and cardio    Update daughter Shelli Cell [phone]      89y year old Male with PMH of recent L hip fx (11/2022), TIA (11/2022) who presents to the ER from [facility] for abdominal pain, nausea for 4 days. Admitted for acute gangrenous cholecystitis, c/b perforated gallbladder, s/p lap kellee on 1/30 with empyema found, s/p drain in place. Pt is s/p zosyn, infectious disease consulting. Post op course complicated by multifocal atrial tachycardia and small bowel obstruction due to ileus. He is now on a full liquid diet. Speech and swallow evaluated today.    #Acute cholecystitis c/b perforated gallbladder  #Transaminitis, Hyperbilirubinemia  - s/p lap kellee with Dr Thompson on 1/30, drain still in place  - Maintain RUPINDER-SS, may be removed today by Surgery.  - Blood cultures grew gram + cocci, urine culture with E Coli and Aerococcus. Infectious disease consulted. S/p zosyn. Resolution of leukocytosis. Now monitoring off of abx.  - Trend LFTs, downtrending  - Pain control, standing tylenol dose with oxy/dilaudid prn  - lovenox for dvt ppx.     # Small bowel obstruction likely due to ileus.  - seen on CT of [facility]   - small bowel follow through on 2/7 showed contrast in the colon, pt advanced to full liquid diet w/ ensure TID. Advance diet per surgery, likely to regular diet today if tolerating.  - Surgery following, d/w Dr. Thompson  - optimize electrolytes    # Acute hypoxic respiratory failure likely due to atelectasis/right pleural effusion - resolved, pt now on room air.  - discussed w/ Pulm, Dr. Harden. Effusion too small to drain  - cont incentive spirometry.  - CXR personal read with haziness over right lung border. Will d/w Dr. Harden.    # Diarrhea - improving.  c/b hypokalemia and hypophosphatemia - potassium and phosphorous repletion  - hold bowel regimen  - negative C diff    # Tachycardia likely due to multifocal atrial tachycardia  w/ associated hypotension from diarrhea/volume depletion/anemia  There was a question if patient was in Paroxysmal atrial fibrillation, though information reviewed by Cardiologist suggests this is MAT. Evidence of ischemia w/ ST segment depression in anterior leads. CTA chest obtained and negative for PE. Echo w/ EF 50-55%. Per cardio, risk of AC seems to outweigh benefit at this point, and this does not seem to be Atrial Fib.  - telemetry overnight w/ HRs 100s-110s, sinus with PACs  - pt may need extended cardiac monitoring on discharge. OP Cardio f/u.     #Urinary retention  - Patient with chronic felipe - was placed in November after fall/hip fx, no TOV has been done as per daughter. No hx of urinary retention prior to that event   - Felipe replaced on 2/4/2023, continue.   - Cont increased Flomax dosing at 0.8mg qhs  - Urology consulted noted; cont Proscar, Flomax and delayed void trial as outpatient.     #Acute Kidney Injury - Improved   - Monitor BMP  - Avoid nephrotoxins  - felipe catheter    # Hypernatremia - improved.    #Hx of TIA  - c/w Statin, ASA    #Dysphagia  -  Speech and swallow eval today, d/w Reva Ashely - level 6 soft and bite sized/ thin level 0.     #DVT Prophylaxis- Lovenox   GOC: DNR/I, MOLST in chart    Dispo: D/C to [facility] when cleared by surgery and cardio    Update daughter Shelli Cell [phone]      89y year old Male with PMH of recent L hip fx (11/2022), TIA (11/2022) who presents to the ER from [facility] for abdominal pain, nausea for 4 days. Admitted for acute gangrenous cholecystitis, c/b perforated gallbladder, s/p lap kellee on 1/30 with empyema found, s/p drain in place. Pt is s/p zosyn, infectious disease consulting. Post op course complicated by multifocal atrial tachycardia and small bowel obstruction due to ileus. He is now on a full liquid diet. Speech and swallow evaluated today.    #Acute cholecystitis c/b perforated gallbladder  #Transaminitis, Hyperbilirubinemia  - s/p lap kellee with Dr Thompson on 1/30, drain still in place  - Maintain RUPINDER-SS, may be removed today by Surgery.  - Blood cultures grew gram + cocci, urine culture with E Coli and Aerococcus. Infectious disease consulted. S/p zosyn. Resolution of leukocytosis. Now monitoring off of abx.  - Trend LFTs, downtrending  - Pain control, standing tylenol dose with oxy/dilaudid prn  - lovenox for dvt ppx.     # Small bowel obstruction likely due to ileus.  - seen on CT of [facility]   - small bowel follow through on 2/7 showed contrast in the colon, pt advanced to full liquid diet w/ ensure TID. Advance diet per surgery, likely to regular diet today if tolerating.  - Surgery following, d/w Dr. Thompson  - optimize electrolytes    # Acute hypoxic respiratory failure likely due to atelectasis/right pleural effusion - resolved, pt now on room air.  - discussed w/ Pulm, Dr. Harden. Effusion too small to drain  - cont incentive spirometry.  - CXR personal read with haziness over right lung border. Will d/w Dr. Harden.    # Diarrhea - improving.  c/b hypokalemia and hypophosphatemia - potassium and phosphorous repletion  - hold bowel regimen  - negative C diff    # Tachycardia likely due to multifocal atrial tachycardia  w/ associated hypotension from diarrhea/volume depletion/anemia  There was a question if patient was in Paroxysmal atrial fibrillation, though information reviewed by Cardiologist suggests this is MAT. Evidence of ischemia w/ ST segment depression in anterior leads. CTA chest obtained and negative for PE. Echo w/ EF 50-55%. Per cardio, risk of AC seems to outweigh benefit at this point, and this does not seem to be Atrial Fib.  - telemetry overnight w/ HRs 100s-110s, sinus with PACs  - pt may need extended cardiac monitoring on discharge. OP Cardio f/u.     #Urinary retention  - Patient with chronic felipe - was placed in November after fall/hip fx, no TOV has been done as per daughter. No hx of urinary retention prior to that event   - Felipe replaced on 2/4/2023, continue.   - Cont increased Flomax dosing at 0.8mg qhs  - Urology consulted noted; cont Proscar, Flomax and delayed void trial as outpatient.     #Acute Kidney Injury - Improved   - Monitor BMP  - Avoid nephrotoxins  - felipe catheter    # Hypernatremia - improved.    #Hx of TIA  - c/w Statin, ASA    #Dysphagia  -  Speech and swallow eval today, d/w Reva Asheyl - level 6 soft and bite sized/ thin level 0.     #DVT Prophylaxis- Lovenox   GOC: DNR/I, MOLST in chart    Dispo: D/C to [facility] when cleared by surgery and cardio    Update daughter Shelli Cell [phone]      89y year old Male with PMH of recent L hip fx (11/2022), TIA (11/2022) who presents to the ER from [facility] for abdominal pain, nausea for 4 days. Admitted for acute gangrenous cholecystitis, c/b perforated gallbladder, s/p lap kellee on 1/30 with empyema found, s/p drain in place. Pt is s/p zosyn, infectious disease consulting. Post op course complicated by multifocal atrial tachycardia and small bowel obstruction due to ileus. He is now on a full liquid diet. Speech and swallow evaluated today.    #Acute cholecystitis c/b perforated gallbladder  #Transaminitis, Hyperbilirubinemia  - s/p lap kellee with Dr Thompson on 1/30, drain still in place  - Maintain RUPINDER-SS, may be removed today by Surgery.  - Blood cultures grew gram + cocci, urine culture with E Coli and Aerococcus. Infectious disease consulted. S/p zosyn. Resolution of leukocytosis. Now monitoring off of abx.  - Trend LFTs, downtrending  - Pain control, standing tylenol dose with oxy/dilaudid prn  - lovenox for dvt ppx.     # Small bowel obstruction likely due to ileus.  - seen on CT of [facility]   - small bowel follow through on 2/7 showed contrast in the colon, pt advanced to full liquid diet w/ ensure TID. Advance diet per surgery, likely to regular diet today if tolerating.  - Surgery following, d/w Dr. Thompson  - optimize electrolytes    # Acute hypoxic respiratory failure likely due to atelectasis/right pleural effusion - resolved, pt now on room air.  - discussed w/ Pulm, Dr. Harden. Effusion too small to drain  - cont incentive spirometry.  - CXR personal read with haziness over right lung border. Will d/w Dr. Harden.    # Diarrhea - improving.  c/b hypokalemia and hypophosphatemia - potassium and phosphorous repletion  - hold bowel regimen  - negative C diff    # Tachycardia likely due to multifocal atrial tachycardia  w/ associated hypotension from diarrhea/volume depletion/anemia  There was a question if patient was in Paroxysmal atrial fibrillation, though information reviewed by Cardiologist suggests this is MAT. Evidence of ischemia w/ ST segment depression in anterior leads. CTA chest obtained and negative for PE. Echo w/ EF 50-55%. Per cardio, risk of AC seems to outweigh benefit at this point, and this does not seem to be Atrial Fib.  - telemetry overnight w/ HRs 100s-110s, sinus with PACs  - pt may need extended cardiac monitoring on discharge. OP Cardio f/u.     #Urinary retention  - Patient with chronic felipe - was placed in November after fall/hip fx, no TOV has been done as per daughter. No hx of urinary retention prior to that event   - Felipe replaced on 2/4/2023, continue.   - Cont increased Flomax dosing at 0.8mg qhs  - Urology consulted noted; cont Proscar, Flomax and delayed void trial as outpatient.     #Acute Kidney Injury - Improved   - Monitor BMP  - Avoid nephrotoxins  - felipe catheter    # Hypernatremia - improved.    #Hx of TIA  - c/w Statin, ASA    #Dysphagia  -  Speech and swallow eval today, d/w Reva Ashely - level 6 soft and bite sized/ thin level 0.     #DVT Prophylaxis- Lovenox   GOC: DNR/I, MOLST in chart    Dispo: D/C to [facility] when cleared by surgery and cardio    Update daughter Shelli Cell [phone]      89y year old Male with PMH of recent L hip fx (11/2022), TIA (11/2022) who presents to the ER from [facility] for abdominal pain, nausea for 4 days. Admitted for acute gangrenous cholecystitis, c/b perforated gallbladder, s/p lap kellee on 1/30 with empyema found, s/p drain in place. Pt is s/p zosyn, infectious disease consulting. Post op course complicated by multifocal atrial tachycardia and small bowel obstruction due to ileus. He is now on a full liquid diet. Speech and swallow evaluated today.    #Acute cholecystitis c/b perforated gallbladder  #Transaminitis, Hyperbilirubinemia  - s/p lap kellee with Dr Thompson on 1/30, drain still in place  - Maintain RUPINDER-SS, may be removed today by Surgery.  - Blood cultures grew gram + cocci, urine culture with E Coli and Aerococcus. Infectious disease consulted. S/p zosyn. Resolution of leukocytosis. Now monitoring off of abx.  - Trend LFTs, downtrending  - Pain control, standing tylenol dose with oxy/dilaudid prn  - lovenox for dvt ppx.     # Small bowel obstruction likely due to ileus.  - seen on CT of [facility]   - small bowel follow through on 2/7 showed contrast in the colon, pt advanced to full liquid diet w/ ensure TID. Advance diet per surgery, likely to regular diet today if tolerating.  - Surgery following, d/w Dr. Thompson  - optimize electrolytes    # Acute hypoxic respiratory failure likely due to atelectasis/right pleural effusion - resolved, pt now on room air.  - discussed w/ Pulm, Dr. Harden. Effusion too small to drain  - cont incentive spirometry.  - CXR personal read with haziness over right lung border. Will d/w Dr. Harden.    # Diarrhea - improving.  c/b hypokalemia and hypophosphatemia - potassium and phosphorous repletion  - hold bowel regimen  - negative C diff    # Tachycardia likely due to multifocal atrial tachycardia  w/ associated hypotension from diarrhea/volume depletion/anemia  There was a question if patient was in Paroxysmal atrial fibrillation, though information reviewed by Cardiologist suggests this is MAT. Evidence of ischemia w/ ST segment depression in anterior leads. CTA chest obtained and negative for PE. Echo w/ EF 50-55%. Per cardio, risk of AC seems to outweigh benefit at this point, and this does not seem to be Atrial Fib.  - telemetry overnight w/ HRs 100s-110s, sinus with PACs  - pt may need extended cardiac monitoring on discharge. OP Cardio f/u.   - f/u ProBNP    #Urinary retention  - Patient with chronic felipe - was placed in November after fall/hip fx, no TOV has been done as per daughter. No hx of urinary retention prior to that event   - Felipe replaced on 2/4/2023, continue.   - Cont increased Flomax dosing at 0.8mg qhs  - Urology consulted noted; cont Proscar, Flomax and delayed void trial as outpatient.     #Acute Kidney Injury - Improved   - Monitor BMP  - Avoid nephrotoxins  - felipe catheter    # Hypernatremia - improved.    #Hx of TIA  - c/w Statin, ASA    #Dysphagia  -  Speech and swallow eval today, d/w Reva Ashely - level 6 soft and bite sized/ thin level 0.     #DVT Prophylaxis- Lovenox   GOC: DNR/I, MOLST in chart    Dispo: D/C to [facility] when cleared by surgery and cardio    Update daughter Shelli Cell [phone]      89y year old Male with PMH of recent L hip fx (11/2022), TIA (11/2022) who presents to the ER from [facility] for abdominal pain, nausea for 4 days. Admitted for acute gangrenous cholecystitis, c/b perforated gallbladder, s/p lap kellee on 1/30 with empyema found, s/p drain in place. Pt is s/p zosyn, infectious disease consulting. Post op course complicated by multifocal atrial tachycardia and small bowel obstruction due to ileus. He is now on a full liquid diet. Speech and swallow evaluated today.    #Acute cholecystitis c/b perforated gallbladder  #Transaminitis, Hyperbilirubinemia  - s/p lap kellee with Dr Thompson on 1/30, drain still in place  - Maintain RUPINDER-SS, may be removed today by Surgery.  - Blood cultures grew gram + cocci, urine culture with E Coli and Aerococcus. Infectious disease consulted. S/p zosyn. Resolution of leukocytosis. Now monitoring off of abx.  - Trend LFTs, downtrending  - Pain control, standing tylenol dose with oxy/dilaudid prn  - lovenox for dvt ppx.     # Small bowel obstruction likely due to ileus.  - seen on CT of [facility]   - small bowel follow through on 2/7 showed contrast in the colon, pt advanced to full liquid diet w/ ensure TID. Advance diet per surgery, likely to regular diet today if tolerating.  - Surgery following, d/w Dr. Thompson  - optimize electrolytes    # Acute hypoxic respiratory failure likely due to atelectasis/right pleural effusion - resolved, pt now on room air.  - discussed w/ Pulm, Dr. Harden. Effusion too small to drain  - cont incentive spirometry.  - CXR personal read with haziness over right lung border. Will d/w Dr. Harden.    # Diarrhea - improving.  c/b hypokalemia and hypophosphatemia - potassium and phosphorous repletion  - hold bowel regimen  - negative C diff    # Tachycardia likely due to multifocal atrial tachycardia  w/ associated hypotension from diarrhea/volume depletion/anemia  There was a question if patient was in Paroxysmal atrial fibrillation, though information reviewed by Cardiologist suggests this is MAT. Evidence of ischemia w/ ST segment depression in anterior leads. CTA chest obtained and negative for PE. Echo w/ EF 50-55%. Per cardio, risk of AC seems to outweigh benefit at this point, and this does not seem to be Atrial Fib.  - telemetry overnight w/ HRs 100s-110s, sinus with PACs  - pt may need extended cardiac monitoring on discharge. OP Cardio f/u.   - f/u ProBNP    #Urinary retention  - Patient with chronic felipe - was placed in November after fall/hip fx, no TOV has been done as per daughter. No hx of urinary retention prior to that event   - Felipe replaced on 2/4/2023, continue.   - Cont increased Flomax dosing at 0.8mg qhs  - Urology consulted noted; cont Proscar, Flomax and delayed void trial as outpatient.     #Acute Kidney Injury - Improved   - Monitor BMP  - Avoid nephrotoxins  - felipe catheter    # Hypernatremia - improved.    #Hx of TIA  - c/w Statin, ASA    #Dysphagia  -  Speech and swallow eval today, d/w Reva Ashely - level 6 soft and bite sized/ thin level 0.     #DVT Prophylaxis- Lovenox   GOC: DNR/I, MOLST in chart    Dispo: D/C to [facility] when cleared by surgery and cardio    Update daughter Shelli Cell [phone]      89y year old Male with PMH of recent L hip fx (11/2022), TIA (11/2022) who presents to the ER from [facility] for abdominal pain, nausea for 4 days. Admitted for acute gangrenous cholecystitis, c/b perforated gallbladder, s/p lap kellee on 1/30 with empyema found, s/p drain in place. Pt is s/p zosyn, infectious disease consulting. Post op course complicated by multifocal atrial tachycardia and small bowel obstruction due to ileus. He is now on a full liquid diet. Speech and swallow evaluated today.    #Acute cholecystitis c/b perforated gallbladder  #Transaminitis, Hyperbilirubinemia  - s/p lap kellee with Dr Thompson on 1/30, drain still in place  - Maintain RUPINDER-SS, may be removed today by Surgery.  - Blood cultures grew gram + cocci, urine culture with E Coli and Aerococcus. Infectious disease consulted. S/p zosyn. Resolution of leukocytosis. Now monitoring off of abx.  - Trend LFTs, downtrending  - Pain control, standing tylenol dose with oxy/dilaudid prn  - lovenox for dvt ppx.     # Small bowel obstruction likely due to ileus.  - seen on CT of [facility]   - small bowel follow through on 2/7 showed contrast in the colon, pt advanced to full liquid diet w/ ensure TID. Advance diet per surgery, likely to regular diet today if tolerating.  - Surgery following, d/w Dr. Thompson  - optimize electrolytes    # Acute hypoxic respiratory failure likely due to atelectasis/right pleural effusion - resolved, pt now on room air.  - discussed w/ Pulm, Dr. Harden. Effusion too small to drain  - cont incentive spirometry.  - CXR personal read with haziness over right lung border. Will d/w Dr. Harden.    # Diarrhea - improving.  c/b hypokalemia and hypophosphatemia - potassium and phosphorous repletion  - hold bowel regimen  - negative C diff    # Tachycardia likely due to multifocal atrial tachycardia  w/ associated hypotension from diarrhea/volume depletion/anemia  There was a question if patient was in Paroxysmal atrial fibrillation, though information reviewed by Cardiologist suggests this is MAT. Evidence of ischemia w/ ST segment depression in anterior leads. CTA chest obtained and negative for PE. Echo w/ EF 50-55%. Per cardio, risk of AC seems to outweigh benefit at this point, and this does not seem to be Atrial Fib.  - telemetry overnight w/ HRs 100s-110s, sinus with PACs  - pt may need extended cardiac monitoring on discharge. OP Cardio f/u.   - f/u ProBNP    #Urinary retention  - Patient with chronic felipe - was placed in November after fall/hip fx, no TOV has been done as per daughter. No hx of urinary retention prior to that event   - Felipe replaced on 2/4/2023, continue.   - Cont increased Flomax dosing at 0.8mg qhs  - Urology consulted noted; cont Proscar, Flomax and delayed void trial as outpatient.     #Acute Kidney Injury - Improved   - Monitor BMP  - Avoid nephrotoxins  - felipe catheter    # Hypernatremia - improved.    #Hx of TIA  - c/w Statin, ASA    #Dysphagia  -  Speech and swallow eval today, d/w Reva Ashely - level 6 soft and bite sized/ thin level 0.     #DVT Prophylaxis- Lovenox   GOC: DNR/I, MOLST in chart    Dispo: D/C to [facility] when cleared by surgery and cardio    Update daughter Shelli Cell [phone]      89y year old Male with PMH of recent L hip fx (11/2022), TIA (11/2022) who presents to the ER from [facility] for abdominal pain, nausea for 4 days. Admitted for acute gangrenous cholecystitis, c/b perforated gallbladder, s/p lap kellee on 1/30 with empyema found, s/p drain in place. Pt is s/p zosyn, infectious disease consulting. Post op course complicated by multifocal atrial tachycardia and small bowel obstruction due to ileus. He is now on a full liquid diet. Speech and swallow evaluated today.    #Acute cholecystitis c/b perforated gallbladder  #Transaminitis, Hyperbilirubinemia  - s/p lap kellee with Dr Thompson on 1/30, drain still in place  - Maintain RUPINDER-SS, may be removed today by Surgery.  - Blood cultures grew gram + cocci, urine culture with E Coli and Aerococcus. Infectious disease consulted. S/p zosyn. Resolution of leukocytosis. Now monitoring off of abx.  - Trend LFTs, downtrending  - Pain control, standing tylenol dose with oxy/dilaudid prn  - lovenox for dvt ppx.     # Small bowel obstruction likely due to ileus.  - seen on CT of [facility]   - small bowel follow through on 2/7 showed contrast in the colon, pt advanced to full liquid diet w/ ensure TID. Advance diet per surgery, likely to regular diet today if tolerating.  - Surgery following, d/w Dr. Thompson  - optimize electrolytes    # Acute hypoxic respiratory failure likely due to atelectasis/right pleural effusion - resolved, pt now on room air.  - discussed w/ Pulm, Dr. Harden. Effusion too small to drain  - cont incentive spirometry.  - CXR personal read with haziness over right lung border. Will d/w Dr. Harden.    # Diarrhea - improving.  c/b hypokalemia and hypophosphatemia - potassium and phosphorous repletion  - hold bowel regimen  - negative C diff    # Tachycardia likely due to multifocal atrial tachycardia  w/ associated hypotension from diarrhea/volume depletion/anemia  There was a question if patient was in Paroxysmal atrial fibrillation, though information reviewed by Cardiologist suggests this is MAT. Evidence of ischemia w/ ST segment depression in anterior leads. CTA chest obtained and negative for PE. Echo w/ EF 50-55%. Per cardio, risk of AC seems to outweigh benefit at this point, and this does not seem to be Atrial Fib.  - telemetry overnight w/ HRs 100s-110s, sinus with PACs  - pt may need extended cardiac monitoring on discharge. OP Cardio f/u.   - D/w Dr. Acuna. f/u ProBNP - elevated, will give 20 mg IV lasix. Start 25 mg PO metoprolol BID with holding parameters.    #Urinary retention  - Patient with chronic felipe - was placed in November after fall/hip fx, no TOV has been done as per daughter. No hx of urinary retention prior to that event   - Felipe replaced on 2/4/2023, continue.   - Cont increased Flomax dosing at 0.8mg qhs  - Urology consulted noted; cont Proscar, Flomax and delayed void trial as outpatient.     #Acute Kidney Injury - Improved   - Monitor BMP  - Avoid nephrotoxins  - felipe catheter    # Hypernatremia - improved.    #Hx of TIA  - c/w Statin, ASA    #Dysphagia  -  Speech and swallow eval today, d/w Reva Ashely - level 6 soft and bite sized/ thin level 0.     #DVT Prophylaxis- Lovenox   GOC: DNR/I, MOLST in chart    Dispo: D/C to [facility] when cleared by surgery and cardio    Update daughter Shelli Cell [phone]      89y year old Male with PMH of recent L hip fx (11/2022), TIA (11/2022) who presents to the ER from [facility] for abdominal pain, nausea for 4 days. Admitted for acute gangrenous cholecystitis, c/b perforated gallbladder, s/p lap kellee on 1/30 with empyema found, s/p drain in place. Pt is s/p zosyn, infectious disease consulting. Post op course complicated by multifocal atrial tachycardia and small bowel obstruction due to ileus. He is now on a full liquid diet. Speech and swallow evaluated today.    #Acute cholecystitis c/b perforated gallbladder  #Transaminitis, Hyperbilirubinemia  - s/p lap kellee with Dr Thompson on 1/30, drain still in place  - Maintain RUPINDER-SS, may be removed today by Surgery.  - Blood cultures grew gram + cocci, urine culture with E Coli and Aerococcus. Infectious disease consulted. S/p zosyn. Resolution of leukocytosis. Now monitoring off of abx.  - Trend LFTs, downtrending  - Pain control, standing tylenol dose with oxy/dilaudid prn  - lovenox for dvt ppx.     # Small bowel obstruction likely due to ileus.  - seen on CT of [facility]   - small bowel follow through on 2/7 showed contrast in the colon, pt advanced to full liquid diet w/ ensure TID. Advance diet per surgery, likely to regular diet today if tolerating.  - Surgery following, d/w Dr. Thompson  - optimize electrolytes    # Acute hypoxic respiratory failure likely due to atelectasis/right pleural effusion - resolved, pt now on room air.  - discussed w/ Pulm, Dr. Harden. Effusion too small to drain  - cont incentive spirometry.  - CXR personal read with haziness over right lung border. Will d/w Dr. Harden.    # Diarrhea - improving.  c/b hypokalemia and hypophosphatemia - potassium and phosphorous repletion  - hold bowel regimen  - negative C diff    # Tachycardia likely due to multifocal atrial tachycardia  w/ associated hypotension from diarrhea/volume depletion/anemia  There was a question if patient was in Paroxysmal atrial fibrillation, though information reviewed by Cardiologist suggests this is MAT. Evidence of ischemia w/ ST segment depression in anterior leads. CTA chest obtained and negative for PE. Echo w/ EF 50-55%. Per cardio, risk of AC seems to outweigh benefit at this point, and this does not seem to be Atrial Fib.  - telemetry overnight w/ HRs 100s-110s, sinus with PACs  - pt may need extended cardiac monitoring on discharge. OP Cardio f/u.   - D/w Dr. Acuna. f/u ProBNP - elevated, will give 20 mg IV lasix. Start 25 mg PO metoprolol BID with holding parameters.    #Urinary retention  - Patient with chronic felipe - was placed in November after fall/hip fx, no TOV has been done as per daughter. No hx of urinary retention prior to that event   - Felipe replaced on 2/4/2023, continue.   - Cont increased Flomax dosing at 0.8mg qhs  - Urology consulted noted; cont Proscar, Flomax and delayed void trial as outpatient.     #Acute Kidney Injury - Improved   - Monitor BMP  - Avoid nephrotoxins  - felipe catheter    # Hypernatremia - improved.    #Hx of TIA  - c/w Statin, ASA    #Dysphagia  -  Speech and swallow eval today, d/w Reva Ashely - level 6 soft and bite sized/ thin level 0.     #DVT Prophylaxis- Lovenox   GOC: DNR/I, MOLST in chart    Dispo: D/C to [facility] when cleared by surgery and cardio    Update daughter Shelli Cell [phone]      89y year old Male with PMH of recent L hip fx (11/2022), TIA (11/2022) who presents to the ER from [facility] for abdominal pain, nausea for 4 days. Admitted for acute gangrenous cholecystitis, c/b perforated gallbladder, s/p lap kellee on 1/30 with empyema found, s/p drain in place. Pt is s/p zosyn, infectious disease consulting. Post op course complicated by multifocal atrial tachycardia and small bowel obstruction due to ileus. He is now on a full liquid diet. Speech and swallow evaluated today.    #Acute cholecystitis c/b perforated gallbladder  #Transaminitis, Hyperbilirubinemia  - s/p lap kellee with Dr Thompson on 1/30, drain still in place  - Maintain RUPINDER-SS, may be removed today by Surgery.  - Blood cultures grew gram + cocci, urine culture with E Coli and Aerococcus. Infectious disease consulted. S/p zosyn. Resolution of leukocytosis. Now monitoring off of abx.  - Trend LFTs, downtrending  - Pain control, standing tylenol dose with oxy/dilaudid prn  - lovenox for dvt ppx.     # Small bowel obstruction likely due to ileus.  - seen on CT of [facility]   - small bowel follow through on 2/7 showed contrast in the colon, pt advanced to full liquid diet w/ ensure TID. Advance diet per surgery, likely to regular diet today if tolerating.  - Surgery following, d/w Dr. Thompson  - optimize electrolytes    # Acute hypoxic respiratory failure likely due to atelectasis/right pleural effusion - resolved, pt now on room air.  - discussed w/ Pulm, Dr. Harden. Effusion too small to drain  - cont incentive spirometry.  - CXR personal read with haziness over right lung border. Will d/w Dr. Harden.    # Diarrhea - improving.  c/b hypokalemia and hypophosphatemia - potassium and phosphorous repletion  - hold bowel regimen  - negative C diff    # Tachycardia likely due to multifocal atrial tachycardia  w/ associated hypotension from diarrhea/volume depletion/anemia  There was a question if patient was in Paroxysmal atrial fibrillation, though information reviewed by Cardiologist suggests this is MAT. Evidence of ischemia w/ ST segment depression in anterior leads. CTA chest obtained and negative for PE. Echo w/ EF 50-55%. Per cardio, risk of AC seems to outweigh benefit at this point, and this does not seem to be Atrial Fib.  - telemetry overnight w/ HRs 100s-110s, sinus with PACs  - pt may need extended cardiac monitoring on discharge. OP Cardio f/u.   - D/w Dr. Acuna. f/u ProBNP - elevated, will give 20 mg IV lasix. Start 25 mg PO metoprolol BID with holding parameters.    #Urinary retention  - Patient with chronic felipe - was placed in November after fall/hip fx, no TOV has been done as per daughter. No hx of urinary retention prior to that event   - Felipe replaced on 2/4/2023, continue.   - Cont increased Flomax dosing at 0.8mg qhs  - Urology consulted noted; cont Proscar, Flomax and delayed void trial as outpatient.     #Acute Kidney Injury - Improved   - Monitor BMP  - Avoid nephrotoxins  - felipe catheter    # Hypernatremia - improved.    #Hx of TIA  - c/w Statin, ASA    #Dysphagia  -  Speech and swallow eval today, d/w Reva Ashely - level 6 soft and bite sized/ thin level 0.     #DVT Prophylaxis- Lovenox   GOC: DNR/I, MOLST in chart    Dispo: D/C to [facility] when cleared by surgery and cardio    Update daughter Shelli Cell [phone]      89y year old Male with PMH of recent L hip fx (11/2022), TIA (11/2022) who presents to the ER from [facility] for abdominal pain, nausea for 4 days. Admitted for acute gangrenous cholecystitis, c/b perforated gallbladder, s/p lap kellee on 1/30 with empyema found, s/p drain in place. Pt is s/p zosyn, infectious disease consulting. Post op course complicated by multifocal atrial tachycardia and small bowel obstruction due to ileus. He is now on a full liquid diet. Speech and swallow evaluated today.    #Acute cholecystitis c/b perforated gallbladder  #Transaminitis, Hyperbilirubinemia  - s/p lap kellee with Dr Thompson on 1/30, drain still in place  - Maintain RUPINDER-SS, may be removed today by Surgery.  - Blood cultures grew gram + cocci, urine culture with E Coli and Aerococcus. Infectious disease consulted. S/p zosyn. Resolution of leukocytosis. Now monitoring off of abx.  - Trend LFTs, downtrending  - Pain control, standing tylenol dose with oxy/dilaudid prn  - lovenox for dvt ppx.     # Small bowel obstruction likely due to ileus.  - seen on CT of [facility]   - small bowel follow through on 2/7 showed contrast in the colon, pt advanced to full liquid diet w/ ensure TID. Advance diet per surgery, likely to regular diet today if tolerating.  - Surgery following, d/w Dr. Thompson  - optimize electrolytes    # Acute hypoxic respiratory failure likely due to atelectasis/right pleural effusion - resolved, pt now on room air.  - discussed w/ Pulm, Dr. Harden. Effusion too small to drain  - cont incentive spirometry.  - CXR personal read with haziness over right lung border. Will d/w Dr. Harden.    # Diarrhea - improving.  c/b hypokalemia and hypophosphatemia - potassium and phosphorous repletion  - hold bowel regimen  - negative C diff    # Tachycardia likely due to multifocal atrial tachycardia  w/ associated hypotension from diarrhea/volume depletion/anemia  There was a question if patient was in Paroxysmal atrial fibrillation, though information reviewed by Cardiologist suggests this is MAT. Evidence of ischemia w/ ST segment depression in anterior leads. CTA chest obtained and negative for PE. Echo w/ EF 50-55%. Per cardio, risk of AC seems to outweigh benefit at this point, and this does not seem to be Atrial Fib.  - telemetry overnight w/ HRs 100s-110s, sinus with PACs  - pt may need extended cardiac monitoring on discharge. OP Cardio f/u.   - D/w Dr. Acuna. f/u ProBNP - elevated, will give 20 mg IV lasix. Start 25 mg PO metoprolol BID with holding parameters.    #Urinary retention  - Patient with chronic felipe - was placed in November after fall/hip fx, no TOV has been done as per daughter. No hx of urinary retention prior to that event   - Felipe replaced on 2/4/2023, continue.   - Cont increased Flomax dosing at 0.8mg qhs  - Urology consulted noted; cont Proscar, Flomax and delayed void trial as outpatient.     #Acute Kidney Injury - Improved   - Monitor BMP  - Avoid nephrotoxins  - felipe catheter    # Hypernatremia - improved.    #Hx of TIA  - c/w Statin, ASA    #Dysphagia  -  Speech and swallow eval today, d/w Reva Ashely - level 6 soft and bite sized/ thin level 0.     #DVT Prophylaxis- Lovenox   GOC: DNR/I, MOLST in chart    Dispo: pt should be ready for discharge tomorrow if tachycardia improved.   Update daughter Shelli Cell [phone]      89y year old Male with PMH of recent L hip fx (11/2022), TIA (11/2022) who presents to the ER from [facility] for abdominal pain, nausea for 4 days. Admitted for acute gangrenous cholecystitis, c/b perforated gallbladder, s/p lap kellee on 1/30 with empyema found, s/p drain in place. Pt is s/p zosyn, infectious disease consulting. Post op course complicated by multifocal atrial tachycardia and small bowel obstruction due to ileus. He is now on a full liquid diet. Speech and swallow evaluated today.    #Acute cholecystitis c/b perforated gallbladder  #Transaminitis, Hyperbilirubinemia  - s/p lap kellee with Dr Thompson on 1/30, drain still in place  - Maintain RUPINDER-SS, may be removed today by Surgery.  - Blood cultures grew gram + cocci, urine culture with E Coli and Aerococcus. Infectious disease consulted. S/p zosyn. Resolution of leukocytosis. Now monitoring off of abx.  - Trend LFTs, downtrending  - Pain control, standing tylenol dose with oxy/dilaudid prn  - lovenox for dvt ppx.     # Small bowel obstruction likely due to ileus.  - seen on CT of [facility]   - small bowel follow through on 2/7 showed contrast in the colon, pt advanced to full liquid diet w/ ensure TID. Advance diet per surgery, likely to regular diet today if tolerating.  - Surgery following, d/w Dr. Thompson  - optimize electrolytes    # Acute hypoxic respiratory failure likely due to atelectasis/right pleural effusion - resolved, pt now on room air.  - discussed w/ Pulm, Dr. Harden. Effusion too small to drain  - cont incentive spirometry.  - CXR personal read with haziness over right lung border. Will d/w Dr. Harden.    # Diarrhea - improving.  c/b hypokalemia and hypophosphatemia - potassium and phosphorous repletion  - hold bowel regimen  - negative C diff    # Tachycardia likely due to multifocal atrial tachycardia  w/ associated hypotension from diarrhea/volume depletion/anemia  There was a question if patient was in Paroxysmal atrial fibrillation, though information reviewed by Cardiologist suggests this is MAT. Evidence of ischemia w/ ST segment depression in anterior leads. CTA chest obtained and negative for PE. Echo w/ EF 50-55%. Per cardio, risk of AC seems to outweigh benefit at this point, and this does not seem to be Atrial Fib.  - telemetry overnight w/ HRs 100s-110s, sinus with PACs  - pt may need extended cardiac monitoring on discharge. OP Cardio f/u.   - D/w Dr. Acuna. f/u ProBNP - elevated, will give 20 mg IV lasix. Start 25 mg PO metoprolol BID with holding parameters.    #Urinary retention  - Patient with chronic felipe - was placed in November after fall/hip fx, no TOV has been done as per daughter. No hx of urinary retention prior to that event   - Felipe replaced on 2/4/2023, continue.   - Cont increased Flomax dosing at 0.8mg qhs  - Urology consulted noted; cont Proscar, Flomax and delayed void trial as outpatient.     #Acute Kidney Injury - Improved   - Monitor BMP  - Avoid nephrotoxins  - felipe catheter    # Hypernatremia - improved.    #Hx of TIA  - c/w Statin, ASA    #Dysphagia  -  Speech and swallow eval today, d/w Reva Ashely - level 6 soft and bite sized/ thin level 0.     #DVT Prophylaxis- Lovenox   GOC: DNR/I, MOLST in chart    Dispo: pt should be ready for discharge tomorrow if tachycardia improved.   Update daughter Shelli Cell [phone]      89y year old Male with PMH of recent L hip fx (11/2022), TIA (11/2022) who presents to the ER from [facility] for abdominal pain, nausea for 4 days. Admitted for acute gangrenous cholecystitis, c/b perforated gallbladder, s/p lap kellee on 1/30 with empyema found, s/p drain in place. Pt is s/p zosyn, infectious disease consulting. Post op course complicated by multifocal atrial tachycardia and small bowel obstruction due to ileus. He is now on a full liquid diet. Speech and swallow evaluated today.    #Acute cholecystitis c/b perforated gallbladder  #Transaminitis, Hyperbilirubinemia  - s/p lap kellee with Dr Thompson on 1/30, drain still in place  - Maintain RUPINDER-SS, may be removed today by Surgery.  - Blood cultures grew gram + cocci, urine culture with E Coli and Aerococcus. Infectious disease consulted. S/p zosyn. Resolution of leukocytosis. Now monitoring off of abx.  - Trend LFTs, downtrending  - Pain control, standing tylenol dose with oxy/dilaudid prn  - lovenox for dvt ppx.     # Small bowel obstruction likely due to ileus.  - seen on CT of [facility]   - small bowel follow through on 2/7 showed contrast in the colon, pt advanced to full liquid diet w/ ensure TID. Advance diet per surgery, likely to regular diet today if tolerating.  - Surgery following, d/w Dr. Thompson  - optimize electrolytes    # Acute hypoxic respiratory failure likely due to atelectasis/right pleural effusion - resolved, pt now on room air.  - discussed w/ Pulm, Dr. Harden. Effusion too small to drain  - cont incentive spirometry.  - CXR personal read with haziness over right lung border. Will d/w Dr. Harden.    # Diarrhea - improving.  c/b hypokalemia and hypophosphatemia - potassium and phosphorous repletion  - hold bowel regimen  - negative C diff    # Tachycardia likely due to multifocal atrial tachycardia  w/ associated hypotension from diarrhea/volume depletion/anemia  There was a question if patient was in Paroxysmal atrial fibrillation, though information reviewed by Cardiologist suggests this is MAT. Evidence of ischemia w/ ST segment depression in anterior leads. CTA chest obtained and negative for PE. Echo w/ EF 50-55%. Per cardio, risk of AC seems to outweigh benefit at this point, and this does not seem to be Atrial Fib.  - telemetry overnight w/ HRs 100s-110s, sinus with PACs  - pt may need extended cardiac monitoring on discharge. OP Cardio f/u.   - D/w Dr. Acuna. f/u ProBNP - elevated, will give 20 mg IV lasix. Start 25 mg PO metoprolol BID with holding parameters.    #Urinary retention  - Patient with chronic felipe - was placed in November after fall/hip fx, no TOV has been done as per daughter. No hx of urinary retention prior to that event   - Felipe replaced on 2/4/2023, continue.   - Cont increased Flomax dosing at 0.8mg qhs  - Urology consulted noted; cont Proscar, Flomax and delayed void trial as outpatient.     #Acute Kidney Injury - Improved   - Monitor BMP  - Avoid nephrotoxins  - felipe catheter    # Hypernatremia - improved.    #Hx of TIA  - c/w Statin, ASA    #Dysphagia  -  Speech and swallow eval today, d/w Reva Ashely - level 6 soft and bite sized/ thin level 0.     #DVT Prophylaxis- Lovenox   GOC: DNR/I, MOLST in chart    Dispo: pt should be ready for discharge tomorrow if tachycardia improved.   Update daughter Shelli Cell [phone]

## 2023-02-08 NOTE — PROGRESS NOTE ADULT - ASSESSMENT
Patient is a 89y male with a past history of a TIA,BPH,GERD, Fe def anemia, remote colon cancer s/p surgical resection, recent fall with left hip fracture, s/p left hip replacement in November of 2022, who was at a rehab facility when he developed abdominal pain, n/v, elevated LFT's and elevated wbc and was sent to ER on 1/30.His imaging revealed a perforated GB and he was taken to OR. He had laparoscopic E Lap with removal of gangrenous, perforated gallbladder with adhesions to small bowel.He was placed on zosyn in ER and he is growing a GPC in anaerobic bottle of admission blood culture.  He had an irregular rhythm on admission, ? A fib, is now in NSR.He was extubated in RR, is alert, has a felipe and is requesting water.  Zosyn should be adequate coverage for gangrenous cholecystitis.His blood isolate has now been identified as an aerococcus urinae, this is a typical  organism which should be covered by zosyn. his repeat blood cultures are negative so far.Zosyn should provide adequate  coverage of E Coli and aerococcus . The admission CT showed the felipe in the prostatic urethra, ? if this was prior to felipe catheter change in ER.  He has completed 1 week of post op antibiotics to cover both  infection and gallbladder, stopped 2/6- zosyn and CTX.  Post op tachypnea and tachycardia improved .CTA negative for PE.Diet is being advanced  Suggest:  1. Monitor off antibiotics  2.Diarrhea moderating- CDT negative, likely non infectious.  3.Felipe for chronic restention  4.diet per surgery-  5.Suppoertive care per medicine

## 2023-02-08 NOTE — DISCHARGE NOTE PROVIDER - HOSPITAL COURSE
89y year old Male with PMH of recent L hip fx (11/2022), TIA (11/2022), indwelling felipe catheter (since Nov 2022, has not had a chance to f/u as an outpatient w/ urology) who presents to the ER from [facility] for abdominal pain, nausea for 4 days. Admitted for acute gangrenous cholecystitis, c/b perforated gallbladder, s/p lap kellee on 1/30 with empyema found, with drain that was placed intraoperatively. Infectious disease was consulted and the Pt completed a course of zosyn, with resolution in leukocytosis. Post op course complicated by multifocal atrial tachycardia and hypotension, precipitated by small bowel obstruction due to ileus, and hypotension, from patient developing diarrhea and emesis. Diet slowly advanced.  Pt also had acute urinary retention, requiring replacement of felipe catheter (pt failed trial to void).  Because of elevated d-dimer, work up was initiated and was negative for PE on CTA chest and LE DVT on b/l LE duplex.   pt additionally seen by Pulmonary for a right pleural effusion seen on CT chest - too small to drain. Recommended incentive spirometry. Pt possibly has underlying COPD - he is an ex smoker.    Speech and swallow evaluated and recommended level 6 soft and bite sized with thin liquids. Drain discontinued prior to discharge. 89y year old Male with PMH of recent L hip fx (11/2022), TIA (11/2022), indwelling felipe catheter (since Nov 2022, has not had a chance to f/u as an outpatient w/ urology) who presents to the ER from [facility] for abdominal pain, nausea for 4 days. Admitted for acute gangrenous cholecystitis, c/b perforated gallbladder, s/p lap kellee on 1/30 with empyema found, with drain that was placed intraoperatively. Infectious disease was consulted and the Pt completed a course of zosyn, with resolution in leukocytosis. Post op course complicated by multifocal atrial tachycardia and hypotension, precipitated by small bowel obstruction due to ileus, and hypotension, from patient developing diarrhea and emesis. Diet slowly advanced.  Pt also had acute urinary retention, requiring replacement of felipe catheter (pt failed trial to void).  Because of elevated d-dimer, work up was initiated and was negative for PE on CTA chest and LE DVT on b/l LE duplex.   pt additionally seen by Pulmonary for a right pleural effusion seen on CT chest - too small to drain. Recommended incentive spirometry. Pt possibly has underlying COPD - he is an ex smoker.    Speech and swallow evaluated and recommended level 6 soft and bite sized with thin liquids. Drain discontinued prior to discharge. 89y year old Male with PMH of recent L hip fx (11/2022), TIA (11/2022), indwelling felipe catheter (since Nov 2022, has not had a chance to f/u as an outpatient w/ urology) who presents to the ER from [facility] for abdominal pain, nausea for 4 days. Admitted for acute gangrenous cholecystitis, c/b perforated gallbladder, s/p lap kellee on 1/30 with empyema found, with drain that was placed intraoperatively. Infectious disease was consulted and the Pt completed a course of zosyn, with resolution in leukocytosis. Post op course complicated by multifocal atrial tachycardia and hypotension, precipitated by small bowel obstruction due to ileus, and hypotension, from patient developing diarrhea and emesis. Diet slowly advanced.  Pt also had acute urinary retention, requiring replacement of felipe catheter (pt failed trial to void).  Because of elevated d-dimer, work up was initiated and was negative for PE on CTA chest and LE DVT on b/l LE duplex.   pt additionally seen by Pulmonary for a right pleural effusion seen on CT chest - too small to drain. Recommended incentive spirometry. Pt possibly has underlying COPD - he is an ex smoker.    Speech and swallow evaluated and recommended level 6 soft and bite sized with thin liquids. Drain discontinued prior to discharge. 89y year old Male with PMH of recent L hip fx (11/2022), TIA (11/2022), indwelling felipe catheter (since Nov 2022, has not had a chance to f/u as an outpatient w/ urology) who presents to the ER from [facility] for abdominal pain, nausea for 4 days. Admitted for acute gangrenous cholecystitis, c/b perforated gallbladder, s/p lap kellee on 1/30 with empyema found, with drain that was placed intraoperatively. Infectious disease was consulted and the Pt completed a course of zosyn, with resolution in leukocytosis. Post op course complicated by multifocal atrial tachycardia and hypotension, precipitated by small bowel obstruction due to ileus, and hypotension, from patient developing diarrhea and emesis. Diet slowly advanced. Cardiology recommended metoprolol.   Pt also had acute urinary retention, requiring replacement of felipe catheter (pt failed trial to void).  Because of elevated d-dimer, work up was initiated and was negative for PE on CTA chest and LE DVT on b/l LE duplex.   pt additionally seen by Pulmonary for a right pleural effusion seen on CT chest - too small to drain. Recommended incentive spirometry. Pt possibly has underlying COPD - he is an ex smoker.    Speech and swallow evaluated and recommended soft and bite sized with thin liquids. Drain discontinued prior to discharge. 89y year old Male with PMH of recent L hip fx (11/2022), TIA (11/2022), indwelling felipe catheter (since Nov 2022, has not had a chance to f/u as an outpatient w/ urology) who presents to the ER from [facility] for abdominal pain, nausea for 4 days. Admitted for acute gangrenous cholecystitis, c/b perforated gallbladder, s/p lap kellee on 1/30 with empyema found, with drain that was placed intraoperatively. Infectious disease was consulted and the Pt completed a course of zosyn, with resolution in leukocytosis. Post op course complicated by multifocal atrial tachycardia and hypotension, precipitated by small bowel obstruction due to ileus, and hypotension, from patient developing diarrhea and emesis. Diet slowly advanced. Cardiology recommended metoprolol.   Pt also had acute urinary retention, requiring replacement of felipe catheter (pt failed trial to void).  Because of elevated d-dimer, work up was initiated and was negative for PE on CTA chest and LE DVT on b/l LE duplex.   pt additionally seen by Pulmonary for a right pleural effusion seen on CT chest - too small to drain. Recommended incentive spirometry. Pt possibly has underlying COPD - he is an ex smoker.    Speech and swallow evaluated and recommended soft and bite sized with thin liquids. Drain discontinued prior to discharge. 89y year old Male with PMH of recent L hip fx (11/2022), TIA (11/2022), indwelling felipe catheter (since Nov 2022, has not had a chance to f/u as an outpatient w/ urology) who presents to the ER from [facility] for abdominal pain, nausea for 4 days. Admitted for acute gangrenous cholecystitis, c/b perforated gallbladder, s/p lap kellee on 1/30 with empyema found, with drain that was placed intraoperatively. Infectious disease was consulted and the Pt completed a course of zosyn, with resolution in leukocytosis. Post op course complicated by multifocal atrial tachycardia and hypotension, precipitated by small bowel obstruction due to ileus, and hypotension, from patient developing diarrhea and emesis. Diet slowly advanced. Cardiology recommended metoprolol.   Pt also had acute urinary retention, requiring replacement of felipe catheter (pt failed trial to void).  Because of elevated d-dimer, work up was initiated and was negative for PE on CTA chest and LE DVT on b/l LE duplex.   pt additionally seen by Pulmonary for a right pleural effusion seen on CT chest - too small to drain. Recommended incentive spirometry. Pt possibly has underlying COPD - he is an ex smoker.    Speech and swallow evaluated and recommended soft and bite sized with thin liquids. Drain discontinued prior to discharge. 89y year old Male with PMH of recent L hip fx (11/2022), TIA (11/2022), indwelling feilpe catheter (since Nov 2022, has not had a chance to f/u as an outpatient w/ urology) who presents to the ER from [facility] for abdominal pain, nausea for 4 days. Admitted for acute gangrenous cholecystitis, c/b perforated gallbladder, s/p lap kellee on 1/30 with empyema found, with drain that was placed intraoperatively. Infectious disease was consulted and the Pt completed a course of zosyn, with resolution in leukocytosis. Post op course complicated by multifocal atrial tachycardia and hypotension, precipitated by small bowel obstruction due to ileus, and hypotension, from patient developing diarrhea and emesis. Diet slowly advanced. Cardiology recommended metoprolol.   Pt also had acute urinary retention, requiring replacement of felipe catheter (pt failed trial to void).  Because of elevated d-dimer, work up was initiated and was negative for PE on CTA chest and LE DVT on b/l LE duplex.   pt additionally seen by Pulmonary for a right pleural effusion seen on CT chest - too small to drain. Recommended incentive spirometry. Pt possibly has underlying COPD - he is an ex smoker.    Speech and swallow evaluated and recommended soft and bite sized with thin liquids. Drain discontinued prior to discharge.    Attempted to replete patient's electrolytes today, spoke with surgery, medically cleared for discharge to facility. 89y year old Male with PMH of recent L hip fx (11/2022), TIA (11/2022), indwelling felipe catheter (since Nov 2022, has not had a chance to f/u as an outpatient w/ urology) who presents to the ER from [facility] for abdominal pain, nausea for 4 days. Admitted for acute gangrenous cholecystitis, c/b perforated gallbladder, s/p lap kellee on 1/30 with empyema found, with drain that was placed intraoperatively. Infectious disease was consulted and the Pt completed a course of zosyn, with resolution in leukocytosis. Post op course complicated by multifocal atrial tachycardia and hypotension, precipitated by small bowel obstruction due to ileus, and hypotension, from patient developing diarrhea and emesis. Diet slowly advanced. Cardiology recommended metoprolol.   Pt also had acute urinary retention, requiring replacement of felipe catheter (pt failed trial to void).  Because of elevated d-dimer, work up was initiated and was negative for PE on CTA chest and LE DVT on b/l LE duplex.   pt additionally seen by Pulmonary for a right pleural effusion seen on CT chest - too small to drain. Recommended incentive spirometry. Pt possibly has underlying COPD - he is an ex smoker.    Speech and swallow evaluated and recommended soft and bite sized with thin liquids. Drain discontinued prior to discharge.    Attempted to replete patient's electrolytes today, spoke with surgery, medically cleared for discharge to facility. 89y year old Male with PMH of recent L hip fx (11/2022), TIA (11/2022), indwelling felipe catheter (since Nov 2022, has not had a chance to f/u as an outpatient w/ urology) who [facility] nausea for 4 days. Admitted for acute gangrenous cholecystitis, c/b perforated gallbladder, s/p lap kellee on 1/30 with empyema found, with drain that was placed intraoperatively. Infectious disease was consulted and the Pt completed a course of zosyn, with resolution in leukocytosis. Post op course complicated by multifocal atrial tachycardia and hypotension, precipitated by small bowel obstruction due to ileus, and hypotension, from patient developing diarrhea and emesis. Diet slowly advanced. Cardiology recommended metoprolol.   Pt also had acute urinary retention, requiring replacement of felipe catheter (pt failed trial to void).  Because of elevated d-dimer, work up was initiated and was negative for PE on CTA chest and LE DVT on b/l LE duplex.   pt additionally seen by Pulmonary for a right pleural effusion seen on CT chest - too small to drain. Recommended incentive spirometry. Pt possibly has underlying COPD - he is an ex smoker.    Speech and swallow evaluated and recommended soft and bite sized with thin liquids. Drain discontinued prior to discharge.    Attempted to replete patient's electrolytes today, spoke with surgery, medically cleared for discharge to facility.

## 2023-02-08 NOTE — DISCHARGE NOTE PROVIDER - PROVIDER TOKENS
PROVIDER:[TOKEN:[[id number]]] PROVIDER:[TOKEN:[[id number]]] PROVIDER:[TOKEN:[[id number]]]

## 2023-02-08 NOTE — SWALLOW BEDSIDE ASSESSMENT ADULT - SWALLOW EVAL: RECOMMENDED FEEDING/EATING TECHNIQUES
alternate food with liquid/maintain upright posture during/after eating for 30 mins/oral hygiene/small sips/bites

## 2023-02-08 NOTE — CHART NOTE - NSCHARTNOTEFT_GEN_A_CORE
Nutrition Follow Up Note  Hospital Course (Per Electronic Medical Record):   Source: Medical Record [X] Patient [X]  Nursing Staff [X]     Diet: Soft Bite size Ensure Clear TID     Patient's diet advanced from full fluids  tolerating current diet well, no nausea or abdominal pain reported at time of visit , patient consumed 100% of Ensure Clear & is currently ingesting the lunch meal ,     Current Weight: no follow up weight since admission     Pertinent Medications: MEDICATIONS  (STANDING):  acetaminophen     Tablet .. 650 milliGRAM(s) Oral every 6 hours  aspirin enteric coated 81 milliGRAM(s) Oral daily  enoxaparin Injectable 30 milliGRAM(s) SubCutaneous every 24 hours  finasteride 5 milliGRAM(s) Oral daily  tamsulosin 0.8 milliGRAM(s) Oral at bedtime    MEDICATIONS  (PRN):  aluminum hydroxide/magnesium hydroxide/simethicone Suspension 30 milliLiter(s) Oral every 4 hours PRN Dyspepsia  melatonin 3 milliGRAM(s) Oral at bedtime PRN Insomnia  ondansetron Injectable 4 milliGRAM(s) IV Push every 8 hours PRN Nausea and/or Vomiting  zinc oxide 40% Paste 1 Application(s) Topical three times a day PRN rash      Pertinent Labs:  02-08 Na141 mmol/L Glu 97 mg/dL K+ 4.4 mmol/L Cr  0.64 mg/dL BUN 4 mg/dL<L> 02-08 Phos 2.7 mg/dL 02-06 Alb 1.7 g/dL<L>        Skin: surgical incision noted ,     Edema: none    Last BM: (2/7)     Estimated Needs:   [X] No Change since Previous Assessment      Previous Nutrition Diagnosis: Severe protein calorie malnutrition     Nutrition Diagnosis is [X] Ongoing, encouraged patient to consume HBV protein .          New Nutrition Diagnosis: [X] Not Applicable    Interventions:   1. continue current diet      Monitoring & Evaluation: will monitor:  [X] Weights   [X] PO Intake   [X] Follow Up (Per Protocol)  [X] Tolerance to Diet Prescription       RD to follow as per Nutrition protocol  Devora Tatum RDN

## 2023-02-08 NOTE — PROGRESS NOTE ADULT - ASSESSMENT
Physical Examination:  GENERAL:               Alert, Oriented, No acute distress.    HEENT:                     No JVD, Moist MM  PULM:                     Bilateral air entry, diminished to auscultation bilaterally, no significant sputum production, No Rales, No Rhonchi, No Wheezing  CVS:                         S1, S2,  +Murmur  ABD:                        Soft, nondistended, nontender, normoactive bowel sounds, post op incision sites intact  EXT:                         No edema, nontender, No Cyanosis or Clubbing    NEURO:                  Alert, oriented, interactive, nonfocal, follows commands  PSYC:                      Calm, + Insight.       Assessment  1. Hypoxemia  - post op likely due to atelectasis and small pleural effusion   2. Abnormal CT chest with small Right effusion   3. Elevated ddimer - no PE on CTA or LE DVT  4. Cholecystitis s/p cholecystectomy, now with bacteremia and UTI  5. Ex smoker possible COPD underlying     Plan  noted cxr suspect very small effusion, doubt active pneumonia.  continue incentive spirometry  flutter valve started by bedside team  monitor on Room air  no evidence of acute COPD exacerbation at this time  OOB, PT  Incentive spirometry  CT reviewed pleural effusion too small to drain, at this time thoracentesis will cause more risk than benefit as effusion small on my review of imaging     IV antibiotics as per ID

## 2023-02-08 NOTE — DISCHARGE NOTE PROVIDER - DETAILS OF MALNUTRITION DIAGNOSIS/DIAGNOSES
This patient has been assessed with a concern for Malnutrition and was treated during this hospitalization for the following Nutrition diagnosis/diagnoses:     -  02/03/2023: Severe protein-calorie malnutrition   -  02/03/2023: Underweight (BMI < 19)

## 2023-02-08 NOTE — PROGRESS NOTE ADULT - NS ATTEND OPT1 GEN_ALL_CORE
I independently performed the documented:
I attest my time as attending is greater than 50% of the total combined time spent on qualifying patient care activities by the PA/NP and attending.
I independently performed the documented:
I attest my time as attending is greater than 50% of the total combined time spent on qualifying patient care activities by the PA/NP and attending.
I attest my time as attending is greater than 50% of the total combined time spent on qualifying patient care activities by the PA/NP and attending.
I independently performed the documented:
I independently performed the documented:
I attest my time as attending is greater than 50% of the total combined time spent on qualifying patient care activities by the PA/NP and attending.
I independently performed the documented:

## 2023-02-08 NOTE — SWALLOW BEDSIDE ASSESSMENT ADULT - COMMENTS
WBC 9.89  CXR IMPRESSION:  Low lung volumes.  New mild patchy right perihilar/midlung opacity which could be due to   atelectasis or developing infection.  New hazy right basilar opacity, possibly subsegmental atelectasis, a   small layering right pleural effusion with associated passive   atelectasis, or developing infection.  CT- N/a

## 2023-02-08 NOTE — PROGRESS NOTE ADULT - ASSESSMENT
Assessment:  Everton Gaxiola is a 89 year old man with recent hospitalization here in November for TIA, now sent in from [facility] due to abdominal pain, nausea, vomiting and abnormal labs, found to have perforated gallbladder and small bowel obstruction.    ECG is poor baseline, irregular rhythm, occasional sinus rhythm with PACs noted, suggestive of atrial tachycardia . Recent echo with normal LVEF 50-55%.    Recommendations:  [] SVT, suggestive of atrial tachycardia: Would trial Metoprolol tartrate 25 mg PO BID. Continue to monitor on telemetry. Goal HR < 100 prior to discharge. Would benefit from outpatient cardiac event monitor.   [] Perforated gallbladder s/p laparoscopic cholecystectomy: Tolerated surgery well, follow up Surgery and ID  [] Would monitor volume status closely as patient received significant IV fluids and appears mildly congested, may benefit from small dose of IV lasix     Will sign out to cardiologist to follow along tomorrow.     Jn Acuna MD  Cardiology        Assessment:  Everton Gaxiola is a 89 year old man with recent hospitalization here in November for TIA, now sent in from [facility] due to abdominal pain, nausea, vomiting and abnormal labs, found to have perforated gallbladder and small bowel obstruction.    ECG is poor baseline, irregular rhythm, occasional sinus rhythm with PACs noted, suggestive of atrial tachycardia . Recent echo with normal LVEF 50-55%.    Recommendations:  [] SVT, suggestive of atrial tachycardia: Would trial Metoprolol tartrate 25 mg PO BID. Continue to monitor on telemetry. Goal HR < 100 prior to discharge. Would benefit from outpatient cardiac event monitor.   [] Perforated gallbladder s/p laparoscopic cholecystectomy: Tolerated surgery well, follow up Surgery and ID  [] Would monitor volume status closely as patient received significant IV fluids and appears mildly congested, may benefit from small dose of IV lasix     Will sign out to cardiologist to follow along tomorrow.     Jn Acuna MD  Cardiology        Assessment:  Everton Gaxiola is a 89 year old man with recent hospitalization here in November for TIA, now sent in from [facility] due to abdominal pain, nausea, vomiting and abnormal labs, found to have perforated gallbladder and small bowel obstruction.    ECG is poor baseline, irregular rhythm, occasional sinus rhythm with PACs noted, suggestive of atrial tachycardia . Recent echo with normal LVEF 50-55%.    Recommendations:  [] SVT, suggestive of atrial tachycardia: Would trial Metoprolol tartrate 25 mg PO BID. Continue to monitor on telemetry. Goal HR < 100 prior to discharge. Would benefit from outpatient cardiac event monitor.   [] Perforated gallbladder s/p laparoscopic cholecystectomy: Tolerated surgery well, follow up Surgery and ID  [] Would monitor volume status closely as patient received significant IV fluids and appears mildly congested, may benefit from small dose of IV lasix     Will sign out to cardiologist to follow along tomorrow.     Jn Acuna MD  Cardiology        Assessment:  Everton Gaxiola is a 89 year old man with recent hospitalization here in November for TIA, now sent in from [facility] due to abdominal pain, nausea, vomiting and abnormal labs, found to have perforated gallbladder and small bowel obstruction.    ECG is poor baseline, irregular rhythm, occasional sinus rhythm with PACs noted, suggestive of atrial tachycardia . Recent echo with normal LVEF 50-55%.    Recommendations:  [] SVT, suggestive of atrial tachycardia: Would trial Metoprolol tartrate 25 mg PO BID. Continue to monitor on telemetry. Goal HR < 100 prior to discharge. Would benefit from outpatient cardiac event monitor.   [] Perforated gallbladder s/p laparoscopic cholecystectomy: Tolerated surgery well, follow up Surgery and ID  [] Would monitor volume status closely as patient received significant IV fluids and appears mildly congested, may benefit from small dose of IV lasix. Check pro BNP     Will sign out to cardiologist to follow along tomorrow.     Jn Acuna MD  Cardiology        Assessment:  Everton Gaxiola is a 89 year old man with recent hospitalization here in November for TIA, now sent in from [facility] due to abdominal pain, nausea, vomiting and abnormal labs, found to have perforated gallbladder and small bowel obstruction.    ECG is poor baseline, irregular rhythm, occasional sinus rhythm with PACs noted, suggestive of atrial tachycardia . Recent echo with normal LVEF 50-55%.    Recommendations:  [] SVT, suggestive of atrial tachycardia: Would trial Metoprolol tartrate 25 mg PO BID. Continue to monitor on telemetry. Goal HR < 100 prior to discharge. Would benefit from outpatient cardiac event monitor.   [] Perforated gallbladder s/p laparoscopic cholecystectomy: Tolerated surgery well, follow up Surgery and ID  [] Would monitor volume status closely as patient received significant IV fluids and appears mildly congested, may benefit from small dose of IV lasix. Check pro BNP     Will sign out to cardiologist to follow along tomorrow.     Jn Acuna MD  Cardiology        Assessment:  Everton Gaxiola is a 89 year old man with recent hospitalization here in November for TIA, now sent in from [facility] due to abdominal pain, nausea, vomiting and abnormal labs, found to have perforated gallbladder and small bowel obstruction.    ECG is poor baseline, irregular rhythm, occasional sinus rhythm with PACs noted, suggestive of atrial tachycardia . Recent echo with normal LVEF 50-55%.    Recommendations:  [] SVT, suggestive of atrial tachycardia: Would trial Metoprolol tartrate 25 mg PO BID. Continue to monitor on telemetry. Goal HR < 100 prior to discharge. Would benefit from outpatient cardiac event monitor.   [] Perforated gallbladder s/p laparoscopic cholecystectomy: Tolerated surgery well, follow up Surgery and ID  [] Would monitor volume status closely as patient received significant IV fluids and appears mildly congested, may benefit from small dose of IV lasix. Check pro BNP     Will sign out to cardiologist to follow along tomorrow.     Jn Acuna MD  Cardiology

## 2023-02-08 NOTE — SWALLOW BEDSIDE ASSESSMENT ADULT - ORAL PREPARATORY PHASE
Within functional limits prolonged and discoordinated mastication in an attempt to utilize existing teeth, munch chew quality noted to access anterior dentition for  mastication of advanced diet textures/Decreased mastication ability

## 2023-02-08 NOTE — DISCHARGE NOTE PROVIDER - ATTENDING DISCHARGE PHYSICAL EXAMINATION:
General: NAD, Awake and alert  ENT: MMM, no thrush  Neck: Supple, No JVD  Lungs: Clear to auscultation bilaterally, good air entry, non labored  Cardio: RRR, S1/S2, No murmurs  Abdomen: Soft, Nontender, Nondistended; Bowel sounds present  Extremities: No cyanosis, No edema

## 2023-02-09 ENCOUNTER — TRANSCRIPTION ENCOUNTER (OUTPATIENT)
Age: 88
End: 2023-02-09

## 2023-02-09 VITALS
SYSTOLIC BLOOD PRESSURE: 113 MMHG | DIASTOLIC BLOOD PRESSURE: 61 MMHG | RESPIRATION RATE: 18 BRPM | HEART RATE: 98 BPM | OXYGEN SATURATION: 95 %

## 2023-02-09 LAB
ANION GAP SERPL CALC-SCNC: 10 MMOL/L — SIGNIFICANT CHANGE UP (ref 5–17)
BUN SERPL-MCNC: 5 MG/DL — LOW (ref 7–23)
CALCIUM SERPL-MCNC: 8.4 MG/DL — SIGNIFICANT CHANGE UP (ref 8.4–10.5)
CHLORIDE SERPL-SCNC: 105 MMOL/L — SIGNIFICANT CHANGE UP (ref 96–108)
CO2 SERPL-SCNC: 26 MMOL/L — SIGNIFICANT CHANGE UP (ref 22–31)
CREAT SERPL-MCNC: 0.72 MG/DL — SIGNIFICANT CHANGE UP (ref 0.5–1.3)
CULTURE RESULTS: SIGNIFICANT CHANGE UP
EGFR: 84 ML/MIN/1.73M2 — SIGNIFICANT CHANGE UP
GLUCOSE SERPL-MCNC: 89 MG/DL — SIGNIFICANT CHANGE UP (ref 70–99)
HCT VFR BLD CALC: 39.1 % — SIGNIFICANT CHANGE UP (ref 39–50)
HGB BLD-MCNC: 12.3 G/DL — LOW (ref 13–17)
MAGNESIUM SERPL-MCNC: 1.8 MG/DL — SIGNIFICANT CHANGE UP (ref 1.6–2.6)
MCHC RBC-ENTMCNC: 29.9 PG — SIGNIFICANT CHANGE UP (ref 27–34)
MCHC RBC-ENTMCNC: 31.5 GM/DL — LOW (ref 32–36)
MCV RBC AUTO: 94.9 FL — SIGNIFICANT CHANGE UP (ref 80–100)
NRBC # BLD: 0 /100 WBCS — SIGNIFICANT CHANGE UP (ref 0–0)
PHOSPHATE SERPL-MCNC: 2.4 MG/DL — LOW (ref 2.5–4.5)
PLATELET # BLD AUTO: 369 K/UL — SIGNIFICANT CHANGE UP (ref 150–400)
POTASSIUM SERPL-MCNC: 3 MMOL/L — LOW (ref 3.5–5.3)
POTASSIUM SERPL-SCNC: 3 MMOL/L — LOW (ref 3.5–5.3)
RBC # BLD: 4.12 M/UL — LOW (ref 4.2–5.8)
RBC # FLD: 15.9 % — HIGH (ref 10.3–14.5)
SARS-COV-2 RNA SPEC QL NAA+PROBE: SIGNIFICANT CHANGE UP
SODIUM SERPL-SCNC: 141 MMOL/L — SIGNIFICANT CHANGE UP (ref 135–145)
SPECIMEN SOURCE: SIGNIFICANT CHANGE UP
WBC # BLD: 12.74 K/UL — HIGH (ref 3.8–10.5)
WBC # FLD AUTO: 12.74 K/UL — HIGH (ref 3.8–10.5)

## 2023-02-09 PROCEDURE — 83605 ASSAY OF LACTIC ACID: CPT

## 2023-02-09 PROCEDURE — 82248 BILIRUBIN DIRECT: CPT

## 2023-02-09 PROCEDURE — 97162 PT EVAL MOD COMPLEX 30 MIN: CPT

## 2023-02-09 PROCEDURE — 85027 COMPLETE CBC AUTOMATED: CPT

## 2023-02-09 PROCEDURE — 80053 COMPREHEN METABOLIC PANEL: CPT

## 2023-02-09 PROCEDURE — 36415 COLL VENOUS BLD VENIPUNCTURE: CPT

## 2023-02-09 PROCEDURE — 84100 ASSAY OF PHOSPHORUS: CPT

## 2023-02-09 PROCEDURE — 85730 THROMBOPLASTIN TIME PARTIAL: CPT

## 2023-02-09 PROCEDURE — 76705 ECHO EXAM OF ABDOMEN: CPT

## 2023-02-09 PROCEDURE — 87150 DNA/RNA AMPLIFIED PROBE: CPT

## 2023-02-09 PROCEDURE — 87040 BLOOD CULTURE FOR BACTERIA: CPT

## 2023-02-09 PROCEDURE — 97110 THERAPEUTIC EXERCISES: CPT

## 2023-02-09 PROCEDURE — 83690 ASSAY OF LIPASE: CPT

## 2023-02-09 PROCEDURE — 99285 EMERGENCY DEPT VISIT HI MDM: CPT

## 2023-02-09 PROCEDURE — 92526 ORAL FUNCTION THERAPY: CPT

## 2023-02-09 PROCEDURE — 71275 CT ANGIOGRAPHY CHEST: CPT

## 2023-02-09 PROCEDURE — 80076 HEPATIC FUNCTION PANEL: CPT

## 2023-02-09 PROCEDURE — 87493 C DIFF AMPLIFIED PROBE: CPT

## 2023-02-09 PROCEDURE — 83735 ASSAY OF MAGNESIUM: CPT

## 2023-02-09 PROCEDURE — 87186 SC STD MICRODIL/AGAR DIL: CPT

## 2023-02-09 PROCEDURE — C9399: CPT

## 2023-02-09 PROCEDURE — 93306 TTE W/DOPPLER COMPLETE: CPT

## 2023-02-09 PROCEDURE — 88304 TISSUE EXAM BY PATHOLOGIST: CPT

## 2023-02-09 PROCEDURE — 74018 RADEX ABDOMEN 1 VIEW: CPT

## 2023-02-09 PROCEDURE — 80048 BASIC METABOLIC PNL TOTAL CA: CPT

## 2023-02-09 PROCEDURE — 99239 HOSP IP/OBS DSCHRG MGMT >30: CPT

## 2023-02-09 PROCEDURE — 86900 BLOOD TYPING SEROLOGIC ABO: CPT

## 2023-02-09 PROCEDURE — 85379 FIBRIN DEGRADATION QUANT: CPT

## 2023-02-09 PROCEDURE — 97530 THERAPEUTIC ACTIVITIES: CPT

## 2023-02-09 PROCEDURE — 96365 THER/PROPH/DIAG IV INF INIT: CPT

## 2023-02-09 PROCEDURE — 86850 RBC ANTIBODY SCREEN: CPT

## 2023-02-09 PROCEDURE — 87077 CULTURE AEROBIC IDENTIFY: CPT

## 2023-02-09 PROCEDURE — 86901 BLOOD TYPING SEROLOGIC RH(D): CPT

## 2023-02-09 PROCEDURE — 92610 EVALUATE SWALLOWING FUNCTION: CPT

## 2023-02-09 PROCEDURE — 87635 SARS-COV-2 COVID-19 AMP PRB: CPT

## 2023-02-09 PROCEDURE — 93005 ELECTROCARDIOGRAM TRACING: CPT

## 2023-02-09 PROCEDURE — 96361 HYDRATE IV INFUSION ADD-ON: CPT

## 2023-02-09 PROCEDURE — 85025 COMPLETE CBC W/AUTO DIFF WBC: CPT

## 2023-02-09 PROCEDURE — 74220 X-RAY XM ESOPHAGUS 1CNTRST: CPT

## 2023-02-09 PROCEDURE — 87086 URINE CULTURE/COLONY COUNT: CPT

## 2023-02-09 PROCEDURE — 81001 URINALYSIS AUTO W/SCOPE: CPT

## 2023-02-09 PROCEDURE — 93970 EXTREMITY STUDY: CPT

## 2023-02-09 PROCEDURE — 74177 CT ABD & PELVIS W/CONTRAST: CPT

## 2023-02-09 PROCEDURE — 71045 X-RAY EXAM CHEST 1 VIEW: CPT

## 2023-02-09 PROCEDURE — 85610 PROTHROMBIN TIME: CPT

## 2023-02-09 PROCEDURE — 82962 GLUCOSE BLOOD TEST: CPT

## 2023-02-09 PROCEDURE — 83880 ASSAY OF NATRIURETIC PEPTIDE: CPT

## 2023-02-09 PROCEDURE — C1889: CPT

## 2023-02-09 RX ORDER — POTASSIUM CHLORIDE 20 MEQ
40 PACKET (EA) ORAL
Refills: 0 | Status: COMPLETED | OUTPATIENT
Start: 2023-02-09 | End: 2023-02-09

## 2023-02-09 RX ORDER — TAMSULOSIN HYDROCHLORIDE 0.4 MG/1
0.4 CAPSULE ORAL AT BEDTIME
Refills: 0 | Status: DISCONTINUED | OUTPATIENT
Start: 2023-02-09 | End: 2023-02-09

## 2023-02-09 RX ORDER — METOPROLOL TARTRATE 50 MG
1 TABLET ORAL
Qty: 0 | Refills: 0 | DISCHARGE
Start: 2023-02-09

## 2023-02-09 RX ORDER — MAGNESIUM SULFATE 500 MG/ML
1 VIAL (ML) INJECTION ONCE
Refills: 0 | Status: COMPLETED | OUTPATIENT
Start: 2023-02-09 | End: 2023-02-09

## 2023-02-09 RX ORDER — POTASSIUM PHOSPHATE, MONOBASIC POTASSIUM PHOSPHATE, DIBASIC 236; 224 MG/ML; MG/ML
15 INJECTION, SOLUTION INTRAVENOUS ONCE
Refills: 0 | Status: COMPLETED | OUTPATIENT
Start: 2023-02-09 | End: 2023-02-09

## 2023-02-09 RX ADMIN — FINASTERIDE 5 MILLIGRAM(S): 5 TABLET, FILM COATED ORAL at 11:34

## 2023-02-09 RX ADMIN — Medication 81 MILLIGRAM(S): at 11:34

## 2023-02-09 RX ADMIN — Medication 40 MILLIEQUIVALENT(S): at 10:43

## 2023-02-09 RX ADMIN — Medication 25 MILLIGRAM(S): at 05:16

## 2023-02-09 RX ADMIN — ENOXAPARIN SODIUM 30 MILLIGRAM(S): 100 INJECTION SUBCUTANEOUS at 11:34

## 2023-02-09 RX ADMIN — Medication 40 MILLIEQUIVALENT(S): at 11:33

## 2023-02-09 NOTE — PROGRESS NOTE ADULT - SUBJECTIVE AND OBJECTIVE BOX
Patient is a 89y old  Male who presents with a chief complaint of abdominal pain (09 Feb 2023 08:30)    Patient seen and examined at bedside.  no acute events overnight    ALLERGIES:  No Known Allergies        Vital Signs Last 24 Hrs  T(F): 97.8 (09 Feb 2023 05:02), Max: 98 (08 Feb 2023 21:55)  HR: 87 (09 Feb 2023 05:02) (87 - 103)  BP: 114/57 (09 Feb 2023 05:02) (103/69 - 139/74)  RR: 18 (09 Feb 2023 05:02) (18 - 18)  SpO2: 93% (09 Feb 2023 05:02) (93% - 95%)  I&O's Summary    08 Feb 2023 07:01  -  09 Feb 2023 07:00  --------------------------------------------------------  IN: 0 mL / OUT: 90 mL / NET: -90 mL      MEDICATIONS:  acetaminophen     Tablet .. 650 milliGRAM(s) Oral every 6 hours  aluminum hydroxide/magnesium hydroxide/simethicone Suspension 30 milliLiter(s) Oral every 4 hours PRN  aspirin enteric coated 81 milliGRAM(s) Oral daily  enoxaparin Injectable 30 milliGRAM(s) SubCutaneous every 24 hours  finasteride 5 milliGRAM(s) Oral daily  magnesium sulfate  IVPB 1 Gram(s) IV Intermittent once  melatonin 3 milliGRAM(s) Oral at bedtime PRN  metoprolol tartrate 25 milliGRAM(s) Oral two times a day  ondansetron Injectable 4 milliGRAM(s) IV Push every 8 hours PRN  potassium chloride   Powder 40 milliEquivalent(s) Oral every 2 hours  potassium phosphate IVPB 15 milliMole(s) IV Intermittent once  tamsulosin 0.4 milliGRAM(s) Oral at bedtime  zinc oxide 40% Paste 1 Application(s) Topical three times a day PRN      PHYSICAL EXAM:  General: NAD, Awake and alert  ENT: MMM, no thrush  Neck: Supple, No JVD  Lungs: Clear to auscultation bilaterally, good air entry, non labored  Cardio: RRR, S1/S2, No murmurs  Abdomen: Soft, Nontender, Nondistended; Bowel sounds present  Extremities: No cyanosis, No edema    LABS:                        12.3   12.74 )-----------( 369      ( 09 Feb 2023 07:06 )             39.1     02-09    141  |  105  |  5   ----------------------------<  89  3.0   |  26  |  0.72    Ca    8.4      09 Feb 2023 07:06  Phos  2.4     02-09  Mg     1.8     02-09                11-23 Chol 160 mg/dL LDL -- HDL 44 mg/dL Trig 95 mg/dL                      Culture - Blood (collected 04 Feb 2023 12:00)  Source: .Blood Blood-Peripheral  Preliminary Report (05 Feb 2023 22:01):    No growth to date.    Culture - Blood (collected 04 Feb 2023 12:00)  Source: .Blood Blood-Peripheral  Preliminary Report (05 Feb 2023 22:01):    No growth to date.      COVID-19 PCR: Melissa (01-30-23 @ 12:25)      RADIOLOGY & ADDITIONAL TESTS:    Care Discussed with Consultants/Other Providers:

## 2023-02-09 NOTE — PROGRESS NOTE ADULT - NUTRITIONAL ASSESSMENT
This patient has been assessed with a concern for Malnutrition and has been determined to have a diagnosis/diagnoses of Severe protein-calorie malnutrition and Underweight (BMI < 19).    This patient is being managed with:   Diet NPO-  Except Medications  With Ice Chips/Sips of Water  Entered: Feb 5 2023 11:45AM    
This patient has been assessed with a concern for Malnutrition and has been determined to have a diagnosis/diagnoses of Severe protein-calorie malnutrition and Underweight (BMI < 19).    This patient is being managed with:   Diet Soft and Bite Sized-  Supplement Feeding Modality:  Oral  Ensure Clear Cans or Servings Per Day:  1       Frequency:  Three Times a day  Entered: Feb 8 2023 12:54PM    
This patient has been assessed with a concern for Malnutrition and has been determined to have a diagnosis/diagnoses of Severe protein-calorie malnutrition and Underweight (BMI < 19).    This patient is being managed with:   Diet NPO-  Entered: Feb 4 2023 11:59AM    
This patient has been assessed with a concern for Malnutrition and has been determined to have a diagnosis/diagnoses of Severe protein-calorie malnutrition and Underweight (BMI < 19).    This patient is being managed with:   Diet NPO-  Except Medications  With Ice Chips/Sips of Water  Entered: Feb 1 2023  4:22PM    
This patient has been assessed with a concern for Malnutrition and has been determined to have a diagnosis/diagnoses of Severe protein-calorie malnutrition and Underweight (BMI < 19).    This patient is being managed with:   Diet NPO-  Except Medications  With Ice Chips/Sips of Water  Entered: Feb 5 2023 11:45AM    
This patient has been assessed with a concern for Malnutrition and has been determined to have a diagnosis/diagnoses of Severe protein-calorie malnutrition and Underweight (BMI < 19).    This patient is being managed with:   Diet Full Liquid-  Supplement Feeding Modality:  Oral  Ensure Clear Cans or Servings Per Day:  3       Frequency:  Three Times a day  Entered: Feb 7 2023  3:05PM    
This patient has been assessed with a concern for Malnutrition and has been determined to have a diagnosis/diagnoses of Severe protein-calorie malnutrition and Underweight (BMI < 19).    This patient is being managed with:   Diet Soft and Bite Sized-  Supplement Feeding Modality:  Oral  Ensure Clear Cans or Servings Per Day:  1       Frequency:  Three Times a day  Entered: Feb 8 2023 12:54PM    
This patient has been assessed with a concern for Malnutrition and has been determined to have a diagnosis/diagnoses of Severe protein-calorie malnutrition and Underweight (BMI < 19).    This patient is being managed with:   Diet NPO-  Entered: Feb 4 2023 11:59AM    
This patient has been assessed with a concern for Malnutrition and has been determined to have a diagnosis/diagnoses of Severe protein-calorie malnutrition and Underweight (BMI < 19).    This patient is being managed with:   Diet Regular-  Supplement Feeding Modality:  Oral  Ensure Clear Cans or Servings Per Day:  1       Frequency:  Three Times a day  Entered: Feb 4 2023 10:46AM      This patient has been assessed with a concern for Malnutrition and has been determined to have a diagnosis/diagnoses of Severe protein-calorie malnutrition and Underweight (BMI < 19).    This patient is being managed with:   Diet Regular-  Supplement Feeding Modality:  Oral  Ensure Clear Cans or Servings Per Day:  1       Frequency:  Three Times a day  Entered: Feb 4 2023 10:46AM    
This patient has been assessed with a concern for Malnutrition and has been determined to have a diagnosis/diagnoses of Severe protein-calorie malnutrition and Underweight (BMI < 19).    This patient is being managed with:   Diet Full Liquid-  Supplement Feeding Modality:  Oral  Ensure Clear Cans or Servings Per Day:  3       Frequency:  Three Times a day  Entered: Feb 7 2023  3:05PM    
This patient has been assessed with a concern for Malnutrition and has been determined to have a diagnosis/diagnoses of Severe protein-calorie malnutrition and Underweight (BMI < 19).    This patient is being managed with:   Diet Soft and Bite Sized-  Supplement Feeding Modality:  Oral  Ensure Clear Cans or Servings Per Day:  1       Frequency:  Three Times a day  Entered: Feb 8 2023 12:54PM    
This patient has been assessed with a concern for Malnutrition and has been determined to have a diagnosis/diagnoses of Severe protein-calorie malnutrition and Underweight (BMI < 19).    This patient is being managed with:   Diet NPO-  Except Medications  With Ice Chips/Sips of Water  Entered: Feb 5 2023 11:45AM

## 2023-02-09 NOTE — PROGRESS NOTE ADULT - ASSESSMENT
Patient is a 89y old  Male who presents with a chief complaint of abdominal pain (09 Feb 2023 10:50)  pt POD # 10 s/p lap kellee  no events noted overnight  pt had 1 loose BM in last 24 hours  cardiology started pt on metoprolol tartrate to control tachycardia  pt feels well, denies n/v/f/c/cp or sob  tolerating regular diet      pt doing great from surgical standpoint      plan  - primary team to replete electrolytes  - cardio following for tachycardia  - anticipate d/c to [facility] tomorrow    case d/w Dr. Thompson and BENITEZ Hidalgo Patient is a 89y old  Male who presents with a chief complaint of abdominal pain (09 Feb 2023 10:50)  pt POD # 10 s/p lap kellee  no events noted overnight  pt had 1 loose BM in last 24 hours  cardiology started pt on metoprolol tartrate to control tachycardia  pt feels well, denies n/v/f/c/cp or sob  tolerating regular diet      pt doing great from surgical standpoint      plan  - primary team to replete electrolytes  - cardio following for tachycardia  - anticipate d/c to [facility] tomorrow    case d/w Dr. Thompson and BENITEZ Hidalgo Patient is a 89y old  Male who presents with a chief complaint of abdominal pain (09 Feb 2023 10:50)  pt POD # 10 s/p lap kellee  no events noted overnight  pt had 1 loose BM in last 24 hours  cardiology started pt on metoprolol tartrate to control tachycardia  pt feels well, denies n/v/f/c/cp or sob  tolerating regular diet      pt doing great from surgical standpoint      plan  - primary team to replete electrolytes  - cardio following for tachycardia  - anticipate d/c to [facility] tomorrow    case d/w Dr. Thompson and BENITEZ Hidalgo

## 2023-02-09 NOTE — DISCHARGE NOTE NURSING/CASE MANAGEMENT/SOCIAL WORK - NSDCFUADDAPPT_GEN_ALL_CORE_FT
D/c to [facility] [place] with Dr. Schultz following D/c to [facility] [place] with Dr. Schultz following D/c to [facility] [place] with Dr. Schultz following

## 2023-02-09 NOTE — DISCHARGE NOTE NURSING/CASE MANAGEMENT/SOCIAL WORK - NSPROEXTENSIONSOFSELF_GEN_A_NUR
Problem: KNOWLEDGE DEFICIT  Goal: Patient/S.O. demonstrates understanding of disease process, treatment plan, medications, and discharge instructions.   Outcome: Met This Shift none

## 2023-02-09 NOTE — PROGRESS NOTE ADULT - PROVIDER SPECIALTY LIST ADULT
Cardiology
Critical Care
Critical Care
Hospitalist
Pulmonology
Surgery
Cardiology
Pulmonology
Surgery
Hospitalist
Hospitalist
Cardiology
Critical Care
Hospitalist
Hospitalist
Infectious Disease
Surgery
Surgery
Cardiology
Infectious Disease
Infectious Disease
Surgery
Cardiology
Hospitalist
Infectious Disease
Infectious Disease
Surgery
Surgery
Cardiology
Surgery
Hospitalist
Infectious Disease
Infectious Disease
Surgery
Hospitalist
Surgery
Urology
Urology

## 2023-02-09 NOTE — PROGRESS NOTE ADULT - SUBJECTIVE AND OBJECTIVE BOX
Patient is a 89y old  Male who presents with a chief complaint of abdominal pain (09 Feb 2023 08:26)    HPI:  PRETTY DOLAN is a 89y year old Male with PMH of recent L hip fx (11/2022), TIA (11/2022) who presents to the ER from [facility] for abdominal pain, nausea for 4 days.     Patient states his symptoms have been worsening over the last 4 days; had bloodwork done at [facility] which showed elevated LFTs which prompted ER visit. patietn states he has had poor appetite for the same time period. Episode of vomiting in ER, nbnb.   Daughter at bedside who states patient has been participating with therapy however has been more fatigued over the last week.   Denies fever, chills, chest pain, shortness of breath.     On ER arrival, /68, HR 71, RR 17 T97.1, sat 95% on room air. Found to have acute cholecystitis on RUQ US. (30 Jan 2023 15:36)    felipe draining, some light-headedness    Interval Events:  Patient seen and examined at bedside.    MEDICATIONS:  MEDICATIONS  (STANDING):  acetaminophen     Tablet .. 650 milliGRAM(s) Oral every 6 hours  aspirin enteric coated 81 milliGRAM(s) Oral daily  enoxaparin Injectable 30 milliGRAM(s) SubCutaneous every 24 hours  finasteride 5 milliGRAM(s) Oral daily  metoprolol tartrate 25 milliGRAM(s) Oral two times a day  tamsulosin 0.4 milliGRAM(s) Oral at bedtime    MEDICATIONS  (PRN):  aluminum hydroxide/magnesium hydroxide/simethicone Suspension 30 milliLiter(s) Oral every 4 hours PRN Dyspepsia  melatonin 3 milliGRAM(s) Oral at bedtime PRN Insomnia  ondansetron Injectable 4 milliGRAM(s) IV Push every 8 hours PRN Nausea and/or Vomiting  zinc oxide 40% Paste 1 Application(s) Topical three times a day PRN rash      Allergies    No Known Allergies    Intolerances        T(C): 36.6 (02-09-23 @ 05:02), Max: 36.7 (02-08-23 @ 21:55)  T(F): 97.8 (02-09-23 @ 05:02), Max: 98 (02-08-23 @ 21:55)  HR: 87 (02-09-23 @ 05:02) (75 - 103)  BP: 114/57 (02-09-23 @ 05:02) (103/69 - 153/73)  RR: 18 (02-09-23 @ 05:02) (18 - 18)  SpO2: 93% (02-09-23 @ 05:02) (93% - 95%)          02-07-23 @ 07:01  -  02-08-23 @ 07:00  --------------------------------------------------------  IN:  Total IN: 0 mL    OUT:    Indwelling Catheter - Urethral (mL): 1800 mL  Total OUT: 1800 mL    Total NET: -1800 mL          LABS:      CBC Full  -  ( 08 Feb 2023 06:30 )  WBC Count : 9.89 K/uL  RBC Count : 3.89 M/uL  Hemoglobin : 11.7 g/dL  Hematocrit : 36.9 %  Platelet Count - Automated : 282 K/uL  Mean Cell Volume : 94.9 fl  Mean Cell Hemoglobin : 30.1 pg  Mean Cell Hemoglobin Concentration : 31.7 gm/dL  Auto Neutrophil # : x  Auto Lymphocyte # : x  Auto Monocyte # : x  Auto Eosinophil # : x  Auto Basophil # : x  Auto Neutrophil % : x  Auto Lymphocyte % : x  Auto Monocyte % : x  Auto Eosinophil % : x  Auto Basophil % : x    02-09    141  |  105  |  5<L>  ----------------------------<  89  3.0<L>   |  26  |  0.72    Ca    8.4      09 Feb 2023 07:06  Phos  2.4     02-09  Mg     1.8     02-09                    Physical Exam    Constitutional: alert, no acute distress    Abdomen: soft, nontender, nondistended, no HSM    Genitourinary: no bladder distention, felipe yellow           Patient is a 89y old  Male who presents with a chief complaint of abdominal pain (09 Feb 2023 08:26)    HPI:  PRETTY DOLAN is a 89y year old Male with PMH of recent L hip fx (11/2022), TIA (11/2022) who presents to the ER from [facility] for abdominal pain, nausea for 4 days.     Patient states his symptoms have been worsening over the last 4 days; had bloodwork done at [facility] which showed elevated LFTs which prompted ER visit. patietn states he has had poor appetite for the same time period. Episode of vomiting in ER, nbnb.   Daughter at bedside who states patient has been participating with therapy however has been more fatigued over the last week.   Denies fever, chills, chest pain, shortness of breath.     On ER arrival, /68, HR 71, RR 17 T97.1, sat 95% on room air. Found to have acute cholecystitis on RUQ US. (30 Jan 2023 15:36)    felipe draining, some light-headedness    Interval Events:  Patient seen and examined at bedside.    MEDICATIONS:  MEDICATIONS  (STANDING):  acetaminophen     Tablet .. 650 milliGRAM(s) Oral every 6 hours  aspirin enteric coated 81 milliGRAM(s) Oral daily  enoxaparin Injectable 30 milliGRAM(s) SubCutaneous every 24 hours  finasteride 5 milliGRAM(s) Oral daily  metoprolol tartrate 25 milliGRAM(s) Oral two times a day  tamsulosin 0.4 milliGRAM(s) Oral at bedtime    MEDICATIONS  (PRN):  aluminum hydroxide/magnesium hydroxide/simethicone Suspension 30 milliLiter(s) Oral every 4 hours PRN Dyspepsia  melatonin 3 milliGRAM(s) Oral at bedtime PRN Insomnia  ondansetron Injectable 4 milliGRAM(s) IV Push every 8 hours PRN Nausea and/or Vomiting  zinc oxide 40% Paste 1 Application(s) Topical three times a day PRN rash      Allergies    No Known Allergies    Intolerances        T(C): 36.6 (02-09-23 @ 05:02), Max: 36.7 (02-08-23 @ 21:55)  T(F): 97.8 (02-09-23 @ 05:02), Max: 98 (02-08-23 @ 21:55)  HR: 87 (02-09-23 @ 05:02) (75 - 103)  BP: 114/57 (02-09-23 @ 05:02) (103/69 - 153/73)  RR: 18 (02-09-23 @ 05:02) (18 - 18)  SpO2: 93% (02-09-23 @ 05:02) (93% - 95%)          02-07-23 @ 07:01  -  02-08-23 @ 07:00  --------------------------------------------------------  IN:  Total IN: 0 mL    OUT:    Indwelling Catheter - Urethral (mL): 1800 mL  Total OUT: 1800 mL    Total NET: -1800 mL          LABS:      CBC Full  -  ( 08 Feb 2023 06:30 )  WBC Count : 9.89 K/uL  RBC Count : 3.89 M/uL  Hemoglobin : 11.7 g/dL  Hematocrit : 36.9 %  Platelet Count - Automated : 282 K/uL  Mean Cell Volume : 94.9 fl  Mean Cell Hemoglobin : 30.1 pg  Mean Cell Hemoglobin Concentration : 31.7 gm/dL  Auto Neutrophil # : x  Auto Lymphocyte # : x  Auto Monocyte # : x  Auto Eosinophil # : x  Auto Basophil # : x  Auto Neutrophil % : x  Auto Lymphocyte % : x  Auto Monocyte % : x  Auto Eosinophil % : x  Auto Basophil % : x    02-09    141  |  105  |  5<L>  ----------------------------<  89  3.0<L>   |  26  |  0.72    Ca    8.4      09 Feb 2023 07:06  Phos  2.4     02-09  Mg     1.8     02-09                    Physical Exam    Constitutional: alert, no acute distress    Abdomen: soft, nontender, nondistended, no HSM    Genitourinary: no bladder distention, felipe yellow           Patient is a 89y old  Male who presents with a chief complaint of abdominal pain (09 Feb 2023 08:26)    HPI:  PRETTY DOLAN is a 89y year old Male with PMH of recent L hip fx (11/2022), TIA (11/2022) who presents to the ER from [facility] for abdominal pain, nausea for 4 days.     Patient states his symptoms have been worsening over the last 4 days; had bloodwork done at [facility] which showed elevated LFTs which prompted ER visit. patietn states he has had poor appetite for the same time period. Episode of vomiting in ER, nbnb.   Daughter at bedside who states patient has been participating with therapy however has been more fatigued over the last week.   Denies fever, chills, chest pain, shortness of breath.     On ER arrival, /68, HR 71, RR 17 T97.1, sat 95% on room air. Found to have acute cholecystitis on RUQ US. (30 Jan 2023 15:36)    felipe draining, some light-headedness    Interval Events:  Patient seen and examined at bedside.    MEDICATIONS:  MEDICATIONS  (STANDING):  acetaminophen     Tablet .. 650 milliGRAM(s) Oral every 6 hours  aspirin enteric coated 81 milliGRAM(s) Oral daily  enoxaparin Injectable 30 milliGRAM(s) SubCutaneous every 24 hours  finasteride 5 milliGRAM(s) Oral daily  metoprolol tartrate 25 milliGRAM(s) Oral two times a day  tamsulosin 0.4 milliGRAM(s) Oral at bedtime    MEDICATIONS  (PRN):  aluminum hydroxide/magnesium hydroxide/simethicone Suspension 30 milliLiter(s) Oral every 4 hours PRN Dyspepsia  melatonin 3 milliGRAM(s) Oral at bedtime PRN Insomnia  ondansetron Injectable 4 milliGRAM(s) IV Push every 8 hours PRN Nausea and/or Vomiting  zinc oxide 40% Paste 1 Application(s) Topical three times a day PRN rash      Allergies    No Known Allergies    Intolerances        T(C): 36.6 (02-09-23 @ 05:02), Max: 36.7 (02-08-23 @ 21:55)  T(F): 97.8 (02-09-23 @ 05:02), Max: 98 (02-08-23 @ 21:55)  HR: 87 (02-09-23 @ 05:02) (75 - 103)  BP: 114/57 (02-09-23 @ 05:02) (103/69 - 153/73)  RR: 18 (02-09-23 @ 05:02) (18 - 18)  SpO2: 93% (02-09-23 @ 05:02) (93% - 95%)          02-07-23 @ 07:01  -  02-08-23 @ 07:00  --------------------------------------------------------  IN:  Total IN: 0 mL    OUT:    Indwelling Catheter - Urethral (mL): 1800 mL  Total OUT: 1800 mL    Total NET: -1800 mL          LABS:      CBC Full  -  ( 08 Feb 2023 06:30 )  WBC Count : 9.89 K/uL  RBC Count : 3.89 M/uL  Hemoglobin : 11.7 g/dL  Hematocrit : 36.9 %  Platelet Count - Automated : 282 K/uL  Mean Cell Volume : 94.9 fl  Mean Cell Hemoglobin : 30.1 pg  Mean Cell Hemoglobin Concentration : 31.7 gm/dL  Auto Neutrophil # : x  Auto Lymphocyte # : x  Auto Monocyte # : x  Auto Eosinophil # : x  Auto Basophil # : x  Auto Neutrophil % : x  Auto Lymphocyte % : x  Auto Monocyte % : x  Auto Eosinophil % : x  Auto Basophil % : x    02-09    141  |  105  |  5<L>  ----------------------------<  89  3.0<L>   |  26  |  0.72    Ca    8.4      09 Feb 2023 07:06  Phos  2.4     02-09  Mg     1.8     02-09                    Physical Exam    Constitutional: alert, no acute distress    Abdomen: soft, nontender, nondistended, no HSM    Genitourinary: no bladder distention, felipe yellow

## 2023-02-09 NOTE — PROGRESS NOTE ADULT - ASSESSMENT
89y year old Male with PMH of recent L hip fx (11/2022), TIA (11/2022) who presents to the ER from [facility] for abdominal pain, nausea for 4 days. Admitted for acute gangrenous cholecystitis, c/b perforated gallbladder, s/p lap kellee on 1/30 with empyema found, s/p drain in place. Pt is s/p zosyn, infectious disease consulting. Post op course complicated by multifocal atrial tachycardia and small bowel obstruction due to ileus. He is now on a full liquid diet. Speech and swallow evaluated today.    #Acute cholecystitis c/b perforated gallbladder  #Transaminitis, Hyperbilirubinemia  - s/p lap kellee with Dr Thompson on 1/30, drain removed by surgery, completed course of Zosyn as per ID  - RUPINDER drain removed by surgery  - Blood cultures grew gram + cocci, urine culture with E Coli and Aerococcus. Infectious disease consulted. S/p zosyn. Resolution of leukocytosis. Now monitoring off of abx.  - Trend LFTs, downtrending  - Pain control, standing tylenol dose with oxy/dilaudid prn  - lovenox for dvt ppx.     # Small bowel obstruction likely due to ileus.  - seen on CT of [facility]   - small bowel follow through on 2/7 showed contrast in the colon, pt advanced to full liquid diet w/ ensure TID. Now tolerating solid foods  - Surgery following, d/w Dr. Thompson  - Plan to replete potassium phosphate and magnesium today    # Acute hypoxic respiratory failure likely due to atelectasis/right pleural effusion - resolved, pt now on room air.  - discussed w/ Pulm, Dr. Harden. Effusion too small to drain  - cont incentive spirometry.  - CXR personal read with haziness over right lung border. Will d/w Dr. Harden.    # Diarrhea - improving.  c/b hypokalemia and hypophosphatemia - potassium and phosphorous repletion  - hold bowel regimen  - negative C diff    # Tachycardia likely due to multifocal atrial tachycardia  w/ associated hypotension from diarrhea/volume depletion/anemia  -Cardiology appreciated  Continue metoprolol 25 BID with parameters, outpatient follow up    #Urinary retention  - Patient with chronic felipe - was placed in November after fall/hip fx, no TOV has been done as per daughter. No hx of urinary retention prior to that event   - Felipe replaced on 2/4/2023, continue.   - Urology reduced flomax 0.4 due to light headedness  - Urology consulted noted; cont Proscar, Flomax and delayed void trial as outpatient.     #Acute Kidney Injury - Improved   - Monitor BMP  - Avoid nephrotoxins  - felipe catheter    # Hypernatremia - improved.    #Hx of TIA  - c/w Statin, ASA    #Dysphagia  -  Speech and swallow eval today, d/w Reva Ashely - level 6 soft and bite sized/ thin level 0.     #DVT Prophylaxis- Lovenox   GOC: DNR/I, MOLST in chart    Dispo: Cleared for discharge pending expedited Covid swab   Update daughter Shelli Cell [phone]  89y year old Male with PMH of recent L hip fx (11/2022), TIA (11/2022) who presents to the ER from [facility] for abdominal pain, nausea for 4 days. Admitted for acute gangrenous cholecystitis, c/b perforated gallbladder, s/p lap kellee on 1/30 with empyema found, s/p drain in place. Pt is s/p zosyn, infectious disease consulting. Post op course complicated by multifocal atrial tachycardia and small bowel obstruction due to ileus. He is now on a full liquid diet. Speech and swallow evaluated today.    #Acute cholecystitis c/b perforated gallbladder  #Transaminitis, Hyperbilirubinemia  - s/p lap kellee with Dr Thompson on 1/30, drain removed by surgery, completed course of Zosyn as per ID  - RUPINDER drain removed by surgery  - Blood cultures grew gram + cocci, urine culture with E Coli and Aerococcus. Infectious disease consulted. S/p zosyn. Resolution of leukocytosis. Now monitoring off of abx.  - Trend LFTs, downtrending  - Pain control, standing tylenol dose with oxy/dilaudid prn  - lovenox for dvt ppx.     # Small bowel obstruction likely due to ileus.  - seen on CT of [facility]   - small bowel follow through on 2/7 showed contrast in the colon, pt advanced to full liquid diet w/ ensure TID. Now tolerating solid foods  - Surgery following, d/w Dr. Thompson  - Plan to replete potassium phosphate and magnesium today    # Acute hypoxic respiratory failure likely due to atelectasis/right pleural effusion - resolved, pt now on room air.  - discussed w/ Pulm, Dr. Harden. Effusion too small to drain  - cont incentive spirometry.  - CXR personal read with haziness over right lung border. Will d/w Dr. Harden.    # Diarrhea - improving.  c/b hypokalemia and hypophosphatemia - potassium and phosphorous repletion  - hold bowel regimen  - negative C diff    # Tachycardia likely due to multifocal atrial tachycardia  w/ associated hypotension from diarrhea/volume depletion/anemia  -Cardiology appreciated  Continue metoprolol 25 BID with parameters, outpatient follow up    #Urinary retention  - Patient with chronic felipe - was placed in November after fall/hip fx, no TOV has been done as per daughter. No hx of urinary retention prior to that event   - Felipe replaced on 2/4/2023, continue.   - Urology reduced flomax 0.4 due to light headedness  - Urology consulted noted; cont Proscar, Flomax and delayed void trial as outpatient.     #Acute Kidney Injury - Improved   - Monitor BMP  - Avoid nephrotoxins  - felipe catheter    # Hypernatremia - improved.    #Hx of TIA  - c/w Statin, ASA    #Dysphagia  -  Speech and swallow eval today, d/w Reva Ashely - level 6 soft and bite sized/ thin level 0.     #DVT Prophylaxis- Lovenox   GOC: DNR/I, MOLST in chart    Dispo: Cleared for discharge pending expedited Covid swab   Update daughter Shelli Cell [phone]  89y year old Male with PMH of recent L hip fx (11/2022), TIA (11/2022) who presents to the ER from [facility] for abdominal pain, nausea for 4 days. Admitted for acute gangrenous cholecystitis, c/b perforated gallbladder, s/p lap kellee on 1/30 with empyema found, s/p drain in place. Pt is s/p zosyn, infectious disease consulting. Post op course complicated by multifocal atrial tachycardia and small bowel obstruction due to ileus. He is now on a full liquid diet. Speech and swallow evaluated today.    #Acute cholecystitis c/b perforated gallbladder  #Transaminitis, Hyperbilirubinemia  - s/p lap kellee with Dr Thompson on 1/30, drain removed by surgery, completed course of Zosyn as per ID  - RUPINDER drain removed by surgery  - Blood cultures grew gram + cocci, urine culture with E Coli and Aerococcus. Infectious disease consulted. S/p zosyn. Resolution of leukocytosis. Now monitoring off of abx.  - Trend LFTs, downtrending  - Pain control, standing tylenol dose with oxy/dilaudid prn  - lovenox for dvt ppx.     # Small bowel obstruction likely due to ileus.  - seen on CT of [facility]   - small bowel follow through on 2/7 showed contrast in the colon, pt advanced to full liquid diet w/ ensure TID. Now tolerating solid foods  - Surgery following, d/w Dr. Thompson  - Plan to replete potassium phosphate and magnesium today    # Acute hypoxic respiratory failure likely due to atelectasis/right pleural effusion - resolved, pt now on room air.  - discussed w/ Pulm, Dr. Harden. Effusion too small to drain  - cont incentive spirometry.  - CXR personal read with haziness over right lung border. Will d/w Dr. Harden.    # Diarrhea - improving.  c/b hypokalemia and hypophosphatemia - potassium and phosphorous repletion  - hold bowel regimen  - negative C diff    # Tachycardia likely due to multifocal atrial tachycardia  w/ associated hypotension from diarrhea/volume depletion/anemia  -Cardiology appreciated  Continue metoprolol 25 BID with parameters, outpatient follow up    #Urinary retention  - Patient with chronic felipe - was placed in November after fall/hip fx, no TOV has been done as per daughter. No hx of urinary retention prior to that event   - Felipe replaced on 2/4/2023, continue.   - Urology reduced flomax 0.4 due to light headedness  - Urology consulted noted; cont Proscar, Flomax and delayed void trial as outpatient.     #Acute Kidney Injury - Improved   - Monitor BMP  - Avoid nephrotoxins  - felipe catheter    # Hypernatremia - improved.    #Hx of TIA  - c/w Statin, ASA    #Dysphagia  -  Speech and swallow eval today, d/w Reva Ashely - level 6 soft and bite sized/ thin level 0.     #DVT Prophylaxis- Lovenox   GOC: DNR/I, MOLST in chart    Dispo: Cleared for discharge pending expedited Covid swab   Update daughter Shelli Cell [phone]

## 2023-02-09 NOTE — PROGRESS NOTE ADULT - ASSESSMENT
Patient is a 89y male with a past history of a TIA,BPH,GERD, Fe def anemia, remote colon cancer s/p surgical resection, recent fall with left hip fracture, s/p left hip replacement in November of 2022, who was at a rehab facility when he developed abdominal pain, n/v, elevated LFT's and elevated wbc and was sent to ER on 1/30.His imaging revealed a perforated GB and he was taken to OR. He had laparoscopic E Lap with removal of gangrenous, perforated gallbladder with adhesions to small bowel.He was placed on zosyn in ER and he is growing a GPC in anaerobic bottle of admission blood culture.  He had an irregular rhythm on admission, ? A fib, is now in NSR.He was extubated in RR, is alert, has a felipe and is requesting water.  Zosyn should be adequate coverage for gangrenous cholecystitis.His blood isolate has now been identified as an aerococcus urinae, this is a typical  organism which should be covered by zosyn. his repeat blood cultures are negative so far.Zosyn should provide adequate  coverage of E Coli and aerococcus . The admission CT showed the felipe in the prostatic urethra, ? if this was prior to felipe catheter change in ER.  He has completed 1 week of post op antibiotics to cover both  infection and gallbladder, stopped 2/6- zosyn and CTX.  Post op tachypnea and tachycardia improved .CTA negative for PE.Diet is being advanced  Abnormal CXR but no clinical signs of pneumonia  Suggest:  1. Monitor off antibiotics  2.Diet per surgery/medicine  3.Felipe for chronic retention  4.No ID objection to discharge planning.  4.diet per surgery-  5.Suppoertive care per medicine

## 2023-02-09 NOTE — DISCHARGE NOTE NURSING/CASE MANAGEMENT/SOCIAL WORK - PATIENT PORTAL LINK FT
You can access the FollowMyHealth Patient Portal offered by [facility] by registering at the following website: [email or web address]. By joining [employer]’s FollowMyHealth portal, you will also be able to view your health information using other applications (apps) compatible with our system. You can access the FollowMyHealth Patient Portal offered by [facility] by registering at the following website: [email or web address]. By joining [employer]’s FollowMyHealth portal, you will also be able to view your health information using other applications (apps) compatible with our system. You can access the FollowMyHealth Patient Portal offered by [facility] by registering at the following website: [email or web address]. By joining [employer]’s FollowMyHealth portal, you will also be able to view your health information using other applications (apps) compatible with our system.

## 2023-02-09 NOTE — DISCHARGE NOTE NURSING/CASE MANAGEMENT/SOCIAL WORK - NSDCPEFALRISK_GEN_ALL_CORE
For information on Fall & Injury Prevention, visit: [email or web address] OR  [email or web address] OR  https://www.cdc.gov/steadi/patient.html For information on Fall & Injury Prevention, visit: [email or web address] OR  [email or web address]-injury OR  https://www.cdc.gov/steadi/patient.html For information on Fall & Injury Prevention, visit: [email or web address]/[email or web address]-mobility OR  [email or web address]/[email or web address]-[email or web address]-injury OR  https://www.cdc.gov/steadi/patient.html

## 2023-02-09 NOTE — PROGRESS NOTE ADULT - SUBJECTIVE AND OBJECTIVE BOX
CC: f/u for aerococcus bactaremia and gangrenous cholecystitis    Patient reports: he is alert, mild confusion, upset over having phlebotomy    REVIEW OF SYSTEMS:  All other review of systems negative (Comprehensive ROS): tolerating diet    Antimicrobials Day #  :off    Other Medications Reviewed  MEDICATIONS  (STANDING):  acetaminophen     Tablet .. 650 milliGRAM(s) Oral every 6 hours  aspirin enteric coated 81 milliGRAM(s) Oral daily  enoxaparin Injectable 30 milliGRAM(s) SubCutaneous every 24 hours  finasteride 5 milliGRAM(s) Oral daily  metoprolol tartrate 25 milliGRAM(s) Oral two times a day  tamsulosin 0.8 milliGRAM(s) Oral at bedtime    T(F): 97.8 (02-09-23 @ 05:02), Max: 98 (02-08-23 @ 21:55)  HR: 87 (02-09-23 @ 05:02)  BP: 114/57 (02-09-23 @ 05:02)  RR: 18 (02-09-23 @ 05:02)  SpO2: 93% (02-09-23 @ 05:02)  Wt(kg): --    PHYSICAL EXAM:  General: alert, no acute distress, frail appearing  Eyes:  anicteric, no conjunctival injection, no discharge  Oropharynx: no lesions or injection 	  Neck: supple, without adenopathy  Lungs: scattered ronchi  Heart: regular rate and rhythm; no murmur, rubs or gallops  Abdomen: soft, nondistended, nontender, without mass or organomegaly  Skin: no lesions  Extremities: no clubbing, cyanosis, or edema  Neurologic: alert, oriented, moves all extremities  : felipe  LAB RESULTS:                        11.7   9.89  )-----------( 282      ( 08 Feb 2023 06:30 )             36.9     02-09    141  |  105  |  5<L>  ----------------------------<  89  3.0<L>   |  26  |  0.72    Ca    8.4      09 Feb 2023 07:06  Phos  2.4     02-09  Mg     1.8     02-09          MICROBIOLOGY:  RECENT CULTURES:  02-04 @ 12:00 .Blood Blood-Peripheral     No growth to date.          RADIOLOGY REVIEWED:    < from: Xray Chest 1 View- PORTABLE-Routine (Xray Chest 1 View- PORTABLE-Routine in AM.) (02.08.23 @ 08:55) >  IMPRESSION:  Questionable right infiltrate. Follow-up study is recommended as   clinically warranted.    < end of copied text >

## 2023-02-09 NOTE — PROGRESS NOTE ADULT - PROBLEM SELECTOR PLAN 1
felipe drainage, will reduce flomax to 0.4mg due to light-headedness    continue proscar, void trial [facility]

## 2023-02-09 NOTE — PROGRESS NOTE ADULT - SUBJECTIVE AND OBJECTIVE BOX
Patient is a 89y old  Male who presents with a chief complaint of abdominal pain (09 Feb 2023 10:50)  pt POD # 10 s/p lap kellee  no events noted overnight  pt had 1 loose BM in last 24 hours  cardiology started pt on metoprolol tartrate to control tachycardia  pt feels well, denies n/v/f/c/cp or sob  tolerating regular diet    Patient seen and examined at bedside    ALLERGIES:  No Known Allergies    MEDICATIONS  (STANDING):  acetaminophen     Tablet .. 650 milliGRAM(s) Oral every 6 hours  aspirin enteric coated 81 milliGRAM(s) Oral daily  enoxaparin Injectable 30 milliGRAM(s) SubCutaneous every 24 hours  finasteride 5 milliGRAM(s) Oral daily  magnesium sulfate  IVPB 1 Gram(s) IV Intermittent once  metoprolol tartrate 25 milliGRAM(s) Oral two times a day  potassium phosphate IVPB 15 milliMole(s) IV Intermittent once  tamsulosin 0.4 milliGRAM(s) Oral at bedtime    MEDICATIONS  (PRN):  aluminum hydroxide/magnesium hydroxide/simethicone Suspension 30 milliLiter(s) Oral every 4 hours PRN Dyspepsia  melatonin 3 milliGRAM(s) Oral at bedtime PRN Insomnia  ondansetron Injectable 4 milliGRAM(s) IV Push every 8 hours PRN Nausea and/or Vomiting  zinc oxide 40% Paste 1 Application(s) Topical three times a day PRN rash    Vital Signs Last 24 Hrs  T(F): 97.8 (09 Feb 2023 05:02), Max: 98 (08 Feb 2023 21:55)  HR: 87 (09 Feb 2023 05:02) (87 - 103)  BP: 114/57 (09 Feb 2023 05:02) (103/69 - 114/57)  RR: 18 (09 Feb 2023 05:02) (18 - 18)  SpO2: 93% (09 Feb 2023 05:02) (93% - 95%)    I&O's Summary    08 Feb 2023 07:01  -  09 Feb 2023 07:00  --------------------------------------------------------  IN: 0 mL / OUT: 90 mL / NET: -90 mL    PHYSICAL EXAM:  General: NAD, A/O x 3  ENT: MMM  Neck: Supple, No JVD  Abdomen: soft, nd, nttp, incisions well healed, oswaldo site healing well, dressing changed, left reducible inguinal hernia  : Musa in place, draining clear, yellow urine  Extremities: No calf tenderness, No pitting edema    LABS:                        12.3   12.74 )-----------( 369      ( 09 Feb 2023 07:06 )             39.1     02-09    141  |  105  |  5   ----------------------------<  89  3.0   |  26  |  0.72    Ca    8.4      09 Feb 2023 07:06  Phos  2.4     02-09  Mg     1.8     02-09    11-23 Chol 160 mg/dL LDL -- HDL 44 mg/dL Trig 95 mg/dL    Culture - Blood (collected 04 Feb 2023 12:00)  Source: .Blood Blood-Peripheral  Preliminary Report (05 Feb 2023 22:01):    No growth to date.    Culture - Blood (collected 04 Feb 2023 12:00)  Source: .Blood Blood-Peripheral  Preliminary Report (05 Feb 2023 22:01):    No growth to date.      COVID-19 PCR: NotDetec (01-30-23 @ 12:25)    < from: Xray Chest 1 View- PORTABLE-Routine (Xray Chest 1 View- PORTABLE-Routine in AM.) (02.08.23 @ 08:55) >  IMPRESSION:  Questionable right infiltrate. Follow-up study is recommended as   clinically warranted.

## 2023-02-27 NOTE — SWALLOW BEDSIDE ASSESSMENT ADULT - SWALLOW EVAL: BUCCAL STRENGTH AND MOBILITY
Detail Level: Generalized
Initiate Treatment: clindamycin phosphate 1 % topical solution: Apply to armpits 1-2 times daily prn
Initiate Treatment: Efudex 5 % topical cream: Apply to affected area of upper lip bid x 2 weeks. Repeat on chest as tolerated.
intact

## 2023-05-01 NOTE — PROGRESS NOTE ADULT - REASON FOR ADMISSION
At today's visit the patient was given 5 cc of 0.25% bupivacaine into the bilateral cervical and occipital regions. They are to follow up in 4 weeks for repeat nerve block if effective. The patient tolerated the procedure with no complications. They are to call with any issues for  neck pain and headaches.
abdominal pain

## 2023-06-29 NOTE — ED ADULT TRIAGE NOTE - AS TEMP SITE
Detail Level: Detailed Wound Evaluated By (Optional): Joey Foley., MD Wound Diameter In Cm(Optional): 0 Wound Crusting?: clean Wound Drainage?: serous drainage Wound Color?: pink oral

## 2023-08-14 NOTE — ED PROVIDER NOTE - CPE EDP GASTRO NORM
Dapsone Counseling: I discussed with the patient the risks of dapsone including but not limited to hemolytic anemia, agranulocytosis, rashes, methemoglobinemia, kidney failure, peripheral neuropathy, headaches, GI upset, and liver toxicity.  Patients who start dapsone require monitoring including baseline LFTs and weekly CBCs for the first month, then every month thereafter.  The patient verbalized understanding of the proper use and possible adverse effects of dapsone.  All of the patient's questions and concerns were addressed. Isotretinoin Pregnancy And Lactation Text: This medication is Pregnancy Category X and is considered extremely dangerous during pregnancy. It is unknown if it is excreted in breast milk. Spironolactone Counseling: Patient advised regarding risks of diarrhea, abdominal pain, hyperkalemia, birth defects (for female patients), liver toxicity and renal toxicity. The patient may need blood work to monitor liver and kidney function and potassium levels while on therapy. The patient verbalized understanding of the proper use and possible adverse effects of spironolactone.  All of the patient's questions and concerns were addressed. Birth Control Pills Counseling: Birth Control Pill Counseling: I discussed with the patient the potential side effects of OCPs including but not limited to increased risk of stroke, heart attack, thrombophlebitis, deep venous thrombosis, hepatic adenomas, breast changes, GI upset, headaches, and depression.  The patient verbalized understanding of the proper use and possible adverse effects of OCPs. All of the patient's questions and concerns were addressed. Use Enhanced Medication Counseling?: No Benzoyl Peroxide Pregnancy And Lactation Text: This medication is Pregnancy Category C. It is unknown if benzoyl peroxide is excreted in breast milk. Topical Sulfur Applications Counseling: Topical Sulfur Counseling: Patient counseled that this medication may cause skin irritation or allergic reactions.  In the event of skin irritation, the patient was advised to reduce the amount of the drug applied or use it less frequently.   The patient verbalized understanding of the proper use and possible adverse effects of topical sulfur application.  All of the patient's questions and concerns were addressed. Sarecycline Counseling: Patient advised regarding possible photosensitivity and discoloration of the teeth, skin, lips, tongue and gums.  Patient instructed to avoid sunlight, if possible.  When exposed to sunlight, patients should wear protective clothing, sunglasses, and sunscreen.  The patient was instructed to call the office immediately if the following severe adverse effects occur:  hearing changes, easy bruising/bleeding, severe headache, or vision changes.  The patient verbalized understanding of the proper use and possible adverse effects of sarecycline.  All of the patient's questions and concerns were addressed. Erythromycin Pregnancy And Lactation Text: This medication is Pregnancy Category B and is considered safe during pregnancy. It is also excreted in breast milk. Bactrim Counseling:  I discussed with the patient the risks of sulfa antibiotics including but not limited to GI upset, allergic reaction, drug rash, diarrhea, dizziness, photosensitivity, and yeast infections.  Rarely, more serious reactions can occur including but not limited to aplastic anemia, agranulocytosis, methemoglobinemia, blood dyscrasias, liver or kidney failure, lung infiltrates or desquamative/blistering drug rashes. Tetracycline Pregnancy And Lactation Text: This medication is Pregnancy Category D and not consider safe during pregnancy. It is also excreted in breast milk. Doxycycline Pregnancy And Lactation Text: This medication is Pregnancy Category D and not consider safe during pregnancy. It is also excreted in breast milk but is considered safe for shorter treatment courses. Minocycline Counseling: Patient advised regarding possible photosensitivity and discoloration of the teeth, skin, lips, tongue and gums.  Patient instructed to avoid sunlight, if possible.  When exposed to sunlight, patients should wear protective clothing, sunglasses, and sunscreen.  The patient was instructed to call the office immediately if the following severe adverse effects occur:  hearing changes, easy bruising/bleeding, severe headache, or vision changes.  The patient verbalized understanding of the proper use and possible adverse effects of minocycline.  All of the patient's questions and concerns were addressed. Topical Clindamycin Counseling: Patient counseled that this medication may cause skin irritation or allergic reactions.  In the event of skin irritation, the patient was advised to reduce the amount of the drug applied or use it less frequently.   The patient verbalized understanding of the proper use and possible adverse effects of clindamycin.  All of the patient's questions and concerns were addressed. Azelaic Acid Pregnancy And Lactation Text: This medication is considered safe during pregnancy and breast feeding. Tazorac Counseling:  Patient advised that medication is irritating and drying.  Patient may need to apply sparingly and wash off after an hour before eventually leaving it on overnight.  The patient verbalized understanding of the proper use and possible adverse effects of tazorac.  All of the patient's questions and concerns were addressed. Winlevi Pregnancy And Lactation Text: This medication is considered safe during pregnancy and breastfeeding. normal... Aklief Pregnancy And Lactation Text: It is unknown if this medication is safe to use during pregnancy.  It is unknown if this medication is excreted in breast milk.  Breastfeeding women should use the topical cream on the smallest area of the skin for the shortest time needed while breastfeeding.  Do not apply to nipple and areola. Dapsone Pregnancy And Lactation Text: This medication is Pregnancy Category C and is not considered safe during pregnancy or breast feeding. High Dose Vitamin A Counseling: Side effects reviewed, pt to contact office should one occur. Spironolactone Pregnancy And Lactation Text: This medication can cause feminization of the male fetus and should be avoided during pregnancy. The active metabolite is also found in breast milk. Azithromycin Counseling:  I discussed with the patient the risks of azithromycin including but not limited to GI upset, allergic reaction, drug rash, diarrhea, and yeast infections. Topical Retinoid counseling:  Patient advised to apply a pea-sized amount only at bedtime and wait 30 minutes after washing their face before applying.  If too drying, patient may add a non-comedogenic moisturizer. The patient verbalized understanding of the proper use and possible adverse effects of retinoids.  All of the patient's questions and concerns were addressed. Topical Sulfur Applications Pregnancy And Lactation Text: This medication is Pregnancy Category C and has an unknown safety profile during pregnancy. It is unknown if this topical medication is excreted in breast milk. Birth Control Pills Pregnancy And Lactation Text: This medication should be avoided if pregnant and for the first 30 days post-partum. Isotretinoin Counseling: Patient should get monthly blood tests, not donate blood, not drive at night if vision affected, not share medication, and not undergo elective surgery for 6 months after tx completed. Side effects reviewed, pt to contact office should one occur. Detail Level: Zone Patient Specific Counseling (Will Not Stick From Patient To Patient): =\\nAF counsels pt on how retinols work to reduce acne and how to reduce dryness with retinols. Pt notes that her acne is hormonal. Bactrim Pregnancy And Lactation Text: This medication is Pregnancy Category D and is known to cause fetal risk.  It is also excreted in breast milk. Erythromycin Counseling:  I discussed with the patient the risks of erythromycin including but not limited to GI upset, allergic reaction, drug rash, diarrhea, increase in liver enzymes, and yeast infections. Benzoyl Peroxide Counseling: Patient counseled that medicine may cause skin irritation and bleach clothing.  In the event of skin irritation, the patient was advised to reduce the amount of the drug applied or use it less frequently.   The patient verbalized understanding of the proper use and possible adverse effects of benzoyl peroxide.  All of the patient's questions and concerns were addressed. Topical Clindamycin Pregnancy And Lactation Text: This medication is Pregnancy Category B and is considered safe during pregnancy. It is unknown if it is excreted in breast milk. Azelaic Acid Counseling: Patient counseled that medicine may cause skin irritation and to avoid applying near the eyes.  In the event of skin irritation, the patient was advised to reduce the amount of the drug applied or use it less frequently.   The patient verbalized understanding of the proper use and possible adverse effects of azelaic acid.  All of the patient's questions and concerns were addressed. Azithromycin Pregnancy And Lactation Text: This medication is considered safe during pregnancy and is also secreted in breast milk. Doxycycline Counseling:  Patient counseled regarding possible photosensitivity and increased risk for sunburn.  Patient instructed to avoid sunlight, if possible.  When exposed to sunlight, patients should wear protective clothing, sunglasses, and sunscreen.  The patient was instructed to call the office immediately if the following severe adverse effects occur:  hearing changes, easy bruising/bleeding, severe headache, or vision changes.  The patient verbalized understanding of the proper use and possible adverse effects of doxycycline.  All of the patient's questions and concerns were addressed. High Dose Vitamin A Pregnancy And Lactation Text: High dose vitamin A therapy is contraindicated during pregnancy and breast feeding. Tetracycline Counseling: Patient counseled regarding possible photosensitivity and increased risk for sunburn.  Patient instructed to avoid sunlight, if possible.  When exposed to sunlight, patients should wear protective clothing, sunglasses, and sunscreen.  The patient was instructed to call the office immediately if the following severe adverse effects occur:  hearing changes, easy bruising/bleeding, severe headache, or vision changes.  The patient verbalized understanding of the proper use and possible adverse effects of tetracycline.  All of the patient's questions and concerns were addressed. Patient understands to avoid pregnancy while on therapy due to potential birth defects. Tazorac Pregnancy And Lactation Text: This medication is not safe during pregnancy. It is unknown if this medication is excreted in breast milk. Aklief counseling:  Patient advised to apply a pea-sized amount only at bedtime and wait 30 minutes after washing their face before applying.  If too drying, patient may add a non-comedogenic moisturizer.  The most commonly reported side effects including irritation, redness, scaling, dryness, stinging, burning, itching, and increased risk of sunburn.  The patient verbalized understanding of the proper use and possible adverse effects of retinoids.  All of the patient's questions and concerns were addressed. Topical Retinoid Pregnancy And Lactation Text: This medication is Pregnancy Category C. It is unknown if this medication is excreted in breast milk. Winlevi Counseling:  I discussed with the patient the risks of topical clascoterone including but not limited to erythema, scaling, itching, and stinging. Patient voiced their understanding. Detail Level: Detailed Patient Specific Counseling (Will Not Stick From Patient To Patient): =\\nPt notes that the rash is not active today.

## 2023-11-22 NOTE — ED ADULT TRIAGE NOTE - NS ED NURSE BANDS TYPE
Pts Cardiologist will only agree to 7 day hold on Aspirin pre-op. Spoke with Esther Meraz NP who discussed case with Dr. Rodríguez and he is agreeing to allow only 7 day hold on Aspirin 81mg. Called and informed pt and his wife. Advised them to resume Aspirin now and to take until last dose on 11/27. Hold from 11/28 on.   Aspirin to be resumed post-op as per Surgical team. tg Name band;

## 2023-12-06 RX ORDER — ACETAMINOPHEN 500 MG
3 TABLET ORAL
Qty: 0 | Refills: 0 | DISCHARGE

## 2023-12-06 RX ORDER — FERROUS SULFATE 325(65) MG
1 TABLET ORAL
Qty: 0 | Refills: 0 | DISCHARGE

## 2023-12-06 RX ORDER — OXYCODONE HYDROCHLORIDE 5 MG/1
1 TABLET ORAL
Qty: 0 | Refills: 0 | DISCHARGE

## 2023-12-06 RX ORDER — ENOXAPARIN SODIUM 100 MG/ML
40 INJECTION SUBCUTANEOUS
Qty: 0 | Refills: 0 | DISCHARGE

## 2023-12-06 RX ORDER — FINASTERIDE 5 MG/1
1 TABLET, FILM COATED ORAL
Qty: 0 | Refills: 0 | DISCHARGE

## 2023-12-06 RX ORDER — MAGNESIUM HYDROXIDE 400 MG/1
0 TABLET, CHEWABLE ORAL
Qty: 0 | Refills: 0 | DISCHARGE

## 2023-12-06 RX ORDER — TAMSULOSIN HYDROCHLORIDE 0.4 MG/1
1 CAPSULE ORAL
Qty: 0 | Refills: 0 | DISCHARGE

## 2023-12-06 RX ORDER — OMEPRAZOLE 10 MG/1
1 CAPSULE, DELAYED RELEASE ORAL
Qty: 0 | Refills: 0 | DISCHARGE

## 2024-01-01 NOTE — PROGRESS NOTE ADULT - ASSESSMENT
89year old Male with PMH of recent L hip fx (11/2022), TIA (11/2022) who presents to the ER from [facility] for abdominal pain, nausea for 4 days. Admitted for acute cholecystitis.    #Acute cholecystitis c/b perforated gallbladder  #Transaminitis, Hyperbilirubinemia  #Leukocytosis - worsening  - s/p lap kellee with Dr Thompson on 1/30, POD #5, drain with high output, surgery not clearing for discharge yet.    - Maintain RUPINDER-SS  - Diet was advanced this am, but now again NPO due to episode of vomiting.  Daughter says also having trouble swallowing.  Will get speech and swallow when diet resumes.  - Blood cultures growing gram + cocci, urine culture with E Coli and Aerococcus  - Continue Zosyn for now, day 5/10  - Started Lovenox for DVT ppx. Aspirin resumed since h/h stable  - Maintain K > 4, supplemented today   - LFT's now downtrending   - Pain control, will order standing tylenol dose with oxy/dilaudid PRN  - ID and Surgery following, drain with high output, , had episode of vomiting, will get repeat CT of abdomen, CT A/P,  surgery aware  - Wean off of O2, incentive spirometry    # Hypernatremia   - resolved      #Acute Kidney Injury - Improved   - Monitor BMP  - Avoid nephrotoxins    #Urinary retention  - Patient with chronic felipe - was placed in November after fall/hip fx, no TOV has been done as per daughter. No hx of urinary retention prior to that event   - Felipe d/c'd last evening, failed trial void last night, straight cathed last night, again no void this am, felipe replaced.  - Cont increased Flomax dosing at 0.8mg qHS  -urology consult (Dr. Pastrana notified)    # Paroxysmal atrial fibrillation  - rapid afib this morning, b/p low, EKG and cardio eval ordered, continue monitoring  -fluid bolus (NS)  -ddimer ordered, if elevated will order CTA  -Cardiology consult (Dr. Ceja)    #Hx of TIA  - c/w Statin,ASA    #DVT Prophylaxis  - Lovenox   -SCD     GOC: DNR/I, MOLST in chart    Dispo: D/C to [facility] when cleared by surgery and cardio    2/4 Updated daughter Shelli Cell [phone] regarding pt's current status and plan of care         89year old Male with PMH of recent L hip fx (11/2022), TIA (11/2022) who presents to the ER from [facility] for abdominal pain, nausea for 4 days. Admitted for acute cholecystitis.    #Acute cholecystitis c/b perforated gallbladder  #Transaminitis, Hyperbilirubinemia  #Leukocytosis - worsening  - s/p lap kellee with Dr Thompson on 1/30, POD #5, drain with high output, surgery not clearing for discharge yet.    - Maintain RUPINDER-SS  - Diet was advanced this am, but now again NPO due to episode of vomiting.  Daughter says also having trouble swallowing.  Will get speech and swallow when diet resumes.  - Blood cultures growing gram + cocci, urine culture with E Coli and Aerococcus  - Continue Zosyn for now, day 5/10  - Started Lovenox for DVT ppx. Aspirin resumed since h/h stable  - Maintain K > 4, supplemented today   - LFT's now downtrending   - Pain control, will order standing tylenol dose with oxy/dilaudid PRN  - ID and Surgery following, drain with high output, , had episode of vomiting, will get repeat CT of abdomen, CT A/P,  surgery aware  - Wean off of O2, incentive spirometry    # Hypernatremia   - resolved      #Acute Kidney Injury - Improved   - Monitor BMP  - Avoid nephrotoxins    #Urinary retention  - Patient with chronic felipe - was placed in November after fall/hip fx, no TOV has been done as per daughter. No hx of urinary retention prior to that event   - Felipe d/c'd last evening, failed trial void last night, straight cathed last night, again no void this am, felipe replaced.  - Cont increased Flomax dosing at 0.8mg qHS  -urology consult (Dr. Pastrana notified)    # Paroxysmal atrial fibrillation  - rapid afib this morning, b/p low, EKG and cardio eval ordered, continue monitoring  -fluid bolus (NS)  -ddimer ordered, if elevated will order CTA  -Cardiology consult (Dr. Ceja)    #Hx of TIA  - c/w Statin,ASA    #DVT Prophylaxis  - Lovenox   -SCD     GOC: DNR/I, MOLST in chart    Dispo: D/C to [facility] when cleared by surgery and cardio    2/4 Updated daughter Shelli Cell [phone] regarding pt's current status and plan of care         89year old Male with PMH of recent L hip fx (11/2022), TIA (11/2022) who presents to the ER from [facility] for abdominal pain, nausea for 4 days. Admitted for acute cholecystitis.    #Acute cholecystitis c/b perforated gallbladder  #Transaminitis, Hyperbilirubinemia  #Leukocytosis - worsening  - s/p lap kellee with Dr Thompson on 1/30, POD #5, drain with high output, surgery not clearing for discharge yet.    - Maintain RUPINDER-SS  - Diet was advanced this am, but now again NPO due to episode of vomiting.  Daughter says also having trouble swallowing.  Will get speech and swallow when diet resumes.  - Blood cultures growing gram + cocci, urine culture with E Coli and Aerococcus  - Continue Zosyn for now, day 5/10  - Started Lovenox for DVT ppx. Aspirin resumed since h/h stable  - Maintain K > 4, supplemented today   - LFT's now downtrending   - Pain control, will order standing tylenol dose with oxy/dilaudid PRN  - ID and Surgery following, drain with high output, , had episode of vomiting, will get repeat CT of abdomen, CT A/P,  surgery aware  - Wean off of O2, incentive spirometry    # Hypernatremia   - resolved      #Acute Kidney Injury - Improved   - Monitor BMP  - Avoid nephrotoxins    #Urinary retention  - Patient with chronic felipe - was placed in November after fall/hip fx, no TOV has been done as per daughter. No hx of urinary retention prior to that event   - Felipe d/c'd last evening, failed trial void last night, straight cathed last night, again no void this am, felipe replaced.  - Cont increased Flomax dosing at 0.8mg qHS  -urology consult (Dr. Pastrana notified)    # Paroxysmal atrial fibrillation  - rapid afib this morning, b/p low, EKG and cardio eval ordered, continue monitoring  -fluid bolus (NS)  -ddimer ordered, if elevated will order CTA  -Cardiology consult (Dr. Ceja)    #Hx of TIA  - c/w Statin,ASA    #DVT Prophylaxis  - Lovenox   -SCD     GOC: DNR/I, MOLST in chart    Dispo: D/C to [facility] when cleared by surgery and cardio    2/4 Updated daughter Shelli Cell [phone] regarding pt's current status and plan of care         89year old Male with PMH of recent L hip fx (11/2022), TIA (11/2022) who presents to the ER from [facility] for abdominal pain, nausea for 4 days. Admitted for acute cholecystitis.    #Acute cholecystitis c/b perforated gallbladder  #Transaminitis, Hyperbilirubinemia  - s/p lap kellee with Dr Thompson on 1/30, POD #5, drain with high output, surgery not clearing for discharge yet.    - Maintain RUPINDER-SS  - Diet was advanced this am, but now again NPO due to episode of vomiting.  Daughter says also having trouble swallowing.  Will get speech and swallow when diet resumes.  - Blood cultures growing gram + cocci, urine culture with E Coli and Aerococcus  - Continue Zosyn, ID following.   - Maintain K > 4, supplemented today   - LFT's now downtrending   - Pain control, will order standing tylenol dose with oxy/dilaudid PRN  - ID and Surgery following, drain with high output, , had episode of vomiting, will get repeat CT of abdomen, CT A/P,  surgery aware  - lovenox for dvt ppx.     # small bowel obstruction  - seen on CT AP as above.  - conservative management, keep NPO  - Surgery following, d/w Dr. Thompson  - optimize electrolytes  - f/u Gastrografin study    # Acute hypoxic respiratory failure likely due to atelectasis/right pleural effusion  - discussed w/ Pulm, Dr. Harden. Effusion too small to drain  - Wean off of O2, incentive spirometry    # Diarrhea  c/b hypokalemia - K repleted yesterday and now wnl.  - hold bowel regimen  - f/u C diff    # Leukocytosis, multifactorial, likely due to above issues.    # Hypernatremia   - resolved    #Acute Kidney Injury - Improved   - Monitor BMP  - Avoid nephrotoxins    #Urinary retention  - Patient with chronic felipe - was placed in November after fall/hip fx, no TOV has been done as per daughter. No hx of urinary retention prior to that event   - Felipe replaced on 2/4/2023  - Cont increased Flomax dosing at 0.8mg qHS  - urology consulted (Dr. Pastrana notified)    # Paroxysmal atrial fibrillation  w/ associated hypotension from diarrhea/volume depletion  -Pt has been going in and out of a fib. Cardiology consulted. CTA negative. Echo as above w/ EF 50-55%, sclerotic AV, Grade I DD.   -d-dimer ordered, if elevated will order CTA  -Cardiology consult (Dr. Ceja) - is pt an anti-coagulation candidate? From surgical perspective, ok to start.     # Hypernatremia  - modify IVF    #Hx of TIA  - c/w Statin, ASA    #?Dysphagia  - daughter would like a speech and swallow evaluation when he is PO    #DVT Prophylaxis  - Lovenox   - SCD   - ambulation    GOC: DNR/I, MOLST in chart    Dispo: D/C to [facility] when cleared by surgery and cardio    2/5 Updated daughter Shelli Cell [phone] regarding pt's current status and plan of care     89year old Male with PMH of recent L hip fx (11/2022), TIA (11/2022) who presents to the ER from [facility] for abdominal pain, nausea for 4 days. Admitted for acute cholecystitis.    #Acute cholecystitis c/b perforated gallbladder  #Transaminitis, Hyperbilirubinemia  - s/p lap kellee with Dr Thompson on 1/30, POD #5, drain with high output, surgery not clearing for discharge yet.    - Maintain RUPINDER-SS  - Diet was advanced this am, but now again NPO due to episode of vomiting.  Daughter says also having trouble swallowing.  Will get speech and swallow when diet resumes.  - Blood cultures growing gram + cocci, urine culture with E Coli and Aerococcus  - Continue Zosyn, ID following.   - Maintain K > 4, supplemented today   - LFT's now downtrending   - Pain control, will order standing tylenol dose with oxy/dilaudid PRN  - ID and Surgery following, drain with high output, , had episode of vomiting, will get repeat CT of abdomen, CT A/P,  surgery aware  - lovenox for dvt ppx.     # small bowel obstruction  - seen on CT AP as above.  - conservative management, keep NPO  - Surgery following, d/w Dr. Thompson  - optimize electrolytes  - f/u Gastrografin study    # Acute hypoxic respiratory failure likely due to atelectasis/right pleural effusion  - discussed w/ Pulm, Dr. Harden. Effusion too small to drain  - Wean off of O2, incentive spirometry    # Diarrhea  c/b hypokalemia - K repleted yesterday and now wnl.  - hold bowel regimen  - f/u C diff    # Leukocytosis, multifactorial, likely due to above issues.    # Hypernatremia   - resolved    #Acute Kidney Injury - Improved   - Monitor BMP  - Avoid nephrotoxins    #Urinary retention  - Patient with chronic felipe - was placed in November after fall/hip fx, no TOV has been done as per daughter. No hx of urinary retention prior to that event   - Felipe replaced on 2/4/2023  - Cont increased Flomax dosing at 0.8mg qHS  - urology consulted (Dr. Pastrana notified)    # Paroxysmal atrial fibrillation  w/ associated hypotension from diarrhea/volume depletion  -Pt has been going in and out of a fib. Cardiology consulted. CTA negative. Echo as above w/ EF 50-55%, sclerotic AV, Grade I DD.   -d-dimer ordered, if elevated will order CTA  -Cardiology consult (Dr. Ceja) - is pt an anti-coagulation candidate? From surgical perspective, ok to start.     # Hypernatremia  - modify IVF    #Hx of TIA  - c/w Statin, ASA    #?Dysphagia  - daughter would like a speech and swallow evaluation when he is PO    #DVT Prophylaxis  - Lovenox   - SCD   - ambulation    GOC: DNR/I, MOLST in chart    Dispo: D/C to [facility] when cleared by surgery and cardio    2/5 Updated daughter Shelli Cell [phone] regarding pt's current status and plan of care     89year old Male with PMH of recent L hip fx (11/2022), TIA (11/2022) who presents to the ER from [facility] for abdominal pain, nausea for 4 days. Admitted for acute cholecystitis.    #Acute cholecystitis c/b perforated gallbladder  #Transaminitis, Hyperbilirubinemia  - s/p lap kellee with Dr Thompson on 1/30, POD #5, drain with high output, surgery not clearing for discharge yet.    - Maintain RUPINDER-SS  - Diet was advanced this am, but now again NPO due to episode of vomiting.  Daughter says also having trouble swallowing.  Will get speech and swallow when diet resumes.  - Blood cultures growing gram + cocci, urine culture with E Coli and Aerococcus  - Continue Zosyn, ID following.   - Maintain K > 4, supplemented today   - LFT's now downtrending   - Pain control, will order standing tylenol dose with oxy/dilaudid PRN  - ID and Surgery following, drain with high output, , had episode of vomiting, will get repeat CT of abdomen, CT A/P,  surgery aware  - lovenox for dvt ppx.     # small bowel obstruction  - seen on CT AP as above.  - conservative management, keep NPO  - Surgery following, d/w Dr. Thompson  - optimize electrolytes  - f/u Gastrografin study    # Acute hypoxic respiratory failure likely due to atelectasis/right pleural effusion  - discussed w/ Pulm, Dr. Harden. Effusion too small to drain  - Wean off of O2, incentive spirometry    # Diarrhea  c/b hypokalemia - K repleted yesterday and now wnl.  - hold bowel regimen  - f/u C diff    # Leukocytosis, multifactorial, likely due to above issues.    # Hypernatremia   - resolved    #Acute Kidney Injury - Improved   - Monitor BMP  - Avoid nephrotoxins    #Urinary retention  - Patient with chronic felipe - was placed in November after fall/hip fx, no TOV has been done as per daughter. No hx of urinary retention prior to that event   - Felipe replaced on 2/4/2023  - Cont increased Flomax dosing at 0.8mg qHS  - urology consulted (Dr. Pastrana notified)    # Paroxysmal atrial fibrillation  w/ associated hypotension from diarrhea/volume depletion  -Pt has been going in and out of a fib. Cardiology consulted. CTA negative. Echo as above w/ EF 50-55%, sclerotic AV, Grade I DD.   -d-dimer ordered, if elevated will order CTA  -Cardiology consult (Dr. Ceja) - is pt an anti-coagulation candidate? From surgical perspective, ok to start.     # Hypernatremia  - modify IVF    #Hx of TIA  - c/w Statin, ASA    #?Dysphagia  - daughter would like a speech and swallow evaluation when he is PO    #DVT Prophylaxis  - Lovenox   - SCD   - ambulation    GOC: DNR/I, MOLST in chart    Dispo: D/C to [facility] when cleared by surgery and cardio    2/5 Updated daughter Shelli Cell [phone] regarding pt's current status and plan of care     5y year old Male with PMH of recent L hip fx (11/2022), TIA (11/2022) who presents to the ER from [facility] for abdominal pain, nausea for 4 days. Admitted for acute gangrenous cholecystitis, c/b perforated gallbladder, s/p lap kellee on 1/30, with empyema found.    #Acute cholecystitis c/b perforated gallbladder  #Transaminitis, Hyperbilirubinemia  - s/p lap kellee with Dr Thompson on 1/30, drain with high output  - Maintain RUPINDER-SS  - Diet was advanced this am, but now again NPO due to vomiting/SBO  - Blood cultures growing gram + cocci, urine culture with E Coli and Aerococcus  - Continue Zosyn, ID following.   - Trend LFTs, downtrending  - Pain control, will order standing tylenol dose with oxy/dilaudid PRN  - lovenox for dvt ppx.     # small bowel obstruction  - possibly due to post-op ileus or adhesions?  - seen on CT AP as above.  - conservative management, keep NPO w/ IVF  - Surgery following, d/w Dr. Thompson  - optimize electrolytes  - f/u Gastrografin study    # Acute hypoxic respiratory failure likely due to atelectasis/right pleural effusion  - discussed w/ Pulm, Dr. Harden. Effusion too small to drain  - Wean off of O2, incentive spirometry    # Diarrhea  c/b hypokalemia - K repleted yesterday and now wnl.  - hold bowel regimen  - f/u C diff  - IVF    # Paroxysmal atrial fibrillation  w/ associated hypotension from diarrhea/volume depletion  -Pt has been going in and out of a fib. Cardiology consulted. CTA negative. Echo as above w/ EF 50-55%, sclerotic AV, Grade I DD.   -d-dimer ordered, if elevated will order CTA  -Cardiology consult (Dr. Ceja) - is pt an anti-coagulation candidate? From surgical perspective, ok to start.     # Leukocytosis, multifactorial, likely due to above issues.    #Urinary retention  - Patient with chronic felipe - was placed in November after fall/hip fx, no TOV has been done as per daughter. No hx of urinary retention prior to that event   - Felipe replaced on 2/4/2023  - Cont increased Flomax dosing at 0.8mg qHS  - urology consulted (Dr. Pastrana notified)    #Acute Kidney Injury - Improved   - Monitor BMP  - Avoid nephrotoxins  - felipe catheter    # Hypernatremia  - modify IVF    #Hx of TIA  - c/w Statin, ASA    #?Dysphagia  - daughter would like a speech and swallow evaluation when he is PO    #DVT Prophylaxis  - Lovenox   - SCD   - ambulation    GOC: DNR/I, MOLST in chart    Dispo: D/C to [facility] when cleared by surgery and cardio    2/5 Updated daughter Shelli Cell [phone] regarding pt's current status and plan of care     5y year old Male with PMH of recent L hip fx (11/2022), TIA (11/2022) who presents to the ER from [facility] for abdominal pain, nausea for 4 days. Admitted for acute gangrenous cholecystitis, c/b perforated gallbladder, s/p lap kellee on 1/30, with empyema found.    #Acute cholecystitis c/b perforated gallbladder  #Transaminitis, Hyperbilirubinemia  - s/p lap kellee with Dr Thompson on 1/30, drain with high output  - Maintain RUPINDER-SS  - Diet was advanced this am, but now again NPO due to vomiting/SBO  - Blood cultures growing gram + cocci, urine culture with E Coli and Aerococcus  - Continue Zosyn, ID following.   - Trend LFTs, downtrending  - Pain control, will order standing tylenol dose with oxy/dilaudid PRN  - lovenox for dvt ppx.     # small bowel obstruction  - possibly due to post-op ileus or adhesions?  - seen on CT AP as above.  - conservative management, keep NPO w/ IVF  - Surgery following, d/w Dr. Thompson  - optimize electrolytes  - f/u Gastrografin study    # Acute hypoxic respiratory failure likely due to atelectasis/right pleural effusion  - discussed w/ Pulm, Dr. Harden. Effusion too small to drain  - Wean off of O2, incentive spirometry    # Diarrhea  c/b hypokalemia - K repleted yesterday and now wnl.  - hold bowel regimen  - f/u C diff  - IVF    # Paroxysmal atrial fibrillation  w/ associated hypotension from diarrhea/volume depletion  -Pt has been going in and out of a fib. Cardiology consulted. CTA negative. Echo as above w/ EF 50-55%, sclerotic AV, Grade I DD.   -d-dimer ordered, if elevated will order CTA  -Cardiology consult (Dr. Ceja) - is pt an anti-coagulation candidate? From surgical perspective, ok to start.     # Leukocytosis, multifactorial, likely due to above issues.    #Urinary retention  - Patient with chronic felipe - was placed in November after fall/hip fx, no TOV has been done as per daughter. No hx of urinary retention prior to that event   - Felipe replaced on 2/4/2023  - Cont increased Flomax dosing at 0.8mg qHS  - urology consulted (Dr. Pastrana notified)    #Acute Kidney Injury - Improved   - Monitor BMP  - Avoid nephrotoxins  - felipe catheter    # Hypernatremia  - modify IVF    #Hx of TIA  - c/w Statin, ASA    #?Dysphagia  - daughter would like a speech and swallow evaluation when he is PO    #DVT Prophylaxis  - Lovenox   - SCD   - ambulation    GOC: DNR/I, MOLST in chart    Dispo: D/C to [facility] when cleared by surgery and cardio    2/5 Updated daughter Shelli Cell [phone] regarding pt's current status and plan of care     5y year old Male with PMH of recent L hip fx (11/2022), TIA (11/2022) who presents to the ER from [facility] for abdominal pain, nausea for 4 days. Admitted for acute gangrenous cholecystitis, c/b perforated gallbladder, s/p lap kellee on 1/30, with empyema found.    #Acute cholecystitis c/b perforated gallbladder  #Transaminitis, Hyperbilirubinemia  - s/p lap kellee with Dr Thompson on 1/30, drain with high output  - Maintain RUPINDER-SS  - Diet was advanced this am, but now again NPO due to vomiting/SBO  - Blood cultures growing gram + cocci, urine culture with E Coli and Aerococcus  - Continue Zosyn, ID following.   - Trend LFTs, downtrending  - Pain control, will order standing tylenol dose with oxy/dilaudid PRN  - lovenox for dvt ppx.     # small bowel obstruction  - possibly due to post-op ileus or adhesions?  - seen on CT AP as above.  - conservative management, keep NPO w/ IVF  - Surgery following, d/w Dr. Thompson  - optimize electrolytes  - f/u Gastrografin study    # Acute hypoxic respiratory failure likely due to atelectasis/right pleural effusion  - discussed w/ Pulm, Dr. Harden. Effusion too small to drain  - Wean off of O2, incentive spirometry    # Diarrhea  c/b hypokalemia - K repleted yesterday and now wnl.  - hold bowel regimen  - f/u C diff  - IVF    # Paroxysmal atrial fibrillation  w/ associated hypotension from diarrhea/volume depletion  -Pt has been going in and out of a fib. Cardiology consulted. CTA negative. Echo as above w/ EF 50-55%, sclerotic AV, Grade I DD.   -d-dimer ordered, if elevated will order CTA  -Cardiology consult (Dr. Ceja) - is pt an anti-coagulation candidate? From surgical perspective, ok to start.     # Leukocytosis, multifactorial, likely due to above issues.    #Urinary retention  - Patient with chronic felipe - was placed in November after fall/hip fx, no TOV has been done as per daughter. No hx of urinary retention prior to that event   - Felipe replaced on 2/4/2023  - Cont increased Flomax dosing at 0.8mg qHS  - urology consulted (Dr. Pastrana notified)    #Acute Kidney Injury - Improved   - Monitor BMP  - Avoid nephrotoxins  - felipe catheter    # Hypernatremia  - modify IVF    #Hx of TIA  - c/w Statin, ASA    #?Dysphagia  - daughter would like a speech and swallow evaluation when he is PO    #DVT Prophylaxis  - Lovenox   - SCD   - ambulation    GOC: DNR/I, MOLST in chart    Dispo: D/C to [facility] when cleared by surgery and cardio    2/5 Updated daughter Shelli Cell [phone] regarding pt's current status and plan of care     Hand swelling

## 2024-01-01 NOTE — ED PROVIDER NOTE - MEDICAL DECISION MAKING DETAILS
transport team arrived at bedside  
Infant transported in isolette to [facility] by transport team, left Nicu at this time  
no urinary drainage, lab

## 2024-03-12 NOTE — ED ADULT TRIAGE NOTE - HEIGHT IN INCHES
Home monitor INR results:  Amee's INR today is therapeutic at 2.7 on 28 mg of Warfarin weekly.  Reason for anticoagulation: atrial fibrillation  INR Range: 2.0-3.0  Last INR: 2.7 on 3/5/24    Patient reports no abnormal findings related to being on Warfarin. Reports no medication or dietary changes. Verified correct previous Warfarin dosing. Patient was advised to take the following Warfarin dosing: continue taking 4mg of Warfarin daily; totaling 28mg weekly. Date of next INR: in 1 week on Tuesday 3/19/24; staff message done.  No further questions/concerns.    Onsite billing provider is THIEN Sarmiento.    Advised to contact the clinic at [phone] with the following:   Medication changes: New, dose change, advised to stop, especially antibiotics/steroids  Procedures planned: Surgical/dental/injections, or if you are advised to hold your Warfarin  Bruising/bleeding  ER visits/Urgent Care visits     8

## 2024-07-09 NOTE — PROGRESS NOTE ADULT - SUBJECTIVE AND OBJECTIVE BOX
Discussed medications, risks and benefits discussed, side effects discussed of medication  Did not tolerate contrave and was started on compounded semaglutide. She is tolerating semaglutide well and will get her to dose of 2.4mg. She currently has no side effects from medication and has lost 14 lbs since starting medication. She is currently on 0.5mg but will increase to 1mg dose next week.   Continue dietary modifications  Qsymia is contraindicated due to glaucoma   Wegovy was denied originally but tried and failed contrave due to side effects. Qsymia is contraindication due to glaucoma   CC: f/u for  aerococcus bacteremia and acute kellee  Patient reports  he is ok today  REVIEW OF SYSTEMS:  All other review of systems negative (Comprehensive ROS)    Antimicrobials Day #  :8 total  cefTRIAXone   IVPB 1000 milliGRAM(s) IV Intermittent every 24 hours    Other Medications Reviewed    T(F): 98.3 (02-06-23 @ 13:34), Max: 98.3 (02-06-23 @ 13:34)  HR: 78 (02-06-23 @ 15:35)  BP: 124/78 (02-06-23 @ 15:35)  RR: 18 (02-06-23 @ 15:35)  SpO2: 95% (02-06-23 @ 15:35)  Wt(kg): --    PHYSICAL EXAM:  General: alert, no acute distress  Eyes:  anicteric, no conjunctival injection, no discharge  Oropharynx: no lesions or injection 	  Neck: supple, without adenopathy  Lungs: clear anterior  to auscultation  Heart: regular rate and rhythm; no murmur, rubs or gallops  Abdomen: soft, nondistended, nontender, without mass or organomegaly  Skin: no lesions  Extremities: no clubbing, cyanosis, or edema  Neurologic: alert,  moves all extremities    LAB RESULTS:                        10.0   6.91  )-----------( 201      ( 06 Feb 2023 08:10 )             32.1     02-06    142  |  110<H>  |  10  ----------------------------<  102<H>  3.0<L>   |  27  |  0.52    Ca    7.5<L>      06 Feb 2023 08:10  Phos  1.6     02-06  Mg     1.7     02-06    TPro  4.9<L>  /  Alb  1.7<L>  /  TBili  0.4  /  DBili  0.1  /  AST  33  /  ALT  26  /  AlkPhos  142<H>  02-06    LIVER FUNCTIONS - ( 06 Feb 2023 08:10 )  Alb: 1.7 g/dL / Pro: 4.9 g/dL / ALK PHOS: 142 U/L / ALT: 26 U/L / AST: 33 U/L / GGT: x             MICROBIOLOGY:  RECENT CULTURES:  02-04 @ 12:00 .Blood Blood-Peripheral     No growth to date.          RADIOLOGY REVIEWED:    < from: US Duplex Venous Lower Ext Complete, Bilateral (02.06.23 @ 14:07) >    ACC: [id number] EXAM:  US DPLX LWR EXT VEINS COMPL BI   ORDERED BY: SANDI BUTT     PROCEDURE DATE:  02/06/2023          INTERPRETATION:  CLINICAL INFORMATION: Status post cholecystectomy.   New-onset A. fib.    COMPARISON: None available.    TECHNIQUE: Duplex sonography of the BILATERAL LOWER extremity veins with   color and spectral Doppler, with and without compression.    FINDINGS:    RIGHT:  Normal compressibility of the RIGHT common femoral, femoral and popliteal   veins.  Doppler examination shows normal spontaneous and phasic flow.  No RIGHT calf vein thrombosis is detected.    LEFT:  Normal compressibility of the LEFT common femoral, femoral and popliteal   veins.  Doppler examination shows normal spontaneous and phasic flow.  No LEFT calf vein thrombosis is detected.    IMPRESSION:  No evidence of deep venous thrombosis in either lower extremity.          --- End of Report ---            ABRAM PAREDES MD; Attending Radiologist  This document has been electronically signed. Feb 6 2023  2:14PM    < end of copied text >            < from: CT Abdomen and Pelvis w/ IV Cont (02.04.23 @ 22:13) >    ACC: [id number] EXAM:  CT ABDOMEN AND PELVIS IC   ORDERED BY: ANASTACIA SUNG     ACC: [id number] EXAM:  [facility]   ORDERED BY: ANASTACIA SUNG     PROCEDURE DATE:  02/04/2023          INTERPRETATION:  CLINICAL INFORMATION: New onset A. fib. Evaluate for   perforated gallbladder.    COMPARISON: None.    CONTRAST/COMPLICATIONS:  IV Contrast: Omnipaque 350  90 cc administered   10 cc discarded  Oral Contrast: NONE  Complications: None reported at time of study completion    PROCEDURE:  CT Angiography of the Chest was performed followed by portal venous phase   imaging of the Abdomen and Pelvis.  Sagittal and coronal reformats were performed as well as 3D (MIP)   reconstructions.    FINDINGS:  CHEST:  LUNGS AND LARGE AIRWAYS: Mildly degraded by motion artifact. Central   airways are patent. Patchy opacity at the posterior aspect of the right   middle lobe (series 5 image 225), either infectious/inflammatory or   atelectasis. 4.0 cm subpleural bleb at the anterior aspect of the left   upper lobe (series 3 image 13).  PLEURA: Small right pleural effusion with compressive atelectasis in the   right lower lobe.  VESSELS: No pulmonary embolism. Thoracic ureter normal in caliber with   atherosclerotic changes. Ulcerated plaque in the thoracic aorta. Coronary   artery calcifications.  HEART: Heart size is normal. No pericardial effusion.  MEDIASTINUM AND LOLA: No lymphadenopathy.  CHEST WALL AND LOWER NECK: No enlarged axillary lymph nodes.    ABDOMEN AND PELVIS:  LIVER: Cyst in the right hepatic lobe and subcentimeter low-attenuation   lesions, too small to characterize.  BILE DUCTS: Normal caliber.  GALLBLADDER: Cholecystectomy. Small amount of fluid in the gallbladder   fossa. Linear foci of high density in the gallbladder fossa with no   discrete organized collection.  SPLEEN: Within normal limits.  PANCREAS: Atrophic with fatty infiltration.  ADRENALS: Within normal limits.  KIDNEYS/URETERS: No hydronephrosis. Bilateral renal cysts.    BLADDER: Limited evaluation of the pelvis secondary to streak artifact   from a left hip arthroplasty. Felipe catheter in urinary bladder. Air in   urinary bladder compatible with the instrumentation. High-density the   posterior aspect of the urinary bladder (series 3 image 95), either   calculi, calcifications in the wall, or excreted intravenous contrast.  REPRODUCTIVE ORGANS: Enlarged prostate.    BOWEL: Left inguinal hernia containing a normal caliber loop of sigmoid   colon. Ileocolic anastomosis in theright upper quadrant. No dilated   loops of small bowel and dilated colon through the distal transverse   colon with no discrete transition point, likely an ileus. Couple of tiny   droplets of free air which are likely postoperative in etiology (series 2   image 50).  PERITONEUM: Drain via a right anterior approach with the tip in the left   upper quadrant.  VESSELS: Atherosclerotic changes including ulcerated plaque in the   abdominal aorta. Abdominal aorta normal in caliber. Dilated right common   iliac artery measuring 1.5 cm.  RETROPERITONEUM/LYMPH NODES: No lymphadenopathy.  ABDOMINAL WALL: Left inguinal hernia, also described above. Simultaneous   droplet of air in the anterior abdominal wall which can be from   subcutaneous injections.  BONES: Left hip arthroplasty.    IMPRESSION:  No pulmonary embolism.    Small to moderate right pleural effusion.    Small right middle lobe opacity, either infectious/inflammatory or   atelectasis.    Status post cholecystectomy with a small amountof fluid in the   gallbladder fossa which can be postoperative in etiology.    Couple of tiny droplets of free air which are likely postoperative in   etiology.    Drain via a right anterior approach with the tip in the left upper   quadrant.    Dilated loops of small and large bowel with no discrete transition point,   likely an ileus.    Left inguinal hernia containing a normal caliber loop of sigmoid colon.    A preliminary report was provided.        --- End of Report ---            < end of copied text >  Assessment:  Elderly man admitted with acute kellee s/p lap kellee with perf of gb, aerococcus bacteremia likely of gu source. He had a lot of diarrhea while on zosyn, seems better with ctx. I think he had an adequate course for transient aerococcus bacteremia related to felipe clog and also for the cholecystitis since the gb was removed and there is no collection of any signficance. He had RRT a couple of days ago but not due to septic event. CDIF not apparent but need to monitor stool output  Plan:  stop ctx and monitor off further antibiotics CC: f/u for  aerococcus bacteremia and acute kellee  Patient reports  he is ok today  REVIEW OF SYSTEMS:  All other review of systems negative (Comprehensive ROS)    Antimicrobials Day #  :8 total  cefTRIAXone   IVPB 1000 milliGRAM(s) IV Intermittent every 24 hours    Other Medications Reviewed    T(F): 98.3 (02-06-23 @ 13:34), Max: 98.3 (02-06-23 @ 13:34)  HR: 78 (02-06-23 @ 15:35)  BP: 124/78 (02-06-23 @ 15:35)  RR: 18 (02-06-23 @ 15:35)  SpO2: 95% (02-06-23 @ 15:35)  Wt(kg): --    PHYSICAL EXAM:  General: alert, no acute distress  Eyes:  anicteric, no conjunctival injection, no discharge  Oropharynx: no lesions or injection 	  Neck: supple, without adenopathy  Lungs: clear anterior  to auscultation  Heart: regular rate and rhythm; no murmur, rubs or gallops  Abdomen: soft, nondistended, nontender, without mass or organomegaly  Skin: no lesions  Extremities: no clubbing, cyanosis, or edema  Neurologic: alert,  moves all extremities    LAB RESULTS:                        10.0   6.91  )-----------( 201      ( 06 Feb 2023 08:10 )             32.1     02-06    142  |  110<H>  |  10  ----------------------------<  102<H>  3.0<L>   |  27  |  0.52    Ca    7.5<L>      06 Feb 2023 08:10  Phos  1.6     02-06  Mg     1.7     02-06    TPro  4.9<L>  /  Alb  1.7<L>  /  TBili  0.4  /  DBili  0.1  /  AST  33  /  ALT  26  /  AlkPhos  142<H>  02-06    LIVER FUNCTIONS - ( 06 Feb 2023 08:10 )  Alb: 1.7 g/dL / Pro: 4.9 g/dL / ALK PHOS: 142 U/L / ALT: 26 U/L / AST: 33 U/L / GGT: x             MICROBIOLOGY:  RECENT CULTURES:  02-04 @ 12:00 .Blood Blood-Peripheral     No growth to date.          RADIOLOGY REVIEWED:    < from: US Duplex Venous Lower Ext Complete, Bilateral (02.06.23 @ 14:07) >    ACC: [id number] EXAM:  US DPLX LWR EXT VEINS COMPL BI   ORDERED BY: SANDI BUTT     PROCEDURE DATE:  02/06/2023          INTERPRETATION:  CLINICAL INFORMATION: Status post cholecystectomy.   New-onset A. fib.    COMPARISON: None available.    TECHNIQUE: Duplex sonography of the BILATERAL LOWER extremity veins with   color and spectral Doppler, with and without compression.    FINDINGS:    RIGHT:  Normal compressibility of the RIGHT common femoral, femoral and popliteal   veins.  Doppler examination shows normal spontaneous and phasic flow.  No RIGHT calf vein thrombosis is detected.    LEFT:  Normal compressibility of the LEFT common femoral, femoral and popliteal   veins.  Doppler examination shows normal spontaneous and phasic flow.  No LEFT calf vein thrombosis is detected.    IMPRESSION:  No evidence of deep venous thrombosis in either lower extremity.          --- End of Report ---            ABRAM PAREDES MD; Attending Radiologist  This document has been electronically signed. Feb 6 2023  2:14PM    < end of copied text >            < from: CT Abdomen and Pelvis w/ IV Cont (02.04.23 @ 22:13) >    ACC: [id number] EXAM:  CT ABDOMEN AND PELVIS IC   ORDERED BY: ANASTACIA SUNG     ACC: [id number] [facility]:  [facility]   ORDERED BY: ANASTACIA SUNG     PROCEDURE DATE:  02/04/2023          INTERPRETATION:  CLINICAL INFORMATION: New onset A. fib. Evaluate for   perforated gallbladder.    COMPARISON: None.    CONTRAST/COMPLICATIONS:  IV Contrast: Omnipaque 350  90 cc administered   10 cc discarded  Oral Contrast: NONE  Complications: None reported at time of study completion    PROCEDURE:  CT Angiography of the Chest was performed followed by portal venous phase   imaging of the Abdomen and Pelvis.  Sagittal and coronal reformats were performed as well as 3D (MIP)   reconstructions.    FINDINGS:  CHEST:  LUNGS AND LARGE AIRWAYS: Mildly degraded by motion artifact. Central   airways are patent. Patchy opacity at the posterior aspect of the right   middle lobe (series 5 image 225), either infectious/inflammatory or   atelectasis. 4.0 cm subpleural bleb at the anterior aspect of the left   upper lobe (series 3 image 13).  PLEURA: Small right pleural effusion with compressive atelectasis in the   right lower lobe.  VESSELS: No pulmonary embolism. Thoracic ureter normal in caliber with   atherosclerotic changes. Ulcerated plaque in the thoracic aorta. Coronary   artery calcifications.  HEART: Heart size is normal. No pericardial effusion.  MEDIASTINUM AND LOLA: No lymphadenopathy.  CHEST WALL AND LOWER NECK: No enlarged axillary lymph nodes.    ABDOMEN AND PELVIS:  LIVER: Cyst in the right hepatic lobe and subcentimeter low-attenuation   lesions, too small to characterize.  BILE DUCTS: Normal caliber.  GALLBLADDER: Cholecystectomy. Small amount of fluid in the gallbladder   fossa. Linear foci of high density in the gallbladder fossa with no   discrete organized collection.  SPLEEN: Within normal limits.  PANCREAS: Atrophic with fatty infiltration.  ADRENALS: Within normal limits.  KIDNEYS/URETERS: No hydronephrosis. Bilateral renal cysts.    BLADDER: Limited evaluation of the pelvis secondary to streak artifact   from a left hip arthroplasty. Felipe catheter in urinary bladder. Air in   urinary bladder compatible with the instrumentation. High-density the   posterior aspect of the urinary bladder (series 3 image 95), either   calculi, calcifications in the wall, or excreted intravenous contrast.  REPRODUCTIVE ORGANS: Enlarged prostate.    BOWEL: Left inguinal hernia containing a normal caliber loop of sigmoid   colon. Ileocolic anastomosis in theright upper quadrant. No dilated   loops of small bowel and dilated colon through the distal transverse   colon with no discrete transition point, likely an ileus. Couple of tiny   droplets of free air which are likely postoperative in etiology (series 2   image 50).  PERITONEUM: Drain via a right anterior approach with the tip in the left   upper quadrant.  VESSELS: Atherosclerotic changes including ulcerated plaque in the   abdominal aorta. Abdominal aorta normal in caliber. Dilated right common   iliac artery measuring 1.5 cm.  RETROPERITONEUM/LYMPH NODES: No lymphadenopathy.  ABDOMINAL WALL: Left inguinal hernia, also described above. Simultaneous   droplet of air in the anterior abdominal wall which can be from   subcutaneous injections.  BONES: Left hip arthroplasty.    IMPRESSION:  No pulmonary embolism.    Small to moderate right pleural effusion.    Small right middle lobe opacity, either infectious/inflammatory or   atelectasis.    Status post cholecystectomy with a small amountof fluid in the   gallbladder fossa which can be postoperative in etiology.    Couple of tiny droplets of free air which are likely postoperative in   etiology.    Drain via a right anterior approach with the tip in the left upper   quadrant.    Dilated loops of small and large bowel with no discrete transition point,   likely an ileus.    Left inguinal hernia containing a normal caliber loop of sigmoid colon.    A preliminary report was provided.        --- End of Report ---            < end of copied text >  Assessment:  Elderly man admitted with acute kellee s/p lap kellee with perf of gb, aerococcus bacteremia likely of gu source. He had a lot of diarrhea while on zosyn, seems better with ctx. I think he had an adequate course for transient aerococcus bacteremia related to felipe clog and also for the cholecystitis since the gb was removed and there is no collection of any signficance. He had RRT a couple of days ago but not due to septic event. CDIF not apparent but need to monitor stool output  Plan:  stop ctx and monitor off further antibiotics CC: f/u for  aerococcus bacteremia and acute kellee  Patient reports  he is ok today  REVIEW OF SYSTEMS:  All other review of systems negative (Comprehensive ROS)    Antimicrobials Day #  :8 total  cefTRIAXone   IVPB 1000 milliGRAM(s) IV Intermittent every 24 hours    Other Medications Reviewed    T(F): 98.3 (02-06-23 @ 13:34), Max: 98.3 (02-06-23 @ 13:34)  HR: 78 (02-06-23 @ 15:35)  BP: 124/78 (02-06-23 @ 15:35)  RR: 18 (02-06-23 @ 15:35)  SpO2: 95% (02-06-23 @ 15:35)  Wt(kg): --    PHYSICAL EXAM:  General: alert, no acute distress  Eyes:  anicteric, no conjunctival injection, no discharge  Oropharynx: no lesions or injection 	  Neck: supple, without adenopathy  Lungs: clear anterior  to auscultation  Heart: regular rate and rhythm; no murmur, rubs or gallops  Abdomen: soft, nondistended, nontender, without mass or organomegaly  Skin: no lesions  Extremities: no clubbing, cyanosis, or edema  Neurologic: alert,  moves all extremities    LAB RESULTS:                        10.0   6.91  )-----------( 201      ( 06 Feb 2023 08:10 )             32.1     02-06    142  |  110<H>  |  10  ----------------------------<  102<H>  3.0<L>   |  27  |  0.52    Ca    7.5<L>      06 Feb 2023 08:10  Phos  1.6     02-06  Mg     1.7     02-06    TPro  4.9<L>  /  Alb  1.7<L>  /  TBili  0.4  /  DBili  0.1  /  AST  33  /  ALT  26  /  AlkPhos  142<H>  02-06    LIVER FUNCTIONS - ( 06 Feb 2023 08:10 )  Alb: 1.7 g/dL / Pro: 4.9 g/dL / ALK PHOS: 142 U/L / ALT: 26 U/L / AST: 33 U/L / GGT: x             MICROBIOLOGY:  RECENT CULTURES:  02-04 @ 12:00 .Blood Blood-Peripheral     No growth to date.          RADIOLOGY REVIEWED:    < from: US Duplex Venous Lower Ext Complete, Bilateral (02.06.23 @ 14:07) >    ACC: [id number] EXAM:  US DPLX LWR EXT VEINS COMPL BI   ORDERED BY: SANDI BUTT     PROCEDURE DATE:  02/06/2023          INTERPRETATION:  CLINICAL INFORMATION: Status post cholecystectomy.   New-onset A. fib.    COMPARISON: None available.    TECHNIQUE: Duplex sonography of the BILATERAL LOWER extremity veins with   color and spectral Doppler, with and without compression.    FINDINGS:    RIGHT:  Normal compressibility of the RIGHT common femoral, femoral and popliteal   veins.  Doppler examination shows normal spontaneous and phasic flow.  No RIGHT calf vein thrombosis is detected.    LEFT:  Normal compressibility of the LEFT common femoral, femoral and popliteal   veins.  Doppler examination shows normal spontaneous and phasic flow.  No LEFT calf vein thrombosis is detected.    IMPRESSION:  No evidence of deep venous thrombosis in either lower extremity.          --- End of Report ---            ABRAM PAREDES MD; Attending Radiologist  This document has been electronically signed. Feb 6 2023  2:14PM    < end of copied text >            < from: CT Abdomen and Pelvis w/ IV Cont (02.04.23 @ 22:13) >    ACC: [id number] EXAM:  CT ABDOMEN AND PELVIS IC   ORDERED BY: ANASTACIA SUNG     ACC: [id number] [facility]:  [facility]   ORDERED BY: ANASTACIA SUNG     PROCEDURE DATE:  02/04/2023          INTERPRETATION:  CLINICAL INFORMATION: New onset A. fib. Evaluate for   perforated gallbladder.    COMPARISON: None.    CONTRAST/COMPLICATIONS:  IV Contrast: Omnipaque 350  90 cc administered   10 cc discarded  Oral Contrast: NONE  Complications: None reported at time of study completion    PROCEDURE:  CT Angiography of the Chest was performed followed by portal venous phase   imaging of the Abdomen and Pelvis.  Sagittal and coronal reformats were performed as well as 3D (MIP)   reconstructions.    FINDINGS:  CHEST:  LUNGS AND LARGE AIRWAYS: Mildly degraded by motion artifact. Central   airways are patent. Patchy opacity at the posterior aspect of the right   middle lobe (series 5 image 225), either infectious/inflammatory or   atelectasis. 4.0 cm subpleural bleb at the anterior aspect of the left   upper lobe (series 3 image 13).  PLEURA: Small right pleural effusion with compressive atelectasis in the   right lower lobe.  VESSELS: No pulmonary embolism. Thoracic ureter normal in caliber with   atherosclerotic changes. Ulcerated plaque in the thoracic aorta. Coronary   artery calcifications.  HEART: Heart size is normal. No pericardial effusion.  MEDIASTINUM AND LOLA: No lymphadenopathy.  CHEST WALL AND LOWER NECK: No enlarged axillary lymph nodes.    ABDOMEN AND PELVIS:  LIVER: Cyst in the right hepatic lobe and subcentimeter low-attenuation   lesions, too small to characterize.  BILE DUCTS: Normal caliber.  GALLBLADDER: Cholecystectomy. Small amount of fluid in the gallbladder   fossa. Linear foci of high density in the gallbladder fossa with no   discrete organized collection.  SPLEEN: Within normal limits.  PANCREAS: Atrophic with fatty infiltration.  ADRENALS: Within normal limits.  KIDNEYS/URETERS: No hydronephrosis. Bilateral renal cysts.    BLADDER: Limited evaluation of the pelvis secondary to streak artifact   from a left hip arthroplasty. Felipe catheter in urinary bladder. Air in   urinary bladder compatible with the instrumentation. High-density the   posterior aspect of the urinary bladder (series 3 image 95), either   calculi, calcifications in the wall, or excreted intravenous contrast.  REPRODUCTIVE ORGANS: Enlarged prostate.    BOWEL: Left inguinal hernia containing a normal caliber loop of sigmoid   colon. Ileocolic anastomosis in theright upper quadrant. No dilated   loops of small bowel and dilated colon through the distal transverse   colon with no discrete transition point, likely an ileus. Couple of tiny   droplets of free air which are likely postoperative in etiology (series 2   image 50).  PERITONEUM: Drain via a right anterior approach with the tip in the left   upper quadrant.  VESSELS: Atherosclerotic changes including ulcerated plaque in the   abdominal aorta. Abdominal aorta normal in caliber. Dilated right common   iliac artery measuring 1.5 cm.  RETROPERITONEUM/LYMPH NODES: No lymphadenopathy.  ABDOMINAL WALL: Left inguinal hernia, also described above. Simultaneous   droplet of air in the anterior abdominal wall which can be from   subcutaneous injections.  BONES: Left hip arthroplasty.    IMPRESSION:  No pulmonary embolism.    Small to moderate right pleural effusion.    Small right middle lobe opacity, either infectious/inflammatory or   atelectasis.    Status post cholecystectomy with a small amountof fluid in the   gallbladder fossa which can be postoperative in etiology.    Couple of tiny droplets of free air which are likely postoperative in   etiology.    Drain via a right anterior approach with the tip in the left upper   quadrant.    Dilated loops of small and large bowel with no discrete transition point,   likely an ileus.    Left inguinal hernia containing a normal caliber loop of sigmoid colon.    A preliminary report was provided.        --- End of Report ---            < end of copied text >  Assessment:  Elderly man admitted with acute kellee s/p lap kellee with perf of gb, aerococcus bacteremia likely of gu source. He had a lot of diarrhea while on zosyn, seems better with ctx. I think he had an adequate course for transient aerococcus bacteremia related to felipe clog and also for the cholecystitis since the gb was removed and there is no collection of any signficance. He had RRT a couple of days ago but not due to septic event. CDIF not apparent but need to monitor stool output  Plan:  stop ctx and monitor off further antibiotics

## 2025-05-04 NOTE — ED ADULT TRIAGE NOTE - MEANS OF ARRIVAL
79 yo male with h/o lumbar spine osteomyelitis, HTN, DM, subdural hematoma presented with altered mental status, found to have bacteremia and cirrhosis.     Fever  -low grade fever  -given pt's poor vascular status, anasarca, hard stick, obtain blood culture was extremely difficult  -will start abx for empiric coverage, ID consult    AMS  -2/2 underlying disease, metabolic encephalopathy  -lethargic    Decompensated cirrhosis       portal hypertension      Coagulopathy      Hypoalbuminemia       hepatic encephalopathy  -on rifaximin, lactulose  -s/p albumin given diffuse edema on 4/30, 5/2    Anemia  -likely multifactorial: sepsis, cirrhosis, poor nutrition, GI bleeding  -s/p EGD on 4/16 which showed 5mm ulcer in duodenal bulb post endo clip, repeat EGD on 4/29 showed no bleeding noted  -will monitor for GI bleeding  -Hb<6.7, will transfuse one unit of PRBC     Bacteremia  -blood culture positive for bacteroides   -s/p ceftriaxone and flagyl  -repeat blood culture negative    Deconditioning  -PT  -OOB    Handoff: clinical improvement stretcher
